# Patient Record
Sex: FEMALE | Race: WHITE | NOT HISPANIC OR LATINO | Employment: OTHER | URBAN - METROPOLITAN AREA
[De-identification: names, ages, dates, MRNs, and addresses within clinical notes are randomized per-mention and may not be internally consistent; named-entity substitution may affect disease eponyms.]

---

## 2017-01-16 ENCOUNTER — ALLSCRIPTS OFFICE VISIT (OUTPATIENT)
Dept: OTHER | Facility: OTHER | Age: 67
End: 2017-01-16

## 2017-01-16 ENCOUNTER — LAB REQUISITION (OUTPATIENT)
Dept: LAB | Facility: HOSPITAL | Age: 67
End: 2017-01-16
Payer: MEDICARE

## 2017-01-16 DIAGNOSIS — Z12.4 ENCOUNTER FOR SCREENING FOR MALIGNANT NEOPLASM OF CERVIX: ICD-10-CM

## 2017-01-16 DIAGNOSIS — N95.0 POSTMENOPAUSAL BLEEDING: ICD-10-CM

## 2017-01-16 PROCEDURE — G0145 SCR C/V CYTO,THINLAYER,RESCR: HCPCS | Performed by: OBSTETRICS & GYNECOLOGY

## 2017-01-16 PROCEDURE — 88305 TISSUE EXAM BY PATHOLOGIST: CPT | Performed by: OBSTETRICS & GYNECOLOGY

## 2017-01-23 ENCOUNTER — GENERIC CONVERSION - ENCOUNTER (OUTPATIENT)
Dept: OTHER | Facility: OTHER | Age: 67
End: 2017-01-23

## 2017-01-23 LAB
LAB AP GYN PRIMARY INTERPRETATION: NORMAL
Lab: NORMAL

## 2017-04-10 ENCOUNTER — GENERIC CONVERSION - ENCOUNTER (OUTPATIENT)
Dept: OTHER | Facility: OTHER | Age: 67
End: 2017-04-10

## 2017-04-10 ENCOUNTER — APPOINTMENT (OUTPATIENT)
Dept: LAB | Facility: HOSPITAL | Age: 67
End: 2017-04-10
Payer: MEDICARE

## 2017-04-10 ENCOUNTER — ALLSCRIPTS OFFICE VISIT (OUTPATIENT)
Dept: OTHER | Facility: OTHER | Age: 67
End: 2017-04-10

## 2017-04-10 ENCOUNTER — TRANSCRIBE ORDERS (OUTPATIENT)
Dept: LAB | Facility: HOSPITAL | Age: 67
End: 2017-04-10

## 2017-04-10 DIAGNOSIS — E78.5 HYPERLIPIDEMIA: ICD-10-CM

## 2017-04-10 DIAGNOSIS — I10 ESSENTIAL (PRIMARY) HYPERTENSION: ICD-10-CM

## 2017-04-10 DIAGNOSIS — E55.9 VITAMIN D DEFICIENCY: ICD-10-CM

## 2017-04-10 DIAGNOSIS — E66.9 OBESITY: ICD-10-CM

## 2017-04-10 DIAGNOSIS — R73.01 IMPAIRED FASTING GLUCOSE: ICD-10-CM

## 2017-04-10 LAB
25(OH)D3 SERPL-MCNC: 42.7 NG/ML (ref 30–100)
ALBUMIN SERPL BCP-MCNC: 4.3 G/DL (ref 3.5–5)
ALP SERPL-CCNC: 85 U/L (ref 46–116)
ALT SERPL W P-5'-P-CCNC: 36 U/L (ref 12–78)
AST SERPL W P-5'-P-CCNC: 25 U/L (ref 5–45)
BASOPHILS # BLD AUTO: 0.04 THOUSANDS/ΜL (ref 0–0.1)
BASOPHILS NFR BLD AUTO: 1 % (ref 0–1)
BILIRUB SERPL-MCNC: 0.54 MG/DL (ref 0.2–1)
BUN SERPL-MCNC: 23 MG/DL (ref 5–25)
CALCIUM SERPL-MCNC: 9.4 MG/DL (ref 8.3–10.1)
CHLORIDE SERPL-SCNC: 106 MMOL/L (ref 100–108)
CHOLEST SERPL-MCNC: 209 MG/DL (ref 50–200)
CREAT SERPL-MCNC: 0.75 MG/DL (ref 0.6–1.3)
CREAT UR-MCNC: 111 MG/DL
EOSINOPHIL # BLD AUTO: 0.23 THOUSAND/ΜL (ref 0–0.61)
EOSINOPHIL NFR BLD AUTO: 4 % (ref 0–6)
ERYTHROCYTE [DISTWIDTH] IN BLOOD BY AUTOMATED COUNT: 13.2 % (ref 11.6–15.1)
EST. AVERAGE GLUCOSE BLD GHB EST-MCNC: 137 MG/DL
GFR SERPL CREATININE-BSD FRML MDRD: >60 ML/MIN/1.73SQ M
GLUCOSE P FAST SERPL-MCNC: 99 MG/DL (ref 65–99)
HBA1C MFR BLD: 6.4 % (ref 4.2–6.3)
HCT VFR BLD AUTO: 40.7 % (ref 34.8–46.1)
HDLC SERPL-MCNC: 60 MG/DL (ref 40–60)
HGB BLD-MCNC: 13.7 G/DL (ref 11.5–15.4)
LDLC SERPL CALC-MCNC: 125 MG/DL (ref 0–100)
LYMPHOCYTES # BLD AUTO: 1.79 THOUSANDS/ΜL (ref 0.6–4.47)
LYMPHOCYTES NFR BLD AUTO: 32 % (ref 14–44)
MCH RBC QN AUTO: 31.6 PG (ref 26.8–34.3)
MCHC RBC AUTO-ENTMCNC: 33.7 G/DL (ref 31.4–37.4)
MCV RBC AUTO: 94 FL (ref 82–98)
MICROALBUMIN UR-MCNC: 24.3 MG/L (ref 0–20)
MICROALBUMIN/CREAT 24H UR: 22 MG/G CREATININE (ref 0–30)
MONOCYTES # BLD AUTO: 0.5 THOUSAND/ΜL (ref 0.17–1.22)
MONOCYTES NFR BLD AUTO: 9 % (ref 4–12)
NEUTROPHILS # BLD AUTO: 3.06 THOUSANDS/ΜL (ref 1.85–7.62)
NEUTS SEG NFR BLD AUTO: 54 % (ref 43–75)
NRBC BLD AUTO-RTO: 0 /100 WBCS
PLATELET # BLD AUTO: 257 THOUSANDS/UL (ref 149–390)
PMV BLD AUTO: 9.6 FL (ref 8.9–12.7)
POTASSIUM SERPL-SCNC: 4.7 MMOL/L (ref 3.5–5.3)
PROT SERPL-MCNC: 8.1 G/DL (ref 6.4–8.2)
RBC # BLD AUTO: 4.33 MILLION/UL (ref 3.81–5.12)
SODIUM SERPL-SCNC: 139 MMOL/L (ref 136–145)
TRIGL SERPL-MCNC: 122 MG/DL
TSH SERPL DL<=0.05 MIU/L-ACNC: 2.56 UIU/ML (ref 0.36–3.74)
WBC # BLD AUTO: 5.63 THOUSAND/UL (ref 4.31–10.16)

## 2017-04-10 PROCEDURE — 85025 COMPLETE CBC W/AUTO DIFF WBC: CPT

## 2017-04-10 PROCEDURE — 82570 ASSAY OF URINE CREATININE: CPT

## 2017-04-10 PROCEDURE — 82043 UR ALBUMIN QUANTITATIVE: CPT

## 2017-04-10 PROCEDURE — 80053 COMPREHEN METABOLIC PANEL: CPT

## 2017-04-10 PROCEDURE — 82306 VITAMIN D 25 HYDROXY: CPT

## 2017-04-10 PROCEDURE — 80061 LIPID PANEL: CPT

## 2017-04-10 PROCEDURE — 83036 HEMOGLOBIN GLYCOSYLATED A1C: CPT

## 2017-04-10 PROCEDURE — 36415 COLL VENOUS BLD VENIPUNCTURE: CPT

## 2017-04-10 PROCEDURE — 84443 ASSAY THYROID STIM HORMONE: CPT

## 2017-04-11 ENCOUNTER — GENERIC CONVERSION - ENCOUNTER (OUTPATIENT)
Dept: OTHER | Facility: OTHER | Age: 67
End: 2017-04-11

## 2017-04-11 ENCOUNTER — ALLSCRIPTS OFFICE VISIT (OUTPATIENT)
Dept: OTHER | Facility: OTHER | Age: 67
End: 2017-04-11

## 2017-05-04 ENCOUNTER — GENERIC CONVERSION - ENCOUNTER (OUTPATIENT)
Dept: OTHER | Facility: OTHER | Age: 67
End: 2017-05-04

## 2017-05-04 LAB
LEFT EYE DIABETIC RETINOPATHY: NORMAL
RIGHT EYE DIABETIC RETINOPATHY: NORMAL

## 2017-07-12 ENCOUNTER — ALLSCRIPTS OFFICE VISIT (OUTPATIENT)
Dept: OTHER | Facility: OTHER | Age: 67
End: 2017-07-12

## 2017-10-02 DIAGNOSIS — R73.01 IMPAIRED FASTING GLUCOSE: ICD-10-CM

## 2017-10-02 DIAGNOSIS — I10 ESSENTIAL (PRIMARY) HYPERTENSION: ICD-10-CM

## 2017-10-02 DIAGNOSIS — E66.9 OBESITY: ICD-10-CM

## 2017-10-02 DIAGNOSIS — E78.5 HYPERLIPIDEMIA: ICD-10-CM

## 2017-10-11 ENCOUNTER — GENERIC CONVERSION - ENCOUNTER (OUTPATIENT)
Dept: OTHER | Facility: OTHER | Age: 67
End: 2017-10-11

## 2017-10-11 ENCOUNTER — APPOINTMENT (OUTPATIENT)
Dept: LAB | Facility: HOSPITAL | Age: 67
End: 2017-10-11
Payer: MEDICARE

## 2017-10-11 DIAGNOSIS — I10 ESSENTIAL (PRIMARY) HYPERTENSION: ICD-10-CM

## 2017-10-11 DIAGNOSIS — E78.5 HYPERLIPIDEMIA: ICD-10-CM

## 2017-10-11 DIAGNOSIS — R73.01 IMPAIRED FASTING GLUCOSE: ICD-10-CM

## 2017-10-11 DIAGNOSIS — E66.9 OBESITY: ICD-10-CM

## 2017-10-11 LAB
ALBUMIN SERPL BCP-MCNC: 3.8 G/DL (ref 3.5–5)
ALP SERPL-CCNC: 88 U/L (ref 46–116)
ALT SERPL W P-5'-P-CCNC: 46 U/L (ref 12–78)
ANION GAP SERPL CALCULATED.3IONS-SCNC: 6 MMOL/L (ref 4–13)
AST SERPL W P-5'-P-CCNC: 41 U/L (ref 5–45)
BILIRUB SERPL-MCNC: 0.64 MG/DL (ref 0.2–1)
BUN SERPL-MCNC: 17 MG/DL (ref 5–25)
CALCIUM SERPL-MCNC: 9.4 MG/DL (ref 8.3–10.1)
CHLORIDE SERPL-SCNC: 106 MMOL/L (ref 100–108)
CHOLEST SERPL-MCNC: 205 MG/DL (ref 50–200)
CO2 SERPL-SCNC: 26 MMOL/L (ref 21–32)
CREAT SERPL-MCNC: 0.72 MG/DL (ref 0.6–1.3)
EST. AVERAGE GLUCOSE BLD GHB EST-MCNC: 143 MG/DL
GFR SERPL CREATININE-BSD FRML MDRD: 86 ML/MIN/1.73SQ M
GLUCOSE P FAST SERPL-MCNC: 114 MG/DL (ref 65–99)
HBA1C MFR BLD: 6.6 % (ref 4.2–6.3)
HDLC SERPL-MCNC: 58 MG/DL (ref 40–60)
LDLC SERPL CALC-MCNC: 120 MG/DL (ref 0–100)
POTASSIUM SERPL-SCNC: 4.1 MMOL/L (ref 3.5–5.3)
PROT SERPL-MCNC: 8.5 G/DL (ref 6.4–8.2)
SODIUM SERPL-SCNC: 138 MMOL/L (ref 136–145)
TRIGL SERPL-MCNC: 137 MG/DL

## 2017-10-11 PROCEDURE — 83036 HEMOGLOBIN GLYCOSYLATED A1C: CPT

## 2017-10-11 PROCEDURE — 36415 COLL VENOUS BLD VENIPUNCTURE: CPT

## 2017-10-11 PROCEDURE — 80053 COMPREHEN METABOLIC PANEL: CPT

## 2017-10-11 PROCEDURE — 80061 LIPID PANEL: CPT

## 2017-10-12 ENCOUNTER — ALLSCRIPTS OFFICE VISIT (OUTPATIENT)
Dept: OTHER | Facility: OTHER | Age: 67
End: 2017-10-12

## 2017-10-13 NOTE — PROGRESS NOTES
Assessment  1  Diabetes mellitus type 2, controlled (250 00) (E11 9)   2  Hyperlipidemia (272 4) (E78 5)   3  Benign essential hypertension (401 1) (I10)   4  Morbid obesity with body mass index of 40 0-49 9 (278 01) (E66 01)   5  Medicare annual wellness visit, subsequent (V70 0) (Z00 00)    Plan  Benign essential hypertension    · Losartan Potassium 100 MG Oral Tablet; TAKE 1 TABLET DAILY  Benign essential hypertension, Diabetes mellitus type 2, controlled, Hyperlipidemia,  Morbid obesity with body mass index of 40 0-49 9    · (1) CBC/PLT/DIFF; Status:Active; Requested for:02Apr2018;    · (1) COMPREHENSIVE METABOLIC PANEL; Status:Active; Requested for:02Apr2018;    · (1) HEMOGLOBIN A1C; Status:Active; Requested for:02Apr2018;    · (1) LIPID PANEL FASTING W DIRECT LDL REFLEX; Status:Active; Requested  for:02Apr2018;    · (1) MICROALBUMIN CREATININE RATIO, RANDOM URINE; Status:Active; Requested  for:02Apr2018;    · (1) TSH WITH FT4 REFLEX; Status:Active; Requested for:02Apr2018;   Diabetes mellitus type 2, controlled    · MetFORMIN HCl - 500 MG Oral Tablet; take 1 tablet every twelve hours   · *VB - Foot Exam; Status:Complete;   Done: 39SRM9136 02:33PM  Hyperlipidemia    · Simvastatin 20 MG Oral Tablet;  Take 1 tablet daily  Medicare annual wellness visit, subsequent    · *VB - Fall Risk Assessment  (Dx Z13 89 Screen for Neurologic Disorder);  Status:Complete;   Done: 46XWG8365 01:29PM   · *VB-Depression Screening; Status:Complete;   Done: 52CUL1118 01:30PM  Morbid obesity with body mass index of 40 0-49 9    · 1 Alfreda Montgomery MD, Jenaro Farooq (Internal Medicine) Co-Management  *  Status: Active  Requested  for: 21GXV7939  Care Summary provided  : Yes   · Keep a diary of when and what you eat ; Status:Complete;   Done: 44SOY4716 02:34PM   · Some eating tips that can help you lose weight ; Status:Complete;   Done: 47TSB9200  02:34PM   · We recommend that you bring your body mass index down to 26 ; Status:Complete;    Done: 98AMU1860 02:34PM  Vitamin D deficiency    · Vitamin D-3 5000 UNIT TABS; Take one capsule daily  Unlinked    · Calcium TABS   · Fish Oil OIL   · Osteo Bi-Flex Joint Shield Oral Tablet    Discussion/Summary    Reviewed lab in 10/63910 6 Low carb diet  Advised pt to take metformin  SE educated pt  ok205/137/58/120 low fat diet  other meds  weight! Refer to wt loss program  flu shot this season  pneumovax at age of 70  Got prevnar 13 2/2016zostavax in 3/2017 per pt  GYN Dr Anitra Santiago for mammogram and pap  Had mammogram 11/2016 negative  Pap 2017, negative per pt  colonoscopy 2012 Dr Evelyn Reno  Due for it  Will make an appt per pt  scan 11/2016 normal  in 6 months  Possible side effects of new medications were reviewed with the patient/guardian today  The treatment plan was reviewed with the patient/guardian  The patient/guardian understands and agrees with the treatment plan      Chief Complaint  Patient presents for a 6 month follow up      History of Present Illness  Pt is here by herself  HgA1C 6 5 Does not take metformin now  neuropathy  Denies hypoglycemia  Does not check BS at home  opthalmology 5/2017 Dr Mamadou Knapp  No retinopathy  orthopedics for knee pain/arthritis  Got cortisone shots which helped some  dermatology as needed for 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  Got steroid shot which helped a lot  obesity---BMI 47 86 today  by herself  Does all ADL's  Still drive  recent falls  score 9 in office  Pt Denies depression  A lot of stress per pt  The patient states her hyperlipidemia has been stable since the last visit  Comorbid Illnesses: diabetes mellitus-and-hypertension  Symptoms: The patient is currently asymptomatic  Medications: the patient is adherent with her medication regimen -She denies medication side effects  the patient's LDL goal is 130 mg/dL  -the patient's last LDL was 120 mg/dL -10/2017  The patient presents for follow-up of essential hypertension   The patient states she has been doing well with her blood pressure control since the last visit  She has no comorbid illnesses  Symptoms: The patient is currently asymptomatic  Home monitoring: The patient is not checking blood pressure at home  Medications: the patient is adherent with her medication regimen -She denies medication side effects  Review of Systems    Constitutional: No fever, no chills, feels well, no tiredness, no recent weight gain or weight loss  ENT: no complaints of earache, no loss of hearing, no nose bleeds, no nasal discharge, no sore throat, no hoarseness  Cardiovascular: No complaints of slow heart rate, no fast heart rate, no chest pain, no palpitations, no leg claudication, no lower extremity edema  Respiratory: No complaints of shortness of breath, no wheezing, no cough, no SOB on exertion, no orthopnea, no PND  Gastrointestinal: No complaints of abdominal pain, no constipation, no nausea or vomiting, no diarrhea, no bloody stools  Musculoskeletal: No complaints of arthralgias, no myalgias, no joint swelling or stiffness, no limb pain or swelling  Active Problems  1  Benign essential hypertension (401 1) (I10)   2  Cervical cancer screening (V76 2) (Z12 4)   3  Cervical radiculopathy (723 4) (M54 12)   4  Chronic lower back pain (724 2,338 29) (M54 5,G89 29)   5  Encounter for routine gynecological examination (V72 31) (Z01 419)   6  Encounter for screening mammogram for breast cancer (V76 12) (Z12 31)   7  Hyperlipidemia (272 4) (E78 5)   8  Left knee pain (719 46) (M25 562)   9  Lumbar stenosis with neurogenic claudication (724 03) (M48 062)   10  Need for shingles vaccine (V04 89) (Z23)   11  Need for vaccination with 13-polyvalent pneumococcal conjugate vaccine (V03 82) (Z23)   12  Osteoarthrosis (715 90) (M19 90)   13  Post-menopausal bleeding (627 1) (N95 0)   14  Primary osteoarthritis of both knees (715 16) (M17 0)   15  Psoriasis (696 1) (L40 9)   16  Right knee pain (719 46) (M25 561)   17   Right shoulder pain (719 41) (M25 511)   18  Screening for osteoporosis (V82 81) (Z13 820)   19  Spondylolisthesis, lumbar region (738 4) (M43 16)   20  Vaginal lump (625 8) (N94 9)   21  Vitamin D deficiency (268 9) (E55 9)    Past Medical History  1  History of Calcaneal spur (726 73) (M77 30)   2  History of Elevated ALT measurement (790 4) (R74 0)   3  History of alopecia (V13 89) (Z87 898)   4  History of Pes planus, unspecified laterality (734) (M21 40)   5  History of Plantar fasciitis (728 71) (M72 2)   6  History of Pure hypercholesterolemia (272 0) (E78 00)    Surgical History  1  History of Colonoscopy (Fiberoptic)   2  History of Incisional Breast Biopsy    Family History  Mother    1  Family history of Diabetes Mellitus (V18 0)  Brother    2  Family history of Colon Cancer (V16 0)    Social History   · Being A Social Drinker   · Never A Smoker    Current Meds   1  Calcium TABS; Therapy: (Recorded:89Dfw5026) to Recorded   2  Fish Oil OIL; Therapy: (Recorded:12Oct2017) to Recorded   3  Losartan Potassium 100 MG Oral Tablet; TAKE 1 TABLET DAILY; Therapy: 09Apr2012 to (Evaluate:06Apr2018)  Requested for: 90Blm5861; Last   Rx:11Apr2017 Ordered   4  MetFORMIN HCl - 500 MG Oral Tablet; take 1 tablet every twelve hours; Therapy: 72Rqn2993 to (Evaluate:08Oct2017); Last Rx:18Gnf8892 Ordered   5  Osteo Bi-Flex Joint Shield Oral Tablet; Therapy: (Recorded:12Oct2017) to Recorded   6  Simvastatin 20 MG Oral Tablet; Take 1 tablet daily; Therapy: 03DQI2771 to (Evaluate:06Apr2018)  Requested for: 49Xth0090; Last   Rx:14Bvz8251 Ordered   7  Vitamin D-3 5000 UNIT TABS; Take one capsule daily; Therapy: 77Oyr8794 to (Evaluate:10Tsz5857); Last Rx:61Dgz8475 Ordered    Allergies  1  Amoxicillin TABS   2  Bactrim TABS   3  Procardia CAPS   4  Augmentin TABS   5   Floxin TABS    Vitals  Vital Signs    Recorded: 68IMX7940 01:17PM   Temperature 98 6 F, Tympanic   Heart Rate 68, L Radial   Pulse Quality Normal, L Radial Respiration Quality Normal   Respiration 16   Systolic 213, LUE, Sitting   Diastolic 78, LUE, Sitting   Height 4 ft 10 in   Weight 229 lb    BMI Calculated 47 86   BSA Calculated 1 93   Pain Scale 3     Physical Exam    Constitutional   General appearance: No acute distress, well appearing and well nourished  Pulmonary   Respiratory effort: No increased work of breathing or signs of respiratory distress  Auscultation of lungs: Clear to auscultation  Cardiovascular   Auscultation of heart: Normal rate and rhythm, normal S1 and S2, without murmurs  Examination of extremities for edema and/or varicosities: Normal     Carotid pulses: Normal     Abdomen   Abdomen: Non-tender, no masses  Liver and spleen: No hepatomegaly or splenomegaly  Lymphatic   Palpation of lymph nodes in neck: No lymphadenopathy  Musculoskeletal   Gait and station: Normal         Socks and shoes removed, Right Foot Findings: normal foot, no swelling, no erythema  Normal tactile sensation with monofilament testing throughout the right foot  Socks and shoes removed, Left Foot Findings: normal foot, no swelling, no erythema  Normal tactile sensation with monofilament testing throughout the left foot  Pulses:   2+ in the dorsalis pedis on the right  Pulses:   2+ in the dorsalis pedis on the left        Results/Data  *VB-Depression Screening 27DMW3797 01:30PM Ludie Hands     Test Name Result Flag Reference   Depression Scale Result      Depression Screen - Negative For Symptoms     *VB - Fall Risk Assessment  (Dx Z13 89 Screen for Neurologic Disorder) 35VQK9948 01:29PM Ludie Hands     Test Name Result Flag Reference   Falls Risk      No falls in the past year     (1) COMPREHENSIVE METABOLIC PANEL 97NGO3097 79:57LX Amaury Gama Order Number: [id number]     Test Name Result Flag Reference   SODIUM 138 mmol/L  136-145   POTASSIUM 4 1 mmol/L  3 5-5 3   CHLORIDE 106 mmol/L  100-108   CARBON DIOXIDE 26 mmol/L  21-32 ANION GAP (CALC) 6 mmol/L  4-13   BLOOD UREA NITROGEN 17 mg/dL  5-25   CREATININE 0 72 mg/dL  0 60-1 30   Standardized to IDMS reference method   CALCIUM 9 4 mg/dL  8 3-10 1   BILI, TOTAL 0 64 mg/dL  0 20-1 00   ALK PHOSPHATAS 88 U/L     ALT (SGPT) 46 U/L  12-78   Specimen collection should occur prior to Sulfasalazine and/or Sulfapyridine administration due to the potential for falsely depressed results  AST(SGOT) 41 U/L  5-45   Specimen collection should occur prior to Sulfasalazine administration due to the potential for falsely depressed results  ALBUMIN 3 8 g/dL  3 5-5 0   TOTAL PROTEIN 8 5 g/dL H 6 4-8 2   eGFR 86 ml/min/1 73sq m     National Kidney Disease Education Program recommendations are as follows:  GFR calculation is accurate only with a steady state creatinine  Chronic Kidney disease less than 60 ml/min/1 73 sq  meters  Kidney failure less than 15 ml/min/1 73 sq  meters  GLUCOSE FASTING 114 mg/dL H 65-99   Specimen collection should occur prior to Sulfasalazine administration due to the potential for falsely depressed results  Specimen collection should occur prior to Sulfapyridine administration due to the potential for falsely elevated results  (1) HEMOGLOBIN A1C 11Oct2017 02:31PM Choco Cowden    Order Number: [id number]     Test Name Result Flag Reference   HEMOGLOBIN A1C 6 6 % H 4 2-6 3   EST  AVG  GLUCOSE 143 mg/dl       (1) LIPID PANEL FASTING W DIRECT LDL REFLEX 11Oct2017 02:31P Choco Cowden    Order Number: [id number]     Test Name Result Flag Reference   CHOLESTEROL 205 mg/dL H    LDL CHOLESTEROL CALCULATED 120 mg/dL H 0-100   - Patient Instructions:  This is a fasting blood test  Water, black tea or black coffee only after 9:00pm the night before test   Drink 2 glasses of water the morning of test       Triglyceride:        Normal <150 mg/dl   Borderline High 150-199 mg/dl   High 200-499 mg/dl   Very High >499 mg/dl      Cholesterol: Desirable <200 mg/dl    Borderline High 200-239 mg/dl    High >239 mg/dl      HDL Cholesterol:       High>59 mg/dL    Low <41 mg/dL      HDL Cholesterol:       High>59 mg/dL    Low <41 mg/dL      This screening LDL is a calculated result  It does not have the accuracy of the Direct Measured LDL in the monitoring of patients with hyperlipidemia and/or statin therapy  Direct Measure LDL (MJV393) must be ordered separately in these patients  TRIGLYCERIDES 137 mg/dL  <=150   Specimen collection should occur prior to N-Acetylcysteine or Metamizole administration due to the potential for falsely depressed results  HDL,DIRECT 58 mg/dL  40-60   Specimen collection should occur prior to Metamizole administration due to the potential for falsley depressed results  Health Management  Health Maintenance   *VB - Eye Exam; every 1 year; Last 72AXJ4658; Next Due: 98OIL9879; Active    Future Appointments    Date/Time Provider Specialty Site   10/31/2017 01:30 PM PREET Obrien  Orthopedic Surgery [address]     Signatures   Electronically signed by :  Shelby Jeong MD; Oct 12 2017  2:35PM EST                       (Author)

## 2017-10-31 ENCOUNTER — GENERIC CONVERSION - ENCOUNTER (OUTPATIENT)
Dept: OTHER | Facility: OTHER | Age: 67
End: 2017-10-31

## 2017-11-15 ENCOUNTER — TRANSCRIBE ORDERS (OUTPATIENT)
Dept: ADMINISTRATIVE | Facility: HOSPITAL | Age: 67
End: 2017-11-15

## 2017-11-15 DIAGNOSIS — Z12.31 VISIT FOR SCREENING MAMMOGRAM: Primary | ICD-10-CM

## 2017-11-16 ENCOUNTER — GENERIC CONVERSION - ENCOUNTER (OUTPATIENT)
Dept: OTHER | Facility: OTHER | Age: 67
End: 2017-11-16

## 2017-11-16 DIAGNOSIS — Z12.31 ENCOUNTER FOR SCREENING MAMMOGRAM FOR MALIGNANT NEOPLASM OF BREAST: ICD-10-CM

## 2017-11-30 ENCOUNTER — HOSPITAL ENCOUNTER (OUTPATIENT)
Dept: RADIOLOGY | Age: 67
Discharge: HOME | End: 2017-11-30
Payer: MEDICARE

## 2017-11-30 DIAGNOSIS — Z12.31 ENCOUNTER FOR SCREENING MAMMOGRAM FOR MALIGNANT NEOPLASM OF BREAST: ICD-10-CM

## 2017-11-30 PROCEDURE — G0202 SCR MAMMO BI INCL CAD: HCPCS

## 2018-01-09 NOTE — RESULT NOTES
Message   DW Pt on 2/25/2016 OV  Verified Results  (1) COMPREHENSIVE METABOLIC PANEL 73DNE2423 70:24GG [place], [facility] Kidney Disease Education Program recommendations are as follows:  GFR calculation is accurate only with a steady state creatinine  Chronic Kidney disease less than 60 ml/min/1 73 sq  meters  Kidney failure less than 15 ml/min/1 73 sq  meters  Test Name Result Flag Reference   GLUCOSE,RANDM 86 mg/dL     If the patient is fasting, the ADA then defines impaired fasting glucose as > 100 mg/dL and diabetes as > or equal to 123 mg/dL     SODIUM 141 mmol/L  136-145   POTASSIUM 4 1 mmol/L  3 5-5 3   CHLORIDE 105 mmol/L  100-108   CARBON DIOXIDE 29 mmol/L  21-32   ANION GAP (CALC) 7 mmol/L  4-13   BLOOD UREA NITROGEN 14 mg/dL  5-25   CREATININE 0 71 mg/dL  0 60-1 30   Standardized to IDMS reference method   CALCIUM 9 0 mg/dL  8 3-10 1   BILI, TOTAL 0 65 mg/dL  0 20-1 00   ALK PHOSPHATAS 84 U/L     ALT (SGPT) 32 U/L  12-78   AST(SGOT) 29 U/L  5-45   ALBUMIN 3 8 g/dL  3 5-5 0   TOTAL PROTEIN 7 9 g/dL  6 4-8 2   eGFR Non-African American      >60 0 ml/min/1 73sq m     (1) VITAMIN D 25-HYDROXY 15Oha7223 02:58PM Mariela Spruce     Test Name Result Flag Reference   VIT D 25-HYDROX 42 8 ng/mL  30 0-100 0     (1) LIPID PANEL FASTING W DIRECT LDL REFLEX 98NQJ1341 02:58PM Mariela Spruce   Triglyceride:         Normal              <150 mg/dl       Borderline High    150-199 mg/dl       High               200-499 mg/dl       Very High          >499 mg/dl  Cholesterol:         Desirable        <200 mg/dl      Borderline High  200-239 mg/dl      High             >239 mg/dl  HDL Cholesterol:        High    >59 mg/dL      Low     <41 mg/dL  LDL Cholesterol:        Optimal          <100 mg/dl         Near Optimal     100-129 mg/dl        Above Optimal          Borderline High   130-159 mg/dl          High              160-189 mg/dl          Very High        >189 mg/dl  LDL CALCULATED:    This screening LDL is a calculated result  It does not have the accuracy of the Direct Measured LDL in the monitoring of patients with hyperlipidemia and/or statin therapy  Direct Measure LDL (WYU212) must be ordered separately in these patients  Test Name Result Flag Reference   CHOLESTEROL 197 mg/dL     LDL CHOLESTEROL CALCULATED 113 mg/dL H 0-100   TRIGLYCERIDES 127 mg/dL  <=150   HDL,DIRECT 59 mg/dL  40-60     (1) CBC/PLT/DIFF 74ONA7878 02:58PM [place]     Test Name Result Flag Reference   WBC COUNT 5 76 Thousand/uL  4 31-10 16   RBC COUNT 4 30 Million/uL  3 81-5 12   HEMOGLOBIN 13 7 g/dL  11 5-15 4   HEMATOCRIT 40 5 %  34 8-46  1   MCV 94 fL  82-98   MCH 31 9 pg  26 8-34 3   MCHC 33 8 g/dL  31 4-37 4   RDW 13 1 %  11 6-15 1   MPV 9 8 fL  8 9-12 7   PLATELET COUNT 258 Thousands/uL  149-390   nRBC AUTOMATED 0 /100 WBCs     NEUTROPHILS RELATIVE PERCENT 47 %  43-75   LYMPHOCYTES RELATIVE PERCENT 35 %  14-44   MONOCYTES RELATIVE PERCENT 9 %  4-12   EOSINOPHILS RELATIVE PERCENT 8 % H 0-6   BASOPHILS RELATIVE PERCENT 1 %  0-1   NEUTROPHILS ABSOLUTE COUNT 2 80 Thousands/µL  1 85-7 62   LYMPHOCYTES ABSOLUTE COUNT 1 99 Thousands/µL  0 60-4 47   MONOCYTES ABSOLUTE COUNT 0 50 Thousand/µL  0 17-1 22   EOSINOPHILS ABSOLUTE COUNT 0 43 Thousand/µL  0 00-0 61   BASOPHILS ABSOLUTE COUNT 0 03 Thousands/µL  0 00-0 10

## 2018-01-09 NOTE — RESULT NOTES
Message   DW pt on 2/25/2016 OV  Verified Results  (1) HEMOGLOBIN A1C 74Ewf8112 02:58PM Christiana Ewing   5 7-6 4% impaired fasting glucose  >=6 5% diagnosis of diabetes    Falsely low levels are seen in conditions linked to short RBC life span-  hemolytic anemia, and splenomegaly  Falsely elevated levels are seen in situations where there is an increased production of RBC- receipt of erythropoietin or blood transfusions  Adopted from ADA-Clinical Practice Recommendations     Test Name Result Flag Reference   HEMOGLOBIN A1C 6 2 % H 4 0-5 6   EST  AVG   GLUCOSE 131 mg/dl

## 2018-01-10 NOTE — RESULT NOTES
Verified Results  (1) COMPREHENSIVE METABOLIC PANEL [id number] 24:98NX Rachel Haider Order Number: [id number]     Test Name Result Flag Reference   SODIUM 138 mmol/L  136-145   POTASSIUM 4 1 mmol/L  3 5-5 3   CHLORIDE 106 mmol/L  100-108   CARBON DIOXIDE 26 mmol/L  21-32   ANION GAP (CALC) 6 mmol/L  4-13   BLOOD UREA NITROGEN 17 mg/dL  5-25   CREATININE 0 72 mg/dL  0 60-1 30   Standardized to IDMS reference method   CALCIUM 9 4 mg/dL  8 3-10 1   BILI, TOTAL 0 64 mg/dL  0 20-1 00   ALK PHOSPHATAS 88 U/L     ALT (SGPT) 46 U/L  12-78   Specimen collection should occur prior to Sulfasalazine and/or Sulfapyridine administration due to the potential for falsely depressed results  AST(SGOT) 41 U/L  5-45   Specimen collection should occur prior to Sulfasalazine administration due to the potential for falsely depressed results  ALBUMIN 3 8 g/dL  3 5-5 0   TOTAL PROTEIN 8 5 g/dL H 6 4-8 2   eGFR 86 ml/min/1 73sq m     National Kidney Disease Education Program recommendations are as follows:  GFR calculation is accurate only with a steady state creatinine  Chronic Kidney disease less than 60 ml/min/1 73 sq  meters  Kidney failure less than 15 ml/min/1 73 sq  meters  GLUCOSE FASTING 114 mg/dL H 65-99   Specimen collection should occur prior to Sulfasalazine administration due to the potential for falsely depressed results  Specimen collection should occur prior to Sulfapyridine administration due to the potential for falsely elevated results  (1) HEMOGLOBIN A1C 11Oct2017 02:31PPREET Olvera Helleroy    Order Number: [id number]     Test Name Result Flag Reference   HEMOGLOBIN A1C 6 6 % H 4 2-6 3   EST  AVG  GLUCOSE 143 mg/dl       (1) LIPID PANEL FASTING W DIRECT LDL REFLEX 11Oct2017 02:31PM Wade Spralicia    Order Number: [id number]     Test Name Result Flag Reference   CHOLESTEROL 205 mg/dL H    LDL CHOLESTEROL CALCULATED 120 mg/dL H 0-100   - Patient Instructions:  This is a fasting blood test  Water, black tea or black coffee only after 9:00pm the night before test   Drink 2 glasses of water the morning of test       Triglyceride:        Normal <150 mg/dl   Borderline High 150-199 mg/dl   High 200-499 mg/dl   Very High >499 mg/dl      Cholesterol:       Desirable <200 mg/dl    Borderline High 200-239 mg/dl    High >239 mg/dl      HDL Cholesterol:       High>59 mg/dL    Low <41 mg/dL      HDL Cholesterol:       High>59 mg/dL    Low <41 mg/dL      This screening LDL is a calculated result  It does not have the accuracy of the Direct Measured LDL in the monitoring of patients with hyperlipidemia and/or statin therapy  Direct Measure LDL (RCS935) must be ordered separately in these patients  TRIGLYCERIDES 137 mg/dL  <=150   Specimen collection should occur prior to N-Acetylcysteine or Metamizole administration due to the potential for falsely depressed results  HDL,DIRECT 58 mg/dL  40-60   Specimen collection should occur prior to Metamizole administration due to the potential for falsley depressed results

## 2018-01-10 NOTE — PROGRESS NOTES
Assessment    1  Diabetes mellitus type 2, controlled (250 00) (E11 9)   2  Hyperlipidemia (272 4) (E78 5)   3  Benign essential hypertension (401 1) (I10)   4  Morbid obesity with body mass index of 40 0-49 9 (278 01) (E66 01)   5  Medicare annual wellness visit, subsequent (V70 0) (Z00 00)    Plan  Benign essential hypertension    · Losartan Potassium 100 MG Oral Tablet; TAKE 1 TABLET DAILY  Benign essential hypertension, Diabetes mellitus type 2, controlled, Hyperlipidemia,  Morbid obesity with body mass index of 40 0-49 9    · (1) CBC/PLT/DIFF; Status:Active; Requested for:02Apr2018;    · (1) COMPREHENSIVE METABOLIC PANEL; Status:Active; Requested for:02Apr2018;    · (1) HEMOGLOBIN A1C; Status:Active; Requested for:02Apr2018;    · (1) LIPID PANEL FASTING W DIRECT LDL REFLEX; Status:Active; Requested  for:02Apr2018;    · (1) MICROALBUMIN CREATININE RATIO, RANDOM URINE; Status:Active; Requested  for:02Apr2018;    · (1) TSH WITH FT4 REFLEX; Status:Active; Requested for:02Apr2018;   Diabetes mellitus type 2, controlled    · MetFORMIN HCl - 500 MG Oral Tablet; take 1 tablet every twelve hours   · *VB - Foot Exam; Status:Complete;   Done: 80YGY2332 02:33PM  Hyperlipidemia    · Simvastatin 20 MG Oral Tablet;  Take 1 tablet daily  Medicare annual wellness visit, subsequent    · *VB - Fall Risk Assessment  (Dx Z13 89 Screen for Neurologic Disorder);  Status:Complete;   Done: 20VAR0228 01:29PM   · *VB-Depression Screening; Status:Complete;   Done: 48DLL1093 01:30PM  Morbid obesity with body mass index of 40 0-49 9    · 1 Toro Saavedra MD, Brian Bernard (Internal Medicine) Co-Management  *  Status: Active  Requested  for: 42HOK5051  Care Summary provided  : Yes   · Keep a diary of when and what you eat ; Status:Complete;   Done: 14BJB5759   · Some eating tips that can help you lose weight ; Status:Complete;   Done: 12Oct2017   · We recommend that you bring your body mass index down to 26 ; Status:Complete;    Done: 12Oct2017  Vitamin D deficiency    · Vitamin D-3 5000 UNIT TABS; Take one capsule daily  Unlinked    · Calcium TABS   · Fish Oil OIL   · Osteo Bi-Flex Joint Shield Oral Tablet    Discussion/Summary    MMSE 30/30 today in office  Give "advance directive" and "My five wish" to pt  Impression: Subsequent Annual Wellness Visit  Cardiovascular screening and counseling: the risks and benefits of screening were discussed and screening is current  Diabetes screening and counseling: the risks and benefits of screening were discussed and screening is current  Colorectal cancer screening and counseling: the risks and benefits of screening were discussed and due for a colonoscopy (low risk)  Breast cancer screening and counseling: the risks and benefits of screening were discussed and screening is current  Cervical cancer screening and counseling: the risks and benefits of screening were discussed and screening is current  Osteoporosis screening and counseling: the risks and benefits of screening were discussed and screening is current  Glaucoma screening and counseling: the risks and benefits of screening were discussed and screening is current  Immunizations: the risks and benefits of influenza vaccination were discussed with the patient, influenza vaccination is recommended annually, the risks and benefits of pneumococcal vaccination were discussed with the patient and the patient declines the pneumococcal vaccination  Advance Directive Planning: not complete, paperwork and instructions were given to the patient  Patient Discussion: plan discussed with the patient  Chief Complaint  Patient is due for an annual wellness visit  History of Present Illness  Welcome to Medicare and Wellness Visits:   Medicare Screening and Risk Factors   Hospitalizations: no previous hospitalizations  Once per lifetime medicare screening tests: ECG has not been done and AAA screening US has not yet been done    Medicare Screening Tests Risk Questions   Abdominal aortic aneurysm risk assessment: none indicated  Osteoporosis risk assessment: , female gender and over 48years of age  Drug and Alcohol Use: The patient has never smoked cigarettes  The patient reports occasional alcohol use  She has never used illicit drugs  Diet and Physical Activity: Current diet includes 1 servings of fruit per day, 1 servings of vegetables per day, 1 servings of meat per day, 1 servings of dairy products per day and 1 cups of coffee per day  She is sedentary and exercises infrequently  The patient does not exercise  Mood Disorder and Cognitive Impairment Screening: PHQ-9 Depression Scale and clinically significant symptoms   Depression screening score was 9  She reports feeling down, depressed, or hopeless over the past two weeks  She reports feeling little interest or pleasure in doing things over the past two weeks  Functional Ability/Level of Safety: Hearing is normal bilaterally  She does not use a hearing aid  Home safety risk factors:  no grab bars in the bathroom  Advance Directives: Advance directives: no living will, no durable power of  for health care directives and no advance directives  Co-Managers and Medical Equipment/Suppliers: See Patient Care Team      Patient Care Team    Care Team Member Role Specialty Office Number   Dionicio How DO Specialist Obstetrics/Gynecology [phone]     Review of Systems    Constitutional: negative  ENT: negative  Cardiovascular: negative  Respiratory: negative  Gastrointestinal: negative  Musculoskeletal: negative  Active Problems    1  Benign essential hypertension (401 1) (I10)   2  Cervical cancer screening (V76 2) (Z12 4)   3  Cervical radiculopathy (723 4) (M54 12)   4  Chronic lower back pain (724 2,338 29) (M54 5,G89 29)   5  Encounter for routine gynecological examination (V72 31) (Z01 419)   6   Encounter for screening mammogram for breast cancer (V76 12) (Z12 31)   7  Hyperlipidemia (272 4) (E78 5)   8  Left knee pain (719 46) (M25 562)   9  Lumbar stenosis with neurogenic claudication (724 03) (M48 062)   10  Need for shingles vaccine (V04 89) (Z23)   11  Need for vaccination with 13-polyvalent pneumococcal conjugate vaccine (V03 82) (Z23)   12  Osteoarthrosis (715 90) (M19 90)   13  Post-menopausal bleeding (627 1) (N95 0)   14  Primary osteoarthritis of both knees (715 16) (M17 0)   15  Psoriasis (696 1) (L40 9)   16  Right knee pain (719 46) (M25 561)   17  Right shoulder pain (719 41) (M25 511)   18  Screening for osteoporosis (V82 81) (Z13 820)   19  Spondylolisthesis, lumbar region (738 4) (M43 16)   20  Vaginal lump (625 8) (N94 9)   21  Vitamin D deficiency (268 9) (E55 9)    Past Medical History    · History of Calcaneal spur (726 73) (M77 30)   · History of Elevated ALT measurement (790 4) (R74 0)   · History of alopecia (V13 89) (Z87 898)   · History of Pes planus, unspecified laterality (734) (M21 40)   · History of Plantar fasciitis (728 71) (M72 2)   · History of Pure hypercholesterolemia (272 0) (E78 00)    Surgical History    · History of Colonoscopy (Fiberoptic)   · History of Incisional Breast Biopsy    Family History  Mother    · Family history of Diabetes Mellitus (V18 0)  Brother    · Family history of Colon Cancer (V16 0)    Social History    · Being A Social Drinker   · Never A Smoker    Current Meds   1  Calcium TABS; Therapy: (Recorded:12Oct2017) to Recorded   2  Fish Oil OIL; Therapy: (Recorded:12Oct2017) to Recorded   3  Losartan Potassium 100 MG Oral Tablet; TAKE 1 TABLET DAILY; Therapy: 09Apr2012 to (Evaluate:06Apr2018)  Requested for: 11Apr2017; Last   Rx:70Wom6748 Ordered   4  MetFORMIN HCl - 500 MG Oral Tablet; take 1 tablet every twelve hours; Therapy: 20Pzk6423 to (Evaluate:08Oct2017); Last Rx:11Apr2017 Ordered   5  Osteo Bi-Flex Joint Shield Oral Tablet; Therapy: (Recorded:12Oct2017) to Recorded   6   Simvastatin 20 MG Oral Tablet; Take 1 tablet daily; Therapy: 78VYS3209 to (Evaluate:06Apr2018)  Requested for: 51Lwv9706; Last   Rx:31Hng5668 Ordered   7  Vitamin D-3 5000 UNIT TABS; Take one capsule daily; Therapy: 51Fzp1121 to (Evaluate:80Xnw3300); Last Rx:54Odg2880 Ordered    Allergies    1  Amoxicillin TABS   2  Bactrim TABS   3  Procardia CAPS   4  Augmentin TABS   5  Floxin TABS    Immunizations   1 2    Influenza  Nov 2012 05-Oct-2015    PCV  25-Feb-2016     PPSV  15-May-2014      Vitals  Signs    Temperature: 98 6 F, Tympanic  Heart Rate: 68, L Radial  Pulse Quality: Normal, L Radial  Respiration Quality: Normal  Respiration: 16  Systolic: 036, LUE, Sitting  Diastolic: 78, LUE, Sitting  Height: 4 ft 10 in  Weight: 229 lb   BMI Calculated: 47 86  BSA Calculated: 1 93  Pain Scale: 3    Results/Data  *VB - Foot Exam 40NDL5642 02:33PM [employer]     Test Name Result Flag Reference   FOOT Dheeraj Avila 63HZY5915       *VB-Depression Screening 40HIG9584 01:30PM [employer]     Test Name Result Flag Reference   Depression Scale Result      Depression Screen - Negative For Symptoms     *VB - Fall Risk Assessment  (Dx Z13 89 Screen for Neurologic Disorder) 75SBA4564 01:29PM [employer]     Test Name Result Flag Reference   Falls Risk      No falls in the past year       Health Management  Health Maintenance   *VB - Eye Exam; every 1 year; Last 81TLX6841; Next Due: 25ALH9336; Active    Future Appointments    Date/Time Provider Specialty Site   10/31/2017 01:30 PM PREET Rashid  Orthopedic Surgery [address]     Signatures   Electronically signed by :  Ish Jones MD; Oct 12 2017  3:07PM EST                       (Author)

## 2018-01-10 NOTE — RESULT NOTES
Message   Dexa is normal       Verified Results  * DXA BONE DENSITY SPINE HIP AND PELVIS 80WGK5483 03:02PM Laila Diamond   TW Order Number: [id number]    - Patient Instructions: To schedule this appointment, please contact Central Scheduling at [phone]  [employer] Order Number: [id number]    - Patient Instructions: To schedule this appointment, please contact Central Scheduling at [phone]  Test Name Result Flag Reference   DXA BONE DENSITY SPINE HIP AND PELVIS (Report)     CENTRAL DXA SCAN     CLINICAL HISTORY:  74year old post-menopausal  female risk factors include estrogen deficient     TECHNIQUE: Bone densitometry was performed using a Horizon A bone densitometer  Regions of interest appear properly placed  There are no obvious fractures or other confounding variables which could limit the study  COMPARISON: None  RESULTS:    LUMBAR SPINE: L1-L4:   BMD 1 297 gm/cm2   T-score 2 3   Z-score 4 2     LEFT TOTAL HIP:   BMD 1 192 gm/cm2   T-score 2 0   Z-score 3 4     LEFT FEMORAL NECK:   BMD 0 917 gm/cm2   T-score 0 6   Z-score 2 3     LEFT FOREARM :   BMD 0 644 gm/sq-cm,   T-score is -0 7   Z-score is 1 3          IMPRESSION:   1  Based on the [facility] classification, this study is normal and the patient is considered at low risk for fracture  2  A daily intake of calcium of at least 1200 mg and vitamin D, 800-1000 IU, as well as weight bearing and muscle strengthening exercise, fall prevention and avoidance of tobacco and excessive alcohol intake as basic preventive measures are recommended  3  Repeat DXA scan on the same equipment in 18-24 months as clinically indicated  The 10 year risk of hip fracture is 0 1%, with the 10 year risk of major osteoporotic fracture being 5 4%, as calculated by the [facility] fracture risk assessment tool (FRAX)   The current NOF guidelines recommend treating patients with FRAX 10 year risk score   of >3% for hip fracture and >20% for major osteoporotic fracture  WHO CLASSIFICATION:   Normal (a T-score of -1 0 or higher)   Low bone mineral density (a T-score of less than -1 0 but higher than -2 5)   Osteoporosis (a T-score of -2 5 or less)   Severe osteoporosis (a T-score of -2 5 or less with a fragility fracture)             Workstation performed: [id number]     Signed by:   Kenan Griggs MD   11/8/16

## 2018-01-12 VITALS
HEIGHT: 58 IN | HEART RATE: 68 BPM | TEMPERATURE: 98.6 F | BODY MASS INDEX: 48.07 KG/M2 | WEIGHT: 229 LBS | SYSTOLIC BLOOD PRESSURE: 138 MMHG | DIASTOLIC BLOOD PRESSURE: 78 MMHG | RESPIRATION RATE: 16 BRPM

## 2018-01-12 VITALS
WEIGHT: 222 LBS | BODY MASS INDEX: 46.6 KG/M2 | HEIGHT: 58 IN | HEART RATE: 103 BPM | SYSTOLIC BLOOD PRESSURE: 146 MMHG | DIASTOLIC BLOOD PRESSURE: 85 MMHG

## 2018-01-13 VITALS
BODY MASS INDEX: 45.97 KG/M2 | DIASTOLIC BLOOD PRESSURE: 92 MMHG | HEIGHT: 58 IN | SYSTOLIC BLOOD PRESSURE: 130 MMHG | TEMPERATURE: 98.2 F | HEART RATE: 68 BPM | WEIGHT: 219.01 LBS | RESPIRATION RATE: 16 BRPM

## 2018-01-13 VITALS
DIASTOLIC BLOOD PRESSURE: 84 MMHG | HEIGHT: 58 IN | BODY MASS INDEX: 46.6 KG/M2 | SYSTOLIC BLOOD PRESSURE: 130 MMHG | WEIGHT: 222 LBS

## 2018-01-13 NOTE — RESULT NOTES
Message   Lumbar xray showed Moderate arthritis  Right knee xray showed arthritis  Left knee xray showed moderate severe arthritis  Pt should follow orthopedics as discussed in OV  Verified Results  * XR KNEE 3 VIEW RIGHT 25Mar2016 02:17PM Alena Carlosmiles    Order Number: [id number]     Test Name Result Flag Reference   XR KNEE 3 VW RIGHT (Report)     RIGHT KNEE     INDICATION: Chronic bilateral knee pain for years, left greater than right  COMPARISON: 11/23/2005     VIEWS: 3; 3 images     FINDINGS:     There is no acute fracture or dislocation  There is no joint effusion  Tricompartmental osteoarthritis with moderately severe narrowing of the medial tibial femoral joint space and osteophytes in all 3 compartments  No lytic or blastic lesions are seen  Vascular calcifications  IMPRESSION:     Tricompartmental osteoarthritis, most pronounced in the medial compartment  Workstation performed: [id number]     Signed by:   Ilir Walker DO   3/27/16     * XR SPINE LUMBAR MINIMUM 4 VIEWS 59MNU4910 02:17PM Alena Carlosmiles    Order Number: [id number]     Test Name Result Flag Reference   XR SPINE LUMBAR MINIMUM 4 VIEWS (Report)     LUMBAR SPINE     INDICATION: Chronic low back pain     COMPARISON: None     VIEWS: AP, lateral, bilateral oblique and coned down projections; 5 images     FINDINGS:     There is a lumbarized S1 vertebral body  Alignment is unremarkable  There are moderate lower lumbar degenerative changes with right greater than left facet arthrosis, most severe at L5-L6  Mild disc space narrowing is seen throughout the lower lumbar spine  There are degenerative changes of the hips  Visualized soft tissues appear unremarkable  IMPRESSION:     Lumbarized S1 vertebral body  Moderate lower lumbar degenerative changes with right greater than left facet arthrosis most severe at L5 and L6         Workstation performed: [id number]     Signed by:   Niecy Vences MD Zachery   3/27/16     * XR KNEE 3 VIEW LEFT 25Mar2016 02:17PM Teressa Gordon    Order Number: [id number]     Test Name Result Flag Reference   XR KNEE 3 VW LEFT (Report)     LEFT KNEE     INDICATION: Chronic bilateral knee pain for years, left greater than right  COMPARISON: None     VIEWS: 3; 3 images     FINDINGS:     There is no acute fracture or dislocation  There is no joint effusion  Tricompartmental osteoarthritis with near bone-on-bone joint space narrowing medial tibiofemoral joint space and small osteophytes in all 3 compartments  No lytic or blastic lesions are seen  Soft tissues are unremarkable  IMPRESSION:     Tricompartmental osteoarthritis which is moderately severe in the medial compartment, mild elsewhere         Workstation performed: [id number]     Signed by:   Jermaine Nolan DO   3/27/16

## 2018-01-14 VITALS
DIASTOLIC BLOOD PRESSURE: 78 MMHG | HEIGHT: 58 IN | SYSTOLIC BLOOD PRESSURE: 128 MMHG | BODY MASS INDEX: 46.6 KG/M2 | WEIGHT: 222 LBS

## 2018-01-14 NOTE — RESULT NOTES
Verified Results  (1) CBC/PLT/DIFF 23Gii3188 01:06PM Laila iDamond    Order Number: [id number]     Test Name Result Flag Reference   WBC COUNT 5 63 Thousand/uL  4 31-10 16   RBC COUNT 4 33 Million/uL  3 81-5 12   HEMOGLOBIN 13 7 g/dL  11 5-15 4   HEMATOCRIT 40 7 %  34 8-46  1   MCV 94 fL  82-98   MCH 31 6 pg  26 8-34 3   MCHC 33 7 g/dL  31 4-37 4   RDW 13 2 %  11 6-15 1   MPV 9 6 fL  8 9-12 7   PLATELET COUNT 386 Thousands/uL  149-390   nRBC AUTOMATED 0 /100 WBCs     NEUTROPHILS RELATIVE PERCENT 54 %  43-75   LYMPHOCYTES RELATIVE PERCENT 32 %  14-44   MONOCYTES RELATIVE PERCENT 9 %  4-12   EOSINOPHILS RELATIVE PERCENT 4 %  0-6   BASOPHILS RELATIVE PERCENT 1 %  0-1   NEUTROPHILS ABSOLUTE COUNT 3 06 Thousands/? ??L  1 85-7 62   LYMPHOCYTES ABSOLUTE COUNT 1 79 Thousands/? ??L  0 60-4 47   MONOCYTES ABSOLUTE COUNT 0 50 Thousand/? ??L  0 17-1 22   EOSINOPHILS ABSOLUTE COUNT 0 23 Thousand/? ??L  0 00-0 61   BASOPHILS ABSOLUTE COUNT 0 04 Thousands/? ??L  0 00-0 10   - Patient Instructions: This bloodwork is non-fasting  Please drink two glasses of water morning of bloodwork  - Patient Instructions: This bloodwork is non-fasting  Please drink two glasses of water morning of bloodwork  (1) COMPREHENSIVE METABOLIC PANEL 79MFG4511 80:64EP Caitie Bains Order Number: [id number]     Test Name Result Flag Reference   SODIUM 139 mmol/L  136-145   POTASSIUM 4 7 mmol/L  3 5-5 3   CHLORIDE 106 mmol/L  100-108   CARBON DIOXIDE 15 mmol/L L 21-32   ANION GAP (CALC) 18 mmol/L H 4-13   BLOOD UREA NITROGEN 23 mg/dL  5-25   CREATININE 0 75 mg/dL  0 60-1 30   Standardized to IDMS reference method   CALCIUM 9 4 mg/dL  8 3-10 1   BILI, TOTAL 0 54 mg/dL  0 20-1 00   ALK PHOSPHATAS 85 U/L     ALT (SGPT) 36 U/L  12-78   AST(SGOT) 25 U/L  5-45   ALBUMIN 1 7 g/dL L 3 5-5 0   TOTAL PROTEIN 8 1 g/dL  6 4-8 2   eGFR Non-African American      >60 0 ml/min/1 73sq m   - Patient Instructions:  This is a fasting blood test  Water, black tea or black coffee only after 9:00pm the night before test Drink 2 glasses of water the morning of test   National Kidney Disease Education Program recommendations are as follows:  GFR calculation is accurate only with a steady state creatinine  Chronic Kidney disease less than 60 ml/min/1 73 sq  meters  Kidney failure less than 15 ml/min/1 73 sq  meters  GLUCOSE FASTING 99 mg/dL  65-99     (1) HEMOGLOBIN A1C 10Apr2017 01:06PM [employer]   TW Order Number: [id number]     Test Name Result Flag Reference   HEMOGLOBIN A1C 6 4 % H 4 2-6 3   EST  AVG  GLUCOSE 137 mg/dl       (1) LIPID PANEL FASTING W DIRECT LDL REFLEX 10Apr2017 01:06PM [employer] Balloon   TW Order Number: [id number]     Test Name Result Flag Reference   CHOLESTEROL 209 mg/dL H    LDL CHOLESTEROL CALCULATED 125 mg/dL H 0-100   - Patient Instructions: This is a fasting blood test  Water, black tea or black coffee only after 9:00pm the night before test   Drink 2 glasses of water the morning of test     - Patient Instructions: This is a fasting blood test  Water, black tea or black coffee only after 9:00pm the night before test Drink 2 glasses of water the morning of test   Triglyceride:         Normal              <150 mg/dl       Borderline High    150-199 mg/dl       High               200-499 mg/dl       Very High          >499 mg/dl  Cholesterol:         Desirable        <200 mg/dl      Borderline High  200-239 mg/dl      High             >239 mg/dl  HDL Cholesterol:        High    >59 mg/dL      Low     <41 mg/dL  LDL Cholesterol:        Optimal          <100 mg/dl        Near Optimal     100-129 mg/dl        Above Optimal          Borderline High   130-159 mg/dl          High              160-189 mg/dl          Very High        >189 mg/dl  LDL CALCULATED:    This screening LDL is a calculated result    It does not have the accuracy of the Direct Measured LDL in the monitoring of patients with hyperlipidemia and/or statin therapy  Direct Measure LDL (XSX770) must be ordered separately in these patients  TRIGLYCERIDES 122 mg/dL  <=150   Specimen collection should occur prior to N-Acetylcysteine or Metamizole administration due to the potential for falsely depressed results  HDL,DIRECT 60 mg/dL  40-60   Specimen collection should occur prior to Metamizole administration due to the potential for falsely depressed results  (1) MICROALBUMIN CREATININE RATIO, RANDOM URINE 10Apr2017 01:06PM [employer] Order Number: [id number]     Test Name Result Flag Reference   MICROALBUMIN/ CREAT R 22 mg/g creatinine  0-30   MICROALBUMIN,URINE 24 3 mg/L H 0 0-20 0   CREATININE URINE 111 0 mg/dL       (1) TSH WITH FT4 REFLEX 10Apr2017 01:06PM [employer] Order Number: [id number]     Test Name Result Flag Reference   TSH 2 560 uIU/mL  0 358-3 740   - Patient Instructions: This is a fasting blood test  Water, black tea or black coffee only after 9:00pm the night before test Drink 2 glasses of water the morning of test   Patients undergoing fluorescein dye angiography may retain small amounts of fluorescein in the body for 48-72 hours post procedure  Samples containing fluorescein can produce falsely depressed TSH values  If the patient had this procedure,a specimen should be resubmitted post fluorescein clearance            The recommended reference ranges for TSH during pregnancy are as follows:  First trimester 0 1 to 2 5 uIU/mL  Second trimester  0 2 to 3 0 uIU/mL  Third trimester 0 3 to 3 0 uIU/m     (1) VITAMIN D 25-HYDROXY 10Apr2017 01:06PM [employer] Order Number: [id number]     Test Name Result Flag Reference   VIT D 25-HYDROX 42 7 ng/mL  30 0-100 0   This assay is a certified procedure of the CDC Vitamin D Standardization Certification Program (VDSCP)     Deficiency <20ng/ml   Insufficiency 20-30ng/ml   Sufficient  ng/ml     *Patients undergoing fluorescein dye angiography may retain small amounts of fluorescein in the body for 48-72 hours post procedure  Samples containing fluorescein can produce falsely elevated Vitamin D values  If the patient had this procedure, a specimen should be resubmitted post fluorescein clearance

## 2018-01-14 NOTE — RESULT NOTES
Message   DW pt on 11/16/2016  Verified Results  (1) COMPREHENSIVE METABOLIC PANEL [id number] 02:89XD [facility] Order Number: [id number]     Test Name Result Flag Reference   GLUCOSE,RANDM 96 mg/dL     If the patient is fasting, the ADA then defines impaired fasting glucose as > 100 mg/dL and diabetes as > or equal to 123 mg/dL  SODIUM 137 mmol/L  136-145   POTASSIUM 4 1 mmol/L  3 5-5 3   CHLORIDE 105 mmol/L  100-108   CARBON DIOXIDE 26 mmol/L  21-32   ANION GAP (CALC) 6 mmol/L  4-13   BLOOD UREA NITROGEN 14 mg/dL  5-25   CREATININE 0 72 mg/dL  0 60-1 30   Standardized to IDMS reference method   CALCIUM 9 9 mg/dL  8 3-10 1   BILI, TOTAL 0 63 mg/dL  0 20-1 00   ALK PHOSPHATAS 97 U/L     ALT (SGPT) 41 U/L  12-78   AST(SGOT) 34 U/L  5-45   ALBUMIN 4 0 g/dL  3 5-5 0   TOTAL PROTEIN 8 1 g/dL  6 4-8 2   eGFR Non-African American      >60 0 ml/min/1 73sq [facility] [employer] Disease Education Program recommendations are as follows:  GFR calculation is accurate only with a steady state creatinine  Chronic Kidney disease less than 60 ml/min/1 73 sq  meters  Kidney failure less than 15 ml/min/1 73 sq  meters  (1) HEMOGLOBIN A1C [id number] 01:45PM [facility] Order Number: [id number]     Test Name Result Flag Reference   HEMOGLOBIN A1C 6 7 % H 4 2-6 3   EST  AVG   GLUCOSE 146 mg/dl       (1) LIPID PANEL FASTING W DIRECT LDL REFLEX [id number] 01:45PM [facility] Order Number: [id number]     Test Name Result Flag Reference   CHOLESTEROL 184 mg/dL     LDL CHOLESTEROL CALCULATED 105 mg/dL H 0-100   Triglyceride:         Normal              <150 mg/dl       Borderline High    150-199 mg/dl       High               200-499 mg/dl       Very High          >499 mg/dl  Cholesterol:         Desirable        <200 mg/dl      Borderline High  200-239 mg/dl      High             >239 mg/dl  HDL Cholesterol:        High    >59 mg/dL      Low     <41 mg/dL  LDL Cholesterol:        Optimal <100 mg/dl        Near Optimal     100-129 mg/dl        Above Optimal          Borderline High   130-159 mg/dl          High              160-189 mg/dl          Very High        >189 mg/dl  LDL CALCULATED:    This screening LDL is a calculated result  It does not have the accuracy of the Direct Measured LDL in the monitoring of patients with hyperlipidemia and/or statin therapy  Direct Measure LDL (JNU914) must be ordered separately in these patients  TRIGLYCERIDES 142 mg/dL  <=150   Specimen collection should occur prior to N-Acetylcysteine or Metamizole administration due to the potential for falsely depressed results  HDL,DIRECT 51 mg/dL  40-60   Specimen collection should occur prior to Metamizole administration due to the potential for falsely depressed results  (1) TSH WITH FT4 REFLEX [id number] 01:45PM Chris Alvarenga    Order Number: [id number]     Test Name Result Flag Reference   TSH 3 060 uIU/mL  0 358-3 740   Patients undergoing fluorescein dye angiography may retain small amounts of fluorescein in the body for 48-72 hours post procedure  Samples containing fluorescein can produce falsely depressed TSH values  If the patient had this procedure,a specimen should be resubmitted post fluorescein clearance            The recommended reference ranges for TSH during pregnancy are as follows:  First trimester 0 1 to 2 5 uIU/mL  Second trimester  0 2 to 3 0 uIU/mL  Third trimester 0 3 to 3 0 uIU/m

## 2018-01-15 NOTE — RESULT NOTES
Verified Results  (1) COMPREHENSIVE METABOLIC PANEL [id number] 35:09LH Yenny VOGEL Order Number: [id number]     Test Name Result Flag Reference   SODIUM 139 mmol/L  136-145   POTASSIUM 4 7 mmol/L  3 5-5 3   CHLORIDE 106 mmol/L  100-108   CARBON DIOXIDE   21-32   Sampling error, unsatisfactory for repeat  This is a corrected result  Previous result was 15 mmol/L on 4/10/2017 at 1517 EDT mmol/L   Sampling error, unsatisfactory for repeat  This is a corrected result  Previous result was 15 mmol/L on 4/10/2017 at 1517 EDT   ANION GAP ([address])   4-13   This is a corrected result  Previous result was 18 mmol/L on 4/10/2017 at 1517 EDT mmol/L   This is a corrected result  Previous result was 18 mmol/L on 4/10/2017 at 1517 EDT   BLOOD UREA NITROGEN 23 mg/dL  5-25   CREATININE 0 75 mg/dL  0 60-1 30   Standardized to IDMS reference method   CALCIUM 9 4 mg/dL  8 3-10 1   BILI, TOTAL 0 54 mg/dL  0 20-1 00   ALK PHOSPHATAS 85 U/L     ALT (SGPT) 36 U/L  12-78   AST(SGOT) 25 U/L  5-45   ALBUMIN 4 3 g/dL  3 5-5 0   Sampling error, corrected result faxed to 's office  This is a corrected result  Previous result was 1 7 g/dL on 4/10/2017 at 1517 EDT   TOTAL PROTEIN 8 1 g/dL  6 4-8 2   eGFR Non-African American      >60 0 ml/min/1 73sq m   - Patient Instructions: This is a fasting blood test  Water, black tea or black coffee only after 9:00pm the night before test Drink 2 glasses of water the morning of test   National Kidney Disease Education Program recommendations are as follows:  GFR calculation is accurate only with a steady state creatinine  Chronic Kidney disease less than 60 ml/min/1 73 sq  meters  Kidney failure less than 15 ml/min/1 73 sq  meters     GLUCOSE FASTING 99 mg/dL  65-99

## 2018-01-15 NOTE — RESULT NOTES
Verified Results  (1) TISSUE EXAM [id number] 04:51PM Dru Gitelman Order Number: [id number]     Test Name Result Flag Reference   LAB AP CASE REPORT (Report)     Surgical Pathology Report             Case: [id number]                   Authorizing Provider: Varun Pratt DO    Collected:      01/16/2017 1651        Pathologist:      Arpita Mcgowan MD     Received:      01/18/2017 0919        Specimen:  Endometrium   LAB AP FINAL DIAGNOSIS (Report)     Endometrium, biopsy:   - Minute, superficial endometrium (see comment)    Comment: The specimen is insufficient for further evaluation after   processing  Clinical correlation is suggested in determining if the   targeted area is adequately sampled  Electronically signed by Arpita Mcgowan MD on 1/20/2017 at 9:22 AM   LAB AP SURGICAL ADDITIONAL INFORMATION (Report)     These tests were developed and their performance characteristics   determined by [facility] or [facility]  They may not be cleared or approved by the [place]  Food and   Drug Administration  The FDA has determined that such clearance or   approval is not necessary  These tests are used for clinical purposes  They should not be regarded as investigational or for research  This   laboratory has been approved by CLIA 88, designated as a high-complexity   laboratory and is qualified to perform these tests  Interpretation performed at [facility], [address] [facility]   LAB [facility] (Report)     A  The specimen is received in formalin, labeled with the patient's name   and hospital number, and is designated endometrial biopsy  The specimen   consists of multiple minute white tan-pink, soft tissue fragments   measuring in aggregate 0 4 x 0 4 by less than 0 1 cm  Due to size and   consistency of the specimen, it is questionable whether the specimen may   survive histologic processing  Entirely submitted   One cassette  Note: The estimated total formalin fixation time based upon information   provided by the submitting clinician and the standard processing schedule   is approximately 72 hours    [facility]   LAB AP CLINICAL INFORMATION      TW Order Number: [id number]

## 2018-01-22 VITALS
HEIGHT: 58 IN | BODY MASS INDEX: 47.75 KG/M2 | WEIGHT: 227.5 LBS | HEART RATE: 84 BPM | DIASTOLIC BLOOD PRESSURE: 87 MMHG | SYSTOLIC BLOOD PRESSURE: 147 MMHG

## 2018-01-31 ENCOUNTER — TELEPHONE (OUTPATIENT)
Dept: OBGYN CLINIC | Facility: HOSPITAL | Age: 68
End: 2018-01-31

## 2018-01-31 ENCOUNTER — OFFICE VISIT (OUTPATIENT)
Dept: OBGYN CLINIC | Facility: HOSPITAL | Age: 68
End: 2018-01-31
Payer: COMMERCIAL

## 2018-01-31 VITALS
HEART RATE: 80 BPM | HEIGHT: 60 IN | WEIGHT: 223.5 LBS | SYSTOLIC BLOOD PRESSURE: 148 MMHG | BODY MASS INDEX: 43.88 KG/M2 | DIASTOLIC BLOOD PRESSURE: 97 MMHG

## 2018-01-31 DIAGNOSIS — M17.10 ARTHRITIS OF KNEE: Primary | ICD-10-CM

## 2018-01-31 PROCEDURE — 99213 OFFICE O/P EST LOW 20 MIN: CPT | Performed by: ORTHOPAEDIC SURGERY

## 2018-01-31 RX ORDER — SIMVASTATIN 20 MG
1 TABLET ORAL DAILY
COMMUNITY
Start: 2012-05-16 | End: 2018-06-27 | Stop reason: SDUPTHER

## 2018-01-31 RX ORDER — PSYLLIUM HUSK 0.4 G
CAPSULE ORAL
COMMUNITY

## 2018-01-31 RX ORDER — LOSARTAN POTASSIUM 100 MG/1
1 TABLET ORAL DAILY
COMMUNITY
Start: 2012-04-09 | End: 2018-06-27 | Stop reason: SDUPTHER

## 2018-01-31 NOTE — TELEPHONE ENCOUNTER
----- Message from Manuel López sent at 1/30/2018  4:21 PM EST -----  Regarding: Injections  Contact: [address] to check the status of this patients injections and spoke with Obi Ramirez  Who informed me that the patient declined consent to ship the injections because the co-pay was to high  Marcel Alvares also informed me that the pharmacy offered her co-pay assistance but the patient also declined because she did not want to fill out the paper work

## 2018-01-31 NOTE — PROGRESS NOTES
69 y o female presents for repeat evaluation bilateral knee pain secondary to osteoarthritis  Patient notes that it has been 3 months since her most recent corticosteroid injection both her knees  And notes that her pain is slowly returning to each knee     Patient notes that she has recently taken on a erythematous rash in both legs after recently beginning meloxicam prescribed by her primary care physician she stops taking the meloxicam 2 weeks ago and her rash is slowly improving she is following up with her primary care physician for said rash    Review of Systems  Review of systems negative unless otherwise specified in HPI    Past Medical History  Past Medical History:   Diagnosis Date    Diabetes insipidus ([address] )     Hyperlipidemia     Hypertension        Past Surgical History  Past Surgical History:   Procedure Laterality Date    CARPAL TUNNEL RELEASE         Current Medications  No current outpatient prescriptions on file prior to visit  No current facility-administered medications on file prior to visit  Recent Labs [facility])  @LABALLVALUEIP(HCT:3,HGB:3,PT,INR,WBC:3,ESR,CRP,GLUCOSE,HGBA1C)@      Physical exam  · General: Awake, Alert, Oriented  · Eyes: Pupils equal, round and reactive to light  · Heart: regular rate and rhythm  · Lungs: No audible wheezing  · Abdomen: soft  bilateral Knee exam  · Tender to palpation over medial lateral joint line of both knees  · Small erythematous rash noted distal to the knees bilateral lower extremities  · Extension 0° flexion X degrees  · Stable to varus valgus stress  · Negative Lachman's  · Negative posterior drawer  · Negative Jourdan's  · Sensation intact both legs    Procedure  Procedures    Imaging  No new imaging    Assessment:   69 y  o female with bilateral knee osteoarthritis    Plan  · Weightbearing:   As toleratedWeightbearing:  · Activity:  As tolerated  · Patient has new onset of diabetes mellitus therefore caution must be used when injecting with steroid we will pre off for a Euflexxa injection  · Follow-up: 2 weeks   ·

## 2018-01-31 NOTE — TELEPHONE ENCOUNTER
----- Message from Manuel Bojorquez sent at 1/30/2018  4:21 PM EST -----  Regarding: Injections  Contact: [address] to check the status of this patients injections and spoke with Glenda Hugo  Who informed me that the patient declined consent to ship the injections because the co-pay was to [facility] also informed me that the pharmacy offered her co-pay assistance but the patient also declined because she did not want to fill out the paper work

## 2018-01-31 NOTE — PROGRESS NOTES
69 y o female presents for repeat evaluation bilateral knee pain secondary to osteoarthritis  Patient notes that it has been 3 months since her most recent corticosteroid injection both her knees  And notes that her pain is slowly returning to each knee     Patient notes that she has recently taken on a erythematous rash in both legs after recently beginning meloxicam prescribed by her primary care physician she stops taking the meloxicam 2 weeks ago and her rash is slowly improving she is following up with her primary care physician for said rash    Review of Systems  Review of systems negative unless otherwise specified in HPI    Past Medical History  Past Medical History:   Diagnosis Date    Diabetes insipidus ([address] )     Hyperlipidemia     Hypertension        Past Surgical History  Past Surgical History:   Procedure Laterality Date    CARPAL TUNNEL RELEASE         Current Medications  No current outpatient prescriptions on file prior to visit  No current facility-administered medications on file prior to visit  Recent Labs [facility])  @LABALLVALUEIP(HCT:3,HGB:3,PT,INR,WBC:3,ESR,CRP,GLUCOSE,HGBA1C)@      Physical exam  · General: Awake, Alert, Oriented  · Eyes: Pupils equal, round and reactive to light  · Heart: regular rate and rhythm  · Lungs: No audible wheezing  · Abdomen: soft  bilateral Knee exam  · Tender to palpation over medial lateral joint line of both knees  · Small erythematous rash noted distal to the knees bilateral lower extremities  · Extension 0° flexion X degrees  · Stable to varus valgus stress  · Negative Lachman's  · Negative posterior drawer  · Negative Jourdan's  · Sensation intact both legs    Procedure  None    Imaging  No new imaging    Assessment:   69 y  o female with bilateral knee osteoarthritis    Plan  · Weightbearing:   As toleratedWeightbearing:  · Activity:  As tolerated  · Patient has new onset of diabetes mellitus therefore caution must be used when injecting with steroid we will pre off for a Euflexxa injection  · Follow-up: 2 weeks

## 2018-02-12 ENCOUNTER — APPOINTMENT (OUTPATIENT)
Dept: LAB | Facility: HOSPITAL | Age: 68
End: 2018-02-12
Payer: MEDICARE

## 2018-02-12 ENCOUNTER — TELEPHONE (OUTPATIENT)
Dept: OBGYN CLINIC | Facility: HOSPITAL | Age: 68
End: 2018-02-12

## 2018-02-12 ENCOUNTER — OFFICE VISIT (OUTPATIENT)
Dept: FAMILY MEDICINE CLINIC | Facility: CLINIC | Age: 68
End: 2018-02-12
Payer: MEDICARE

## 2018-02-12 VITALS
HEIGHT: 61 IN | DIASTOLIC BLOOD PRESSURE: 90 MMHG | SYSTOLIC BLOOD PRESSURE: 138 MMHG | TEMPERATURE: 98.2 F | WEIGHT: 218.2 LBS | RESPIRATION RATE: 16 BRPM | HEART RATE: 72 BPM | BODY MASS INDEX: 41.19 KG/M2

## 2018-02-12 DIAGNOSIS — R21 RASH: ICD-10-CM

## 2018-02-12 DIAGNOSIS — L73.9 FOLLICULITIS: ICD-10-CM

## 2018-02-12 DIAGNOSIS — R21 RASH: Primary | ICD-10-CM

## 2018-02-12 PROBLEM — E11.9 DIABETES MELLITUS TYPE 2, CONTROLLED (HCC): Status: ACTIVE | Noted: 2017-10-12

## 2018-02-12 PROBLEM — E66.01 MORBID OBESITY WITH BODY MASS INDEX OF 40.0-49.9 (HCC): Status: ACTIVE | Noted: 2017-10-12

## 2018-02-12 LAB
BASOPHILS # BLD AUTO: 0.02 THOUSANDS/ΜL (ref 0–0.1)
BASOPHILS NFR BLD AUTO: 0 % (ref 0–1)
EOSINOPHIL # BLD AUTO: 0.42 THOUSAND/ΜL (ref 0–0.61)
EOSINOPHIL NFR BLD AUTO: 6 % (ref 0–6)
ERYTHROCYTE [DISTWIDTH] IN BLOOD BY AUTOMATED COUNT: 13.3 % (ref 11.6–15.1)
HCT VFR BLD AUTO: 40.7 % (ref 34.8–46.1)
HGB BLD-MCNC: 13.7 G/DL (ref 11.5–15.4)
LYMPHOCYTES # BLD AUTO: 1.94 THOUSANDS/ΜL (ref 0.6–4.47)
LYMPHOCYTES NFR BLD AUTO: 30 % (ref 14–44)
MCH RBC QN AUTO: 31.6 PG (ref 26.8–34.3)
MCHC RBC AUTO-ENTMCNC: 33.7 G/DL (ref 31.4–37.4)
MCV RBC AUTO: 94 FL (ref 82–98)
MONOCYTES # BLD AUTO: 0.53 THOUSAND/ΜL (ref 0.17–1.22)
MONOCYTES NFR BLD AUTO: 8 % (ref 4–12)
NEUTROPHILS # BLD AUTO: 3.62 THOUSANDS/ΜL (ref 1.85–7.62)
NEUTS SEG NFR BLD AUTO: 56 % (ref 43–75)
NRBC BLD AUTO-RTO: 0 /100 WBCS
PLATELET # BLD AUTO: 293 THOUSANDS/UL (ref 149–390)
PMV BLD AUTO: 9.7 FL (ref 8.9–12.7)
RBC # BLD AUTO: 4.34 MILLION/UL (ref 3.81–5.12)
WBC # BLD AUTO: 6.54 THOUSAND/UL (ref 4.31–10.16)

## 2018-02-12 PROCEDURE — 36415 COLL VENOUS BLD VENIPUNCTURE: CPT

## 2018-02-12 PROCEDURE — 85025 COMPLETE CBC W/AUTO DIFF WBC: CPT

## 2018-02-12 PROCEDURE — 99214 OFFICE O/P EST MOD 30 MIN: CPT | Performed by: FAMILY MEDICINE

## 2018-02-12 RX ORDER — CEPHALEXIN 500 MG/1
500 CAPSULE ORAL EVERY 8 HOURS SCHEDULED
Qty: 30 CAPSULE | Refills: 0 | Status: SHIPPED | OUTPATIENT
Start: 2018-02-12 | End: 2018-02-22

## 2018-02-12 NOTE — TELEPHONE ENCOUNTER
----- Message from Manuel Al sent at 2/9/2018  3:53 PM EST -----  No prior authorization or specialty pharmacy is needed for this patient, office may buy and bill for Euflexxa injections  Please call the patient and schedule her an appointment to have her injections administered    ----- Message -----  From: Avis Pfeiffer MD  Sent: 1/31/2018   3:05 PM  To: Arlin Macias MA    Pre authorization for Euflexxa injection

## 2018-02-12 NOTE — PROGRESS NOTES
Bertin is here for ongoing knee issues  Assessment/Plan:    Will check labs  Give antibiotics  SE educated pt  Call office if symptoms no improving or worse  Diagnoses and all orders for this visit:    Rash  -     CBC and differential; Future    Folliculitis  -     CBC and differential; Future  -     cephalexin (KEFLEX) 500 mg capsule; Take 1 capsule (500 mg total) by mouth every 8 (eight) hours for 10 days          Subjective:      Patient ID: Kobe Davila is a 69 y o  female  HPI    Pt is here by herself  Had knee pain for a while  She tried meloxicam for 1 week, but she had bruise of elbow  So she stopped it  Then, 2 weeks ago she had rash on her lower legs  No pain  No itching  Denies new lotion, soap, cloth etc      Saw dermatology for psoriasis, got shot on elbow but did not help this time  Saw orthopedics for knee pain  Plan to have Euflexxa for knee injection  Denies fever, SOB, cP, n/v/abd pain  The following portions of the patient's history were reviewed and updated as appropriate: allergies, current medications, past family history, past medical history, past social history, past surgical history and problem list     Review of Systems   Constitutional: Negative for appetite change, chills and fever  HENT: Negative for congestion, ear pain, sinus pain and sore throat  Eyes: Negative for discharge and itching  Respiratory: Negative for apnea, cough, chest tightness, shortness of breath and wheezing  Cardiovascular: Negative for chest pain, palpitations and leg swelling  Gastrointestinal: Negative for abdominal pain, anal bleeding, constipation, diarrhea, nausea and vomiting  Endocrine: Negative for cold intolerance, heat intolerance and polyuria  Genitourinary: Negative for difficulty urinating and dysuria  Musculoskeletal: Negative for arthralgias, back pain and myalgias  Skin: Positive for rash  Neurological: Negative for dizziness and headaches  Psychiatric/Behavioral: Negative for agitation  Objective:    Vitals:    02/12/18 1508   BP: 138/90   Pulse: 72   Resp: 16   Temp: 98 2 °F (36 8 °C)        Physical Exam   Constitutional: She appears well-developed  No distress  HENT:   Head: Normocephalic  Right Ear: External ear normal    Left Ear: External ear normal    Nose: Nose normal    Mouth/Throat: Oropharynx is clear and moist    Eyes: Conjunctivae are normal  Pupils are equal, round, and reactive to light  Right eye exhibits no discharge  Left eye exhibits no discharge  Neck: Normal range of motion  No thyromegaly present  Cardiovascular: Normal rate, regular rhythm and normal heart sounds  Exam reveals no gallop and no friction rub  No murmur heard  Pulmonary/Chest: Effort normal and breath sounds normal  No respiratory distress  She has no wheezes  She has no rales  She exhibits no tenderness  Abdominal: Soft  Bowel sounds are normal  She exhibits no distension and no mass  There is no tenderness  There is no rebound and no guarding  Musculoskeletal: Normal range of motion  She exhibits no edema, tenderness or deformity  Lymphadenopathy:     She has no cervical adenopathy  Neurological: She is alert  Skin: Rash noted  Maculopapular rash on lower legs  Size 1-3mm, erythema with pustules  Psychiatric: She has a normal mood and affect

## 2018-02-13 ENCOUNTER — TELEPHONE (OUTPATIENT)
Dept: FAMILY MEDICINE CLINIC | Facility: CLINIC | Age: 68
End: 2018-02-13

## 2018-02-13 NOTE — TELEPHONE ENCOUNTER
----- Message from Cleve Jimenez MD sent at 2/12/2018  6:34 PM EST -----  WBC normal  Hg normal  Platelet normal

## 2018-03-15 ENCOUNTER — OFFICE VISIT (OUTPATIENT)
Dept: OBGYN CLINIC | Facility: HOSPITAL | Age: 68
End: 2018-03-15
Payer: MEDICARE

## 2018-03-15 VITALS
WEIGHT: 222 LBS | HEIGHT: 60 IN | SYSTOLIC BLOOD PRESSURE: 125 MMHG | BODY MASS INDEX: 43.59 KG/M2 | HEART RATE: 97 BPM | DIASTOLIC BLOOD PRESSURE: 79 MMHG

## 2018-03-15 DIAGNOSIS — M25.462 EFFUSION OF LEFT KNEE: ICD-10-CM

## 2018-03-15 DIAGNOSIS — M17.0 PRIMARY OSTEOARTHRITIS OF BOTH KNEES: Primary | ICD-10-CM

## 2018-03-15 PROCEDURE — 20610 DRAIN/INJ JOINT/BURSA W/O US: CPT | Performed by: ORTHOPAEDIC SURGERY

## 2018-03-15 RX ORDER — HYALURONATE SODIUM 10 MG/ML
20 SYRINGE (ML) INTRAARTICULAR
Status: COMPLETED | OUTPATIENT
Start: 2018-03-15 | End: 2018-03-15

## 2018-03-15 RX ADMIN — Medication 20 MG: at 15:26

## 2018-03-15 RX ADMIN — Medication 20 MG: at 15:28

## 2018-03-15 NOTE — PROGRESS NOTES
1  Primary osteoarthritis of both knees     2  Effusion of left knee       Patient is here for her first injection of bilateral into the left knee  Patient reports persistent bilateral knee pain  Patient was seen, examined and plan formulated by Dr Frias Gentle      Physical exam of the right knee shows no effusion no ecchymosis    Left knee effusion present    Large joint arthrocentesis  Date/Time: 3/15/2018 3:26 PM  Consent given by: patient  Supporting Documentation  Indications: pain   Procedure Details  Location: knee - R knee  Needle size: 22 G  Ultrasound guidance: no  Approach: posterior  Medications administered: 20 mg Sodium Hyaluronate 20 MG/2ML    Patient tolerance: patient tolerated the procedure well with no immediate complications      Large joint arthrocentesis  Date/Time: 3/15/2018 3:28 PM  Consent given by: patient  Supporting Documentation  Indications: pain   Procedure Details  Location: knee - L knee  Needle size: 18 G  Ultrasound guidance: no  Approach: posterior  Medications administered: 20 mg Sodium Hyaluronate 20 MG/2ML    Aspirate amount: 20 mL  Aspirate: yellow and clear  Patient tolerance: patient tolerated the procedure well with no immediate complications            Patient tolerated procedure follow up one week

## 2018-03-22 ENCOUNTER — OFFICE VISIT (OUTPATIENT)
Dept: OBGYN CLINIC | Facility: HOSPITAL | Age: 68
End: 2018-03-22
Payer: MEDICARE

## 2018-03-22 VITALS
BODY MASS INDEX: 43.72 KG/M2 | SYSTOLIC BLOOD PRESSURE: 161 MMHG | DIASTOLIC BLOOD PRESSURE: 90 MMHG | WEIGHT: 222.66 LBS | HEIGHT: 60 IN | HEART RATE: 86 BPM

## 2018-03-22 DIAGNOSIS — M17.0 PRIMARY OSTEOARTHRITIS OF BOTH KNEES: Primary | ICD-10-CM

## 2018-03-22 PROCEDURE — 20610 DRAIN/INJ JOINT/BURSA W/O US: CPT | Performed by: ORTHOPAEDIC SURGERY

## 2018-03-22 RX ORDER — HYALURONATE SODIUM 10 MG/ML
20 SYRINGE (ML) INTRAARTICULAR
Status: COMPLETED | OUTPATIENT
Start: 2018-03-22 | End: 2018-03-22

## 2018-03-22 RX ADMIN — Medication 20 MG: at 15:46

## 2018-03-22 NOTE — PROGRESS NOTES
Orthopedics          Maxine Dodd 69 y o  female MRN: [id number]      Chief Complaint:   bilateral knee pain    HPI:   69 y  o female complaining of bilateral knee pain and osteoarthritis  Patient presents today for her 2nd Euflexxa injection  Patient states she did fall on her left knee yesterday  She was able to go well and shovel snow thereafter she states the pain is minimal   She does complain of aching pain or bilateral knees left worse than right  She denies any fevers chills swelling of her lower extremities she denies any increasing numbness and tingling                Review Of Systems:   · Skin: Normal  · Neuro: See HPI  · Musculoskeletal: See HPI  · 14 point review of systems negative except as stated above     Past Medical History:   Past Medical History:   Diagnosis Date    Alopecia     Calcaneal spur     [address] insip[address]us [address] [address]     Hyperlipidemia     Hypertension     Osteoarthrosis 2/13/2013    Pes planus     unspecified laterality    Plantar fasciitis        Past Surgical History:   Past Surgical History:   Procedure Laterality Date    CARPAL TUNNEL RELEASE      COLONOSCOPY      May 2006    INCISIONAL BREAST BIOPSY         Family History:  Family history reviewed and non-contributory  Family History   Problem Relation Age of Onset    Diabetes Mother     Hypertension Mother     Emphysema Father     Cancer Brother     Colon cancer Brother        Social History:  Social History     Social History    Marital status: Single     Spouse name: N/A    Number of children: N/A    Years of education: N/A     Social History Main Topics    Smoking status: Never Smoker    Smokeless tobacco: Never Used    Alcohol use Yes      Comment: social    Drug use: No    Sexual activity: Not Asked     Other Topics Concern    None     Social History Narrative    None       Allergies:    Allergies   Allergen Reactions    Nifedipine Edema    Sulfamethoxazole-Trimethoprim Edema       Labs:    0  Lab Value Date/Time   HCT 40 7 02/12/2018 1621   HCT 40 7 04/10/2017 1306   HCT 40 5 02/22/2016 1458   HGB 13 7 02/12/2018 1621   HGB 13 7 04/10/2017 1306   HGB 13 7 02/22/2016 1458   WBC 6 54 02/12/2018 1621   WBC 5 63 04/10/2017 1306   WBC 5 76 02/22/2016 1458       Meds:    Current Outpatient Prescriptions:     Calcium Carb-Cholecalciferol (CALCIUM 1000 + D) 1000-800 MG-UNIT TABS, Take by mouth, Disp: , Rfl:     FISH OIL-CANOLA OIL-VIT D3 PO, by Does not apply route, Disp: , Rfl:     losartan (COZAAR) 100 MG tablet, Take 1 tablet by mouth daily, Disp: , Rfl:     metFORMIN (GLUCOPHAGE) 500 mg tablet, Take 1 tablet by mouth, Disp: , Rfl:     simvastatin (ZOCOR) 20 mg tablet, Take 1 tablet by mouth daily, Disp: , Rfl:       Physical Exam:     General Appearance:    Alert, cooperative, no distress, appears stated age   Head:    Normocephalic, without obvious abnormality, atraumatic   Eyes:    conjunctiva/corneas clear, both eyes        Nose:   Nares normal, septum midline, no drainage    Throat:   Lips normal; teeth and gums normal   Neck:    symmetrical, trachea midline, ;     thyroid:  no enlargement/   Back:     Symmetric, no curvature, ROM normal   Lungs:   No audible wheezing or labored breathing   Chest Wall:    No tenderness or deformity    Heart:    Regular rate and rhythm               Pulses:   2+ and symmetric all extremities   Skin:   Skin color, texture, turgor normal, no rashes or lesions   Neurologic:   normal strength, sensation and reflexes     throughout       Musculoskeletal: bilateral lower extremity  · On examination of the left knee there is a mild effusion, no erythema  Range of motion to full active extension and flexion to greater than 120°  Pain on palpation medial and lateral joint lines  There is crepitus with range of motion, no warmth to palpation, bony enlargement noted  No pain on palpation pes anserine bursa region or distal iliotibial band    Stable to varus and valgus stress without pain or gapping  Negative anterior and posterior drawer testing  Sensation intact distal pulses present  On examination of the right knee there is no effusion, no erythema  Range of motion to full active extension and flexion to greater than 120°  Pain on palpation medial and lateral joint lines  There is crepitus with range of motion, no warmth to palpation, bony enlargement noted  No pain on palpation pes anserine bursa region or distal iliotibial band  Stable to varus and valgus stress without pain or gapping  Negative anterior and posterior drawer testing  Sensation intact distal pulses present  Large joint arthrocentesis  Date/Time: 3/22/2018 3:46 PM  Consent given by: patient  Site marked: site marked  Supporting Documentation  Indications: pain   Procedure Details  Location: knee - R knee  Needle size: 22 G  Approach: anteromedial  Medications administered: 20 mg Sodium Hyaluronate 20 MG/2ML      Large joint arthrocentesis  Date/Time: 3/22/2018 3:46 PM  Consent given by: patient  Site marked: site marked  Supporting Documentation  Indications: pain   Procedure Details  Location: knee - L knee  Needle size: 22 G  Ultrasound guidance: no  Approach: anteromedial  Medications administered: 20 mg Sodium Hyaluronate 20 MG/2ML            _*_*_*_*_*_*_*_*_*_*_*_*_*_*_*_*_*_*_*_*_*_*_*_*_*_*_*_*_*_*_*_*_*_*_*_*_*_*_*_*_*    Assessment:  69 y  o female with bilateral knee pain and osteoarthritis    Plan:   · Weight bearing as tolerated  bilateral lower extremity  · Bilateral Euflexxa injections given as noted above   · Patient interviewed and examined by Dr Brooklyn Cooley and myself  · Patient advised should they develop any increasing pain, redness, drainage, numbness, tingling or swelling surrounding the injection sight, they are to contact our office or present to the emergency department    · Follow up 1 week for final bilateral intra-articular knee Euflexxa injections        Barry Haile KRISHNA

## 2018-03-29 ENCOUNTER — OFFICE VISIT (OUTPATIENT)
Dept: OBGYN CLINIC | Facility: HOSPITAL | Age: 68
End: 2018-03-29
Payer: MEDICARE

## 2018-03-29 VITALS
BODY MASS INDEX: 43.72 KG/M2 | HEART RATE: 93 BPM | HEIGHT: 60 IN | WEIGHT: 222.66 LBS | DIASTOLIC BLOOD PRESSURE: 93 MMHG | SYSTOLIC BLOOD PRESSURE: 162 MMHG

## 2018-03-29 DIAGNOSIS — M17.0 BILATERAL PRIMARY OSTEOARTHRITIS OF KNEE: Primary | ICD-10-CM

## 2018-03-29 PROCEDURE — 20610 DRAIN/INJ JOINT/BURSA W/O US: CPT | Performed by: ORTHOPAEDIC SURGERY

## 2018-03-29 RX ORDER — HYALURONATE SODIUM 10 MG/ML
20 SYRINGE (ML) INTRAARTICULAR
Status: COMPLETED | OUTPATIENT
Start: 2018-03-29 | End: 2018-03-29

## 2018-03-29 RX ADMIN — Medication 20 MG: at 16:01

## 2018-03-29 RX ADMIN — Medication 20 MG: at 16:02

## 2018-03-29 NOTE — PATIENT INSTRUCTIONS
Weightbearing as tolerated bilateral lower extremities    Follow up in office in 3 months for repeat evaluation and possible repeat injections

## 2018-03-29 NOTE — PROGRESS NOTES
69 y o female presents for ongoing evaluation of bilateral knee pain  Patient is here today for her 3rd Euflexxa injection  Patient states that she feels relatively the same from a pain perspective but does notice episodes throughout the day where she has significant pain relief recently  Patient denies any numbness tingling  Patient has also been taking Aleve over-the-counter with significant pain relief  She denies any episodes of instability clicking or locking  Pain is bilateral the level of the joint and is worse along the medial aspect of her knees  Review of Systems  Review of systems negative unless otherwise specified in HPI    Past Medical History  Past Medical History:   Diagnosis Date    Alopecia     Calcaneal spur     Diabetes insipidus ([facility] [address] [facility]     Hyperlipidemia     Hypertension     Osteoarthrosis 2/13/2013    Pes planus     unspecified laterality    Plantar fasciitis        Past Surgical History  Past Surgical History:   Procedure Laterality Date    CARPAL TUNNEL RELEASE      COLONOSCOPY      May 2006    INCISIONAL BREAST BIOPSY         Current Medications  Current Outpatient Prescriptions on File Prior to Visit   Medication Sig Dispense Refill    Calcium Carb-Cholecalciferol (CALCIUM 1000 + D) 1000-800 MG-UNIT TABS Take by mouth      FISH OIL-CANOLA OIL-VIT D3 PO by Does not apply route      losartan (COZAAR) 100 MG tablet Take 1 tablet by mouth daily      metFORMIN (GLUCOPHAGE) 500 mg tablet Take 1 tablet by mouth      simvastatin (ZOCOR) 20 mg tablet Take 1 tablet by mouth daily       No current facility-administered medications on file prior to visit          Recent Labs [facility])    0  Lab Value Date/Time   HCT 40 7 02/12/2018 1621   HGB 13 7 02/12/2018 1621   WBC 6 54 02/12/2018 1621   GLUCOSE 96 11/14/2016 1345   GLUCOSE 95 04/21/2015 1430   HGBA1C 6 6 (H) 10/11/2017 1431   HGBA1C 6 2 (H) 04/21/2015 1430         Physical exam  · General: Awake, Alert, Oriented  · Eyes: Pupils equal, round and reactive to light  · Heart: regular rate and rhythm  · Lungs: No audible wheezing  · Abdomen: soft  bilateral Knee exam  · Skin intact, no erythema, no ecchymosis, no palpable knee effusion  · Tenderness to palpation along medial joint line bilaterally, minimal tenderness palpation along lateral joint line  · mild palpable crepitation with knee flexion-extension  · Knee stable to varus and valgus stress  · Knee range of motion 0-120 degrees  · Distal extremity warm sensate    Procedure  Large joint arthrocentesis  Date/Time: 3/29/2018 4:01 PM  Consent given by: patient  Site marked: site marked  Supporting Documentation  Indications: pain   Procedure Details  Location: knee - R knee  Needle size: 22 G  Ultrasound guidance: no  Approach: anterolateral  Medications administered: 20 mg Sodium Hyaluronate 20 MG/2ML    Patient tolerance: patient tolerated the procedure well with no immediate complications  Dressing:  Sterile dressing applied  Large joint arthrocentesis  Date/Time: 3/29/2018 4:02 PM  Consent given by: patient  Site marked: site marked  Timeout: Immediately prior to procedure a time out was called to verify the correct patient, procedure, equipment, support staff and site/side marked as required   Supporting Documentation  Indications: pain   Procedure Details  Location: knee - L knee  Needle size: 22 G  Ultrasound guidance: no  Approach: anterolateral  Medications administered: 20 mg Sodium Hyaluronate 20 MG/2ML    Patient tolerance: patient tolerated the procedure well with no immediate complications  Dressing:  Sterile dressing applied          Imaging  None taken at today's visit     Assessment:   69 y  o female with ongoing bilateral knee pain secondary to bilateral knee osteoarthritis status post 3 of 3 Euflexxa injections    Plan  · Weightbearing:  As tolerated bilateral lower extremities  · Activity:  As tolerated  · Patient receive 3rd Euflexxa injection bilateral knees today  Patient states that she has been experiencing episodes of increasing pain relief  Patient will be seen back in office in 3 months time for repeat evaluation and possible repeat injections    · Follow-up: 3 months   ·

## 2018-04-02 DIAGNOSIS — E78.5 HYPERLIPIDEMIA: ICD-10-CM

## 2018-04-02 DIAGNOSIS — I10 ESSENTIAL (PRIMARY) HYPERTENSION: ICD-10-CM

## 2018-04-02 DIAGNOSIS — E11.9 TYPE 2 DIABETES MELLITUS WITHOUT COMPLICATIONS (HCC): ICD-10-CM

## 2018-04-02 DIAGNOSIS — E66.01 MORBID (SEVERE) OBESITY DUE TO EXCESS CALORIES (HCC): ICD-10-CM

## 2018-04-30 ENCOUNTER — OFFICE VISIT (OUTPATIENT)
Dept: URGENT CARE | Age: 68
End: 2018-04-30
Payer: MEDICARE

## 2018-04-30 VITALS
HEIGHT: 60 IN | RESPIRATION RATE: 18 BRPM | DIASTOLIC BLOOD PRESSURE: 67 MMHG | SYSTOLIC BLOOD PRESSURE: 159 MMHG | WEIGHT: 221.2 LBS | TEMPERATURE: 98.1 F | OXYGEN SATURATION: 98 % | HEART RATE: 94 BPM | BODY MASS INDEX: 43.43 KG/M2

## 2018-04-30 DIAGNOSIS — H92.01 OTALGIA OF RIGHT EAR: Primary | ICD-10-CM

## 2018-04-30 PROCEDURE — G0463 HOSPITAL OUTPT CLINIC VISIT: HCPCS | Performed by: FAMILY MEDICINE

## 2018-04-30 PROCEDURE — 99213 OFFICE O/P EST LOW 20 MIN: CPT | Performed by: FAMILY MEDICINE

## 2018-04-30 RX ORDER — FLUTICASONE PROPIONATE 50 MCG
1 SPRAY, SUSPENSION (ML) NASAL DAILY
Qty: 16 G | Refills: 0 | Status: SHIPPED | OUTPATIENT
Start: 2018-04-30 | End: 2021-06-08 | Stop reason: ALTCHOICE

## 2018-04-30 NOTE — PROGRESS NOTES
[employer] [facility]        NAME: Dionne Pineda is a 69 y o  female  : 1950    MRN: [id number]  DATE: 2018  TIME: 5:31 PM    Assessment and Plan   Otalgia of right ear [H92 01]  1  Otalgia of right ear  fluticasone (FLONASE) 50 mcg/act nasal spray         Patient Instructions       Take flonase and motrin for symptomatic relief  Follow up with your family doctor this week  If symptoms persist, contact an [facility] and Throat doctor  Call [facility] to schedule and appointment:    [phone]    If swelling behind ear, redness behind ear, tenderness behind ear, worsening ear pain, fever or chills develop go immediately to ER    Chief Complaint     Chief Complaint   Patient presents with    Earache     right ear x 5 days          History of Present Illness       Patient has 1 week history of intermittant R ear pain  She states that the pain is underneath R ear  Patient states that the pain is not constant, she occasionally gets a sharp 8/10 pain but then it goes down to 0/10  Patient denies any fevers or chills  Denies any ear discharge  Denies any dizziness  Denies frequent ear infections  Patient denies any tenderness behind R ear, or redness or swelling  Pt states she has a little cold sore in her mouth and noticed that she has a little swelling under R side of jaw  Pt states she was on the internet and read about mastoiditis and became concerned  Pt has no other symptoms  Review of Systems   Review of Systems   Constitutional: Negative for chills, diaphoresis, fatigue and fever  HENT: Positive for congestion, ear pain, postnasal drip and sinus pressure  Negative for ear discharge, facial swelling, rhinorrhea, sinus pain and voice change  Eyes: Negative  Respiratory: Negative for cough, chest tightness and shortness of breath  Cardiovascular: Negative for chest pain and palpitations     Gastrointestinal: Negative for abdominal pain, diarrhea, nausea and vomiting  Endocrine: Negative  Genitourinary: Negative for dysuria  Musculoskeletal: Negative for back pain, myalgias and neck pain  Skin: Negative for pallor and rash  Allergic/Immunologic: Negative  Neurological: Negative for dizziness, syncope, weakness, light-headedness and headaches  Hematological: Negative  Psychiatric/Behavioral: Negative            Current Medications       Current Outpatient Prescriptions:     Calcium Carb-Cholecalciferol (CALCIUM 1000 + D) 1000-800 MG-UNIT TABS, Take by mouth, Disp: , Rfl:     FISH OIL-CANOLA OIL-VIT D3 PO, by Does not apply route, Disp: , Rfl:     losartan (COZAAR) 100 MG tablet, Take 1 tablet by mouth daily, Disp: , Rfl:     metFORMIN (GLUCOPHAGE) 500 mg tablet, Take 1 tablet by mouth, Disp: , Rfl:     simvastatin (ZOCOR) 20 mg tablet, Take 1 tablet by mouth daily, Disp: , Rfl:     fluticasone (FLONASE) 50 mcg/act nasal spray, 1 spray into each nostril daily, Disp: 16 g, Rfl: 0    Current Allergies     Allergies as of 04/30/2018 - Reviewed 04/30/2018   Allergen Reaction Noted    Nifedipine Edema 02/12/2013    Sulfamethoxazole-trimethoprim Edema 02/12/2013            The following portions of the patient's history were reviewed and updated as appropriate: allergies, current medications, past family history, past medical history, past social history, past surgical history and problem list      Past Medical History:   Diagnosis Date    Alopecia     Calcaneal spur     Diabetes insipidus [facility] [address]     Hyperlipidemia     Hypertension     Osteoarthrosis 2/13/2013    Pes planus     unspecified laterality    Plantar fasciitis        Past Surgical History:   Procedure Laterality Date    CARPAL TUNNEL RELEASE      COLONOSCOPY      May 2006    INCISIONAL BREAST BIOPSY         Family History   Problem Relation Age of Onset    Diabetes Mother     Hypertension Mother     Emphysema Father     Cancer Brother     Colon cancer Brother Medications have been verified  Objective   /67   Pulse 94   Temp 98 1 °F (36 7 °C) (Temporal)   Resp 18   Ht 5' (1 524 m)   Wt 100 kg (221 lb 3 2 oz)   SpO2 98%   BMI 43 20 kg/m²        Physical Exam     Physical Exam   Constitutional: She appears well-developed and well-nourished  No distress  HENT:   Head: Normocephalic and atraumatic  Right Ear: Hearing, external ear and ear canal normal  No drainage or tenderness  No foreign bodies  No mastoid tenderness  Tympanic membrane is bulging  Tympanic membrane is not injected, not perforated and not erythematous  No decreased hearing is noted  Left Ear: Hearing, external ear and ear canal normal  Tympanic membrane is bulging  Tympanic membrane is not erythematous  Nose: Nose normal    Mouth/Throat: Oropharynx is clear and moist  No oropharyngeal exudate  Eyes: Conjunctivae are normal  Right eye exhibits no discharge  Left eye exhibits no discharge  No scleral icterus  Neck: Normal range of motion  Neck supple  Cardiovascular: Normal rate, regular rhythm, normal heart sounds and intact distal pulses  Pulmonary/Chest: Effort normal and breath sounds normal  No respiratory distress  She has no wheezes  She has no rales  Skin: Skin is warm  No rash noted  She is not diaphoretic  Nursing note and vitals reviewed  Patients pain is likely secondary to lymphnode under R side of jaw  No ear infection  No mastoid tenderness warmth or erythema  TMs bulging b/l  I will place pt on flonase and have her f/u with PCP  I instructed pt on detailed instructions of when to go to ER including redness, swelling of mastoid, fevers, chills, decreased hearing of ear, and any other concerning symptoms  She agrees and understands  I also provided patient with information for ENT

## 2018-04-30 NOTE — PATIENT INSTRUCTIONS
Take flonase and motrin for symptomatic relief  Follow up with your family doctor this week  If symptoms persist, contact an [facility] Thro[facility] doctor  Call [facility] to schedule and appointment:    [phone]    If swelling behind ear, redness behind ear, tenderness behind ear, worsening ear pain, fever or chills develop go immediately to ER  Earache   WHAT YOU NEED TO KNOW:   An earache can be caused by a problem within your ear or from another body area  Common causes include earwax buildup, objects in your ear, injury, infections, or jaw or dental problems  Less often, earaches may be caused by arthritis in your upper spine  DISCHARGE INSTRUCTIONS:   Return to the emergency department if:   · You have a severe earache  · You have ear pain with itching, hearing loss, dizziness, a feeling of fullness in your ear, or ringing in your ears  Contact your healthcare provider if:   · Your ear pain worsens or does not go away with treatment  · You have drainage from your ear  · You have a fever  · Your outer ear becomes red, swollen, and warm  · You have questions or concerns about your condition or care  Medicines: You may need any of the following:  · NSAIDs , such as ibuprofen, help decrease swelling, pain, and fever  This medicine is available with or without a doctor's order  NSAIDs can cause stomach bleeding or kidney problems in certain people  If you take blood thinner medicine, always ask if NSAIDs are safe for you  Always read the medicine label and follow directions  Do not give these medicines to children under 10months of age without direction from your child's healthcare provider  · Acetaminophen  decreases pain and fever  It is available without a doctor's order  Ask how much to take and how often to take it  Follow directions  Acetaminophen can cause liver damage if not taken correctly  · Do not give aspirin to children under 25years of age    Your child could develop Reye syndrome if he takes aspirin  Reye syndrome can cause life-threatening brain and liver damage  Check your child's medicine labels for aspirin, salicylates, or oil of wintergreen  · Take your medicine as directed  Call your healthcare provider if you think your medicine is not helping or if you have side effects  Tell him if you are allergic to any medicine  Keep a list of the medicines, vitamins, and herbs you take  Include the amounts, and when and why you take them  Bring the list or the pill bottles to follow-up visits  Carry your medicine list with you in case of an emergency  Follow up with your healthcare provider as directed:  Write down your questions so you remember to ask them during your visits  © 2017 [employer]  Information is for End User's use only and may not be sold, redistributed or otherwise used for commercial purposes  All illustrations and images included in CareNotes® are the copyrighted property of [employer]  or Ishaan Bell  The above information is an  only  It is not intended as medical advice for individual conditions or treatments  Talk to your doctor, nurse or pharmacist before following any medical regimen to see if it is safe and effective for you

## 2018-05-03 ENCOUNTER — TELEPHONE (OUTPATIENT)
Dept: FAMILY MEDICINE CLINIC | Facility: CLINIC | Age: 68
End: 2018-05-03

## 2018-05-03 NOTE — TELEPHONE ENCOUNTER
Tried to call pt but nobody answer the phone  Please tell pt she can hold metformin today, restart tomorrow after she can eat

## 2018-05-04 ENCOUNTER — ANESTHESIA (OUTPATIENT)
Dept: GASTROENTEROLOGY | Facility: HOSPITAL | Age: 68
End: 2018-05-04
Payer: MEDICARE

## 2018-05-04 ENCOUNTER — ANESTHESIA EVENT (OUTPATIENT)
Dept: GASTROENTEROLOGY | Facility: HOSPITAL | Age: 68
End: 2018-05-04
Payer: MEDICARE

## 2018-05-04 ENCOUNTER — HOSPITAL ENCOUNTER (OUTPATIENT)
Facility: HOSPITAL | Age: 68
Setting detail: OUTPATIENT SURGERY
Discharge: HOME | End: 2018-05-04
Attending: COLON & RECTAL SURGERY | Admitting: COLON & RECTAL SURGERY
Payer: MEDICARE

## 2018-05-04 VITALS
BODY MASS INDEX: 43.19 KG/M2 | WEIGHT: 220 LBS | HEIGHT: 60 IN | TEMPERATURE: 97.8 F | RESPIRATION RATE: 18 BRPM | HEART RATE: 83 BPM | DIASTOLIC BLOOD PRESSURE: 91 MMHG | SYSTOLIC BLOOD PRESSURE: 149 MMHG | OXYGEN SATURATION: 96 %

## 2018-05-04 RX ORDER — PROPOFOL 10 MG/ML
INJECTION, EMULSION INTRAVENOUS CONTINUOUS PRN
Status: DISCONTINUED | OUTPATIENT
Start: 2018-05-04 | End: 2018-05-04 | Stop reason: SURG

## 2018-05-04 RX ORDER — SODIUM CHLORIDE 9 MG/ML
100 INJECTION, SOLUTION INTRAVENOUS CONTINUOUS
Status: DISCONTINUED | OUTPATIENT
Start: 2018-05-04 | End: 2018-05-04 | Stop reason: HOSPADM

## 2018-05-04 RX ORDER — PROPOFOL 10 MG/ML
INJECTION, EMULSION INTRAVENOUS AS NEEDED
Status: DISCONTINUED | OUTPATIENT
Start: 2018-05-04 | End: 2018-05-04 | Stop reason: SURG

## 2018-05-04 RX ADMIN — PROPOFOL 50 MG: 10 INJECTION, EMULSION INTRAVENOUS at 09:59

## 2018-05-04 RX ADMIN — PROPOFOL 100 MG: 10 INJECTION, EMULSION INTRAVENOUS at 09:52

## 2018-05-04 RX ADMIN — PROPOFOL 120 MCG/KG/MIN: 10 INJECTION, EMULSION INTRAVENOUS at 09:53

## 2018-05-04 RX ADMIN — SODIUM CHLORIDE 100 ML/HR: 0.9 INJECTION, SOLUTION INTRAVENOUS at 09:01

## 2018-05-04 NOTE — ANESTHESIA POSTPROCEDURE EVALUATION
Post-Op Assessment Note      CV Status:  Stable    Mental Status:  Alert and awake    Hydration Status:  Euvolemic    PONV Controlled:  Controlled    Airway Patency:  Patent    Post Op Vitals Reviewed: Yes          Staff: CRNA           /76 (05/04/18 1020)    Temp      Pulse 78 (05/04/18 1020)   Resp 18 (05/04/18 1020)    SpO2 98 % (05/04/18 1020)

## 2018-05-04 NOTE — OP NOTE
**** GI/ENDOSCOPY REPORT ****     PATIENT NAME: Maryam N Second ------ VISIT ID:     INTRODUCTION: Colonoscopy - A 69 female patient presents for an outpatient   Colonoscopy at [address] [place] [address]  PREVIOUS COLONOSCOPY:     INDICATIONS: Screening for family history of colorectal cancer  CONSENT:  The benefits, risks, and alternatives to the procedure were   discussed and informed consent was obtained from the patient  PREPARATION: The patient was identified by myself both verbally and by   visual inspection of ID band  EKG, pulse, pulse oximetry and blood   pressure were monitored throughout the procedure  ASA Classification:   Class 1 - Patient has no organic, physiologic, biochemical, or psychiatric   disturbance  Airway Assessment Classification: Airway class 1 -   Visualization of the soft palate, fauces, uvula, and posterior pillars  MEDICATIONS: Anesthesia-check records     RECTAL EXAM: Normal sphincter tone  No external hemorrhoids  No internal   hemorrhoids  Fungal dermatitis seen perianal area  PROCEDURE:  The endoscope was passed without difficulty through the anus   under direct visualization and advanced to the cecum, confirmed by   photographs  The quality of the preparation was  The scope was withdrawn   and the mucosa was carefully examined  The views were good  The patient's   toleration of the procedure was good  Cecal Intubation Time: 4 minutes(s)   Cecal Withdrawal Time: 12 minutes(s)     FINDINGS:  No evidence of diverticula, erythematous mucosae, polyps, and   tumors in the colon  Appendiceal opening identified     COMPLICATIONS: There were no complications  IMPRESSIONS: No evidence of diverticula, erythematous mucosae, polyps, and   tumors in the colon  RECOMMENDATIONS: Continue current medications  Call if you are having any   problems: Dr Maddy King [phone]  Start high fiber diet  Colonoscopy   recommended in 5 years       PATHOLOGY SPECIMENS:     PROCEDURE CODES: Colonoscopy     ICD-9 Codes: V16 0 Family history of malignant neoplasm of   gastrointestinal tract     ICD-10 Codes: Z80 0 Family history of malignant neoplasm of digestive   organs     PERFORMED BY: PREET Shane  on 05/04/2018  Version 1, electronically signed by PREET Shane  on   05/04/2018 at 10:20

## 2018-05-04 NOTE — ADDENDUM NOTE
Addendum  created 05/04/18 1052 by Porfirio Root MD    Anesthesia Review and Sign - Ready for Procedure

## 2018-05-04 NOTE — H&P
History and Physical   Colon and Rectal Surgery   Sam Templeton 69 y o  female MRN: [id number]  Unit/Bed#: ENDO POOL Encounter: [id number]  05/04/18   9:46 AM      No chief complaint on file  History of Present Illness   HPI:  Sam Templeton is a 69 y o  female who presents with high family colon cancer risk        Historical Information   Past Medical History:   Diagnosis Date    Alopecia     Calcaneal spur     [facility]us [facility] [address]     Diabetes mellitus ([facility] [address]     Hyperlipidemia     Hypertension     Osteoarthrosis 2/13/2013    Pes planus     unspecified laterality    Plantar fasciitis      Past Surgical History:   Procedure Laterality Date    CARPAL TUNNEL RELEASE      COLONOSCOPY      May 2006    INCISIONAL BREAST BIOPSY         Meds/Allergies     Prescriptions Prior to Admission   Medication    Calcium Carb-Cholecalciferol (CALCIUM 1000 + D) 1000-800 MG-UNIT TABS    FISH OIL-CANOLA OIL-VIT D3 PO    fluticasone (FLONASE) 50 mcg/act nasal spray    losartan (COZAAR) 100 MG tablet    metFORMIN (GLUCOPHAGE) 500 mg tablet    simvastatin (ZOCOR) 20 mg tablet         Current Facility-Administered Medications:     sodium chloride 0 9 % infusion, 100 mL/hr, Intravenous, Continuous, R Elena Beckman MD, Last Rate: 100 mL/hr at 05/04/18 0901, 100 mL/hr at 05/04/18 0901    Allergies   Allergen Reactions    Nifedipine Edema    Sulfamethoxazole-Trimethoprim Edema         Social History   History   Alcohol Use    Yes     Comment: social     History   Drug Use No     History   Smoking Status    Never Smoker   Smokeless Tobacco    Never Used         Family History:   Family History   Problem Relation Age of Onset    Diabetes Mother     Hypertension Mother     Emphysema Father     Cancer Brother     Colon cancer Brother          Objective     Current Vitals:   Blood Pressure: (!) 183/99 (05/04/18 0846)  Pulse: 89 (05/04/18 0846)  Temperature: 97 8 °F (36 6 °C) (05/04/18 0846)  Temp Source: Temporal (05/04/18 0846)  Respirations: 16 (05/04/18 0846)  Height: 5' (152 4 cm) (05/04/18 0846)  Weight - Scale: 99 8 kg (220 lb) (05/04/18 0846)  SpO2: 97 % (05/04/18 0846)  No intake or output data in the 24 hours ending 05/04/18 0946    Physical Exam:  General: No acute distress  Eyes: Normal   ENT: Normal   Neck: No JVD  Pulm: Normal in A&P  CV: NSR no murmur  Abdomen: Soft and normal on palpation, no mass, no tenderness, no guarding  Rectal: Normal sphincter tone, no perianal skin lesions  Extremities: Normal  Lymphatics: Normal        Lab Results: I have personally reviewed pertinent lab results  Imaging: I have personally reviewed pertinent reports  Patient was consented by myself for procedure as explained earlier with all the risks and benefits described  All questions answered  ASSESSMENT:  Angelica Luke is a 69 y o  female who presents with  high family colon cancer risk  Nicolas Tim       PLAN:  colonoscopy

## 2018-06-25 ENCOUNTER — APPOINTMENT (OUTPATIENT)
Dept: LAB | Facility: HOSPITAL | Age: 68
End: 2018-06-25
Payer: MEDICARE

## 2018-06-25 DIAGNOSIS — E66.01 MORBID (SEVERE) OBESITY DUE TO EXCESS CALORIES (HCC): ICD-10-CM

## 2018-06-25 DIAGNOSIS — I10 ESSENTIAL (PRIMARY) HYPERTENSION: ICD-10-CM

## 2018-06-25 DIAGNOSIS — E78.5 HYPERLIPIDEMIA: ICD-10-CM

## 2018-06-25 DIAGNOSIS — E11.9 TYPE 2 DIABETES MELLITUS WITHOUT COMPLICATIONS (HCC): ICD-10-CM

## 2018-06-25 LAB
ALBUMIN SERPL BCP-MCNC: 4 G/DL (ref 3.5–5)
ALP SERPL-CCNC: 80 U/L (ref 46–116)
ALT SERPL W P-5'-P-CCNC: 27 U/L (ref 12–78)
ANION GAP SERPL CALCULATED.3IONS-SCNC: 9 MMOL/L (ref 4–13)
AST SERPL W P-5'-P-CCNC: 24 U/L (ref 5–45)
BASOPHILS # BLD AUTO: 0.04 THOUSANDS/ΜL (ref 0–0.1)
BASOPHILS NFR BLD AUTO: 1 % (ref 0–1)
BILIRUB SERPL-MCNC: 0.6 MG/DL (ref 0.2–1)
BUN SERPL-MCNC: 16 MG/DL (ref 5–25)
CALCIUM SERPL-MCNC: 9.4 MG/DL (ref 8.3–10.1)
CHLORIDE SERPL-SCNC: 102 MMOL/L (ref 100–108)
CHOLEST SERPL-MCNC: 193 MG/DL (ref 50–200)
CO2 SERPL-SCNC: 26 MMOL/L (ref 21–32)
CREAT SERPL-MCNC: 0.75 MG/DL (ref 0.6–1.3)
CREAT UR-MCNC: 185 MG/DL
EOSINOPHIL # BLD AUTO: 0.34 THOUSAND/ΜL (ref 0–0.61)
EOSINOPHIL NFR BLD AUTO: 5 % (ref 0–6)
ERYTHROCYTE [DISTWIDTH] IN BLOOD BY AUTOMATED COUNT: 12.6 % (ref 11.6–15.1)
EST. AVERAGE GLUCOSE BLD GHB EST-MCNC: 134 MG/DL
GFR SERPL CREATININE-BSD FRML MDRD: 82 ML/MIN/1.73SQ M
GLUCOSE P FAST SERPL-MCNC: 88 MG/DL (ref 65–99)
HBA1C MFR BLD: 6.3 % (ref 4.2–6.3)
HCT VFR BLD AUTO: 42.7 % (ref 34.8–46.1)
HDLC SERPL-MCNC: 56 MG/DL (ref 40–60)
HGB BLD-MCNC: 14.1 G/DL (ref 11.5–15.4)
IMM GRANULOCYTES # BLD AUTO: 0.02 THOUSAND/UL (ref 0–0.2)
IMM GRANULOCYTES NFR BLD AUTO: 0 % (ref 0–2)
LDLC SERPL CALC-MCNC: 107 MG/DL (ref 0–100)
LYMPHOCYTES # BLD AUTO: 1.87 THOUSANDS/ΜL (ref 0.6–4.47)
LYMPHOCYTES NFR BLD AUTO: 29 % (ref 14–44)
MCH RBC QN AUTO: 31.3 PG (ref 26.8–34.3)
MCHC RBC AUTO-ENTMCNC: 33 G/DL (ref 31.4–37.4)
MCV RBC AUTO: 95 FL (ref 82–98)
MICROALBUMIN UR-MCNC: 56.9 MG/L (ref 0–20)
MICROALBUMIN/CREAT 24H UR: 31 MG/G CREATININE (ref 0–30)
MONOCYTES # BLD AUTO: 0.55 THOUSAND/ΜL (ref 0.17–1.22)
MONOCYTES NFR BLD AUTO: 9 % (ref 4–12)
NEUTROPHILS # BLD AUTO: 3.65 THOUSANDS/ΜL (ref 1.85–7.62)
NEUTS SEG NFR BLD AUTO: 56 % (ref 43–75)
NRBC BLD AUTO-RTO: 0 /100 WBCS
PLATELET # BLD AUTO: 283 THOUSANDS/UL (ref 149–390)
PMV BLD AUTO: 9.1 FL (ref 8.9–12.7)
POTASSIUM SERPL-SCNC: 4.2 MMOL/L (ref 3.5–5.3)
PROT SERPL-MCNC: 8.2 G/DL (ref 6.4–8.2)
RBC # BLD AUTO: 4.5 MILLION/UL (ref 3.81–5.12)
SODIUM SERPL-SCNC: 137 MMOL/L (ref 136–145)
TRIGL SERPL-MCNC: 151 MG/DL
TSH SERPL DL<=0.05 MIU/L-ACNC: 3.55 UIU/ML (ref 0.36–3.74)
WBC # BLD AUTO: 6.47 THOUSAND/UL (ref 4.31–10.16)

## 2018-06-25 PROCEDURE — 85025 COMPLETE CBC W/AUTO DIFF WBC: CPT

## 2018-06-25 PROCEDURE — 82043 UR ALBUMIN QUANTITATIVE: CPT

## 2018-06-25 PROCEDURE — 80061 LIPID PANEL: CPT

## 2018-06-25 PROCEDURE — 80053 COMPREHEN METABOLIC PANEL: CPT

## 2018-06-25 PROCEDURE — 84443 ASSAY THYROID STIM HORMONE: CPT

## 2018-06-25 PROCEDURE — 36415 COLL VENOUS BLD VENIPUNCTURE: CPT

## 2018-06-25 PROCEDURE — 83036 HEMOGLOBIN GLYCOSYLATED A1C: CPT

## 2018-06-25 PROCEDURE — 82570 ASSAY OF URINE CREATININE: CPT

## 2018-06-27 ENCOUNTER — OFFICE VISIT (OUTPATIENT)
Dept: FAMILY MEDICINE CLINIC | Facility: CLINIC | Age: 68
End: 2018-06-27
Payer: MEDICARE

## 2018-06-27 VITALS
HEART RATE: 72 BPM | HEIGHT: 60 IN | DIASTOLIC BLOOD PRESSURE: 80 MMHG | SYSTOLIC BLOOD PRESSURE: 138 MMHG | TEMPERATURE: 98.5 F | BODY MASS INDEX: 42.13 KG/M2 | WEIGHT: 214.6 LBS | RESPIRATION RATE: 16 BRPM

## 2018-06-27 DIAGNOSIS — I10 BENIGN ESSENTIAL HYPERTENSION: ICD-10-CM

## 2018-06-27 DIAGNOSIS — E66.01 MORBID OBESITY WITH BODY MASS INDEX OF 40.0-49.9 (HCC): ICD-10-CM

## 2018-06-27 DIAGNOSIS — M20.41 HAMMER TOE OF RIGHT FOOT: ICD-10-CM

## 2018-06-27 DIAGNOSIS — E11.65 CONTROLLED TYPE 2 DIABETES MELLITUS WITH HYPERGLYCEMIA, WITHOUT LONG-TERM CURRENT USE OF INSULIN (HCC): Primary | ICD-10-CM

## 2018-06-27 DIAGNOSIS — E78.5 HYPERLIPIDEMIA, UNSPECIFIED HYPERLIPIDEMIA TYPE: ICD-10-CM

## 2018-06-27 PROCEDURE — 99214 OFFICE O/P EST MOD 30 MIN: CPT | Performed by: FAMILY MEDICINE

## 2018-06-27 RX ORDER — LOSARTAN POTASSIUM 100 MG/1
100 TABLET ORAL DAILY
Qty: 90 TABLET | Refills: 3 | Status: SHIPPED | OUTPATIENT
Start: 2018-06-27 | End: 2019-04-10 | Stop reason: SDUPTHER

## 2018-06-27 RX ORDER — SIMVASTATIN 20 MG
20 TABLET ORAL DAILY
Qty: 90 TABLET | Refills: 3 | Status: SHIPPED | OUTPATIENT
Start: 2018-06-27 | End: 2019-04-10 | Stop reason: SDUPTHER

## 2018-06-27 NOTE — PROGRESS NOTES
Patient is here for a follow up with no concerns  Assessment/Plan:  Reviewed lab in 6/2018  TSH normal  CBC ok  CMP ok  hgA1C 6 3 controlled  M/c 31 slightly elevated  Lipid 193/151/56/107 ok    DM---controlled  Low carb diet  Continue metformin 500mg bid  HTN---controlled  Continue losartan  Hyperlipidemia---controlled  Low fat diet  Continue simvastatin  Morbid obesity---lose weight  Sajan Rudolph toe---refer to podiatry  Got pneumovax at age of 70  Got prevnar 13 2/2016  Got zostavax in 3/2017 per pt  Aminata Dial for mammogram and pap  Had mammogram 11/2017 negative  Pap 2017, negative per pt  Had colonoscopy 5/2018 Dr Tess Hill  Repeat in 5 years  Dexa scan 11/2016 normal    RTO in 6 months  Diagnoses and all orders for this visit:    Controlled type 2 diabetes mellitus with hyperglycemia, without long-term current use of insulin (HCC)    Benign essential hypertension    Hyperlipidemia, unspecified hyperlipidemia type    Morbid obesity with body mass index of 40 0-49 9 (HCC)    Other orders  -     Multiple Vitamin (MULTI-VITAMIN DAILY PO); Take by mouth          Subjective:      Patient ID: Deena Hernandez is a 69 y o  female  HPI    Pt is here by herself  DM---6/2018 HgA1C 6 3 She is on metformin 500mg bid now  Tried to follow Low carb diet  Some neuropathy on toes  Denies hypoglycemia  Does not check BS at home  [facility] opthalmology 5/2018 Dr Kapadia Bound  No retinopathy  HTN---Does not check BP at home  Denies headache, vision change, SOB or CP  Today /80 today  She is on losartan 100mg QD  Hyperlipidemia---She is on simvastatin 20mg qhs  Denies SE      [facility] orthopedics for knee pain/arthritis  Got injections which helped some  She is on aleve as needed, not everyday  [facility] dermatology as needed for 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  Got steroid shot which helped a lot  Morbid obesity---BMI 41 91 today  Cannot exercise because of knee pain  Live by herself  Does all ADL's   Still drive    Denies recent falls  Denies depression  The following portions of the patient's history were reviewed and updated as appropriate: allergies, current medications, past family history, past medical history, past social history, past surgical history and problem list     Review of Systems   Constitutional: Negative for appetite change, chills and fever  HENT: Negative for congestion, ear pain, sinus pain and sore throat  Eyes: Negative for discharge and itching  Respiratory: Negative for apnea, cough, chest tightness, shortness of breath and wheezing  Cardiovascular: Negative for chest pain, palpitations and leg swelling  Gastrointestinal: Negative for abdominal pain, anal bleeding, constipation, diarrhea, nausea and vomiting  Endocrine: Negative for cold intolerance, heat intolerance and polyuria  Genitourinary: Negative for difficulty urinating and dysuria  Musculoskeletal: Negative for arthralgias, back pain and myalgias  Skin: Negative for rash  Neurological: Negative for dizziness and headaches  Psychiatric/Behavioral: Negative for agitation  Objective:      /80   Pulse 72   Temp 98 5 °F (36 9 °C) (Tympanic)   Resp 16   Ht 5' (1 524 m)   Wt 97 3 kg (214 lb 9 6 oz)   BMI 41 91 kg/m²          Physical Exam   Constitutional: She appears well-developed  No distress  HENT:   Head: Normocephalic  Right Ear: External ear normal    Left Ear: External ear normal    Nose: Nose normal    Mouth/Throat: Oropharynx is clear and moist    Eyes: Conjunctivae are normal  Pupils are equal, round, and reactive to light  Right eye exhibits no discharge  Left eye exhibits no discharge  Neck: Normal range of motion  No thyromegaly present  Cardiovascular: Normal rate, regular rhythm and normal heart sounds  Exam reveals no gallop and no friction rub  No murmur heard  Pulses:       Dorsalis pedis pulses are 2+ on the right side, and 2+ on the left side  Pulmonary/Chest: Effort normal and breath sounds normal  No respiratory distress  She has no wheezes  She has no rales  She exhibits no tenderness  Abdominal: Soft  Bowel sounds are normal    Musculoskeletal: Normal range of motion  Feet:    Feet:   Right Foot:   Skin Integrity: Negative for ulcer, skin breakdown, erythema, warmth, callus or dry skin  Left Foot:   Skin Integrity: Negative for ulcer, skin breakdown, erythema, warmth, callus or dry skin  Lymphadenopathy:     She has no cervical adenopathy  Neurological: She is alert  Psychiatric: She has a normal mood and affect  Patient's shoes and socks removed  Right Foot/Ankle   Right Foot Inspection  Skin Exam: skin normal and skin intact no dry skin, no warmth, no callus, no erythema, no maceration, no abnormal color, no pre-ulcer, no ulcer and no callus                          Toe Exam: right toe deformity  Sensory       Monofilament testing: intact  Vascular    The right DP pulse is 2+  Left Foot/Ankle  Left Foot Inspection  Skin Exam: skin normal and skin intactno dry skin, no warmth, no erythema, no maceration, normal color, no pre-ulcer, no ulcer and no callus                                         Sensory       Monofilament: intact  Vascular    The left DP pulse is 2+

## 2018-06-28 ENCOUNTER — OFFICE VISIT (OUTPATIENT)
Dept: OBGYN CLINIC | Facility: HOSPITAL | Age: 68
End: 2018-06-28
Payer: MEDICARE

## 2018-06-28 VITALS
HEART RATE: 99 BPM | SYSTOLIC BLOOD PRESSURE: 127 MMHG | DIASTOLIC BLOOD PRESSURE: 81 MMHG | BODY MASS INDEX: 42.11 KG/M2 | WEIGHT: 214.51 LBS | HEIGHT: 60 IN

## 2018-06-28 DIAGNOSIS — M25.561 CHRONIC PAIN OF RIGHT KNEE: Primary | ICD-10-CM

## 2018-06-28 DIAGNOSIS — M17.0 PRIMARY OSTEOARTHRITIS OF BOTH KNEES: ICD-10-CM

## 2018-06-28 DIAGNOSIS — G89.29 CHRONIC PAIN OF RIGHT KNEE: Primary | ICD-10-CM

## 2018-06-28 DIAGNOSIS — M25.562 CHRONIC PAIN OF LEFT KNEE: ICD-10-CM

## 2018-06-28 DIAGNOSIS — G89.29 CHRONIC PAIN OF LEFT KNEE: ICD-10-CM

## 2018-06-28 PROCEDURE — 99213 OFFICE O/P EST LOW 20 MIN: CPT | Performed by: ORTHOPAEDIC SURGERY

## 2018-06-28 PROCEDURE — 20610 DRAIN/INJ JOINT/BURSA W/O US: CPT | Performed by: ORTHOPAEDIC SURGERY

## 2018-06-28 RX ORDER — BUPIVACAINE HYDROCHLORIDE 2.5 MG/ML
2 INJECTION, SOLUTION INFILTRATION; PERINEURAL
Status: COMPLETED | OUTPATIENT
Start: 2018-06-28 | End: 2018-06-28

## 2018-06-28 RX ORDER — LIDOCAINE HYDROCHLORIDE 10 MG/ML
2 INJECTION, SOLUTION INFILTRATION; PERINEURAL
Status: COMPLETED | OUTPATIENT
Start: 2018-06-28 | End: 2018-06-28

## 2018-06-28 RX ORDER — BETAMETHASONE SODIUM PHOSPHATE AND BETAMETHASONE ACETATE 3; 3 MG/ML; MG/ML
12 INJECTION, SUSPENSION INTRA-ARTICULAR; INTRALESIONAL; INTRAMUSCULAR; SOFT TISSUE
Status: COMPLETED | OUTPATIENT
Start: 2018-06-28 | End: 2018-06-28

## 2018-06-28 RX ADMIN — BETAMETHASONE SODIUM PHOSPHATE AND BETAMETHASONE ACETATE 12 MG: 3; 3 INJECTION, SUSPENSION INTRA-ARTICULAR; INTRALESIONAL; INTRAMUSCULAR; SOFT TISSUE at 15:46

## 2018-06-28 RX ADMIN — LIDOCAINE HYDROCHLORIDE 2 ML: 10 INJECTION, SOLUTION INFILTRATION; PERINEURAL at 15:45

## 2018-06-28 RX ADMIN — BUPIVACAINE HYDROCHLORIDE 2 ML: 2.5 INJECTION, SOLUTION INFILTRATION; PERINEURAL at 15:45

## 2018-06-28 RX ADMIN — BETAMETHASONE SODIUM PHOSPHATE AND BETAMETHASONE ACETATE 12 MG: 3; 3 INJECTION, SUSPENSION INTRA-ARTICULAR; INTRALESIONAL; INTRAMUSCULAR; SOFT TISSUE at 15:45

## 2018-06-28 RX ADMIN — LIDOCAINE HYDROCHLORIDE 2 ML: 10 INJECTION, SOLUTION INFILTRATION; PERINEURAL at 15:46

## 2018-06-28 RX ADMIN — BUPIVACAINE HYDROCHLORIDE 2 ML: 2.5 INJECTION, SOLUTION INFILTRATION; PERINEURAL at 15:46

## 2018-06-28 NOTE — PROGRESS NOTES
69 y o female presents for evaluation  Three months ago she received a 3 shot series of viscosupplementation for arthritic knees bilaterally, and describes excellent relief of her symptoms  She describes less pain, she describes greater function, she describes less inflammation  Total knee over the past week or so she has had return to weight-bearing pain in both knees  Review of Systems  Review of systems negative unless otherwise specified in HPI    Past Medical History  Past Medical History:   Diagnosis Date    Alopecia     Calcaneal spur     [facility] [address] [facility]     [facility] mellitus [facility] [address] [facility]     Hyperlipidemia     Hypertension     Osteoarthrosis 2/13/2013    Pes planus     unspecified laterality    Plantar fasciitis        Past Surgical History  Past Surgical History:   Procedure Laterality Date    CARPAL TUNNEL RELEASE      COLONOSCOPY      May 2006    INCISIONAL BREAST BIOPSY      NV COLONOSCOPY FLX DX W/COLLJ SPEC WHEN PFRMD N/A 5/4/2018    Procedure: COLONOSCOPY;  Surgeon: Nichol Hendrix MD;  Location: [facility]; Service: Colorectal       Current Medications  Current Outpatient Prescriptions on File Prior to Visit   Medication Sig Dispense Refill    Calcium Carb-Cholecalciferol (CALCIUM 1000 + D) 1000-800 MG-UNIT TABS Take by mouth      FISH OIL-CANOLA OIL-VIT D3 PO by Does not apply route      fluticasone (FLONASE) 50 mcg/act nasal spray 1 spray into each nostril daily 16 g 0    losartan (COZAAR) 100 MG tablet Take 1 tablet (100 mg total) by mouth daily for 90 days 90 tablet 3    metFORMIN (GLUCOPHAGE) 500 mg tablet Take 1 tablet (500 mg total) by mouth 2 (two) times a day with meals for 90 days 180 tablet 3    Multiple Vitamin (MULTI-VITAMIN DAILY PO) Take by mouth      simvastatin (ZOCOR) 20 mg tablet Take 1 tablet (20 mg total) by mouth daily for 90 days 90 tablet 3     No current facility-administered medications on file prior to visit          Recent Labs (HCT,HGB,PT,INR,ESR,CRP,GLU,HgA1C)    0  Lab Value Date/Time   HCT 42 7 06/25/2018 1535   HGB 14 1 06/25/2018 1535   WBC 6 47 06/25/2018 1535   GLUCOSE 96 11/14/2016 1345   GLUCOSE 95 04/21/2015 1430   HGBA1C 6 3 06/25/2018 1535   HGBA1C 6 2 (H) 04/21/2015 1430         Physical exam  · General: Awake, Alert, Oriented  · Eyes: Pupils equal, round and reactive to light  · Heart: regular rate and rhythm  · Lungs: No audible wheezing  · Abdomen: soft  Gait patterns without antalgia  Breathing is nonlabored  Both hips move well  New the thighs atrophy  Both knees are in varus  There are no effusions bilaterally  There is bony enlargement moderate tenderness medially  There is crepitation flexion extension bilaterally  Calf compartments are soft and supple bilaterally  Toes are warm, sensate, mobile bilaterally    Imaging  No new x-rays accompany her today    Procedure  Injection of corticosteroid or provided for bilateral knee joints today    There documented below    Large joint arthrocentesis  Date/Time: 6/28/2018 3:45 PM  Consent given by: patient  Supporting Documentation  Indications: pain   Procedure Details  Location: knee - R knee  Needle size: 22 G  Ultrasound guidance: no  Medications administered: 2 mL bupivacaine 0 25 %; 2 mL lidocaine 1 %; 12 mg betamethasone acetate-betamethasone sodium phosphate 6 (3-3) mg/mL    Patient tolerance: patient tolerated the procedure well with no immediate complications  Dressing:  Sterile dressing applied  Large joint arthrocentesis  Date/Time: 6/28/2018 3:46 PM  Consent given by: patient  Supporting Documentation  Indications: pain   Procedure Details  Location: knee - L knee  Needle size: 22 G  Medications administered: 2 mL bupivacaine 0 25 %; 2 mL lidocaine 1 %; 12 mg betamethasone acetate-betamethasone sodium phosphate 6 (3-3) mg/mL    Patient tolerance: patient tolerated the procedure well with no immediate complications  Dressing:  Sterile dressing applied          Assessment/Plan:   69 y  o female with osteoarthritis of both knees who and excellent response to Euflexxa, but now has return of pain and dysfunction  Injection of corticosteroid is indicated as a bridge treatment  They are advised, except, administers outlined above    I would welcome the opportunity see this patient back in the office in 3 months time, I would consider a viable candidate for repeat injections of Euflexxa both knees

## 2018-09-12 ENCOUNTER — TELEPHONE (OUTPATIENT)
Dept: OBGYN CLINIC | Facility: HOSPITAL | Age: 68
End: 2018-09-12

## 2018-09-12 DIAGNOSIS — M17.10 ARTHRITIS OF KNEE: Primary | ICD-10-CM

## 2018-09-12 NOTE — TELEPHONE ENCOUNTER
Chart reviewed    Patient's medication ordered for both knees    Awaiting precertification    They will contact the patient

## 2018-10-22 RX ORDER — MOMETASONE FUROATE 1 MG/G
OINTMENT TOPICAL
Refills: 1 | COMMUNITY
Start: 2018-09-26 | End: 2020-07-30 | Stop reason: SDUPTHER

## 2018-10-24 ENCOUNTER — OFFICE VISIT (OUTPATIENT)
Dept: OBGYN CLINIC | Facility: HOSPITAL | Age: 68
End: 2018-10-24
Payer: MEDICARE

## 2018-10-24 VITALS
SYSTOLIC BLOOD PRESSURE: 129 MMHG | DIASTOLIC BLOOD PRESSURE: 77 MMHG | BODY MASS INDEX: 41.82 KG/M2 | WEIGHT: 213 LBS | HEIGHT: 60 IN | HEART RATE: 87 BPM

## 2018-10-24 DIAGNOSIS — G89.29 CHRONIC PAIN OF RIGHT KNEE: ICD-10-CM

## 2018-10-24 DIAGNOSIS — G89.29 CHRONIC PAIN OF LEFT KNEE: ICD-10-CM

## 2018-10-24 DIAGNOSIS — M17.11 PRIMARY OSTEOARTHRITIS OF RIGHT KNEE: ICD-10-CM

## 2018-10-24 DIAGNOSIS — M25.562 CHRONIC PAIN OF LEFT KNEE: ICD-10-CM

## 2018-10-24 DIAGNOSIS — M25.561 CHRONIC PAIN OF RIGHT KNEE: ICD-10-CM

## 2018-10-24 DIAGNOSIS — M17.12 PRIMARY OSTEOARTHRITIS OF LEFT KNEE: Primary | ICD-10-CM

## 2018-10-24 PROCEDURE — 20610 DRAIN/INJ JOINT/BURSA W/O US: CPT | Performed by: ORTHOPAEDIC SURGERY

## 2018-10-24 RX ORDER — HYALURONATE SODIUM 10 MG/ML
20 SYRINGE (ML) INTRAARTICULAR
Status: COMPLETED | OUTPATIENT
Start: 2018-10-24 | End: 2018-10-24

## 2018-10-24 RX ADMIN — Medication 20 MG: at 13:40

## 2018-10-24 RX ADMIN — Medication 20 MG: at 13:39

## 2018-10-24 NOTE — PROGRESS NOTES
Assessment:  1  Primary osteoarthritis of left knee  Large joint arthrocentesis   2  Chronic pain of left knee  Large joint arthrocentesis   3  Primary osteoarthritis of right knee  Large joint arthrocentesis   4  Chronic pain of right knee  Large joint arthrocentesis       Plan:  Bilateral knees known osteoarthritis left worse than right both on diagnostics as well as symptomatically  She is here for Euflexxa #1 to both knees  Both knees were injected with Euflexxa #1 successfully  Ice and post injection protocol advised  Weight-bearing and activities as tolerated  Future candidate for total knee arthroplasty  Patient was advised about maintain a healthy lifestyle for proper weight control  To do next visit:  Return in about 1 week (around 10/31/2018) for re-check and Euflexxa #2 B/L knees   Scribe Attestation    I,:   Sarah Go am acting as a scribe while in the presence of the attending physician :        I,:   Sarthak Whiteside MD personally performed the services described in this documentation    as scribed in my presence :              Subjective:   Michelle Davison is a 77 y o  female who presents for Euflexxa #1 to both of her knees  She found better relief with the series instead of the one-and-done injection  She wishes to continue with the visco for her knees for her known osteoarthritis, left worse than right, both symptomatically and radiographically         Review of systems negative unless otherwise specified in HPI    Past Medical History:   Diagnosis Date    Alopecia     Calcaneal spur     Diabetes insipidus ([address]     Diabetes mellitus ([address]     Hyperlipidemia     Hypertension     Osteoarthrosis 2/13/2013    Pes planus     unspecified laterality    Plantar fasciitis        Past Surgical History:   Procedure Laterality Date    CARPAL TUNNEL RELEASE      COLONOSCOPY      May 2006    INCISIONAL BREAST BIOPSY      AL COLONOSCOPY FLX DX W/COLLJ SPEC WHEN PFRMD N/A 5/4/2018 Procedure: COLONOSCOPY;  Surgeon: Rony Cristobal MD;  Location: [facility]; Service: Colorectal       Family History   Problem Relation Age of Onset    Diabetes Mother     Hypertension Mother     Emphysema Father     Cancer Brother     Colon cancer Brother        Social History     Occupational History    Not on file  Social History Main Topics    Smoking status: Never Smoker    Smokeless tobacco: Never Used    Alcohol use Yes      Comment: social    Drug use: No    Sexual activity: Not on file         Current Outpatient Prescriptions:     Calcium Carb-Cholecalciferol (CALCIUM 1000 + D) 1000-800 MG-UNIT TABS, Take by mouth, Disp: , Rfl:     FISH OIL-CANOLA OIL-VIT D3 PO, by Does not apply route, Disp: , Rfl:     fluticasone (FLONASE) 50 mcg/act nasal spray, 1 spray into each nostril daily, Disp: 16 g, Rfl: 0    losartan (COZAAR) 100 MG tablet, Take 1 tablet (100 mg total) by mouth daily for 90 days, Disp: 90 tablet, Rfl: 3    metFORMIN (GLUCOPHAGE) 500 mg tablet, Take 1 tablet (500 mg total) by mouth 2 (two) times a day with meals for 90 days, Disp: 180 tablet, Rfl: 3    mometasone (ELOCON) 0 1 % ointment, APPLY TO RASH LESIONS TWO TIMES DAILY FOR 2 WEEKS THEN THREE TO FOUR DAYS PER WEEK AS NEEDED, Disp: , Rfl: 1    Multiple Vitamin (MULTI-VITAMIN DAILY PO), Take by mouth, Disp: , Rfl:     simvastatin (ZOCOR) 20 mg tablet, Take 1 tablet (20 mg total) by mouth daily for 90 days, Disp: 90 tablet, Rfl: 3    Allergies   Allergen Reactions    Nifedipine Edema    Sulfamethoxazole-Trimethoprim Edema            Vitals:    10/24/18 1323   BP: 129/77   Pulse: 87       Objective:          Physical Exam                    Right Knee Exam     Tenderness   The patient is experiencing tenderness in the medial joint line  Range of Motion   The patient has normal right knee ROM  Right knee flexion: with crepitus       Tests   Jourdan:  Medial - negative Lateral - negative  Varus: negative  Valgus: negative    Other   Erythema: absent  Sensation: normal  Swelling: none  Other tests: no effusion present      Left Knee Exam     Tenderness   The patient is experiencing tenderness in the medial joint line  Range of Motion   The patient has normal left knee ROM  Left knee flexion: with crepitus       Tests   Jourdan:  Medial - negative Lateral - negative    Other   Erythema: absent  Sensation: normal  Swelling: mild  Effusion: effusion (trace) present    Comments:    Mild laxity but no gross instability              Diagnostics, reviewed and taken today if performed as documented:    None performed          Procedures, if performed today:  Large joint arthrocentesis  Date/Time: 10/24/2018 1:39 PM  Consent given by: patient  Site marked: site marked  Supporting Documentation  Indications: pain   Procedure Details  Location: knee - L knee  Preparation: Patient was prepped and draped in the usual sterile fashion  Needle size: 22 G  Ultrasound guidance: no  Medications administered: 20 mg Sodium Hyaluronate 20 MG/2ML    Patient tolerance: patient tolerated the procedure well with no immediate complications  Dressing:  Sterile dressing applied  Large joint arthrocentesis  Date/Time: 10/24/2018 1:40 PM  Consent given by: patient  Site marked: site marked  Supporting Documentation  Indications: pain   Procedure Details  Location: knee - R knee  Preparation: Patient was prepped and draped in the usual sterile fashion  Needle size: 22 G  Ultrasound guidance: no  Medications administered: 20 mg Sodium Hyaluronate 20 MG/2ML    Patient tolerance: patient tolerated the procedure well with no immediate complications  Dressing:  Sterile dressing applied            Portions of the record may have been created with voice recognition software   Occasional wrong word or "sound a like" substitutions may have occurred due to the inherent limitations of voice recognition software   Read the chart carefully and recognize, using context, where substitutions have occurred

## 2018-10-31 ENCOUNTER — OFFICE VISIT (OUTPATIENT)
Dept: OBGYN CLINIC | Facility: HOSPITAL | Age: 68
End: 2018-10-31
Payer: MEDICARE

## 2018-10-31 VITALS
BODY MASS INDEX: 41.82 KG/M2 | HEART RATE: 93 BPM | DIASTOLIC BLOOD PRESSURE: 85 MMHG | HEIGHT: 60 IN | SYSTOLIC BLOOD PRESSURE: 142 MMHG | WEIGHT: 213 LBS

## 2018-10-31 DIAGNOSIS — G89.29 CHRONIC PAIN OF LEFT KNEE: ICD-10-CM

## 2018-10-31 DIAGNOSIS — M17.12 PRIMARY OSTEOARTHRITIS OF LEFT KNEE: Primary | ICD-10-CM

## 2018-10-31 DIAGNOSIS — M25.562 CHRONIC PAIN OF LEFT KNEE: ICD-10-CM

## 2018-10-31 DIAGNOSIS — M25.561 CHRONIC PAIN OF RIGHT KNEE: ICD-10-CM

## 2018-10-31 DIAGNOSIS — G89.29 CHRONIC PAIN OF RIGHT KNEE: ICD-10-CM

## 2018-10-31 DIAGNOSIS — M17.11 PRIMARY OSTEOARTHRITIS OF RIGHT KNEE: ICD-10-CM

## 2018-10-31 PROCEDURE — 20610 DRAIN/INJ JOINT/BURSA W/O US: CPT | Performed by: ORTHOPAEDIC SURGERY

## 2018-10-31 RX ORDER — HYALURONATE SODIUM 10 MG/ML
20 SYRINGE (ML) INTRAARTICULAR
Status: COMPLETED | OUTPATIENT
Start: 2018-10-31 | End: 2018-10-31

## 2018-10-31 RX ADMIN — Medication 20 MG: at 14:24

## 2018-10-31 RX ADMIN — Medication 20 MG: at 14:23

## 2018-10-31 NOTE — PROGRESS NOTES
Assessment:  1  Primary osteoarthritis of left knee  Large joint arthrocentesis   2  Chronic pain of left knee  Large joint arthrocentesis   3  Primary osteoarthritis of right knee  Large joint arthrocentesis   4  Chronic pain of right knee  Large joint arthrocentesis       Plan:  Her knees known osteoarthritis, both knees injected with Euflexxa #2 Visco injection today  Ice and post injection protocol advised weight-bearing and activities as tolerated  She was advised to return next week to complete the Visco series to her knees  To do next visit:  Return in about 1 week (around 11/7/2018) for re-check and Euflexxa #3 B/L Knees  The above stated was discussed in layman's terms and the patient expressed understanding  All questions were answered to the patient's satisfaction  Scribe Attestation    I,:   Thalia Colón am acting as a scribe while in the presence of the attending physician :        I,:   Katja Sebastian MD personally performed the services described in this documentation    as scribed in my presence :              Subjective:   Prem Mclaughlin is a 77 y o  female who presents for Euflexxa#2 to both knees for ongoing Visco supplement treatment of her known osteoarthritis  He has she had her 1st injection last week with no ill side effects  Review of systems negative unless otherwise specified in HPI    Past Medical History:   Diagnosis Date    Alopecia     Calcaneal spur     Diabetes insipidus ([address]     Diabetes mellitus ([address]     Hyperlipidemia     Hypertension     Osteoarthrosis 2/13/2013    Pes planus     unspecified laterality    Plantar fasciitis        Past Surgical History:   Procedure Laterality Date    CARPAL TUNNEL RELEASE      COLONOSCOPY      May 2006    INCISIONAL BREAST BIOPSY      HI COLONOSCOPY FLX DX W/COLLJ SPEC WHEN PFRMD N/A 5/4/2018    Procedure: COLONOSCOPY;  Surgeon: Belinda Vasquez MD;  Location: [facility];   Service: Colorectal       Family History   Problem Relation Age of Onset    Diabetes Mother     Hypertension Mother     Emphysema Father     Cancer Brother     Colon cancer Brother        Social History     Occupational History    Not on file  Social History Main Topics    Smoking status: Never Smoker    Smokeless tobacco: Never Used    Alcohol use Yes      Comment: social    Drug use: No    Sexual activity: Not on file         Current Outpatient Prescriptions:     Calcium Carb-Cholecalciferol (CALCIUM 1000 + D) 1000-800 MG-UNIT TABS, Take by mouth, Disp: , Rfl:     FISH OIL-CANOLA OIL-VIT D3 PO, by Does not apply route, Disp: , Rfl:     fluticasone (FLONASE) 50 mcg/act nasal spray, 1 spray into each nostril daily, Disp: 16 g, Rfl: 0    losartan (COZAAR) 100 MG tablet, Take 1 tablet (100 mg total) by mouth daily for 90 days, Disp: 90 tablet, Rfl: 3    metFORMIN (GLUCOPHAGE) 500 mg tablet, Take 1 tablet (500 mg total) by mouth 2 (two) times a day with meals for 90 days, Disp: 180 tablet, Rfl: 3    mometasone (ELOCON) 0 1 % ointment, APPLY TO RASH LESIONS TWO TIMES DAILY FOR 2 WEEKS THEN THREE TO FOUR DAYS PER WEEK AS NEEDED, Disp: , Rfl: 1    Multiple Vitamin (MULTI-VITAMIN DAILY PO), Take by mouth, Disp: , Rfl:     simvastatin (ZOCOR) 20 mg tablet, Take 1 tablet (20 mg total) by mouth daily for 90 days, Disp: 90 tablet, Rfl: 3    Allergies   Allergen Reactions    Nifedipine Edema    Sulfamethoxazole-Trimethoprim Edema            Vitals:    10/31/18 1352   BP: 142/85   Pulse: 93       Objective:          Physical Exam                    Right Knee Exam     Tenderness   The patient is experiencing tenderness in the medial joint line  Range of Motion   The patient has normal right knee ROM  Right knee flexion: with crepitus       Tests   Jourdan:  Medial - negative Lateral - negative  Varus: negative  Valgus: negative    Other   Erythema: absent  Sensation: normal  Swelling: none  Other tests: no effusion present      Left Knee Exam     Tenderness   The patient is experiencing tenderness in the medial joint line  Range of Motion   The patient has normal left knee ROM  Left knee flexion: with crepitus  Tests   Jourdan:  Medial - negative Lateral - negative    Other   Erythema: absent  Sensation: normal  Swelling: mild  Effusion: no effusion (trace) present    Comments:    Mild laxity but no gross instability              Diagnostics, reviewed and taken today if performed as documented:    None performed            Procedures, if performed today:    Large joint arthrocentesis  Date/Time: 10/31/2018 2:23 PM  Consent given by: patient  Site marked: site marked  Supporting Documentation  Indications: pain   Procedure Details  Location: knee - L knee  Preparation: Patient was prepped and draped in the usual sterile fashion  Needle size: 22 G  Ultrasound guidance: no  Medications administered: 20 mg Sodium Hyaluronate 20 MG/2ML    Patient tolerance: patient tolerated the procedure well with no immediate complications  Dressing:  Sterile dressing applied  Large joint arthrocentesis  Date/Time: 10/31/2018 2:24 PM  Consent given by: patient  Site marked: site marked  Supporting Documentation  Indications: pain   Procedure Details  Location: knee - R knee  Preparation: Patient was prepped and draped in the usual sterile fashion  Needle size: 22 G  Ultrasound guidance: no  Medications administered: 20 mg Sodium Hyaluronate 20 MG/2ML    Patient tolerance: patient tolerated the procedure well with no immediate complications  Dressing:  Sterile dressing applied            Portions of the record may have been created with voice recognition software   Occasional wrong word or "sound a like" substitutions may have occurred due to the inherent limitations of voice recognition software   Read the chart carefully and recognize, using context, where substitutions have occurred

## 2018-11-07 ENCOUNTER — OFFICE VISIT (OUTPATIENT)
Dept: OBGYN CLINIC | Facility: HOSPITAL | Age: 68
End: 2018-11-07
Payer: MEDICARE

## 2018-11-07 VITALS
HEART RATE: 96 BPM | DIASTOLIC BLOOD PRESSURE: 94 MMHG | HEIGHT: 60 IN | BODY MASS INDEX: 41.82 KG/M2 | WEIGHT: 213 LBS | SYSTOLIC BLOOD PRESSURE: 145 MMHG

## 2018-11-07 DIAGNOSIS — M17.0 PRIMARY OSTEOARTHRITIS OF BOTH KNEES: Primary | ICD-10-CM

## 2018-11-07 PROCEDURE — 99024 POSTOP FOLLOW-UP VISIT: CPT | Performed by: ORTHOPAEDIC SURGERY

## 2018-11-07 PROCEDURE — 20610 DRAIN/INJ JOINT/BURSA W/O US: CPT | Performed by: ORTHOPAEDIC SURGERY

## 2018-11-07 RX ORDER — HYALURONATE SODIUM 10 MG/ML
20 SYRINGE (ML) INTRAARTICULAR
Status: COMPLETED | OUTPATIENT
Start: 2018-11-07 | End: 2018-11-07

## 2018-11-07 RX ADMIN — Medication 20 MG: at 15:19

## 2018-11-07 NOTE — PROGRESS NOTES
1  Primary osteoarthritis of both knees       Patient is here for her 3rd injection of Euflexxa into the bilateral knee  Patient reports no post injection complications  All organ systems normal  Physical exam of the knee shows no effusion no ecchymosis        Large joint arthrocentesis  Date/Time: 11/7/2018 3:19 PM  Consent given by: patient  Supporting Documentation  Indications: pain   Procedure Details  Location: knee - R knee  Needle size: 22 G  Medications administered: 20 mg Sodium Hyaluronate 20 MG/2ML    Patient tolerance: patient tolerated the procedure well with no immediate complications      Large joint arthrocentesis  Date/Time: 11/7/2018 3:19 PM  Consent given by: patient  Supporting Documentation  Indications: pain   Procedure Details  Location: knee - L knee  Needle size: 22 G  Medications administered: 20 mg Sodium Hyaluronate 20 MG/2ML    Patient tolerance: patient tolerated the procedure well with no immediate complications            Patient tolerated procedure follow up 3 months

## 2019-02-06 ENCOUNTER — HOSPITAL ENCOUNTER (OUTPATIENT)
Dept: RADIOLOGY | Facility: HOSPITAL | Age: 69
Discharge: HOME | End: 2019-02-06
Attending: ORTHOPAEDIC SURGERY
Payer: MEDICARE

## 2019-02-06 ENCOUNTER — OFFICE VISIT (OUTPATIENT)
Dept: OBGYN CLINIC | Facility: HOSPITAL | Age: 69
End: 2019-02-06
Payer: MEDICARE

## 2019-02-06 VITALS
SYSTOLIC BLOOD PRESSURE: 150 MMHG | WEIGHT: 218 LBS | HEART RATE: 86 BPM | DIASTOLIC BLOOD PRESSURE: 98 MMHG | HEIGHT: 60 IN | BODY MASS INDEX: 42.8 KG/M2

## 2019-02-06 DIAGNOSIS — M17.12 PRIMARY OSTEOARTHRITIS OF LEFT KNEE: Primary | ICD-10-CM

## 2019-02-06 DIAGNOSIS — M17.11 PRIMARY OSTEOARTHRITIS OF RIGHT KNEE: ICD-10-CM

## 2019-02-06 DIAGNOSIS — M25.562 LEFT KNEE PAIN, UNSPECIFIED CHRONICITY: ICD-10-CM

## 2019-02-06 PROCEDURE — 20610 DRAIN/INJ JOINT/BURSA W/O US: CPT | Performed by: ORTHOPAEDIC SURGERY

## 2019-02-06 PROCEDURE — 99213 OFFICE O/P EST LOW 20 MIN: CPT | Performed by: ORTHOPAEDIC SURGERY

## 2019-02-06 PROCEDURE — 73560 X-RAY EXAM OF KNEE 1 OR 2: CPT

## 2019-02-06 RX ORDER — LIDOCAINE HYDROCHLORIDE 10 MG/ML
2 INJECTION, SOLUTION INFILTRATION; PERINEURAL
Status: COMPLETED | OUTPATIENT
Start: 2019-02-06 | End: 2019-02-06

## 2019-02-06 RX ORDER — BETAMETHASONE SODIUM PHOSPHATE AND BETAMETHASONE ACETATE 3; 3 MG/ML; MG/ML
12 INJECTION, SUSPENSION INTRA-ARTICULAR; INTRALESIONAL; INTRAMUSCULAR; SOFT TISSUE
Status: COMPLETED | OUTPATIENT
Start: 2019-02-06 | End: 2019-02-06

## 2019-02-06 RX ORDER — BUPIVACAINE HYDROCHLORIDE 5 MG/ML
2 INJECTION, SOLUTION EPIDURAL; INTRACAUDAL
Status: COMPLETED | OUTPATIENT
Start: 2019-02-06 | End: 2019-02-06

## 2019-02-06 RX ADMIN — BETAMETHASONE SODIUM PHOSPHATE AND BETAMETHASONE ACETATE 12 MG: 3; 3 INJECTION, SUSPENSION INTRA-ARTICULAR; INTRALESIONAL; INTRAMUSCULAR; SOFT TISSUE at 15:37

## 2019-02-06 RX ADMIN — BUPIVACAINE HYDROCHLORIDE 2 ML: 5 INJECTION, SOLUTION EPIDURAL; INTRACAUDAL at 15:37

## 2019-02-06 RX ADMIN — LIDOCAINE HYDROCHLORIDE 2 ML: 10 INJECTION, SOLUTION INFILTRATION; PERINEURAL at 15:37

## 2019-02-06 NOTE — PROGRESS NOTES
Assessment:  1  Primary osteoarthritis of left knee     2  Left knee pain, unspecified chronicity  XR knee 1 or 2 vw left   3  Primary osteoarthritis of right knee         Plan:  Patient received a cortisone injection to her left knee today in the office  Surgical intervention for left total knee arthroplasty was discussed in detail  Weight loss was discussed and encouraged    To do next visit:  Return in about 6 weeks (around 3/20/2019)  The above stated was discussed in layman's terms and the patient expressed understanding  All questions were answered to the patient's satisfaction  Scribe Attestation    I,:   Polly Samaniego am acting as a scribe while in the presence of the attending physician :        I,:   Saurav Valdes MD personally performed the services described in this documentation    as scribed in my presence :              Subjective:   David Mascorro is a 77 y o  female who presents to the office for follow up of bilateral osteoarthritis  She has been treated with cortisone and visco injections with beneficial relief  Today she reports increased pain, worse on the left, that is decreasing her quality of life  She has increased pain with weightbearing activities  She is unable to perform daily tasks due to her pain  She takes aleve and advil to control her pain which has become less relieving  She is interested in discussing surgical intervention versus continued injection therapy today in the office  Patient has a history of diabetes         Review of systems negative unless otherwise specified in HPI    Past Medical History:   Diagnosis Date    Alopecia     Calcaneal spur     Diabetes insipidus ([address]     Diabetes mellitus [address] [address]     Hyperlipidemia     Hypertension     Osteoarthrosis 2/13/2013    Pes planus     unspecified laterality    Plantar fasciitis        Past Surgical History:   Procedure Laterality Date    CARPAL TUNNEL RELEASE      COLONOSCOPY      May 2006    INCISIONAL BREAST BIOPSY      [place] COLONOSCOPY FLX DX W/COLLJ SPEC WHEN PFRMD N/A 5/4/2018    Procedure: COLONOSCOPY;  Surgeon: Hannah Mcadams MD;  [facility]: [facility]; Service: Colorectal       Family History   Problem Relation Age of Onset    Diabetes Mother     Hypertension Mother     Emphysema Father     Cancer Brother     Colon cancer Brother        Social History     Occupational History    Not on file  Social History Main Topics    Smoking status: Never Smoker    Smokeless tobacco: Never Used    Alcohol use Yes      Comment: social    Drug use: No    Sexual activity: Not on file         Current Outpatient Prescriptions:     Calcium Carb-Cholecalciferol (CALCIUM 1000 + D) 1000-800 MG-UNIT TABS, Take by mouth, Disp: , Rfl:     FISH OIL-CANOLA OIL-VIT D3 PO, by Does not apply route, Disp: , Rfl:     fluticasone (FLONASE) 50 mcg/act nasal spray, 1 spray into each nostril daily, Disp: 16 g, Rfl: 0    losartan (COZAAR) 100 MG tablet, Take 1 tablet (100 mg total) by mouth daily for 90 days, Disp: 90 tablet, Rfl: 3    metFORMIN (GLUCOPHAGE) 500 mg tablet, Take 1 tablet (500 mg total) by mouth 2 (two) times a day with meals for 90 days, Disp: 180 tablet, Rfl: 3    mometasone (ELOCON) 0 1 % ointment, APPLY TO RASH LESIONS TWO TIMES DAILY FOR 2 WEEKS THEN THREE TO FOUR DAYS PER WEEK AS NEEDED, Disp: , Rfl: 1    Multiple Vitamin (MULTI-VITAMIN DAILY PO), Take by mouth, Disp: , Rfl:     simvastatin (ZOCOR) 20 mg tablet, Take 1 tablet (20 mg total) by mouth daily for 90 days, Disp: 90 tablet, Rfl: 3    Allergies   Allergen Reactions    Nifedipine Edema    Sulfamethoxazole-Trimethoprim Edema            Vitals:    02/06/19 1448   BP: 150/98   Pulse: 86       Objective:                    Right Knee Exam     Tenderness   The patient is experiencing tenderness in the medial joint line  Range of Motion   The patient has normal right knee ROM      Muscle Strength     The patient has normal right knee strength  Tests   Lachman:  Anterior - negative      Varus: negative  Valgus: negative    Other   Sensation: normal  Pulse: present  Swelling: none      Left Knee Exam     Tenderness   The patient is experiencing tenderness in the medial joint line  Range of Motion   The patient has normal left knee ROM  Muscle Strength     The patient has normal left knee strength  Tests   Lachman:  Anterior - negative      Varus: negative  Valgus: negative    Other   Sensation: normal  Pulse: present  Swelling: none    Comments:  Medial bony enlargement            Diagnostics, reviewed and taken today if performed as documented:    X-rays of left knee 02/06/2019 were reviewed and shows moderate to severe degenerative changes  The attending physician has personally reviewed the pertinent films in PACS and interpretation is above        Procedures, if performed today:    Large joint arthrocentesis  Date/Time: 2/6/2019 3:37 PM  Consent given by: patient  Timeout: Immediately prior to procedure a time out was called to verify the correct patient, procedure, equipment, support staff and site/side marked as required   Supporting Documentation  Indications: pain   Procedure Details  Location: knee - L knee  Preparation: Patient was prepped and draped in the usual sterile fashion  Needle size: 22 G  Ultrasound guidance: no  Approach: anterolateral  Medications administered: 2 mL lidocaine 1 %; 2 mL bupivacaine (PF) 0 5 %; 12 mg betamethasone acetate-betamethasone sodium phosphate 6 (3-3) mg/mL    Patient tolerance: patient tolerated the procedure well with no immediate complications  Dressing:  Sterile dressing applied

## 2019-02-07 ENCOUNTER — TRANSCRIBE ORDERS (OUTPATIENT)
Dept: ADMINISTRATIVE | Facility: HOSPITAL | Age: 69
End: 2019-02-07

## 2019-02-07 DIAGNOSIS — Z12.39 SCREENING BREAST EXAMINATION: Primary | ICD-10-CM

## 2019-02-07 DIAGNOSIS — Z12.31 ENCOUNTER FOR SCREENING MAMMOGRAM FOR MALIGNANT NEOPLASM OF BREAST: Primary | ICD-10-CM

## 2019-03-01 ENCOUNTER — HOSPITAL ENCOUNTER (OUTPATIENT)
Dept: RADIOLOGY | Age: 69
Discharge: HOME | End: 2019-03-01
Payer: MEDICARE

## 2019-03-01 VITALS — BODY MASS INDEX: 40.12 KG/M2 | WEIGHT: 218 LBS | HEIGHT: 62 IN

## 2019-03-01 DIAGNOSIS — Z12.31 ENCOUNTER FOR SCREENING MAMMOGRAM FOR MALIGNANT NEOPLASM OF BREAST: ICD-10-CM

## 2019-03-01 PROCEDURE — 77063 BREAST TOMOSYNTHESIS BI: CPT

## 2019-03-01 PROCEDURE — 77067 SCR MAMMO BI INCL CAD: CPT

## 2019-03-20 ENCOUNTER — OFFICE VISIT (OUTPATIENT)
Dept: OBGYN CLINIC | Facility: HOSPITAL | Age: 69
End: 2019-03-20
Payer: MEDICARE

## 2019-03-20 ENCOUNTER — HOSPITAL ENCOUNTER (OUTPATIENT)
Dept: RADIOLOGY | Facility: HOSPITAL | Age: 69
Discharge: HOME | End: 2019-03-20
Attending: ORTHOPAEDIC SURGERY
Payer: MEDICARE

## 2019-03-20 VITALS
HEART RATE: 92 BPM | WEIGHT: 218 LBS | BODY MASS INDEX: 40.12 KG/M2 | HEIGHT: 62 IN | SYSTOLIC BLOOD PRESSURE: 170 MMHG | DIASTOLIC BLOOD PRESSURE: 105 MMHG

## 2019-03-20 DIAGNOSIS — M25.561 RIGHT KNEE PAIN, UNSPECIFIED CHRONICITY: ICD-10-CM

## 2019-03-20 DIAGNOSIS — M16.11 ARTHRITIS OF RIGHT HIP: ICD-10-CM

## 2019-03-20 DIAGNOSIS — M17.0 PRIMARY OSTEOARTHRITIS OF BOTH KNEES: ICD-10-CM

## 2019-03-20 DIAGNOSIS — M25.551 PAIN IN RIGHT HIP: Primary | ICD-10-CM

## 2019-03-20 DIAGNOSIS — M25.551 PAIN IN RIGHT HIP: ICD-10-CM

## 2019-03-20 PROCEDURE — 73502 X-RAY EXAM HIP UNI 2-3 VIEWS: CPT

## 2019-03-20 PROCEDURE — 99214 OFFICE O/P EST MOD 30 MIN: CPT | Performed by: ORTHOPAEDIC SURGERY

## 2019-03-20 PROCEDURE — 73560 X-RAY EXAM OF KNEE 1 OR 2: CPT

## 2019-03-20 NOTE — PROGRESS NOTES
Subjective;    78-year-old adult female well known to the practice  She has an established history of osteoarthritic changes of her knees left greater than right  She has had a multitude of treatments to conclude but not limited to wellness, weight reduction, physical exercise both in and outside of her home, the use of neoprene sleeve or knee wraps, medication by mouth, and injectable medication  She has become refractory to medication by mouth, she has plateaued in regards to weight loss and general conditioning  She now has amplified pain from her left knee medially as well as her right hip and groin  As she is holding the leg externally rotated and is resistant internal rotation of the right hip x-rays of the right hip and right knee were done at today's office visit  Past Medical History:   Diagnosis Date    Alopecia     Calcaneal spur     [facility]ip[facility]us [facility] [address]     Diabetes mellitus [facility] [address] [facility]     Hyperlipidemia     Hypertension     Osteoarthrosis 2/13/2013    Pes planus     unspecified laterality    Plantar fasciitis        Past Surgical History:   Procedure Laterality Date    CARPAL TUNNEL RELEASE      COLONOSCOPY      May 2006    INCISIONAL BREAST BIOPSY      [place] COLONOSCOPY FLX DX W/COLLJ SPEC WHEN PFRMD N/A 5/4/2018    Procedure: COLONOSCOPY;  Surgeon: Yousif Hernandez MD;  Location: [facility];   Service: Colorectal       Family History   Problem Relation Age of Onset    Diabetes Mother     Hypertension Mother     Emphysema Father     Cancer Brother     Colon cancer Brother 40       Social History     Tobacco Use    Smoking status: Never Smoker    Smokeless tobacco: Never Used   Substance Use Topics    Alcohol use: Yes     Comment: social    Drug use: No     Exam;    Adult female who appears her stated age  She is comfortable in the exam chair  Blood pressure was retaken due to error a high reading by the machine  Retaken with a blood pressure cuff and stethoscope it was 156/82 in the left arm seated position  HEENT; NC/AT  Neck; no bruits  Chest; CTA  CVS; faint heart sounds, S1-S2  Abdomen; plient soft nontender  Musculoskeletal;    She holds the right leg externally rotated from the hip down attempted internal rotation of the flexed right hip is met with a hard stop severe pain and lifting of the buttock from the chair  She has a tight adductor tendon right thigh medially  She has modest discomfort to palpation of the medial compartment of her right knee yet no effusion  Her left knee offers varus angulation left knee fully extends without a extension lag or contracture  She has flexion of the left knee greater than 90°  X-rays; left knee series shows bone on bone opposition of the medial compartment patellofemoral arthritic changes and genu varum    X-rays right hip series with AP pelvis shows significant degenerative changes of the right femoral head bone on bone opposition to the acetabular dome  X-rays right knee series degenerative changes with medial compartment narrowing    Impression;    Significant osteoarthritis of the right hip, painful, in debilitating  Osteoarthritis of both knees, left greater than right    Plan;    Her entire experience was reviewed by the attending surgeon  She is offered right total hip replacement arthroplasty as an answer to her hip thigh and groin pain  Also total hip is offered prior to any intervention of her knees to allow for appropriate therapy without painful range of motion of the hip  In the interim as per her desire request Euflexxa product will be preauthorized for both knees  She is of course asked to continue practicing wellness and conditioning as her arthritic joints allow, and maintain her weight if not of decrease her weight    Again her experience was provided by and plan formulated by the attending surgeon it was my privilege to assist him in its delivery

## 2019-03-20 NOTE — PATIENT INSTRUCTIONS
You have arthritic changes of your right hip and both knees  You were offered surgery to replace her right hip to improve range of motion and alleviate her pain  Right hip surgery is the preferred surgery prior to embarking on invasive management of your knees  You will be assisted with obtaining Euflexxa brand product you're Visco supplement of record  It will be our privilege to begin administering this within 2-4 weeks from today's date  We look forward when you return to discuss your decision regarding elective right total hip replacement arthroplasty

## 2019-03-22 ENCOUNTER — TELEPHONE (OUTPATIENT)
Dept: OBGYN CLINIC | Facility: OTHER | Age: 69
End: 2019-03-22

## 2019-04-08 ENCOUNTER — APPOINTMENT (OUTPATIENT)
Dept: LAB | Facility: HOSPITAL | Age: 69
End: 2019-04-08
Payer: MEDICARE

## 2019-04-08 DIAGNOSIS — E66.01 MORBID OBESITY WITH BODY MASS INDEX OF 40.0-49.9 (HCC): ICD-10-CM

## 2019-04-08 DIAGNOSIS — I10 BENIGN ESSENTIAL HYPERTENSION: ICD-10-CM

## 2019-04-08 DIAGNOSIS — E11.65 CONTROLLED TYPE 2 DIABETES MELLITUS WITH HYPERGLYCEMIA, WITHOUT LONG-TERM CURRENT USE OF INSULIN (HCC): ICD-10-CM

## 2019-04-08 DIAGNOSIS — E78.5 HYPERLIPIDEMIA, UNSPECIFIED HYPERLIPIDEMIA TYPE: ICD-10-CM

## 2019-04-08 LAB
ALBUMIN SERPL BCP-MCNC: 4.1 G/DL (ref 3.5–5)
ALP SERPL-CCNC: 89 U/L (ref 46–116)
ALT SERPL W P-5'-P-CCNC: 21 U/L (ref 12–78)
ANION GAP SERPL CALCULATED.3IONS-SCNC: 5 MMOL/L (ref 4–13)
AST SERPL W P-5'-P-CCNC: 20 U/L (ref 5–45)
BILIRUB SERPL-MCNC: 0.57 MG/DL (ref 0.2–1)
BUN SERPL-MCNC: 14 MG/DL (ref 5–25)
CALCIUM SERPL-MCNC: 9.2 MG/DL (ref 8.3–10.1)
CHLORIDE SERPL-SCNC: 106 MMOL/L (ref 100–108)
CHOLEST SERPL-MCNC: 185 MG/DL (ref 50–200)
CO2 SERPL-SCNC: 26 MMOL/L (ref 21–32)
CREAT SERPL-MCNC: 0.66 MG/DL (ref 0.6–1.3)
EST. AVERAGE GLUCOSE BLD GHB EST-MCNC: 131 MG/DL
GFR SERPL CREATININE-BSD FRML MDRD: 90 ML/MIN/1.73SQ M
GLUCOSE P FAST SERPL-MCNC: 84 MG/DL (ref 65–99)
HBA1C MFR BLD: 6.2 % (ref 4.2–6.3)
HDLC SERPL-MCNC: 54 MG/DL (ref 40–60)
LDLC SERPL CALC-MCNC: 100 MG/DL (ref 0–100)
NONHDLC SERPL-MCNC: 131 MG/DL
POTASSIUM SERPL-SCNC: 4 MMOL/L (ref 3.5–5.3)
PROT SERPL-MCNC: 7.9 G/DL (ref 6.4–8.2)
SODIUM SERPL-SCNC: 137 MMOL/L (ref 136–145)
TRIGL SERPL-MCNC: 154 MG/DL
TSH SERPL DL<=0.05 MIU/L-ACNC: 2.81 UIU/ML (ref 0.36–3.74)

## 2019-04-08 PROCEDURE — 80053 COMPREHEN METABOLIC PANEL: CPT

## 2019-04-08 PROCEDURE — 36415 COLL VENOUS BLD VENIPUNCTURE: CPT

## 2019-04-08 PROCEDURE — 80061 LIPID PANEL: CPT

## 2019-04-08 PROCEDURE — 83036 HEMOGLOBIN GLYCOSYLATED A1C: CPT

## 2019-04-08 PROCEDURE — 84443 ASSAY THYROID STIM HORMONE: CPT

## 2019-04-10 ENCOUNTER — OFFICE VISIT (OUTPATIENT)
Dept: FAMILY MEDICINE CLINIC | Facility: CLINIC | Age: 69
End: 2019-04-10
Payer: MEDICARE

## 2019-04-10 VITALS
DIASTOLIC BLOOD PRESSURE: 82 MMHG | SYSTOLIC BLOOD PRESSURE: 138 MMHG | HEIGHT: 62 IN | OXYGEN SATURATION: 98 % | BODY MASS INDEX: 38.46 KG/M2 | HEART RATE: 94 BPM | WEIGHT: 209 LBS | TEMPERATURE: 97.9 F | RESPIRATION RATE: 20 BRPM

## 2019-04-10 DIAGNOSIS — Z11.59 NEED FOR HEPATITIS C SCREENING TEST: ICD-10-CM

## 2019-04-10 DIAGNOSIS — E78.5 HYPERLIPIDEMIA, UNSPECIFIED HYPERLIPIDEMIA TYPE: ICD-10-CM

## 2019-04-10 DIAGNOSIS — Z00.00 MEDICARE ANNUAL WELLNESS VISIT, SUBSEQUENT: ICD-10-CM

## 2019-04-10 DIAGNOSIS — E66.9 OBESITY (BMI 30-39.9): ICD-10-CM

## 2019-04-10 DIAGNOSIS — Z13.820 SCREENING FOR OSTEOPOROSIS: ICD-10-CM

## 2019-04-10 DIAGNOSIS — E11.65 CONTROLLED TYPE 2 DIABETES MELLITUS WITH HYPERGLYCEMIA, WITHOUT LONG-TERM CURRENT USE OF INSULIN (HCC): Primary | ICD-10-CM

## 2019-04-10 DIAGNOSIS — I10 BENIGN ESSENTIAL HYPERTENSION: ICD-10-CM

## 2019-04-10 PROCEDURE — G0439 PPPS, SUBSEQ VISIT: HCPCS | Performed by: FAMILY MEDICINE

## 2019-04-10 PROCEDURE — 99214 OFFICE O/P EST MOD 30 MIN: CPT | Performed by: FAMILY MEDICINE

## 2019-04-10 RX ORDER — NICOTINE POLACRILEX 2 MG
GUM BUCCAL DAILY
COMMUNITY

## 2019-04-10 RX ORDER — MELATONIN
1000 DAILY
COMMUNITY

## 2019-04-10 RX ORDER — LOSARTAN POTASSIUM 100 MG/1
100 TABLET ORAL DAILY
Qty: 90 TABLET | Refills: 3 | Status: SHIPPED | OUTPATIENT
Start: 2019-04-10 | End: 2019-10-07 | Stop reason: HOSPADM

## 2019-04-10 RX ORDER — SIMVASTATIN 20 MG
20 TABLET ORAL DAILY
Qty: 90 TABLET | Refills: 3 | Status: SHIPPED | OUTPATIENT
Start: 2019-04-10 | End: 2020-02-17

## 2019-05-07 ENCOUNTER — TELEPHONE (OUTPATIENT)
Dept: OBGYN CLINIC | Facility: HOSPITAL | Age: 69
End: 2019-05-07

## 2019-05-08 ENCOUNTER — APPOINTMENT (OUTPATIENT)
Dept: RADIOLOGY | Age: 69
End: 2019-05-08
Payer: MEDICARE

## 2019-05-08 ENCOUNTER — OFFICE VISIT (OUTPATIENT)
Dept: FAMILY MEDICINE CLINIC | Facility: CLINIC | Age: 69
End: 2019-05-08
Payer: MEDICARE

## 2019-05-08 ENCOUNTER — TRANSCRIBE ORDERS (OUTPATIENT)
Dept: ADMINISTRATIVE | Age: 69
End: 2019-05-08

## 2019-05-08 VITALS
RESPIRATION RATE: 18 BRPM | SYSTOLIC BLOOD PRESSURE: 140 MMHG | HEIGHT: 62 IN | BODY MASS INDEX: 38.76 KG/M2 | WEIGHT: 210.6 LBS | TEMPERATURE: 98 F | DIASTOLIC BLOOD PRESSURE: 84 MMHG | HEART RATE: 82 BPM

## 2019-05-08 DIAGNOSIS — M79.605 LEFT LEG PAIN: ICD-10-CM

## 2019-05-08 DIAGNOSIS — M54.32 SCIATICA OF LEFT SIDE: Primary | ICD-10-CM

## 2019-05-08 DIAGNOSIS — M54.32 SCIATICA OF LEFT SIDE: ICD-10-CM

## 2019-05-08 PROCEDURE — 72110 X-RAY EXAM L-2 SPINE 4/>VWS: CPT

## 2019-05-08 PROCEDURE — 99214 OFFICE O/P EST MOD 30 MIN: CPT | Performed by: FAMILY MEDICINE

## 2019-05-08 RX ORDER — PREDNISONE 10 MG/1
TABLET ORAL
Qty: 28 TABLET | Refills: 0 | Status: SHIPPED | OUTPATIENT
Start: 2019-05-08 | End: 2019-07-22

## 2019-05-18 ENCOUNTER — TELEPHONE (OUTPATIENT)
Dept: OTHER | Facility: OTHER | Age: 69
End: 2019-05-18

## 2019-05-20 DIAGNOSIS — M54.30 SCIATICA, UNSPECIFIED LATERALITY: Primary | ICD-10-CM

## 2019-05-20 RX ORDER — TRAMADOL HYDROCHLORIDE 50 MG/1
50 TABLET ORAL EVERY 8 HOURS PRN
Qty: 15 TABLET | Refills: 0 | Status: SHIPPED | OUTPATIENT
Start: 2019-05-20 | End: 2019-05-25

## 2019-05-23 ENCOUNTER — TELEPHONE (OUTPATIENT)
Dept: FAMILY MEDICINE CLINIC | Facility: CLINIC | Age: 69
End: 2019-05-23

## 2019-05-28 ENCOUNTER — TELEPHONE (OUTPATIENT)
Dept: FAMILY MEDICINE CLINIC | Facility: CLINIC | Age: 69
End: 2019-05-28

## 2019-05-28 DIAGNOSIS — M48.062 LUMBAR STENOSIS WITH NEUROGENIC CLAUDICATION: Primary | ICD-10-CM

## 2019-05-28 RX ORDER — GABAPENTIN 300 MG/1
300 CAPSULE ORAL
Qty: 30 CAPSULE | Refills: 0 | Status: SHIPPED | OUTPATIENT
Start: 2019-05-28 | End: 2019-06-11 | Stop reason: SDUPTHER

## 2019-05-29 ENCOUNTER — TELEPHONE (OUTPATIENT)
Dept: FAMILY MEDICINE CLINIC | Facility: CLINIC | Age: 69
End: 2019-05-29

## 2019-06-01 ENCOUNTER — TELEPHONE (OUTPATIENT)
Dept: FAMILY MEDICINE CLINIC | Facility: CLINIC | Age: 69
End: 2019-06-01

## 2019-06-01 DIAGNOSIS — G89.29 CHRONIC LOW BACK PAIN WITH SCIATICA, SCIATICA LATERALITY UNSPECIFIED, UNSPECIFIED BACK PAIN LATERALITY: Primary | ICD-10-CM

## 2019-06-01 DIAGNOSIS — M54.40 CHRONIC LOW BACK PAIN WITH SCIATICA, SCIATICA LATERALITY UNSPECIFIED, UNSPECIFIED BACK PAIN LATERALITY: Primary | ICD-10-CM

## 2019-06-01 RX ORDER — IBUPROFEN 600 MG/1
600 TABLET ORAL EVERY 8 HOURS PRN
Qty: 30 TABLET | Refills: 0 | Status: SHIPPED | OUTPATIENT
Start: 2019-06-01 | End: 2019-06-13 | Stop reason: SDUPTHER

## 2019-06-03 ENCOUNTER — TELEPHONE (OUTPATIENT)
Dept: FAMILY MEDICINE CLINIC | Facility: CLINIC | Age: 69
End: 2019-06-03

## 2019-06-04 ENCOUNTER — TELEPHONE (OUTPATIENT)
Dept: OBGYN CLINIC | Facility: HOSPITAL | Age: 69
End: 2019-06-04

## 2019-06-10 ENCOUNTER — CLINICAL SUPPORT (OUTPATIENT)
Dept: PAIN MEDICINE | Facility: CLINIC | Age: 69
End: 2019-06-10
Payer: MEDICARE

## 2019-06-10 VITALS
HEART RATE: 84 BPM | SYSTOLIC BLOOD PRESSURE: 140 MMHG | WEIGHT: 209 LBS | TEMPERATURE: 98.7 F | DIASTOLIC BLOOD PRESSURE: 86 MMHG | HEIGHT: 62 IN | BODY MASS INDEX: 38.46 KG/M2

## 2019-06-10 DIAGNOSIS — M46.1 SACROILIITIS (HCC): ICD-10-CM

## 2019-06-10 DIAGNOSIS — M51.36 DDD (DEGENERATIVE DISC DISEASE), LUMBAR: ICD-10-CM

## 2019-06-10 DIAGNOSIS — M54.16 LUMBAR RADICULOPATHY: Primary | ICD-10-CM

## 2019-06-10 DIAGNOSIS — M48.062 LUMBAR STENOSIS WITH NEUROGENIC CLAUDICATION: ICD-10-CM

## 2019-06-10 DIAGNOSIS — M54.32 SCIATICA OF LEFT SIDE: ICD-10-CM

## 2019-06-10 DIAGNOSIS — M47.816 LUMBAR SPONDYLOSIS: ICD-10-CM

## 2019-06-10 PROBLEM — M51.369 DDD (DEGENERATIVE DISC DISEASE), LUMBAR: Status: ACTIVE | Noted: 2019-06-10

## 2019-06-10 PROCEDURE — 99204 OFFICE O/P NEW MOD 45 MIN: CPT | Performed by: ANESTHESIOLOGY

## 2019-06-11 DIAGNOSIS — M48.062 LUMBAR STENOSIS WITH NEUROGENIC CLAUDICATION: ICD-10-CM

## 2019-06-11 RX ORDER — GABAPENTIN 300 MG/1
300 CAPSULE ORAL 3 TIMES DAILY
Qty: 90 CAPSULE | Refills: 0 | Status: SHIPPED | OUTPATIENT
Start: 2019-06-11 | End: 2019-07-11 | Stop reason: SDUPTHER

## 2019-06-13 DIAGNOSIS — M54.40 CHRONIC LOW BACK PAIN WITH SCIATICA, SCIATICA LATERALITY UNSPECIFIED, UNSPECIFIED BACK PAIN LATERALITY: ICD-10-CM

## 2019-06-13 DIAGNOSIS — G89.29 CHRONIC LOW BACK PAIN WITH SCIATICA, SCIATICA LATERALITY UNSPECIFIED, UNSPECIFIED BACK PAIN LATERALITY: ICD-10-CM

## 2019-06-13 RX ORDER — IBUPROFEN 600 MG/1
600 TABLET ORAL EVERY 8 HOURS PRN
Qty: 30 TABLET | Refills: 3 | Status: SHIPPED | OUTPATIENT
Start: 2019-06-13 | End: 2019-09-16 | Stop reason: ALTCHOICE

## 2019-06-14 ENCOUNTER — TELEPHONE (OUTPATIENT)
Dept: PAIN MEDICINE | Facility: CLINIC | Age: 69
End: 2019-06-14

## 2019-06-14 ENCOUNTER — HOSPITAL ENCOUNTER (OUTPATIENT)
Dept: NON INVASIVE DIAGNOSTICS | Facility: CLINIC | Age: 69
Discharge: HOME | End: 2019-06-14
Payer: MEDICARE

## 2019-06-14 ENCOUNTER — TELEPHONE (OUTPATIENT)
Dept: PAIN MEDICINE | Facility: MEDICAL CENTER | Age: 69
End: 2019-06-14

## 2019-06-14 DIAGNOSIS — M79.605 LEFT LEG PAIN: ICD-10-CM

## 2019-06-14 DIAGNOSIS — M54.16 LUMBAR RADICULOPATHY: Primary | ICD-10-CM

## 2019-06-14 PROCEDURE — 93971 EXTREMITY STUDY: CPT

## 2019-06-14 RX ORDER — METHYLPREDNISOLONE 4 MG/1
TABLET ORAL
Qty: 1 EACH | Refills: 0 | Status: SHIPPED | OUTPATIENT
Start: 2019-06-14 | End: 2019-08-05

## 2019-06-15 PROCEDURE — 93971 EXTREMITY STUDY: CPT | Performed by: SURGERY

## 2019-06-18 ENCOUNTER — HOSPITAL ENCOUNTER (OUTPATIENT)
Dept: MRI IMAGING | Facility: HOSPITAL | Age: 69
Discharge: HOME | End: 2019-06-18
Attending: ANESTHESIOLOGY
Payer: MEDICARE

## 2019-06-18 DIAGNOSIS — M54.16 LUMBAR RADICULOPATHY: ICD-10-CM

## 2019-06-18 PROCEDURE — 72148 MRI LUMBAR SPINE W/O DYE: CPT

## 2019-06-19 ENCOUNTER — TELEPHONE (OUTPATIENT)
Dept: PAIN MEDICINE | Facility: CLINIC | Age: 69
End: 2019-06-19

## 2019-06-20 ENCOUNTER — EVALUATION (OUTPATIENT)
Dept: PHYSICAL THERAPY | Facility: CLINIC | Age: 69
End: 2019-06-20
Payer: MEDICARE

## 2019-06-20 ENCOUNTER — TELEPHONE (OUTPATIENT)
Dept: PAIN MEDICINE | Facility: CLINIC | Age: 69
End: 2019-06-20

## 2019-06-20 DIAGNOSIS — M54.16 LUMBAR RADICULOPATHY: ICD-10-CM

## 2019-06-20 PROCEDURE — 97162 PT EVAL MOD COMPLEX 30 MIN: CPT | Performed by: PHYSICAL THERAPIST

## 2019-06-20 PROCEDURE — 97112 NEUROMUSCULAR REEDUCATION: CPT | Performed by: PHYSICAL THERAPIST

## 2019-06-21 ENCOUNTER — TELEPHONE (OUTPATIENT)
Dept: PAIN MEDICINE | Facility: CLINIC | Age: 69
End: 2019-06-21

## 2019-06-25 ENCOUNTER — APPOINTMENT (OUTPATIENT)
Dept: PHYSICAL THERAPY | Facility: CLINIC | Age: 69
End: 2019-06-25
Payer: MEDICARE

## 2019-06-26 ENCOUNTER — OFFICE VISIT (OUTPATIENT)
Dept: PHYSICAL THERAPY | Facility: CLINIC | Age: 69
End: 2019-06-26
Payer: MEDICARE

## 2019-06-26 DIAGNOSIS — M54.16 LUMBAR RADICULOPATHY: Primary | ICD-10-CM

## 2019-06-26 PROCEDURE — 97112 NEUROMUSCULAR REEDUCATION: CPT | Performed by: PHYSICAL THERAPIST

## 2019-06-26 PROCEDURE — 97110 THERAPEUTIC EXERCISES: CPT | Performed by: PHYSICAL THERAPIST

## 2019-06-27 ENCOUNTER — TELEPHONE (OUTPATIENT)
Dept: FAMILY MEDICINE CLINIC | Facility: CLINIC | Age: 69
End: 2019-06-27

## 2019-06-27 NOTE — TELEPHONE ENCOUNTER
Patient wants 90 day refills on Losartan 100 mg tablets and simvastatin 20 mg tablets called in to [facility]  Cleopatra Dickson can be reached at [phone] any questions

## 2019-06-28 ENCOUNTER — OFFICE VISIT (OUTPATIENT)
Dept: PHYSICAL THERAPY | Facility: CLINIC | Age: 69
End: 2019-06-28
Payer: MEDICARE

## 2019-06-28 DIAGNOSIS — M54.16 LUMBAR RADICULOPATHY: Primary | ICD-10-CM

## 2019-06-28 PROCEDURE — 97110 THERAPEUTIC EXERCISES: CPT

## 2019-06-28 PROCEDURE — 97112 NEUROMUSCULAR REEDUCATION: CPT

## 2019-07-01 ENCOUNTER — APPOINTMENT (OUTPATIENT)
Dept: PHYSICAL THERAPY | Facility: CLINIC | Age: 69
End: 2019-07-01
Payer: MEDICARE

## 2019-07-02 ENCOUNTER — TELEPHONE (OUTPATIENT)
Dept: OBGYN CLINIC | Facility: HOSPITAL | Age: 69
End: 2019-07-02

## 2019-07-02 NOTE — TELEPHONE ENCOUNTER
Patient is calling stating that she is having back pain and is able to get an epidural but she also has euflexxa injections coming up  She is wondering if she should get the injections with us and then get the epidural or what should she do   She is looking for advice on what to do first

## 2019-07-02 NOTE — TELEPHONE ENCOUNTER
Patient is requesting a call back  she said that someone called to get her setup for an injection for spa  Patient would like to have the injection done by pain management first? However she wants to know if Dr Persaud Memory think she should get the injection on her knee first? Please call her back with an answer

## 2019-07-02 NOTE — TELEPHONE ENCOUNTER
The sequence of events (injections) are not critical   It depends on her symptoms  If the back pain and thigh discomfort are more pressing/problematic to her, then she should do those injections with Dr Piedad Holly first   The knee injections are a series and will occur over the course of 3 weeks  The back injections can occur the same week that the visco injections are performed without side effect

## 2019-07-03 ENCOUNTER — APPOINTMENT (OUTPATIENT)
Dept: PHYSICAL THERAPY | Facility: CLINIC | Age: 69
End: 2019-07-03
Payer: MEDICARE

## 2019-07-05 ENCOUNTER — OFFICE VISIT (OUTPATIENT)
Dept: PHYSICAL THERAPY | Facility: CLINIC | Age: 69
End: 2019-07-05
Payer: MEDICARE

## 2019-07-05 DIAGNOSIS — M54.16 LUMBAR RADICULOPATHY: Primary | ICD-10-CM

## 2019-07-05 PROCEDURE — 97112 NEUROMUSCULAR REEDUCATION: CPT | Performed by: PHYSICAL THERAPIST

## 2019-07-05 PROCEDURE — 97110 THERAPEUTIC EXERCISES: CPT | Performed by: PHYSICAL THERAPIST

## 2019-07-05 NOTE — PROGRESS NOTES
Daily Note     Today's date: 2019  Patient name: Obdulio Paulino  : 1950  MRN: [id number]  Referring provider: Melina Pulido DO  Dx:   Encounter Diagnosis     ICD-10-CM    1  Lumbar radiculopathy M54 [id number]                 Subjective: Pt states her R knee hurts today, otherwise is feeling well  Arrived at appointment 10 min late    Objective: See treatment diary below  Precautions: DM II, OA    Daily Treatment Diary     Manuals                                                              Exercise Diary              UBE w/ lumbar roll   5 min 5'         Seated AB  5"x10 5"x10 5" 10x         Seated AB w/ march  x10 1x15 15x         Seated AB w/ add  5"x10 5"x15 5" 15x         Seated AB w/ abd  5"x10 grn 5"x15  green 5"x20  green         Seated SK to C             Seated piriformis str if able             Repeated Lumbar ext modified standing at raised table 3x10 4x10 4x10 4x10         Gait train with SPC   100 ft 100ft x2 Cues to proper use of SPC                                                Postural ed  15' 10'                                                                                         Modalities                                         Assessment: Tolerated session fair  Fatigues easily and reports some pain increase with exercises but able to tolerate  Some difficulty maintaining trunk stability and posture with seated exercises  Trialed having pt use SPC with L hand to aid with R knee and hip pain, however pt also demonstrated trendelenburg gait on L hip which deteriorated gait quality, recommended pt continue using on R hand for time being  Would benefit from continued PT  Plan: Continue treatment as per PT plan of care

## 2019-07-08 ENCOUNTER — APPOINTMENT (OUTPATIENT)
Dept: PHYSICAL THERAPY | Facility: CLINIC | Age: 69
End: 2019-07-08
Payer: MEDICARE

## 2019-07-09 ENCOUNTER — HOSPITAL ENCOUNTER (OUTPATIENT)
Dept: RADIOLOGY | Facility: CLINIC | Age: 69
Discharge: HOME | End: 2019-07-09
Payer: MEDICARE

## 2019-07-09 VITALS
TEMPERATURE: 97.8 F | SYSTOLIC BLOOD PRESSURE: 162 MMHG | DIASTOLIC BLOOD PRESSURE: 92 MMHG | OXYGEN SATURATION: 98 % | RESPIRATION RATE: 20 BRPM | HEART RATE: 77 BPM

## 2019-07-09 DIAGNOSIS — M54.16 LUMBAR RADICULOPATHY: ICD-10-CM

## 2019-07-09 PROCEDURE — 62323 NJX INTERLAMINAR LMBR/SAC: CPT | Performed by: ANESTHESIOLOGY

## 2019-07-09 RX ORDER — LIDOCAINE HYDROCHLORIDE 10 MG/ML
5 INJECTION, SOLUTION EPIDURAL; INFILTRATION; INTRACAUDAL; PERINEURAL ONCE
Status: COMPLETED | OUTPATIENT
Start: 2019-07-09 | End: 2019-07-09

## 2019-07-09 RX ORDER — METHYLPREDNISOLONE ACETATE 80 MG/ML
80 INJECTION, SUSPENSION INTRA-ARTICULAR; INTRALESIONAL; INTRAMUSCULAR; PARENTERAL; SOFT TISSUE ONCE
Status: COMPLETED | OUTPATIENT
Start: 2019-07-09 | End: 2019-07-09

## 2019-07-09 RX ADMIN — LIDOCAINE HYDROCHLORIDE 3 ML: 10 INJECTION, SOLUTION EPIDURAL; INFILTRATION; INTRACAUDAL; PERINEURAL at 16:06

## 2019-07-09 RX ADMIN — METHYLPREDNISOLONE ACETATE 80 MG: 80 INJECTION, SUSPENSION INTRA-ARTICULAR; INTRALESIONAL; INTRAMUSCULAR; SOFT TISSUE at 16:09

## 2019-07-09 RX ADMIN — IOHEXOL 1 ML: 300 INJECTION, SOLUTION INTRAVENOUS at 16:07

## 2019-07-09 NOTE — H&P
History of Present Illness: The patient is a 77 y o  female who presents with complaints of low back and leg pain  Patient Active Problem List   Diagnosis    Benign essential hypertension    Cervical radiculopathy    Chronic lower back pain    Diabetes mellitus type 2, controlled ([facility] [address]    Hyperlipidemia    Left knee pain    Lumbar stenosis with neurogenic claudication    Obesity (BMI 30-39  9)    Osteoarthrosis    Post-menopausal bleeding    Primary osteoarthritis of both knees    Psoriasis    Right knee pain    Right shoulder pain    Spondylolisthesis, lumbar region    Vaginal lump    Vitamin D deficiency    Effusion of left knee    Pain in right hip    Arthritis of right hip    Lumbar radiculopathy    DDD (degenerative disc disease), lumbar    Lumbar spondylosis    Sacroiliitis (HCC)       Past Medical History:   Diagnosis Date    Alopecia     Calcaneal spur     Diabetes insipidus ([facility] [address]     Diabetes mellitus ([facility] [address]     Hyperlipidemia     Hypertension     Osteoarthrosis 2/13/2013    Pes planus     unspecified laterality    Plantar fasciitis        Past Surgical History:   Procedure Laterality Date    CARPAL TUNNEL RELEASE      COLONOSCOPY      May 2006    INCISIONAL BREAST BIOPSY      [place] COLONOSCOPY FLX DX W/COLLJ SPEC WHEN PFRMD N/A 5/4/2018    Procedure: COLONOSCOPY;  Surgeon: Niles Estes MD;  Location: [facility];   Service: Colorectal         Current Outpatient Medications:     Biotin 1 MG CAPS, Take by mouth, Disp: , Rfl:     Calcium Carb-Cholecalciferol (CALCIUM 1000 + D) 1000-800 MG-UNIT TABS, Take by mouth, Disp: , Rfl:     cholecalciferol (VITAMIN D3) 1,000 units tablet, Take 1,000 Units by mouth daily, Disp: , Rfl:     FISH OIL-CANOLA OIL-VIT D3 PO, by Does not apply route, Disp: , Rfl:     fluticasone (FLONASE) 50 mcg/act nasal spray, 1 spray into each nostril daily (Patient not taking: Reported on 4/10/2019), Disp: 16 g, Rfl: 0    gabapentin (NEURONTIN) 300 mg capsule, Take 1 capsule (300 mg total) by mouth 3 (three) times a day for 30 days, Disp: 90 capsule, Rfl: 0    ibuprofen (MOTRIN) 600 mg tablet, Take 1 tablet (600 mg total) by mouth every 8 (eight) hours as needed for moderate pain, Disp: 30 tablet, Rfl: 3    losartan (COZAAR) 100 MG tablet, Take 1 tablet (100 mg total) by mouth daily for 90 days, Disp: 90 tablet, Rfl: 3    metFORMIN (GLUCOPHAGE) 500 mg tablet, Take 1 tablet (500 mg total) by mouth 2 (two) times a day with meals for 90 days, Disp: 180 tablet, Rfl: 3    methylPREDNISolone 4 MG tablet therapy pack, Use as directed on package, Disp: 1 each, Rfl: 0    mometasone (ELOCON) 0 1 % ointment, APPLY TO RASH LESIONS TWO TIMES DAILY FOR 2 WEEKS THEN THREE TO FOUR DAYS PER WEEK AS NEEDED, Disp: , Rfl: 1    Multiple Vitamin (MULTI-VITAMIN DAILY PO), Take by mouth, Disp: , Rfl:     predniSONE 10 mg tablet, Take 4 tabs for 4 days, then 3 tabs for 2 days, then 2 tabs for 2 days and then 1 tab for 2 day (Patient not taking: Reported on 6/10/2019), Disp: 28 tablet, Rfl: 0    simvastatin (ZOCOR) 20 mg tablet, Take 1 tablet (20 mg total) by mouth daily for 90 days, Disp: 90 tablet, Rfl: 3    Allergies   Allergen Reactions    Meloxicam Hives    Nifedipine Edema    Sulfamethoxazole-Trimethoprim Edema       Physical Exam:   Vitals:    07/09/19 1552   BP: 154/87   Pulse: 80   Resp: 18   Temp: 97 8 °F (36 6 °C)   SpO2: 94%     General: Awake, Alert, Oriented x 3, Mood and affect appropriate  Respiratory: Respirations even and unlabored  Cardiovascular: Peripheral pulses intact; no edema  Musculoskeletal Exam:  Bilateral lumbar paraspinals tender to palpation  ASA Score: 3         Assessment:   1   Lumbar radiculopathy        Plan: ROSEMARY

## 2019-07-09 NOTE — DISCHARGE INSTRUCTIONS
Epidural Steroid Injection   WHAT YOU NEED TO KNOW:   An epidural steroid injection (MARYLIN) is a procedure to inject steroid medicine into the epidural space  The epidural space is between your spinal cord and vertebrae  Steroids reduce inflammation and fluid buildup in your spine that may be causing pain  You may be given pain medicine along with the steroids  ACTIVITY  · Do not drive or operate machinery today  · No strenuous activity today - bending, lifting, etc   · You may resume normal activites starting tomorrow - start slowly and as tolerated  · You may shower today, but no tub baths or hot tubs  · You may have numbness for several hours from the local anesthetic  Please use caution and common sense, especially with weight-bearing activities  CARE OF THE INJECTION SITE  · If you have soreness or pain, apply ice to the area today (20 minutes on/20 minutes off)  · Starting tomorrow, you may use warm, moist heat or ice if needed  · You may have an increase or change in your discomfort for 36-48 hours after your treatment  · Apply ice and continue with any pain medication you have been prescribed  · Notify [facility] if you have any of the following: redness, drainage, swelling, headache, stiff neck or fever above 100°F     SPECIAL INSTRUCTIONS  · Our office will contact you in approximately 7 days for a progress report  MEDICATIONS  · Continue to take all routine medications  · Our office may have instructed you to hold some medications  If you have a problem specifically related to your procedure, please call our office at [phone]  Problems not related to your procedure should be directed to your primary care physician

## 2019-07-10 ENCOUNTER — OFFICE VISIT (OUTPATIENT)
Dept: OBGYN CLINIC | Facility: HOSPITAL | Age: 69
End: 2019-07-10
Payer: MEDICARE

## 2019-07-10 VITALS
SYSTOLIC BLOOD PRESSURE: 176 MMHG | HEIGHT: 62 IN | WEIGHT: 212.8 LBS | HEART RATE: 77 BPM | BODY MASS INDEX: 39.16 KG/M2 | DIASTOLIC BLOOD PRESSURE: 88 MMHG

## 2019-07-10 DIAGNOSIS — M25.562 CHRONIC PAIN OF LEFT KNEE: ICD-10-CM

## 2019-07-10 DIAGNOSIS — M25.561 CHRONIC PAIN OF RIGHT KNEE: ICD-10-CM

## 2019-07-10 DIAGNOSIS — G89.29 CHRONIC PAIN OF RIGHT KNEE: ICD-10-CM

## 2019-07-10 DIAGNOSIS — G89.29 CHRONIC PAIN OF LEFT KNEE: ICD-10-CM

## 2019-07-10 DIAGNOSIS — M17.11 PRIMARY OSTEOARTHRITIS OF RIGHT KNEE: ICD-10-CM

## 2019-07-10 DIAGNOSIS — Z47.1 AFTERCARE FOLLOWING RIGHT HIP JOINT REPLACEMENT SURGERY: ICD-10-CM

## 2019-07-10 DIAGNOSIS — M25.551 RIGHT HIP PAIN: ICD-10-CM

## 2019-07-10 DIAGNOSIS — M16.11 PRIMARY OSTEOARTHRITIS OF RIGHT HIP: ICD-10-CM

## 2019-07-10 DIAGNOSIS — M17.12 PRIMARY OSTEOARTHRITIS OF LEFT KNEE: Primary | ICD-10-CM

## 2019-07-10 DIAGNOSIS — Z96.641 AFTERCARE FOLLOWING RIGHT HIP JOINT REPLACEMENT SURGERY: ICD-10-CM

## 2019-07-10 PROCEDURE — 20610 DRAIN/INJ JOINT/BURSA W/O US: CPT | Performed by: ORTHOPAEDIC SURGERY

## 2019-07-10 RX ORDER — CEFAZOLIN SODIUM 2 G/50ML
2000 SOLUTION INTRAVENOUS ONCE
Status: CANCELLED | OUTPATIENT
Start: 2019-07-10 | End: 2019-07-10

## 2019-07-10 RX ORDER — FOLIC ACID 1 MG/1
1 TABLET ORAL DAILY
Qty: 30 TABLET | Refills: 0 | Status: SHIPPED | OUTPATIENT
Start: 2019-07-10 | End: 2019-10-30 | Stop reason: CLARIF

## 2019-07-10 RX ORDER — CHLORHEXIDINE GLUCONATE 0.12 MG/ML
15 RINSE ORAL ONCE
Status: CANCELLED | OUTPATIENT
Start: 2019-07-10 | End: 2019-07-10

## 2019-07-10 RX ORDER — ACETAMINOPHEN 325 MG/1
975 TABLET ORAL ONCE
Status: CANCELLED | OUTPATIENT
Start: 2019-07-10 | End: 2019-07-10

## 2019-07-10 RX ORDER — HYALURONATE SODIUM 10 MG/ML
20 SYRINGE (ML) INTRAARTICULAR
Status: COMPLETED | OUTPATIENT
Start: 2019-07-10 | End: 2019-07-10

## 2019-07-10 RX ORDER — ASCORBIC ACID 500 MG
500 TABLET ORAL 2 TIMES DAILY
Qty: 60 TABLET | Refills: 0 | Status: SHIPPED | OUTPATIENT
Start: 2019-07-10 | End: 2021-06-08 | Stop reason: ALTCHOICE

## 2019-07-10 RX ORDER — FERROUS SULFATE TAB EC 324 MG (65 MG FE EQUIVALENT) 324 (65 FE) MG
TABLET DELAYED RESPONSE ORAL
Qty: 15 TABLET | Refills: 0 | Status: SHIPPED | OUTPATIENT
Start: 2019-07-10 | End: 2021-06-08 | Stop reason: ALTCHOICE

## 2019-07-10 RX ORDER — SODIUM CHLORIDE, SODIUM LACTATE, POTASSIUM CHLORIDE, CALCIUM CHLORIDE 600; 310; 30; 20 MG/100ML; MG/100ML; MG/100ML; MG/100ML
125 INJECTION, SOLUTION INTRAVENOUS CONTINUOUS
Status: CANCELLED | OUTPATIENT
Start: 2019-07-10

## 2019-07-10 RX ORDER — GABAPENTIN 300 MG/1
300 CAPSULE ORAL ONCE
Status: CANCELLED | OUTPATIENT
Start: 2019-07-10 | End: 2019-07-10

## 2019-07-10 RX ADMIN — Medication 20 MG: at 15:55

## 2019-07-10 NOTE — PROGRESS NOTES
Assessment:  1  Primary osteoarthritis of left knee  Large joint arthrocentesis: L knee   2  Chronic pain of left knee  Large joint arthrocentesis: L knee   3  Primary osteoarthritis of right knee  Large joint arthrocentesis: R knee   4  Chronic pain of right knee  Large joint arthrocentesis: R knee   5  Primary osteoarthritis of right hip  ferrous sulfate 324 (65 Fe) mg    folic acid (FOLVITE) 1 mg tablet    ascorbic acid (VITAMIN C) 500 mg tablet   6  Right hip pain  ferrous sulfate 324 (65 Fe) mg    folic acid (FOLVITE) 1 mg tablet    ascorbic acid (VITAMIN C) 500 mg tablet   7  Aftercare following right hip joint replacement surgery  enoxaparin (LOVENOX) 40 mg/0 4 mL       Plan:  Previously taken hip diagnostics reviewed and physical exam performed  Diagnosis, treatment options and associated risks were discussed with the patient including no treatment, nonsurgical treatment and potential for surgical intervention  The patient was given the opportunity to ask questions regarding each  Patient was educated on maintaining a healthy lifestyle with proper weight management and physician recommended home exercise program/routine for regular fitness  Quality of life decision to pursue a right total hip arthroplasty  Risks and benefits of a right total hip arthroplasty discussed, consents obtained  Both knees were also injected with the 1st of 3 Euflexxa visco injections, and she will follow-up each of the next 2 weeks to complete the visco series  Ice and post-injection protocol advised  Weight bearing and activities as tolerated  Pre-operative vitamins and post-operative lovenox sent to her pharmacy  Given the patients elevated BP, recommend she consults with her PCP for further management  To do next visit:  Return in about 1 week (around 7/17/2019) for re-check and Euflexxa #2 both knees  The above stated was discussed in layman's terms and the patient expressed understanding    All questions were answered to the patient's satisfaction  Scribe Attestation    I,:   Josette Hernandez am acting as a scribe while in the presence of the attending physician :        I,:   Kd Poe MD personally performed the services described in this documentation    as scribed in my presence :              Subjective:   Satnam Hawkins is a 77 y o  female who presents for repeat evaluation of her bilateral knees and right hip, all known osteoarthritis  She is here today for initiation of the Euflexxa visco series for her knees  She continues to complain of medial knee pain with weight bearing and stiffness when getting up after prolonged sedentary positions  She does use a cane for ambulatory assistance  Her right hip, groin pain,  has been more bothersome as of late  She has groin pain with rotation motions  She has difficulty putting on/off her socks/shoes, getting in/out of her car, or turning in bed  She recently had a lumbar MARYLIN which has helped with some of her lumbar symptoms, leaving her right hip symptoms  Patient's most recent (April) hemoglobin A1c was 6 2  BMI is currently 38 9  Review of systems negative unless otherwise specified in HPI    Past Medical History:   Diagnosis Date    Alopecia     Calcaneal spur     Diabetes insipidus ([address]     Diabetes mellitus ([address]     Hyperlipidemia     Hypertension     Osteoarthrosis 2/13/2013    Pes planus     unspecified laterality    Plantar fasciitis        Past Surgical History:   Procedure Laterality Date    CARPAL TUNNEL RELEASE      COLONOSCOPY      May 2006    INCISIONAL BREAST BIOPSY      [place] COLONOSCOPY FLX DX W/COLLJ SPEC WHEN PFRMD N/A 5/4/2018    Procedure: COLONOSCOPY;  Surgeon: Claudeen Garbe, MD;  Location: [facility];   Service: Colorectal       Family History   Problem Relation Age of Onset    Diabetes Mother     Hypertension Mother     Emphysema Father     Cancer Brother     Colon cancer Brother 40       Social History Occupational History    Not on file   Tobacco Use    Smoking status: Never Smoker    Smokeless tobacco: Never Used   Substance and Sexual Activity    Alcohol use: Yes     Comment: social    Drug use: No    Sexual activity: Not on file         Current Outpatient Medications:     Biotin 1 MG CAPS, Take by mouth, Disp: , Rfl:     Calcium Carb-Cholecalciferol (CALCIUM 1000 + D) 1000-800 MG-UNIT TABS, Take by mouth, Disp: , Rfl:     cholecalciferol (VITAMIN D3) 1,000 units tablet, Take 1,000 Units by mouth daily, Disp: , Rfl:     FISH OIL-CANOLA OIL-VIT D3 PO, by Does not apply route, Disp: , Rfl:     fluticasone (FLONASE) 50 mcg/act nasal spray, 1 spray into each nostril daily (Patient not taking: Reported on 4/10/2019), Disp: 16 g, Rfl: 0    gabapentin (NEURONTIN) 300 mg capsule, Take 1 capsule (300 mg total) by mouth 3 (three) times a day for 30 days, Disp: 90 capsule, Rfl: 0    ibuprofen (MOTRIN) 600 mg tablet, Take 1 tablet (600 mg total) by mouth every 8 (eight) hours as needed for moderate pain, Disp: 30 tablet, Rfl: 3    losartan (COZAAR) 100 MG tablet, Take 1 tablet (100 mg total) by mouth daily for 90 days, Disp: 90 tablet, Rfl: 3    metFORMIN (GLUCOPHAGE) 500 mg tablet, Take 1 tablet (500 mg total) by mouth 2 (two) times a day with meals for 90 days, Disp: 180 tablet, Rfl: 3    methylPREDNISolone 4 MG tablet therapy pack, Use as directed on package, Disp: 1 each, Rfl: 0    mometasone (ELOCON) 0 1 % ointment, APPLY TO RASH LESIONS TWO TIMES DAILY FOR 2 WEEKS THEN THREE TO FOUR DAYS PER WEEK AS NEEDED, Disp: , Rfl: 1    Multiple Vitamin (MULTI-VITAMIN DAILY PO), Take by mouth, Disp: , Rfl:     predniSONE 10 mg tablet, Take 4 tabs for 4 days, then 3 tabs for 2 days, then 2 tabs for 2 days and then 1 tab for 2 day (Patient not taking: Reported on 6/10/2019), Disp: 28 tablet, Rfl: 0    simvastatin (ZOCOR) 20 mg tablet, Take 1 tablet (20 mg total) by mouth daily for 90 days, Disp: 90 tablet, Rfl: 3    Allergies   Allergen Reactions    Meloxicam Hives    Nifedipine Edema    Sulfamethoxazole-Trimethoprim Edema            Vitals:    07/10/19 1604   BP: (!) 176/88   Pulse:        Objective:                    Right Knee Exam     Muscle Strength   The patient has normal right knee strength  Tenderness   The patient is experiencing tenderness in the medial joint line  Range of Motion   The patient has normal right knee ROM  Right knee flexion: with crepitus and stiffness  Other   Erythema: absent  Sensation: normal  Swelling: mild  Effusion: no effusion present      Left Knee Exam     Muscle Strength   The patient has normal left knee strength  Tenderness   The patient is experiencing tenderness in the medial joint line  Range of Motion   The patient has normal left knee ROM  Left knee flexion: with crepitus and stiffness  Other   Erythema: absent  Sensation: normal  Swelling: mild  Effusion: no effusion present    Comments:    Mild varus alignment at both lower extremities  Right Hip Exam     Tenderness   The patient is experiencing tenderness in the anterior      Range of Motion   Flexion: 90   External rotation: 10   Internal rotation: 0 (all with groin pain and guarding)     Muscle Strength   Abduction: 4/5   Adduction: 4/5   Flexion: 4/5     Other   Erythema: absent            Diagnostics, reviewed and taken today if performed as documented:    None performed but reviewed:  Advanced right hip OA          Procedures, if performed today:    Large joint arthrocentesis: R knee  Date/Time: 7/10/2019 3:55 PM  Consent given by: patient  Site marked: site marked  Timeout: Immediately prior to procedure a time out was called to verify the correct patient, procedure, equipment, support staff and site/side marked as required   Supporting Documentation  Indications: pain and diagnostic evaluation   Procedure Details  Location: knee - R knee  Preparation: Patient was prepped and draped in the usual sterile fashion  Needle size: 22 G  Ultrasound guidance: no  Approach: anterolateral  Medications administered: 20 mg Sodium Hyaluronate 20 MG/2ML    Patient tolerance: patient tolerated the procedure well with no immediate complications  Dressing:  Sterile dressing applied    Large joint arthrocentesis: L knee  Date/Time: 7/10/2019 3:55 PM  Consent given by: patient  Site marked: site marked  Timeout: Immediately prior to procedure a time out was called to verify the correct patient, procedure, equipment, support staff and site/side marked as required   Supporting Documentation  Indications: pain and diagnostic evaluation   Procedure Details  Location: knee - L knee  Preparation: Patient was prepped and draped in the usual sterile fashion  Needle size: 22 G  Ultrasound guidance: no  Approach: anterolateral  Medications administered: 20 mg Sodium Hyaluronate 20 MG/2ML    Patient tolerance: patient tolerated the procedure well with no immediate complications  Dressing:  Sterile dressing applied              Portions of the record may have been created with voice recognition software   Occasional wrong word or "sound a like" substitutions may have occurred due to the inherent limitations of voice recognition software   Read the chart carefully and recognize, using context, where substitutions have occurred

## 2019-07-11 ENCOUNTER — OFFICE VISIT (OUTPATIENT)
Dept: PHYSICAL THERAPY | Facility: CLINIC | Age: 69
End: 2019-07-11
Payer: MEDICARE

## 2019-07-11 DIAGNOSIS — M54.16 LUMBAR RADICULOPATHY: Primary | ICD-10-CM

## 2019-07-11 DIAGNOSIS — M48.062 LUMBAR STENOSIS WITH NEUROGENIC CLAUDICATION: ICD-10-CM

## 2019-07-11 PROCEDURE — 97112 NEUROMUSCULAR REEDUCATION: CPT | Performed by: PHYSICAL THERAPIST

## 2019-07-11 PROCEDURE — 97110 THERAPEUTIC EXERCISES: CPT | Performed by: PHYSICAL THERAPIST

## 2019-07-11 RX ORDER — GABAPENTIN 300 MG/1
300 CAPSULE ORAL 3 TIMES DAILY
Qty: 90 CAPSULE | Refills: 5 | Status: SHIPPED | OUTPATIENT
Start: 2019-07-11 | End: 2019-12-11

## 2019-07-11 NOTE — TELEPHONE ENCOUNTER
Patient wants a refill on Gabapentin 300 mg capsule take 1 capsule by mouth 3 times a day total 90 capsules  Cara Humphries can be reached [phone] any questions

## 2019-07-11 NOTE — PROGRESS NOTES
Daily Note     Today's date: 2019  Patient name: Ramón Au  : 1950  MRN: [id number]  Referring provider: Renetta Stephens DO  Dx:   Encounter Diagnosis     ICD-10-CM    1  Lumbar radiculopathy M54 16                   Subjective: Pt reports she had injections in her knees yesterday and an epidural in her back two days ago  She also reports she will have her right hip replaced in September  Objective: See treatment diary below      Assessment: Tolerated treatment well  Patient with decreased rest breaks needed between exercises today  Tolerated addition of 2 exercises without complaint  Much more upright stance noted when walking  Plan: Continue per plan of care          Precautions: DM II, OA    Daily Treatment Diary     Manuals                                                             Exercise Diary              UBE w/ lumbar roll   5 min 5' 7'        Seated AB  5"x10 5"x10 5" 10x 5"x15        Seated AB w/ march  x10 1x15 15x 15x        Seated AB w/ add  5"x10 5"x15 5" 15x 5"x20        Seated AB w/ abd  5"x10 grn 5"x15  green 5"x20  green 5"x20 grn        Seated SK to C     5"x5        Seated piriformis str if able             Repeated Lumbar ext modified standing at raised table 3x10 4x10 4x10 4x10 4x10        Gait train with SPC   100 ft 100ft x2 Cues to proper use of SPC         Step Up     4" 10x                                  Postural ed  15' 10'   5'                                                                                      Modalities

## 2019-07-12 ENCOUNTER — APPOINTMENT (OUTPATIENT)
Dept: PHYSICAL THERAPY | Facility: CLINIC | Age: 69
End: 2019-07-12
Payer: MEDICARE

## 2019-07-15 ENCOUNTER — OFFICE VISIT (OUTPATIENT)
Dept: PHYSICAL THERAPY | Facility: CLINIC | Age: 69
End: 2019-07-15
Payer: MEDICARE

## 2019-07-15 DIAGNOSIS — M54.16 LUMBAR RADICULOPATHY: Primary | ICD-10-CM

## 2019-07-15 PROCEDURE — 97112 NEUROMUSCULAR REEDUCATION: CPT | Performed by: PHYSICAL THERAPIST

## 2019-07-15 PROCEDURE — 97110 THERAPEUTIC EXERCISES: CPT | Performed by: PHYSICAL THERAPIST

## 2019-07-15 NOTE — PROGRESS NOTES
Daily Note     Today's date: 7/15/2019  Patient name: Argelia Pulliam  : 1950  MRN: [id number]  Referring provider: Mike Decker DO  Dx:   Encounter Diagnosis     ICD-10-CM    1  Lumbar radiculopathy M54 16                   Subjective: Pt reports she continues to improve but was having more leg pain over the weekend  She acknowledged she has not been doing her lumbar extensions nearly as often as instructed  Objective: See treatment diary below      Assessment: Tolerated treatment well  Patient would benefit from continued PT      Plan: Continue per plan of care        Precautions: DM II, OA    Daily Treatment Diary     Manuals 6/20 6/26 6/28 7/5 7/11 7/15                                                           Exercise Diary              SCI fit bike w/ lumbar roll      6'       UBE w/ lumbar roll   5 min 5' 7'        Seated AB  5"x10 5"x10 5" 10x 5"x15 5"x15       Seated AB w/ march  x10 1x15 15x 15x 15x       Seated AB w/ add  5"x10 5"x15 5" 15x 5"x20 5"x20       Seated AB w/ abd  5"x10 grn 5"x15  green 5"x20  green 5"x20 grn 5"x20 blue       Seated SK to C     5"x5 5"x5       Seated piriformis str if able             Repeated Lumbar ext modified standing at raised table 3x10 4x10 4x10 4x10 4x10 4x10       Gait train with SPC   100 ft 100ft x2 Cues to proper use of SPC         Step Up     4" 10x 4" 15x                                 Postural ed  15' 10'   5' 5'                                                                                     Modalities

## 2019-07-16 ENCOUNTER — TELEPHONE (OUTPATIENT)
Dept: PAIN MEDICINE | Facility: CLINIC | Age: 69
End: 2019-07-16

## 2019-07-17 ENCOUNTER — OFFICE VISIT (OUTPATIENT)
Dept: OBGYN CLINIC | Facility: HOSPITAL | Age: 69
End: 2019-07-17
Payer: MEDICARE

## 2019-07-17 VITALS
HEART RATE: 78 BPM | BODY MASS INDEX: 39.15 KG/M2 | HEIGHT: 62 IN | DIASTOLIC BLOOD PRESSURE: 90 MMHG | SYSTOLIC BLOOD PRESSURE: 158 MMHG | WEIGHT: 212.74 LBS

## 2019-07-17 DIAGNOSIS — G89.29 CHRONIC PAIN OF LEFT KNEE: ICD-10-CM

## 2019-07-17 DIAGNOSIS — M25.561 CHRONIC PAIN OF RIGHT KNEE: ICD-10-CM

## 2019-07-17 DIAGNOSIS — M17.12 PRIMARY OSTEOARTHRITIS OF LEFT KNEE: Primary | ICD-10-CM

## 2019-07-17 DIAGNOSIS — G89.29 CHRONIC PAIN OF RIGHT KNEE: ICD-10-CM

## 2019-07-17 DIAGNOSIS — M25.562 CHRONIC PAIN OF LEFT KNEE: ICD-10-CM

## 2019-07-17 DIAGNOSIS — M17.11 PRIMARY OSTEOARTHRITIS OF RIGHT KNEE: ICD-10-CM

## 2019-07-17 PROCEDURE — 20610 DRAIN/INJ JOINT/BURSA W/O US: CPT | Performed by: ORTHOPAEDIC SURGERY

## 2019-07-17 RX ORDER — HYALURONATE SODIUM 10 MG/ML
20 SYRINGE (ML) INTRAARTICULAR
Status: COMPLETED | OUTPATIENT
Start: 2019-07-17 | End: 2019-07-17

## 2019-07-17 RX ORDER — LIDOCAINE HYDROCHLORIDE 10 MG/ML
3 INJECTION, SOLUTION INFILTRATION; PERINEURAL
Status: COMPLETED | OUTPATIENT
Start: 2019-07-17 | End: 2019-07-17

## 2019-07-17 RX ADMIN — LIDOCAINE HYDROCHLORIDE 3 ML: 10 INJECTION, SOLUTION INFILTRATION; PERINEURAL at 14:38

## 2019-07-17 RX ADMIN — Medication 20 MG: at 14:38

## 2019-07-17 NOTE — PROGRESS NOTES
Assessment:  1  Primary osteoarthritis of left knee  Large joint arthrocentesis: L knee   2  Chronic pain of left knee  Large joint arthrocentesis: L knee   3  Primary osteoarthritis of right knee  Large joint arthrocentesis: R knee   4  Chronic pain of right knee  Large joint arthrocentesis: R knee       Plan:  Bilateral knees known osteoarthritis returns today for Euflexxa #2 to both knees for ongoing treatment of her known osteoarthritis  Both knees were injected the 2nd of 3 Euflexxa Visco injections  Ice and post injection protocol advised  Weightbearing activities as tolerated  Follow up next week for completion of here Euflexxa series  (RP)    To do next visit:  Return in about 1 week (around 7/24/2019) for re-check and Euflexxa #3 B/L knees  The above stated was discussed in layman's terms and the patient expressed understanding  All questions were answered to the patient's satisfaction  Scribe Attestation    I,:   Chante Owen am acting as a scribe while in the presence of the attending physician :        I,:   Jessica Trotter MD personally performed the services described in this documentation    as scribed in my presence :              Subjective:   Alek Lyons is a 77 y o  female who presents for the 2nd of 3 Euflexxa Visco injections to both knees for ongoing treatment of her known osteoarthritis  Last week her knees were injected with the 1st injections without any ill or side effects  Continues to use pain with weight-bearing activities  She uses a single-point cane for ambulatory assistance she returns today for continuation of her gel series        Review of systems negative unless otherwise specified in HPI    Past Medical History:   Diagnosis Date    Alopecia     Calcaneal spur     [address] [facility] [address] [address]     [facility] mellitus [facility] [facility]     Hyperlipidemia     Hypertension     Osteoarthrosis 2/13/2013    Pes planus     unspecified laterality    Plantar fasciitis        Past Surgical History:   Procedure Laterality Date    CARPAL TUNNEL RELEASE      COLONOSCOPY      May 2006    INCISIONAL BREAST BIOPSY      [place] COLONOSCOPY FLX DX W/COLLJ SPEC WHEN PFRMD N/A 5/4/2018    Procedure: COLONOSCOPY;  Surgeon: Batsheva Jo MD;  Location: [facility];   Service: Colorectal       Family History   Problem Relation Age of Onset    Diabetes Mother     Hypertension Mother     Emphysema Father     Cancer Brother     Colon cancer Brother 40       Social History     Occupational History    Not on file   Tobacco Use    Smoking status: Never Smoker    Smokeless tobacco: Never Used   Substance and Sexual Activity    Alcohol use: Yes     Comment: social    Drug use: No    Sexual activity: Not on file         Current Outpatient Medications:     ascorbic acid (VITAMIN C) 500 mg tablet, Take 1 tablet (500 mg total) by mouth 2 (two) times a day, Disp: 60 tablet, Rfl: 0    Biotin 1 MG CAPS, Take by mouth, Disp: , Rfl:     Calcium Carb-Cholecalciferol (CALCIUM 1000 + D) 1000-800 MG-UNIT TABS, Take by mouth, Disp: , Rfl:     cholecalciferol (VITAMIN D3) 1,000 units tablet, Take 1,000 Units by mouth daily, Disp: , Rfl:     enoxaparin (LOVENOX) 40 mg/0 4 mL, Inject 0 4 mL (40 mg total) under the skin daily for 28 days To start postoperatively, Disp: 28 Syringe, Rfl: 0    ferrous sulfate 324 (65 Fe) mg, Take one tablet daily, every other day, Disp: 15 tablet, Rfl: 0    FISH OIL-CANOLA OIL-VIT D3 PO, by Does not apply route, Disp: , Rfl:     fluticasone (FLONASE) 50 mcg/act nasal spray, 1 spray into each nostril daily (Patient not taking: Reported on 4/10/2019), Disp: 16 g, Rfl: 0    folic acid (FOLVITE) 1 mg tablet, Take 1 tablet (1 mg total) by mouth daily, Disp: 30 tablet, Rfl: 0    gabapentin (NEURONTIN) 300 mg capsule, Take 1 capsule (300 mg total) by mouth 3 (three) times a day for 30 days, Disp: 90 capsule, Rfl: 5    ibuprofen (MOTRIN) 600 mg tablet, Take 1 tablet (600 mg total) by mouth every 8 (eight) hours as needed for moderate pain, Disp: 30 tablet, Rfl: 3    losartan (COZAAR) 100 MG tablet, Take 1 tablet (100 mg total) by mouth daily for 90 days, Disp: 90 tablet, Rfl: 3    metFORMIN (GLUCOPHAGE) 500 mg tablet, Take 1 tablet (500 mg total) by mouth 2 (two) times a day with meals for 90 days, Disp: 180 tablet, Rfl: 3    methylPREDNISolone 4 MG tablet therapy pack, Use as directed on package, Disp: 1 each, Rfl: 0    mometasone (ELOCON) 0 1 % ointment, APPLY TO RASH LESIONS TWO TIMES DAILY FOR 2 WEEKS THEN THREE TO FOUR DAYS PER WEEK AS NEEDED, Disp: , Rfl: 1    Multiple Vitamin (MULTI-VITAMIN DAILY PO), Take by mouth, Disp: , Rfl:     predniSONE 10 mg tablet, Take 4 tabs for 4 days, then 3 tabs for 2 days, then 2 tabs for 2 days and then 1 tab for 2 day (Patient not taking: Reported on 6/10/2019), Disp: 28 tablet, Rfl: 0    simvastatin (ZOCOR) 20 mg tablet, Take 1 tablet (20 mg total) by mouth daily for 90 days, Disp: 90 tablet, Rfl: 3    Allergies   Allergen Reactions    Meloxicam Hives    Nifedipine Edema    Sulfamethoxazole-Trimethoprim Edema          There were no vitals filed for this visit  Objective:                    Right Knee Exam     Muscle Strength   The patient has normal right knee strength  Tenderness   The patient is experiencing tenderness in the medial joint line  Range of Motion   The patient has normal right knee ROM  Right knee flexion: with crepitus and stiffness  Other   Erythema: absent  Sensation: normal  Swelling: mild  Effusion: no effusion present      Left Knee Exam     Muscle Strength   The patient has normal left knee strength  Tenderness   The patient is experiencing tenderness in the medial joint line  Range of Motion   The patient has normal left knee ROM  Left knee flexion: with crepitus and stiffness       Other   Erythema: absent  Sensation: normal  Swelling: mild  Effusion: no effusion present    Comments:    Mild varus alignment at both lower extremities  No indications not to proceed with today's injections            Diagnostics, reviewed and taken today if performed as documented:    None performed          Procedures, if performed today:    Large joint arthrocentesis: R knee  Date/Time: 7/17/2019 2:38 PM  Consent given by: patient  Site marked: site marked  Timeout: Immediately prior to procedure a time out was called to verify the correct patient, procedure, equipment, support staff and site/side marked as required   Supporting Documentation  Indications: pain and diagnostic evaluation   Procedure Details  Location: knee - R knee  Preparation: Patient was prepped and draped in the usual sterile fashion  Needle size: 22 G  Ultrasound guidance: no  Approach: anteromedial  Medications administered: 20 mg Sodium Hyaluronate 20 MG/2ML; 3 mL lidocaine 1 %    Patient tolerance: patient tolerated the procedure well with no immediate complications  Dressing:  Sterile dressing applied    Large joint arthrocentesis: L knee  Date/Time: 7/17/2019 2:38 PM  Consent given by: patient  Site marked: site marked  Timeout: Immediately prior to procedure a time out was called to verify the correct patient, procedure, equipment, support staff and site/side marked as required   Supporting Documentation  Indications: pain and diagnostic evaluation   Procedure Details  Location: knee - L knee  Preparation: Patient was prepped and draped in the usual sterile fashion  Needle size: 22 G  Ultrasound guidance: no  Approach: anteromedial  Medications administered: 3 mL lidocaine 1 %; 20 mg Sodium Hyaluronate 20 MG/2ML    Patient tolerance: patient tolerated the procedure well with no immediate complications  Dressing:  Sterile dressing applied              Portions of the record may have been created with voice recognition software    Occasional wrong word or "sound a like" substitutions may have occurred due to the inherent limitations of voice recognition software  Read the chart carefully and recognize, using context, where substitutions have occurred

## 2019-07-18 ENCOUNTER — OFFICE VISIT (OUTPATIENT)
Dept: PHYSICAL THERAPY | Facility: CLINIC | Age: 69
End: 2019-07-18
Payer: MEDICARE

## 2019-07-18 ENCOUNTER — PREP FOR PROCEDURE (OUTPATIENT)
Dept: OBGYN CLINIC | Facility: HOSPITAL | Age: 69
End: 2019-07-18

## 2019-07-18 DIAGNOSIS — M16.11 PRIMARY OSTEOARTHRITIS OF ONE HIP, RIGHT: Primary | ICD-10-CM

## 2019-07-18 DIAGNOSIS — M54.16 LUMBAR RADICULOPATHY: Primary | ICD-10-CM

## 2019-07-18 PROCEDURE — 97112 NEUROMUSCULAR REEDUCATION: CPT | Performed by: PHYSICAL THERAPIST

## 2019-07-18 PROCEDURE — 97110 THERAPEUTIC EXERCISES: CPT | Performed by: PHYSICAL THERAPIST

## 2019-07-18 NOTE — PROGRESS NOTES
Daily Note     Today's date: 2019  Patient name: Sony Rodas  : 1950  MRN: [id number]  Referring provider: Nimesh Perez DO  Dx:   Encounter Diagnosis     ICD-10-CM    1  Lumbar radiculopathy M54 16                   Subjective: Pt reports she really liked the SCI fit bike from last visit and she's getting around better  Objective: See treatment diary below      Assessment: Tolerated treatment well  Patient would benefit from continued PT      Plan: Continue per plan of care        Precautions: DM II, OA    Daily Treatment Diary     Manuals 6/20 6/26 6/28 7/5 7/11 7/15 7/18                                                          Exercise Diary              SCI fit bike w/ lumbar roll      6' 8'      UBE w/ lumbar roll   5 min 5' 7'        Seated AB  5"x10 5"x10 5" 10x 5"x15 5"x15 5"x20      Seated AB / march  x10 1x15 15x 15x 15x 20x      Seated AB w/ add  5"x10 5"x15 5" 15x 5"x20 5"x20 5"x20      Seated AB w/ abd  5"x10 grn 5"x15  green 5"x20  green 5"x20 grn 5"x20 blue 5"x20 blue      Seated SK to C     5"x5 5"x5 5"x5      Seated piriformis str if able             Repeated Lumbar ext modified standing at raised table 3x10 4x10 4x10 4x10 4x10 4x10 4x10      Gait train with SPC   100 ft 100ft x2 Cues to proper use of SPC         Step Up     4" 10x 4" 15x 6" 10x                                Postural ed  15' 10'   5' 5'                                                                                     Modalities

## 2019-07-22 ENCOUNTER — OFFICE VISIT (OUTPATIENT)
Dept: PAIN MEDICINE | Facility: CLINIC | Age: 69
End: 2019-07-22
Payer: MEDICARE

## 2019-07-22 VITALS
HEIGHT: 62 IN | BODY MASS INDEX: 38.64 KG/M2 | SYSTOLIC BLOOD PRESSURE: 148 MMHG | WEIGHT: 210 LBS | HEART RATE: 88 BPM | DIASTOLIC BLOOD PRESSURE: 88 MMHG

## 2019-07-22 DIAGNOSIS — M54.16 LUMBAR RADICULOPATHY: Primary | ICD-10-CM

## 2019-07-22 DIAGNOSIS — M43.16 SPONDYLOLISTHESIS, LUMBAR REGION: ICD-10-CM

## 2019-07-22 DIAGNOSIS — M25.551 RIGHT HIP PAIN: ICD-10-CM

## 2019-07-22 PROCEDURE — 99214 OFFICE O/P EST MOD 30 MIN: CPT | Performed by: NURSE PRACTITIONER

## 2019-07-22 NOTE — PROGRESS NOTES
Assessment:  1  Lumbar radiculopathy    2  Spondylolisthesis, lumbar region    3  Right hip pain        Plan:  1  Patient continues with a 50% relief of her low back and radiating leg pain status post LESI with Dr Althea Goodpasture on July 9, 2019  I discussed with the patient that since there has been moderate to significant improvement in the pain symptoms, we will hold off on any repeat injections at this point in time  However, I reviewed with the patient that if their symptoms should return or worsen,  they should call our office to schedule to discuss repeating the injection  2  Patient is scheduled for a right total hip replacement with Dr Baldev Payton on September 23, 2019  She will continue to follow with orthopedics as scheduled  She is currently undergoing Euflexxa injections for her bilateral knee pain  3  Patient may continue gabapentin 300 mg t i d  As prescribed by her PCP  She is not interested in increasing her dose at this time as her leg pain is intermittent and much more mild than previous office visit  4  The patient may continue Tylenol and ibuprofen p r n   5  The patient will follow-up in 2 months for medication prescription refill and reevaluation  The patient was advised to contact the office should their symptoms worsen in the interim  The patient was agreeable and verbalized an understanding  [place] Prescription Drug Monitoring Program report was reviewed and was appropriate     Other than as stated above, the patient denies any interval changes in medications, medical condition, mental condition, symptoms, or allergies since the last office visit  I attest that I have spent at least 25 minutes face to face with the patient and that at least 50% of the time was spent educating and/or discussing the patient's symptoms and treatment plan options  M*Modal software was used to dictate this note  It may contain errors with dictating incorrect words or incorrect spelling   Please contact the provider directly with any questions  History of Present Illness: The patient is a 77 y o  female last seen on 6/10/19 who presents for a follow up office visit in regards to chronic lumbosacral back pain that radiates into the anterior, lateral and posterior aspects of her lower extremities with associated numbness and paresthesias secondary to lumbar degenerative disc disease, lumbar spondylosis, lumbar stenosis and lumbar anterolisthesis  The patient denies bowel or bladder incontinence or saddle anesthesia  Patient is status post L5-S1 LESI with Dr Piedad Metzger on July 9, 2019 and reports 50% ongoing relief of her lower extremity symptoms and back pain from this procedure at this time  She does feel that her symptoms are much more mild and intermittent at this time  She does continue with some right hip and groin pain toward the thigh, however she does have known severe osteoarthritis of the hip  She states she never had intra-articular hip injections, but is scheduled for right total hip replacement with Dr Wilfrid Oliver on September 23, 2019  She currently follows with orthopedics as well for bilateral knee pain and is currently receiving Euflexxa injections  MRI of the lumbar spine reveals multilevel lumbar spondylosis, bilateral foraminal stenosis with probable bilateral L2 nerve root encroachment at L1-2, mild central and bilateral foraminal stenosis at L2-3, moderate central and bilateral foraminal stenosis with possible bilateral L4 nerve root encroachment at L3-4, and grade 1 anterolisthesis with mild bilateral foraminal stenosis at L4-5  X-ray of the bilateral hips reveals severe narrowing of the right hip joint and moderate narrowing of the left hip joint  Vascular studies having completed on the lower extremities which were unremarkable as well  The patient currently rates her pain a 5/10 on the numeric pain rating scale    She states her pain is occasional in nature and follows no particular pattern throughout the day  She characterizes the pain as sharp  Current pain medications includes:  Gabapentin 300 mg t i d  And ibuprofen 600 mg p r n  As prescribed by her PCP   The patient reports that this regimen is providing moderate pain relief  The patient is reporting no side effects from this pain medication regimen  I have personally reviewed and/or updated the patient's past medical history, past surgical history, family history, social history, current medications, allergies, and vital signs today  Review of Systems:    Review of Systems   Respiratory: Negative for shortness of breath  Cardiovascular: Negative for chest pain  Gastrointestinal: Negative for constipation, diarrhea, nausea and vomiting  Musculoskeletal: Negative for arthralgias, gait problem, joint swelling and myalgias  Skin: Negative for rash  Neurological: Negative for dizziness, seizures and weakness  All other systems reviewed and are negative  Past Medical History:   Diagnosis Date    Alopecia     Calcaneal spur     [facility]     [facility] mellitus [facility]     Hyperlipidemia     Hypertension     Osteoarthrosis 2/13/2013    Pes planus     unspecified laterality    Plantar fasciitis        Past Surgical History:   Procedure Laterality Date    CARPAL TUNNEL RELEASE      COLONOSCOPY      May 2006    INCISIONAL BREAST BIOPSY      [place] COLONOSCOPY FLX DX W/COLLJ SPEC WHEN PFRMD N/A 5/4/2018    Procedure: COLONOSCOPY;  Surgeon: Webb Cogan, MD;  Location: [facility];   Service: Colorectal       Family History   Problem Relation Age of Onset    Diabetes Mother     Hypertension Mother     Emphysema Father     Cancer Brother     Colon cancer Brother 40       Social History     Occupational History    Not on file   Tobacco Use    Smoking status: Never Smoker    Smokeless tobacco: Never Used   Substance and Sexual Activity    Alcohol use: Yes     Comment: social  Drug use: No    Sexual activity: Not on file         Current Outpatient Medications:     ascorbic acid (VITAMIN C) 500 mg tablet, Take 1 tablet (500 mg total) by mouth 2 (two) times a day, Disp: 60 tablet, Rfl: 0    Biotin 1 MG CAPS, Take by mouth, Disp: , Rfl:     Calcium Carb-Cholecalciferol (CALCIUM 1000 + D) 1000-800 MG-UNIT TABS, Take by mouth, Disp: , Rfl:     cholecalciferol (VITAMIN D3) 1,000 units tablet, Take 1,000 Units by mouth daily, Disp: , Rfl:     enoxaparin (LOVENOX) 40 mg/0 4 mL, Inject 0 4 mL (40 mg total) under the skin daily for 28 days To start postoperatively, Disp: 28 Syringe, Rfl: 0    ferrous sulfate 324 (65 Fe) mg, Take one tablet daily, every other day, Disp: 15 tablet, Rfl: 0    FISH OIL-CANOLA OIL-VIT D3 PO, by Does not apply route, Disp: , Rfl:     fluticasone (FLONASE) 50 mcg/act nasal spray, 1 spray into each nostril daily, Disp: 16 g, Rfl: 0    folic acid (FOLVITE) 1 mg tablet, Take 1 tablet (1 mg total) by mouth daily, Disp: 30 tablet, Rfl: 0    gabapentin (NEURONTIN) 300 mg capsule, Take 1 capsule (300 mg total) by mouth 3 (three) times a day for 30 days, Disp: 90 capsule, Rfl: 5    ibuprofen (MOTRIN) 600 mg tablet, Take 1 tablet (600 mg total) by mouth every 8 (eight) hours as needed for moderate pain, Disp: 30 tablet, Rfl: 3    methylPREDNISolone 4 MG tablet therapy pack, Use as directed on package, Disp: 1 each, Rfl: 0    mometasone (ELOCON) 0 1 % ointment, APPLY TO RASH LESIONS TWO TIMES DAILY FOR 2 WEEKS THEN THREE TO FOUR DAYS PER WEEK AS NEEDED, Disp: , Rfl: 1    Multiple Vitamin (MULTI-VITAMIN DAILY PO), Take by mouth, Disp: , Rfl:     losartan (COZAAR) 100 MG tablet, Take 1 tablet (100 mg total) by mouth daily for 90 days, Disp: 90 tablet, Rfl: 3    metFORMIN (GLUCOPHAGE) 500 mg tablet, Take 1 tablet (500 mg total) by mouth 2 (two) times a day with meals for 90 days, Disp: 180 tablet, Rfl: 3    simvastatin (ZOCOR) 20 mg tablet, Take 1 tablet (20 mg total) by mouth daily for 90 days, Disp: 90 tablet, Rfl: 3    Allergies   Allergen Reactions    Meloxicam Hives    Nifedipine Edema    Sulfamethoxazole-Trimethoprim Edema       Physical Exam:    /88   Pulse 88   Ht 5' 2 01" (1 575 m)   Wt 95 3 kg (210 lb)   BMI 38 40 kg/m²     Constitutional:overweight  Eyes:anicteric  HEENT:grossly intact  Neck:supple, symmetric, trachea midline and no masses   Pulmonary:even and unlabored  Cardiovascular:No edema or pitting edema present  Skin:Normal without rashes or lesions and well hydrated  Psychiatric:Mood and affect appropriate  Neurologic:Cranial Nerves II-XII grossly intact  Musculoskeletal:antalgic gait but steady with the use of a cane      Imaging  No orders to display       MRI LUMBAR SPINE WITHOUT CONTRAST     INDICATION: 20-year-old female, back pain, leg pain     COMPARISON:  5/8/2019 x-rays     TECHNIQUE:  Sagittal T1, sagittal T2, sagittal inversion recovery, axial T1 and axial T2, coronal T2     IMAGE QUALITY:  Diagnostic     FINDINGS:     VERTEBRAL BODIES:    Mild to moderate levoscoliosis  Grade 1 degenerative retrolisthesis T12-L1  Grade 1 degenerative anterolisthesis L3-4, L4-5  No compression fracture  Normal marrow signal is identified within the visualized bony structures  No discrete marrow   lesion      SACRUM:  Normal signal within the sacrum   No evidence of insufficiency or stress fracture      DISTAL CORD AND CONUS:  Normal size and signal within the distal cord and conus        PARASPINAL SOFT TISSUES:  Paraspinal soft tissues are unremarkable      LOWER THORACIC DISC SPACES: Grade 1 degenerative retrolisthesis T12-L1, mild central canal stenosis, no overt neural element impingement     LUMBAR DISC SPACES:     L1-L2:  Moderate degenerative spondylosis, moderate central disc herniation, moderate central stenosis, mild bilateral foraminal stenosis, probable bilateral L2 nerve root encroachment      L2-L3:  Moderate degenerative spondylosis and bulging annulus, mild central canal and bilateral foraminal stenosis     L3-L4:  Moderate degenerative spondylosis, grade 1 anterolisthesis, moderate central canal stenosis, mild bilateral foraminal stenosis, possible bilateral L4 nerve root encroachment     L4-L5:  Moderate degenerative spondylosis, grade 1 anterolisthesis, mild bilateral foraminal stenosis, no overt neural element impingement     L5-S1:  Normal disc, mild degenerative facet arthrosis, no stenosis     IMPRESSION:  Multilevel degenerative spondylosis, levoscoliosis, consistent with x-rays     Grade 1 retrolisthesis, mild central canal stenosis T12-L1     Moderate central canal stenosis, moderate central disc herniation, mild bilateral foraminal stenosis L1-2     Mild central canal and bilateral foraminal stenosis L2-3     Grade 1 anterolisthesis, moderate central canal stenosis, mild bilateral foraminal stenosis L3-4     Grade 1 anterolisthesis, mild bilateral foraminal stenosis L4-5     No orders of the defined types were placed in this encounter

## 2019-07-23 ENCOUNTER — OFFICE VISIT (OUTPATIENT)
Dept: PHYSICAL THERAPY | Facility: CLINIC | Age: 69
End: 2019-07-23
Payer: MEDICARE

## 2019-07-23 ENCOUNTER — TELEPHONE (OUTPATIENT)
Dept: PAIN MEDICINE | Facility: MEDICAL CENTER | Age: 69
End: 2019-07-23

## 2019-07-23 DIAGNOSIS — M54.16 LUMBAR RADICULOPATHY: Primary | ICD-10-CM

## 2019-07-23 PROCEDURE — 97110 THERAPEUTIC EXERCISES: CPT | Performed by: PHYSICAL THERAPIST

## 2019-07-23 PROCEDURE — 97112 NEUROMUSCULAR REEDUCATION: CPT | Performed by: PHYSICAL THERAPIST

## 2019-07-23 NOTE — PROGRESS NOTES
Daily Note     Today's date: 2019  Patient name: Raulito Rico  : 1950  MRN: [id number]  Referring provider: Young Esparza DO  Dx:   Encounter Diagnosis     ICD-10-CM    1  Lumbar radiculopathy M54 16                   Subjective: Pt reports having increased low back pain over the last few days but gets relief with repeated lumbar extensions  Objective: See treatment diary below      Assessment: Tolerated treatment well  Patient would benefit from continued PT  Much improved standing tolerance today  Plan: Continue per plan of care        Precautions: DM II, OA    Daily Treatment Diary     Manuals 6/20 6/26 6/28 7/5 7/11 7/15 7/18 7/23                                                         Exercise Diary              SCI fit bike w/ lumbar roll      6' 8' 8'     UBE w/ lumbar roll   5 min 5' 7'        Seated AB  5"x10 5"x10 5" 10x 5"x15 5"x15 5"x20 5"x20     Seated AB w/ march  x10 1x15 15x 15x 15x 20x 20x     Seated AB w/ add  5"x10 5"x15 5" 15x 5"x20 5"x20 5"x20 5"x20     Seated AB w/ abd  5"x10 grn 5"x15  green 5"x20  green 5"x20 grn 5"x20 blue 5"x20 blue 5"x20 blue     Seated SK to C     5"x5 5"x5 5"x5 5"x5     Seated piriformis str if able             Repeated Lumbar ext modified standing at raised table 3x10 4x10 4x10 4x10 4x10 4x10 4x10 4x10     Gait train with SPC   100 ft 100ft x2 Cues to proper use of SPC         Step Up     4" 10x 4" 15x 6" 10x 6" 15x     Std SLR x3 w/ AB        10x ea                  Postural ed  15' 10'   5' 5'                                                                                     Modalities

## 2019-07-23 NOTE — TELEPHONE ENCOUNTER
Pt is calling stating she mentioned yesterday with [facility] about lower right sided  back pain  Pt is asking if the pain persists (low back pain) pt is wondering if it would help to increase the Gabapentin        Pt can be reached at [phone]

## 2019-07-24 ENCOUNTER — OFFICE VISIT (OUTPATIENT)
Dept: OBGYN CLINIC | Facility: HOSPITAL | Age: 69
End: 2019-07-24
Payer: MEDICARE

## 2019-07-24 VITALS
DIASTOLIC BLOOD PRESSURE: 87 MMHG | HEART RATE: 92 BPM | WEIGHT: 210.1 LBS | SYSTOLIC BLOOD PRESSURE: 131 MMHG | BODY MASS INDEX: 38.66 KG/M2 | HEIGHT: 62 IN

## 2019-07-24 DIAGNOSIS — M17.0 PRIMARY OSTEOARTHRITIS OF BOTH KNEES: Primary | ICD-10-CM

## 2019-07-24 PROCEDURE — 20610 DRAIN/INJ JOINT/BURSA W/O US: CPT | Performed by: ORTHOPAEDIC SURGERY

## 2019-07-24 RX ORDER — HYALURONATE SODIUM 10 MG/ML
20 SYRINGE (ML) INTRAARTICULAR
Status: COMPLETED | OUTPATIENT
Start: 2019-07-24 | End: 2019-07-24

## 2019-07-24 RX ADMIN — Medication 20 MG: at 15:43

## 2019-07-24 RX ADMIN — Medication 20 MG: at 15:46

## 2019-07-24 NOTE — PROGRESS NOTES
Subjective; patient with established osteoarthritis of her knees  She is undergoing 3 shot Visco supplement using Euflexxa brand product  On arrival to the office today she has improvement of her knee function since last week  Past Medical History:   Diagnosis Date    Alopecia     Calcaneal spur     Diabetes insipidus ([address]     Diabetes mellitus ([address]     Hyperlipidemia     Hypertension     Osteoarthrosis 2/13/2013    Pes planus     unspecified laterality    Plantar fasciitis        Past Surgical History:   Procedure Laterality Date    CARPAL TUNNEL RELEASE      COLONOSCOPY      May 2006    INCISIONAL BREAST BIOPSY      [place] COLONOSCOPY FLX DX W/COLLJ SPEC WHEN PFRMD N/A 5/4/2018    Procedure: COLONOSCOPY;  Surgeon: Ann Olivares MD;  Location: [facility]; Service: Colorectal       Family History   Problem Relation Age of Onset    Diabetes Mother     Hypertension Mother     Emphysema Father     Cancer Brother     Colon cancer Brother 40       Social History     Tobacco Use    Smoking status: Never Smoker    Smokeless tobacco: Never Used   Substance Use Topics    Alcohol use: Yes     Comment: social    Drug use: No     Exam;    Patient has no overlying discoloration bruising or effusion that would preclude safe administration of medication      Large joint arthrocentesis: R knee  Date/Time: 7/24/2019 3:43 PM  Procedure Details  Location: knee - R knee  Needle size: 22 G  Medications administered: 20 mg Sodium Hyaluronate 20 MG/2ML    Patient tolerance: patient tolerated the procedure well with no immediate complications  Dressing:  Sterile dressing applied    Large joint arthrocentesis: L knee  Date/Time: 7/24/2019 3:46 PM  Procedure Details  Location: knee - L knee  Needle size: 22 G  Medications administered: 20 mg Sodium Hyaluronate 20 MG/2ML    Patient tolerance: patient tolerated the procedure well with no immediate complications  Dressing:  Sterile dressing applied Impression;    Osteoarthritis of the knees  Chronic knee pain    Plan;    She underwent injections of Euflexxa to both knees  She tolerated procedure well  Her experience was supervised by and plan formulated by the attending it was my privilege to assist him in its delivery

## 2019-07-25 ENCOUNTER — OFFICE VISIT (OUTPATIENT)
Dept: PHYSICAL THERAPY | Facility: CLINIC | Age: 69
End: 2019-07-25
Payer: MEDICARE

## 2019-07-25 DIAGNOSIS — M54.16 LUMBAR RADICULOPATHY: Primary | ICD-10-CM

## 2019-07-25 PROCEDURE — 97112 NEUROMUSCULAR REEDUCATION: CPT | Performed by: PHYSICAL THERAPIST

## 2019-07-25 PROCEDURE — 97110 THERAPEUTIC EXERCISES: CPT | Performed by: PHYSICAL THERAPIST

## 2019-07-25 NOTE — PROGRESS NOTES
Daily Note     Today's date: 2019  Patient name: Jose Roberto De La Paz  : 1950  MRN: [id number]  Referring provider: Keaton Tomlinson DO  Dx:   Encounter Diagnosis     ICD-10-CM    1  Lumbar radiculopathy M54 16                   Subjective: Pt notes she's improving quite a bit  She's walking much better and has been trying to not use her cane at times  Objective: See treatment diary below      Assessment: Tolerated treatment well  Patient would benefit from continued PT      Plan: Continue per plan of care        Precautions: DM II, OA    Daily Treatment Diary     Manuals 6/20 6/26 6/28 7/5 7/11 7/15 7/18 7/23 7/25                                                        Exercise Diary              SCI fit bike w/ lumbar roll      6' 8' 8' 10'    UBE w/ lumbar roll   5 min 5' 7'        Seated AB  5"x10 5"x10 5" 10x 5"x15 5"x15 5"x20 5"x20 5"x20    Seated AB / march  x10 1x15 15x 15x 15x 20x 20x 20x    Seated AB w/ add  5"x10 5"x15 5" 15x 5"x20 5"x20 5"x20 5"x20 5"x20    Seated AB w/ abd  5"x10 grn 5"x15  green 5"x20  green 5"x20 grn 5"x20 blue 5"x20 blue 5"x20 blue 5"x20    Seated SK to C     5"x5 5"x5 5"x5 5"x5     Seated piriformis str if able             Repeated Lumbar ext modified standing at raised table 3x10 4x10 4x10 4x10 4x10 4x10 4x10 4x10 4x10    Gait train with SPC   100 ft 100ft x2 Cues to proper use of SPC         Step Up     4" 10x 4" 15x 6" 10x 6" 15x 6" 15x    Std SLR x3 w/ AB        10x ea 15x ea                 Postural ed  15' 10'   5' 5'                                                                                     Modalities

## 2019-07-30 ENCOUNTER — OFFICE VISIT (OUTPATIENT)
Dept: PHYSICAL THERAPY | Facility: CLINIC | Age: 69
End: 2019-07-30
Payer: MEDICARE

## 2019-07-30 DIAGNOSIS — M54.16 LUMBAR RADICULOPATHY: Primary | ICD-10-CM

## 2019-07-30 PROCEDURE — 97112 NEUROMUSCULAR REEDUCATION: CPT | Performed by: PHYSICAL THERAPIST

## 2019-07-30 PROCEDURE — 97110 THERAPEUTIC EXERCISES: CPT | Performed by: PHYSICAL THERAPIST

## 2019-07-30 NOTE — PROGRESS NOTES
Daily Note     Today's date: 2019  Patient name: Bijan Montes  : 1950  MRN: [id number]  Referring provider: Phyllis Henderson DO  Dx:   Encounter Diagnosis     ICD-10-CM    1  Lumbar radiculopathy M54 16                   Subjective: Pt reports her hip was hurting her a lot this morning  She arrived 20 min late for appointment  Objective: See treatment diary below      Assessment: Tolerated treatment well  Patient demonstrated fatigue post treatment      Plan: Continue per plan of care        Precautions: DM II, OA    Daily Treatment Diary     Manuals 6/20 6/26 6/28 7/5 7/11 7/15 7/18 7/23 7/25 7/30                                                       Exercise Diary              SCI fit bike w/ lumbar roll      6' 8' 8' 10' 10'   UBE w/ lumbar roll   5 min 5' 7'        Seated AB  5"x10 5"x10 5" 10x 5"x15 5"x15 5"x20 5"x20 5"x20    Seated AB w/ march  x10 1x15 15x 15x 15x 20x 20x 20x    Seated AB w/ add  5"x10 5"x15 5" 15x 5"x20 5"x20 5"x20 5"x20 5"x20 5"x20   Seated AB w/ abd  5"x10 grn 5"x15  green 5"x20  green 5"x20 grn 5"x20 blue 5"x20 blue 5"x20 blue 5"x20 5"x20 blue   Seated SK to C     5"x5 5"x5 5"x5 5"x5     Seated piriformis str if able             Repeated Lumbar ext modified standing at raised table 3x10 4x10 4x10 4x10 4x10 4x10 4x10 4x10 4x10 2x10   Gait train with SPC   100 ft 100ft x2 Cues to proper use of SPC         Step Up     4" 10x 4" 15x 6" 10x 6" 15x 6" 15x 6" 15x   Std SLR x3 w/ AB        10x ea 15x ea 15x ea                Postural ed  15' 10'   5' 5'                                                                                     Modalities

## 2019-08-01 ENCOUNTER — OFFICE VISIT (OUTPATIENT)
Dept: PHYSICAL THERAPY | Facility: CLINIC | Age: 69
End: 2019-08-01
Payer: MEDICARE

## 2019-08-01 DIAGNOSIS — M54.16 LUMBAR RADICULOPATHY: Primary | ICD-10-CM

## 2019-08-01 PROCEDURE — 97112 NEUROMUSCULAR REEDUCATION: CPT | Performed by: PHYSICAL THERAPIST

## 2019-08-01 PROCEDURE — 97110 THERAPEUTIC EXERCISES: CPT | Performed by: PHYSICAL THERAPIST

## 2019-08-01 NOTE — PROGRESS NOTES
Daily Note     Today's date: 2019  Patient name: Joycie Phoenix  : 1950  MRN: [id number]  Referring provider: Diana Joseph DO  Dx:   Encounter Diagnosis     ICD-10-CM    1  Lumbar radiculopathy M54 16                   Subjective: Pt reports she's been having more right anterolateral hip pain lately but does not feel therapy is causing it  Objective: See treatment diary below      Assessment: Tolerated treatment well  Patient would benefit from continued PT      Plan: Continue per plan of care        Precautions: DM II, OA    Daily Treatment Diary     Manuals    R long axis hip distraction KT 5'                                                   Exercise Diary              SCI fit bike w/ lumbar roll 10'        10' 10'                Seated AB 5"x20        5"x20    Seated AB w/ march 20x        20x    Seated AB w/ add 5"x20        5"x20 5"x20   Seated AB w/ abd 5"x20 blue        5"x20 5"x20 blue                             Repeated Lumbar ext modified standing at raised table 3x10        4x10 2x10                Step Up 6" 15x L only        6" 15x 6" 15x   Std SLR x3 w/ AB 20x ea        15x ea 15x ea                Postural ed                                                                                            Modalities

## 2019-08-02 ENCOUNTER — TELEPHONE (OUTPATIENT)
Dept: OBGYN CLINIC | Facility: HOSPITAL | Age: 69
End: 2019-08-02

## 2019-08-02 NOTE — TELEPHONE ENCOUNTER
Pt called asking questions regarding her vitamins  Left me a VM and I returned the VM  I spoke with pt's preferred [facility] pharmacy and they report they received the vitamin C, folic acid and iron RXs, however placed them all back on hold  Per staff at pharmacy, iron and vitamin C is not covered by pt's insurance and are OTC- per her she will assist this patient find the correct dose of vitamin C and iron  Per her, folic acid is covered at $4  I instructed her to fill the folic acid  Spoke with patient  She was advised of everything stated above  Per her, she plans to  her vitamins  Pt instructed to start her vitamins on 8/25/19  Pt plans to attend her ML[facility] class on Monday, I encouraged pt to bring her caregiver with her  Pt denies having further questions or concerns at this time  Pt encouraged to call me with any questions, concerns or issues

## 2019-08-05 ENCOUNTER — APPOINTMENT (OUTPATIENT)
Dept: LAB | Facility: HOSPITAL | Age: 69
End: 2019-08-05
Attending: ORTHOPAEDIC SURGERY
Payer: MEDICARE

## 2019-08-05 DIAGNOSIS — E11.65 CONTROLLED TYPE 2 DIABETES MELLITUS WITH HYPERGLYCEMIA, WITHOUT LONG-TERM CURRENT USE OF INSULIN (HCC): ICD-10-CM

## 2019-08-05 DIAGNOSIS — Z11.59 NEED FOR HEPATITIS C SCREENING TEST: ICD-10-CM

## 2019-08-05 DIAGNOSIS — M16.11 PRIMARY OSTEOARTHRITIS OF RIGHT HIP: ICD-10-CM

## 2019-08-05 DIAGNOSIS — M16.11 PRIMARY OSTEOARTHRITIS OF ONE HIP, RIGHT: ICD-10-CM

## 2019-08-05 LAB
ABO GROUP BLD: NORMAL
ALBUMIN SERPL BCP-MCNC: 4 G/DL (ref 3.5–5)
ALP SERPL-CCNC: 75 U/L (ref 46–116)
ALT SERPL W P-5'-P-CCNC: 14 U/L (ref 12–78)
ANION GAP SERPL CALCULATED.3IONS-SCNC: 6 MMOL/L (ref 4–13)
APTT PPP: 27 SECONDS (ref 23–37)
AST SERPL W P-5'-P-CCNC: 15 U/L (ref 5–45)
ATRIAL RATE: 79 BPM
BASOPHILS # BLD AUTO: 0.03 THOUSANDS/ΜL (ref 0–0.1)
BASOPHILS NFR BLD AUTO: 1 % (ref 0–1)
BILIRUB SERPL-MCNC: 0.59 MG/DL (ref 0.2–1)
BLD GP AB SCN SERPL QL: NEGATIVE
BUN SERPL-MCNC: 17 MG/DL (ref 5–25)
CALCIUM SERPL-MCNC: 9.4 MG/DL (ref 8.3–10.1)
CHLORIDE SERPL-SCNC: 106 MMOL/L (ref 100–108)
CO2 SERPL-SCNC: 27 MMOL/L (ref 21–32)
CREAT SERPL-MCNC: 0.5 MG/DL (ref 0.6–1.3)
CRP SERPL QL: <3 MG/L
EOSINOPHIL # BLD AUTO: 0.2 THOUSAND/ΜL (ref 0–0.61)
EOSINOPHIL NFR BLD AUTO: 4 % (ref 0–6)
ERYTHROCYTE [DISTWIDTH] IN BLOOD BY AUTOMATED COUNT: 13.1 % (ref 11.6–15.1)
EST. AVERAGE GLUCOSE BLD GHB EST-MCNC: 134 MG/DL
FERRITIN SERPL-MCNC: 190 NG/ML (ref 8–388)
GFR SERPL CREATININE-BSD FRML MDRD: 98 ML/MIN/1.73SQ M
GLUCOSE P FAST SERPL-MCNC: 77 MG/DL (ref 65–99)
HBA1C MFR BLD: 6.3 % (ref 4.2–6.3)
HCT VFR BLD AUTO: 39.9 % (ref 34.8–46.1)
HGB BLD-MCNC: 12.9 G/DL (ref 11.5–15.4)
IMM GRANULOCYTES # BLD AUTO: 0.02 THOUSAND/UL (ref 0–0.2)
IMM GRANULOCYTES NFR BLD AUTO: 0 % (ref 0–2)
INR PPP: 1.14 (ref 0.84–1.19)
IRON SATN MFR SERPL: 29 %
IRON SERPL-MCNC: 82 UG/DL (ref 50–170)
LYMPHOCYTES # BLD AUTO: 1.3 THOUSANDS/ΜL (ref 0.6–4.47)
LYMPHOCYTES NFR BLD AUTO: 25 % (ref 14–44)
MCH RBC QN AUTO: 31.4 PG (ref 26.8–34.3)
MCHC RBC AUTO-ENTMCNC: 32.3 G/DL (ref 31.4–37.4)
MCV RBC AUTO: 97 FL (ref 82–98)
MONOCYTES # BLD AUTO: 0.5 THOUSAND/ΜL (ref 0.17–1.22)
MONOCYTES NFR BLD AUTO: 10 % (ref 4–12)
NEUTROPHILS # BLD AUTO: 3.17 THOUSANDS/ΜL (ref 1.85–7.62)
NEUTS SEG NFR BLD AUTO: 60 % (ref 43–75)
NRBC BLD AUTO-RTO: 0 /100 WBCS
P AXIS: 39 DEGREES
PLATELET # BLD AUTO: 234 THOUSANDS/UL (ref 149–390)
PMV BLD AUTO: 9.2 FL (ref 8.9–12.7)
POTASSIUM SERPL-SCNC: 4.2 MMOL/L (ref 3.5–5.3)
PR INTERVAL: 188 MS
PROT SERPL-MCNC: 8 G/DL (ref 6.4–8.2)
PROTHROMBIN TIME: 14.2 SECONDS (ref 11.6–14.5)
QRS AXIS: -38 DEGREES
QRSD INTERVAL: 100 MS
QT INTERVAL: 402 MS
QTC INTERVAL: 460 MS
RBC # BLD AUTO: 4.11 MILLION/UL (ref 3.81–5.12)
RH BLD: POSITIVE
SODIUM SERPL-SCNC: 139 MMOL/L (ref 136–145)
SPECIMEN EXPIRATION DATE: NORMAL
T WAVE AXIS: 21 DEGREES
TIBC SERPL-MCNC: 283 UG/DL (ref 250–450)
VENTRICULAR RATE: 79 BPM
WBC # BLD AUTO: 5.22 THOUSAND/UL (ref 4.31–10.16)

## 2019-08-05 PROCEDURE — 86803 HEPATITIS C AB TEST: CPT

## 2019-08-05 PROCEDURE — 83550 IRON BINDING TEST: CPT

## 2019-08-05 PROCEDURE — 80053 COMPREHEN METABOLIC PANEL: CPT

## 2019-08-05 PROCEDURE — 83540 ASSAY OF IRON: CPT

## 2019-08-05 PROCEDURE — 83036 HEMOGLOBIN GLYCOSYLATED A1C: CPT

## 2019-08-05 PROCEDURE — 36415 COLL VENOUS BLD VENIPUNCTURE: CPT

## 2019-08-05 PROCEDURE — 93005 ELECTROCARDIOGRAM TRACING: CPT

## 2019-08-05 PROCEDURE — 86901 BLOOD TYPING SEROLOGIC RH(D): CPT

## 2019-08-05 PROCEDURE — 86900 BLOOD TYPING SEROLOGIC ABO: CPT

## 2019-08-05 PROCEDURE — 85610 PROTHROMBIN TIME: CPT

## 2019-08-05 PROCEDURE — 86140 C-REACTIVE PROTEIN: CPT

## 2019-08-05 PROCEDURE — 85025 COMPLETE CBC W/AUTO DIFF WBC: CPT

## 2019-08-05 PROCEDURE — 82728 ASSAY OF FERRITIN: CPT

## 2019-08-05 PROCEDURE — 93010 ELECTROCARDIOGRAM REPORT: CPT | Performed by: INTERNAL MEDICINE

## 2019-08-05 PROCEDURE — 86850 RBC ANTIBODY SCREEN: CPT

## 2019-08-05 PROCEDURE — 85730 THROMBOPLASTIN TIME PARTIAL: CPT

## 2019-08-06 ENCOUNTER — OFFICE VISIT (OUTPATIENT)
Dept: PHYSICAL THERAPY | Facility: CLINIC | Age: 69
End: 2019-08-06
Payer: MEDICARE

## 2019-08-06 DIAGNOSIS — M54.16 LUMBAR RADICULOPATHY: Primary | ICD-10-CM

## 2019-08-06 LAB — HCV AB SER QL: NORMAL

## 2019-08-06 PROCEDURE — 97112 NEUROMUSCULAR REEDUCATION: CPT | Performed by: PHYSICAL THERAPIST

## 2019-08-06 PROCEDURE — 97110 THERAPEUTIC EXERCISES: CPT | Performed by: PHYSICAL THERAPIST

## 2019-08-06 NOTE — PROGRESS NOTES
Daily Note     Today's date: 2019  Patient name: Ramón Au  : 1950  MRN: [id number]  Referring provider: Renetta Stephens DO  Dx:   Encounter Diagnosis     ICD-10-CM    1  Lumbar radiculopathy M54 16                   Subjective: Pt reports she felt better after last session and thinks the hip distraction helped  Objective: See treatment diary below      Assessment: Tolerated treatment well  Patient would benefit from continued PT      Plan: Continue per plan of care        Precautions: DM II, OA    Daily Treatment Diary     Manuals    R long axis hip distraction KT 5' KT 5'                                                  Exercise Diary              SCI fit bike w/ lumbar roll 10' 10'       10' 10'                Seated AB 5"x20 5"x20       5"x20    Seated AB w/ march 20x 20x       20x    Seated AB w/ add 5"x20 5"x20       5"x20 5"x20   Seated AB w/ abd 5"x20 blue 5"x20 blue       5"x20 5"x20 blue   Prone glute sq  5"x20           Prone hip ext  20x           Repeated Lumbar ext modified standing at raised table 3x10 2x10       4x10 2x10                Step Up 6" 15x L only 6" 20x       6" 15x 6" 15x   Std SLR x3 w/ AB 20x ea 20x ea       15x ea 15x ea                Postural ed                                                                                            Modalities

## 2019-08-06 NOTE — PRE-PROCEDURE INSTRUCTIONS
Pre-Surgery Instructions:   Medication Instructions    ascorbic acid (VITAMIN C) 500 mg tablet Patient was instructed by Physician and understands   Biotin 1 MG CAPS Instructed patient per Anesthesia Guidelines   Calcium Carb-Cholecalciferol (CALCIUM 1000 + D) 1000-800 MG-UNIT TABS Instructed patient per Anesthesia Guidelines   cholecalciferol (VITAMIN D3) 1,000 units tablet Instructed patient per Anesthesia Guidelines   enoxaparin (LOVENOX) 40 mg/0 4 mL Patient was instructed by Physician and understands   ferrous sulfate 324 (65 Fe) mg Patient was instructed by Physician and understands   fluticasone (FLONASE) 50 mcg/act nasal spray Instructed patient per Anesthesia Guidelines   folic acid (FOLVITE) 1 mg tablet Instructed patient per Anesthesia Guidelines   gabapentin (NEURONTIN) 300 mg capsule Instructed patient per Anesthesia Guidelines   ibuprofen (MOTRIN) 600 mg tablet Instructed patient per Anesthesia Guidelines   losartan (COZAAR) 100 MG tablet Instructed patient per Anesthesia Guidelines   mometasone (ELOCON) 0 1 % ointment Instructed patient per Anesthesia Guidelines   Multiple Vitamin (MULTI-VITAMIN DAILY PO) Instructed patient per Anesthesia Guidelines   simvastatin (ZOCOR) 20 mg tablet Instructed patient per Anesthesia Guidelines      [DISCONTINUED] metFORMIN (GLUCOPHAGE) 500 mg tablet

## 2019-08-06 NOTE — PRE-PROCEDURE INSTRUCTIONS
Pre-Surgery Instructions:   Medication Instructions    ascorbic acid (VITAMIN C) 500 mg tablet Patient was instructed by Physician and understands   Biotin 1 MG CAPS Instructed patient per Anesthesia Guidelines   Calcium Carb-Cholecalciferol (CALCIUM 1000 + D) 1000-800 MG-UNIT TABS Instructed patient per Anesthesia Guidelines   cholecalciferol (VITAMIN D3) 1,000 units tablet Instructed patient per Anesthesia Guidelines   enoxaparin (LOVENOX) 40 mg/0 4 mL Patient was instructed by Physician and understands   ferrous sulfate 324 (65 Fe) mg Patient was instructed by Physician and understands   fluticasone (FLONASE) 50 mcg/act nasal spray Instructed patient per Anesthesia Guidelines   folic acid (FOLVITE) 1 mg tablet Instructed patient per Anesthesia Guidelines   gabapentin (NEURONTIN) 300 mg capsule Instructed patient per Anesthesia Guidelines   ibuprofen (MOTRIN) 600 mg tablet Instructed patient per Anesthesia Guidelines   losartan (COZAAR) 100 MG tablet Instructed patient per Anesthesia Guidelines   mometasone (ELOCON) 0 1 % ointment Instructed patient per Anesthesia Guidelines   Multiple Vitamin (MULTI-VITAMIN DAILY PO) Instructed patient per Anesthesia Guidelines   simvastatin (ZOCOR) 20 mg tablet Instructed patient per Anesthesia Guidelines   [DISCONTINUED] metFORMIN (GLUCOPHAGE) 500 mg tablet     Continue to take this medication on your normal schedule  If this is an oral medication and you take it in the morning, then you may take this medicine with a sip of water  Low Molecular Weight Heparin Med Class     Stop taking this medication at least 12-24 hours prior to surgery/procedure with prescribing Physician and Surgeon consultation    Insulin Med Class     Pre-Surgery/Procedure Instructions for Adult Patients who Take Medicine for Diabetes or to Control their Blood Sugar     Day Before Surgery/Procedure  Use the directions based on the type of medicine you take for your diabetes  1  If you are having a procedure that does not require a bowel prep:  ? Pre-Mixed Insulin (Intermediate Acting: Humalog 75/25, Humulin 70/30  Novolog 70/30, Regular Insulin)  § Take ½ your regular dose the evening before your procedure  ? Rapid/Fast Acting Insulin/Long Acting Insulin (Humalog U200, NovoLog, Apidra, Lantus, Levemir, Ugo Simeon, [place])  § Take your FULL regular dose the day before procedure  ? Oral Diabetic Medicines including Glipizide/Glimepiride/Glucotrol (sulfonylurea)  § Take your regular dose with dinner the evening before your procedure  2  If you are having a procedure (e g  Colonoscopy) that requires a bowel prep and you are allowed to have at least a clear liquid diet:  ? Pre-Mixed Insulin (Intermediate Acting: Humalog 75/25, Humulin 70/30, Novolog 70/30, Regular Insulin)  § Take ½ your regular dose the evening before your procedure  ? Rapid/Fast Acting Insulin (Humalog U200, NovoLog, Apidra, Fiasp)  § Take ½ your regular dose the evening before your procedure  ? Long Acting Insulin (Lantus, Levemir, Ugo Simeon)  § Take your FULL regular dose the day before procedure  ? Oral Glipizide/Glimepiride/Glucotrol (sulfonylurea)  § Take ½ your regular dose the evening before your procedure  ? Oral Diabetic Medicines that are NOT Glipizide/Glimepiride/Glucotrol  § Take your regular dose with dinner in the evening before your procedure      Day of Surgery/Procedure  · Long Acting Insulin (Lantus, Levemir, Ugo Simeon)  ? If you usually take your Long-Acting Insulin in the morning, take the full dose as scheduled  · With the exception of the morning Long-Acting Insulin noted above, DO NOT take ANY diabetic medicine on the day of your procedure unless you were instructed by the doctor who manages your diabetic medicines  · Continue to check your blood sugars  · If you have an insulin pump then consult with your Endocrinologist for instructions    · If you cannot see your Endocrinologist, on the day of the procedure set your insulin pump to your basal rate only  Please bring your insulin pump supplies to the hospital      This Educational material has been approved by the Patient Education Advisory Committee  Date prepared: 1/17/2018          Expiration date: 1/17/2019        Approval Number:                     NSAID Med Class     Stop taking this medication at least 3 days prior to surgery/procedure

## 2019-08-08 ENCOUNTER — OFFICE VISIT (OUTPATIENT)
Dept: PHYSICAL THERAPY | Facility: CLINIC | Age: 69
End: 2019-08-08
Payer: MEDICARE

## 2019-08-08 DIAGNOSIS — M54.16 LUMBAR RADICULOPATHY: Primary | ICD-10-CM

## 2019-08-08 PROCEDURE — 97112 NEUROMUSCULAR REEDUCATION: CPT | Performed by: PHYSICAL THERAPIST

## 2019-08-08 PROCEDURE — 97110 THERAPEUTIC EXERCISES: CPT | Performed by: PHYSICAL THERAPIST

## 2019-08-08 NOTE — PROGRESS NOTES
Daily Note     Today's date: 2019  Patient name: Mo Rondon  : 1950  MRN: [id number]  Referring provider: Sixto Forte DO  Dx:   Encounter Diagnosis     ICD-10-CM    1  Lumbar radiculopathy M54 16                   Subjective: Pt reports she's feeling pretty well  Objective: See treatment diary below      Assessment: Tolerated treatment well  Patient demonstrated fatigue post treatment      Plan: Continue per plan of care        Precautions: DM II, OA    Daily Treatment Diary     Manuals    R long axis hip distraction KT 5' KT 5' KT 5'                                                 Exercise Diary              SCI fit bike w/ lumbar roll 10' 10' 10'      10' 10'   AB + sit to stand   10x          Seated AB 5"x20 5"x20       5"x20    Seated AB / march 20x 20x       20x    Seated AB w/ add 5"x20 5"x20 5"x20      5"x20 5"x20   Seated AB w/ abd 5"x20 blue 5"x20 blue       5"x20 5"x20 blue   Prone glute sq  5"x20 5"x20          Prone hip ext  20x 20x          Repeated Lumbar ext modified standing at raised table 3x10 2x10 2x10      4x10 2x10   Prone quad str   30"x2 manual          Step Up 6" 15x L only 6" 20x 2x flight      6" 15x 6" 15x   Std SLR x3 w/ AB 20x ea 20x ea 20x ea      15x ea 15x ea                Postural ed                                                                                            Modalities

## 2019-08-13 ENCOUNTER — APPOINTMENT (OUTPATIENT)
Dept: PHYSICAL THERAPY | Facility: CLINIC | Age: 69
End: 2019-08-13
Payer: MEDICARE

## 2019-08-15 ENCOUNTER — OFFICE VISIT (OUTPATIENT)
Dept: PHYSICAL THERAPY | Facility: CLINIC | Age: 69
End: 2019-08-15
Payer: MEDICARE

## 2019-08-15 DIAGNOSIS — M54.16 LUMBAR RADICULOPATHY: Primary | ICD-10-CM

## 2019-08-15 PROCEDURE — 97112 NEUROMUSCULAR REEDUCATION: CPT | Performed by: PHYSICAL THERAPIST

## 2019-08-15 PROCEDURE — 97530 THERAPEUTIC ACTIVITIES: CPT | Performed by: PHYSICAL THERAPIST

## 2019-08-15 PROCEDURE — 97110 THERAPEUTIC EXERCISES: CPT | Performed by: PHYSICAL THERAPIST

## 2019-08-15 NOTE — PROGRESS NOTES
Daily Note     Today's date: 8/15/2019  Patient name: Veronica Brar  : 1950  MRN: [id number]  Referring provider: Juan Guevara DO  Dx:   Encounter Diagnosis     ICD-10-CM    1  Lumbar radiculopathy M54 16                   Subjective: Pt reports she tried her stairs at home and was able to do them without too much difficulty  Objective: See treatment diary below      Assessment: Tolerated treatment well  Patient would benefit from continued PT      Plan: Continue per plan of care        Precautions: DM II, OA    Daily Treatment Diary     Manuals 8/1 8/6 8/8 8/15         R long axis hip distraction KT 5' KT 5' KT 5' KT 5'                                                Exercise Diary              SCI fit bike w/ lumbar roll 10' 10' 10'          AB + sit to stand   10x 15x         Seated AB 5"x20 5"x20  5"x20         Seated AB w/ march 20x 20x  20x         Seated AB w/ add 5"x20 5"x20 5"x20 5"x20         Seated AB w/ abd 5"x20 blue 5"x20 blue  5"x20 blue         Prone glute sq  5"x20 5"x20 5"x20         Prone hip ext  20x 20x 20x         Repeated Lumbar ext modified standing at raised table 3x10 2x10 2x10 2x10         Prone quad str   30"x2 manual 30"x3         Step Up 6" 15x L only 6" 20x 2x flight 2x flight         Std SLR x3 w/ AB 20x ea 20x ea 20x ea 20x ea                      Postural ed                                                                                            Modalities

## 2019-08-20 ENCOUNTER — APPOINTMENT (OUTPATIENT)
Dept: PHYSICAL THERAPY | Facility: CLINIC | Age: 69
End: 2019-08-20
Payer: MEDICARE

## 2019-08-20 ENCOUNTER — OFFICE VISIT (OUTPATIENT)
Dept: PHYSICAL THERAPY | Facility: CLINIC | Age: 69
End: 2019-08-20
Payer: MEDICARE

## 2019-08-20 DIAGNOSIS — Z12.31 ENCOUNTER FOR SCREENING MAMMOGRAM FOR BREAST CANCER: Primary | ICD-10-CM

## 2019-08-20 DIAGNOSIS — M54.16 LUMBAR RADICULOPATHY: Primary | ICD-10-CM

## 2019-08-20 PROCEDURE — 97110 THERAPEUTIC EXERCISES: CPT | Performed by: PHYSICAL THERAPIST

## 2019-08-20 PROCEDURE — 97112 NEUROMUSCULAR REEDUCATION: CPT | Performed by: PHYSICAL THERAPIST

## 2019-08-20 NOTE — PROGRESS NOTES
Daily Note     Today's date: 2019  Patient name: Angelica Luke  : 1950  MRN: [id number]  Referring provider: Franca Jolley DO  Dx:   Encounter Diagnosis     ICD-10-CM    1  Lumbar radiculopathy M54 16                   Subjective: Pt reports she's feeling pretty well and comfortable performing her HEP  Objective: See treatment diary below      Assessment: Tolerated treatment well  Patient exhibited good technique with therapeutic exercises  Increased strength and much improved functional mobility noted since beginning PT  Goals  STG - 3 weeks  1  Independent with HEP - MET  2  Maintain neutral posture in sitting without cueing - MET  3  Eliminate radicular pain with repeated motions  - MET    LTG - 6 weeks  1  Return to sleeping in a bed to improve QOL  - not yet met due to hip pain  2  Tolerate sitting/standing/walking 10-15 minutes without onset of pain to allow resuming PLOF  - MET  3  Normalize BLE flexibility to promote good body mechanics during daily tasks  - excellent progress noted  4  Tolerate all functional transfers and household ambulation without low back pain  - MET    Discussed upcoming total hip replacement surgery, precautions, and expectations for rehab period  All questions answered to patient's satisfaction  Plan: Discharge episode of care due to upcoming total hip replacement  Pt to continue with HEP       Precautions: DM II, OA    Daily Treatment Diary     Manuals 8/1 8/6 8/8 8/15         R long axis hip distraction KT 5' KT 5' KT 5' KT 5'                                                Exercise Diary              SCI fit bike w/ lumbar roll 10' 10' 10'          AB + sit to stand   10x 15x         Seated AB 5"x20 5"x20  5"x20         Seated AB w/ march 20x 20x  20x         Seated AB w/ add 5"x20 5"x20 5"x20 5"x20         Seated AB w/ abd 5"x20 blue 5"x20 blue  5"x20 blue         Prone glute sq  5"x20 5"x20 5"x20         Prone hip ext  20x 20x 20x         Repeated Lumbar ext modified standing at raised table 3x10 2x10 2x10 2x10         Prone quad str   30"x2 manual 30"x3         Step Up 6" 15x L only 6" 20x 2x flight 2x flight         Std SLR x3 w/ AB 20x ea 20x ea 20x ea 20x ea                      Postural ed                                                                                            Modalities

## 2019-08-22 ENCOUNTER — APPOINTMENT (OUTPATIENT)
Dept: PHYSICAL THERAPY | Facility: CLINIC | Age: 69
End: 2019-08-22
Payer: MEDICARE

## 2019-08-23 ENCOUNTER — PREPPED CHART (OUTPATIENT)
Dept: URBAN - METROPOLITAN AREA CLINIC 6 | Facility: CLINIC | Age: 69
End: 2019-08-23

## 2019-08-23 LAB
LEFT EYE DIABETIC RETINOPATHY: NORMAL
RIGHT EYE DIABETIC RETINOPATHY: NORMAL

## 2019-08-27 ENCOUNTER — APPOINTMENT (OUTPATIENT)
Dept: PHYSICAL THERAPY | Facility: CLINIC | Age: 69
End: 2019-08-27
Payer: MEDICARE

## 2019-08-29 ENCOUNTER — APPOINTMENT (OUTPATIENT)
Dept: PHYSICAL THERAPY | Facility: CLINIC | Age: 69
End: 2019-08-29
Payer: MEDICARE

## 2019-09-05 ENCOUNTER — TELEPHONE (OUTPATIENT)
Dept: OBGYN CLINIC | Facility: HOSPITAL | Age: 69
End: 2019-09-05

## 2019-09-09 NOTE — TELEPHONE ENCOUNTER
Preoperative Elective Admission Assessment-Spoke with pt  Living Situation: Pt lives alone in a 1/2 double/multi-level home  Home Layout: Multi-level with step-in tub                       Steps: None to enter but #15 "steep and narrow steps" to the 2nd level  First Floor Setup: Yes  Post-op Caregiver: Pt's brother, Nilam Galvez, is pt's only support system  Per pt, Nilam Galvez has multiple health issues and is unable to "lift a lot"  I advised pt Nilam Galvez should not be lifting/pushing pt, pt is to be able to self maneuver if decided safe to DC to home  Nilam Galvez to help with house upkeep, dressing, meal prep etc  Pt agreeable Nilam Galvez can be caregiver if no lifting being done  Post-op Transport:Pt's brotherNilam,    Outpatient Physical Therapy Site: [facility]  DME: Pt has a cane but does not have a RW or BSC  Patient's Current Level of Function: Pt currently ambulates using a cane  Medication Management: Pt self manages her meds                     Preferred Pharmacy: [facility] in [facility]                     Blood Management Vitamins: Pt confirms she is taking her daily iron, MV, folic acid and vitamin C                     Post-op anticoagulant: Pt has filled and picked up her post-op lovenox, educated this is for after surgery use only  DC Plan:   Plan A) Home with outpatient PT  Plan B) Home with [place] [id number]  *Plan A and B are only if pt self sufficient and independent with ADLs, brother can help but limitedly  Plan C) SNF                    Barriers to DC identified preoperatively:  Limited post-op caregiver    BMI: 38 42 at [facility] 7/24  [employer]: pt enrolled                     RAPT: Score 6, already in caresense                     ACE/ARB Form: NA GFR 98                     HOOS/KOOS: Score 43 335, already in caresense      Patient Education:  Pt educated on post-op pain, early mobilization (POD0), indication for/use of incentive spirometer (10x/hour while awake) and indication for/use of foot/leg pumps (18 hours/day)  educated that our goal, if at all possible, is to appropriately discharge patient based off their post-op function while striving to maintain maximal independence  If possible, the goal is to discharge patient to home and for them to attend outpatient physical therapy  I educated patient on the many benefits of outpt PT(Including maintaining independence, additional resources at outpt site, better outcomes etc  )  Also educated on how home PT vs  outpt PT is determined (while inpt)  Pt denies having questions at this time  Pt encouraged to call me with any questions, concerns or issues

## 2019-09-16 ENCOUNTER — OFFICE VISIT (OUTPATIENT)
Dept: FAMILY MEDICINE CLINIC | Facility: CLINIC | Age: 69
End: 2019-09-16
Payer: MEDICARE

## 2019-09-16 VITALS
WEIGHT: 209 LBS | BODY MASS INDEX: 38.46 KG/M2 | HEIGHT: 62 IN | SYSTOLIC BLOOD PRESSURE: 138 MMHG | RESPIRATION RATE: 16 BRPM | HEART RATE: 80 BPM | TEMPERATURE: 98 F | DIASTOLIC BLOOD PRESSURE: 88 MMHG

## 2019-09-16 DIAGNOSIS — M16.11 PRIMARY OSTEOARTHRITIS OF RIGHT HIP: ICD-10-CM

## 2019-09-16 DIAGNOSIS — E78.5 HYPERLIPIDEMIA, UNSPECIFIED HYPERLIPIDEMIA TYPE: ICD-10-CM

## 2019-09-16 DIAGNOSIS — I10 BENIGN ESSENTIAL HYPERTENSION: ICD-10-CM

## 2019-09-16 DIAGNOSIS — Z01.818 PREOP EXAMINATION: Primary | ICD-10-CM

## 2019-09-16 DIAGNOSIS — E11.65 CONTROLLED TYPE 2 DIABETES MELLITUS WITH HYPERGLYCEMIA, WITHOUT LONG-TERM CURRENT USE OF INSULIN (HCC): ICD-10-CM

## 2019-09-16 PROCEDURE — 99214 OFFICE O/P EST MOD 30 MIN: CPT | Performed by: FAMILY MEDICINE

## 2019-09-16 NOTE — PROGRESS NOTES
Chief Complaint   Patient presents with    Pre-op Exam     right hip surgery- surgery 9/23/19     Health Maintenance   Topic Date Due    HEPATITIS B VACCINES (1 of 3 - Risk 3-dose series) 09/20/1967    DTaP,Tdap,and Td Vaccines (1 - Tdap) 09/20/1969    Falls: Plan of Care  09/20/2013    URINE MICROALBUMIN  06/25/2019    INFLUENZA VACCINE  07/01/2019    DXA SCAN  11/07/2019    HEMOGLOBIN A1C  02/05/2020    Fall Risk  04/10/2020    Urinary Incontinence Screening  04/10/2020    Medicare Annual Wellness Visit (AWV)  04/10/2020    BMI: Followup Plan  04/10/2020    Diabetic Foot Exam  04/10/2020    BMI: Adult  07/24/2020    Depression Screening PHQ  08/05/2020    DM Eye Exam  08/23/2020    CRC Screening: Colonoscopy  05/04/2023    Hepatitis C Screening  Completed    Pneumococcal Vaccine: 65+ Years  Completed    Pneumococcal Vaccine: Pediatrics (0 to 5 Years) and At-Risk Patients (6 to 59 Years)  Aged Out     Assessment/Plan:    Diabetes mellitus type 2, controlled ([facility] [address] )  Lab Results   Component Value Date    HGBA1C 6 3 08/05/2019       Controlled  Continue metformin 500mg bid  Benign essential hypertension  Controlled  DASH diet  Continue losartan 100mg QD  Hyperlipidemia  Controlled  Low fat diet  Continue simvastatin 20mg qhs  Reviewed lab in 8/2019  hgA1C 6 3 good  Cbc ok  CMP ok  INR 1 14 normal  EKG showed sinus rhythm, no acute ST-T change  Pt will have low risk for hip surgery  Will send clearance to Dr Lisa Dorman          Diagnoses and all orders for this visit:    Preop examination    Primary osteoarthritis of right hip  -     Ambulatory referral to Family Practice    Controlled type 2 diabetes mellitus with hyperglycemia, without long-term current use of insulin (HCC)    Benign essential hypertension    Hyperlipidemia, unspecified hyperlipidemia type          Subjective:      Patient ID: Argelia Pulliam is a 77 y o  female  HPI    Pt is here by herself     Plan to do right hip replacement on 9/23/2019 by Dr Robbie Staley  Surgical Risk Assessment:   Prior Anesthesia: Patient had prior anesthesia, no prior adverse reaction to edidural anesthesia, no prior adverse reaction to spinal anesthesia and no prior adverse reaction to general anesthesia  Pertinent Past Medical History: diabetes, No CAD, no chronic liver disease, no coagulation delay, no pulmonary embolism, no DVT, does not use insulin, no thyroid disease, no seizure disorder, no CVA, no asthma and no COPD  Exercise Capacity: able to walk four blocks without symptoms and able to walk two flights of stairs without symptoms  Lifestyle Factors: denies alcohol use, denies tobacco use and denies illegal drug use  Symptoms: no frequent nosebleeds, no chest pain, no cough, no dyspnea, no edema, no palpitations and no wheezing  Living Situation: live alone  Brother may help her  DM---8/2019 HgA1C 6 3 She is on metformin 500mg bid now    Tried to follow Low carb diet  Some neuropathy on toes  Denies hypoglycemia  Does not check BS at home  [facility] opthalmology 5/2018 Dr Iraj Carvalho  No retinopathy  No podiatry      HTN---Does not check BP at home  Denies headache, vision change, SOB or CP  She is on losartan 100mg QD  BP today ok       Hyperlipidemia---She is on simvastatin 20mg qhs  Denies SE       Live by herself  Does all ADL's  Still drive  Denies recent falls  Denies depression        The following portions of the patient's history were reviewed and updated as appropriate: allergies, current medications, past family history, past medical history, past social history, past surgical history and problem list     Review of Systems   Constitutional: Negative for appetite change, chills and fever  HENT: Negative for congestion, ear pain, sinus pain and sore throat  Eyes: Negative for discharge and itching  Respiratory: Negative for apnea, cough, chest tightness, shortness of breath and wheezing      Cardiovascular: Negative for chest pain, palpitations and leg swelling  Gastrointestinal: Negative for abdominal pain, anal bleeding, constipation, diarrhea, nausea and vomiting  Endocrine: Negative for cold intolerance, heat intolerance and polyuria  Genitourinary: Negative for difficulty urinating and dysuria  Musculoskeletal: Positive for arthralgias  Negative for back pain and myalgias  Skin: Negative for rash  Neurological: Negative for dizziness and headaches  Psychiatric/Behavioral: Negative for agitation  Objective:      /88   Pulse 80   Temp 98 °F (36 7 °C) (Tympanic)   Resp 16   Ht 5' 2" (1 575 m)   Wt 94 8 kg (209 lb)   BMI 38 23 kg/m²          Physical Exam   Constitutional: She appears well-developed  No distress  HENT:   Head: Normocephalic  Right Ear: External ear normal    Left Ear: External ear normal    Nose: Nose normal    Mouth/Throat: Oropharynx is clear and moist    Eyes: Pupils are equal, round, and reactive to light  Conjunctivae are normal  Right eye exhibits no discharge  Left eye exhibits no discharge  Neck: Normal range of motion  No thyromegaly present  Cardiovascular: Normal rate, regular rhythm and normal heart sounds  Exam reveals no gallop and no friction rub  No murmur heard  Pulmonary/Chest: Effort normal and breath sounds normal  No respiratory distress  She has no wheezes  She has no rales  She exhibits no tenderness  Abdominal: Soft  Bowel sounds are normal    Musculoskeletal: Normal range of motion  She exhibits no edema  Lymphadenopathy:     She has no cervical adenopathy  Neurological: She is alert  Psychiatric: She has a normal mood and affect

## 2019-09-16 NOTE — ASSESSMENT & PLAN NOTE
Lab Results   Component Value Date    HGBA1C 6 3 08/05/2019       Controlled  Continue metformin 500mg bid

## 2019-09-19 ENCOUNTER — OFFICE VISIT (OUTPATIENT)
Dept: PAIN MEDICINE | Facility: CLINIC | Age: 69
End: 2019-09-19
Payer: MEDICARE

## 2019-09-19 VITALS
WEIGHT: 204 LBS | HEIGHT: 62 IN | HEART RATE: 84 BPM | BODY MASS INDEX: 37.54 KG/M2 | SYSTOLIC BLOOD PRESSURE: 131 MMHG | DIASTOLIC BLOOD PRESSURE: 84 MMHG

## 2019-09-19 DIAGNOSIS — M48.062 SPINAL STENOSIS OF LUMBAR REGION WITH NEUROGENIC CLAUDICATION: ICD-10-CM

## 2019-09-19 DIAGNOSIS — G89.4 CHRONIC PAIN SYNDROME: Primary | ICD-10-CM

## 2019-09-19 DIAGNOSIS — M54.16 LUMBAR RADICULOPATHY: ICD-10-CM

## 2019-09-19 DIAGNOSIS — M54.40 CHRONIC BILATERAL LOW BACK PAIN WITH SCIATICA, SCIATICA LATERALITY UNSPECIFIED: ICD-10-CM

## 2019-09-19 DIAGNOSIS — G89.29 CHRONIC BILATERAL LOW BACK PAIN WITH SCIATICA, SCIATICA LATERALITY UNSPECIFIED: ICD-10-CM

## 2019-09-19 DIAGNOSIS — M16.11 PRIMARY OSTEOARTHRITIS OF RIGHT HIP: ICD-10-CM

## 2019-09-19 DIAGNOSIS — M47.816 LUMBAR SPONDYLOSIS: ICD-10-CM

## 2019-09-19 DIAGNOSIS — M25.551 RIGHT HIP PAIN: ICD-10-CM

## 2019-09-19 PROCEDURE — 1124F ACP DISCUSS-NO DSCNMKR DOCD: CPT | Performed by: NURSE PRACTITIONER

## 2019-09-19 PROCEDURE — 99213 OFFICE O/P EST LOW 20 MIN: CPT | Performed by: NURSE PRACTITIONER

## 2019-09-19 NOTE — PROGRESS NOTES
Assessment:  1  Chronic pain syndrome    2  Chronic bilateral low back pain with sciatica, sciatica laterality unspecified    3  Lumbar spondylosis    4  Spinal stenosis of lumbar region with neurogenic claudication    5  Lumbar radiculopathy    6  Right hip pain    7  Primary osteoarthritis of right hip        Plan:  The patient is a 77 y o  female last seen on 7/22/19 who presents for a follow up office visit  Patient has a history of chronic pain syndrome secondary to low back pain, lumbar spondylosis, lumbar stenosis, lumbar radiculopathy, and right hip osteoarthritis  Patient presents today with ongoing low back and right hip pain  She has made significant prove min after undergoing the L5-S1 lumbar interlaminar epidural steroid injection on July 9, 2019  She will be having a right hip total arthroplasty on September 23, 2019  She will continue on gabapentin as prescribed  She does not need refills today  We will discuss weaning off in the future once healed from her hip surgery    In regards to the numbness and tingling in her toes  MRI of the lumbar spine shows mild bilateral foraminal stenosis L4-L5 and no stenosis at L5-S1  She is diabetic, so I would consider ordering an EMG of bilateral lower extremities in the future if continues with numbness and tingling in her feet to rule out radiculopathy versus peripheral neuropathy  MRI of the lumbar spine also shows moderate central canal stenosis L3-L4  If ongoing right thigh pain after her right total hip arthroplasty, may consider a right L3 and L4 transforaminal epidural steroid injection to see if the thigh pain is neuropathic in nature      The patient will follow-up in 12 weeks for medication prescription refill and reevaluation  The patient was advised to contact the office should their symptoms worsen in the interim  The patient was agreeable and verbalized an understanding  History of Present Illness: The patient is a 77 y o  female last seen on 7/22/19 who presents for a follow up office visit  Patient has a history of chronic pain syndrome secondary to low back pain, lumbar spondylosis, lumbar stenosis, lumbar radiculopathy, and right hip osteoarthritis  She presents today with ongoing right hip pain  The pain also radiates into the anteriolateral aspect of her right thigh  The pain is intermittent and described as burning, dull aching, and cramping  She also has been experiencing some tingling in the toes in both of her feet starting at the 2nd toe  The degree of numbness and tingling varies but can increase with sitting  She is currently rating her pain a 3/10 on the numeric rating scale  She will be having a right total hip arthroplasty with Dr Moy Philippe on September 23, 2019  She continues to take gabapentin 300 milligrams 3 times a day  She did not decrease the dose, because she was told by a nurse at orthopedics that they may use Gabapentin to treat her postoperative hip pain  She is also taking Tylenol as needed  She had a L5-S1 lumbar interlaminar epidural steroid injection on July 9, 2019 which had provided excellent pain relief  She no longer is experiencing the intense back pain she had in the past           I have personally reviewed and/or updated the patient's past medical history, past surgical history, family history, social history, current medications, allergies, and vital signs today  Review of Systems:    Review of Systems   Respiratory: Negative for shortness of breath  Cardiovascular: Negative for chest pain  Gastrointestinal: Negative for constipation, diarrhea, nausea and vomiting  Musculoskeletal: Positive for gait problem  Negative for arthralgias, joint swelling and myalgias  Skin: Negative for rash  Neurological: Negative for dizziness, seizures and weakness  All other systems reviewed and are negative          Past Medical History:   Diagnosis Date    Alopecia     Calcaneal spur     [facility] insipidus [facility] [address] [facility]     Diabetes mellitus [facility] [address] [facility]     Hyperlipidemia     Hypertension     Osteoarthrosis 2/13/2013    Pes planus     unspecified laterality    Plantar fasciitis        Past Surgical History:   Procedure Laterality Date    CARPAL TUNNEL RELEASE      COLONOSCOPY      May 2006    INCISIONAL BREAST BIOPSY      SD COLONOSCOPY FLX DX W/COLLJ SPEC WHEN PFRMD N/A 5/4/2018    Procedure: COLONOSCOPY;  Surgeon: Batsheva Jo MD;  Location: [facility];   Service: Colorectal    WISDOM TOOTH EXTRACTION         Family History   Problem Relation Age of Onset    Diabetes Mother     Hypertension Mother     Emphysema Father     Cancer Brother     Colon cancer Brother 40       Social History     Occupational History    Not on file   Tobacco Use    Smoking status: Never Smoker    Smokeless tobacco: Never Used   Substance and Sexual Activity    Alcohol use: Yes     Frequency: 2-4 times a month     Drinks per session: 1 or 2     Binge frequency: Never    Drug use: No    Sexual activity: Not on file         Current Outpatient Medications:     ascorbic acid (VITAMIN C) 500 mg tablet, Take 1 tablet (500 mg total) by mouth 2 (two) times a day, Disp: 60 tablet, Rfl: 0    Biotin 1 MG CAPS, Take by mouth daily , Disp: , Rfl:     cholecalciferol (VITAMIN D3) 1,000 units tablet, Take 1,000 Units by mouth daily, Disp: , Rfl:     ferrous sulfate 324 (65 Fe) mg, Take one tablet daily, every other day, Disp: 15 tablet, Rfl: 0    fluticasone (FLONASE) 50 mcg/act nasal spray, 1 spray into each nostril daily, Disp: 16 g, Rfl: 0    folic acid (FOLVITE) 1 mg tablet, Take 1 tablet (1 mg total) by mouth daily, Disp: 30 tablet, Rfl: 0    metFORMIN (GLUCOPHAGE) 500 mg tablet, TAKE 1 TABLET BY MOUTH TWO TIMES DAILY WITH MEALS, Disp: 180 tablet, Rfl: 3    mometasone (ELOCON) 0 1 % ointment, APPLY TO RASH LESIONS TWO TIMES DAILY FOR 2 WEEKS THEN THREE TO FOUR DAYS PER WEEK AS NEEDED, Disp: , Rfl: 1    Multiple Vitamin (MULTI-VITAMIN DAILY PO), Take by mouth daily , Disp: , Rfl:     Calcium Carb-Cholecalciferol (CALCIUM 1000 + D) 1000-800 MG-UNIT TABS, Take by mouth, Disp: , Rfl:     enoxaparin (LOVENOX) 40 mg/0 4 mL, Inject 0 4 mL (40 mg total) under the skin daily for 28 days To start postoperatively, Disp: 28 Syringe, Rfl: 0    gabapentin (NEURONTIN) 300 mg capsule, Take 1 capsule (300 mg total) by mouth 3 (three) times a day for 30 days, Disp: 90 capsule, Rfl: 5    losartan (COZAAR) 100 MG tablet, Take 1 tablet (100 mg total) by mouth daily for 90 days, Disp: 90 tablet, Rfl: 3    simvastatin (ZOCOR) 20 mg tablet, Take 1 tablet (20 mg total) by mouth daily for 90 days, Disp: 90 tablet, Rfl: 3    Allergies   Allergen Reactions    Meloxicam Hives    Nifedipine Edema    Sulfamethoxazole-Trimethoprim Edema       Physical Exam:    /84   Pulse 84   Ht 5' 2" (1 575 m)   Wt 92 5 kg (204 lb)   BMI 37 31 kg/m²     Constitutional:normal, well developed, well nourished, alert, in no distress and non-toxic and no overt pain behavior  Eyes:anicteric  HEENT:grossly intact  Neck:supple, symmetric, trachea midline and no masses   Pulmonary:even and unlabored  Cardiovascular:No edema or pitting edema present  Skin:Normal without rashes or lesions and well hydrated  Psychiatric:Mood and affect appropriate  Neurologic:Cranial Nerves II-XII grossly intact  Musculoskeletal:ambulates with cane      Imaging  MRI LUMBAR SPINE WITHOUT CONTRAST 6/18/19     INDICATION: 22-year-old female, back pain, leg pain     COMPARISON:  5/8/2019 x-rays     TECHNIQUE:  Sagittal T1, sagittal T2, sagittal inversion recovery, axial T1 and axial T2, coronal T2     IMAGE QUALITY:  Diagnostic     FINDINGS:     VERTEBRAL BODIES:    Mild to moderate levoscoliosis  Grade 1 degenerative retrolisthesis T12-L1  Grade 1 degenerative anterolisthesis L3-4, L4-5  No compression fracture      Normal marrow signal is identified within the visualized bony structures  No discrete marrow   lesion      SACRUM:  Normal signal within the sacrum  No evidence of insufficiency or stress fracture      DISTAL CORD AND CONUS:  Normal size and signal within the distal cord and conus        PARASPINAL SOFT TISSUES:  Paraspinal soft tissues are unremarkable      LOWER THORACIC DISC SPACES: Grade 1 degenerative retrolisthesis T12-L1, mild central canal stenosis, no overt neural element impingement     LUMBAR DISC SPACES:     L1-L2:  Moderate degenerative spondylosis, moderate central disc herniation, moderate central stenosis, mild bilateral foraminal stenosis, probable bilateral L2 nerve root encroachment      L2-L3:  Moderate degenerative spondylosis and bulging annulus, mild central canal and bilateral foraminal stenosis     L3-L4:  Moderate degenerative spondylosis, grade 1 anterolisthesis, moderate central canal stenosis, mild bilateral foraminal stenosis, possible bilateral L4 nerve root encroachment     L4-L5:  Moderate degenerative spondylosis, grade 1 anterolisthesis, mild bilateral foraminal stenosis, no overt neural element impingement     L5-S1:  Normal disc, mild degenerative facet arthrosis, no stenosis     IMPRESSION:  Multilevel degenerative spondylosis, levoscoliosis, consistent with x-rays     Grade 1 retrolisthesis, mild central canal stenosis T12-L1     Moderate central canal stenosis, moderate central disc herniation, mild bilateral foraminal stenosis L1-2     Mild central canal and bilateral foraminal stenosis L2-3     Grade 1 anterolisthesis, moderate central canal stenosis, mild bilateral foraminal stenosis L3-4     Grade 1 anterolisthesis, mild bilateral foraminal stenosis L4-5       No orders of the defined types were placed in this encounter

## 2019-09-22 ENCOUNTER — ANESTHESIA EVENT (OUTPATIENT)
Dept: PERIOP | Facility: HOSPITAL | Age: 69
DRG: 470 | End: 2019-09-22
Payer: MEDICARE

## 2019-09-22 NOTE — ANESTHESIA PREPROCEDURE EVALUATION
68year old ASA 2 F with R Hip osteoarthritis who presents for R total hip arthroplasty  Review of Systems/Medical History  Patient summary reviewed  Chart reviewed  No history of anesthetic complications     Cardiovascular  Exercise tolerance (METS): >4,  Hyperlipidemia, Hypertension (losartan last taken 11:30 pm ) , No past MI , No angina , No CHF ,    Pulmonary  Negative pulmonary ROS Not a smoker ,        GI/Hepatic       Negative  ROS        Endo/Other  Diabetes (Diet controlled ) well controlled type 2 ,      GYN       Hematology      Comment: No anticoagulation use  Musculoskeletal  Back pain , lumbar pain, Sciatica, Osteoarthritis,   Comment: Sciatica s/p LESI in 7/19  Arthritis     Neurology  Negative neurology ROS      Psychology       6/18/19 MRI   IMPRESSION:  Multilevel degenerative spondylosis, levoscoliosis, consistent with x-rays     Grade 1 retrolisthesis, mild central canal stenosis T12-L1     Moderate central canal stenosis, moderate central disc herniation, mild bilateral foraminal stenosis L1-2     Mild central canal and bilateral foraminal stenosis L2-3     Grade 1 anterolisthesis, moderate central canal stenosis, mild bilateral foraminal stenosis L3-4     Grade 1 anterolisthesis, mild bilateral foraminal stenosis L4-5          Physical Exam    Airway    Mallampati score: III  TM Distance: >3 FB  Neck ROM: full     Dental       Cardiovascular      Pulmonary      Other Findings        Anesthesia Plan  ASA Score- 2     Anesthesia Type- spinal and IV sedation with anesthesia with ASA Monitors  Additional Monitors:   Airway Plan:         Plan Factors-    Induction- intravenous  Postoperative Plan-     Informed Consent- Anesthetic plan and risks discussed with patient  I personally reviewed this patient with the CRNA  Discussed and agreed on the Anesthesia Plan with the CRNA  Diana Mccray

## 2019-09-23 ENCOUNTER — ANESTHESIA (OUTPATIENT)
Dept: PERIOP | Facility: HOSPITAL | Age: 69
DRG: 470 | End: 2019-09-23
Payer: MEDICARE

## 2019-09-23 ENCOUNTER — HOSPITAL ENCOUNTER (INPATIENT)
Facility: HOSPITAL | Age: 69
LOS: 4 days | Discharge: NURSING, REHAB OR LONG-TERM CARE | DRG: 470 | End: 2019-09-27
Attending: ORTHOPAEDIC SURGERY | Admitting: ORTHOPAEDIC SURGERY
Payer: MEDICARE

## 2019-09-23 DIAGNOSIS — Z96.641 STATUS POST TOTAL HIP REPLACEMENT, RIGHT: Primary | ICD-10-CM

## 2019-09-23 DIAGNOSIS — E11.21 CONTROLLED TYPE 2 DIABETES MELLITUS WITH DIABETIC NEPHROPATHY, WITHOUT LONG-TERM CURRENT USE OF INSULIN (HCC): ICD-10-CM

## 2019-09-23 DIAGNOSIS — I10 BENIGN ESSENTIAL HYPERTENSION: ICD-10-CM

## 2019-09-23 DIAGNOSIS — I47.1 SVT (SUPRAVENTRICULAR TACHYCARDIA) (HCC): ICD-10-CM

## 2019-09-23 LAB
ABO GROUP BLD: NORMAL
BLD GP AB SCN SERPL QL: NEGATIVE
GLUCOSE SERPL-MCNC: 106 MG/DL (ref 65–140)
GLUCOSE SERPL-MCNC: 125 MG/DL (ref 65–140)
GLUCOSE SERPL-MCNC: 146 MG/DL (ref 65–140)
GLUCOSE SERPL-MCNC: 187 MG/DL (ref 65–140)
PLATELET # BLD AUTO: 222 THOUSANDS/UL (ref 149–390)
PMV BLD AUTO: 9 FL (ref 8.9–12.7)
RH BLD: POSITIVE
SPECIMEN EXPIRATION DATE: NORMAL

## 2019-09-23 PROCEDURE — 0SR902A REPLACEMENT OF RIGHT HIP JOINT WITH METAL ON POLYETHYLENE SYNTHETIC SUBSTITUTE, UNCEMENTED, OPEN APPROACH: ICD-10-PCS | Performed by: ORTHOPAEDIC SURGERY

## 2019-09-23 PROCEDURE — C1776 JOINT DEVICE (IMPLANTABLE): HCPCS | Performed by: ORTHOPAEDIC SURGERY

## 2019-09-23 PROCEDURE — 97163 PT EVAL HIGH COMPLEX 45 MIN: CPT

## 2019-09-23 PROCEDURE — G8979 MOBILITY GOAL STATUS: HCPCS

## 2019-09-23 PROCEDURE — 99024 POSTOP FOLLOW-UP VISIT: CPT | Performed by: ORTHOPAEDIC SURGERY

## 2019-09-23 PROCEDURE — 86900 BLOOD TYPING SEROLOGIC ABO: CPT | Performed by: ORTHOPAEDIC SURGERY

## 2019-09-23 PROCEDURE — 82948 REAGENT STRIP/BLOOD GLUCOSE: CPT

## 2019-09-23 PROCEDURE — 86901 BLOOD TYPING SEROLOGIC RH(D): CPT | Performed by: ORTHOPAEDIC SURGERY

## 2019-09-23 PROCEDURE — 86850 RBC ANTIBODY SCREEN: CPT | Performed by: ORTHOPAEDIC SURGERY

## 2019-09-23 PROCEDURE — 27130 TOTAL HIP ARTHROPLASTY: CPT | Performed by: ORTHOPAEDIC SURGERY

## 2019-09-23 PROCEDURE — G8978 MOBILITY CURRENT STATUS: HCPCS

## 2019-09-23 PROCEDURE — C1713 ANCHOR/SCREW BN/BN,TIS/BN: HCPCS | Performed by: ORTHOPAEDIC SURGERY

## 2019-09-23 PROCEDURE — 85049 AUTOMATED PLATELET COUNT: CPT | Performed by: ORTHOPAEDIC SURGERY

## 2019-09-23 DEVICE — PINNACLE POROCOAT ACETABULAR SHELL SECTOR II 48MM OD
Type: IMPLANTABLE DEVICE | Site: HIP | Status: FUNCTIONAL
Brand: PINNACLE POROCOAT

## 2019-09-23 DEVICE — PINNACLE HIP SOLUTIONS ALTRX POLYETHYLENE ACETABULAR LINER +4 10 DEGREES 32MM ID 48MM OD
Type: IMPLANTABLE DEVICE | Site: HIP | Status: FUNCTIONAL
Brand: PINNACLE ALTRX

## 2019-09-23 DEVICE — PINNACLE CANCELLOUS BONE SCREW 6.5MM X 25MM
Type: IMPLANTABLE DEVICE | Site: HIP | Status: FUNCTIONAL
Brand: PINNACLE

## 2019-09-23 DEVICE — ARTICUL/EZE FEMORAL HEAD DIAMETER 32MM +5 12/14 TAPER
Type: IMPLANTABLE DEVICE | Site: HIP | Status: FUNCTIONAL
Brand: ARTICUL/EZE

## 2019-09-23 DEVICE — CORAIL HIP SYSTEM CEMENTLESS FEMORAL STEM 12/14 AMT 135 DEGREES KHO SIZE 10 HA COATED HIGH OFFSET NO COLLAR
Type: IMPLANTABLE DEVICE | Site: HIP | Status: FUNCTIONAL
Brand: CORAIL

## 2019-09-23 RX ORDER — ACETAMINOPHEN 325 MG/1
975 TABLET ORAL ONCE
Status: COMPLETED | OUTPATIENT
Start: 2019-09-23 | End: 2019-09-23

## 2019-09-23 RX ORDER — ACETAMINOPHEN 325 MG/1
975 TABLET ORAL EVERY 8 HOURS
Status: DISCONTINUED | OUTPATIENT
Start: 2019-09-23 | End: 2019-09-27 | Stop reason: HOSPADM

## 2019-09-23 RX ORDER — PROPOFOL 10 MG/ML
INJECTION, EMULSION INTRAVENOUS AS NEEDED
Status: DISCONTINUED | OUTPATIENT
Start: 2019-09-23 | End: 2019-09-23 | Stop reason: SURG

## 2019-09-23 RX ORDER — CHLORHEXIDINE GLUCONATE 0.12 MG/ML
15 RINSE ORAL ONCE
Status: COMPLETED | OUTPATIENT
Start: 2019-09-23 | End: 2019-09-23

## 2019-09-23 RX ORDER — GABAPENTIN 300 MG/1
300 CAPSULE ORAL ONCE
Status: COMPLETED | OUTPATIENT
Start: 2019-09-23 | End: 2019-09-23

## 2019-09-23 RX ORDER — DOCUSATE SODIUM 100 MG/1
100 CAPSULE, LIQUID FILLED ORAL 2 TIMES DAILY
Status: DISCONTINUED | OUTPATIENT
Start: 2019-09-23 | End: 2019-09-27 | Stop reason: HOSPADM

## 2019-09-23 RX ORDER — OXYCODONE HYDROCHLORIDE 5 MG/1
TABLET ORAL
Qty: 30 TABLET | Refills: 0 | Status: SHIPPED | OUTPATIENT
Start: 2019-09-23 | End: 2019-10-07 | Stop reason: HOSPADM

## 2019-09-23 RX ORDER — SODIUM CHLORIDE, SODIUM LACTATE, POTASSIUM CHLORIDE, CALCIUM CHLORIDE 600; 310; 30; 20 MG/100ML; MG/100ML; MG/100ML; MG/100ML
125 INJECTION, SOLUTION INTRAVENOUS CONTINUOUS
Status: DISCONTINUED | OUTPATIENT
Start: 2019-09-23 | End: 2019-09-27 | Stop reason: HOSPADM

## 2019-09-23 RX ORDER — SENNOSIDES 8.6 MG
1 TABLET ORAL DAILY
Status: DISCONTINUED | OUTPATIENT
Start: 2019-09-23 | End: 2019-09-27 | Stop reason: HOSPADM

## 2019-09-23 RX ORDER — HYDROMORPHONE HCL/PF 1 MG/ML
0.5 SYRINGE (ML) INJECTION
Status: DISCONTINUED | OUTPATIENT
Start: 2019-09-23 | End: 2019-09-23 | Stop reason: HOSPADM

## 2019-09-23 RX ORDER — MAGNESIUM HYDROXIDE 1200 MG/15ML
LIQUID ORAL AS NEEDED
Status: DISCONTINUED | OUTPATIENT
Start: 2019-09-23 | End: 2019-09-23 | Stop reason: HOSPADM

## 2019-09-23 RX ORDER — KETAMINE HCL IN NACL, ISO-OSM 100MG/10ML
SYRINGE (ML) INJECTION AS NEEDED
Status: DISCONTINUED | OUTPATIENT
Start: 2019-09-23 | End: 2019-09-23 | Stop reason: SURG

## 2019-09-23 RX ORDER — OXYCODONE HYDROCHLORIDE 5 MG/1
10 TABLET ORAL EVERY 4 HOURS PRN
Status: DISCONTINUED | OUTPATIENT
Start: 2019-09-23 | End: 2019-09-27 | Stop reason: HOSPADM

## 2019-09-23 RX ORDER — ASCORBIC ACID 500 MG
500 TABLET ORAL 2 TIMES DAILY
Status: DISCONTINUED | OUTPATIENT
Start: 2019-09-23 | End: 2019-09-27 | Stop reason: HOSPADM

## 2019-09-23 RX ORDER — ONDANSETRON 2 MG/ML
4 INJECTION INTRAMUSCULAR; INTRAVENOUS ONCE AS NEEDED
Status: DISCONTINUED | OUTPATIENT
Start: 2019-09-23 | End: 2019-09-23 | Stop reason: HOSPADM

## 2019-09-23 RX ORDER — SUCCINYLCHOLINE/SOD CL,ISO/PF 100 MG/5ML
SYRINGE (ML) INTRAVENOUS AS NEEDED
Status: DISCONTINUED | OUTPATIENT
Start: 2019-09-23 | End: 2019-09-23 | Stop reason: SURG

## 2019-09-23 RX ORDER — ONDANSETRON 2 MG/ML
4 INJECTION INTRAMUSCULAR; INTRAVENOUS EVERY 6 HOURS PRN
Status: DISCONTINUED | OUTPATIENT
Start: 2019-09-23 | End: 2019-09-27 | Stop reason: HOSPADM

## 2019-09-23 RX ORDER — ROCURONIUM BROMIDE 10 MG/ML
INJECTION, SOLUTION INTRAVENOUS AS NEEDED
Status: DISCONTINUED | OUTPATIENT
Start: 2019-09-23 | End: 2019-09-23 | Stop reason: SURG

## 2019-09-23 RX ORDER — HYDRALAZINE HYDROCHLORIDE 25 MG/1
25 TABLET, FILM COATED ORAL EVERY 8 HOURS PRN
Status: DISCONTINUED | OUTPATIENT
Start: 2019-09-23 | End: 2019-09-27 | Stop reason: HOSPADM

## 2019-09-23 RX ORDER — FOLIC ACID 1 MG/1
1 TABLET ORAL DAILY
Status: DISCONTINUED | OUTPATIENT
Start: 2019-09-23 | End: 2019-09-27 | Stop reason: HOSPADM

## 2019-09-23 RX ORDER — ACETAMINOPHEN 500 MG
1000 TABLET ORAL EVERY 6 HOURS PRN
COMMUNITY

## 2019-09-23 RX ORDER — SODIUM CHLORIDE, SODIUM LACTATE, POTASSIUM CHLORIDE, CALCIUM CHLORIDE 600; 310; 30; 20 MG/100ML; MG/100ML; MG/100ML; MG/100ML
50 INJECTION, SOLUTION INTRAVENOUS CONTINUOUS
Status: DISCONTINUED | OUTPATIENT
Start: 2019-09-23 | End: 2019-09-27 | Stop reason: HOSPADM

## 2019-09-23 RX ORDER — METHOCARBAMOL 500 MG/1
500 TABLET, FILM COATED ORAL EVERY 6 HOURS SCHEDULED
Status: DISCONTINUED | OUTPATIENT
Start: 2019-09-23 | End: 2019-09-27 | Stop reason: HOSPADM

## 2019-09-23 RX ORDER — PRAVASTATIN SODIUM 40 MG
40 TABLET ORAL
Status: DISCONTINUED | OUTPATIENT
Start: 2019-09-23 | End: 2019-09-27 | Stop reason: HOSPADM

## 2019-09-23 RX ORDER — GABAPENTIN 300 MG/1
300 CAPSULE ORAL 3 TIMES DAILY
Status: DISCONTINUED | OUTPATIENT
Start: 2019-09-23 | End: 2019-09-27 | Stop reason: HOSPADM

## 2019-09-23 RX ORDER — MOMETASONE FUROATE 1 MG/G
OINTMENT TOPICAL DAILY
Status: DISCONTINUED | OUTPATIENT
Start: 2019-09-23 | End: 2019-09-23

## 2019-09-23 RX ORDER — HYDROMORPHONE HCL/PF 1 MG/ML
0.5 SYRINGE (ML) INJECTION EVERY 2 HOUR PRN
Status: ACTIVE | OUTPATIENT
Start: 2019-09-23 | End: 2019-09-25

## 2019-09-23 RX ORDER — PROPOFOL 10 MG/ML
INJECTION, EMULSION INTRAVENOUS AS NEEDED
Status: DISCONTINUED | OUTPATIENT
Start: 2019-09-23 | End: 2019-09-23

## 2019-09-23 RX ORDER — GLYCOPYRROLATE 0.2 MG/ML
INJECTION INTRAMUSCULAR; INTRAVENOUS AS NEEDED
Status: DISCONTINUED | OUTPATIENT
Start: 2019-09-23 | End: 2019-09-23 | Stop reason: SURG

## 2019-09-23 RX ORDER — ONDANSETRON 2 MG/ML
INJECTION INTRAMUSCULAR; INTRAVENOUS AS NEEDED
Status: DISCONTINUED | OUTPATIENT
Start: 2019-09-23 | End: 2019-09-23 | Stop reason: SURG

## 2019-09-23 RX ORDER — CEFAZOLIN SODIUM 1 G/50ML
1000 SOLUTION INTRAVENOUS EVERY 8 HOURS
Status: COMPLETED | OUTPATIENT
Start: 2019-09-23 | End: 2019-09-24

## 2019-09-23 RX ORDER — TRANEXAMIC ACID 100 MG/ML
INJECTION, SOLUTION INTRAVENOUS AS NEEDED
Status: DISCONTINUED | OUTPATIENT
Start: 2019-09-23 | End: 2019-09-23 | Stop reason: SURG

## 2019-09-23 RX ORDER — HYDROMORPHONE HCL/PF 1 MG/ML
SYRINGE (ML) INJECTION AS NEEDED
Status: DISCONTINUED | OUTPATIENT
Start: 2019-09-23 | End: 2019-09-23 | Stop reason: SURG

## 2019-09-23 RX ORDER — FERROUS SULFATE 325(65) MG
325 TABLET ORAL
Status: DISCONTINUED | OUTPATIENT
Start: 2019-09-23 | End: 2019-09-27 | Stop reason: HOSPADM

## 2019-09-23 RX ORDER — NEOSTIGMINE METHYLSULFATE 1 MG/ML
INJECTION INTRAVENOUS AS NEEDED
Status: DISCONTINUED | OUTPATIENT
Start: 2019-09-23 | End: 2019-09-23 | Stop reason: SURG

## 2019-09-23 RX ORDER — LIDOCAINE HYDROCHLORIDE 10 MG/ML
INJECTION, SOLUTION INFILTRATION; PERINEURAL AS NEEDED
Status: DISCONTINUED | OUTPATIENT
Start: 2019-09-23 | End: 2019-09-23 | Stop reason: SURG

## 2019-09-23 RX ORDER — OXYCODONE HYDROCHLORIDE 5 MG/1
5 TABLET ORAL EVERY 4 HOURS PRN
Status: DISCONTINUED | OUTPATIENT
Start: 2019-09-23 | End: 2019-09-27 | Stop reason: HOSPADM

## 2019-09-23 RX ORDER — FENTANYL CITRATE/PF 50 MCG/ML
50 SYRINGE (ML) INJECTION
Status: DISCONTINUED | OUTPATIENT
Start: 2019-09-23 | End: 2019-09-23 | Stop reason: HOSPADM

## 2019-09-23 RX ORDER — FENTANYL CITRATE 50 UG/ML
INJECTION, SOLUTION INTRAMUSCULAR; INTRAVENOUS AS NEEDED
Status: DISCONTINUED | OUTPATIENT
Start: 2019-09-23 | End: 2019-09-23 | Stop reason: SURG

## 2019-09-23 RX ORDER — MIDAZOLAM HYDROCHLORIDE 1 MG/ML
INJECTION INTRAMUSCULAR; INTRAVENOUS AS NEEDED
Status: DISCONTINUED | OUTPATIENT
Start: 2019-09-23 | End: 2019-09-23 | Stop reason: SURG

## 2019-09-23 RX ORDER — CEFAZOLIN SODIUM 2 G/50ML
2000 SOLUTION INTRAVENOUS ONCE
Status: COMPLETED | OUTPATIENT
Start: 2019-09-23 | End: 2019-09-23

## 2019-09-23 RX ADMIN — TRANEXAMIC ACID 1 G: 1 INJECTION, SOLUTION INTRAVENOUS at 08:04

## 2019-09-23 RX ADMIN — SODIUM CHLORIDE, SODIUM LACTATE, POTASSIUM CHLORIDE, AND CALCIUM CHLORIDE: .6; .31; .03; .02 INJECTION, SOLUTION INTRAVENOUS at 07:05

## 2019-09-23 RX ADMIN — MIDAZOLAM 1 MG: 1 INJECTION INTRAMUSCULAR; INTRAVENOUS at 07:30

## 2019-09-23 RX ADMIN — MIDAZOLAM 1 MG: 1 INJECTION INTRAMUSCULAR; INTRAVENOUS at 07:40

## 2019-09-23 RX ADMIN — CEFAZOLIN SODIUM 1000 MG: 1 SOLUTION INTRAVENOUS at 17:01

## 2019-09-23 RX ADMIN — FOLIC ACID 1 MG: 1 TABLET ORAL at 14:04

## 2019-09-23 RX ADMIN — PRAVASTATIN SODIUM 40 MG: 40 TABLET ORAL at 17:00

## 2019-09-23 RX ADMIN — OXYCODONE HYDROCHLORIDE 5 MG: 5 TABLET ORAL at 10:48

## 2019-09-23 RX ADMIN — Medication 10 MG: at 08:36

## 2019-09-23 RX ADMIN — OXYCODONE HYDROCHLORIDE AND ACETAMINOPHEN 500 MG: 500 TABLET ORAL at 17:00

## 2019-09-23 RX ADMIN — PROPOFOL 150 MG: 10 INJECTION, EMULSION INTRAVENOUS at 07:50

## 2019-09-23 RX ADMIN — SODIUM CHLORIDE, SODIUM LACTATE, POTASSIUM CHLORIDE, AND CALCIUM CHLORIDE 50 ML/HR: .6; .31; .03; .02 INJECTION, SOLUTION INTRAVENOUS at 12:52

## 2019-09-23 RX ADMIN — Medication 20 MG: at 08:05

## 2019-09-23 RX ADMIN — ENOXAPARIN SODIUM 40 MG: 40 INJECTION SUBCUTANEOUS at 21:15

## 2019-09-23 RX ADMIN — CHLORHEXIDINE GLUCONATE 0.12% ORAL RINSE 15 ML: 1.2 LIQUID ORAL at 06:41

## 2019-09-23 RX ADMIN — METHOCARBAMOL TABLETS 500 MG: 500 TABLET, COATED ORAL at 14:03

## 2019-09-23 RX ADMIN — DOCUSATE SODIUM 100 MG: 100 CAPSULE, LIQUID FILLED ORAL at 14:04

## 2019-09-23 RX ADMIN — ROCURONIUM BROMIDE 20 MG: 10 INJECTION INTRAVENOUS at 08:00

## 2019-09-23 RX ADMIN — OXYCODONE HYDROCHLORIDE 5 MG: 5 TABLET ORAL at 19:35

## 2019-09-23 RX ADMIN — DOCUSATE SODIUM 100 MG: 100 CAPSULE, LIQUID FILLED ORAL at 17:00

## 2019-09-23 RX ADMIN — LIDOCAINE HYDROCHLORIDE 50 MG: 10 INJECTION, SOLUTION INFILTRATION; PERINEURAL at 07:50

## 2019-09-23 RX ADMIN — PHENYLEPHRINE HYDROCHLORIDE 30 MCG/MIN: 10 INJECTION INTRAVENOUS at 08:24

## 2019-09-23 RX ADMIN — FENTANYL CITRATE 100 MCG: 50 INJECTION, SOLUTION INTRAMUSCULAR; INTRAVENOUS at 07:50

## 2019-09-23 RX ADMIN — INSULIN LISPRO 1 UNITS: 100 INJECTION, SOLUTION INTRAVENOUS; SUBCUTANEOUS at 17:38

## 2019-09-23 RX ADMIN — ACETAMINOPHEN 975 MG: 325 TABLET ORAL at 06:40

## 2019-09-23 RX ADMIN — HYDROMORPHONE HYDROCHLORIDE 0.5 MG: 1 INJECTION, SOLUTION INTRAMUSCULAR; INTRAVENOUS; SUBCUTANEOUS at 09:12

## 2019-09-23 RX ADMIN — FENTANYL CITRATE 50 MCG: 50 INJECTION INTRAMUSCULAR; INTRAVENOUS at 09:22

## 2019-09-23 RX ADMIN — ONDANSETRON 4 MG: 2 INJECTION INTRAMUSCULAR; INTRAVENOUS at 08:37

## 2019-09-23 RX ADMIN — GABAPENTIN 300 MG: 300 CAPSULE ORAL at 14:12

## 2019-09-23 RX ADMIN — PHENYLEPHRINE HYDROCHLORIDE 100 MCG: 10 INJECTION INTRAVENOUS at 08:24

## 2019-09-23 RX ADMIN — GABAPENTIN 300 MG: 300 CAPSULE ORAL at 21:12

## 2019-09-23 RX ADMIN — FERROUS SULFATE TAB 325 MG (65 MG ELEMENTAL FE) 325 MG: 325 (65 FE) TAB at 14:11

## 2019-09-23 RX ADMIN — ACETAMINOPHEN 975 MG: 325 TABLET ORAL at 14:02

## 2019-09-23 RX ADMIN — Medication 100 MG: at 07:50

## 2019-09-23 RX ADMIN — IRON SUCROSE 300 MG: 20 INJECTION, SOLUTION INTRAVENOUS at 14:04

## 2019-09-23 RX ADMIN — GABAPENTIN 300 MG: 300 CAPSULE ORAL at 06:41

## 2019-09-23 RX ADMIN — SODIUM CHLORIDE, SODIUM LACTATE, POTASSIUM CHLORIDE, AND CALCIUM CHLORIDE 1000 ML: .6; .31; .03; .02 INJECTION, SOLUTION INTRAVENOUS at 11:50

## 2019-09-23 RX ADMIN — METHOCARBAMOL TABLETS 500 MG: 500 TABLET, COATED ORAL at 17:00

## 2019-09-23 RX ADMIN — GLYCOPYRROLATE 0.4 MG: 0.2 INJECTION, SOLUTION INTRAMUSCULAR; INTRAVENOUS at 08:51

## 2019-09-23 RX ADMIN — FENTANYL CITRATE 50 MCG: 50 INJECTION INTRAMUSCULAR; INTRAVENOUS at 09:29

## 2019-09-23 RX ADMIN — ACETAMINOPHEN 975 MG: 325 TABLET ORAL at 21:12

## 2019-09-23 RX ADMIN — HYDROMORPHONE HYDROCHLORIDE 0.5 MG: 1 INJECTION, SOLUTION INTRAMUSCULAR; INTRAVENOUS; SUBCUTANEOUS at 08:19

## 2019-09-23 RX ADMIN — PROPOFOL 100 MG: 10 INJECTION, EMULSION INTRAVENOUS at 07:51

## 2019-09-23 RX ADMIN — CEFAZOLIN SODIUM 2000 MG: 2 SOLUTION INTRAVENOUS at 07:50

## 2019-09-23 RX ADMIN — SENNOSIDES 8.6 MG: 8.6 TABLET, FILM COATED ORAL at 14:03

## 2019-09-23 RX ADMIN — NEOSTIGMINE METHYLSULFATE 3 MG: 1 INJECTION, SOLUTION INTRAVENOUS at 08:51

## 2019-09-23 RX ADMIN — OXYCODONE HYDROCHLORIDE AND ACETAMINOPHEN 500 MG: 500 TABLET ORAL at 14:03

## 2019-09-23 NOTE — OP NOTE
OPERATIVE REPORT  PATIENT NAME: Angelica Luke    :  1950  MRN: [id number]  Pt Location: [facility]    SURGERY DATE: 2019    Surgeon(s) and Role:     * Rhina Manzo MD - Primary     * Dolores Pedersen PA-C - Ananya Adams MD - Assisting    Preop Diagnosis:  Primary osteoarthritis of right hip [M16 11]    Post-Op Diagnosis Codes:     * Primary osteoarthritis of right hip [M16 11]    Procedure(s) (LRB):  ARTHROPLASTY HIP TOTAL (Right)    Specimen(s):  * No specimens in log *    Estimated Blood Loss:   Minimal    Drains:  Urethral Catheter Latex 16 Fr  (Active)   Number of days: 0       Anesthesia Type:   Spinal    Operative Indications:  Primary osteoarthritis of right hip [M16 11]      Operative Findings:  depuy   Cup-48mm metal   Liner-10 degreed lipped poly   Head/neck-32m + 5mm metal   [id number]    Complications:   None    Procedure and Technique: Following induction of adequate level of general anesthesia, a Blue catheters and sterilely introduced into this patient's bladder  Antibiotics were administered  She was then placed in the left leg is, right-side-up position  An axillary roll was placed underneath the left axilla  The right hip and lateral thigh were then prepped draped sterilely  A posterior-lateral approach was created order gain access to the hip  Full-thickness flaps raised get the tensor fascia  This was split, expose the deep layer the hip  With the hip in internal rotation, the piriformis tendon was identified, transected, and retracted in a posterior fashion  The remainder of the short external rotators were sectioned as well  A T-type capsulotomy was used to open up the hip  The femoral head was then delivered posteriorly  Utilizing the femoral neck osteotomy guide, the proper femoral cut was then made  A posterior capsulectomy, and anterior capsulotomy was then created in order to circumferentially expose the acetabulum    Reaming started at 55 extended to 50 which point time hemisphere of bleeding cancellous bone countered  The 48 trial was inserted and noted to fit well, therefore the 48 mm insert was then hammered into place  A single screw was then placed within the posterior superior quadrant for additional fixation  The 10 degree lip liner was snapped into position  The proximal femur was then prepared for insertion of Press-Fit component  The box osteotome was used of the proximal femur  Patient's canal sequentially broached  With a 10  High offset, a 30 2+5 femoral head and neck, hip was located, taken through range of motion, found to be quite stable  The trial components removed if the hip was carefully dislocated  The insert components were assembled and introduced the patient's right hip in standard fashion  The hip was once again located, taken through range of motion, found to be quite stable  Satisfied with the extent of surgery, the wounds then flushed with saline and closed  A Betadine soak was initiated  The piriformis tendon was reapproximated to the greater trochanter number Vicryl suture  The tensor fascia was then closed number Vicryl suture  The subcu tissue closed in layers with a mixture 1  For deep layer, 2 O Vicryl for the subcutaneous tissues, and skin staples the skin  Sterile dressings were applied  Abduction pillow placed    She was then awakened from general anesthesia, and taken recovery room in stable condition with plans to include physical therapy weight-bearing to tolerance, she will require DVT prophylaxis with Lovenox   I was present for the entire procedure    Patient Disposition:  PACU     SIGNATURE: Kobi Dia MD  DATE: September 23, 2019  TIME: 8:53 AM

## 2019-09-23 NOTE — H&P (VIEW-ONLY)
Office Visit     7/10/2019  [address] [facility] Moshe Parks MD   Orthopedic Surgery   Primary osteoarthritis of left knee +6 more   Dx   Left Knee - Follow-up , Right Knee - Follow-up ; Referred by Janice Bingham PA-C   Reason for Visit    Progress Notes     Procedure Orders   1  Large joint arthrocentesis: L knee [[id number]] ordered by Shefali Parks MD at 07/10/19 1555    2  Large joint arthrocentesis: R knee [[id number]] ordered by Shefali Parks MD at 07/10/19 1555    Expand All Collapse All    Assessment:  1  Primary osteoarthritis of left knee  Large joint arthrocentesis: L knee   2  Chronic pain of left knee  Large joint arthrocentesis: L knee   3  Primary osteoarthritis of right knee  Large joint arthrocentesis: R knee   4  Chronic pain of right knee  Large joint arthrocentesis: R knee   5  Primary osteoarthritis of right hip  ferrous sulfate 324 (65 Fe) mg     folic acid (FOLVITE) 1 mg tablet     ascorbic acid (VITAMIN C) 500 mg tablet   6  Right hip pain  ferrous sulfate 324 (65 Fe) mg     folic acid (FOLVITE) 1 mg tablet     ascorbic acid (VITAMIN C) 500 mg tablet   7  Aftercare following right hip joint replacement surgery  enoxaparin (LOVENOX) 40 mg/0 4 mL         Plan:  Previously taken hip diagnostics reviewed and physical exam performed  Diagnosis, treatment options and associated risks were discussed with the patient including no treatment, nonsurgical treatment and potential for surgical intervention  The patient was given the opportunity to ask questions regarding each  Patient was educated on maintaining a healthy lifestyle with proper weight management and physician recommended home exercise program/routine for regular fitness  Quality of life decision to pursue a right total hip arthroplasty  Risks and benefits of a right total hip arthroplasty discussed, consents obtained    Both knees were also injected with the 1st of 3 Euflexxa visco injections, and she will follow-up each of the next 2 weeks to complete the visco series  Ice and post-injection protocol advised  Weight bearing and activities as tolerated  Pre-operative vitamins and post-operative lovenox sent to her pharmacy  Given the patients elevated BP, recommend she consults with her PCP for further management          To do next visit:  Return in about 1 week (around 7/17/2019) for re-check and Euflexxa #2 both knees      The above stated was discussed in layman's terms and the patient expressed understanding  All questions were answered to the patient's satisfaction               Scribe Attestation    I,:   Sher Mccann am acting as a scribe while in the presence of the attending physician :        I,:   Renuka Andrew MD personally performed the services described in this documentation    as scribed in my presence  :                  Subjective:   Tonio Stern is a 77 y o  female who presents for repeat evaluation of her bilateral knees and right hip, all known osteoarthritis  She is here today for initiation of the Euflexxa visco series for her knees  She continues to complain of medial knee pain with weight bearing and stiffness when getting up after prolonged sedentary positions  She does use a cane for ambulatory assistance  Her right hip, groin pain,  has been more bothersome as of late  She has groin pain with rotation motions  She has difficulty putting on/off her socks/shoes, getting in/out of her car, or turning in bed  She recently had a lumbar MARYLIN which has helped with some of her lumbar symptoms, leaving her right hip symptoms       Patient's most recent (April) hemoglobin A1c was 6 2  BMI is currently 38  9         Review of systems negative unless otherwise specified in HPI     Medical History        Past Medical History:   Diagnosis Date    Alopecia      Calcaneal spur      [facility] [address]      [facility] mellitus [facility] [address]      Hyperlipidemia      Hypertension      Osteoarthrosis 2/13/2013    Pes planus       unspecified laterality    Plantar fasciitis              Surgical History         Past Surgical History:   Procedure Laterality Date    CARPAL TUNNEL RELEASE        COLONOSCOPY         May 2006    INCISIONAL BREAST BIOPSY        [place] COLONOSCOPY FLX DX W/COLLJ SPEC WHEN PFRMD N/A 5/4/2018     Procedure: COLONOSCOPY;  Surgeon: Dusty Mtz MD;  Location:  GI LAB; Service: Colorectal                  Family History   Problem Relation Age of Onset    Diabetes Mother      Hypertension Mother      Emphysema Father      Cancer Brother      Colon cancer Brother 40         Social History            Occupational History    Not on file   Tobacco Use    Smoking status: Never Smoker    Smokeless tobacco: Never Used   Substance and Sexual Activity    Alcohol use:  Yes       Comment: social    Drug use: No    Sexual activity: Not on file            Current Outpatient Medications:     Biotin 1 MG CAPS, Take by mouth, Disp: , Rfl:     Calcium Carb-Cholecalciferol (CALCIUM 1000 + D) 1000-800 MG-UNIT TABS, Take by mouth, Disp: , Rfl:     cholecalciferol (VITAMIN D3) 1,000 units tablet, Take 1,000 Units by mouth daily, Disp: , Rfl:     FISH OIL-CANOLA OIL-VIT D3 PO, by Does not apply route, Disp: , Rfl:     fluticasone (FLONASE) 50 mcg/act nasal spray, 1 spray into each nostril daily (Patient not taking: Reported on 4/10/2019), Disp: 16 g, Rfl: 0    gabapentin (NEURONTIN) 300 mg capsule, Take 1 capsule (300 mg total) by mouth 3 (three) times a day for 30 days, Disp: 90 capsule, Rfl: 0    ibuprofen (MOTRIN) 600 mg tablet, Take 1 tablet (600 mg total) by mouth every 8 (eight) hours as needed for moderate pain, Disp: 30 tablet, Rfl: 3    losartan (COZAAR) 100 MG tablet, Take 1 tablet (100 mg total) by mouth daily for 90 days, Disp: 90 tablet, Rfl: 3    metFORMIN (GLUCOPHAGE) 500 mg tablet, Take 1 tablet (500 mg total) by mouth 2 (two) times a day with meals for 90 days, Disp: 180 tablet, Rfl: 3    methylPREDNISolone 4 MG tablet therapy pack, Use as directed on package, Disp: 1 each, Rfl: 0    mometasone (ELOCON) 0 1 % ointment, APPLY TO RASH LESIONS TWO TIMES DAILY FOR 2 WEEKS THEN THREE TO FOUR DAYS PER WEEK AS NEEDED, Disp: , Rfl: 1    Multiple Vitamin (MULTI-VITAMIN DAILY PO), Take by mouth, Disp: , Rfl:     predniSONE 10 mg tablet, Take 4 tabs for 4 days, then 3 tabs for 2 days, then 2 tabs for 2 days and then 1 tab for 2 day (Patient not taking: Reported on 6/10/2019), Disp: 28 tablet, Rfl: 0    simvastatin (ZOCOR) 20 mg tablet, Take 1 tablet (20 mg total) by mouth daily for 90 days, Disp: 90 tablet, Rfl: 3          Allergies   Allergen Reactions    Meloxicam Hives    Nifedipine Edema    Sulfamethoxazole-Trimethoprim Edema                   Vitals:     07/10/19 1604   BP: (!) 176/88   Pulse:           Objective:                     Right Knee Exam      Muscle Strength   The patient has normal right knee strength      Tenderness   The patient is experiencing tenderness in the medial joint line      Range of Motion   The patient has normal right knee ROM  Right knee flexion: with crepitus and stiffness       Other   Erythema: absent  Sensation: normal  Swelling: mild  Effusion: no effusion present        Left Knee Exam      Muscle Strength   The patient has normal left knee strength      Tenderness   The patient is experiencing tenderness in the medial joint line      Range of Motion   The patient has normal left knee ROM    Left knee flexion: with crepitus and stiffness       Other   Erythema: absent  Sensation: normal  Swelling: mild  Effusion: no effusion present     Comments:    Mild varus alignment at both lower extremities          Right Hip Exam      Tenderness   The patient is experiencing tenderness in the anterior      Range of Motion   Flexion: 90   External rotation: 10   Internal rotation: 0 (all with groin pain and guarding)      Muscle Strength Abduction: 4/5   Adduction: 4/5   Flexion: 4/5      Other   Erythema: absent                 Diagnostics, reviewed and taken today if performed as documented:     None performed but reviewed:  Advanced right hip OA             Procedures, if performed today:     Large joint arthrocentesis: R knee  Date/Time: 7/10/2019 3:55 PM  Consent given by: patient  Site marked: site marked  Timeout: Immediately prior to procedure a time out was called to verify the correct patient, procedure, equipment, support staff and site/side marked as required   Supporting Documentation  Indications: pain and diagnostic evaluation   Procedure Details  Location: knee - R knee  Preparation: Patient was prepped and draped in the usual sterile fashion  Needle size: 22 G  Ultrasound guidance: no  Approach: anterolateral  Medications administered: 20 mg Sodium Hyaluronate 20 MG/2ML     Patient tolerance: patient tolerated the procedure well with no immediate complications  Dressing:  Sterile dressing applied    Large joint arthrocentesis: L knee  Date/Time: 7/10/2019 3:55 PM  Consent given by: patient  Site marked: site marked  Timeout: Immediately prior to procedure a time out was called to verify the correct patient, procedure, equipment, support staff and site/side marked as required   Supporting Documentation  Indications: pain and diagnostic evaluation   Procedure Details  Location: knee - L knee  Preparation: Patient was prepped and draped in the usual sterile fashion  Needle size: 22 G  Ultrasound guidance: no  Approach: anterolateral  Medications administered: 20 mg Sodium Hyaluronate 20 MG/2ML     Patient tolerance: patient tolerated the procedure well with no immediate complications  Dressing:  Sterile dressing applied                   Portions of the record may have been created with voice recognition software   Occasional wrong word or "sound a like" substitutions may have occurred due to the inherent limitations of voice recognition software   Read the chart carefully and recognize, using context, where substitutions have occurred             Instructions         Return in about 1 week (around 7/17/2019) for re-check and Euflexxa #2 both knees  Additional Documentation     Vitals:    /88      Pulse 77    Ht 5' 2" (1 575 m)    Wt 96 5 kg (212 lb 12 8 oz)    BMI 38 92 kg/m²    BSA 1 96 m²       More Vitals    SmartForms:     [facility] PRE-CHARTING      [facility] PCMH/PCSP WRAP UP REQUIREMENTS ADVANCED      [facility] SCRIBE ATTESTATION       Encounter Info:    Billing Info,    History,    Allergies,    Detailed Report       Orders Placed        Labs       Comprehensive metabolic panel       APTT       C-reactive protein       CBC and differential       Iron Panel       Protime-INR       Type and screen      Hemoglobin A1C W/EAG Estimation      Nicolas Tim  (6 more)     Other Orders       Large joint arthrocentesis: R knee       Large joint arthrocentesis: L knee      Ambulatory referral to Family Practice Pending Review      Ambulatory referral to Physical Therapy Authorized      Case request operating room: ARTHROPLASTY HIP TOTAL Once      Nicolas Tim  (3 more)     All Encounter Results    Medication Changes         Ascorbic Acid 500 mg Oral 2 times daily       Enoxaparin Sodium 40 mg Subcutaneous Daily, To start postoperatively       Ferrous Sulfate 324 (65 Fe) mg Take one tablet daily, every other day       Folic Acid 1 mg Oral Daily      Medication List    Medications Administered      Sodium Hyaluronate (Viscosup) 20 mg    Sodium Hyaluronate (Viscosup) 20 mg   Visit Diagnoses         Primary osteoarthritis of left knee      Chronic pain of left knee      Primary osteoarthritis of right knee      Chronic pain of right knee      Primary osteoarthritis of right hip      Right hip pain      Aftercare following right hip joint replacement surgery      Problem List

## 2019-09-23 NOTE — INTERVAL H&P NOTE
H&P reviewed  After examining the patient I find no changes in the patients condition since the H&P had been written      Vitals:    09/23/19 0616   BP: 124/87   Pulse: 97   Resp: 20   Temp: 98 7 °F (37 1 °C)   SpO2: 97%       Preop for right total hip arthroplasty

## 2019-09-23 NOTE — CONSULTS
Consultation - Francis Lang 68 y o  female MRN: [id number]    Unit/Bed#: OR POOL Encounter: [id number]        History of Present Illness     HPI: Francis Lang is a 68 y o  female, with HTN, hyperlipidemia, DM type 2 and chronic low back pain, who presents for an elective Rt ELSA by Dr Ashly Varela  She had failed conservative treatment  Pt had minimal EBL intraop  Pt currently reports feeling fatigued and has Rt hip and back pain  Pt was hypotensive in PACU and did receive IV fluid boluses  ROS:  Constitutional: fatigue  HENT: Negative  Respiratory: Negative  Cardiovascular: Negative  Gastrointestinal: Negative  Musculoskeletal: back and Rt hip pain  Neurological: Negative  Psychiatric/Behavioral: Negative  Historical Information   Past Medical History:   Diagnosis Date    Alopecia     Calcaneal spur     [facility]us [facility] [address]     [facility] mellitus [facility] [address]     Hyperlipidemia     Hypertension     Osteoarthrosis 2/13/2013    Pes planus     unspecified laterality    Plantar fasciitis      Past Surgical History:   Procedure Laterality Date    CARPAL TUNNEL RELEASE      COLONOSCOPY      May 2006    INCISIONAL BREAST BIOPSY      [place] COLONOSCOPY FLX DX W/COLLJ SPEC WHEN PFRMD N/A 5/4/2018    Procedure: COLONOSCOPY;  Surgeon: Denzel Steele MD;  Location: [facility];   Service: Colorectal    WISDOM TOOTH EXTRACTION       Social History   Social History     Substance and Sexual Activity   Alcohol Use Yes    Frequency: 2-4 times a month    Drinks per session: 1 or 2    Binge frequency: Never     Social History     Substance and Sexual Activity   Drug Use No     Social History     Tobacco Use   Smoking Status Never Smoker   Smokeless Tobacco Never Used     Family History   Problem Relation Age of Onset    Diabetes Mother     Hypertension Mother     Emphysema Father     Cancer Brother     Colon cancer Brother 40       Meds/Allergies   current meds:  Current Facility-Administered Medications   Medication Dose Route Frequency    ascorbic acid (VITAMIN C) tablet 500 mg  500 mg Oral BID    fentaNYL (SUBLIMAZE) injection 50 mcg  50 mcg Intravenous Q3 min PRN    ferrous sulfate tablet 325 mg  325 mg Oral Daily With Breakfast    folic acid (FOLVITE) tablet 1 mg  1 mg Oral Daily    gabapentin (NEURONTIN) capsule 300 mg  300 mg Oral TID    HYDROmorphone (DILAUDID) injection 0 5 mg  0 5 mg Intravenous Q5 Min PRN    lactated ringers infusion  125 mL/hr Intravenous Continuous    lactated ringers infusion  50 mL/hr Intravenous Continuous    mometasone (ELOCON) 0 1 % ointment   Topical Daily    ondansetron (ZOFRAN) injection 4 mg  4 mg Intravenous Once PRN    pravastatin (PRAVACHOL) tablet 40 mg  40 mg Oral Daily With Dinner       PTA meds:   Medications Prior to Admission   Medication    acetaminophen (TYLENOL) 500 mg tablet    ascorbic acid (VITAMIN C) 500 mg tablet    enoxaparin (LOVENOX) 40 mg/0 4 mL    ferrous sulfate 324 (65 Fe) mg    folic acid (FOLVITE) 1 mg tablet    gabapentin (NEURONTIN) 300 mg capsule    losartan (COZAAR) 100 MG tablet    simvastatin (ZOCOR) 20 mg tablet    Biotin 1 MG CAPS    Calcium Carb-Cholecalciferol (CALCIUM 1000 + D) 1000-800 MG-UNIT TABS    cholecalciferol (VITAMIN D3) 1,000 units tablet    fluticasone (FLONASE) 50 mcg/act nasal spray    metFORMIN (GLUCOPHAGE) 500 mg tablet    mometasone (ELOCON) 0 1 % ointment    Multiple Vitamin (MULTI-VITAMIN DAILY PO)     Allergies   Allergen Reactions    Meloxicam Hives    Nifedipine Edema    Sulfamethoxazole-Trimethoprim Edema       Objective   Vitals: Blood pressure (!) 97/49, pulse 72, temperature 98 6 °F (37 °C), resp  rate 20, height 5' 2" (1 575 m), weight 94 8 kg (209 lb), SpO2 100 %  Physical Exam      HENT:  Normocephalic  EOM are normal  PERRLA  Neck supple  Cardiovascular: Normal rate and regular rhythm      Pulmonary: Breath sounds normal  No respiratory distress  Pt has no wheezes nor rales  Abdominal: Soft  Bowel sounds are normal  Pt exhibits no distension  There is no tenderness  There is no rebound and no guarding  Neurological: Pt is alert and oriented to person, place, and time  Psychiatric: Pt has a normal mood and affect  Invasive Devices     Peripheral Intravenous Line            Peripheral IV 09/23/19 Left Hand less than 1 day          Drain            Urethral Catheter Latex 16 Fr  less than 1 day                   Lab Results:           Invalid input(s): LABGLOM            Results from last 7 days   Lab Units 09/23/19  0920 09/23/19  0648   POC GLUCOSE mg/dl 146* 125       Labs reviewed    Imaging: reviewed  EKG, Pathology, and Other Studies: I have personally reviewed pertinent reports  VTE Prophylaxis: Enoxaparin (Lovenox)    Code Status: No Order   Advance Directive and Living Will:      Power of :    POLST:      Reviewed with patient their advanced directive wishes while being in hospital during this encounter  Patient demonstrates understanding of the directives they wish and these are in line with what is noted in the current hospital record  Level 1: Full Code    Assessment/Plan     Rt hip OA s/p Rt ELSA: Continue post op pain control measures as prescribed  Follow bowel regimen to help decrease narcotic induced constipation  Follow post operative hemoglobin with serial CBC and treat accordingly  Monitor WBC and fever curve post op while encouraging use of incentive spirometer  DVT prophylaxis in place and reviewed  HTN: Pt takes losartan 100mg daily  Will hold ARB to decrease the risk of [place] in the post-op period  Add Hydralazine 25mg every 8 hours as needed for SBP > 160  Monitor blood pressure per protocol and adjust medications as needed - BP has actually been low  DM type 2: Pt takes metformin 500mg 2x daily with meals  Continue SSI and accuchecks  Adjust medications as needed      Chronic back pain: Continue gabapentin  Counseling / Coordination of Care  Total time spent: At least 60 minutes, with more than 50% spent counseling/coordinating care  Counseling includes discussion with patient re: progress  and discussion with patient of his/her current medical state/information  Coordination of patient's care was performed in conjunction with primary service  Time invested included review of patient's labs, vitals, and management of their comorbidities with continued monitoring  In addition, this patient was discussed with medical team including physician and advanced extenders  The care of the patient was extensively discussed and appropriate treatment plan was formulated unique for this patient  ** Please Note: Dragon 360 Dictation voice to text software may have been used in the creation of this document   **

## 2019-09-23 NOTE — PROGRESS NOTES
Pt BP 70/54, dizzy, and pale  LR 1L bolus started per protocol  Dr Marsha Crowell aware and coming bedside to see pt

## 2019-09-23 NOTE — PLAN OF CARE
Problem: PHYSICAL THERAPY ADULT  Goal: Performs mobility at highest level of function for planned discharge setting  See evaluation for individualized goals  Description  Treatment/Interventions: Functional transfer training, LE strengthening/ROM, Elevations, Endurance training, Therapeutic exercise, Patient/family training, Equipment eval/education, Bed mobility, Gait training  Equipment Recommended: Jenna Patterson       See flowsheet documentation for full assessment, interventions and recommendations  Note:   Prognosis: Good  Problem List: Decreased strength, Decreased range of motion, Decreased endurance, Impaired balance, Decreased mobility, Pain, Orthopedic restrictions  Assessment: Pt seen for high complexity physical therapy evaluation  Pt is a 69 y/o female w/ history/comorbidities of HTN, HLD, DM II, LBP w/ sciatica, known OA, who is now admitted w/ known R hip OA for elective R ELSA, performed today 9/23,  Due to acute surgery, pain, fall risk, new ELSA precautions, note unstable clinical picture  PT consulted to assess mobility, d/c needs  Pt presents w/ decreased functional mob, standing balance, endurance, R LE ROM/strength, barriers at home  will benefit from skilled PT to correct for the above problems  Pending progress, lean towards home w/ home PT   will also need RW  Recommendation: (home w/ home PT, RW pending progress)     PT - OK to Discharge: No    See flowsheet documentation for full assessment

## 2019-09-23 NOTE — CONSULTS
Office Visit     7/10/2019  [address] [facility] Moshe Benavides MD   Orthopedic Surgery   Primary osteoarthritis of left knee +6 more   Dx   Left Knee - Follow-up , Right Knee - Follow-up ; Referred by Hill Hazel PA-C   Reason for Visit    Progress Notes     Procedure Orders   1  Large joint arthrocentesis: L knee [[id number]] ordered by Anish Benavides MD at 07/10/19 1555    2  Large joint arthrocentesis: R knee [[id number]] ordered by Anish Benavides MD at 07/10/19 1555    Expand All Collapse All    Assessment:  1  Primary osteoarthritis of left knee  Large joint arthrocentesis: L knee   2  Chronic pain of left knee  Large joint arthrocentesis: L knee   3  Primary osteoarthritis of right knee  Large joint arthrocentesis: R knee   4  Chronic pain of right knee  Large joint arthrocentesis: R knee   5  Primary osteoarthritis of right hip  ferrous sulfate 324 (65 Fe) mg     folic acid (FOLVITE) 1 mg tablet     ascorbic acid (VITAMIN C) 500 mg tablet   6  Right hip pain  ferrous sulfate 324 (65 Fe) mg     folic acid (FOLVITE) 1 mg tablet     ascorbic acid (VITAMIN C) 500 mg tablet   7  Aftercare following right hip joint replacement surgery  enoxaparin (LOVENOX) 40 mg/0 4 mL         Plan:  Previously taken hip diagnostics reviewed and physical exam performed  Diagnosis, treatment options and associated risks were discussed with the patient including no treatment, nonsurgical treatment and potential for surgical intervention  The patient was given the opportunity to ask questions regarding each  Patient was educated on maintaining a healthy lifestyle with proper weight management and physician recommended home exercise program/routine for regular fitness  Quality of life decision to pursue a right total hip arthroplasty  Risks and benefits of a right total hip arthroplasty discussed, consents obtained    Both knees were also injected with the 1st of 3 Euflexxa visco injections, and she will follow-up each of the next 2 weeks to complete the visco series  Ice and post-injection protocol advised  Weight bearing and activities as tolerated  Pre-operative vitamins and post-operative lovenox sent to her pharmacy  Given the patients elevated BP, recommend she consults with her PCP for further management          To do next visit:  Return in about 1 week (around 7/17/2019) for re-check and Euflexxa #2 both knees      The above stated was discussed in layman's terms and the patient expressed understanding  All questions were answered to the patient's satisfaction               Scribe Attestation    I,:   Estelita Lundberg am acting as a scribe while in the presence of the attending physician :        I,:   Danish Roblero MD personally performed the services described in this documentation    as scribed in my presence  :                  Subjective:   Argelia Pulliam is a 77 y o  female who presents for repeat evaluation of her bilateral knees and right hip, all known osteoarthritis  She is here today for initiation of the Euflexxa visco series for her knees  She continues to complain of medial knee pain with weight bearing and stiffness when getting up after prolonged sedentary positions  She does use a cane for ambulatory assistance  Her right hip, groin pain,  has been more bothersome as of late  She has groin pain with rotation motions  She has difficulty putting on/off her socks/shoes, getting in/out of her car, or turning in bed  She recently had a lumbar MARYLIN which has helped with some of her lumbar symptoms, leaving her right hip symptoms       Patient's most recent (April) hemoglobin A1c was 6 2  BMI is currently 38  9         Review of systems negative unless otherwise specified in HPI     Medical History        Past Medical History:   Diagnosis Date    Alopecia      Calcaneal spur      [facility]ip[facility]us [facility] [address] [facility]      [facility] mellitus [facility] [address] [facility]      Hyperlipidemia      Hypertension      Osteoarthrosis 2/13/2013    Pes planus       unspecified laterality    Plantar fasciitis              Surgical History         Past Surgical History:   Procedure Laterality Date    CARPAL TUNNEL RELEASE        COLONOSCOPY         May 2006    INCISIONAL BREAST BIOPSY        OK COLONOSCOPY FLX DX W/COLLJ SPEC WHEN PFRMD N/A 5/4/2018     Procedure: COLONOSCOPY;  Surgeon: Breanna Hernández MD;  Location: [facility]; Service: Colorectal                  Family History   Problem Relation Age of Onset    Diabetes Mother      Hypertension Mother      Emphysema Father      Cancer Brother      Colon cancer Brother 40         Social History            Occupational History    Not on file   Tobacco Use    Smoking status: Never Smoker    Smokeless tobacco: Never Used   Substance and Sexual Activity    Alcohol use:  Yes       Comment: social    Drug use: No    Sexual activity: Not on file            Current Outpatient Medications:     Biotin 1 MG CAPS, Take by mouth, Disp: , Rfl:     Calcium Carb-Cholecalciferol (CALCIUM 1000 + D) 1000-800 MG-UNIT TABS, Take by mouth, Disp: , Rfl:     cholecalciferol (VITAMIN D3) 1,000 units tablet, Take 1,000 Units by mouth daily, Disp: , Rfl:     FISH OIL-CANOLA OIL-VIT D3 PO, by Does not apply route, Disp: , Rfl:     fluticasone (FLONASE) 50 mcg/act nasal spray, 1 spray into each nostril daily (Patient not taking: Reported on 4/10/2019), Disp: 16 g, Rfl: 0    gabapentin (NEURONTIN) 300 mg capsule, Take 1 capsule (300 mg total) by mouth 3 (three) times a day for 30 days, Disp: 90 capsule, Rfl: 0    ibuprofen (MOTRIN) 600 mg tablet, Take 1 tablet (600 mg total) by mouth every 8 (eight) hours as needed for moderate pain, Disp: 30 tablet, Rfl: 3    losartan (COZAAR) 100 MG tablet, Take 1 tablet (100 mg total) by mouth daily for 90 days, Disp: 90 tablet, Rfl: 3    metFORMIN (GLUCOPHAGE) 500 mg tablet, Take 1 tablet (500 mg total) by mouth 2 (two) times a day with meals for 90 days, Disp: 180 tablet, Rfl: 3    methylPREDNISolone 4 MG tablet therapy pack, Use as directed on package, Disp: 1 each, Rfl: 0    mometasone (ELOCON) 0 1 % ointment, APPLY TO RASH LESIONS TWO TIMES DAILY FOR 2 WEEKS THEN THREE TO FOUR DAYS PER WEEK AS NEEDED, Disp: , Rfl: 1    Multiple Vitamin (MULTI-VITAMIN DAILY PO), Take by mouth, Disp: , Rfl:     predniSONE 10 mg tablet, Take 4 tabs for 4 days, then 3 tabs for 2 days, then 2 tabs for 2 days and then 1 tab for 2 day (Patient not taking: Reported on 6/10/2019), Disp: 28 tablet, Rfl: 0    simvastatin (ZOCOR) 20 mg tablet, Take 1 tablet (20 mg total) by mouth daily for 90 days, Disp: 90 tablet, Rfl: 3          Allergies   Allergen Reactions    Meloxicam Hives    Nifedipine Edema    Sulfamethoxazole-Trimethoprim Edema                   Vitals:     07/10/19 1604   BP: (!) 176/88   Pulse:           Objective:                     Right Knee Exam      Muscle Strength   The patient has normal right knee strength      Tenderness   The patient is experiencing tenderness in the medial joint line      Range of Motion   The patient has normal right knee ROM  Right knee flexion: with crepitus and stiffness       Other   Erythema: absent  Sensation: normal  Swelling: mild  Effusion: no effusion present        Left Knee Exam      Muscle Strength   The patient has normal left knee strength      Tenderness   The patient is experiencing tenderness in the medial joint line      Range of Motion   The patient has normal left knee ROM    Left knee flexion: with crepitus and stiffness       Other   Erythema: absent  Sensation: normal  Swelling: mild  Effusion: no effusion present     Comments:    Mild varus alignment at both lower extremities          Right Hip Exam      Tenderness   The patient is experiencing tenderness in the anterior      Range of Motion   Flexion: 90   External rotation: 10   Internal rotation: 0 (all with groin pain and guarding)      Muscle Strength Abduction: 4/5   Adduction: 4/5   Flexion: 4/5      Other   Erythema: absent                 Diagnostics, reviewed and taken today if performed as documented:     None performed but reviewed:  Advanced right hip OA             Procedures, if performed today:     Large joint arthrocentesis: R knee  Date/Time: 7/10/2019 3:55 PM  Consent given by: patient  Site marked: site marked  Timeout: Immediately prior to procedure a time out was called to verify the correct patient, procedure, equipment, support staff and site/side marked as required   Supporting Documentation  Indications: pain and diagnostic evaluation   Procedure Details  Location: knee - R knee  Preparation: Patient was prepped and draped in the usual sterile fashion  Needle size: 22 G  Ultrasound guidance: no  Approach: anterolateral  Medications administered: 20 mg Sodium Hyaluronate 20 MG/2ML     Patient tolerance: patient tolerated the procedure well with no immediate complications  Dressing:  Sterile dressing applied    Large joint arthrocentesis: L knee  Date/Time: 7/10/2019 3:55 PM  Consent given by: patient  Site marked: site marked  Timeout: Immediately prior to procedure a time out was called to verify the correct patient, procedure, equipment, support staff and site/side marked as required   Supporting Documentation  Indications: pain and diagnostic evaluation   Procedure Details  Location: knee - L knee  Preparation: Patient was prepped and draped in the usual sterile fashion  Needle size: 22 G  Ultrasound guidance: no  Approach: anterolateral  Medications administered: 20 mg Sodium Hyaluronate 20 MG/2ML     Patient tolerance: patient tolerated the procedure well with no immediate complications  Dressing:  Sterile dressing applied                   Portions of the record may have been created with voice recognition software   Occasional wrong word or "sound a like" substitutions may have occurred due to the inherent limitations of voice recognition software   Read the chart carefully and recognize, using context, where substitutions have occurred             Instructions         Return in about 1 week (around 7/17/2019) for re-check and Euflexxa #2 both knees  Additional Documentation     Vitals:    /88      Pulse 77    Ht 5' 2" (1 575 m)    Wt 96 5 kg (212 lb 12 8 oz)    BMI 38 92 kg/m²    BSA 1 96 m²       More Vitals    SmartForms:     [facility] PRE-CHARTING      [facility] PCMH/PCSP WRAP UP REQUIREMENTS ADVANCED      [facility] SCRIBE ATTESTATION       Encounter Info:    Billing Info,    History,    Allergies,    Detailed Report       Orders Placed        Labs       Comprehensive metabolic panel       APTT       C-reactive protein       CBC and differential       Iron Panel       Protime-INR       Type and screen      Hemoglobin A1C W/EAG Estimation      Yves Ewing  (6 more)     Other Orders       Large joint arthrocentesis: R knee       Large joint arthrocentesis: L knee      Ambulatory referral to Family Practice Pending Review      Ambulatory referral to Physical Therapy Authorized      Case request operating room: ARTHROPLASTY HIP TOTAL Once      Yves Ewing  (3 more)     All Encounter Results    Medication Changes         Ascorbic Acid 500 mg Oral 2 times daily       Enoxaparin Sodium 40 mg Subcutaneous Daily, To start postoperatively       Ferrous Sulfate 324 (65 Fe) mg Take one tablet daily, every other day       Folic Acid 1 mg Oral Daily      Medication List    Medications Administered      Sodium Hyaluronate (Viscosup) 20 mg    Sodium Hyaluronate (Viscosup) 20 mg   Visit Diagnoses         Primary osteoarthritis of left knee      Chronic pain of left knee      Primary osteoarthritis of right knee      Chronic pain of right knee      Primary osteoarthritis of right hip      Right hip pain      Aftercare following right hip joint replacement surgery      Problem List

## 2019-09-23 NOTE — PLAN OF CARE
Problem: Potential for Falls  Goal: Patient will remain free of falls  Description  INTERVENTIONS:  - Assess patient frequently for physical needs  -  Identify cognitive and physical deficits and behaviors that affect risk of falls    -  [place] fall precautions as indicated by assessment   - Educate patient/family on patient safety including physical limitations  - Instruct patient to call for assistance with activity based on assessment  - Modify environment to reduce risk of injury  - Consider OT/PT consult to assist with strengthening/mobility  Outcome: Progressing     Problem: PAIN - ADULT  Goal: Verbalizes/displays adequate comfort level or baseline comfort level  Description  Interventions:  - Encourage patient to monitor pain and request assistance  - Assess pain using appropriate pain scale  - Administer analgesics based on type and severity of pain and evaluate response  - Implement non-pharmacological measures as appropriate and evaluate response  - Consider cultural and social influences on pain and pain management  - Notify physician/advanced practitioner if interventions unsuccessful or patient reports new pain  Outcome: Progressing     Problem: INFECTION - ADULT  Goal: Absence or prevention of progression during hospitalization  Description  INTERVENTIONS:  - Assess and monitor for signs and symptoms of infection  - Monitor lab/diagnostic results  - Monitor all insertion sites, i e  indwelling lines, tubes, and drains  - Monitor endotracheal if appropriate and nasal secretions for changes in amount and color  - [place] appropriate cooling/warming therapies per order  - Administer medications as ordered  - Instruct and encourage patient and family to use good hand hygiene technique  - Identify and instruct in appropriate isolation precautions for identified infection/condition  Outcome: Progressing     Problem: SAFETY ADULT  Goal: Maintain or return to baseline ADL function  Description  INTERVENTIONS:  -  Assess patient's ability to carry out ADLs; assess patient's baseline for ADL function and identify physical deficits which impact ability to perform ADLs (bathing, care of mouth/teeth, toileting, grooming, dressing, etc )  - Assess/evaluate cause of self-care deficits   - Assess range of motion  - Assess patient's mobility; develop plan if impaired  - Assess patient's need for assistive devices and provide as appropriate  - Encourage maximum independence but intervene and supervise when necessary  - Involve family in performance of ADLs  - Assess for home care needs following discharge   - Consider OT consult to assist with ADL evaluation and planning for discharge  - Provide patient education as appropriate  Outcome: Progressing  Goal: Maintain or return mobility status to optimal level  Description  INTERVENTIONS:  - Assess patient's baseline mobility status (ambulation, transfers, stairs, etc )    - Identify cognitive and physical deficits and behaviors that affect mobility  - Identify mobility aids required to assist with transfers and/or ambulation (gait belt, sit-to-stand, lift, walker, cane, etc )  - [place] fall precautions as indicated by assessment  - Record patient progress and toleration of activity level on Mobility SBAR; progress patient to next Phase/Stage  - Instruct patient to call for assistance with activity based on assessment  - Consider rehabilitation consult to assist with strengthening/weightbearing, etc   Outcome: Progressing     Problem: DISCHARGE PLANNING  Goal: Discharge to home or other facility with appropriate resources  Description  INTERVENTIONS:  - Identify barriers to discharge w/patient and caregiver  - Arrange for needed discharge resources and transportation as appropriate  - Identify discharge learning needs (meds, wound care, etc )  - Arrange for interpretive services to assist at discharge as needed  - Refer to Case Management Department for coordinating discharge planning if the patient needs post-hospital services based on physician/advanced practitioner order or complex needs related to functional status, cognitive ability, or social support system  Outcome: Progressing

## 2019-09-23 NOTE — ANESTHESIA POSTPROCEDURE EVALUATION
Post-Op Assessment Note    CV Status:  Stable  Pain Score: 5    Pain management: adequate     Mental Status:  Alert and awake   Hydration Status:  Euvolemic   PONV Controlled:  Controlled   Airway Patency:  Patent   Post Op Vitals Reviewed: Yes      Staff: CRNA, Anesthesiologist           BP   148/79   Temp   98 6   Pulse  72   Resp   19   SpO2   99

## 2019-09-24 PROBLEM — M16.11 PRIMARY OSTEOARTHRITIS OF RIGHT HIP: Status: RESOLVED | Noted: 2019-07-10 | Resolved: 2019-09-24

## 2019-09-24 PROBLEM — Z96.641 STATUS POST TOTAL HIP REPLACEMENT, RIGHT: Status: ACTIVE | Noted: 2019-09-24

## 2019-09-24 LAB
ANION GAP SERPL CALCULATED.3IONS-SCNC: 5 MMOL/L (ref 4–13)
BUN SERPL-MCNC: 20 MG/DL (ref 5–25)
CALCIUM SERPL-MCNC: 8.3 MG/DL (ref 8.3–10.1)
CHLORIDE SERPL-SCNC: 103 MMOL/L (ref 100–108)
CO2 SERPL-SCNC: 28 MMOL/L (ref 21–32)
CREAT SERPL-MCNC: 0.8 MG/DL (ref 0.6–1.3)
ERYTHROCYTE [DISTWIDTH] IN BLOOD BY AUTOMATED COUNT: 16.5 % (ref 11.6–15.1)
GFR SERPL CREATININE-BSD FRML MDRD: 74 ML/MIN/1.73SQ M
GLUCOSE SERPL-MCNC: 118 MG/DL (ref 65–140)
GLUCOSE SERPL-MCNC: 121 MG/DL (ref 65–140)
GLUCOSE SERPL-MCNC: 131 MG/DL (ref 65–140)
GLUCOSE SERPL-MCNC: 134 MG/DL (ref 65–140)
GLUCOSE SERPL-MCNC: 138 MG/DL (ref 65–140)
HCT VFR BLD AUTO: 33.2 % (ref 34.8–46.1)
HGB BLD-MCNC: 10.4 G/DL (ref 11.5–15.4)
MCH RBC QN AUTO: 28.1 PG (ref 26.8–34.3)
MCHC RBC AUTO-ENTMCNC: 31.3 G/DL (ref 31.4–37.4)
MCV RBC AUTO: 90 FL (ref 82–98)
PLATELET # BLD AUTO: 202 THOUSANDS/UL (ref 149–390)
PMV BLD AUTO: 10.1 FL (ref 8.9–12.7)
POTASSIUM SERPL-SCNC: 3 MMOL/L (ref 3.5–5.3)
RBC # BLD AUTO: 3.7 MILLION/UL (ref 3.81–5.12)
SODIUM SERPL-SCNC: 136 MMOL/L (ref 136–145)
WBC # BLD AUTO: 10.3 THOUSAND/UL (ref 4.31–10.16)

## 2019-09-24 PROCEDURE — G8987 SELF CARE CURRENT STATUS: HCPCS

## 2019-09-24 PROCEDURE — 82948 REAGENT STRIP/BLOOD GLUCOSE: CPT

## 2019-09-24 PROCEDURE — NS001 PR NO SIGNATURE OR ATTESTATION: Performed by: ORTHOPAEDIC SURGERY

## 2019-09-24 PROCEDURE — 97530 THERAPEUTIC ACTIVITIES: CPT

## 2019-09-24 PROCEDURE — 97116 GAIT TRAINING THERAPY: CPT

## 2019-09-24 PROCEDURE — G8988 SELF CARE GOAL STATUS: HCPCS

## 2019-09-24 PROCEDURE — 97167 OT EVAL HIGH COMPLEX 60 MIN: CPT

## 2019-09-24 PROCEDURE — 80048 BASIC METABOLIC PNL TOTAL CA: CPT | Performed by: ORTHOPAEDIC SURGERY

## 2019-09-24 PROCEDURE — 85027 COMPLETE CBC AUTOMATED: CPT | Performed by: ORTHOPAEDIC SURGERY

## 2019-09-24 RX ORDER — POTASSIUM CHLORIDE 20 MEQ/1
20 TABLET, EXTENDED RELEASE ORAL ONCE
Status: COMPLETED | OUTPATIENT
Start: 2019-09-24 | End: 2019-09-24

## 2019-09-24 RX ADMIN — GABAPENTIN 300 MG: 300 CAPSULE ORAL at 21:18

## 2019-09-24 RX ADMIN — OXYCODONE HYDROCHLORIDE 10 MG: 5 TABLET ORAL at 17:04

## 2019-09-24 RX ADMIN — OXYCODONE HYDROCHLORIDE 5 MG: 5 TABLET ORAL at 22:45

## 2019-09-24 RX ADMIN — GABAPENTIN 300 MG: 300 CAPSULE ORAL at 08:29

## 2019-09-24 RX ADMIN — ACETAMINOPHEN 975 MG: 325 TABLET ORAL at 05:03

## 2019-09-24 RX ADMIN — METFORMIN HYDROCHLORIDE 500 MG: 500 TABLET ORAL at 08:29

## 2019-09-24 RX ADMIN — DOCUSATE SODIUM 100 MG: 100 CAPSULE, LIQUID FILLED ORAL at 08:29

## 2019-09-24 RX ADMIN — SENNOSIDES 8.6 MG: 8.6 TABLET, FILM COATED ORAL at 08:29

## 2019-09-24 RX ADMIN — DOCUSATE SODIUM 100 MG: 100 CAPSULE, LIQUID FILLED ORAL at 17:05

## 2019-09-24 RX ADMIN — METHOCARBAMOL TABLETS 500 MG: 500 TABLET, COATED ORAL at 17:03

## 2019-09-24 RX ADMIN — OXYCODONE HYDROCHLORIDE 5 MG: 5 TABLET ORAL at 00:07

## 2019-09-24 RX ADMIN — ACETAMINOPHEN 975 MG: 325 TABLET ORAL at 12:26

## 2019-09-24 RX ADMIN — OXYCODONE HYDROCHLORIDE AND ACETAMINOPHEN 500 MG: 500 TABLET ORAL at 17:04

## 2019-09-24 RX ADMIN — OXYCODONE HYDROCHLORIDE AND ACETAMINOPHEN 500 MG: 500 TABLET ORAL at 08:29

## 2019-09-24 RX ADMIN — FOLIC ACID 1 MG: 1 TABLET ORAL at 08:29

## 2019-09-24 RX ADMIN — METFORMIN HYDROCHLORIDE 500 MG: 500 TABLET ORAL at 17:04

## 2019-09-24 RX ADMIN — ACETAMINOPHEN 975 MG: 325 TABLET ORAL at 21:18

## 2019-09-24 RX ADMIN — IRON SUCROSE 300 MG: 20 INJECTION, SOLUTION INTRAVENOUS at 09:29

## 2019-09-24 RX ADMIN — GABAPENTIN 300 MG: 300 CAPSULE ORAL at 17:04

## 2019-09-24 RX ADMIN — METHOCARBAMOL TABLETS 500 MG: 500 TABLET, COATED ORAL at 11:17

## 2019-09-24 RX ADMIN — ENOXAPARIN SODIUM 40 MG: 40 INJECTION SUBCUTANEOUS at 08:29

## 2019-09-24 RX ADMIN — METHOCARBAMOL TABLETS 500 MG: 500 TABLET, COATED ORAL at 00:07

## 2019-09-24 RX ADMIN — OXYCODONE HYDROCHLORIDE 5 MG: 5 TABLET ORAL at 09:29

## 2019-09-24 RX ADMIN — OXYCODONE HYDROCHLORIDE 10 MG: 5 TABLET ORAL at 05:04

## 2019-09-24 RX ADMIN — CEFAZOLIN SODIUM 1000 MG: 1 SOLUTION INTRAVENOUS at 00:07

## 2019-09-24 RX ADMIN — PRAVASTATIN SODIUM 40 MG: 40 TABLET ORAL at 17:03

## 2019-09-24 RX ADMIN — FERROUS SULFATE TAB 325 MG (65 MG ELEMENTAL FE) 325 MG: 325 (65 FE) TAB at 08:29

## 2019-09-24 RX ADMIN — METHOCARBAMOL TABLETS 500 MG: 500 TABLET, COATED ORAL at 05:03

## 2019-09-24 RX ADMIN — POTASSIUM CHLORIDE 20 MEQ: 1500 TABLET, EXTENDED RELEASE ORAL at 08:29

## 2019-09-24 NOTE — QUICK NOTE
Patient was reviewed and seen by me  Doing well, but requiring additional assistive equipment for a safe discharge  Per physical therapy evaluation, she requires assistance with repositioning and safe transfer out of bed  Patient currently must maintain hip precautions and is a high risk of fall in her home sleeping environment and would benefit from a hospital bed for home

## 2019-09-24 NOTE — PROGRESS NOTES
Internal Medicine Progress Note  Patient: Obdulio Paulino  Age/sex: 68 y o  female  Medical Record #: [id number]      ASSESSMENT/PLAN:  Obdulio Paulino is seen and examined and mangement for following issues:    Rt hip OA s/p Rt ELSA:  pain control adequate  Patient remains afebrile  Eating breakfast without difficulty no nausea  Monitor WBC and fever curve post op while encouraging use of incentive spirometer  DVT prophylaxis in place and reviewed  Venofer per Orthopedic protocol  Labs reviewed     HTN: Pt takes losartan 100mg daily  Will hold ARB to decrease the risk of [place] in the post-op period  Add Hydralazine 25mg every 8 hours as needed for SBP > 160  Monitor blood pressure per protocol and adjust medications as needed - BP has actually been low      DM type 2: Pt takes metformin 500mg 2x daily with meals -  resumed today  Continue SSI and accuchecks  Adjust medications as needed      Chronic back pain: Continue gabapentin  Hypokalemia:  Replete with oral potassium chloride 20 mEq p o  X1      Subjective: Patient seen and examined  Patients overnight issues or events were reviewed with nursing or staff during rounds or morning huddle session  New or overnight issues include the following: DM type 2:  Stable; Metformin to restart this AM  Blood sugars stable  HTN:  Stable; Continue off ARB for now  BP has improved from the OR  Low back pain:  Adequate control; Continue pain control measures      ROS:   GI: denies abdominal pain, change bowel habits or reflux symptoms  Neuro: Denies any headache, new vision changes, new neuropathies,new weaknesses   Respiratory: No Cough, SOB, denies wheeze  Cardiovascular: No CP, palpitations , denies perception of rapid heartbeat  : denies any new urinary burning or frequency    Review of Scheduled Meds:    Current Facility-Administered Medications:  acetaminophen 975 mg Oral Q8H Isabel Garsia MD    ascorbic acid 500 mg Oral BID Isabel Garsia MD    docusate sodium 100 mg Oral BID Laron Smith MD    enoxaparin 40 mg Subcutaneous Daily Carlos Reveles MD    ferrous sulfate 325 mg Oral Daily With Cholo Esparza MD    folic acid 1 mg Oral Daily Laron Smith MD    gabapentin 300 mg Oral TID Laron Smith MD    hydrALAZINE 25 mg Oral Q8H PRN Kaitlynn Arroyo PA-C    HYDROmorphone 0 5 mg Intravenous Q2H PRN Laron Smith MD    insulin lispro 1-5 Units Subcutaneous TID AC Kaitlynn Arroyo PA-C    insulin lispro 1-5 Units Subcutaneous HS Kaitlynn Arroyo PA-C    lactated ringers 1,000 mL Intravenous Once PRN Laron Smith MD    lactated ringers 500 mL Intravenous Once PRN Laron Smith MD    lactated ringers 125 mL/hr Intravenous Continuous Sameer Javier MD Last Rate: 75 mL/hr (09/23/19 0705)   lactated ringers 50 mL/hr Intravenous Continuous Deborah Núñez CRNA Last Rate: 50 mL/hr (09/23/19 1252)   metFORMIN 500 mg Oral BID With Meals Adriane Oviedo DO    methocarbamol 500 mg Oral Q6H Fortunato Casas MD    ondansetron 4 mg Intravenous Q6H PRN Laron Smith MD    oxyCODONE 10 mg Oral Q4H PRN Laron Smith MD    oxyCODONE 5 mg Oral Q4H PRN Laron Smith MD    pravastatin 40 mg Oral Daily With Kayy Rodriguez MD    senna 1 tablet Oral Daily Laron Smith MD        Labs:     Results from last 7 days   Lab Units 09/24/19  0609 09/23/19  1401   WBC Thousand/uL 10 30*  --    HEMOGLOBIN g/dL 10 4*  --    HEMATOCRIT % 33 2*  --    PLATELETS Thousands/uL 202 222     Results from last 7 days   Lab Units 09/24/19  0609   SODIUM mmol/L 136   POTASSIUM mmol/L 3 0*   CHLORIDE mmol/L 103   CO2 mmol/L 28   BUN mg/dL 20   CREATININE mg/dL 0 80   CALCIUM mg/dL 8 3                  Results from last 7 days   Lab Units 09/24/19  0623 09/23/19  2119 09/23/19  1619   POC GLUCOSE mg/dl 121 106 187*       Invasive Devices     Peripheral Intravenous Line            Peripheral IV 09/23/19 Left Hand 1 day                 *Labs reviewed  *Radiology studies reviewed  *Medications reviewed and reconciled as needed  *Please refer to order section for additional ordered labs studies    Physical Examination:  Vitals:   Vitals:    09/23/19 2320 09/24/19 0316 09/24/19 0600 09/24/19 0608   BP: 121/67 121/65  119/65   BP Location:    Left arm   Pulse: 89 85  92   Resp: 18 19 18   Temp: 99 °F (37 2 °C) 98 6 °F (37 °C)  98 4 °F (36 9 °C)   TempSrc:    Oral   SpO2: 94% 92%  93%   Weight:   93 7 kg (206 lb 9 1 oz)    Height:           GEN: NAD  RESP: CTAB, no R/R/W, good expiratory effort, breath sounds equal  CV: +S1 S2, regular rate, no rubs, PMI normal  ABD: soft, NT, ND, normal BS   : catheter removed;   EXT: DP pulses intact b/l; good cap refill;   Skin: no rashes , no lesions  Neuro: AAOx3 no focality on exam;    Total time spent: At least 35 minutes, with more than 50% spent counseling/coordinating care  Counseling includes discussion with patient re: progress  and discussion with patient of his/her current medical state/information  Coordination of patient's care was performed in conjunction with primary service  Time invested included review of patient's labs, vitals, and management of their comorbidities with continued monitoring  In addition, this patient was discussed with medical team including physician and advanced extenders  The care of the patient was extensively discussed and appropriate treatment plan was formulated unique for this patient  ** Please Note: Dragon 360 Dictation voice to text software may have been used in the creation of this document   **

## 2019-09-24 NOTE — PROGRESS NOTES
Damon Martins 68 y o  female MRN: [id number]  Unit/Bed#: -01      Subjective:  68 y  o female post operative day 1 right total hip arthroplasty  Pt doing well  Pain controlled      Labs:  0   Lab Value Date/Time    HCT 33 2 (L) 09/24/2019 0609    HCT 39 9 08/05/2019 1423    HCT 42 7 06/25/2018 1535    HGB 10 4 (L) 09/24/2019 0609    HGB 12 9 08/05/2019 1423    HGB 14 1 06/25/2018 1535    INR 1 14 08/05/2019 1423    WBC 10 30 (H) 09/24/2019 0609    WBC 5 22 08/05/2019 1423    WBC 6 47 06/25/2018 1535    CRP <3 0 08/05/2019 1423       Meds:    Current Facility-Administered Medications:     acetaminophen (TYLENOL) tablet 975 mg, 975 mg, Oral, Q8H, MD Easton, 975 mg at 09/24/19 0503    ascorbic acid (VITAMIN C) tablet 500 mg, 500 mg, Oral, BID, MD Easton, 500 mg at 09/23/19 1700    docusate sodium (COLACE) capsule 100 mg, 100 mg, Oral, BID, MD Easton, 100 mg at 09/23/19 1700    enoxaparin (LOVENOX) subcutaneous injection 40 mg, 40 mg, Subcutaneous, Daily, Yousuf Beckett MD, 40 mg at 09/23/19 2115    ferrous sulfate tablet 325 mg, 325 mg, Oral, Daily With Breakfast, MD Easton, 325 mg at 62/45/52 1425    folic acid (FOLVITE) tablet 1 mg, 1 mg, Oral, Daily, MD Easton, 1 mg at 09/23/19 1404    gabapentin (NEURONTIN) capsule 300 mg, 300 mg, Oral, TID, MD Easton, 300 mg at 09/23/19 2112    hydrALAZINE (APRESOLINE) tablet 25 mg, 25 mg, Oral, Q8H PRN, Kaitlynn Arroyo PA-C    HYDROmorphone (DILAUDID) injection 0 5 mg, 0 5 mg, Intravenous, Q2H PRN, MD Easton    insulin lispro (HumaLOG) 100 units/mL subcutaneous injection 1-5 Units, 1-5 Units, Subcutaneous, TID AC, 1 Units at 09/23/19 0958 **AND** Fingerstick Glucose (POCT), , , TID AC, Kaitlynn Arroyo PA-C    insulin lispro (HumaLOG) 100 units/mL subcutaneous injection 1-5 Units, 1-5 Units, Subcutaneous, HS, Kaitlynn Arroyo PA-C    lactated ringers bolus 1,000 mL, 1,000 mL, Intravenous, Once PRN **AND** [DISCONTINUED] lactated ringers bolus 1,000 mL, 1,000 mL, Intravenous, Once PRN, Contreras Ornelas MD, Stopped at 09/23/19 1252    lactated ringers bolus 500 mL, 500 mL, Intravenous, Once PRN **AND** [DISCONTINUED] lactated ringers bolus 500 mL, 500 mL, Intravenous, Once PRN, Contreras Ornelas MD    lactated ringers infusion, 125 mL/hr, Intravenous, Continuous, Laura Pinto MD, Last Rate: 75 mL/hr at 09/23/19 0705    lactated ringers infusion, 50 mL/hr, Intravenous, Continuous, Mel Arthur CRNA, Last Rate: 50 mL/hr at 09/23/19 1252, 50 mL/hr at 09/23/19 1252    metFORMIN (GLUCOPHAGE) tablet 500 mg, 500 mg, Oral, BID With Meals, Jorge Gamboa DO    methocarbamol (ROBAXIN) tablet 500 mg, 500 mg, Oral, Q6H [address], Contreras Ornelas MD, 500 mg at 09/24/19 0503    ondansetron (ZOFRAN) injection 4 mg, 4 mg, Intravenous, Q6H PRN, Contreras Ornelas MD    oxyCODONE (ROXICODONE) IR tablet 10 mg, 10 mg, Oral, Q4H PRN, Contreras Ornelas MD, 10 mg at 09/24/19 0504    oxyCODONE (ROXICODONE) IR tablet 5 mg, 5 mg, Oral, Q4H PRN, Contreras Ornelas MD, 5 mg at 09/24/19 0007    pravastatin (PRAVACHOL) tablet 40 mg, 40 mg, Oral, Daily With Patria Wren MD, 40 mg at 09/23/19 1700    senna (SENOKOT) tablet 8 6 mg, 1 tablet, Oral, Daily, Contreras Ornelas MD, 8 6 mg at 09/23/19 1403    Blood Culture:   No results found for: BLOODCX    Wound Culture:   No results found for: WOUNDCULT    Ins and Outs:  I/O last 24 hours: In: 2648 [P O :240; I V :600; IV Piggyback:1000]  Out: 1225 [Urine:1225]          Physical Exam:  Vitals:    09/24/19 0608   BP: 119/65   Pulse: 92   Resp: 18   Temp: 98 4 °F (36 9 °C)   SpO2: 93%     right lower extremity:  · Dressings C/D/I  · Sensation intact L2-S1  · Motor intact L2-S1  · 2+ dorsalis pedis     _*_*_*_*_*_*_*_*_*_*_*_*_*_*_*_*_*_*_*_*_*_*_*_*_*_*_*_*_*_*_*_*_*_*_*_*_*_*_*_*_*    Assessment: 68 y  o female post operative day 1 right total hip arthroplasty   Doing well    Plan:  · Weight Bearing as tolerated  · Up and out of bed  · Posterior total hip precautions  · Abduction pillow while in bed  · DVT prophylaxis  · Analgesics  · PT/OT  · Will continue to assess for acute blood loss anemia    Luiza Arciniega

## 2019-09-24 NOTE — PLAN OF CARE
Problem: PHYSICAL THERAPY ADULT  Goal: Performs mobility at highest level of function for planned discharge setting  See evaluation for individualized goals  Description  Treatment/Interventions: Functional transfer training, LE strengthening/ROM, Elevations, Endurance training, Therapeutic exercise, Patient/family training, Equipment eval/education, Bed mobility, Gait training  Equipment Recommended: Guille Arevalo       See flowsheet documentation for full assessment, interventions and recommendations  Outcome: Progressing  Note:   Prognosis: Good  Problem List: Decreased strength, Decreased range of motion, Decreased endurance, Impaired balance, Decreased mobility, Pain, Orthopedic restrictions  Assessment: Pt continues to perform all mobility with min A x 1 this session with standby assist for line management & safety  All mobility performed slowly due to pain & anxiety with instructions & encouragement required throughout to maximize outcomes  Pt anxious with performing tasks & perseverates on her progress during session  RW adjusted during session for comfort  Distances limited by increased pain during stance phase of gait, which limits her step length  Next session, plan to increase ambulatin distances & improve transfer technique to decrease required assist at this time  Continue POC at this time  If pt's brother able to stay with pt & assist with mobility at home, pt may be able to progress to home with home PT  If increased assist is unavailable, pt will most likely require rehab to maximize independence for safe return home  Pt will report on conversation with brother in upcoming session  Barriers to Discharge: Inaccessible home environment, Decreased caregiver support     Recommendation: (home PT vs rehab pending progress & support)     PT - OK to Discharge: No    See flowsheet documentation for full assessment

## 2019-09-24 NOTE — OCCUPATIONAL THERAPY NOTE
[address] Evaluation     Patient Name: Sirisha Walters Date: 9/24/2019  Problem List  Principal Problem:    Status post total hip replacement, right    Past Medical History  Past Medical History:   Diagnosis Date    Alopecia     Calcaneal spur     [facility] [address] [facility]     Diabetes mellitus [facility] [address] [facility]     Hyperlipidemia     Hypertension     Osteoarthrosis 2/13/2013    Pes planus     unspecified laterality    Plantar fasciitis      Past Surgical History  Past Surgical History:   Procedure Laterality Date    CARPAL TUNNEL RELEASE      COLONOSCOPY      May 2006    INCISIONAL BREAST BIOPSY      UT COLONOSCOPY FLX DX W/COLLJ SPEC WHEN PFRMD N/A 5/4/2018    Procedure: COLONOSCOPY;  Surgeon: Shannon Elliott MD;  [facility]: [facility]; Service: Colorectal    UT TOTAL HIP ARTHROPLASTY Right 9/23/2019    Procedure: ARTHROPLASTY HIP TOTAL;  Surgeon: Anish Benavides MD;  [facility];  Service: Orthopedics    WISDOM TOOTH EXTRACTION        09/24/19 1115   Note Type   Note type Eval only   Restrictions/Precautions   Weight Bearing Precautions Per Order Yes   RUE Weight Bearing Per Order WBAT   LUE Weight Bearing Per Order WBAT   RLE Weight Bearing Per Order WBAT   LLE Weight Bearing Per Order WBAT   Other Precautions WBS;THR;Fall Risk;Pain   Pain Assessment   Pain Assessment 0-10   Pain Score 8   Pain Type Acute pain   Pain Location Hip   Pain Orientation Right   Hospital Pain Intervention(s) Repositioned; Ambulation/increased activity; Emotional support   Home Living   Type of [address] Multi-level; Able to live on main level with bedroom/bathroom   Prior Function   Level of [place] Independent with ADLs and functional mobility   Lives With Alone   Receives Help From Family   ADL Assistance Independent   IADLs Independent   Falls in the last 6 months 0   Vocational Retired   [address] and mobility however admits she was struggling PTA - I iadls Reciprocal Relationships limited - reports brother is supportive and able to assist    Service to Others retired   Intrinsic Gratification  mostly sedentary   Subjective   Subjective "I'm nervous about having to do this at home"   ADL   Eating Assistance 7  Independent   Grooming Assistance 5  Supervision/Setup   [facility] [address] 4  Minimal Assistance   [facility] [address] 3  Moderate Assistance   [address] 4  Minimal Assistance    [facility] 3  Moderate [address]  3  Moderate Assistance   Bed Mobility   Additional Comments oob in chair    Transfers   Sit to Stand 4  Minimal assistance   Stand to Sit 4  Minimal assistance   Stand pivot 4  Minimal assistance   Functional Mobility   Functional Mobility 4  Minimal assistance   Additional Comments moves very slowly - requires cues t/o for sequencing, following thr prec - anxious and requires positive reinforcement   Additional items Rolling walker   Balance   Static Sitting Fair +   Dynamic Sitting Fair   Static Standing Fair -   Dynamic Standing Poor +   Ambulatory Poor +   Activity Tolerance   Activity Tolerance Patient limited by fatigue;Patient limited by pain   RUE Assessment   RUE Assessment WFL   LUE Assessment   LUE Assessment WFL   Cognition   Arousal/Participation Alert; Cooperative   Attention Attends with cues to redirect   Orientation Level Oriented X4   Memory Decreased recall of precautions   Following Commands Follows one step commands with increased time or repetition   Assessment   Limitation Decreased ADL status; Decreased endurance;Decreased self-care trans;Decreased high-level ADLs   Prognosis Good   Assessment Pt is a 68 y o  female who was admitted to [facility] on 9/23/2019 with Status post total hip replacement, right -posterior approach  Pt's problem list also includes PMH of DM, HTN and alopecia, calcaneal spur, hld, OA, pes planus, plantar fascitis   At baseline pt was completing adls and mobility independently however admits she was struggling at home PTA - I iadls  Pt lives alone in 2 story home with first floor setup - sleeps in recliner  Currently pt requires mod assist for overall ADLS and min to mod assist for functional mobility/transfers  Pt currently presents with impairments in the following categories -steps to enter environment, limited home support, difficulty performing ADLS and difficulty performing IADLS  activity tolerance, endurance and standing balance/tolerance  These impairments, as well as pt's fatigue, pain, hip precautions, orthopedic restricitions , WBS , decreased caregiver support and risk for falls  limit pt's ability to safely engage in all baseline areas of occupation, includingbathing, dressing, toileting, functional mobility/transfers, community mobility, laundry , driving, house maintenance, meal prep, cleaning, social participation  and leisure activities  From OT standpoint, recommendations depend upon progress and ability to secure additional support  upon D/C  OT will continue to follow to address the below stated goals  Goals   Patient Goals have less pain    LTG Time Frame 7-10   Long Term Goal #1 refer to established goals below   Plan   Treatment Interventions ADL retraining;Functional transfer training; Endurance training;Cognitive reorientation;Patient/family training;Equipment evaluation/education; Compensatory technique education; Activityengagement   Goal Expiration Date 10/04/19   OT Frequency 3-5x/wk   Recommendation   OT Discharge Recommendation Other (Comment)  (pending progress and additional support )   Barthel Index   Feeding 5   Bathing 0   Grooming Score 0   Dressing Score 5   Bladder Score 10   Bowels Score 10   Toilet Use Score 5   Transfers (Bed/Chair) Score 5   Mobility (Level Surface) Score 0   Stairs Score 0   Barthel Index Score 40       OCCUPATIONAL THERAPY GOALS:    *Mod I adls after setup with use of AE    *Mod I toileting and clothing management   *Mod I functional mobility and transfers to/from all surfaces with good dynamic balance and safety for participation in dynamic adls and iadl tasks   *Demonstrate good carryover with safe use of RW, THR prec and use of LHAE  during functional tasks   *Assess DME needs   *Increase activity tolerance to 35-40 minutes for participation in adls and enjoyable activities  *Assist with safe d/c recommendations     Renuka Arauz, OT

## 2019-09-24 NOTE — PLAN OF CARE
Problem: PHYSICAL THERAPY ADULT  Goal: Performs mobility at highest level of function for planned discharge setting  See evaluation for individualized goals  Description  Treatment/Interventions: Functional transfer training, LE strengthening/ROM, Elevations, Endurance training, Therapeutic exercise, Patient/family training, Equipment eval/education, Bed mobility, Gait training  Equipment Recommended: Guille Arevalo       See flowsheet documentation for full assessment, interventions and recommendations  9/24/2019 1727 by Kevan Herrera PTA  Outcome: Progressing  Note:   Prognosis: Good  Problem List: Decreased strength, Decreased range of motion, Decreased endurance, Impaired balance, Decreased mobility, Pain, Orthopedic restrictions  Assessment: Pt continues to be limited by pain with mobilty this session  Able to perform transfers with mod A to reach EOB, then min A for OOB mobilty  Pt able to maintain static standing balance without issue, but unable to tolerate increased weight bearing on RLE during stance phase to advance LLE  Seated rests given to recover  pt performed pivot to commode without incident, and demonstrated improved mobilty due to urgency, but required instructions for safety  Educated pt in pain management, hip precautions, and goals for next session  Pt & brother verbalized understanding at this time  Continue to plan for discharge home with family support and home PT pending progress  If pt unable make significant progress next session, will require rehab at discharge  Barriers to Discharge: Inaccessible home environment     Recommendation: (home PT vs rehab pending progress)     PT - OK to Discharge: No    See flowsheet documentation for full assessment       9/24/2019 1320 by Kevan Herrera PTA  Outcome: Progressing  Note:   Prognosis: Good  Problem List: Decreased strength, Decreased range of motion, Decreased endurance, Impaired balance, Decreased mobility, Pain, Orthopedic restrictions  Assessment: Pt continues to perform all mobility with min A x 1 this session with standby assist for line management & safety  All mobility performed slowly due to pain & anxiety with instructions & encouragement required throughout to maximize outcomes  Pt anxious with performing tasks & perseverates on her progress during session  RW adjusted during session for comfort  Distances limited by increased pain during stance phase of gait, which limits her step length  Next session, plan to increase ambulatin distances & improve transfer technique to decrease required assist at this time  Continue POC at this time  If pt's brother able to stay with pt & assist with mobility at home, pt may be able to progress to home with home PT  If increased assist is unavailable, pt will most likely require rehab to maximize independence for safe return home  Pt will report on conversation with brother in upcoming session  Barriers to Discharge: Inaccessible home environment, Decreased caregiver support     Recommendation: (home PT vs rehab pending progress & support)     PT - OK to Discharge: No    See flowsheet documentation for full assessment

## 2019-09-24 NOTE — PHYSICAL THERAPY NOTE
Physical Therapy Progress Note     09/24/19 1118   Pain Assessment   Pain Assessment 0-10   Pain Score 7   Pain Location Hip   Pain Orientation Right   Hospital Pain Intervention(s) Repositioned; Ambulation/increased activity; Rest   Restrictions/Precautions   RLE Weight Bearing Per Order WBAT   Other Precautions WBS;THR;Fall Risk;Pain;Multiple lines   Subjective   Subjective Pt encountered seated in recliner, anxious, but agreeable to treatment  Encouragement & instructions required for all tasks  Pt reports she can recieve help from brother & will call him before PM session to ensure that he can stay with her at discharge  Transfers   Sit to Stand 4  Minimal assistance   Additional items Assist x 1; Armrests; Increased time required;Verbal cues  (+ standby)   Stand to Sit 5  Supervision   Additional items Assist x 1; Armrests; Increased time required;Verbal cues   Stand pivot 4  Minimal assistance   Additional items Assist x 1  (+ standby )   Ambulation/Elevation   Gait pattern Decreased R stance;Decreased foot clearance;Shuffling;Excessively slow; Step to;Short stride; Foward flexed; Antalgic;Poor UE support   Gait Assistance 4  Minimal assist   Additional items Assist x 1   Assistive Device Rolling walker   Distance 10', 8' recliner to commode   Balance   Static Sitting Fair +   Static Standing Fair -   Ambulatory Poor +   Endurance Deficit   Endurance Deficit Yes   Endurance Deficit Description pain, weakness, anxiety   Activity Tolerance   Activity Tolerance Patient tolerated treatment well;Patient limited by fatigue;Patient limited by pain   Nurse Alanna Gay RN   Assessment   Prognosis Good   Problem List Decreased strength;Decreased range of motion;Decreased endurance; Impaired balance;Decreased mobility;Pain;Orthopedic restrictions   Assessment Pt continues to perform all mobility with min A x 1 this session with standby assist for line management & safety    All mobility performed slowly due to pain & anxiety with instructions & encouragement required throughout to maximize outcomes  Pt anxious with performing tasks & perseverates on her progress during session  RW adjusted during session for comfort  Distances limited by increased pain during stance phase of gait, which limits her step length  Next session, plan to increase ambulatin distances & improve transfer technique to decrease required assist at this time  Continue POC at this time  If pt's brother able to stay with pt & assist with mobility at home, pt may be able to progress to home with home PT  If increased assist is unavailable, pt will most likely require rehab to maximize independence for safe return home  Pt will report on conversation with brother in upcoming session  Barriers to Discharge Inaccessible home environment;Decreased caregiver support   Goals   Patient Goals to do better & have less pain   STG Expiration Date 10/03/19   PT Treatment Day 1   Plan   Treatment/Interventions Functional transfer training;LE strengthening/ROM; Elevations; Therapeutic exercise; Endurance training;Patient/family training;Equipment eval/education; Bed mobility;Gait training   PT Frequency 7x/wk; Twice a day   Recommendation   Recommendation   (home PT vs rehab pending progress & support)   Equipment Recommended Walker  (pediatric RW)   PT - OK to Discharge No     Luis Rodriguez, PTA

## 2019-09-24 NOTE — PLAN OF CARE
Problem: OCCUPATIONAL THERAPY ADULT  Goal: Performs self-care activities at highest level of function for planned discharge setting  See evaluation for individualized goals  Description  Treatment Interventions: ADL retraining, Functional transfer training, Endurance training, Cognitive reorientation, Patient/family training, Equipment evaluation/education, Compensatory technique education, Activityengagement          See flowsheet documentation for full assessment, interventions and recommendations  Note:   Limitation: Decreased ADL status, Decreased endurance, Decreased self-care trans, Decreased high-level ADLs  Prognosis: Good  Assessment: Pt is a 68 y o  female who was admitted to [facility] on 9/23/2019 with Status post total hip replacement, right -posterior approach  Pt's problem list also includes PMH of DM, HTN and alopecia, calcaneal spur, hld, OA, pes planus, plantar fascitis  At baseline pt was completing adls and mobility independently however admits she was struggling at home PTA - I iadls  Pt lives alone in 2 story home with first floor setup - sleeps in recliner  Currently pt requires mod assist for overall ADLS and min to mod assist for functional mobility/transfers  Pt currently presents with impairments in the following categories -steps to enter environment, limited home support, difficulty performing ADLS and difficulty performing IADLS  activity tolerance, endurance and standing balance/tolerance   These impairments, as well as pt's fatigue, pain, hip precautions, orthopedic restricitions , WBS , decreased caregiver support and risk for falls  limit pt's ability to safely engage in all baseline areas of occupation, includingbathing, dressing, toileting, functional mobility/transfers, community mobility, laundry , driving, house maintenance, meal prep, cleaning, social participation  and leisure activities  From OT standpoint, recommendations depend upon progress and ability to secure additional support  upon D/C  OT will continue to follow to address the below stated goals        OT Discharge Recommendation: Other (Comment)(pending progress and additional support )

## 2019-09-24 NOTE — PROGRESS NOTES
Pastoral Care Progress Note    2019  Patient: Chanel Obregon : 1950  Admission Date & Time: 2019 0553  MRN: [id number] [facility]: [id number]                     Chaplaincy Interventions Utilized:   Empowerment: Provided chaplaincy education    Exploration: Facilitated story telling    Collaboration:     Relationship Building: Cultivated a relationship of care and support and Listened empathically    Ritual:   Chaplaincy Outcomes Achieved:  Expressed gratitude, Expressed peace, Identified meaningful connections and Improved communication  Spiritual Coping Strategies Utilized:   No spiritual coping       [id number] Affiliation [address] Orthodoxy   Current Adventism Involvement Patient active with [employer]   Spiritual Beliefs/Perceptions   Concept of God Accepting   Relationship with God Close   Support Systems Family members   Stress Factors   Patient Stress Factors Health changes   Coping Responses   Patient Coping Accepting;Open/discussion   Plan of Care   Comments Cultivated a relationship of care and support   Assessment Completed by: Unit visit

## 2019-09-24 NOTE — SOCIAL WORK
Initial interview:     CM met with the patient to review the CM role and discuss possible dc needs  Pt lives alone in a 1/2 double, 2 story home in [place], [place]  [address]  2nd bedroom and full bathroom  Pt reports that she sleeps in a recliner chair on the 1st floor and has a 1/2 bath on that level  Pt reported a history of sliding out of the recliner and requiring assist to get up  Pt uses a SPC for mobility, is retired and her brother drives for her  No other DME  No hx of VNA  or IP rehab  Pt denied drug, etoh or mental illness history  No POA or LW  Prescriptions are filled at [facility] in [place]  Main contact:   Brother Hilaria Diaz [phone], [phone]    DC Plan - home via brother, DME needs - RW, BSC and a hospital bed d/t high risk of fall / hx of sliding out of recliner  Pt reported that she has already received her Lovenox from her pharmacy  DME scripts for RW and [facility] sent to [facility] via [facility]  CM requested hospital bed script from  [facility]  CM will follow  CM reviewed d/c planning process including the following: identifying help at home, patient preference for d/c planning needs, Discharge Lounge, [facility] Meds to Bed program, availability of treatment team to discuss questions or concerns patient and/or family may have regarding understanding medications and recognizing signs and symptoms once discharged  CM also encouraged patient to follow up with all recommended appointments after discharge  Patient advised of importance for patient and family to participate in managing patients medical well being

## 2019-09-24 NOTE — PHYSICAL THERAPY NOTE
Physical Therapy Progress Note     09/24/19 1705   Pain Assessment   Pain Assessment 0-10   Pain Score 7   Pain Location Hip   Pain Orientation Right   Hospital Pain Intervention(s) Repositioned; Ambulation/increased activity; Rest;Medication (See MAR)   Response to Interventions worse with weight bearing RLE   Restrictions/Precautions   RLE Weight Bearing Per Order WBAT   Other Precautions WBS;THR;Pain; Fall Risk   Subjective   Subjective Pt encountered supine in bed, about to transfer to [facility] with nursing present  Pt reports sleeping through lunch today, and feels more rested now  Pt reports elevated pain with weight bearing which increases her anxiety about ambulation  RN notifed at conclusion of session  Bed Mobility   Supine to Sit 3  Moderate assistance   Additional items Assist x 1; Increased time required;LE management   Transfers   Sit to Stand 5  Supervision   Additional items Assist x 1; Armrests; Increased time required   Stand to Sit 5  Supervision   Additional items Assist x 1; Armrests; Increased time required   Stand pivot 4  Minimal assistance   Additional items Assist x 1; Armrests; Increased time required   Ambulation/Elevation   Gait pattern Excessively slow; Step to;Short stride; Shuffling;Decreased R stance; Forward Flexion; Antalgic; Improper Weight shift   Gait Assistance 4  Minimal assist   Additional items Assist x 1   Assistive Device Rolling walker   Distance 3' x 2, 5' x 2   Balance   Static Sitting Fair +   Static Standing Fair -   Ambulatory Poor +   Endurance Deficit   Endurance Deficit Yes   Endurance Deficit Description pain   Activity Tolerance   Activity Tolerance Patient limited by pain   Nurse Beata Rasheed RN   Assessment   Prognosis Good   Problem List Decreased strength;Decreased range of motion;Decreased endurance; Impaired balance;Decreased mobility;Pain;Orthopedic restrictions   Assessment Pt continues to be limited by pain with mobilty this session    Able to perform transfers with mod A to reach EOB, then min A for OOB mobilty  Pt able to maintain static standing balance without issue, but unable to tolerate increased weight bearing on RLE during stance phase to advance LLE  Seated rests given to recover  pt performed pivot to commode without incident, and demonstrated improved mobilty due to urgency, but required instructions for safety  Educated pt in pain management, hip precautions, and goals for next session  Pt & brother verbalized understanding at this time  Continue to plan for discharge home with family support and home PT pending progress  If pt unable make significant progress next session, will require rehab at discharge  Barriers to Discharge Inaccessible home environment   Goals   Patient Goals for pain to go down & do better tomorrow   STG Expiration Date 10/03/19   PT Treatment Day 2   Plan   Treatment/Interventions Functional transfer training;LE strengthening/ROM; Elevations; Therapeutic exercise; Endurance training;Patient/family training;Equipment eval/education; Bed mobility;Gait training   Progress Progressing toward goals   PT Frequency 7x/wk; Twice a day   Recommendation   Recommendation   (home PT vs rehab pending progress)   Equipment Recommended Walker  (pediatric RW)   PT - OK to Discharge No     Genie Lopez, PTA

## 2019-09-24 NOTE — UTILIZATION REVIEW
Initial Clinical Review    Elective Inpatient surgical procedure    Age/Sex: 68 y o  female     Surgery Date: 9/23    Procedure: S/P ARTHROPLASTY HIP TOTAL (Right)    Anesthesia: Spinal    Admission Orders: Date/Time/Statement: Inpatient Admission Orders (From admission, onward)     Ordered        09/23/19 0912  Inpatient Admission  Once                   Orders Placed This Encounter   Procedures    Inpatient Admission     Standing Status:   Standing     Number of Occurrences:   1     Order Specific Question:   Admitting Physician     Answer:   Dolores Smith [[id number]]     Order Specific Question:   Level of Care     Answer:   Med Surg [[id number]]     Order Specific Question:   Estimated length of stay     Answer:   More than 2 Midnights     Order Specific Question:   Certification     Answer:   I certify that inpatient services are medically necessary for this patient for a duration of greater than two midnights  See H&P and MD Progress Notes for additional information about the patient's course of treatment       Vital Signs: /65 (BP Location: Left arm)   Pulse 92   Temp 98 4 °F (36 9 °C) (Oral)   Resp 18   Ht 5' 2" (1 575 m)   Wt 93 7 kg (206 lb 9 1 oz)   SpO2 93%   BMI 37 78 kg/m²      Diet: Consistent Carb   Mobility: Activity as tolerated  DVT Prophylaxis: Sequential compression device    Medications/Pain Control:   Current Facility-Administered Medications:  acetaminophen 975 mg Oral Q8H    ascorbic acid 500 mg Oral BID    docusate sodium 100 mg Oral BID    enoxaparin 40 mg Subcutaneous Daily    ferrous sulfate 325 mg Oral Daily With Breakfast    folic acid 1 mg Oral Daily    gabapentin 300 mg Oral TID    hydrALAZINE 25 mg Oral Q8H PRN    HYDROmorphone 0 5 mg Intravenous Q2H PRN    insulin lispro 1-5 Units Subcutaneous TID AC    insulin lispro 1-5 Units Subcutaneous HS    iron sucrose 300 mg Intravenous Once    lactated ringers 1,000 mL Intravenous Once PRN    lactated ringers 500 mL Intravenous Once PRN lactated ringers 125 mL/hr Intravenous Continuous    lactated ringers 50 mL/hr Intravenous Continuous    metFORMIN 500 mg Oral BID With Meals    methocarbamol 500 mg Oral Q6H [address]    ondansetron 4 mg Intravenous Q6H PRN    oxyCODONE 10 mg Oral Q4H PRN 9/24 x1   oxyCODONE 5 mg Oral Q4H PRN 9/23 x2, 9/24 x2   pravastatin 40 mg Oral Daily With Dinner    senna 1 tablet Oral Daily      Network Utilization Review Department  Phone: [phone]; Fax [phone]  [email or web address]  ATTENTION: Please call with any questions or concerns to [phone]  and carefully listen to the prompts so that you are directed to the right person  Send all requests for admission clinical reviews, approved or denied determinations and any other requests to fax [phone]   All voicemails are confidential

## 2019-09-25 DIAGNOSIS — I47.1 SUPRAVENTRICULAR TACHYCARDIA (HCC): Primary | ICD-10-CM

## 2019-09-25 PROBLEM — I47.10 SUPRAVENTRICULAR TACHYCARDIA: Status: ACTIVE | Noted: 2019-09-25

## 2019-09-25 LAB
ANION GAP SERPL CALCULATED.3IONS-SCNC: 6 MMOL/L (ref 4–13)
BUN SERPL-MCNC: 7 MG/DL (ref 5–25)
CALCIUM SERPL-MCNC: 8.7 MG/DL (ref 8.3–10.1)
CHLORIDE SERPL-SCNC: 102 MMOL/L (ref 100–108)
CO2 SERPL-SCNC: 27 MMOL/L (ref 21–32)
CREAT SERPL-MCNC: 0.52 MG/DL (ref 0.6–1.3)
ERYTHROCYTE [DISTWIDTH] IN BLOOD BY AUTOMATED COUNT: 13.1 % (ref 11.6–15.1)
GFR SERPL CREATININE-BSD FRML MDRD: 96 ML/MIN/1.73SQ M
GLUCOSE SERPL-MCNC: 102 MG/DL (ref 65–140)
GLUCOSE SERPL-MCNC: 102 MG/DL (ref 65–140)
GLUCOSE SERPL-MCNC: 112 MG/DL (ref 65–140)
GLUCOSE SERPL-MCNC: 123 MG/DL (ref 65–140)
GLUCOSE SERPL-MCNC: 142 MG/DL (ref 65–140)
HCT VFR BLD AUTO: 33.4 % (ref 34.8–46.1)
HGB BLD-MCNC: 10.5 G/DL (ref 11.5–15.4)
MCH RBC QN AUTO: 31 PG (ref 26.8–34.3)
MCHC RBC AUTO-ENTMCNC: 31.4 G/DL (ref 31.4–37.4)
MCV RBC AUTO: 99 FL (ref 82–98)
PLATELET # BLD AUTO: 203 THOUSANDS/UL (ref 149–390)
PMV BLD AUTO: 9.7 FL (ref 8.9–12.7)
POTASSIUM SERPL-SCNC: 3.5 MMOL/L (ref 3.5–5.3)
RBC # BLD AUTO: 3.39 MILLION/UL (ref 3.81–5.12)
SODIUM SERPL-SCNC: 135 MMOL/L (ref 136–145)
TROPONIN I SERPL-MCNC: <0.02 NG/ML
TSH SERPL DL<=0.05 MIU/L-ACNC: 1.23 UIU/ML (ref 0.36–3.74)
WBC # BLD AUTO: 12 THOUSAND/UL (ref 4.31–10.16)

## 2019-09-25 PROCEDURE — 97530 THERAPEUTIC ACTIVITIES: CPT

## 2019-09-25 PROCEDURE — 80048 BASIC METABOLIC PNL TOTAL CA: CPT | Performed by: ORTHOPAEDIC SURGERY

## 2019-09-25 PROCEDURE — 84443 ASSAY THYROID STIM HORMONE: CPT | Performed by: INTERNAL MEDICINE

## 2019-09-25 PROCEDURE — 99222 1ST HOSP IP/OBS MODERATE 55: CPT | Performed by: INTERNAL MEDICINE

## 2019-09-25 PROCEDURE — 97535 SELF CARE MNGMENT TRAINING: CPT

## 2019-09-25 PROCEDURE — 84484 ASSAY OF TROPONIN QUANT: CPT | Performed by: INTERNAL MEDICINE

## 2019-09-25 PROCEDURE — 84484 ASSAY OF TROPONIN QUANT: CPT | Performed by: NURSE PRACTITIONER

## 2019-09-25 PROCEDURE — 85027 COMPLETE CBC AUTOMATED: CPT | Performed by: ORTHOPAEDIC SURGERY

## 2019-09-25 PROCEDURE — 97116 GAIT TRAINING THERAPY: CPT

## 2019-09-25 PROCEDURE — 93005 ELECTROCARDIOGRAM TRACING: CPT

## 2019-09-25 PROCEDURE — 82948 REAGENT STRIP/BLOOD GLUCOSE: CPT

## 2019-09-25 PROCEDURE — 99024 POSTOP FOLLOW-UP VISIT: CPT | Performed by: PHYSICIAN ASSISTANT

## 2019-09-25 RX ORDER — LOSARTAN POTASSIUM 50 MG/1
50 TABLET ORAL DAILY
Status: DISCONTINUED | OUTPATIENT
Start: 2019-09-26 | End: 2019-09-27 | Stop reason: HOSPADM

## 2019-09-25 RX ORDER — POTASSIUM CHLORIDE 20 MEQ/1
40 TABLET, EXTENDED RELEASE ORAL ONCE
Status: COMPLETED | OUTPATIENT
Start: 2019-09-25 | End: 2019-09-25

## 2019-09-25 RX ADMIN — ENOXAPARIN SODIUM 40 MG: 40 INJECTION SUBCUTANEOUS at 08:00

## 2019-09-25 RX ADMIN — DOCUSATE SODIUM 100 MG: 100 CAPSULE, LIQUID FILLED ORAL at 17:00

## 2019-09-25 RX ADMIN — OXYCODONE HYDROCHLORIDE 5 MG: 5 TABLET ORAL at 03:54

## 2019-09-25 RX ADMIN — GABAPENTIN 300 MG: 300 CAPSULE ORAL at 15:10

## 2019-09-25 RX ADMIN — METFORMIN HYDROCHLORIDE 500 MG: 500 TABLET ORAL at 07:19

## 2019-09-25 RX ADMIN — OXYCODONE HYDROCHLORIDE AND ACETAMINOPHEN 500 MG: 500 TABLET ORAL at 08:00

## 2019-09-25 RX ADMIN — OXYCODONE HYDROCHLORIDE 10 MG: 5 TABLET ORAL at 08:00

## 2019-09-25 RX ADMIN — ACETAMINOPHEN 975 MG: 325 TABLET ORAL at 05:04

## 2019-09-25 RX ADMIN — OXYCODONE HYDROCHLORIDE AND ACETAMINOPHEN 500 MG: 500 TABLET ORAL at 17:00

## 2019-09-25 RX ADMIN — METFORMIN HYDROCHLORIDE 500 MG: 500 TABLET ORAL at 16:48

## 2019-09-25 RX ADMIN — POTASSIUM CHLORIDE 40 MEQ: 1500 TABLET, EXTENDED RELEASE ORAL at 15:11

## 2019-09-25 RX ADMIN — GABAPENTIN 300 MG: 300 CAPSULE ORAL at 22:12

## 2019-09-25 RX ADMIN — METHOCARBAMOL TABLETS 500 MG: 500 TABLET, COATED ORAL at 05:04

## 2019-09-25 RX ADMIN — METHOCARBAMOL TABLETS 500 MG: 500 TABLET, COATED ORAL at 11:42

## 2019-09-25 RX ADMIN — DOCUSATE SODIUM 100 MG: 100 CAPSULE, LIQUID FILLED ORAL at 08:00

## 2019-09-25 RX ADMIN — METOPROLOL TARTRATE 12.5 MG: 25 TABLET ORAL at 13:42

## 2019-09-25 RX ADMIN — SENNOSIDES 8.6 MG: 8.6 TABLET, FILM COATED ORAL at 08:00

## 2019-09-25 RX ADMIN — PRAVASTATIN SODIUM 40 MG: 40 TABLET ORAL at 16:48

## 2019-09-25 RX ADMIN — FOLIC ACID 1 MG: 1 TABLET ORAL at 08:00

## 2019-09-25 RX ADMIN — METHOCARBAMOL TABLETS 500 MG: 500 TABLET, COATED ORAL at 17:23

## 2019-09-25 RX ADMIN — ACETAMINOPHEN 975 MG: 325 TABLET ORAL at 11:45

## 2019-09-25 RX ADMIN — ACETAMINOPHEN 975 MG: 325 TABLET ORAL at 19:51

## 2019-09-25 RX ADMIN — METHOCARBAMOL TABLETS 500 MG: 500 TABLET, COATED ORAL at 00:06

## 2019-09-25 RX ADMIN — METOPROLOL TARTRATE 12.5 MG: 25 TABLET ORAL at 22:11

## 2019-09-25 RX ADMIN — FERROUS SULFATE TAB 325 MG (65 MG ELEMENTAL FE) 325 MG: 325 (65 FE) TAB at 07:19

## 2019-09-25 RX ADMIN — OXYCODONE HYDROCHLORIDE 10 MG: 5 TABLET ORAL at 19:50

## 2019-09-25 RX ADMIN — GABAPENTIN 300 MG: 300 CAPSULE ORAL at 08:00

## 2019-09-25 NOTE — PHYSICAL THERAPY NOTE
Physical Therapy Progress Note     09/25/19 7375   Pain Assessment   Pain Assessment 0-10   Pain Score 5   Pain Location Hip   Pain Orientation Right   Hospital Pain Intervention(s) Repositioned; Ambulation/increased activity; Elevated; Rest   Response to Interventions tolerated   Restrictions/Precautions   RLE Weight Bearing Per Order WBAT   Other Precautions WBS;THR;Pain; Fall Risk   Subjective   Subjective Pt encountered seated in recliner with family present  Agreeable to treatment once family left  Reports increased fatigue, chills, bloating & increased flatulence this session  Pt declined further ambulation due to these symptoms, but agreeable to remain in chair with encouragement  Transfers   Sit to Stand 5  Supervision   Additional items Assist x 1; Armrests; Increased time required   Stand to Sit 5  Supervision   Additional items Assist x 1; Armrests; Increased time required   Ambulation/Elevation   Gait pattern Excessively slow; Step to;Short stride; Shuffling;Decreased R stance;Decreased foot clearance; Antalgic; Improper Weight shift   Gait Assistance 5  Supervision   Additional items Assist x 1   Assistive Device Rolling walker   Distance 40'   Balance   Static Sitting Fair +   Static Standing Fair   Ambulatory Fair -   Endurance Deficit   Endurance Deficit Yes   Endurance Deficit Description fatigue   Activity Tolerance   Activity Tolerance Patient tolerated treatment well;Patient limited by fatigue   Nurse Made Aware [id number] Fred Oneal RN   Assessment   Prognosis Good   Problem List Decreased strength;Decreased range of motion;Decreased endurance; Impaired balance;Decreased mobility;Pain;Orthopedic restrictions   Assessment Pt limited this session by increased fatigue compared to AM session  Required increased encouragment to perform tasks & maximize participation  Pt able to perform all mobility without assist & good awareness of precautions throughout    Upon return to recliner, pt instructed in plan for tomorrow to increase ambulation, perform stair training again, and have brother present for education with all tasks  Pt verbalized understanding & agreement  Continue with current discharge plan  Anticipate discharge home wtih progress in 1-2 sessions as long as increased assist can be provided  If assist is unavailable, will require rehab  Barriers to Discharge Inaccessible home environment;Decreased caregiver support   Goals   Patient Goals to feel better & walk more tomorrow   STG Expiration Date 10/03/19   PT Treatment Day 4   Plan   Treatment/Interventions Functional transfer training;LE strengthening/ROM; Therapeutic exercise;Elevations; Endurance training;Patient/family training;Equipment eval/education; Bed mobility;Gait training   Progress Progressing toward goals   PT Frequency 7x/wk; Twice a day   Recommendation   Recommendation   (home PT vs rehab pending progress, family assist)   Equipment Recommended Augusta Hernandez   PT - OK to Discharge No     Nonnie Sos, PTA

## 2019-09-25 NOTE — CONSULTS
Consultation - Cardiology Team One  Trinna Riedel 68 y o  female MRN: [id number]  Unit/Bed#: -01 Encounter: [id number]    Inpatient consult to Cardiology  Consult performed by: WILLIAN Bruce  Consult ordered by: WILLIAN Banegas        Physician Requesting Consult: Faith Chinchilla MD     Reason for Consult / Principal Problem: tachycardia    Assessment/ Plan:    SVT: pt with 30-45 min of asymptomatic SVT; Hrs 150bpm    She went back into SR on her own; repeat ekg showing sinus rhythm with rate 92  No prior hx  Placed on low dose metoprolol tartrate 12 5mg BID by medicine team; continue this dose on discharge  K 3 5; give 40meq x1 today  Repeat labs in AM and echo if she will be staying overnight  If not she can have an echo as OP  Follow up with Dr Rhoda Bailey as OP 10/30/19  HTN: on losartan 100mg as OP; BP stable 117/72; adding metoprolol for SVT; will decrease losartan dose to 50mg daily  History of Present Illness      HPI: Trinna Riedel is a 68y o  year old female who has a history of HTN, dyslididemia, DM Type 2 and chronic back pain  She presented on 9/23 for elective right total hip arthroplasty  Surgery uncomplicated per OP report  She has minimal EBL intraoperatively  She was hypotensive in PACU which responded to IVF and BP remained stable  No events since surgery; she has been participating in therapy, This afternoon while after completing PT and eating her lunch she became tachycardic; this was only noted on HR monitor by nursing staff  EKG at the time showed a narrow complex regular tachycardia; consistent with SVT  She was not experiencing any sytmptoms including chest pain, palpitations, dizziness, lightheadedness, SOB  This rhythm persisted for about 30-40min and then her HR decreased  Medicine team started her on metoprolol tartrate 12 5mg BID and a cardiology consult was requested for further evaluation     At time of my exam pt reports feeling well  She has not experienced any cardiac symptoms today  Denies nay prior hx of arrhythmias including atrial fibrillation/flutter, MI/CAD, CHF  No prior cardiac testing  Does not get exertional SOB or chest pain  She did have a pre-op ekg 8/2019 that showed sinus rhythm with HR 79  EKG reviewed personally:  9/25/19  12:15  Narrow complex tachycardia; SVT    9/25/2019  Sinus rhythm  Rate 92    Telemetry reviewed personally:   No telemetry    Review of Systems   Constitution: Negative for decreased appetite and fever  Cardiovascular: Negative for chest pain, dyspnea on exertion, leg swelling, orthopnea, palpitations and syncope  Respiratory: Negative for cough, shortness of breath and wheezing  Gastrointestinal: Negative for abdominal pain, nausea and vomiting  Genitourinary: Negative for dysuria  Neurological: Negative for dizziness and light-headedness  Psychiatric/Behavioral: Negative for altered mental status  All other systems reviewed and are negative  Historical Information   Past Medical History:   Diagnosis Date    Alopecia     Calcaneal spur     [facility] insipidus [facility] [address]     Diabetes mellitus ([facility] [address] [facility]     Hyperlipidemia     Hypertension     Osteoarthrosis 2/13/2013    Pes planus     unspecified laterality    Plantar fasciitis      Past Surgical History:   Procedure Laterality Date    CARPAL TUNNEL RELEASE      COLONOSCOPY      May 2006    INCISIONAL BREAST BIOPSY      NM COLONOSCOPY FLX DX W/COLLJ SPEC WHEN PFRMD N/A 5/4/2018    Procedure: COLONOSCOPY;  Surgeon: Kely Alegre MD;  Location: [facility];   Service: Colorectal    NM TOTAL HIP ARTHROPLASTY Right 9/23/2019    Procedure: ARTHROPLASTY HIP TOTAL;  Surgeon: Taurus Clinton MD;  Location: [facility];  Service: Orthopedics    WISDOM TOOTH EXTRACTION       Social History     Substance and Sexual Activity   Alcohol Use Yes    Frequency: 2-4 times a month    Drinks per session: 1 or 2    Binge frequency: Never     Social History     Substance and Sexual Activity   Drug Use No     Social History     Tobacco Use   Smoking Status Never Smoker   Smokeless Tobacco Never Used     Family History:   Family History   Problem Relation Age of Onset    Diabetes Mother     Hypertension Mother     Emphysema Father     Cancer Brother     Colon cancer Brother 40     Meds/Allergies   current meds:   Current Facility-Administered Medications   Medication Dose Route Frequency    acetaminophen (TYLENOL) tablet 975 mg  975 mg Oral Q8H    ascorbic acid (VITAMIN C) tablet 500 mg  500 mg Oral BID    docusate sodium (COLACE) capsule 100 mg  100 mg Oral BID    enoxaparin (LOVENOX) subcutaneous injection 40 mg  40 mg Subcutaneous Daily    ferrous sulfate tablet 325 mg  325 mg Oral Daily With Breakfast    folic acid (FOLVITE) tablet 1 mg  1 mg Oral Daily    gabapentin (NEURONTIN) capsule 300 mg  300 mg Oral TID    hydrALAZINE (APRESOLINE) tablet 25 mg  25 mg Oral Q8H PRN    insulin lispro (HumaLOG) 100 units/mL subcutaneous injection 1-5 Units  1-5 Units Subcutaneous TID AC    insulin lispro (HumaLOG) 100 units/mL subcutaneous injection 1-5 Units  1-5 Units Subcutaneous HS    lactated ringers infusion  125 mL/hr Intravenous Continuous    lactated ringers infusion  50 mL/hr Intravenous Continuous    metFORMIN (GLUCOPHAGE) tablet 500 mg  500 mg Oral BID With Meals    methocarbamol (ROBAXIN) tablet 500 mg  500 mg Oral Q6H LANRE    metoprolol tartrate (LOPRESSOR) partial tablet 12 5 mg  12 5 mg Oral Q12H LANRE    ondansetron (ZOFRAN) injection 4 mg  4 mg Intravenous Q6H PRN    oxyCODONE (ROXICODONE) IR tablet 10 mg  10 mg Oral Q4H PRN    oxyCODONE (ROXICODONE) IR tablet 5 mg  5 mg Oral Q4H PRN    pravastatin (PRAVACHOL) tablet 40 mg  40 mg Oral Daily With Dinner    senna (SENOKOT) tablet 8 6 mg  1 tablet Oral Daily       lactated ringers 125 mL/hr Last Rate: 75 mL/hr (09/23/19 0705)   lactated ringers 50 mL/hr Last Rate: Stopped (09/24/19 0932)     Allergies   Allergen Reactions    Meloxicam Hives    Nifedipine Edema    Sulfamethoxazole-Trimethoprim Edema     Objective   Vitals: Blood pressure 120/81, pulse (!) 152, temperature 98 4 °F (36 9 °C), resp  rate 18, height 5' 2" (1 575 m), weight 94 9 kg (209 lb 3 5 oz), SpO2 92 %  ,     Body mass index is 38 27 kg/m²  ,     Systolic (40NHH), MVM:410 , Min:119 , PPX:123     Diastolic (49VCU), CSN:99, Min:65, Max:81      Intake/Output Summary (Last 24 hours) at 9/25/2019 1347  Last data filed at 9/25/2019 0855  Gross per 24 hour   Intake 300 ml   Output 2350 ml   Net -2050 ml     Weight (last 2 days)     Date/Time   Weight    09/25/19 0610   94 9 (209 22)    09/24/19 0600   93 7 (206 57)    09/23/19 0913   99 2 (218 7)    09/23/19 0650   94 8 (209)            Invasive Devices     Peripheral Intravenous Line            Peripheral IV 09/23/19 Left Hand 2 days              Physical Exam   Constitutional: She is oriented to person, place, and time  No distress  HENT:   Head: Normocephalic and atraumatic  Neck: No JVD present  Cardiovascular: Normal rate, regular rhythm, S1 normal and S2 normal    No murmur heard  No LE edema   Pulmonary/Chest: Effort normal and breath sounds normal  She has no wheezes  She has no rales  Abdominal: Soft  Musculoskeletal: She exhibits no edema  Neurological: She is alert and oriented to person, place, and time  Skin: Skin is warm and dry  She is not diaphoretic  Psychiatric: She has a normal mood and affect  Her behavior is normal    Nursing note and vitals reviewed      LABORATORY RESULTS:      CBC with diff: Results from last 7 days   Lab Units 09/25/19  0432 09/24/19  0609 09/23/19  1401   WBC Thousand/uL 12 00* 10 30*  --    HEMOGLOBIN g/dL 10 5* 10 4*  --    HEMATOCRIT % 33 4* 33 2*  --    MCV fL 99* 90  --    PLATELETS Thousands/uL 203 202 222   MCH pg 31 0 28 1  --    MCHC g/dL 31 4 31 3*  --    RDW % 13 1 16 5*  --    MPV fL 9 7 10 1 9  0     CMP:  Results from last 7 days   Lab Units 09/25/19  0432 09/24/19  0609   POTASSIUM mmol/L 3 5 3 0*   CHLORIDE mmol/L 102 103   CO2 mmol/L 27 28   BUN mg/dL 7 20   CREATININE mg/dL 0 52* 0 80   CALCIUM mg/dL 8 7 8 3   EGFR ml/min/1 73sq m 96 74     BMP:  Results from last 7 days   Lab Units 09/25/19  0432 09/24/19  0609   POTASSIUM mmol/L 3 5 3 0*   CHLORIDE mmol/L 102 103   CO2 mmol/L 27 28   BUN mg/dL 7 20   CREATININE mg/dL 0 52* 0 80   CALCIUM mg/dL 8 7 8 3         Lipid Profile:   Lab Results   Component Value Date    CHOL 202 04/21/2015    CHOL 207 (H) 08/13/2013     Lab Results   Component Value Date    HDL 54 04/08/2019    HDL 56 06/25/2018    HDL 58 10/11/2017     Lab Results   Component Value Date    LDLCALC 100 04/08/2019    LDLCALC 107 (H) 06/25/2018    LDLCALC 120 (H) 10/11/2017     Lab Results   Component Value Date    TRIG 154 (H) 04/08/2019    TRIG 151 (H) 06/25/2018    TRIG 137 10/11/2017     Imaging: I have personally reviewed pertinent reports  No results found  Assessment  Principal Problem:    Status post total hip replacement, right    Thank you for allowing us to participate in this patient's care  This pt will follow up with Dr Marlys Vazquez once discharged  Counseling / Coordination of Care  Total floor / unit time spent today 45 minutes  Greater than 50% of total time was spent with the patient and / or family counseling and / or coordination of care  A description of the counseling / coordination of care: Review of history, current assessment, development of a plan  Code Status: Level 1 - Full Code    ** Please Note: Dragon 360 Dictation voice to text software may have been used in the creation of this document   **

## 2019-09-25 NOTE — QUICK NOTE
Called by Dr Teodora Levy to come down and see the patient after she developed a sudden onset of tachycardia to 150 = she stayed in this for 1/2 hr to 45 minutes per nsg and was asymptomatic  BP stable  Sats stable  No SOB  She was sitting in chair at the time it occurred  HR then abruptly dropped back to baseline   Chest is clear  EKG done  Is not afib/flutter but looks like a re-entrant tachycardia  Reviewed all with Dr Teodora Levy and he will be in to see her later  May end up placing in BB  Considering abrupt onset/resolution and no sx, likely she has this as OP as well

## 2019-09-25 NOTE — PLAN OF CARE
Problem: OCCUPATIONAL THERAPY ADULT  Goal: Performs self-care activities at highest level of function for planned discharge setting  See evaluation for individualized goals  Description  Treatment Interventions: ADL retraining, Functional transfer training, Endurance training, Cognitive reorientation, Patient/family training, Equipment evaluation/education, Compensatory technique education, Activityengagement          See flowsheet documentation for full assessment, interventions and recommendations  Outcome: Progressing  Note:   Limitation: Decreased ADL status, Decreased endurance, Decreased self-care trans, Decreased high-level ADLs  Prognosis: Good  Assessment: Pt was seen this date for OT tx session focusing on self care tasks, sit to stand progressions, tranfers, LHAE education, THP reinforcment and reeducation, LB dressing with LHAE education and practice, home set up and problem solving discussion, energy conservation educaiton, fall prevention educaiton and overall activity toelrance  Pt presnts seated OOB in chair, compeltes previously mentioned tasks at documented assist levels, please see above in flowsheet for details  Pt continues to be unsure about level of support available upon d/c reporting her brother lives only 2 blocks away however she would be uncomfortable asking to to assist her with self care tasks, toileting tasks or physicallying having to "handle" her as he is her little brother and having him assist is not to her comfort level  Educated pt on improtance of use of [address] to be able to independently complete self cares and practiced with same, pt continues to require Min A at this time for overall stability in stance as well as increased cues on funciotnal use of LHAE while maintaining THP   Pt also educated on importance of having support at home upon d/c to maximize safety and decrease risk of falls, reports understanding however continues to report she is unsure if her brother will be staying with her upon d/c  Pt resting in chair at end of session with all needs in reach, Will continue to follow pt to assess funcitonal level in completion of full ADL task with good carryover of understanidng of THP with LHAE and overall safety with same as pt reports she would need to be completely independent with bathing, dressing, and toileting as she will not request assistance from her brother with same  Continue to follow with current POC        OT Discharge Recommendation: Other (Comment)(Pending clarification of support available upon D/C)

## 2019-09-25 NOTE — PLAN OF CARE
Problem: PHYSICAL THERAPY ADULT  Goal: Performs mobility at highest level of function for planned discharge setting  See evaluation for individualized goals  Description  Treatment/Interventions: Functional transfer training, LE strengthening/ROM, Elevations, Endurance training, Therapeutic exercise, Patient/family training, Equipment eval/education, Bed mobility, Gait training  Equipment Recommended: Sixto Ledezma       See flowsheet documentation for full assessment, interventions and recommendations  9/25/2019 1717 by Nicko Santiago PTA  Outcome: Progressing  Note:   Prognosis: Good  Problem List: Decreased strength, Decreased range of motion, Decreased endurance, Impaired balance, Decreased mobility, Pain, Orthopedic restrictions  Assessment: Pt limited this session by increased fatigue compared to AM session  Required increased encouragment to perform tasks & maximize participation  Pt able to perform all mobility without assist & good awareness of precautions throughout  Upon return to recliner, pt instructed in plan for tomorrow to increase ambulation, perform stair training again, and have brother present for education with all tasks  Pt verbalized understanding & agreement  Continue with current discharge plan  Anticipate discharge home wtih progress in 1-2 sessions as long as increased assist can be provided  If assist is unavailable, will require rehab  Barriers to Discharge: Inaccessible home environment, Decreased caregiver support     Recommendation: (home PT vs rehab pending progress, family assist)     PT - OK to Discharge: No    See flowsheet documentation for full assessment  9/25/2019 1708 by Nicko Santiago PTA  Outcome: Progressing  Note:   Prognosis: Good  Problem List: Decreased strength, Decreased range of motion, Decreased endurance, Impaired balance, Decreased mobility, Pain, Orthopedic restrictions  Assessment: Pt demonstrated improved functional mobility this session  Ambulated repeated household distances, but limited by fatigue & LE weakness  Continues to ambulate with slow, shuffling gait, but no LOB noted  Seated rest given between trials to recover  Pt ambulated on steps as noted above with instructions for sequencing  Required increased assist while descending due to instability & posterior weight shifting while stepping down  Continue to plan for discharge home with family support if pt able to continue progress  If increased assist is unavailable, pt will require rehab before return home to maximize indpendence & safety with all tasks  Barriers to Discharge: Decreased caregiver support, Inaccessible home environment     Recommendation: (home PT vs rehab pending progress)     PT - OK to Discharge: No    See flowsheet documentation for full assessment

## 2019-09-25 NOTE — PLAN OF CARE
Problem: PHYSICAL THERAPY ADULT  Goal: Performs mobility at highest level of function for planned discharge setting  See evaluation for individualized goals  Description  Treatment/Interventions: Functional transfer training, LE strengthening/ROM, Elevations, Endurance training, Therapeutic exercise, Patient/family training, Equipment eval/education, Bed mobility, Gait training  Equipment Recommended: Debo Mcclain       See flowsheet documentation for full assessment, interventions and recommendations  Outcome: Progressing  Note:   Prognosis: Good  Problem List: Decreased strength, Decreased range of motion, Decreased endurance, Impaired balance, Decreased mobility, Pain, Orthopedic restrictions  Assessment: Pt demonstrated improved functional mobility this session  Ambulated repeated household distances, but limited by fatigue & LE weakness  Continues to ambulate with slow, shuffling gait, but no LOB noted  Seated rest given between trials to recover  Pt ambulated on steps as noted above with instructions for sequencing  Required increased assist while descending due to instability & posterior weight shifting while stepping down  Continue to plan for discharge home with family support if pt able to continue progress  If increased assist is unavailable, pt will require rehab before return home to maximize indpendence & safety with all tasks  Barriers to Discharge: Decreased caregiver support, Inaccessible home environment     Recommendation: (home PT vs rehab pending progress)     PT - OK to Discharge: No    See flowsheet documentation for full assessment

## 2019-09-25 NOTE — PROGRESS NOTES
Sudden increase in pt's Hr, reading 153 on masimo  Checked rate manually, which was accurate to masimo reading  Did EKG and called Dr Yaquelin Gibson  NP will be down to assess pt

## 2019-09-25 NOTE — PROGRESS NOTES
Internal Medicine Progress Note  Patient: Mayda Goodwin  Age/sex: 68 y o  female  Medical Record #: [id number]      ASSESSMENT/PLAN:  Mayda Goodwin is seen and examined and mangement for following issues:    Rt hip OA s/p Rt ELSA:  a did well overnight; pain control adequate  Patient remains afebrile  Monitor WBC and fever curve post op while encouraging use of incentive spirometer  DVT prophylaxis in place and reviewed  Venofer per Orthopedic protocol  Labs reviewed     HTN:  blood pressure well controlled continue to hold ARB to decrease the risk of KENTON in the post-op period  Add Hydralazine 25mg every 8 hours as needed for SBP > 160  Monitor blood pressure per protocol and adjust medications as needed - BP has been low      DM type 2:  fasting a m  Blood sugar 112  Pt takes metformin 500mg 2x daily with meals -  resumed  Continue SSI and accuchecks  Adjust medications as needed  BS stable    Post operative blood loss anemia:  IV venofer given per orthopedics; repeat cbc stable     Chronic back pain: Continue gabapentin  Hypokalemia:  Resolved; Repleted yesterday     Patient stable for DC from medical standpoint  Subjective: Patient seen and examined  Patients overnight issues or events were reviewed with nursing or staff during rounds or morning huddle session  New or overnight issues include the following: DM type 2:  Stable; Metformin restarted  Blood sugars stable  HTN:  Stable; Continue off ARB for now  BP stable  Low back pain:  Adequate control; Continue pain control measures    Hypokalemia resolved    ROS:   GI: denies abdominal pain, change bowel habits or reflux symptoms  Neuro: Denies any headache, new vision changes, new neuropathies,new weaknesses   Respiratory: No Cough, SOB, denies wheeze  Cardiovascular: No CP, palpitations , denies perception of rapid heartbeat  : denies any new urinary burning or frequency    Review of Scheduled Meds:    Current Facility-Administered Medications:  acetaminophen 975 mg Oral Nuria Paula MD    ascorbic acid 500 mg Oral BID Neel Camara MD    docusate sodium 100 mg Oral BID Neel Camara MD    enoxaparin 40 mg Subcutaneous Daily Stephen Ayala MD    ferrous sulfate 325 mg Oral Daily With Gregor Fisher MD    folic acid 1 mg Oral Daily Neel Camara MD    gabapentin 300 mg Oral TID Neel Camara MD    hydrALAZINE 25 mg Oral Q8H PRN Kaitlynn Arroyo PA-C    HYDROmorphone 0 5 mg Intravenous Q2H PRN Neel Camara MD    insulin lispro 1-5 Units Subcutaneous TID AC Kaitlynn Arroyo PA-C    insulin lispro 1-5 Units Subcutaneous HS Kaitlynn Arroyo PA-C    lactated ringers 125 mL/hr Intravenous Continuous Toni Real MD Last Rate: 75 mL/hr (09/23/19 0705)   lactated ringers 50 mL/hr Intravenous Continuous Guerrero Stephens CRNA Last Rate: Stopped (09/24/19 0932)   metFORMIN 500 mg Oral BID With Meals Radha Rowell DO    methocarbamol 500 mg Oral Q6H Ulisses Frankel MD    ondansetron 4 mg Intravenous Q6H PRN Neel Camara MD    oxyCODONE 10 mg Oral Q4H PRN Neel Camara MD    oxyCODONE 5 mg Oral Q4H PRN Neel Camara MD    pravastatin 40 mg Oral Daily With Jefferson Moss MD    senna 1 tablet Oral Daily Neel Camara MD        Labs:     Results from last 7 days   Lab Units 09/24/19  0609 09/23/19  1401   WBC Thousand/uL 10 30*  --    HEMOGLOBIN g/dL 10 4*  --    HEMATOCRIT % 33 2*  --    PLATELETS Thousands/uL 202 222     Results from last 7 days   Lab Units 09/25/19  0432 09/24/19  0609   SODIUM mmol/L 135* 136   POTASSIUM mmol/L 3 5 3 0*   CHLORIDE mmol/L 102 103   CO2 mmol/L 27 28   BUN mg/dL 7 20   CREATININE mg/dL 0 52* 0 80   CALCIUM mg/dL 8 7 8 3                  Results from last 7 days   Lab Units 09/25/19  0630 09/24/19  2100 09/24/19  1704   POC GLUCOSE mg/dl 112 138 118       Invasive Devices     Peripheral Intravenous Line            Peripheral IV 09/23/19 Left Hand 2 days                 *Labs reviewed  *Radiology studies reviewed  *Medications reviewed and reconciled as needed  *Please refer to order section for additional ordered labs studies    Physical Examination:  Vitals:   Vitals:    09/24/19 1502 09/24/19 2307 09/25/19 0610 09/25/19 0651   BP: 122/67 119/66  119/65   BP Location:       Pulse: 92 95  91   Resp: 18 19 18   Temp: 100 2 °F (37 9 °C) 99 °F (37 2 °C)  98 4 °F (36 9 °C)   TempSrc:       SpO2: 95% 95%  96%   Weight:   94 9 kg (209 lb 3 5 oz)    Height:           GEN: NAD  RESP: CTAB, no R/R/W, good expiratory effort, breath sounds equal  CV: +S1 S2, regular rate, no rubs, PMI normal  ABD: soft, NT, ND, normal BS   : voiding   EXT: DP pulses intact b/l; good cap refill; dressing intact  Skin: no rashes , no lesions  Neuro: AAOx3 no focality on exam;    Total time spent: At least 35 minutes, with more than 50% spent counseling/coordinating care  Counseling includes discussion with patient re: progress  and discussion with patient of his/her current medical state/information  Coordination of patient's care was performed in conjunction with primary service  Time invested included review of patient's labs, vitals, and management of their comorbidities with continued monitoring  In addition, this patient was discussed with medical team including physician and advanced extenders  The care of the patient was extensively discussed and appropriate treatment plan was formulated unique for this patient  ** Please Note: Dragon 360 Dictation voice to text software may have been used in the creation of this document   **

## 2019-09-25 NOTE — PHYSICAL THERAPY NOTE
Physical Therapy Progress Note     09/25/19 1130   Pain Assessment   Pain Assessment 0-10   Pain Score 6   Pain Location Hip   Pain Orientation Right   Hospital Pain Intervention(s) Repositioned; Ambulation/increased activity; Rest   Response to Interventions tolerated   Restrictions/Precautions   RLE Weight Bearing Per Order WBAT   Other Precautions WBS;THR;Pain; Fall Risk   Subjective   Subjective Pt encountered seated in recliner, pleasant and agreeable to treatment  Reports controlled pain & improved mobility at beginning of session  Required repeated instrucions to maximize carryover of instructions  Transfers   Sit to Stand 5  Supervision   Additional items Assist x 1; Armrests; Increased time required   Stand to Sit 5  Supervision   Additional items Assist x 1; Armrests; Increased time required   Ambulation/Elevation   Gait pattern Excessively slow; Step to;Short stride; Shuffling;Decreased R stance;Decreased foot clearance; Antalgic; Improper Weight shift   Gait Assistance 4  Minimal assist   Additional items Assist x 1   Assistive Device Rolling walker   Distance 80' x 2   Balance   Static Sitting Fair +   Static Standing Fair   Ambulatory Fair -   Endurance Deficit   Endurance Deficit Yes   Endurance Deficit Description fatigue, LE weakness   Activity Tolerance   Activity Tolerance Patient tolerated treatment well;Patient limited by fatigue   Nurse Made Aware [id number] Fred Oneal RN   Assessment   Prognosis Good   Problem List Decreased strength;Decreased range of motion;Decreased endurance; Impaired balance;Decreased mobility;Pain;Orthopedic restrictions   Assessment Pt demonstrated improved functional mobility this session  Ambulated repeated household distances, but limited by fatigue & LE weakness  Continues to ambulate with slow, shuffling gait, but no LOB noted  Seated rest given between trials to recover  Pt ambulated on steps as noted above with instructions for sequencing    Required increased assist while descending due to instability & posterior weight shifting while stepping down  Continue to plan for discharge home with family support if pt able to continue progress  If increased assist is unavailable, pt will require rehab before return home to maximize indpendence & safety with all tasks  Barriers to Discharge Decreased caregiver support; Inaccessible home environment   Goals   Patient Goals to keep doing better & go home   STG Expiration Date 10/03/19   PT Treatment Day 3   Plan   Treatment/Interventions Functional transfer training;LE strengthening/ROM; Elevations; Therapeutic exercise; Endurance training;Patient/family training;Equipment eval/education; Bed mobility;Gait training   Progress Progressing toward goals   PT Frequency 7x/wk; Twice a day   Recommendation   Recommendation   (home PT vs rehab pending progress)   Equipment Recommended Jennifer Redder   PT - OK to Discharge No     Luis Rodriguez, PTA

## 2019-09-25 NOTE — PROGRESS NOTES
Progress Note - Orthopedics   Satnam Hawkins 68 y o  female MRN: [id number]  Unit/Bed#: -01 Encounter: [id number]    Assessment:  Postop day 2, right total hip replacement    Plan:  Continue PT  DVT prophylaxis  Discharge planning    Weight bearing:   Weight-bearing as tolerated right lower extremity    VTE Pharmacologic Prophylaxis: Enoxaparin (Lovenox)  VTE Mechanical Prophylaxis: sequential compression device    Subjective:   Adult female now postop day 2 from right total hip replacement    Vitals: Blood pressure 119/66, pulse 95, temperature 99 °F (37 2 °C), resp  rate 19, height 5' 2" (1 575 m), weight 94 9 kg (209 lb 3 5 oz), SpO2 95 %  ,Body mass index is 38 27 kg/m²  Intake/Output Summary (Last 24 hours) at 9/25/2019 9971  Last data filed at 9/25/2019 0413  Gross per 24 hour   Intake 240 ml   Output 1975 ml   Net -1735 ml       Invasive Devices     Peripheral Intravenous Line            Peripheral IV 09/23/19 Left Hand 1 day                Physical Exam:   Adult female patient awake alert note acute distress  Ortho Exam: Hip  Dressing intact, no drainage, no calf pain      Lab, Imaging and other studies: pending

## 2019-09-26 LAB
ANION GAP SERPL CALCULATED.3IONS-SCNC: 5 MMOL/L (ref 4–13)
BUN SERPL-MCNC: 9 MG/DL (ref 5–25)
CALCIUM SERPL-MCNC: 8.7 MG/DL (ref 8.3–10.1)
CHLORIDE SERPL-SCNC: 100 MMOL/L (ref 100–108)
CO2 SERPL-SCNC: 27 MMOL/L (ref 21–32)
CREAT SERPL-MCNC: 0.56 MG/DL (ref 0.6–1.3)
ERYTHROCYTE [DISTWIDTH] IN BLOOD BY AUTOMATED COUNT: 13.2 % (ref 11.6–15.1)
GFR SERPL CREATININE-BSD FRML MDRD: 94 ML/MIN/1.73SQ M
GLUCOSE SERPL-MCNC: 106 MG/DL (ref 65–140)
GLUCOSE SERPL-MCNC: 110 MG/DL (ref 65–140)
GLUCOSE SERPL-MCNC: 114 MG/DL (ref 65–140)
GLUCOSE SERPL-MCNC: 129 MG/DL (ref 65–140)
GLUCOSE SERPL-MCNC: 99 MG/DL (ref 65–140)
HCT VFR BLD AUTO: 31 % (ref 34.8–46.1)
HGB BLD-MCNC: 9.9 G/DL (ref 11.5–15.4)
MCH RBC QN AUTO: 31.5 PG (ref 26.8–34.3)
MCHC RBC AUTO-ENTMCNC: 31.9 G/DL (ref 31.4–37.4)
MCV RBC AUTO: 99 FL (ref 82–98)
PLATELET # BLD AUTO: 212 THOUSANDS/UL (ref 149–390)
PMV BLD AUTO: 9.5 FL (ref 8.9–12.7)
POTASSIUM SERPL-SCNC: 3.8 MMOL/L (ref 3.5–5.3)
RBC # BLD AUTO: 3.14 MILLION/UL (ref 3.81–5.12)
SODIUM SERPL-SCNC: 132 MMOL/L (ref 136–145)
WBC # BLD AUTO: 11.25 THOUSAND/UL (ref 4.31–10.16)

## 2019-09-26 PROCEDURE — 97530 THERAPEUTIC ACTIVITIES: CPT

## 2019-09-26 PROCEDURE — 99024 POSTOP FOLLOW-UP VISIT: CPT | Performed by: PHYSICIAN ASSISTANT

## 2019-09-26 PROCEDURE — 85027 COMPLETE CBC AUTOMATED: CPT | Performed by: ORTHOPAEDIC SURGERY

## 2019-09-26 PROCEDURE — 97110 THERAPEUTIC EXERCISES: CPT

## 2019-09-26 PROCEDURE — 97116 GAIT TRAINING THERAPY: CPT

## 2019-09-26 PROCEDURE — 80048 BASIC METABOLIC PNL TOTAL CA: CPT | Performed by: ORTHOPAEDIC SURGERY

## 2019-09-26 PROCEDURE — 82948 REAGENT STRIP/BLOOD GLUCOSE: CPT

## 2019-09-26 PROCEDURE — 97535 SELF CARE MNGMENT TRAINING: CPT

## 2019-09-26 RX ADMIN — METFORMIN HYDROCHLORIDE 500 MG: 500 TABLET ORAL at 07:18

## 2019-09-26 RX ADMIN — SENNOSIDES 8.6 MG: 8.6 TABLET, FILM COATED ORAL at 08:18

## 2019-09-26 RX ADMIN — ENOXAPARIN SODIUM 40 MG: 40 INJECTION SUBCUTANEOUS at 08:16

## 2019-09-26 RX ADMIN — OXYCODONE HYDROCHLORIDE 10 MG: 5 TABLET ORAL at 02:01

## 2019-09-26 RX ADMIN — GABAPENTIN 300 MG: 300 CAPSULE ORAL at 15:20

## 2019-09-26 RX ADMIN — ACETAMINOPHEN 975 MG: 325 TABLET ORAL at 05:57

## 2019-09-26 RX ADMIN — PRAVASTATIN SODIUM 40 MG: 40 TABLET ORAL at 16:48

## 2019-09-26 RX ADMIN — GABAPENTIN 300 MG: 300 CAPSULE ORAL at 08:18

## 2019-09-26 RX ADMIN — OXYCODONE HYDROCHLORIDE 5 MG: 5 TABLET ORAL at 08:18

## 2019-09-26 RX ADMIN — METFORMIN HYDROCHLORIDE 500 MG: 500 TABLET ORAL at 16:48

## 2019-09-26 RX ADMIN — DOCUSATE SODIUM 100 MG: 100 CAPSULE, LIQUID FILLED ORAL at 17:02

## 2019-09-26 RX ADMIN — LOSARTAN POTASSIUM 50 MG: 50 TABLET, FILM COATED ORAL at 08:18

## 2019-09-26 RX ADMIN — METHOCARBAMOL TABLETS 500 MG: 500 TABLET, COATED ORAL at 23:55

## 2019-09-26 RX ADMIN — GABAPENTIN 300 MG: 300 CAPSULE ORAL at 21:48

## 2019-09-26 RX ADMIN — OXYCODONE HYDROCHLORIDE AND ACETAMINOPHEN 500 MG: 500 TABLET ORAL at 08:18

## 2019-09-26 RX ADMIN — ACETAMINOPHEN 975 MG: 325 TABLET ORAL at 21:48

## 2019-09-26 RX ADMIN — METHOCARBAMOL TABLETS 500 MG: 500 TABLET, COATED ORAL at 12:09

## 2019-09-26 RX ADMIN — FERROUS SULFATE TAB 325 MG (65 MG ELEMENTAL FE) 325 MG: 325 (65 FE) TAB at 07:18

## 2019-09-26 RX ADMIN — METHOCARBAMOL TABLETS 500 MG: 500 TABLET, COATED ORAL at 17:02

## 2019-09-26 RX ADMIN — DOCUSATE SODIUM 100 MG: 100 CAPSULE, LIQUID FILLED ORAL at 08:18

## 2019-09-26 RX ADMIN — METHOCARBAMOL TABLETS 500 MG: 500 TABLET, COATED ORAL at 05:57

## 2019-09-26 RX ADMIN — METOPROLOL TARTRATE 12.5 MG: 25 TABLET ORAL at 08:18

## 2019-09-26 RX ADMIN — ACETAMINOPHEN 975 MG: 325 TABLET ORAL at 12:08

## 2019-09-26 RX ADMIN — OXYCODONE HYDROCHLORIDE 5 MG: 5 TABLET ORAL at 21:48

## 2019-09-26 RX ADMIN — METHOCARBAMOL TABLETS 500 MG: 500 TABLET, COATED ORAL at 00:34

## 2019-09-26 RX ADMIN — OXYCODONE HYDROCHLORIDE 5 MG: 5 TABLET ORAL at 14:10

## 2019-09-26 RX ADMIN — FOLIC ACID 1 MG: 1 TABLET ORAL at 08:18

## 2019-09-26 RX ADMIN — OXYCODONE HYDROCHLORIDE AND ACETAMINOPHEN 500 MG: 500 TABLET ORAL at 17:02

## 2019-09-26 NOTE — SOCIAL WORK
A post acute care recommendation was made by the care team for inpt rehab  Discussed [place] [facility] with pt  List of resources was given to pt via print out of SNF's in a 7 mile radius from pt's address  Pt does not think that she can tolerate 3 hours per day of therapy so she declined acute rehab  Pt chose [facility], [facility] and [facility]  Cm sent referrals  [facility] notified [facility] to and [facility] to hold off on delivery of DME  ALEJO explained to pt that none of her DME should be delivered until after she's completed inpt rehab  Pt understood and is calling her brother to inform him

## 2019-09-26 NOTE — PHYSICAL THERAPY NOTE
PHYSICAL THERAPY NOTE          Patient Name: Veronica Brar  BFSZJ'T Date: 9/26/2019 09/26/19 1313   Pain Assessment   Pain Assessment 0-10   Pain Score 3   Pain Type Acute pain;Surgical pain   Pain Location Hip   Pain Orientation Right   Hospital Pain Intervention(s) Repositioned; Ambulation/increased activity   Response to Interventions tolerated   Restrictions/Precautions   Weight Bearing Precautions Per Order Yes   RLE Weight Bearing Per Order WBAT   Other Precautions WBS;THR;Fall Risk;Pain   General   Chart Reviewed Yes   Family/Caregiver Present No   Cognition   Overall Cognitive Status WFL   Arousal/Participation Alert; Cooperative   Attention Attends with cues to redirect   Orientation Level Oriented X4   Following Commands Follows multistep commands with increased time or repetition   Subjective   Subjective "I picked a couple rehab places, I am always tired in the afternoon"   Bed Mobility   Additional Comments Not assessed, pt received sitting in bedside chair, agreeable to therapy session, pt left seated in bedside chair at termination of session, all needs within reach   Transfers   Sit to Stand 5  Supervision   Additional items Increased time required;Verbal cues   Stand to Sit 5  Supervision   Additional items Increased time required;Verbal cues   Additional Comments transfer for repositioning in bedside chair as pt reports she was sliding forward   Ambulation/Elevation   Gait pattern Not tested  (not a goal of this session)   Balance   Static Sitting Good   Dynamic Sitting Fair +   Static Standing Fair  (RW)   Endurance Deficit   Endurance Deficit Yes   Endurance Deficit Description weakness, fatigue   Activity Tolerance   Activity Tolerance Patient limited by fatigue;Patient limited by pain   Nurse Made Aware RN cleared patient for PT session   Exercises   Glute Sets Sitting;10 reps  (2 sets)   Knee AROM Long Arc Quad Sitting;10 reps;AROM; Right  (2 sets)   Ankle Pumps Sitting;25 reps;AROM; Bilateral  (2 sets)   Assessment   Prognosis Good   Problem List Decreased strength;Decreased range of motion;Decreased endurance; Impaired balance;Decreased mobility; Decreased coordination; Impaired judgement;Decreased safety awareness;Orthopedic restrictions;Pain   Assessment Pt participated in therapy session focusing on therapeutic exercises  Pt continues to report increased fatigue in afternoon compared to AM sessions  Pt agreeable to participate in seated TE and instructed pt to perform exercises 2-3x per day while in the hospital  Provided extensive pt education regarding goals of therapy session, difference between PT in the acute care setting vs subacute setting, goals for therapy session tomorrow if pt does not get discharged today  Skilled physical therapy is indicated to address listed functional deficits focusing on strength, endurance and functional mobility  Barriers to Discharge Inaccessible home environment;Decreased caregiver support   Goals   Patient Goals go to rehab   STG Expiration Date 10/03/19   Short Term Goal #1 1-10 days: bed mob and transfers w/ indep, standing balance to good/normal w/ device, ambulate 200-300 ft w/ RW and mod I WBAT on R, increase B LE strength by 1/2 -1 grade, indep recall all ELSA precautions, indep HEP, ambulate 1 flight of stairs w/ rail and mod I   PT Treatment Day 6   Plan   Treatment/Interventions Functional transfer training;LE strengthening/ROM; Elevations; Therapeutic exercise; Endurance training;Patient/family training;Equipment eval/education; Bed mobility;Gait training;Spoke to nursing   Progress Progressing toward goals   PT Frequency 7x/wk; Twice a day   Recommendation   Recommendation Post acute IP rehab   Equipment Recommended Walker  (pt RW already present in room)   PT - OK to Discharge Yes   Additional Comments to rehab when medically stable       Virginie Jones PT, DPT

## 2019-09-26 NOTE — PROGRESS NOTES
Internal Medicine Progress Note  Patient: Obdulio Paulino  Age/sex: 68 y o  female  Medical Record #: [id number]      ASSESSMENT/PLAN:  Obdulio Paulino is seen and examined and mangement for following issues:    Rt hip OA s/p Rt ELSA:  a did well overnight; pain control adequate  Patient remains afebrile  Monitor WBC and fever curve post op while encouraging use of incentive spirometer  DVT prophylaxis in place and reviewed  Venofer per Orthopedic protocol  Concerned about help at home; lives alone will defer to case management     HTN:  blood pressure well controlled continue to hold ARB to decrease the risk of [place] in the post-op period  Add Hydralazine 25mg every 8 hours as needed for SBP > 160  Monitor blood pressure per protocol and adjust medications as needed - BP better      DM type 2:    Pt takes metformin 500mg 2x daily with meals -   Continue SSI and accuchecks  Adjust medications as needed  BS stable    Post operative blood loss anemia:  IV venofer given per orthopedics; hgb 9 9 >2 gram blood loss     Chronic back pain: Continue gabapentin  Hyponatremia:  Asymptomatic; appetite improving; does not appear dry    SVT:  One episode yesterday, started metoprolol, cardiology input appreciated, they recommend outpatient echocardiogram to be completed by PCP; cont low dose BB; no further episodes              Subjective: Patient seen and examined  Patients overnight issues or events were reviewed with nursing or staff during rounds or morning huddle session  New or overnight issues include the following: DM type 2:  Stable; Metformin restarted  Blood sugars stable  HTN:  Stable; Continue off ARB for now  BP stable  Low back pain:  Adequate control; Continue pain control measures        ROS:   GI: denies abdominal pain, change bowel habits or reflux symptoms  Neuro: Denies any headache, new vision changes, new neuropathies,new weaknesses   Respiratory: No Cough, SOB, denies wheeze  Cardiovascular: No CP, palpitations , denies perception of rapid heartbeat  : denies any new urinary burning or frequency    Review of Scheduled Meds:    Current Facility-Administered Medications:  acetaminophen 975 mg Oral Q8H Giovana Harper MD    ascorbic acid 500 mg Oral BID Giovana Harper MD    docusate sodium 100 mg Oral BID Giovana Harper MD    enoxaparin 40 mg Subcutaneous Daily Nidia MD [place]    ferrous sulfate 325 mg Oral Daily With Sundeep Tobias MD    folic acid 1 mg Oral Daily Giovana Harper MD    gabapentin 300 mg Oral TID Giovana Harper MD    hydrALAZINE 25 mg Oral Q8H PRN Kaitlynn Arroyo PA-C    insulin lispro 1-5 Units Subcutaneous TID AC Kaitlynn Arroyo PA-C    insulin lispro 1-5 Units Subcutaneous HS Kaitlynn Arroyo PA-C    lactated ringers 125 mL/hr Intravenous Continuous Fareed Rosas MD Last Rate: 75 mL/hr (09/23/19 0705)   lactated ringers 50 mL/hr Intravenous Continuous Cory NoREZA liz Last Rate: Stopped (09/24/19 0932)   losartan 50 mg Oral Daily WILLIAN Portillo    metFORMIN 500 mg Oral BID With Meals Mat Moran DO    methocarbamol 500 mg Oral Q6H Joe Lopez MD    metoprolol tartrate 12 5 mg Oral Q12H [facility] WILLIAN Enamorado    ondansetron 4 mg Intravenous Q6H PRN Giovana Harper MD    oxyCODONE 10 mg Oral Q4H PRN Giovana Harper MD    oxyCODONE 5 mg Oral Q4H PRN Giovana Harper MD    pravastatin 40 mg Oral Daily With Moni Christina MD    senna 1 tablet Oral Daily Giovana Harper MD        Labs:     Results from last 7 days   Lab Units 09/26/19  0517 09/25/19  0432 09/24/19  0609   WBC Thousand/uL 11 25* 12 00* 10 30*   HEMOGLOBIN g/dL 9 9* 10 5* 10 4*   HEMATOCRIT % 31 0* 33 4* 33 2*   PLATELETS Thousands/uL 212 203 202     Results from last 7 days   Lab Units 09/26/19  0517 09/25/19  0432 09/24/19  0609   SODIUM mmol/L 132* 135* 136   POTASSIUM mmol/L 3 8 3 5 3 0*   CHLORIDE mmol/L 100 102 103   CO2 mmol/L 27 27 28   BUN mg/dL 9 7 20   CREATININE mg/dL 0 56* 0 52* 0 80   CALCIUM mg/dL 8 7 8 7 8 3                  Results from last 7 days   Lab Units 09/26/19  0518 09/25/19  2125 09/25/19  1600   POC GLUCOSE mg/dl 110 102 123       Invasive Devices     Peripheral Intravenous Line            Peripheral IV 09/23/19 Left Hand 3 days                 *Labs reviewed  *Radiology studies reviewed  *Medications reviewed and reconciled as needed  *Please refer to order section for additional ordered labs studies    Physical Examination:  Vitals:   Vitals:    09/25/19 2209 09/25/19 2327 09/26/19 0600 09/26/19 0713   BP: 138/72 110/73  126/56   BP Location: Left arm      Pulse:  80  94   Resp:  18  18   Temp:  98 8 °F (37 1 °C)  99 °F (37 2 °C)   TempSrc:       SpO2:  98%  96%   Weight:   97 7 kg (215 lb 6 2 oz)    Height:           GEN: NAD  RESP: CTAB, no R/R/W, good expiratory effort, breath sounds equal  CV: +S1 S2, regular rate, no rubs, PMI normal  ABD: soft, NT, ND, normal BS   : voiding   EXT: DP pulses intact b/l; good cap refill; dressing intact  Skin: no rashes , no lesions  Neuro: AAOx3 no focality on exam;    Total time spent: At least 35 minutes, with more than 50% spent counseling/coordinating care  Counseling includes discussion with patient re: progress  and discussion with patient of his/her current medical state/information  Coordination of patient's care was performed in conjunction with primary service  Time invested included review of patient's labs, vitals, and management of their comorbidities with continued monitoring  In addition, this patient was discussed with medical team including physician and advanced extenders  The care of the patient was extensively discussed and appropriate treatment plan was formulated unique for this patient  ** Please Note: Dragon 360 Dictation voice to text software may have been used in the creation of this document   **

## 2019-09-26 NOTE — PLAN OF CARE
Problem: PHYSICAL THERAPY ADULT  Goal: Performs mobility at highest level of function for planned discharge setting  See evaluation for individualized goals  Description  Treatment/Interventions: Functional transfer training, LE strengthening/ROM, Elevations, Endurance training, Therapeutic exercise, Patient/family training, Equipment eval/education, Bed mobility, Gait training  Equipment Recommended: Anton Mu       See flowsheet documentation for full assessment, interventions and recommendations  Outcome: Progressing  Note:   Prognosis: Good  Problem List: Decreased strength, Decreased range of motion, Decreased endurance, Impaired balance, Decreased mobility, Decreased coordination, Impaired judgement, Decreased safety awareness, Orthopedic restrictions, Pain  Assessment: Pt particiapted in therapy session and continues to require frequent verbal and tactile cues for safety  Pt easily distractible and requires redirection to focus on therapeutic tasks  Provided extensive education and discussion with pt regarding disposition plan  Pt reports she has 5 uneven steps with a right handrail and then an additional uneven step to enter home environment  She expresses concerns regarding sleeping arrangements as her hospital bed will not be delivered until tomorrow  Pt also expresses concern regarding personal hygiene at home as she is unable to rely on her brother for that and she lives alone  Discussed home health aides with pt and reported she would need to discuss with case management regarding setting up home health aides  Pt performed transfers and ambulation with supervision assist this session however has not made significant progress in ambulation distance and is still unable to participate in stair training  Recommend pt transition to post acute inpatient rehab following discharge from the hospital to increase functional independence   Skilled physical therapy is indicated to address listed functional deficits focusing on strength, endurance and functional mobility  Barriers to Discharge: Inaccessible home environment, Decreased caregiver support     Recommendation: Post acute IP rehab     PT - OK to Discharge: Yes    See flowsheet documentation for full assessment

## 2019-09-26 NOTE — PLAN OF CARE
Problem: PHYSICAL THERAPY ADULT  Goal: Performs mobility at highest level of function for planned discharge setting  See evaluation for individualized goals  Description  Treatment/Interventions: Functional transfer training, LE strengthening/ROM, Elevations, Endurance training, Therapeutic exercise, Patient/family training, Equipment eval/education, Bed mobility, Gait training  Equipment Recommended: Manuel Cobian       See flowsheet documentation for full assessment, interventions and recommendations  9/26/2019 1343 by Destiny Holguin, PT  Outcome: Progressing  Note:   Prognosis: Good  Problem List: Decreased strength, Decreased range of motion, Decreased endurance, Impaired balance, Decreased mobility, Decreased coordination, Impaired judgement, Decreased safety awareness, Orthopedic restrictions, Pain  Assessment: Pt participated in therapy session focusing on therapeutic exercises  Pt continues to report increased fatigue in afternoon compared to AM sessions  Pt agreeable to participate in seated TE and instructed pt to perform exercises 2-3x per day while in the hospital  Provided extensive pt education regarding goals of therapy session, difference between PT in the acute care setting vs subacute setting, goals for therapy session tomorrow if pt does not get discharged today  Skilled physical therapy is indicated to address listed functional deficits focusing on strength, endurance and functional mobility  Barriers to Discharge: Inaccessible home environment, Decreased caregiver support     Recommendation: Post acute IP rehab     PT - OK to Discharge: Yes    See flowsheet documentation for full assessment       9/26/2019 1035 by Destiny Holguin PT  Outcome: Progressing  Note:   Prognosis: Good  Problem List: Decreased strength, Decreased range of motion, Decreased endurance, Impaired balance, Decreased mobility, Decreased coordination, Impaired judgement, Decreased safety awareness, Orthopedic restrictions, Pain  Assessment: Pt particiapted in therapy session and continues to require frequent verbal and tactile cues for safety  Pt easily distractible and requires redirection to focus on therapeutic tasks  Provided extensive education and discussion with pt regarding disposition plan  Pt reports she has 5 uneven steps with a right handrail and then an additional uneven step to enter home environment  She expresses concerns regarding sleeping arrangements as her hospital bed will not be delivered until tomorrow  Pt also expresses concern regarding personal hygiene at home as she is unable to rely on her brother for that and she lives alone  Discussed home health aides with pt and reported she would need to discuss with case management regarding setting up home health aides  Pt performed transfers and ambulation with supervision assist this session however has not made significant progress in ambulation distance and is still unable to participate in stair training  Recommend pt transition to post acute inpatient rehab following discharge from the hospital to increase functional independence  Skilled physical therapy is indicated to address listed functional deficits focusing on strength, endurance and functional mobility  Barriers to Discharge: Inaccessible home environment, Decreased caregiver support     Recommendation: Post acute IP rehab     PT - OK to Discharge: Yes    See flowsheet documentation for full assessment

## 2019-09-26 NOTE — PLAN OF CARE
Problem: Potential for Falls  Goal: Patient will remain free of falls  Description  INTERVENTIONS:  - Assess patient frequently for physical needs  -  Identify cognitive and physical deficits and behaviors that affect risk of falls    -  [place] fall precautions as indicated by assessment   - Educate patient/family on patient safety including physical limitations  - Instruct patient to call for assistance with activity based on assessment  - Modify environment to reduce risk of injury  - Consider OT/PT consult to assist with strengthening/mobility  Outcome: Progressing     Problem: PAIN - ADULT  Goal: Verbalizes/displays adequate comfort level or baseline comfort level  Description  Interventions:  - Encourage patient to monitor pain and request assistance  - Assess pain using appropriate pain scale  - Administer analgesics based on type and severity of pain and evaluate response  - Implement non-pharmacological measures as appropriate and evaluate response  - Consider cultural and social influences on pain and pain management  - Notify physician/advanced practitioner if interventions unsuccessful or patient reports new pain  Outcome: Progressing     Problem: INFECTION - ADULT  Goal: Absence or prevention of progression during hospitalization  Description  INTERVENTIONS:  - Assess and monitor for signs and symptoms of infection  - Monitor lab/diagnostic results  - Monitor all insertion sites, i e  indwelling lines, tubes, and drains  - Monitor endotracheal if appropriate and nasal secretions for changes in amount and color  - [place] appropriate cooling/warming therapies per order  - Administer medications as ordered  - Instruct and encourage patient and family to use good hand hygiene technique  - Identify and instruct in appropriate isolation precautions for identified infection/condition  Outcome: Progressing     Problem: SAFETY ADULT  Goal: Maintain or return to baseline ADL function  Description  INTERVENTIONS:  -  Assess patient's ability to carry out ADLs; assess patient's baseline for ADL function and identify physical deficits which impact ability to perform ADLs (bathing, care of mouth/teeth, toileting, grooming, dressing, etc )  - Assess/evaluate cause of self-care deficits   - Assess range of motion  - Assess patient's mobility; develop plan if impaired  - Assess patient's need for assistive devices and provide as appropriate  - Encourage maximum independence but intervene and supervise when necessary  - Involve family in performance of ADLs  - Assess for home care needs following discharge   - Consider OT consult to assist with ADL evaluation and planning for discharge  - Provide patient education as appropriate  Outcome: Progressing  Goal: Maintain or return mobility status to optimal level  Description  INTERVENTIONS:  - Assess patient's baseline mobility status (ambulation, transfers, stairs, etc )    - Identify cognitive and physical deficits and behaviors that affect mobility  - Identify mobility aids required to assist with transfers and/or ambulation (gait belt, sit-to-stand, lift, walker, cane, etc )  - [place] fall precautions as indicated by assessment  - Record patient progress and toleration of activity level on Mobility SBAR; progress patient to next Phase/Stage  - Instruct patient to call for assistance with activity based on assessment  - Consider rehabilitation consult to assist with strengthening/weightbearing, etc   Outcome: Progressing     Problem: DISCHARGE PLANNING  Goal: Discharge to home or other facility with appropriate resources  Description  INTERVENTIONS:  - Identify barriers to discharge w/patient and caregiver  - Arrange for needed discharge resources and transportation as appropriate  - Identify discharge learning needs (meds, wound care, etc )  - Arrange for interpretive services to assist at discharge as needed  - Refer to Case Management Department for coordinating discharge planning if the patient needs post-hospital services based on physician/advanced practitioner order or complex needs related to functional status, cognitive ability, or social support system  Outcome: Progressing     Problem: Prexisting or High Potential for Compromised Skin Integrity  Goal: Skin integrity is maintained or improved  Description  INTERVENTIONS:  - Identify patients at risk for skin breakdown  - Assess and monitor skin integrity  - Assess and monitor nutrition and hydration status  - Monitor labs   - Assess for incontinence   - Turn and reposition patient  - Assist with mobility/ambulation  - Relieve pressure over bony prominences  - Avoid friction and shearing  - Provide appropriate hygiene as needed including keeping skin clean and dry  - Evaluate need for skin moisturizer/barrier cream  - Collaborate with interdisciplinary team   - Patient/family teaching  - Consider wound care consult   Outcome: Progressing

## 2019-09-26 NOTE — PROGRESS NOTES
Rossnoemi Martins 68 y o  female MRN: [id number]  Unit/Bed#: -01      Subjective:  68 y  o female post operative day 1 right posterior total hip arthroplasty  Pt doing well  Pain controlled  Tmax overnight was 101 1, which resolved  No acute complaints      Labs:  0   Lab Value Date/Time    HCT 33 4 (L) 09/25/2019 0432    HCT 33 2 (L) 09/24/2019 0609    HCT 39 9 08/05/2019 1423    HGB 10 5 (L) 09/25/2019 0432    HGB 10 4 (L) 09/24/2019 0609    HGB 12 9 08/05/2019 1423    INR 1 14 08/05/2019 1423    WBC 12 00 (H) 09/25/2019 0432    WBC 10 30 (H) 09/24/2019 0609    WBC 5 22 08/05/2019 1423    CRP <3 0 08/05/2019 1423       Meds:    Current Facility-Administered Medications:     acetaminophen (TYLENOL) tablet 975 mg, 975 mg, Oral, Q8H, Jeyson Neely MD, 975 mg at 09/26/19 0557    ascorbic acid (VITAMIN C) tablet 500 mg, 500 mg, Oral, BID, Jeyson Neely MD, 500 mg at 09/25/19 1700    docusate sodium (COLACE) capsule 100 mg, 100 mg, Oral, BID, Jeyson Neely MD, 100 mg at 09/25/19 1700    enoxaparin (LOVENOX) subcutaneous injection 40 mg, 40 mg, Subcutaneous, Daily, Maru Tillman MD, 40 mg at 09/25/19 0800    ferrous sulfate tablet 325 mg, 325 mg, Oral, Daily With Breakfast, Jeyson Neely MD, 325 mg at 09/90/73 7817    folic acid (FOLVITE) tablet 1 mg, 1 mg, Oral, Daily, Jeyson Neely MD, 1 mg at 09/25/19 0800    gabapentin (NEURONTIN) capsule 300 mg, 300 mg, Oral, TID, Jeyson Neely MD, 300 mg at 09/25/19 2212    hydrALAZINE (APRESOLINE) tablet 25 mg, 25 mg, Oral, Q8H PRN, Kaitlynn Arroyo PA-C    insulin lispro (HumaLOG) 100 units/mL subcutaneous injection 1-5 Units, 1-5 Units, Subcutaneous, TID AC, 1 Units at 09/23/19 1738 **AND** Fingerstick Glucose (POCT), , , TID AC, Kaitlynn Arroyo PA-C    insulin lispro (HumaLOG) 100 units/mL subcutaneous injection 1-5 Units, 1-5 Units, Subcutaneous, HS, Kaitlynn Arroyo PA-C    lactated ringers infusion, 125 mL/hr, Intravenous, Continuous, Kianna Pereira MD Ingrid, Last Rate: 75 mL/hr at 09/23/19 0705    lactated ringers infusion, 50 mL/hr, Intravenous, Continuous, Paola Rivera, CRNA, Stopped at 09/24/19 0932    losartan (COZAAR) tablet 50 mg, 50 mg, Oral, Daily, Julisa Dine, CRNP    metFORMIN (GLUCOPHAGE) tablet 500 mg, 500 mg, Oral, BID With Meals, Reed Munoz, DO, 500 mg at 09/25/19 1648    methocarbamol (ROBAXIN) tablet 500 mg, 500 mg, Oral, Q6H [facility], Naomi Chand MD, 500 mg at 09/26/19 0557    metoprolol tartrate (LOPRESSOR) partial tablet 12 5 mg, 12 5 mg, Oral, Q12H LANRE, WILLIAN Enamorado, 12 5 mg at 09/25/19 2211    ondansetron (ZOFRAN) injection 4 mg, 4 mg, Intravenous, Q6H PRN, Naomi Chand MD    oxyCODONE (ROXICODONE) IR tablet 10 mg, 10 mg, Oral, Q4H PRN, Naomi Chand MD, 10 mg at 09/26/19 0201    oxyCODONE (ROXICODONE) IR tablet 5 mg, 5 mg, Oral, Q4H PRN, Naomi Chand MD, 5 mg at 09/25/19 0354    pravastatin (PRAVACHOL) tablet 40 mg, 40 mg, Oral, Daily With Pastor Otoniel MD, 40 mg at 09/25/19 1648    senna (SENOKOT) tablet 8 6 mg, 1 tablet, Oral, Daily, Naomi Chand MD, 8 6 mg at 09/25/19 0800    Blood Culture:   No results found for: BLOODCX    Wound Culture:   No results found for: WOUNDCULT    Ins and Outs:  I/O last 24 hours: In: 860 [P O :860]  Out: 2175 [Urine:2175]          Physical Exam:  Vitals:    09/25/19 2327   BP: 110/73   Pulse: 80   Resp: 18   Temp: 98 8 °F (37 1 °C)   SpO2: 98%     right lower extremity:  · Dressings C/D/I  · Sensation intact L2-S1  · Motor intact L2-S1  · 2+ dorsalis pedis     _*_*_*_*_*_*_*_*_*_*_*_*_*_*_*_*_*_*_*_*_*_*_*_*_*_*_*_*_*_*_*_*_*_*_*_*_*_*_*_*_*    Assessment: 68 y  o female post operative day 1 right posterior total hip arthroplasty   Doing well    Plan:  · Weight Bearing as tolerated  · Up and out of bed  · Posterior total hip precautions  · Abduction pillow while in bed  · DVT prophylaxis  · Analgesics  · PT/OT  · Will continue to assess for acute blood loss anemia   · Pending AM lab draw    Howard Shannon PA-C

## 2019-09-26 NOTE — OCCUPATIONAL THERAPY NOTE
Occupational Therapy Treatment Note:       09/26/19 1135   Restrictions/Precautions   Weight Bearing Precautions Per Order Yes   RLE Weight Bearing Per Order WBAT   Other Precautions WBS;THR;Fall Risk;Pain   Pain Assessment   Pain Assessment 0-10   Pain Score 4   Pain Type Acute pain;Surgical pain   Pain Location Hip   Pain Orientation Right   ADL   Where Assessed Chair   Eating Assistance 7  Independent   Grooming Assistance 5  Supervision/Setup   Grooming Deficit Setup;Standing with assistive device; Wash/dry hands   UB Bathing Assistance 4  Minimal Assistance   UB Bathing Deficit Setup;Verbal cueing; Increased time to complete   LB Bathing Assistance 3  Moderate Assistance   LB Bathing Deficit Setup;Steadying;Verbal cueing;Supervision/safety; Increased time to complete; Buttocks;Right lower leg including foot; Left lower leg including foot   UB Dressing Assistance 4  Minimal Assistance   UB Dressing Deficit Setup; Increased time to complete   UB Dressing Comments   (asssit with washing back)   LB Dressing Assistance 3  Moderate Assistance   LB Dressing Deficit Setup; Requires assistive device for steadying;Verbal cueing;Supervision/safety; Increased time to complete; Don/doff R sock; Thread RLE into pants; Don/doff R shoe   Toileting Assistance  4  Minimal Assistance   Toileting Deficit Setup;Steadying;Verbal cueing;Supervison/safety; Increased time to complete;Clothing management up   Transfers   Sit to Stand 5  Supervision   Additional items Assist x 1; Increased time required;Verbal cues   Stand to Sit 5  Supervision   Additional items Assist x 1; Increased time required   Functional Mobility   Functional Mobility 4  Minimal assistance   Additional Comments   (intermittent vc's for technique)   Additional items Rolling walker   Toilet Transfers   Toilet Transfer From Rolling walker   Toilet Transfer Type To and from   Toilet Transfer to Raised toilet seat with rails   Toilet Transfer Technique Ambulating   Toilet Transfers Minimal assistance   Cognition   Overall Cognitive Status WFL   Arousal/Participation Alert; Cooperative   Attention Attends with cues to redirect   Orientation Level Oriented X4   Memory Within functional limits   Following Commands Follows multistep commands with increased time or repetition   Comments able to recall 3/3 THP   Activity Tolerance   Activity Tolerance Patient tolerated treatment well   Medical Staff Made Aware Patient cleared for OT by RN   Assessment   Assessment Patient participated in skilled OT with focus on adl/self care task performance, toileting, functional transfers and mobility skills, activity endurance, use of LHAE for LB dressing, THP carryover into functional task performance skills  Patient presents transferring into bathroom with PT  Patient required min assist and vc's to plan and sequence steps with the task of toileting inc ambulating to sink to perform hand washing post toileting  Patient required increased amount of time secondary to decreased endurance and requiring vc's to direct attention  Patient required intermittent CGA from bathroom to recliner  Patient required assist levels as documented with performance of adl tasks including follow through with use of LHAE for LB dressing and consistency with TH precautions during functional tasks  Patient would benefit from [address] with focus on increasing functional strength and endurance, increasing consistency with carryover of specific techniques for bathing and dressing, increase safety and independence with functional mobiilty and transfer skills for carryover into her own enviroment  Plan   Treatment Interventions ADL retraining;Functional transfer training; Endurance training   Goal Expiration Date 10/04/19   OT Treatment Day 2   OT Frequency 3-5x/wk   Recommendation   OT Discharge Recommendation Short Term Rehab   OT - OK to Discharge   (when medically cleared)   Dacia Fairbanks

## 2019-09-26 NOTE — PLAN OF CARE
Problem: OCCUPATIONAL THERAPY ADULT  Goal: Performs self-care activities at highest level of function for planned discharge setting  See evaluation for individualized goals  Description  Treatment Interventions: ADL retraining, Functional transfer training, Endurance training, Cognitive reorientation, Patient/family training, Equipment evaluation/education, Compensatory technique education, Activityengagement          See flowsheet documentation for full assessment, interventions and recommendations  Outcome: Progressing  Note:   Limitation: Decreased ADL status, Decreased endurance, Decreased self-care trans, Decreased high-level ADLs  Prognosis: Good  Assessment: Patient participated in skilled OT with focus on adl/self care task performance, toileting, functional transfers and mobility skills, activity endurance, use of LHAE for LB dressing, THP carryover into functional task performance skills  Patient presents transferring into bathroom with PT  Patient required min assist and vc's to plan and sequence steps with the task of toileting inc ambulating to sink to perform hand washing post toileting  Patient required increased amount of time secondary to decreased endurance and requiring vc's to direct attention  Patient required intermittent CGA from bathroom to recliner  Patient required assist levels as documented with performance of adl tasks including follow through with use of LHAE for LB dressing and consistency with TH precautions during functional tasks  Patient would benefit from [address] with focus on increasing functional strength and endurance, increasing consistency with carryover of specific techniques for bathing and dressing, increase safety and independence with functional mobiilty and transfer skills for carryover into her own enviroment        OT Discharge Recommendation: Short Term Rehab  OT - OK to Discharge: (when medically cleared)  Rishi Cormier

## 2019-09-26 NOTE — PHYSICAL THERAPY NOTE
PHYSICAL THERAPY NOTE          Patient Name: Trinna Riedel EVQIH'N Date: 9/26/2019 09/26/19 1018   Pain Assessment   Pain Assessment 0-10   Pain Score 4   Pain Type Acute pain;Surgical pain   Pain Location Hip   Pain Orientation Right   Hospital Pain Intervention(s) Repositioned; Ambulation/increased activity   Response to Interventions tolerated   Restrictions/Precautions   Weight Bearing Precautions Per Order Yes   RLE Weight Bearing Per Order WBAT   Other Precautions WBS;THR;Fall Risk;Pain   General   Chart Reviewed Yes   Family/Caregiver Present No   Cognition   Overall Cognitive Status WFL   Arousal/Participation Alert; Cooperative   Attention Attends with cues to redirect   Orientation Level Oriented X4   Following Commands Follows multistep commands with increased time or repetition   Subjective   Subjective "If they aren't able to deliver my hospital bed until tomorrow, where will I sleep tonight?"   Bed Mobility   Additional Comments Not assessed, pt received sitting in bedside chair, agreeable to therapy session   Transfers   Sit to Stand 5  Supervision   Additional items Increased time required;Verbal cues   Stand to Sit 5  Supervision   Additional items Increased time required;Verbal cues   Toilet transfer 5  Supervision   Additional items Increased time required;Verbal cues; Commode   Ambulation/Elevation   Gait pattern Improper Weight shift; Forward Flexion;Decreased foot clearance;Decreased R stance; Short stride; Step to;Excessively slow   Gait Assistance 5  Supervision   Additional items Verbal cues   Assistive Device Rolling walker   Distance 75' x 2   Stair Management Assistance Not tested  (pt unable to trial at this time)   Balance   Static Sitting Fair +   Dynamic Sitting Fair   Static Standing Fair  (RW)   Dynamic Standing Fair -  (RW)   Ambulatory Fair -  (RW)   Endurance Deficit   Endurance Deficit Yes Endurance Deficit Description weakness, fatigue, pain   Activity Tolerance   Activity Tolerance Patient limited by fatigue;Patient limited by pain   Nurse Made Aware RN cleared patient for PT session   Assessment   Prognosis Good   Problem List Decreased strength;Decreased range of motion;Decreased endurance; Impaired balance;Decreased mobility; Decreased coordination; Impaired judgement;Decreased safety awareness;Orthopedic restrictions;Pain   Assessment Pt particiapted in therapy session and continues to require frequent verbal and tactile cues for safety  Pt easily distractible and requires redirection to focus on therapeutic tasks  Provided extensive education and discussion with pt regarding disposition plan  Pt reports she has 5 uneven steps with a right handrail and then an additional uneven step to enter home environment  She expresses concerns regarding sleeping arrangements as her hospital bed will not be delivered until tomorrow  Pt also expresses concern regarding personal hygiene at home as she is unable to rely on her brother for that and she lives alone  Discussed home health aides with pt and reported she would need to discuss with case management regarding setting up home health aides  Pt performed transfers and ambulation with supervision assist this session however has not made significant progress in ambulation distance and is still unable to participate in stair training  Recommend pt transition to post acute inpatient rehab following discharge from the hospital to increase functional independence  Skilled physical therapy is indicated to address listed functional deficits focusing on strength, endurance and functional mobility     Barriers to Discharge Inaccessible home environment;Decreased caregiver support   Goals   Patient Goals get better   STG Expiration Date 10/03/19   Short Term Goal #1 1-10 days: bed mob and transfers w/ indep, standing balance to good/normal w/ device, ambulate 200-300 ft w/ RW and mod I WBAT on R, increase B LE strength by 1/2 -1 grade, indep recall all ELSA precautions, indep HEP, ambulate 1 flight of stairs w/ rail and mod I   PT Treatment Day 5   Plan   Treatment/Interventions Functional transfer training;LE strengthening/ROM; Elevations; Therapeutic exercise; Endurance training;Patient/family training;Equipment eval/education; Bed mobility;Gait training;Spoke to nursing;Spoke to case management;Spoke to advanced practitioner;OT   Progress Progressing toward goals   PT Frequency 7x/wk; Twice a day   Recommendation   Recommendation Post acute IP rehab   Equipment Recommended Walker  (pt RW already present in room)   PT - OK to Discharge Yes   Additional Comments to rehab when medically stable       Aylin Ackerman PT, DPT

## 2019-09-27 ENCOUNTER — HOSPITAL ENCOUNTER (INPATIENT)
Facility: HOSPITAL | Age: 69
LOS: 10 days | Discharge: HOME HEALTH | DRG: 560 | End: 2019-10-07
Attending: FAMILY MEDICINE | Admitting: FAMILY MEDICINE
Payer: MEDICARE

## 2019-09-27 ENCOUNTER — TRANSITIONAL CARE MANAGEMENT (OUTPATIENT)
Dept: FAMILY MEDICINE CLINIC | Facility: CLINIC | Age: 69
End: 2019-09-27

## 2019-09-27 VITALS
HEIGHT: 62 IN | TEMPERATURE: 98.2 F | HEART RATE: 82 BPM | DIASTOLIC BLOOD PRESSURE: 62 MMHG | BODY MASS INDEX: 39.64 KG/M2 | WEIGHT: 215.39 LBS | SYSTOLIC BLOOD PRESSURE: 99 MMHG | OXYGEN SATURATION: 98 % | RESPIRATION RATE: 17 BRPM

## 2019-09-27 DIAGNOSIS — M25.562 CHRONIC PAIN OF LEFT KNEE: ICD-10-CM

## 2019-09-27 DIAGNOSIS — G89.29 CHRONIC PAIN OF LEFT KNEE: ICD-10-CM

## 2019-09-27 DIAGNOSIS — Z96.641 AFTERCARE FOLLOWING RIGHT HIP JOINT REPLACEMENT SURGERY: ICD-10-CM

## 2019-09-27 DIAGNOSIS — Z47.1 AFTERCARE FOLLOWING RIGHT HIP JOINT REPLACEMENT SURGERY: ICD-10-CM

## 2019-09-27 DIAGNOSIS — Z96.641 STATUS POST TOTAL HIP REPLACEMENT, RIGHT: ICD-10-CM

## 2019-09-27 DIAGNOSIS — I47.1 SVT (SUPRAVENTRICULAR TACHYCARDIA) (HCC): ICD-10-CM

## 2019-09-27 DIAGNOSIS — I10 BENIGN ESSENTIAL HYPERTENSION: Primary | ICD-10-CM

## 2019-09-27 LAB
ANION GAP SERPL CALCULATED.3IONS-SCNC: 8 MMOL/L (ref 4–13)
ATRIAL RATE: 151 BPM
ATRIAL RATE: 153 BPM
ATRIAL RATE: 92 BPM
BASOPHILS # BLD AUTO: 0.06 THOUSANDS/ΜL (ref 0–0.1)
BASOPHILS NFR BLD AUTO: 1 % (ref 0–1)
BUN SERPL-MCNC: 11 MG/DL (ref 5–25)
CALCIUM SERPL-MCNC: 9.5 MG/DL (ref 8.3–10.1)
CHLORIDE SERPL-SCNC: 102 MMOL/L (ref 100–108)
CO2 SERPL-SCNC: 27 MMOL/L (ref 21–32)
CREAT SERPL-MCNC: 0.64 MG/DL (ref 0.6–1.3)
EOSINOPHIL # BLD AUTO: 0.51 THOUSAND/ΜL (ref 0–0.61)
EOSINOPHIL NFR BLD AUTO: 5 % (ref 0–6)
ERYTHROCYTE [DISTWIDTH] IN BLOOD BY AUTOMATED COUNT: 13.1 % (ref 11.6–15.1)
GFR SERPL CREATININE-BSD FRML MDRD: 90 ML/MIN/1.73SQ M
GLUCOSE SERPL-MCNC: 119 MG/DL (ref 65–140)
GLUCOSE SERPL-MCNC: 119 MG/DL (ref 65–140)
GLUCOSE SERPL-MCNC: 127 MG/DL (ref 65–140)
GLUCOSE SERPL-MCNC: 134 MG/DL (ref 65–140)
GLUCOSE SERPL-MCNC: 95 MG/DL (ref 65–140)
HCT VFR BLD AUTO: 35.9 % (ref 34.8–46.1)
HGB BLD-MCNC: 11.5 G/DL (ref 11.5–15.4)
IMM GRANULOCYTES # BLD AUTO: 0.06 THOUSAND/UL (ref 0–0.2)
IMM GRANULOCYTES NFR BLD AUTO: 1 % (ref 0–2)
LYMPHOCYTES # BLD AUTO: 1.32 THOUSANDS/ΜL (ref 0.6–4.47)
LYMPHOCYTES NFR BLD AUTO: 14 % (ref 14–44)
MCH RBC QN AUTO: 31 PG (ref 26.8–34.3)
MCHC RBC AUTO-ENTMCNC: 32 G/DL (ref 31.4–37.4)
MCV RBC AUTO: 97 FL (ref 82–98)
MONOCYTES # BLD AUTO: 0.95 THOUSAND/ΜL (ref 0.17–1.22)
MONOCYTES NFR BLD AUTO: 10 % (ref 4–12)
NEUTROPHILS # BLD AUTO: 6.78 THOUSANDS/ΜL (ref 1.85–7.62)
NEUTS SEG NFR BLD AUTO: 69 % (ref 43–75)
NRBC BLD AUTO-RTO: 0 /100 WBCS
P AXIS: 31 DEGREES
PLATELET # BLD AUTO: 308 THOUSANDS/UL (ref 149–390)
PMV BLD AUTO: 8.6 FL (ref 8.9–12.7)
POTASSIUM SERPL-SCNC: 3.7 MMOL/L (ref 3.5–5.3)
PR INTERVAL: 112 MS
PR INTERVAL: 114 MS
PR INTERVAL: 176 MS
QRS AXIS: -18 DEGREES
QRS AXIS: -19 DEGREES
QRS AXIS: -29 DEGREES
QRSD INTERVAL: 96 MS
QRSD INTERVAL: 98 MS
QRSD INTERVAL: 98 MS
QT INTERVAL: 262 MS
QT INTERVAL: 264 MS
QT INTERVAL: 362 MS
QTC INTERVAL: 415 MS
QTC INTERVAL: 421 MS
QTC INTERVAL: 447 MS
RBC # BLD AUTO: 3.71 MILLION/UL (ref 3.81–5.12)
SODIUM SERPL-SCNC: 137 MMOL/L (ref 136–145)
T WAVE AXIS: 38 DEGREES
T WAVE AXIS: 49 DEGREES
T WAVE AXIS: 9 DEGREES
VENTRICULAR RATE: 151 BPM
VENTRICULAR RATE: 153 BPM
VENTRICULAR RATE: 92 BPM
WBC # BLD AUTO: 9.68 THOUSAND/UL (ref 4.31–10.16)

## 2019-09-27 PROCEDURE — 97530 THERAPEUTIC ACTIVITIES: CPT

## 2019-09-27 PROCEDURE — 93010 ELECTROCARDIOGRAM REPORT: CPT | Performed by: INTERNAL MEDICINE

## 2019-09-27 PROCEDURE — NC001 PR NO CHARGE: Performed by: ORTHOPAEDIC SURGERY

## 2019-09-27 PROCEDURE — 82948 REAGENT STRIP/BLOOD GLUCOSE: CPT

## 2019-09-27 PROCEDURE — NS001 PR NO SIGNATURE OR ATTESTATION: Performed by: ORTHOPAEDIC SURGERY

## 2019-09-27 PROCEDURE — 97116 GAIT TRAINING THERAPY: CPT

## 2019-09-27 PROCEDURE — 97535 SELF CARE MNGMENT TRAINING: CPT

## 2019-09-27 PROCEDURE — 85025 COMPLETE CBC W/AUTO DIFF WBC: CPT | Performed by: ORTHOPAEDIC SURGERY

## 2019-09-27 PROCEDURE — 80048 BASIC METABOLIC PNL TOTAL CA: CPT | Performed by: ORTHOPAEDIC SURGERY

## 2019-09-27 PROCEDURE — 99306 1ST NF CARE HIGH MDM 50: CPT | Performed by: FAMILY MEDICINE

## 2019-09-27 RX ORDER — POLYETHYLENE GLYCOL 3350 17 G/17G
17 POWDER, FOR SOLUTION ORAL DAILY PRN
Status: DISCONTINUED | OUTPATIENT
Start: 2019-09-27 | End: 2019-10-07 | Stop reason: HOSPADM

## 2019-09-27 RX ORDER — FOLIC ACID 1 MG/1
1 TABLET ORAL DAILY
Status: DISCONTINUED | OUTPATIENT
Start: 2019-09-28 | End: 2019-10-07 | Stop reason: HOSPADM

## 2019-09-27 RX ORDER — METHOCARBAMOL 500 MG/1
500 TABLET, FILM COATED ORAL 2 TIMES DAILY
Status: DISCONTINUED | OUTPATIENT
Start: 2019-09-27 | End: 2019-10-07 | Stop reason: HOSPADM

## 2019-09-27 RX ORDER — FERROUS SULFATE 325(65) MG
325 TABLET ORAL
Status: DISCONTINUED | OUTPATIENT
Start: 2019-09-28 | End: 2019-10-07 | Stop reason: HOSPADM

## 2019-09-27 RX ORDER — DOCUSATE SODIUM 100 MG/1
100 CAPSULE, LIQUID FILLED ORAL 2 TIMES DAILY
Status: DISCONTINUED | OUTPATIENT
Start: 2019-09-27 | End: 2019-10-07 | Stop reason: HOSPADM

## 2019-09-27 RX ORDER — PRAVASTATIN SODIUM 40 MG
40 TABLET ORAL
Status: DISCONTINUED | OUTPATIENT
Start: 2019-09-27 | End: 2019-10-07 | Stop reason: HOSPADM

## 2019-09-27 RX ORDER — SENNOSIDES 8.6 MG
1 TABLET ORAL DAILY
Status: DISCONTINUED | OUTPATIENT
Start: 2019-09-28 | End: 2019-10-07 | Stop reason: HOSPADM

## 2019-09-27 RX ORDER — OXYCODONE HYDROCHLORIDE 5 MG/1
5 TABLET ORAL EVERY 4 HOURS PRN
Status: DISCONTINUED | OUTPATIENT
Start: 2019-09-27 | End: 2019-10-07 | Stop reason: HOSPADM

## 2019-09-27 RX ORDER — GABAPENTIN 300 MG/1
300 CAPSULE ORAL 3 TIMES DAILY
Status: DISCONTINUED | OUTPATIENT
Start: 2019-09-27 | End: 2019-10-07 | Stop reason: HOSPADM

## 2019-09-27 RX ORDER — METHOCARBAMOL 500 MG/1
500 TABLET, FILM COATED ORAL EVERY 6 HOURS SCHEDULED
Status: DISCONTINUED | OUTPATIENT
Start: 2019-09-27 | End: 2019-09-27

## 2019-09-27 RX ORDER — ASCORBIC ACID 500 MG
500 TABLET ORAL 2 TIMES DAILY
Status: DISCONTINUED | OUTPATIENT
Start: 2019-09-27 | End: 2019-10-07 | Stop reason: HOSPADM

## 2019-09-27 RX ORDER — LOSARTAN POTASSIUM 50 MG/1
50 TABLET ORAL DAILY
Status: DISCONTINUED | OUTPATIENT
Start: 2019-09-28 | End: 2019-10-07 | Stop reason: HOSPADM

## 2019-09-27 RX ORDER — ACETAMINOPHEN 325 MG/1
975 TABLET ORAL EVERY 8 HOURS
Status: DISCONTINUED | OUTPATIENT
Start: 2019-09-27 | End: 2019-10-07 | Stop reason: HOSPADM

## 2019-09-27 RX ORDER — POLYETHYLENE GLYCOL 3350 17 G/17G
17 POWDER, FOR SOLUTION ORAL DAILY PRN
Status: DISCONTINUED | OUTPATIENT
Start: 2019-09-27 | End: 2019-09-27 | Stop reason: HOSPADM

## 2019-09-27 RX ADMIN — METFORMIN HYDROCHLORIDE 500 MG: 500 TABLET ORAL at 07:03

## 2019-09-27 RX ADMIN — ACETAMINOPHEN 975 MG: 325 TABLET ORAL at 12:01

## 2019-09-27 RX ADMIN — METOPROLOL TARTRATE 12.5 MG: 25 TABLET ORAL at 21:58

## 2019-09-27 RX ADMIN — FOLIC ACID 1 MG: 1 TABLET ORAL at 08:06

## 2019-09-27 RX ADMIN — DOCUSATE SODIUM 100 MG: 100 CAPSULE, LIQUID FILLED ORAL at 18:08

## 2019-09-27 RX ADMIN — METHOCARBAMOL TABLETS 500 MG: 500 TABLET, COATED ORAL at 05:14

## 2019-09-27 RX ADMIN — OXYCODONE HYDROCHLORIDE 5 MG: 5 TABLET ORAL at 12:01

## 2019-09-27 RX ADMIN — PRAVASTATIN SODIUM 40 MG: 40 TABLET ORAL at 18:09

## 2019-09-27 RX ADMIN — METHOCARBAMOL TABLETS 500 MG: 500 TABLET, COATED ORAL at 12:01

## 2019-09-27 RX ADMIN — OXYCODONE HYDROCHLORIDE 5 MG: 5 TABLET ORAL at 05:14

## 2019-09-27 RX ADMIN — OXYCODONE HYDROCHLORIDE 5 MG: 5 TABLET ORAL at 22:04

## 2019-09-27 RX ADMIN — FERROUS SULFATE TAB 325 MG (65 MG ELEMENTAL FE) 325 MG: 325 (65 FE) TAB at 07:03

## 2019-09-27 RX ADMIN — GABAPENTIN 300 MG: 300 CAPSULE ORAL at 21:59

## 2019-09-27 RX ADMIN — OXYCODONE HYDROCHLORIDE AND ACETAMINOPHEN 500 MG: 500 TABLET ORAL at 08:06

## 2019-09-27 RX ADMIN — OXYCODONE HYDROCHLORIDE AND ACETAMINOPHEN 500 MG: 500 TABLET ORAL at 18:09

## 2019-09-27 RX ADMIN — OXYCODONE HYDROCHLORIDE 5 MG: 5 TABLET ORAL at 18:08

## 2019-09-27 RX ADMIN — METFORMIN HYDROCHLORIDE 500 MG: 500 TABLET ORAL at 18:09

## 2019-09-27 RX ADMIN — GABAPENTIN 300 MG: 300 CAPSULE ORAL at 08:06

## 2019-09-27 RX ADMIN — ACETAMINOPHEN 975 MG: 325 TABLET ORAL at 21:58

## 2019-09-27 RX ADMIN — METHOCARBAMOL 500 MG: 500 TABLET, FILM COATED ORAL at 18:08

## 2019-09-27 RX ADMIN — DOCUSATE SODIUM 100 MG: 100 CAPSULE, LIQUID FILLED ORAL at 08:06

## 2019-09-27 RX ADMIN — ENOXAPARIN SODIUM 40 MG: 40 INJECTION SUBCUTANEOUS at 08:06

## 2019-09-27 RX ADMIN — SENNOSIDES 8.6 MG: 8.6 TABLET, FILM COATED ORAL at 08:06

## 2019-09-27 RX ADMIN — ACETAMINOPHEN 975 MG: 325 TABLET ORAL at 05:14

## 2019-09-27 NOTE — PROGRESS NOTES
Internal Medicine Progress Note  Patient: Argelia Pulliam  Age/sex: 68 y o  female  Medical Record #: [id number]      ASSESSMENT/PLAN:  Argelia Pulliam is seen and examined and mangement for following issues:    Rt hip OA s/p Rt ELSA:  Did well overnight; pain control adequate  Patient remains afebrile  Monitor WBC and fever curve post op while encouraging use of incentive spirometer  DVT prophylaxis in place and reviewed  Venofer per Orthopedic protocol  DC to SNF planned for today      HTN:  blood pressure well controlled and pt currently does not need ARB  Will monitor BP and add back when needed      DM type 2:    Pt takes metformin 500mg 2x daily with meals -   Continue SSI and accuchecks  Adjust medications as needed  BS stable    Post operative blood loss anemia:  IV venofer given per orthopedics; hgb 9 9 >2 gram blood loss     Chronic back pain: Continue gabapentin  Hyponatremia:  Asymptomatic; appetite improving; does not appear dry    SVT:  No further events since starting metoprolol, cardiology input appreciated, they recommend outpatient echocardiogram to be completed by PCP; cont low dose BB; no further episodes        Subjective: Patient seen and examined  Patients overnight issues or events were reviewed with nursing or staff during rounds or morning huddle session  New or overnight issues include the following: DM type 2:  Stable; Metformin restarted  Blood sugars stable  HTN:  Stable; Continue off ARB for now  BP stable  Low back pain:  Adequate control; Continue pain control measures        ROS:   GI: denies abdominal pain, change bowel habits or reflux symptoms  Neuro: Denies any headache, new vision changes, new neuropathies,new weaknesses   Respiratory: No Cough, SOB, denies wheeze  Cardiovascular: No CP, palpitations , denies perception of rapid heartbeat  : denies any new urinary burning or frequency    Review of Scheduled Meds:    Current Facility-Administered Medications:  acetaminophen 975 mg Oral Du Medina MD    ascorbic acid 500 mg Oral BID Eloina Davison MD    docusate sodium 100 mg Oral BID Eloina Davison MD    enoxaparin 40 mg Subcutaneous Daily Sofia Oreilly MD    ferrous sulfate 325 mg Oral Daily With Gerald Montero MD    folic acid 1 mg Oral Daily Eloina Davison MD    gabapentin 300 mg Oral TID Eloina Davison MD    hydrALAZINE 25 mg Oral Q8H PRN Kaitlnyn Arroyo PA-C    insulin lispro 1-5 Units Subcutaneous TID AC Kaitlynn Arroyo PA-C    insulin lispro 1-5 Units Subcutaneous HS Kaitlynn Arroyo PA-C    lactated ringers 125 mL/hr Intravenous Continuous Taurus Clinton MD Last Rate: 75 mL/hr (09/23/19 0705)   lactated ringers 50 mL/hr Intravenous Continuous Breann Hanley CRNA Last Rate: Stopped (09/24/19 0932)   losartan 50 mg Oral Daily WILLIAN Ugatre    metFORMIN 500 mg Oral BID With Meals Bruno Vaca DO    methocarbamol 500 mg Oral Q6H Jung Flores MD    metoprolol tartrate 12 5 mg Oral Q12H [facility] WILLIAN Enamorado    ondansetron 4 mg Intravenous Q6H PRN Eloina Davison MD    oxyCODONE 10 mg Oral Q4H PRN Eloina Davison MD    oxyCODONE 5 mg Oral Q4H PRN Eloina Davison MD    pravastatin 40 mg Oral Daily With Sandeep Henderson MD    senna 1 tablet Oral Daily Eloina Davison MD        Labs:     Results from last 7 days   Lab Units 09/26/19  0517 09/25/19  0432 09/24/19  0609   WBC Thousand/uL 11 25* 12 00* 10 30*   HEMOGLOBIN g/dL 9 9* 10 5* 10 4*   HEMATOCRIT % 31 0* 33 4* 33 2*   PLATELETS Thousands/uL 212 203 202     Results from last 7 days   Lab Units 09/26/19  0517 09/25/19  0432 09/24/19  0609   SODIUM mmol/L 132* 135* 136   POTASSIUM mmol/L 3 8 3 5 3 0*   CHLORIDE mmol/L 100 102 103   CO2 mmol/L 27 27 28   BUN mg/dL 9 7 20   CREATININE mg/dL 0 56* 0 52* 0 80   CALCIUM mg/dL 8 7 8 7 8 3                  Results from last 7 days   Lab Units 09/26/19  1923 09/26/19  1628 09/26/19  1054   POC GLUCOSE mg/dl 106 114 129       Invasive Devices     Peripheral Intravenous Line Peripheral IV 09/23/19 Left Hand 3 days                 *Labs reviewed  *Radiology studies reviewed  *Medications reviewed and reconciled as needed  *Please refer to order section for additional ordered labs studies    Physical Examination:  Vitals:   Vitals:    09/26/19 0713 09/26/19 1513 09/26/19 2142 09/26/19 2322   BP: 126/56 98/58 107/66 105/63   Pulse: 94 78 93 90   Resp: 18 18 19   Temp: 99 °F (37 2 °C) 98 2 °F (36 8 °C)  98 2 °F (36 8 °C)   TempSrc:       SpO2: 96% 95% 97% 97%   Weight:       Height:           GEN: NAD  RESP: CTAB, no R/R/W, good expiratory effort, breath sounds equal  CV: +S1 S2, regular rate, no rubs, PMI normal  ABD: soft, NT, ND, normal BS   : voiding   EXT: DP pulses intact b/l; good cap refill; dressing intact  Skin: no rashes , no lesions  Neuro: AAOx3 no focality on exam;    Total time spent: At least 35 minutes, with more than 50% spent counseling/coordinating care  Counseling includes discussion with patient re: progress  and discussion with patient of his/her current medical state/information  Coordination of patient's care was performed in conjunction with primary service  Time invested included review of patient's labs, vitals, and management of their comorbidities with continued monitoring  In addition, this patient was discussed with medical team including physician and advanced extenders  The care of the patient was extensively discussed and appropriate treatment plan was formulated unique for this patient  ** Please Note: Dragon 360 Dictation voice to text software may have been used in the creation of this document   **

## 2019-09-27 NOTE — DISCHARGE INSTRUCTIONS
You need an outpatient echocardiogram  Please call [phone] to schedule  Discharge Instructions - Pavel Martins 68 y o  female MRN: [id number]  Unit/Bed#: PACU 04    Weight Bearing Status:                                           Weight Bearing as tolerated to the right lower extremity  DVT prophylaxis:  Complete course of Lovenox as directed    Pain:  Continue analgesics as directed    Showering Instructions:   Do not shower until 5 days after surgery    Dressing Instructions:   Keep dressing clean, dry and intact until follow up appointment  Driving Instructions:  No driving until cleared by Orthopaedic Surgery  PT/OT:  Continue PT/OT on outpatient basis as directed    Appt Instructions: If you do not have your appointment, please call the clinic at [phone]  Otherwise followup as scheduled below:      Please obtain cardiac echo in 1 week and follow up with cardiology  Follow up with Dr Hina Fairbanks as OP 10/30/19     Please continue taking metoprolol for control of your heart rate

## 2019-09-27 NOTE — PLAN OF CARE
Problem: OCCUPATIONAL THERAPY ADULT  Goal: Performs self-care activities at highest level of function for planned discharge setting  See evaluation for individualized goals  Description  Treatment Interventions: ADL retraining, Functional transfer training, Endurance training, Cognitive reorientation, Patient/family training, Equipment evaluation/education, Compensatory technique education, Activityengagement          See flowsheet documentation for full assessment, interventions and recommendations  Outcome: Progressing  Note:   Limitation: Decreased ADL status, Decreased endurance, Decreased self-care trans, Decreased high-level ADLs  Prognosis: Good  Assessment: Pt was seen this date for OT tx session focusing on self-care tasks, sit to stnad progressions, tranfers, funciotnal mobility, LHAE dcation and reinforcement of funcitional use, fall prevention education and overall activity tolerance  Pt presnts seated OOB in chair, compeltes previously mentioned tasks at documented assist levels, see above  Pt continues to require cues for use of LHAE, and emotional suport throughout txsession as well as increased rest breaks and time for task completion  Pt reports she will not b able to have her brother providing support at home also reporting " My brother thinks it will be better if I go to rehab before going home" Due to inadequate levl of support, continue to recommend STR upon D/C However should pt find a solution to having support at home, D/C To home with family support is recommended as pt is funcitonal at S level for safety  Pt resting in chair at end of sessio nwith all needs i nreach  Would benefit form Continued OT tx to improve overall funciotnal abilites  COntinue to follow with current POC        OT Discharge Recommendation: Short Term Rehab  OT - OK to Discharge: (when medically cleared)

## 2019-09-27 NOTE — SOCIAL WORK
CM notified pt that [address] [place] [address]  are willing to accept pt today  Pt chose [facility] and stated that her brother will transport her  Pt stated that she will tell her brother to return the [facility] and RW from [facility] that he took home  Await admit time from [facility] ([phone])  9:45am CM spoke with Cl Mercado at [facility] and she stated that [facility] is willing to admit anytime today  Cl Mercado provided nursing report phone and fax #'s which CM entered into Facility Contacts  CM left detailed message for pt's brother, Sarah Deleon at [phone] that pt would like him to transport pt to [facility] in [facility] today  CM asked brother to please return the [facility] and RW to the hospital so that [facility] can pick it up  CM explained that pt will have  at [facility] and they will arrange necessary equipment prior to her return home from there  CM asked brother to please return this call with a  time  10:15am Brother stated that he will  pt between 12pm-1pm and return the un-used BSC and RW  CM notified Jeremías Esteban with [employer] who stated he cannot  the equipment today and to set it aside for him  CM asked pt's nurse, Sarah Deleon to do so and he agreed  CM notified physician and [facility] of  time

## 2019-09-27 NOTE — PLAN OF CARE
Problem: PHYSICAL THERAPY ADULT  Goal: Performs mobility at highest level of function for planned discharge setting  See evaluation for individualized goals  Description  Treatment/Interventions: Functional transfer training, LE strengthening/ROM, Elevations, Endurance training, Therapeutic exercise, Patient/family training, Equipment eval/education, Bed mobility, Gait training  Equipment Recommended: Anabela Whalen       See flowsheet documentation for full assessment, interventions and recommendations  Outcome: Progressing  Note:   Prognosis: Good  Problem List: Decreased strength, Decreased range of motion, Decreased endurance, Impaired balance, Decreased mobility, Decreased coordination, Impaired judgement, Decreased safety awareness, Orthopedic restrictions, Pain  Assessment: Pt continues to make progress with functional mobilty this session  ambualted repeated household distances with seated rest in between to recover  ambulated on steps as noted above with improved sequencing & reduced assist compared to previous trial   Pt educatd on foot placement on steps to prevent crossing her legs while descending  pt with good carryover in subsequent trial   Upon return to room, discussed pt progress & POC at this time  Continue with current discharge plan at this time as pt likely will not have assist upon return home & will require increased practice with mobility tasks & maximize independence for safe return home  Barriers to Discharge: Decreased caregiver support     Recommendation: Post acute IP rehab     PT - OK to Discharge: Yes    See flowsheet documentation for full assessment

## 2019-09-27 NOTE — PHYSICAL THERAPY NOTE
Physical Therapy Progress Note     09/27/19 0905   Pain Assessment   Pain Assessment 0-10   Pain Score 3   Pain Location Hip   Pain Orientation Right   Hospital Pain Intervention(s) Repositioned; Ambulation/increased activity; Rest   Response to Interventions tolerated   Restrictions/Precautions   RLE Weight Bearing Per Order WBAT   Other Precautions WBS;THR;Pain; Fall Risk   Subjective   Subjective Pt encountered supine in bed, pleasant and agreeable to treatment  No reported increased pain with mobility  Bed Mobility   Supine to Sit 3  Moderate assistance   Additional items Assist x 1; Increased time required;LE management   Transfers   Sit to Stand 5  Supervision   Additional items Assist x 1   Stand to Sit 5  Supervision   Additional items Assist x 1   Ambulation/Elevation   Gait pattern Excessively slow; Step to;Short stride;Decreased R stance;Decreased foot clearance; Antalgic; Improper Weight shift   Gait Assistance 5  Supervision   Additional items Assist x 1   Assistive Device Rolling walker   Distance 80' x 2   Stair Management Assistance 4  Minimal assist   Additional items Assist x 1   Stair Management Technique One rail R;Step to pattern; Foreward; With cane   Number of Stairs 4   Balance   Static Sitting Good   Static Standing Fair   Ambulatory Fair -   Endurance Deficit   Endurance Deficit Yes   Endurance Deficit Description pain, fatigue   Activity Tolerance   Activity Tolerance Patient tolerated treatment well   Nurse Made Aware CAPO Lloyd   Assessment   Prognosis Good   Problem List Decreased strength;Decreased range of motion;Decreased endurance; Impaired balance;Decreased mobility; Decreased coordination; Impaired judgement;Decreased safety awareness;Orthopedic restrictions;Pain   Assessment Pt continues to make progress with functional mobilty this session  ambualted repeated household distances with seated rest in between to recover    ambulated on steps as noted above with improved sequencing & reduced assist compared to previous trial   Pt educatd on foot placement on steps to prevent crossing her legs while descending  pt with good carryover in subsequent trial   Upon return to room, discussed pt progress & POC at this time  Continue with current discharge plan at this time as pt likely will not have assist upon return home & will require increased practice with mobility tasks & maximize independence for safe return home  Barriers to Discharge Decreased caregiver support   Goals   Patient Goals to go to rehab so she can go home safely   STG Expiration Date 10/03/19   PT Treatment Day 7   Plan   Treatment/Interventions Functional transfer training;LE strengthening/ROM; Elevations; Therapeutic exercise; Endurance training;Patient/family training;Equipment eval/education; Bed mobility;Gait training   Progress Progressing toward goals   PT Frequency 7x/wk; Twice a day   Recommendation   Recommendation Post acute IP rehab   Equipment Recommended Danisha Kelley, PTA

## 2019-09-27 NOTE — PROGRESS NOTES
Boubacar Martins 68 y o  female MRN: [id number]  Unit/Bed#: -01      Subjective:  68 y  o female post operative day 4 right total hip arthroplasty  Pt doing well  Pain controlled      Labs:  0   Lab Value Date/Time    HCT 31 0 (L) 09/26/2019 0517    HCT 33 4 (L) 09/25/2019 0432    HCT 33 2 (L) 09/24/2019 0609    HGB 9 9 (L) 09/26/2019 0517    HGB 10 5 (L) 09/25/2019 0432    HGB 10 4 (L) 09/24/2019 0609    INR 1 14 08/05/2019 1423    WBC 11 25 (H) 09/26/2019 0517    WBC 12 00 (H) 09/25/2019 0432    WBC 10 30 (H) 09/24/2019 0609    CRP <3 0 08/05/2019 1423       Meds:    Current Facility-Administered Medications:     acetaminophen (TYLENOL) tablet 975 mg, 975 mg, Oral, Q8H, Jennie Singh MD, 975 mg at 09/27/19 8533    ascorbic acid (VITAMIN C) tablet 500 mg, 500 mg, Oral, BID, Jennie Singh MD, 500 mg at 09/26/19 1702    docusate sodium (COLACE) capsule 100 mg, 100 mg, Oral, BID, Jennie Singh MD, 100 mg at 09/26/19 1702    enoxaparin (LOVENOX) subcutaneous injection 40 mg, 40 mg, Subcutaneous, Daily, Shayy Olvera MD, 40 mg at 09/26/19 1584    ferrous sulfate tablet 325 mg, 325 mg, Oral, Daily With Breakfast, Jennie Singh MD, 325 mg at 50/93/04 6817    folic acid (FOLVITE) tablet 1 mg, 1 mg, Oral, Daily, Jennie Singh MD, 1 mg at 09/26/19 0818    gabapentin (NEURONTIN) capsule 300 mg, 300 mg, Oral, TID, Jennie Singh MD, 300 mg at 09/26/19 2148    hydrALAZINE (APRESOLINE) tablet 25 mg, 25 mg, Oral, Q8H PRN, Kaitlynn Arroyo PA-C    insulin lispro (HumaLOG) 100 units/mL subcutaneous injection 1-5 Units, 1-5 Units, Subcutaneous, TID AC, 1 Units at 09/23/19 1738 **AND** Fingerstick Glucose (POCT), , , TID AC, Kaitlynn Arroyo PA-C    insulin lispro (HumaLOG) 100 units/mL subcutaneous injection 1-5 Units, 1-5 Units, Subcutaneous, HS, Kaitlynn Arroyo PA-C    lactated ringers infusion, 125 mL/hr, Intravenous, Continuous, Anish Benavides MD, Last Rate: 75 mL/hr at 09/23/19 0705    lactated ringers infusion, 50 mL/hr, Intravenous, Continuous, Aretha Vasquez, CRNA, Stopped at 09/24/19 0932    losartan (COZAAR) tablet 50 mg, 50 mg, Oral, Daily, WILLIAN Romo, 50 mg at 09/26/19 0818    metFORMIN (GLUCOPHAGE) tablet 500 mg, 500 mg, Oral, BID With Meals, [employer] Sa[employer], DO, 500 mg at 09/26/19 1648    methocarbamol (ROBAXIN) tablet 500 mg, 500 mg, Oral, Q6H [address], Nisha Coburn MD, 500 mg at 09/27/19 0514    metoprolol tartrate (LOPRESSOR) partial tablet 12 5 mg, 12 5 mg, Oral, Q12H LANRE, WILLIAN Enamorado, 12 5 mg at 09/26/19 0818    ondansetron (ZOFRAN) injection 4 mg, 4 mg, Intravenous, Q6H PRN, Nisha Coburn MD    oxyCODONE (ROXICODONE) IR tablet 10 mg, 10 mg, Oral, Q4H PRN, Nisha Coburn MD, 10 mg at 09/26/19 0201    oxyCODONE (ROXICODONE) IR tablet 5 mg, 5 mg, Oral, Q4H PRN, Nisha Coburn MD, 5 mg at 09/27/19 0514    pravastatin (PRAVACHOL) tablet 40 mg, 40 mg, Oral, Daily With Liz Shaffer MD, 40 mg at 09/26/19 1648    senna (SENOKOT) tablet 8 6 mg, 1 tablet, Oral, Daily, Nisha Coburn MD, 8 6 mg at 09/26/19 0818    Blood Culture:   No results found for: BLOODCX    Wound Culture:   No results found for: WOUNDCULT    Ins and Outs:  I/O last 24 hours: In: 1860 [P O :1860]  Out: 1000 [Urine:1000]          Physical Exam:  Vitals:    09/26/19 2322   BP: 105/63   Pulse: 90   Resp: 19   Temp: 98 2 °F (36 8 °C)   SpO2: 97%     right lower extremity:  · Dressings C/D/I  · Sensation intact L2-S1  · Motor intact L2-S1  · 2+ dorsalis pedis     _*_*_*_*_*_*_*_*_*_*_*_*_*_*_*_*_*_*_*_*_*_*_*_*_*_*_*_*_*_*_*_*_*_*_*_*_*_*_*_*_*    Assessment: 68 y  o female post operative day 4 right total hip arthroplasty   Doing well    Plan:  · Weight Bearing as tolerated  · Up and out of bed  · Posterior total hip precautions  · Abduction pillow while in bed  · DVT prophylaxis  · Analgesics  · PT/OT  · Patient noted to have acute blood loss anemia due to a drop in Hbg of > 2 0g from preop levels, will monitor vital signs and resuscitate with IV fluids as needed    Joshua Greenwood

## 2019-09-27 NOTE — ASSESSMENT & PLAN NOTE
Patient had an episode of supraventricular tachycardia with heart rate in the 150s while she was in the acute and patient's hospital   He converted spontaneously however she was placed on metoprolol 12 5 mg daily which we will continue for now    Patient is to have outpatient follow-up with Cardiology after discharge from rehab

## 2019-09-27 NOTE — ASSESSMENT & PLAN NOTE
Patient had right hip replacement surgery done which was uneventful with no complications reported  She is here for course of rehab requiring physical therapy and occupational therapy which has been consulted  Continue Oxycodone for pain control along with Tylenol as well  Also on Robaxin for muscle relaxation which have decreased to every 12 hours    Also on Lovenox 40 mg daily for 28 days post surgery

## 2019-09-27 NOTE — OCCUPATIONAL THERAPY NOTE
Occupational Therapy Treatment Note:       09/27/19 1035   Restrictions/Precautions   Weight Bearing Precautions Per Order Yes   RLE Weight Bearing Per Order WBAT   Other Precautions WBS;THR;Fall Risk;Pain   Pain Assessment   Pain Score 3   Pain Type Acute pain   Pain Location Hip   Pain Orientation Right   ADL   Where Assessed Chair   Grooming Assistance 5  Supervision/Setup   Grooming Deficit Setup; Wash/dry hands; Wash/dry face; Teeth care;Brushing hair   Grooming Comments completes wash hands, face and oral car in stance at sink, RW level, compeltes brushing hair in seated   UB Bathing Assistance 4  Minimal Assistance   UB Bathing Deficit Chest;Right arm;Left arm; Abdomen   UB Bathing Comments assist to wash back, in seated, increased time   LB Bathing Assistance 3  Moderate Assistance   LB Bathing Deficit Perineal area; Buttocks;Right upper leg;Left upper leg;Right lower leg including foot; Left lower leg including foot   LB Bathing Comments Assit for distal LE and buttocks, educated pt on use of long handled sponge for same, reports understanding   [address] 4  Minimal Assistance   UB Dressing Deficit Thread RUE; Thread LUE;Pull around back   UB Dressing Comments assist to don bra, able to don pull ovre shirt mod I in seated   LB Dressing Assistance 4  Minimal Assistance   LB Dressing Deficit Don/doff R sock; Don/doff L sock; Thread RLE into underwear; Thread LLE into underwear;Pull up over hips;Don/doff R shoe;Don/doff L shoe;Use of adaptive equipment   LB Dressing Comments [address] [facility], continues to require verbal insruciton for use of same, increased time required and occasional min A    Toileting Assistance  4  Minimal Assistance   Toileting Deficit Perineal hygiene   Toileting Comments Requests assist for thoroughness following toileting   Functional Standing Tolerance   Time ~7 mins   Activity standing at sink during self care tasks    Comments RW level, close SBA throughout   Bed Mobility   Additional Comments OOB upon presntation and at end of session   Transfers   Sit to Stand 5  Supervision   Additional items Increased time required   Stand to Sit 5  Supervision   Additional items Increased time required   Stand pivot 5  Supervision   Additional items Increased time required   Toilet transfer 5  Supervision   Additional items Increased time required   Additional Comments RW level, ocassional VC for hand placement during transfers   Functional Mobility   Functional Mobility 5  Supervision   Additional Comments to and from bathroom   Additional items Rolling walker   Toilet Transfers   Toilet Transfer From Rolling walker   Toilet Transfer Type To and from   Toilet Transfer to Raised toilet seat with rails   Toilet Transfer Technique Ambulating   Toilet Transfers Supervision   Toilet Transfers Comments increased time   Cognition   Overall Cognitive Status [facility]   Arousal/Participation Alert; Cooperative   Attention Attends with cues to redirect   Orientation Level Oriented X4   Memory Within functional limits   Following Commands Follows multistep commands with increased time or repetition   Comments tangential, cues to redirect, pt with anxious like behaviors throguhout reporting she eels anxious about d/c requires increased emotional support, and encouragment throguhout tx session this date due to same  Activity Tolerance   Activity Tolerance Patient tolerated treatment well   Medical Staff Made Aware NSG aware   Assessment   Assessment Pt was seen this date for OT tx session focusing on self-care tasks, sit to stnad progressions, tranfers, funciotnal mobility, LHAE dcation and reinforcement of funcitional use, fall prevention education and overall activity tolerance  Pt presnts seated OOB in chair, compeltes previously mentioned tasks at documented assist levels, see above   Pt continues to require cues for use of LHAE, and emotional suport throughout txsession as well as increased rest breaks and time for task completion  Pt reports she will not b able to have her brother providing support at home also reporting " My brother thinks it will be better if I go to rehab before going home" Due to inadequate levl of support, continue to recommend STR upon D/C However should pt find a solution to having support at home, D/C To home with family support is recommended as pt is funcitonal at S level for safety  Pt resting in chair at end of sessio nwith all needs i nreach  Would benefit form Continued OT tx to improve overall funciotnal abilites  COntinue to follow with current POC      Plan   Treatment Interventions ADL retraining   Goal Expiration Date 10/04/19   OT Treatment Day 3   OT Frequency 3-5x/wk   Recommendation   OT Discharge Recommendation Short Term [address] [facility] [facility] [place]

## 2019-09-27 NOTE — NURSING NOTE
Patient discharged in no acute distress or pain via wheelchair accompanied by PCA and brother to be transported by brother via car to [facility]  AVS and summary of care faxed to [facility] prior to patient's arrival  Patient had prescription for Oxycodone in their possession at time of discharge

## 2019-09-27 NOTE — ASSESSMENT & PLAN NOTE
Lab Results   Component Value Date    HGBA1C 6 3 08/05/2019    Patient's diabetes is well controlled  Continue metformin oral only    No need to do Accu-Cheks

## 2019-09-27 NOTE — DISCHARGE SUMMARY
ORTHOPEDICS DISCHARGE SUMMARY   Lew Martins 68 y o  female MRN: [id number]  Unit/Bed#: -01      Attending Physician: Jacqueline Melgar    Admitting diagnosis: Primary osteoarthritis of right hip [M16 11]    Discharge diagnosis: Primary osteoarthritis of right hip [M16 11]    Date of admission: 9/23/2019    Date of discharge: 09/27/19    Procedure: Right total hip arthroplasty    HPI  This is a 68y o  year old female that presented to the office with signs and symptoms of right hip osteoarthritis  They tried and failed conservative treatment measures and wished to proceed with surgical intervention  The risks, benefits, and complications of the procedure were discussed with the patient and informed consent was obtained  Hospital Course: The patient was admitted to the hospital on 9/23/2019 and underwent an uncomplicated right total hip arthroplasty  They were transferred to the floor after a brief stay in the post-anesthesia care unit  Their pain was well managed with IV and oral pain medications  They began therapy on post operative day #1  Lovenox was also started for DVT prophylaxis 12 hours post op  Patient experienced asymptomatic SVTs for which cardiology was consulted and the patient was started on metoprolol  Patient was cleared for discharge by cardiology with an outpatient echo 1 week after discharge  On discharge date pt was cleared by PT and the medicine team and determined to be safe for discharge  Daily discussion was had with the patient, nursing staff, orthopaedic team, and family members if present  All questions were answered to the patients satisifaction      0   Lab Value Date/Time    HGB 11 5 09/27/2019 1002    HGB 9 9 (L) 09/26/2019 0517    HGB 10 5 (L) 09/25/2019 0432    HGB 10 4 (L) 09/24/2019 0609    HGB 12 9 08/05/2019 1423    HGB 14 1 06/25/2018 1535    HGB 13 7 02/12/2018 1621    HGB 13 7 04/10/2017 1306    HGB 13 7 02/22/2016 1458       Greater than 2 gram drop which qualifies for diagnosis of acute blood loss anemia  Vital signs remained stable and pt was resuscitated with IVF as needed   Body mass index is 39 4 kg/m²  morbidly obese  Recommend behavior modifications, nutrition and physical activity  Discharge Instructions: The patient was discharged weight bearing as tolerated to the right lower extremity  Lovenox will be continued for 28 days  Continue PT/OT  Take pain medications as instructed  Please obtain cardiac echo in 1 week and follow up with cardiology  Please continue taking metoprolol for control of your heart rate    Discharge Medications: For the complete list of discharge medications, please refer to the patient's medication reconciliation

## 2019-09-27 NOTE — H&P
H&P- Alberto Alicia 1950, 68 y o  female MRN: [id number]    Unit/Bed#: -02 Encounter: [id number]    Primary Care Provider: Angel Flores MD   Date and time admitted to hospital: 9/27/2019  2:31 PM        * Aftercare following right hip joint replacement surgery  Assessment & Plan  Patient had right hip replacement surgery done which was uneventful with no complications reported  She is here for course of rehab requiring physical therapy and occupational therapy which has been consulted  Continue Oxycodone for pain control along with Tylenol as well  Also on Robaxin for muscle relaxation which have decreased to every 12 hours  Also on Lovenox 40 mg daily for 28 days post surgery    Supraventricular tachycardia [facility])  Assessment & Plan  Patient had an episode of supraventricular tachycardia with heart rate in the 150s while she was in the acute and patient's hospital   He converted spontaneously however she was placed on metoprolol 12 5 mg daily which we will continue for now  Patient is to have outpatient follow-up with Cardiology after discharge from rehab    Obesity (BMI 30-39  9)  Assessment & Plan  Consult dietitian    Left knee pain  Assessment & Plan  Continue physical therapy and occupational therapy    Diabetes mellitus type 2, controlled [facility])  Assessment & Plan    Lab Results   Component Value Date    HGBA1C 6 3 08/05/2019    Patient's diabetes is well controlled  Continue metformin oral only  No need to do Accu-Cheks    Benign essential hypertension  Assessment & Plan  Patient's blood pressure is well controlled at this time    Continue low-dose metoprolol and losartan      VTE Prophylaxis: Enoxaparin (Lovenox)  / reason for no mechanical VTE prophylaxis On Lovenox   Code Status:  Full code as per the patient  POLST: There is no POLST form on file for this patient (pre-hospital)  Discussion with family:  None    Anticipated Length of Stay:  Patient will be admitted on an SNF Short Term Inpatient basis with an anticipated length of stay of  more than 2 midnights  Justification for Hospital Stay:  Aftercare for right hip replacement surgery    Total Time for Visit, including Counseling / Coordination of Care: 1 hour  Greater than 50% of this total time spent on direct patient counseling and coordination of care  Chief Complaint:   Right hip pain    History of Present Illness:    Sacha Mojica is a 68 y o  female who presents with right hip pain  Patient states that she recently had right hip replacement done and had no complications other than an episode of SVT postop  She states that she is here for course of rehab   Right hip pain at present is 5/10    Review of Systems:    Review of Systems   Constitutional: Negative for appetite change, chills, fatigue and fever  HENT: Negative for hearing loss, sore throat and trouble swallowing  Eyes: Negative for photophobia, discharge and visual disturbance  Respiratory: Negative for chest tightness and shortness of breath  Cardiovascular: Negative for chest pain and palpitations  Gastrointestinal: Positive for constipation  Negative for abdominal pain, blood in stool and vomiting  Endocrine: Negative for polydipsia and polyuria  Genitourinary: Negative for difficulty urinating, dysuria, flank pain and hematuria  Musculoskeletal: Positive for arthralgias and gait problem  Negative for back pain  Skin: Negative for rash  Allergic/Immunologic: Negative for environmental allergies and food allergies  Neurological: Negative for dizziness, seizures, syncope and headaches  Hematological: Does not bruise/bleed easily  Psychiatric/Behavioral: Negative for behavioral problems  All other systems reviewed and are negative        Past Medical and Surgical History:     Past Medical History:   Diagnosis Date    Alopecia     Calcaneal spur     Diabetes insipidus [facility] [address] [facility]     Diabetes mellitus ([facility] [address] [facility]     Hyperlipidemia     Hypertension     Osteoarthrosis 2/13/2013    Pes planus     unspecified laterality    Plantar fasciitis        Past Surgical History:   Procedure Laterality Date    CARPAL TUNNEL RELEASE      COLONOSCOPY      May 2006    INCISIONAL BREAST BIOPSY      DC COLONOSCOPY FLX DX W/COLLJ SPEC WHEN PFRMD N/A 5/4/2018    Procedure: COLONOSCOPY;  Surgeon: Dania Nguyễn MD;  [facility]; Service: Colorectal    DC TOTAL HIP ARTHROPLASTY Right 9/23/2019    Procedure: ARTHROPLASTY HIP TOTAL;  Surgeon: Sergio Mathur MD;  [facility];  Service: Orthopedics    WISDOM TOOTH EXTRACTION         Meds/Allergies:    Prior to Admission medications    Medication Sig Start Date End Date Taking?  Authorizing Provider   acetaminophen (TYLENOL) 500 mg tablet Take 1,000 mg by mouth every 6 (six) hours as needed for mild pain   Yes Historical Provider, MD   ascorbic acid (VITAMIN C) 500 mg tablet Take 1 tablet (500 mg total) by mouth 2 (two) times a day 7/10/19  Yes Sergio Mathur MD   cholecalciferol (VITAMIN D3) 1,000 units tablet Take 1,000 Units by mouth daily   Yes Historical Provider, MD   ferrous sulfate 324 (65 Fe) mg Take one tablet daily, every other day 7/10/19  Yes Sergio Mathur MD   fluticasone [facility]) 50 mcg/act nasal spray 1 spray into each nostril daily 4/30/18  Yes Alli Russo PA-C   folic acid (FOLVITE) 1 mg tablet Take 1 tablet (1 mg total) by mouth daily 7/10/19  Yes Sergio Mathur MD   metFORMIN (GLUCOPHAGE) 500 mg tablet TAKE 1 TABLET BY MOUTH TWO TIMES DAILY WITH MEALS 8/5/19  Yes Stefano Jacob MD   metoprolol tartrate (LOPRESSOR) 25 mg tablet Take 0 5 tablets (12 5 mg total) by mouth every 12 (twelve) hours 9/27/19  Yes Naomi Malin MD   Multiple Vitamin (MULTI-VITAMIN DAILY PO) Take by mouth daily    Yes Historical Provider, MD   oxyCODONE (ROXICODONE) 5 mg immediate release tablet 1 pill po Q4-6 Hrs prn 9/23/19  Yes Cydney Jeter PA-C   Biotin 1 MG CAPS Take by mouth daily Historical Provider, MD   Calcium Carb-Cholecalciferol (CALCIUM 1000 + D) 1000-800 MG-UNIT TABS Take by mouth    Historical Provider, MD   enoxaparin (LOVENOX) 40 mg/0 4 mL Inject 0 4 mL (40 mg total) under the skin daily for 28 days To start postoperatively 7/10/19 8/7/19  Juanis Flynn MD   gabapentin (NEURONTIN) 300 mg capsule Take 1 capsule (300 mg total) by mouth 3 (three) times a day for 30 days 7/11/19 9/16/19  Bishop Chandler MD   losartan (COZAAR) 100 MG tablet Take 1 tablet (100 mg total) by mouth daily for 90 days 4/10/19 9/16/19  Bishop Chandler MD   mometasone (ELOCON) 0 1 % ointment APPLY TO RASH LESIONS TWO TIMES DAILY FOR 2 WEEKS THEN THREE TO FOUR DAYS PER WEEK AS NEEDED 9/26/18   Historical Provider, MD   simvastatin (ZOCOR) 20 mg tablet Take 1 tablet (20 mg total) by mouth daily for 90 days 4/10/19 9/16/19  Bishop Chandler MD     I have reviewed home medications with patient personally  Allergies:    Allergies   Allergen Reactions    Meloxicam Hives    Nifedipine Edema    Sulfamethoxazole-Trimethoprim Edema       Social History:     Marital Status: Single   Social History     Substance and Sexual Activity   Alcohol Use Yes    Alcohol/week: 0 0 standard drinks    Frequency: 2-4 times a month    Drinks per session: 1 or 2    Binge frequency: Never    Comment: 0     Social History     Tobacco Use   Smoking Status Never Smoker   Smokeless Tobacco Never Used     Social History     Substance and Sexual Activity   Drug Use No       Family History:    Family History   Problem Relation Age of Onset    Diabetes Mother     Hypertension Mother     Emphysema Father     Cancer Brother     Colon cancer Brother 37       Physical Exam:     Vitals:   Blood Pressure: 140/72 (09/27/19 1416)  Pulse: 99 (09/27/19 1416)  Temperature: 97 5 °F (36 4 °C) (09/27/19 1405)  Temp Source: Temporal (09/27/19 1416)  Respirations: 19 (09/27/19 1405)  Height: 5' 2" (157 5 cm) (09/27/19 1405)  Weight - Scale: 96 6 kg (212 lb 15 4 oz) (09/27/19 1405)  SpO2: 96 % (09/27/19 1405)    Physical Exam   Constitutional: She is oriented to person, place, and time  She appears well-developed and well-nourished  HENT:   Head: Normocephalic and atraumatic  Mouth/Throat: Oropharynx is clear and moist    Eyes: Conjunctivae and EOM are normal    Neck: Normal range of motion  Neck supple  Cardiovascular: Normal rate, regular rhythm and normal heart sounds  Pulmonary/Chest: Effort normal and breath sounds normal    Abdominal: Soft  Bowel sounds are normal  She exhibits no mass  There is no tenderness  There is no rebound and no guarding  Genitourinary:   Genitourinary Comments: deferred   Musculoskeletal: Normal range of motion  Neurological: She is alert and oriented to person, place, and time  She has normal reflexes  Cranial nerves 2-12 are normal   Normal neurological exam   Skin: Skin is warm and dry  No rash noted  Psychiatric: She has a normal mood and affect  Nursing note and vitals reviewed  Additional Data:     Lab Results: I have personally reviewed pertinent reports  Results from last 7 days   Lab Units 09/27/19  1002   WBC Thousand/uL 9 68   HEMOGLOBIN g/dL 11 5   HEMATOCRIT % 35 9   PLATELETS Thousands/uL 308   NEUTROS PCT % 69   LYMPHS PCT % 14   MONOS PCT % 10   EOS PCT % 5     Results from last 7 days   Lab Units 09/27/19  1002   SODIUM mmol/L 137   POTASSIUM mmol/L 3 7   CHLORIDE mmol/L 102   CO2 mmol/L 27   BUN mg/dL 11   CREATININE mg/dL 0 64   ANION GAP mmol/L 8   CALCIUM mg/dL 9 5   GLUCOSE RANDOM mg/dL 119         Results from last 7 days   Lab Units 09/27/19  1031 09/27/19  0549 09/26/19  1923 09/26/19  1628 09/26/19  1054 09/26/19  0518 09/25/19  2125 09/25/19  1600 09/25/19  1038 09/25/19  0630 09/24/19  2100 09/24/19  1704   POC GLUCOSE mg/dl 127 95 106 114 129 110 102 123 142* 112 138 118               Imaging: I have personally reviewed pertinent reports        No orders to display       EKG, Pathology, and Other Studies Reviewed on Admission:   · EKG:  Reviewed    Allscripts / Epic Records Reviewed: Yes     ** Please Note: This note has been constructed using a voice recognition system   **

## 2019-09-27 NOTE — ASSESSMENT & PLAN NOTE
Patient's blood pressure is well controlled at this time    Continue low-dose metoprolol and losartan

## 2019-09-28 LAB
GLUCOSE SERPL-MCNC: 102 MG/DL (ref 65–140)
GLUCOSE SERPL-MCNC: 104 MG/DL (ref 65–140)
GLUCOSE SERPL-MCNC: 122 MG/DL (ref 65–140)
GLUCOSE SERPL-MCNC: 90 MG/DL (ref 65–140)

## 2019-09-28 PROCEDURE — 97116 GAIT TRAINING THERAPY: CPT

## 2019-09-28 PROCEDURE — 82948 REAGENT STRIP/BLOOD GLUCOSE: CPT

## 2019-09-28 PROCEDURE — 97162 PT EVAL MOD COMPLEX 30 MIN: CPT

## 2019-09-28 RX ADMIN — ACETAMINOPHEN 975 MG: 325 TABLET ORAL at 21:58

## 2019-09-28 RX ADMIN — METOPROLOL TARTRATE 12.5 MG: 25 TABLET ORAL at 21:59

## 2019-09-28 RX ADMIN — METFORMIN HYDROCHLORIDE 500 MG: 500 TABLET ORAL at 07:53

## 2019-09-28 RX ADMIN — DOCUSATE SODIUM 100 MG: 100 CAPSULE, LIQUID FILLED ORAL at 10:32

## 2019-09-28 RX ADMIN — METOPROLOL TARTRATE 12.5 MG: 25 TABLET ORAL at 10:32

## 2019-09-28 RX ADMIN — ENOXAPARIN SODIUM 40 MG: 40 INJECTION SUBCUTANEOUS at 10:35

## 2019-09-28 RX ADMIN — OXYCODONE HYDROCHLORIDE 5 MG: 5 TABLET ORAL at 07:53

## 2019-09-28 RX ADMIN — GABAPENTIN 300 MG: 300 CAPSULE ORAL at 22:00

## 2019-09-28 RX ADMIN — OXYCODONE HYDROCHLORIDE 5 MG: 5 TABLET ORAL at 15:05

## 2019-09-28 RX ADMIN — FOLIC ACID 1 MG: 1 TABLET ORAL at 10:34

## 2019-09-28 RX ADMIN — OXYCODONE HYDROCHLORIDE AND ACETAMINOPHEN 500 MG: 500 TABLET ORAL at 17:49

## 2019-09-28 RX ADMIN — PRAVASTATIN SODIUM 40 MG: 40 TABLET ORAL at 17:49

## 2019-09-28 RX ADMIN — METHOCARBAMOL 500 MG: 500 TABLET, FILM COATED ORAL at 17:50

## 2019-09-28 RX ADMIN — DOCUSATE SODIUM 100 MG: 100 CAPSULE, LIQUID FILLED ORAL at 17:50

## 2019-09-28 RX ADMIN — ACETAMINOPHEN 975 MG: 325 TABLET ORAL at 14:13

## 2019-09-28 RX ADMIN — LOSARTAN POTASSIUM 50 MG: 50 TABLET ORAL at 10:34

## 2019-09-28 RX ADMIN — METHOCARBAMOL 500 MG: 500 TABLET, FILM COATED ORAL at 10:34

## 2019-09-28 RX ADMIN — OXYCODONE HYDROCHLORIDE AND ACETAMINOPHEN 500 MG: 500 TABLET ORAL at 10:33

## 2019-09-28 RX ADMIN — GABAPENTIN 300 MG: 300 CAPSULE ORAL at 17:50

## 2019-09-28 RX ADMIN — METFORMIN HYDROCHLORIDE 500 MG: 500 TABLET ORAL at 17:49

## 2019-09-28 RX ADMIN — OXYCODONE HYDROCHLORIDE 5 MG: 5 TABLET ORAL at 02:10

## 2019-09-28 RX ADMIN — ACETAMINOPHEN 975 MG: 325 TABLET ORAL at 05:36

## 2019-09-28 RX ADMIN — SENNOSIDES 8.6 MG: 8.6 TABLET, FILM COATED ORAL at 10:32

## 2019-09-28 RX ADMIN — GABAPENTIN 300 MG: 300 CAPSULE ORAL at 10:34

## 2019-09-28 RX ADMIN — FERROUS SULFATE TAB 325 MG (65 MG ELEMENTAL FE) 325 MG: 325 (65 FE) TAB at 07:53

## 2019-09-28 NOTE — PHYSICAL THERAPY NOTE
PT Eval  [id number]  Physical Therapy Evaluation     Patient's Name: Yunior Beaulieu    Admitting Diagnosis  Status post total hip replacement, right [[id number]]    Problem List  Patient Active Problem List   Diagnosis    Benign essential hypertension    Cervical radiculopathy    Chronic lower back pain    Diabetes mellitus type 2, controlled ([address] )    Hyperlipidemia    Left knee pain    Lumbar stenosis with neurogenic claudication    Obesity (BMI 30-39  9)    Osteoarthrosis    Post-menopausal bleeding    Primary osteoarthritis of both knees    Psoriasis    Right knee pain    Right shoulder pain    Spondylolisthesis, lumbar region    Vaginal lump    Vitamin D deficiency    Effusion of left knee    Right hip pain    Arthritis of right hip    Lumbar radiculopathy    DDD (degenerative disc disease), lumbar    Lumbar spondylosis    Sacroiliitis (HCC)    Status post total hip replacement, right    Supraventricular tachycardia ([address] )    Aftercare following right hip joint replacement surgery       Past Medical History  Past Medical History:   Diagnosis Date    Alopecia     Calcaneal spur     Diabetes insipidus ([address]     Diabetes mellitus ([address]     Hyperlipidemia     Hypertension     Osteoarthrosis 2/13/2013    Pes planus     unspecified laterality    Plantar fasciitis        Past Surgical History  Past Surgical History:   Procedure Laterality Date    CARPAL TUNNEL RELEASE      COLONOSCOPY      May 2006    INCISIONAL BREAST BIOPSY      ND COLONOSCOPY FLX DX W/COLLJ SPEC WHEN PFRMD N/A 5/4/2018    Procedure: COLONOSCOPY;  Surgeon: Lia Rankin MD;  [facility];   Service: Colorectal    ND TOTAL HIP ARTHROPLASTY Right 9/23/2019    Procedure: ARTHROPLASTY HIP TOTAL;  Surgeon: Reyna Mccarty MD;  [facility];  Service: Orthopedics    WISDOM TOOTH EXTRACTION          09/28/19 1305   Note Type   Note type Eval only   Pain Assessment   Pain Assessment 0-10   Pain Score 5   Curry-Baker FACES Pain Rating 2   Pain Type Acute pain   Pain Location Hip   Pain Orientation Right   Pain Descriptors Aching   Pain Frequency Constant/continuous   Pain Onset Ongoing   Clinical Progression Gradually improving   Patient's Stated Pain Goal No pain   Hospital Pain Intervention(s) Medication (See MAR)   Diversional Activities Television   Response to Interventions tolerated well   Home Living   Type of [address] Multi-level;Stairs to enter with rails  (sleeping in recliner chair; enters from rear, 6+6)   Additional Comments resides alone with support from her brother  Pt's goal is to be able to live (I) at and exceeding PLOF     Prior Function   Level of [place] Independent with ADLs and functional mobility   Lives With Alone   Receives Help From Family   ADL Assistance Independent   IADLs Independent   Falls in the last 6 months 0   Vocational Retired   Restrictions/Precautions   [place] Bearing Precautions Per Order Yes   RUE [place] Bearing Per Order WBAT   LUE Weight Bearing Per Order WBAT   RLE Weight Bearing Per Order WBAT   LLE Weight Bearing Per Order WBAT   Other Precautions Fall Risk;Pain;THR   General   Family/Caregiver Present No   Cognition   Overall Cognitive Status WFL   Arousal/Participation Responsive   Attention Attends with cues to redirect   Orientation Level Oriented X4   Memory Within functional limits   Following Commands Follows multistep commands with increased time or repetition   Comments tangential, verbose, requires redirection   RUE Assessment   RUE Assessment WFL   LUE Assessment   LUE Assessment WFL   RLE Assessment   RLE Assessment X   Strength RLE   RLE Overall Strength 2+/5   LLE Assessment   LLE Assessment WFL   Coordination   Movements are Fluid and Coordinated 1   Light Touch   RLE Light Touch Grossly intact   LLE Light Touch Grossly intact   Bed Mobility   Additional Comments OOB in recliner chair at time of Evaluation   Transfers   Sit to Stand 5  Supervision   Additional items Increased time required   Stand to Sit 5  Supervision   Additional items Increased time required   Stand pivot 5  Supervision   Additional items Increased time required   Additional Comments RW for transfers   Ambulation/Elevation   Gait pattern Excessively slow   Gait Assistance 5  Supervision   Additional items Assist x 1   Assistive Device Rolling walker   Stair Management Assistance 4  Minimal assist   Additional items Assist x 1   Stair Management Technique One rail L   Number of Stairs 4   Balance   Static Sitting Good   Dynamic Sitting Fair +   Static Standing Fair   Dynamic Standing Fair -   Ambulatory Fair -   Endurance Deficit   Endurance Deficit Yes   Endurance Deficit Description pain and fatigue limited   Activity Tolerance   Activity Tolerance Patient limited by pain   Medical Staff Made Aware RN   Nurse Made Aware [facility]   Assessment   Prognosis Good   Problem List Decreased strength;Decreased range of motion;Decreased endurance; Impaired balance;Decreased mobility; Decreased coordination; Impaired judgement;Decreased safety awareness;Orthopedic restrictions;Pain   Assessment Lew Luis (Ladona Plowman) Spine is a 68year old female with recent R THR  Pt ambulates up to 150ft with RW with decreased R stance time, antalgic gait and increase LLE WB  Pt edu on hip precautions and demonstrated 2/3 knowledge  Pt completed FTSTS in 23s to assess functional strength which is impaired per examination  Pt will benefit from ongoing skilled PT services to address balance, mobility, strength, endurance, and safety  Barriers to Discharge Decreased caregiver support; Inaccessible home environment   Goals   Patient Goals "go home (I)"   STG Expiration Date 10/18/19   Short Term Goal #1 2-3 weeks   LTG Expiration Date 10/18/19   Long Term Goal #1 1 ) pt will amb 300ft or greater mod (I) with RW vs SPC for return home   2) Pt will ascend/descend 1FF stairs to access second floor bedroom using required UE support  3 ) pt will be (I) for bed mobility to improve home access  4) Pt will be (I) with hip precautions for safety  5) pt will be (I) with HEP for continued strengthening   PT Treatment Day 1   Plan   Treatment/Interventions Therapeutic exercise; Endurance training;Elevations;LE strengthening/ROM; Cognitive reorientation; Bed mobility;Gait training; Compensatory technique education;Patient/family training;Equipment eval/education;Continued evaluation; Functional transfer training;ADL retraining   PT Frequency   (6x/wk)   Recommendation   Recommendation Short-term skilled PT   Equipment Recommended Walker   PT - OK to Discharge Yes   Modified Hamlin Scale   Modified Renaldo Scale 4   Barthel Index   Feeding 5   Bathing 0   Grooming Score 0   Dressing Score 5   Bladder Score 10   Bowels Score 10   Toilet Use Score 5   Transfers (Bed/Chair) Score 5   Mobility (Level Surface) Score 0   Stairs Score 0   Barthel Index Score 40     Viviane Frias, PT

## 2019-09-28 NOTE — PLAN OF CARE
Problem: PHYSICAL THERAPY ADULT  Goal: Performs mobility at highest level of function for planned discharge setting  See evaluation for individualized goals  Description  Treatment/Interventions: Therapeutic exercise, Endurance training, Elevations, LE strengthening/ROM, Cognitive reorientation, Bed mobility, Gait training, Compensatory technique education, Patient/family training, Equipment eval/education, Continued evaluation, Functional transfer training, ADL retraining  Equipment Recommended: Jennifer Ramos       See flowsheet documentation for full assessment, interventions and recommendations  9/28/2019 1410 by Corie Camops, PT  Outcome: Progressing  Note:   Prognosis: Good  Problem List: Decreased strength, Decreased range of motion, Decreased endurance, Impaired balance, Decreased mobility, Decreased coordination, Impaired judgement, Decreased safety awareness, Orthopedic restrictions, Pain  Assessment: Lew Moran" Adilson Levy is a 68year old female with recent R THR  Pt ambulates up to 150ft with RW with decreased R stance time, antalgic gait and increase LLE WB  Pt edu on hip precautions and demonstrated 2/3 knowledge  Pt completed FTSTS in 23s to assess functional strength which is impaired per examination  Pt will benefit from ongoing skilled PT services to address balance, mobility, strength, endurance, and safety  Barriers to Discharge: Decreased caregiver support, Inaccessible home environment     Recommendation: Short-term skilled PT     PT - OK to Discharge: Yes    See flowsheet documentation for full assessment  9/28/2019 1410 by Corie Campos, PT  Outcome: Progressing  Note:   Prognosis: Good  Problem List: Decreased strength, Decreased range of motion, Decreased endurance, Impaired balance, Decreased mobility, Decreased coordination, Impaired judgement, Decreased safety awareness, Orthopedic restrictions, Pain  Assessment: Lew Levy (Jackye Rather) is a 68year old female with recent R THR   Pt ambulates up to 150ft with RW with decreased R stance time, antalgic gait and increase LLE WB  Pt edu on hip precautions and demonstrated 2/3 knowledge  Pt completed FTSTS in 23s to assess functional strength which is impaired per examination  Pt will benefit from ongoing skilled PT services to address balance, mobility, strength, endurance, and safety  Barriers to Discharge: Decreased caregiver support, Inaccessible home environment     Recommendation: Short-term skilled PT     PT - OK to Discharge: Yes    See flowsheet documentation for full assessment

## 2019-09-29 LAB
GLUCOSE SERPL-MCNC: 104 MG/DL (ref 65–140)
GLUCOSE SERPL-MCNC: 105 MG/DL (ref 65–140)
GLUCOSE SERPL-MCNC: 116 MG/DL (ref 65–140)
GLUCOSE SERPL-MCNC: 88 MG/DL (ref 65–140)

## 2019-09-29 PROCEDURE — 97535 SELF CARE MNGMENT TRAINING: CPT

## 2019-09-29 PROCEDURE — 82948 REAGENT STRIP/BLOOD GLUCOSE: CPT

## 2019-09-29 PROCEDURE — 97166 OT EVAL MOD COMPLEX 45 MIN: CPT

## 2019-09-29 RX ADMIN — GABAPENTIN 300 MG: 300 CAPSULE ORAL at 08:57

## 2019-09-29 RX ADMIN — ACETAMINOPHEN 975 MG: 325 TABLET ORAL at 21:53

## 2019-09-29 RX ADMIN — GABAPENTIN 300 MG: 300 CAPSULE ORAL at 21:54

## 2019-09-29 RX ADMIN — FERROUS SULFATE TAB 325 MG (65 MG ELEMENTAL FE) 325 MG: 325 (65 FE) TAB at 08:58

## 2019-09-29 RX ADMIN — METOPROLOL TARTRATE 12.5 MG: 25 TABLET ORAL at 21:54

## 2019-09-29 RX ADMIN — ENOXAPARIN SODIUM 40 MG: 40 INJECTION SUBCUTANEOUS at 08:57

## 2019-09-29 RX ADMIN — DOCUSATE SODIUM 100 MG: 100 CAPSULE, LIQUID FILLED ORAL at 08:58

## 2019-09-29 RX ADMIN — METFORMIN HYDROCHLORIDE 500 MG: 500 TABLET ORAL at 17:55

## 2019-09-29 RX ADMIN — FOLIC ACID 1 MG: 1 TABLET ORAL at 08:58

## 2019-09-29 RX ADMIN — ACETAMINOPHEN 975 MG: 325 TABLET ORAL at 06:11

## 2019-09-29 RX ADMIN — METHOCARBAMOL 500 MG: 500 TABLET, FILM COATED ORAL at 08:58

## 2019-09-29 RX ADMIN — TUBERCULIN PURIFIED PROTEIN DERIVATIVE 5 UNITS: 5 INJECTION INTRADERMAL at 06:36

## 2019-09-29 RX ADMIN — OXYCODONE HYDROCHLORIDE 5 MG: 5 TABLET ORAL at 01:35

## 2019-09-29 RX ADMIN — ACETAMINOPHEN 975 MG: 325 TABLET ORAL at 14:07

## 2019-09-29 RX ADMIN — OXYCODONE HYDROCHLORIDE AND ACETAMINOPHEN 500 MG: 500 TABLET ORAL at 08:57

## 2019-09-29 RX ADMIN — GABAPENTIN 300 MG: 300 CAPSULE ORAL at 16:48

## 2019-09-29 RX ADMIN — OXYCODONE HYDROCHLORIDE AND ACETAMINOPHEN 500 MG: 500 TABLET ORAL at 17:55

## 2019-09-29 RX ADMIN — PRAVASTATIN SODIUM 40 MG: 40 TABLET ORAL at 17:55

## 2019-09-29 RX ADMIN — METFORMIN HYDROCHLORIDE 500 MG: 500 TABLET ORAL at 08:57

## 2019-09-29 RX ADMIN — SENNOSIDES 8.6 MG: 8.6 TABLET, FILM COATED ORAL at 08:57

## 2019-09-29 RX ADMIN — OXYCODONE HYDROCHLORIDE 5 MG: 5 TABLET ORAL at 08:58

## 2019-09-29 RX ADMIN — METOPROLOL TARTRATE 12.5 MG: 25 TABLET ORAL at 08:58

## 2019-09-29 RX ADMIN — METHOCARBAMOL 500 MG: 500 TABLET, FILM COATED ORAL at 17:55

## 2019-09-29 RX ADMIN — LOSARTAN POTASSIUM 50 MG: 50 TABLET ORAL at 08:58

## 2019-09-29 NOTE — PROGRESS NOTES
Medication Regimen Review (MRR)    To promote positive health outcomes and reduce adverse consequences the patient's medication therapy has been reviewed by a pharmacist for the following potential problems:   1   documented indication and therapeutic benefits  2   appropriate dose, frequency, route, and duration of therapy  3   medication interactions, side effects, and allergies  4   medication or transcription errors  Medications are also reviewed for appropriate monitoring, duplicate therapy, and dose reduction  Based on the review please see the following recommendations  Patient information:    The patient is 68 y o  Female admitted for ambulatory dysfunction secondary to right hip replacement surgery  Wt Readings from Last 1 Encounters:   09/29/19 95 2 kg (209 lb 14 1 oz)       Lab Results   Component Value Date    GLUCOSE 95 04/21/2015    CALCIUM 9 5 09/27/2019     04/21/2015    K 3 7 09/27/2019    CO2 27 09/27/2019     09/27/2019    BUN 11 09/27/2019    CREATININE 0 64 09/27/2019       Recommendations:  1  No recommendations at this time

## 2019-09-29 NOTE — PLAN OF CARE
Problem: OCCUPATIONAL THERAPY ADULT  Goal: Performs self-care activities at highest level of function for planned discharge setting  See evaluation for individualized goals  Description  Treatment Interventions: ADL retraining, Functional transfer training, UE strengthening/ROM, Endurance training          See flowsheet documentation for full assessment, interventions and recommendations  Note:   Limitation: Decreased ADL status, Decreased Safe judgement during ADL, Decreased endurance, Decreased high-level ADLs  Prognosis: Good  Assessment: Pt is a 68 y o  female seen for OT evaluation s/p admit to [facility] on [facility]/27/2019 w/ Aftercare following right hip joint replacement surgery, posterior approach currently with total hip precautions, WBAT  Comorbidities affecting pt's functional performance at time of assessment include: DM, obesity and decreased bilateral knee joint integrity    Personal factors affecting pt at time of IE include:steps to enter environment, limited home support, difficulty performing ADLS, difficulty performing IADLS , health management  and environment  Upon evaluation: Pt requires assist for LB ADLs and bed mobility, supervision for transfers and bathroom mobility with a rolling walker  The following deficits impact occupational performance: weakness, decreased ROM, decreased strength, decreased balance, decreased tolerance, impaired problem solving, increased pain and orthopedic restrictions  Pt to benefit from continued skilled OT services while in the hospital to address deficits as defined above and maximize level of functional independence w ADL's and functional mobility  Occupational performance areas to address include: grooming, bathing/shower, toilet hygiene, dressing, health maintenance, functional mobility, community mobility, clothing management and household maintenance  From OT standpoint, recommendation at time of d/c would be home with family support    Recommend a commode and shower chair (TBD what type)        OT Discharge Recommendation: Home with family support

## 2019-09-29 NOTE — OCCUPATIONAL THERAPY NOTE
Occupational Therapy Evaluation & Treatment Note   9:40-10:35 (txt 10:11-10:35)     Patient Name: Domo Cabrera  PRTKA'X Date: 9/29/2019  Problem List  Principal Problem:    Aftercare following right hip joint replacement surgery  Active Problems:    Benign essential hypertension    Diabetes mellitus type 2, controlled ([facility] [address] )    Left knee pain    Obesity (BMI 30-39  9)    Supraventricular tachycardia ([facility] [address] )    Past Medical History  Past Medical History:   Diagnosis Date    Alopecia     Calcaneal spur     Diabetes insipidus ([facility] [address] )     Diabetes mellitus ([facility] [address] )     Hyperlipidemia     Hypertension     Osteoarthrosis 2/13/2013    Pes planus     unspecified laterality    Plantar fasciitis      Past Surgical History  Past Surgical History:   Procedure Laterality Date    CARPAL TUNNEL RELEASE      COLONOSCOPY      May 2006    INCISIONAL BREAST BIOPSY      IA COLONOSCOPY FLX DX W/COLLJ SPEC WHEN PFRMD N/A 5/4/2018    Procedure: COLONOSCOPY;  Surgeon: Jeremy Adams MD;  Location: [facility];   Service: Colorectal    IA TOTAL HIP ARTHROPLASTY Right 9/23/2019    Procedure: ARTHROPLASTY HIP TOTAL;  Surgeon: Phu Cheung MD;  [facility]: [facility];  Service: Orthopedics    WISDOM TOOTH EXTRACTION             09/29/19 0946   Note Type   Note type Eval/Treat   Restrictions/Precautions   Weight Bearing Precautions Per Order Yes   RUE Weight Bearing Per Order WBAT   LUE Weight Bearing Per Order WBAT   RLE Weight Bearing Per Order WBAT   LLE Weight Bearing Per Order WBAT   Other Precautions Fall Risk;Pain;THR   Pain Assessment   Pain Assessment 0-10   Pain Score 5   Pain Type Surgical pain   Pain Location Hip   Pain Orientation Right   Pain Descriptors Aching;Discomfort   Hospital Pain Intervention(s) Medication (See MAR)   Home Living   Type of [address] [place] [address] Multi-level;Stairs to enter with rails;1/2 bath on main level  (railling on R side going up)   Bathroom Shower/Tub Tub/shower unit   Cal Nev Ari Toilet Standard  (1st floor toilet lower than standard )   Bathroom Equipment   (none )   Bathroom Accessibility Accessible via walker   [address] [facility]   Additional Comments Pt reports she has been staying on the 1st floor and sleeping in a recliner  Prior Function   Level of Spray Independent with ADLs and functional mobility   Lives With Alone   Receives Help From Family   ADL Assistance Independent   IADLs Independent   Falls in the last 6 months 0   Vocational Retired   Psychosocial   Psychosocial (WDL) WDL   Subjective   Subjective "each day has it's pluses and minuses"   ADL   Where Assessed   (in the bathroom )   Eating Assistance 7  Independent   Grooming Assistance 5  Supervision/Setup   Grooming Deficit Wash/dry hands  (standing at the sink)   UB Bathing Assistance 5  Supervision/Setup   LB Bathing Assistance 2  Maximal Assistance   UB Dressing Assistance 5  Supervision/Setup   LB Dressing Assistance 2  Maximal [address]  3  Moderate Assistance   Toileting Deficit Clothing management up;Clothing management down;Perineal hygiene   Bed Mobility   Supine to Sit 4  Minimal assistance   Additional items Assist x 1;LE management;Verbal cues; Increased time required   Sit to Supine 3  Moderate assistance   Additional items Assist x 1;LE management;Verbal cues; Increased time required   Transfers   Sit to Stand 5  Supervision   Additional items Increased time required;Verbal cues   Stand to Sit 5  Supervision   Additional items Increased time required;Verbal cues   Toilet transfer 5  Supervision   Additional items Assist x 1; Armrests   Functional Mobility   Functional Mobility 5  Supervision   Additional Comments household distances    Additional items Rolling walker   Balance   Static Sitting Good   Dynamic Sitting Good   Static Standing Fair +   Dynamic Standing Fair   Ambulatory Fair   Activity Tolerance   Activity Tolerance Patient limited by fatigue   RUE Assessment   RUE Assessment WFL   LUE Assessment   LUE Assessment WFL   Hand Function   Gross Motor Coordination Functional   Fine Motor Coordination Functional   Sensation   Light Touch No apparent deficits   Proprioception   Proprioception No apparent deficits   Cognition   Overall Cognitive Status WFL   Arousal/Participation Responsive; Cooperative   Attention Attends with cues to redirect   Orientation Level Oriented X4   Memory Within functional limits   Following Commands Follows one step commands without difficulty   Comments Pt very verbose and tangential, requires frequent redirection to task  Assessment   Limitation Decreased ADL status; Decreased Safe judgement during ADL;Decreased endurance;Decreased high-level ADLs   Prognosis Good   Assessment   Pt is a 68 y o  female seen for OT evaluation s/p admit to [facility] on 9/27/2019 w/ Aftercare following right hip joint replacement surgery, posterior approach currently with total hip precautions, WBAT  Comorbidities affecting pt's functional performance at time of assessment include: DM, obesity and decreased bilateral knee joint integrity    Personal factors affecting pt at time of IE include:steps to enter environment, limited home support, difficulty performing ADLS, difficulty performing IADLS , health management  and environment  Upon evaluation: Pt requires assist for LB ADLs and bed mobility, supervision for transfers and bathroom mobility with a rolling walker  The following deficits impact occupational performance: weakness, decreased ROM, decreased strength, decreased balance, decreased tolerance, impaired problem solving, increased pain and orthopedic restrictions  Pt to benefit from continued skilled OT services while in the hospital to address deficits as defined above and maximize level of functional independence w ADL's and functional mobility   Occupational performance areas to address include: grooming, bathing/shower, toilet hygiene, dressing, health maintenance, functional mobility, community mobility, clothing management and household maintenance  From OT standpoint, recommendation at time of d/c would be home with family support  Recommend a commode and shower chair (TBD what type)  Goals   Patient Goals to be independent at home    Plan   Treatment Interventions ADL retraining;Functional transfer training;UE strengthening/ROM; Endurance training   Goal Expiration Date 10/07/19   OT Treatment Day 1   OT Frequency   (5-7x/week)   Additional Treatment Session   Start Time 1011   End Time 0920   Treatment Assessment Pt seen for OT txt following initial eval   Pt educated re: hip precautions and safety with body mechanics  Pt completed grooming at the sink with good standing tolerance  Pt would benefit from continued OT services to further address deficits and maximize function  Recommendation   OT Discharge Recommendation Home with family support   Barthel Index   Feeding 10   Bathing 0   Grooming Score 5   Dressing Score 5   Bladder Score 10   Bowels Score 10   Toilet Use Score 5   Transfers (Bed/Chair) Score 10   Mobility (Level Surface) Score 0   Stairs Score 0   Barthel Index Score 55        Vitals- 138/73, HR 78, O2 98%  Standardized Assessment- Barthel Index, 55  Home Evaluation (virtual)- Pt's brother to provide pictures if indicated   Family/Caregiver Training- Pt reports she has limited family support, her brother assists as able but has his own health issues  Pt is interested in services to the home       Additional Comments-  Pt remains in room with all needs at end of session          Goals STG achieved within 1 week Performance at Initial Evaluation (9/29) Current Performance (last date completed)   Grooming while standing at sink Eva/I Supervision  (9/29) supervision    ADL transfers  Eva/I Supervision  (9/29) supervision    Bathroom mobility  Eva/I Supervision syed De Santiago (9/29) supervision syed De Santiago   UB ADL  Eva/I Set-up (9/29) set-up   LB ADL, AE PRN Marni/I maxA (9/29) maxA socks, underwear    Toileting/clothing management and hygiene Marni/I modA (9/29) modA to manage underwear and gown, hygiene following a BM   Increase standing tolerance for inc'd safety with standing purposeful tasks >10 minutes ~4 minutes (9/29) 4 minutes   Participate in therex 1-3x/week for inc'd overall stamina/activity tolerance for purposeful tasks To complete N/A    Tub/shower transfer  Marni/I with DME as needed N/A    Kitchen mobility for inc'd independence and safety negotiating kitchen environment Marni/I N/A    Light meal prep Marni/I N/A    Bed mobility with HOB flat  Marni/I modA (9/29) modA, LE management    Household management (cleaning) Marni/I N/A

## 2019-09-30 ENCOUNTER — TELEPHONE (OUTPATIENT)
Dept: OBGYN CLINIC | Facility: HOSPITAL | Age: 69
End: 2019-09-30

## 2019-09-30 LAB
GLUCOSE SERPL-MCNC: 103 MG/DL (ref 65–140)
GLUCOSE SERPL-MCNC: 128 MG/DL (ref 65–140)
GLUCOSE SERPL-MCNC: 89 MG/DL (ref 65–140)
GLUCOSE SERPL-MCNC: 96 MG/DL (ref 65–140)

## 2019-09-30 PROCEDURE — 82948 REAGENT STRIP/BLOOD GLUCOSE: CPT

## 2019-09-30 PROCEDURE — 97535 SELF CARE MNGMENT TRAINING: CPT

## 2019-09-30 PROCEDURE — 97530 THERAPEUTIC ACTIVITIES: CPT

## 2019-09-30 PROCEDURE — 97116 GAIT TRAINING THERAPY: CPT

## 2019-09-30 PROCEDURE — 97110 THERAPEUTIC EXERCISES: CPT

## 2019-09-30 RX ADMIN — METHOCARBAMOL 500 MG: 500 TABLET, FILM COATED ORAL at 17:24

## 2019-09-30 RX ADMIN — PRAVASTATIN SODIUM 40 MG: 40 TABLET ORAL at 17:24

## 2019-09-30 RX ADMIN — FERROUS SULFATE TAB 325 MG (65 MG ELEMENTAL FE) 325 MG: 325 (65 FE) TAB at 09:30

## 2019-09-30 RX ADMIN — METOPROLOL TARTRATE 12.5 MG: 25 TABLET ORAL at 09:30

## 2019-09-30 RX ADMIN — LOSARTAN POTASSIUM 50 MG: 50 TABLET ORAL at 09:30

## 2019-09-30 RX ADMIN — FOLIC ACID 1 MG: 1 TABLET ORAL at 09:29

## 2019-09-30 RX ADMIN — GABAPENTIN 300 MG: 300 CAPSULE ORAL at 09:30

## 2019-09-30 RX ADMIN — GABAPENTIN 300 MG: 300 CAPSULE ORAL at 17:23

## 2019-09-30 RX ADMIN — METFORMIN HYDROCHLORIDE 500 MG: 500 TABLET ORAL at 17:23

## 2019-09-30 RX ADMIN — OXYCODONE HYDROCHLORIDE AND ACETAMINOPHEN 500 MG: 500 TABLET ORAL at 17:24

## 2019-09-30 RX ADMIN — OXYCODONE HYDROCHLORIDE AND ACETAMINOPHEN 500 MG: 500 TABLET ORAL at 09:29

## 2019-09-30 RX ADMIN — ACETAMINOPHEN 975 MG: 325 TABLET ORAL at 06:37

## 2019-09-30 RX ADMIN — GABAPENTIN 300 MG: 300 CAPSULE ORAL at 21:42

## 2019-09-30 RX ADMIN — ACETAMINOPHEN 975 MG: 325 TABLET ORAL at 21:41

## 2019-09-30 RX ADMIN — METHOCARBAMOL 500 MG: 500 TABLET, FILM COATED ORAL at 09:29

## 2019-09-30 RX ADMIN — METFORMIN HYDROCHLORIDE 500 MG: 500 TABLET ORAL at 09:30

## 2019-09-30 RX ADMIN — OXYCODONE HYDROCHLORIDE 5 MG: 5 TABLET ORAL at 22:37

## 2019-09-30 RX ADMIN — ENOXAPARIN SODIUM 40 MG: 40 INJECTION SUBCUTANEOUS at 09:29

## 2019-09-30 RX ADMIN — OXYCODONE HYDROCHLORIDE 5 MG: 5 TABLET ORAL at 02:54

## 2019-09-30 RX ADMIN — METOPROLOL TARTRATE 12.5 MG: 25 TABLET ORAL at 21:41

## 2019-09-30 RX ADMIN — ACETAMINOPHEN 975 MG: 325 TABLET ORAL at 14:29

## 2019-09-30 NOTE — PHYSICAL THERAPY NOTE
Letter of Medical Necessity   Pt is  69 yo female who was admitted to [facility] for reahb following a post R hip replacement  Pt' s past medical hx consists of lumbar and cervical  radiculopathy,DDD of lumbar spine,pain and OA of B knees,and OA of R hip with resulting THR  Pt lives alone with FF to get to 2nd floor  Pt has been sleeping on a recliner on the first floor and would benefit from hospital bed on the first floor   A hospital bed would allow pt to position in ways not feasible with a ordinary bed: to alleviate pain and and assist in edema control ( to limit risk of DVT s/p THR) In  addition, a hospital bed that has rails and elevating functions would allow the pt to perform bed mobility with ease to decrease the workload  and risk for hip dislocation as pt has weak upper body strength and cannot transfer sup to sit by herself  Laila Andino

## 2019-09-30 NOTE — PLAN OF CARE
Problem: Potential for Falls  Goal: Patient will remain free of falls  Description  INTERVENTIONS:  - Assess patient frequently for physical needs  -  Identify cognitive and physical deficits and behaviors that affect risk of falls  -  [place] fall precautions as indicated by assessment   - Educate patient/family on patient safety including physical limitations  - Instruct patient to call for assistance with activity based on assessment  - Modify environment to reduce risk of injury  - Consider OT/PT consult to assist with strengthening/mobility  Outcome: Progressing     Problem: Nutrition/Hydration-ADULT  Goal: Nutrient/Hydration intake appropriate for improving, restoring or maintaining nutritional needs  Description  Monitor and assess patient's nutrition/hydration status for malnutrition  Collaborate with interdisciplinary team and initiate plan and interventions as ordered  Monitor patient's weight and dietary intake as ordered or per policy  Utilize nutrition screening tool and intervene as necessary  Determine patient's food preferences and provide high-protein, high-caloric foods as appropriate       INTERVENTIONS:  - Monitor oral intake, urinary output, labs, and treatment plans  - Assess nutrition and hydration status and recommend course of action  - Evaluate amount of meals eaten  - Assist patient with eating if necessary   - Allow adequate time for meals  - Recommend/ encourage appropriate diets, oral nutritional supplements, and vitamin/mineral supplements  - Order, calculate, and assess calorie counts as needed  - Recommend, monitor, and adjust tube feedings and TPN/PPN based on assessed needs  - Assess need for intravenous fluids  - Provide specific nutrition/hydration education as appropriate  - Include patient/family/caregiver in decisions related to nutrition  Outcome: Progressing

## 2019-09-30 NOTE — PHYSICAL THERAPY NOTE
Pt ws admitted for rehab on 9/27/19  Her POC for physical therapy will be increased to BID during her rehab stay      Jeremiah Waters, PT

## 2019-09-30 NOTE — PHYSICAL THERAPY NOTE
37' session     09/30/19 1008   Pain Assessment   Pain Assessment 0-10   Pain Score 6   Pain Type Surgical pain   Pain Location Hip   Pain Orientation Right   Pain Radiating Towards knee   Pain Descriptors Sore  (stiff)   Pain Frequency Intermittent   Pain Onset Ongoing   Clinical Progression Not changed   Patient's Stated Pain Goal No pain   Hospital Pain Intervention(s) Cold applied;Medication (See MAR); Ambulation/increased activity   Response to Interventions no change   Precautions   Total Hip Replacement ADduction; Flexion; Internal rotation   Restrictions/Precautions   RUE Weight Bearing Per Order WBAT   Other Precautions Pain;WBS;THR   General   Chart Reviewed Yes   Family/Caregiver Present No   Cognition   Overall Cognitive Status WFL   Arousal/Participation Alert; Cooperative   Attention Attends with cues to redirect  (very talkative)   Following Commands Follows one step commands without difficulty   Subjective   Subjective last night had a lot of pain - knee isnt too bad today but hip is still sore   Transfers   Sit to Stand 5  Supervision   Additional items   (cues for flex precaution)   Stand to Sit 5  Supervision   Stand pivot 5  Supervision   Ambulation/Elevation   Gait pattern   (R LE appears longer than L )   Gait Assistance 5  Supervision   Assistive Device Rolling walker   Distance 170' x 2   Stair Management Assistance   (close S to CG)   Stair Management Technique One rail R;Step to pattern; With cane   Number of Stairs 4   Curbs 1 + 1  for 4" stsp with RW and S    Balance   Static Standing Fair +   Ambulatory Fair   Endurance Deficit   Endurance Deficit Yes   Activity Tolerance   Activity Tolerance Patient limited by fatigue;Patient limited by pain   Assessment   Prognosis Good   Problem List Decreased strength;Decreased range of motion;Decreased endurance;Decreased mobility;Pain;Orthopedic restrictions   Assessment Pt is very talkative - needing redirection to tasks   Pt needing review of precautions- states 3/3 but reminders not to break flex precaution with STS and while seatd - tends top lean too far  Forward to get things on her table - also  brike IR precautions when leaning to  L in the chair  LLE appeatrs longer than R with amb - donned sneakers for session  Pt reports Rails to be on the R  For 5 steps  In the back  And  one threshold step to enter , 6 + 6 steps are in the front which she does not use  Pt reports that she has applied for a hospital bed to stay on first level of home   CP for R knee and hip after session   Barriers to Discharge Inaccessible home environment;Decreased caregiver support   Goals   Patient Goals to get stronger so I can manuever better   STG Expiration Date 10/18/19   LTG Expiration Date 10/18/19   PT Treatment Day 2   Plan   Treatment/Interventions   (cont as per poC)   Progress Progressing toward goals   PT Frequency   (6x/wk BID)

## 2019-09-30 NOTE — PLAN OF CARE
Problem: PHYSICAL THERAPY ADULT  Goal: Performs mobility at highest level of function for planned discharge setting  See evaluation for individualized goals  Description  Treatment/Interventions: (cont as per poC)  Equipment Recommended: Richi Sandoval       See flowsheet documentation for full assessment, interventions and recommendations  9/30/2019 1434 by Onel Berry PTA  Outcome: Progressing  Note:   Prognosis: Good  Problem List: Decreased strength, Decreased range of motion, Decreased endurance, Decreased mobility, Orthopedic restrictions, Pain  Assessment: Pt has much difficulty with sup to sit with bed flat and no rail  Pt reports no upper body strength and  Needs mod A for upper body to upright  Able to perform with S when [facility] is elevated and use of rail and leglifter  Oral Baldy HEP  With performance of sup ex - but will need further review  chest pain to R hip and knee after session  Rec hospital bed for pt to use of first floor  Pt has hx of back and neck pain, OA of knees and hips and has not been able to get to 2nd  floor to her bed ( which she reports is too low and she cannot get out of it)  Barriers to Discharge: Inaccessible home environment, Decreased caregiver support     Recommendation: Short-term skilled PT     PT - OK to Discharge: Yes    See flowsheet documentation for full assessment  9/30/2019 1019 by Onel Berry PTA  Outcome: Progressing  Note:   Prognosis: Good  Problem List: Decreased strength, Decreased range of motion, Decreased endurance, Decreased mobility, Pain, Orthopedic restrictions  Assessment: Pt is very talkative - needing redirection to tasks  Pt needing review of precautions- states 3/3 but reminders not to break flex precaution with STS and while seatd - tends top lean too far  Forward to get things on her table - also  brike IR precautions when leaning to  L in the chair  LLE appeatrs longer than R with amb - donned sneakers for session   Pt reports Rails to be on the R  For 5 steps  In the back  And  one threshold step to enter , 6 + 6 steps are in the front which she does not use  Pt reports that she has applied for a hospital bed to stay on first level of home  CP for R knee and hip after session  Barriers to Discharge: Inaccessible home environment, Decreased caregiver support     Recommendation: Short-term skilled PT     PT - OK to Discharge: Yes    See flowsheet documentation for full assessment  9/30/2019 1019 by Mykel Ruiz PTA  Outcome: Progressing  Note:   Prognosis: Good  Problem List: Decreased strength, Decreased range of motion, Decreased endurance, Decreased mobility, Pain, Orthopedic restrictions  Assessment: Pt is very talkative - needing redirection to tasks  Pt needing review of precautions- states 3/3 but reminders not to break flex precaution with STS and while seatd - tends top lean too far  Forward to get things on her table - also  brike IR precautions when leaning to  L in the chair  LLE appeatrs longer than R with amb - donned sneakers for session  Pt reports Rails to be on the R  For 5 steps  In the back  And  one threshold step to enter , 6 + 6 steps are in the front which she does not use  Pt reports that she has applied for a hospital bed to stay on first level of home  CP for R knee and hip after session  Barriers to Discharge: Inaccessible home environment, Decreased caregiver support     Recommendation: Short-term skilled PT     PT - OK to Discharge: Yes    See flowsheet documentation for full assessment

## 2019-09-30 NOTE — PLAN OF CARE
Problem: OCCUPATIONAL THERAPY ADULT  Goal: Performs self-care activities at highest level of function for planned discharge setting  See evaluation for individualized goals  Description  Treatment Interventions: ADL retraining, Functional transfer training, UE strengthening/ROM, Endurance training          See flowsheet documentation for full assessment, interventions and recommendations  Outcome: Progressing  Note:   Limitation: Decreased ADL status, Decreased Safe judgement during ADL, Decreased endurance, Decreased high-level ADLs  Prognosis: Good  Assessment: Pt pleasant and cooperative during  ADL session  Pt finishing breakfast upon initiation of session; Pt requires cues for redirect during tasks as pt is very talkative (detailed, explaining in depth, etc)  Session focused on eating, grooming, bathing, and dressing  Pt completed toileting with nursing prior to session (as per nursing report S)  Pt able to adhere to hip precautions t/o session  Pt receptive of AE provided to improve performance with ADLs, to continue use  Pt limited by THR precautions, ROM, weakness, pain, dynamic standing balance, and activity tolerance  Pt educated on safe functional transfer techniques,  the appropriate use of AE/DME to improve functional performance, and activity modification techniques for energy conservation  Pt would benefit from continued OT sessions to focus on above deficits, ADL/IADL performance, and EC techs to maximize independence  Pt seated in recliner with call bell in reach to conclude session       OT Discharge Recommendation: Home with family support

## 2019-09-30 NOTE — TELEPHONE ENCOUNTER
VM left for  at [facility] unit  I explained my role with this patient and offered to be of any assistance with ortho questions, concerns or issues  I also requested DC updates and disposition

## 2019-09-30 NOTE — PLAN OF CARE
Problem: PHYSICAL THERAPY ADULT  Goal: Performs mobility at highest level of function for planned discharge setting  See evaluation for individualized goals  Description  Treatment/Interventions: (cont as per poC)  Equipment Recommended: Aguilar Garcia       See flowsheet documentation for full assessment, interventions and recommendations  9/30/2019 1019 by Jen Urrutia PTA  Outcome: Progressing  Note:   Prognosis: Good  Problem List: Decreased strength, Decreased range of motion, Decreased endurance, Decreased mobility, Pain, Orthopedic restrictions  Assessment: Pt is very talkative - needing redirection to tasks  Pt needing review of precautions- states 3/3 but reminders not to break flex precaution with STS and while seatd - tends top lean too far  Forward to get things on her table - also  brike IR precautions when leaning to  L in the chair  LLE appeatrs longer than R with amb - donned sneakers for session  Pt reports Rails to be on the R  For 5 steps  In the back  And  one threshold step to enter , 6 + 6 steps are in the front which she does not use  Pt reports that she has applied for a hospital bed to stay on first level of home  CP for R knee and hip after session  Barriers to Discharge: Inaccessible home environment, Decreased caregiver support     Recommendation: Short-term skilled PT     PT - OK to Discharge: Yes    See flowsheet documentation for full assessment  9/30/2019 1019 by Jen Urrutia PTA  Outcome: Progressing  Note:   Prognosis: Good  Problem List: Decreased strength, Decreased range of motion, Decreased endurance, Decreased mobility, Pain, Orthopedic restrictions  Assessment: Pt is very talkative - needing redirection to tasks  Pt needing review of precautions- states 3/3 but reminders not to break flex precaution with STS and while seatd - tends top lean too far  Forward to get things on her table - also  brike IR precautions when leaning to  L in the chair   LLE appeatrs longer than R with amb - donned sneakers for session  Pt reports Rails to be on the R  For 5 steps  In the back  And  one threshold step to enter , 6 + 6 steps are in the front which she does not use  Pt reports that she has applied for a hospital bed to stay on first level of home  CP for R knee and hip after session  Barriers to Discharge: Inaccessible home environment, Decreased caregiver support     Recommendation: Short-term skilled PT     PT - OK to Discharge: Yes    See flowsheet documentation for full assessment

## 2019-09-30 NOTE — OCCUPATIONAL THERAPY NOTE
Occupational Therapy Treatment Note  [id number] (71 min)       Satnamlivier Alarconmike     9/30/2019    Patient Active Problem List   Diagnosis    Benign essential hypertension    Cervical radiculopathy    Chronic lower back pain    Diabetes mellitus type 2, controlled ([address] )    Hyperlipidemia    Left knee pain    Lumbar stenosis with neurogenic claudication    Obesity (BMI 30-39  9)    Osteoarthrosis    Post-menopausal bleeding    Primary osteoarthritis of both knees    Psoriasis    Right knee pain    Right shoulder pain    Spondylolisthesis, lumbar region    Vaginal lump    Vitamin D deficiency    Effusion of left knee    Right hip pain    Arthritis of right hip    Lumbar radiculopathy    DDD (degenerative disc disease), lumbar    Lumbar spondylosis    Sacroiliitis (HCC)    Status post total hip replacement, right    Supraventricular tachycardia ([address] )    Aftercare following right hip joint replacement surgery       Past Medical History:   Diagnosis Date    Alopecia     Calcaneal spur     Diabetes insipidus [facility]     Diabetes mellitus ([address] )     Hyperlipidemia     Hypertension     Osteoarthrosis 2/13/2013    Pes planus     unspecified laterality    Plantar fasciitis        TIME: 6516-0595  VITALS: Non taken; stable   PAIN: 5/10;  "maybe moving helps"   COGNITION: WFL - tangential   PRECAUTIONS: WBAT; THR; Fall risk; Pain     EXPIRATION DATE: 10/07/19  TREATMENT DAY: 2  DISCHARGE RECOMMENDATION: Home with family support   ADDITIONAL COMMENTS: Able to adhere/recall precautions     ASSESSMENT:    Pt pleasant and cooperative during  ADL session  Pt finishing breakfast upon initiation of session; Pt requires cues for redirect during tasks as pt is very talkative (detailed, explaining in depth, etc)  Session focused on eating, grooming, bathing, and dressing  Pt completed toileting with nursing prior to session (as per nursing report S)  Pt able to adhere to hip precautions t/o session   Pt receptive of AE provided to improve performance with ADLs, to continue use  Pt limited by THR precautions, ROM, weakness, pain, dynamic standing balance, and activity tolerance  Pt educated on safe functional transfer techniques,  the appropriate use of AE/DME to improve functional performance, and activity modification techniques for energy conservation  Pt would benefit from continued OT sessions to focus on above deficits, ADL/IADL performance, and EC techs to maximize independence  Pt seated in recliner with call bell in reach to conclude session      Goals STG achieved within 1 week Performance at Initial Evaluation (9/29) Current Performance (last date completed)   Grooming while standing at sink Marni/I Supervision  (9/30) supervision    ADL transfers  Marni/I Supervision  (9/30) d/S   Bathroom mobility  Marni/I Supervision w  [facility] (9/30) supervision w     UB ADL  Marni/I Set-up (9/30) Setup/Supervision    LB ADL, AE PRN Marni/I maxA (9/29) maxA socks, underwear    (9/30) Min A - to wash buttocks; AE trial - Supervision with demonstration and minimal cueing    Toileting/clothing management and hygiene Marni/I modA (9/29) modA to manage underwear and gown, hygiene following a BM   Increase standing tolerance for inc'd safety with standing purposeful tasks >10 minutes ~4 minutes (9/29) 4 minutes   (9/30) 2-3 minutes with hygiene and clothing mgmt    Participate in therex 1-3x/week for inc'd overall stamina/activity tolerance for purposeful tasks To complete N/A     Tub/shower transfer  Marni/I with DME as needed N/A     Kitchen mobility for inc'd independence and safety negotiating kitchen environment Marni/I N/A     Light meal prep Marni/I N/A     Bed mobility with HOB flat  Marni/I modA (9/29) modA, LE management    Household management (cleaning) Marni/I N/A (9/30) utilizing reacher from seated positioning picking up items from floor -- adhering to precautions          Media Tello MS, OTR/L

## 2019-09-30 NOTE — PHYSICAL THERAPY NOTE
52' session     09/30/19 1421   Pain Assessment   Pain Assessment 0-10   Pain Score 5   Pain Type Surgical pain   Pain Location Hip   Pain Orientation Right   Pain Descriptors Sore   Pain Frequency Intermittent   Pain Onset Ongoing   Clinical Progression Gradually improving   Patient's Stated Pain Goal No pain   Hospital Pain Intervention(s) Medication (See MAR); Cold applied;Repositioned; Ambulation/increased activity   Response to Interventions no change   Restrictions/Precautions   RUE Weight Bearing Per Order WBAT   Other Precautions THR;Pain   General   Chart Reviewed Yes   Family/Caregiver Present No   Cognition   Overall Cognitive Status WFL   Arousal/Participation Alert; Cooperative   Attention   (redirected 2* being talkative)   Following Commands Follows all commands and directions without difficulty   Subjective   Subjective so how do you think I am doing ? I have no upper body strength   Bed Mobility   Supine to Sit   (with  mod A  when bed flat then S with [facility] elevated/rail)   Additional items   (use of leglifter, cues   R side of bed)   Sit to Supine 5  Supervision  (to CG - use of leglifter and cues for technque)   Additional items   (HOB flat and no rail)   Transfers   Sit to Stand 5  Supervision   Stand to Sit 5  Supervision   Stand pivot 5  Supervision   Ambulation/Elevation   Gait pattern   (slightly antalgic - step thru gt -ddecrease foot clearance)   Gait Assistance 5  Supervision   Assistive Device Rolling walker   Distance 150' x 1   Balance   Static Standing Fair +   Dynamic Standing Fair   Ambulatory Fair   Endurance Deficit   Endurance Deficit Yes   Activity Tolerance   Activity Tolerance Patient limited by fatigue;Patient limited by pain   Exercises   Quad Sets 20 reps   Heelslides Bilateral;AROM;20 reps   Glute Sets 20 reps   Hip Flexion   (SLR- AA R , A- L x 20)   Hip Abduction   (A for R and L LE x 20 reps)   Knee AROM Short Arc Quad Bilateral;AROM;20 reps   Ankle Pumps Bilateral;AROM;20 reps   Assessment   Prognosis Good   Problem List Decreased strength;Decreased range of motion;Decreased endurance;Decreased mobility;Orthopedic restrictions;Pain   Assessment Pt has much difficulty with sup to sit with bed flat and no rail  Pt reports no upper body strength and  Needs mod A for upper body to upright  Able to perform with S when [facility] is elevated and use of rail and leglifter  Brianne Oyster HEP  With performance of sup ex - but will need further review  chest pain to R hip and knee after session  Rec hospital bed for pt to use of first floor   Pt has hx of back and neck pain, OA of knees and hips and has not been able to get to 2nd  floor to her bed ( which she reports is too low and she cannot get out of it)   Barriers to Discharge Inaccessible home environment;Decreased caregiver support   Goals   Patient Goals get stronger   STG Expiration Date 10/18/19   LTG Expiration Date 10/18/19   PT Treatment Day 2   Plan   Treatment/Interventions   (cont as per POC)   Progress Progressing toward goals   PT Frequency   (6x/wk BID)   Recommendation   Equipment Recommended   (hospital bed, commode, RW)

## 2019-10-01 LAB
GLUCOSE SERPL-MCNC: 128 MG/DL (ref 65–140)
GLUCOSE SERPL-MCNC: 138 MG/DL (ref 65–140)
GLUCOSE SERPL-MCNC: 95 MG/DL (ref 65–140)
GLUCOSE SERPL-MCNC: 98 MG/DL (ref 65–140)

## 2019-10-01 PROCEDURE — 97116 GAIT TRAINING THERAPY: CPT

## 2019-10-01 PROCEDURE — 97535 SELF CARE MNGMENT TRAINING: CPT

## 2019-10-01 PROCEDURE — 82948 REAGENT STRIP/BLOOD GLUCOSE: CPT

## 2019-10-01 PROCEDURE — 97530 THERAPEUTIC ACTIVITIES: CPT

## 2019-10-01 PROCEDURE — 97110 THERAPEUTIC EXERCISES: CPT

## 2019-10-01 RX ADMIN — METOPROLOL TARTRATE 12.5 MG: 25 TABLET ORAL at 21:19

## 2019-10-01 RX ADMIN — OXYCODONE HYDROCHLORIDE 5 MG: 5 TABLET ORAL at 07:46

## 2019-10-01 RX ADMIN — METFORMIN HYDROCHLORIDE 500 MG: 500 TABLET ORAL at 07:46

## 2019-10-01 RX ADMIN — DOCUSATE SODIUM 100 MG: 100 CAPSULE, LIQUID FILLED ORAL at 08:40

## 2019-10-01 RX ADMIN — OXYCODONE HYDROCHLORIDE AND ACETAMINOPHEN 500 MG: 500 TABLET ORAL at 17:47

## 2019-10-01 RX ADMIN — GABAPENTIN 300 MG: 300 CAPSULE ORAL at 21:18

## 2019-10-01 RX ADMIN — DOCUSATE SODIUM 100 MG: 100 CAPSULE, LIQUID FILLED ORAL at 17:47

## 2019-10-01 RX ADMIN — ENOXAPARIN SODIUM 40 MG: 40 INJECTION SUBCUTANEOUS at 08:42

## 2019-10-01 RX ADMIN — ACETAMINOPHEN 975 MG: 325 TABLET ORAL at 21:18

## 2019-10-01 RX ADMIN — OXYCODONE HYDROCHLORIDE AND ACETAMINOPHEN 500 MG: 500 TABLET ORAL at 08:40

## 2019-10-01 RX ADMIN — FOLIC ACID 1 MG: 1 TABLET ORAL at 08:41

## 2019-10-01 RX ADMIN — GABAPENTIN 300 MG: 300 CAPSULE ORAL at 08:40

## 2019-10-01 RX ADMIN — OXYCODONE HYDROCHLORIDE 5 MG: 5 TABLET ORAL at 22:07

## 2019-10-01 RX ADMIN — ACETAMINOPHEN 975 MG: 325 TABLET ORAL at 05:28

## 2019-10-01 RX ADMIN — METHOCARBAMOL 500 MG: 500 TABLET, FILM COATED ORAL at 08:40

## 2019-10-01 RX ADMIN — METFORMIN HYDROCHLORIDE 500 MG: 500 TABLET ORAL at 16:53

## 2019-10-01 RX ADMIN — METHOCARBAMOL 500 MG: 500 TABLET, FILM COATED ORAL at 17:46

## 2019-10-01 RX ADMIN — ACETAMINOPHEN 975 MG: 325 TABLET ORAL at 13:26

## 2019-10-01 RX ADMIN — FERROUS SULFATE TAB 325 MG (65 MG ELEMENTAL FE) 325 MG: 325 (65 FE) TAB at 07:46

## 2019-10-01 RX ADMIN — GABAPENTIN 300 MG: 300 CAPSULE ORAL at 16:53

## 2019-10-01 RX ADMIN — METOPROLOL TARTRATE 12.5 MG: 25 TABLET ORAL at 08:41

## 2019-10-01 RX ADMIN — LOSARTAN POTASSIUM 50 MG: 50 TABLET ORAL at 08:41

## 2019-10-01 RX ADMIN — PRAVASTATIN SODIUM 40 MG: 40 TABLET ORAL at 16:53

## 2019-10-01 RX ADMIN — SENNOSIDES 8.6 MG: 8.6 TABLET, FILM COATED ORAL at 08:43

## 2019-10-01 NOTE — PLAN OF CARE
Problem: OCCUPATIONAL THERAPY ADULT  Goal: Performs self-care activities at highest level of function for planned discharge setting  See evaluation for individualized goals  Description  Treatment Interventions: ADL retraining, Functional transfer training, UE strengthening/ROM, Endurance training          See flowsheet documentation for full assessment, interventions and recommendations  Outcome: Progressing  Note:   Limitation: Decreased ADL status, Decreased Safe judgement during ADL, Decreased endurance, Decreased high-level ADLs  Prognosis: Good  Assessment:  Patient seen this date for OT with focus on goals as set by OTR  Patient agreeable to skilled OT session with focus on ADL's (bathing, dressing, toileting), Standing tolerance/balance, Activity tolerance and Education on safety, fall prevention and energy conservation techniques  Patient educated on safe functional transfers techniques, the appropriate use of AE/DME to improve functional performance, and activity modification techniques for energy conservation  Patient able to recall and adhere to THP's  No LOB with treatment interventions for ADL's  Plans for d/c are home  Patient is making gains toward OT goals with continued OT recommended at this time to max tolerance, safety and function for appropriate d/c planning  At end of session patient remains in room with all needs within reach          OT Discharge Recommendation: Home with family support

## 2019-10-01 NOTE — PHYSICAL THERAPY NOTE
10/01/19 1342   Pain Assessment   Pain Assessment 0-10   Pain Score 5   Pain Type Surgical pain   Pain Location Hip   Pain Orientation Right   Hospital Pain Intervention(s) Medication (See MAR); Repositioned; Ambulation/increased activity; Emotional support; Environmental changes   Precautions   Total Hip Replacement ADduction; Internal rotation; Flexion   Restrictions/Precautions   Weight Bearing Precautions Per Order Yes   RLE Weight Bearing Per Order WBAT   Other Precautions THR;Pain   General   Chart Reviewed Yes   Family/Caregiver Present No   Cognition   Overall Cognitive Status WFL   Following Commands Follows all commands and directions without difficulty   Subjective   Subjective Pt agreeable to PT   Bed Mobility   Sit to Supine   (CS/CG)   Additional items   (bed flat, no rail)   Transfers   Sit to Stand 5  Supervision   Additional items   (cues for THR precautions)   Stand to Sit 5  Supervision   Additional items Verbal cues   Stand pivot 5  Supervision   Ambulation/Elevation   Gait pattern Decreased foot clearance   Gait Assistance 5  Supervision   Assistive Device Rolling walker   Distance 10'   Balance   Ambulatory Fair   Activity Tolerance   Activity Tolerance Patient limited by fatigue;Patient tolerated treatment well   Exercises   Quad Sets Supine;Bilateral;AROM   Heelslides Supine;Bilateral;AROM;20 reps   Glute Sets Supine;20 reps;Bilateral   Hip Flexion   (SLR AAROM to RLE, AROM to LLE x 20)   Hip Abduction Supine;20 reps;AROM; Bilateral   Knee AROM Short Arc Quad Bilateral;20 reps;AROM   Ankle Pumps Supine;20 reps;AROM; Left   Assessment   Prognosis Good   Problem List Decreased strength;Decreased range of motion;Decreased endurance;Decreased mobility;Orthopedic restrictions;Pain   Assessment Pt seen at bed side PT for minute  Pt agreeable to PT treatment  Pt did transfer into bed w/ CG     Pt did well with exercise program     Goals   Patient Goals to get better   STG Expiration Date 10/18/19 LTG Expiration Date 10/18/19   PT Treatment Day 4   Plan   Treatment/Interventions   (Continue per plan of care)   Progress Progressing toward goals   PT Frequency Twice a day  (6x/week)   Jamir Middleton, PTA

## 2019-10-01 NOTE — OCCUPATIONAL THERAPY NOTE
Occupational Therapy Treatment Note          Satnamlivier Dorianmike            Patient Active Problem List   Diagnosis    Benign essential hypertension    Cervical radiculopathy    Chronic lower back pain    Diabetes mellitus type 2, controlled ([address] )    Hyperlipidemia    Left knee pain    Lumbar stenosis with neurogenic claudication    Obesity (BMI 30-39  9)    Osteoarthrosis    Post-menopausal bleeding    Primary osteoarthritis of both knees    Psoriasis    Right knee pain    Right shoulder pain    Spondylolisthesis, lumbar region    Vaginal lump    Vitamin D deficiency    Effusion of left knee    Right hip pain    Arthritis of right hip    Lumbar radiculopathy    DDD (degenerative disc disease), lumbar    Lumbar spondylosis    Sacroiliitis (HCC)    Status post total hip replacement, right    Supraventricular tachycardia ([address] )    Aftercare following right hip joint replacement surgery         Medical History        Past Medical History:   Diagnosis Date    Alopecia      Calcaneal spur      Diabetes insipidus ([address] )      Diabetes mellitus ([address] )      Hyperlipidemia      Hypertension      Osteoarthrosis 2/13/2013    Pes planus       unspecified laterality    Plantar fasciitis              TIME: (4167-6089) 70 minutes  VITALS: stable throughout treatment session  PAIN: no reports of pain throughout treatment session   COGNITION: WFL  PRECAUTIONS: WBAT; THR; Fall risk; Pain      EXPIRATION DATE: 10/07/19  TREATMENT DAY: 3  DISCHARGE RECOMMENDATION: Home with family support   ADDITIONAL COMMENTS:   · Able to adhere/recall precautions   · Elastic laces provided along with DME education for LE dressing   · DME:  Patient will need a bed side commode for home and possible hospital bed        ASSESSMENT: Patient seen this date for OT with focus on goals as set by OTR    Patient agreeable to skilled OT session with focus on ADL's (bathing, dressing, toileting), Standing tolerance/balance, Activity tolerance and Education on safety, fall prevention and energy conservation techniques  Patient educated on safe functional transfers techniques, the appropriate use of AE/DME to improve functional performance, and activity modification techniques for energy conservation  Patient able to recall and adhere to THP's  No LOB with treatment interventions for ADL's  Plans for d/c are home  Patient is making gains toward OT goals with continued OT recommended at this time to max tolerance, safety and function for appropriate d/c planning  At end of session patient remains in room with all needs within reach  Goals STG achieved within 1 week Performance at Initial Evaluation (9/29) Current Performance (last date completed)   Grooming while standing at sink Marni/I  (10/1) Supervision  10/1  MI with good tolerance and no LOB for oral care and grooming including applying hair products and rollers  (9/30) supervision    ADL transfers  Marni/I  (10/1) Supervision  10/1  MI within room with good safety and no LOB using RW  Dynamic balance Fair+, unsupported Fair+ for ADL  (9/30) D/S   Bathroom mobility  Marni/I Supervision w  [facility] (10/1)  MI + RW  (9/30) supervision w RW    UB ADL  Marni/I Set-up (9/30) Setup/Supervision    LB ADL, AE PRN Marni/I maxA (10/1)  Ed on AE use for socks and shoe management  Elastic laces provided to 2 sneakers therefore practiced 2x      Doff socks MI post 1 visual aide with dressing stick  Don sneakers Supervision + foot funnel  (9/29) maxA socks, underwear    (9/30) Min A - to wash buttocks; AE trial - Supervision with demonstration and minimal cueing    Toileting/clothing management and hygiene Marni/I modA 10/1  Min CM post shower (moisture) and Supervision hygiene   (9/29) modA to manage underwear and gown, hygiene following a BM   Increase standing tolerance for inc'd safety with standing purposeful tasks >10 minutes ~4 minutes 10/1  15 minutes sinkside for grooming/oral care  (9/29) 4 minutes   (9/30) 2-3 minutes with hygiene and clothing mgmt    Participate in therex 1-3x/week for inc'd overall stamina/activity tolerance for purposeful tasks To complete N/A  10/1  With functional activities   Tub/shower transfer  Marni/I with DME as needed N/A     Kitchen mobility for inc'd independence and safety negotiating kitchen environment Marni/I N/A     Light meal prep Marni/I N/A     Bed mobility with HOB flat  Marni/I modA (9/29) Ana Cancer, [address]    Household management (cleaning) Marni/I N/A (9/30) utilizing reacher from seated positioning picking up items from floor -- adhering to precautions          Charley Rincon  10/1/2019

## 2019-10-01 NOTE — PLAN OF CARE
Problem: PHYSICAL THERAPY ADULT  Goal: Performs mobility at highest level of function for planned discharge setting  See evaluation for individualized goals  Description  Treatment/Interventions: (cont as per poC)  Equipment Recommended: Lori Crisostomo       See flowsheet documentation for full assessment, interventions and recommendations  Outcome: Progressing  Note:   Prognosis: Good  Problem List: Decreased strength, Decreased range of motion, Decreased endurance, Decreased mobility, Orthopedic restrictions, Pain  Assessment: Pt seen for PT treatment  Pts gait is steady  No LOB  Pt requires occ cue for THR flexion precaution  Pt tolerated treatemtn fairly well  Pt talkative  Barriers to Discharge: Inaccessible home environment, Decreased caregiver support     Recommendation: Short-term skilled PT     PT - OK to Discharge: Yes    See flowsheet documentation for full assessment

## 2019-10-01 NOTE — NURSING NOTE
On initial rounds patient in no apparent distress  Skin warm and dry to touch  Very pleasant and cooperative  Stated pain at level 5 to right hip was medicated with relief  Tolerating diet  Tolerating therapy  All safety maintained  Will continue to monitor

## 2019-10-01 NOTE — PLAN OF CARE
Problem: PHYSICAL THERAPY ADULT  Goal: Performs mobility at highest level of function for planned discharge setting  See evaluation for individualized goals  Description  Treatment/Interventions: (cont as per poC)  Equipment Recommended: Ashish Lopez       See flowsheet documentation for full assessment, interventions and recommendations  10/1/2019 1445 by Kayleen Montalvo PTA  Outcome: Progressing  Note:   Prognosis: Good  Problem List: Decreased strength, Decreased range of motion, Decreased endurance, Decreased mobility, Orthopedic restrictions, Pain  Assessment: Pt seen at bed side PT for minute  Pt agreeable to PT treatment  Pt did transfer into bed w/ CG  Pt did well with exercise program    Barriers to Discharge: Inaccessible home environment, Decreased caregiver support     Recommendation: Short-term skilled PT     PT - OK to Discharge: Yes    See flowsheet documentation for full assessment  10/1/2019 1016 by Kayleen Montalvo PTA  Outcome: Progressing  Note:   Prognosis: Good  Problem List: Decreased strength, Decreased range of motion, Decreased endurance, Decreased mobility, Orthopedic restrictions, Pain  Assessment: Pt seen for PT treatment  Pts gait is steady  No LOB  Pt requires occ cue for THR flexion precaution  Pt tolerated treatemtn fairly well  Pt talkative  Barriers to Discharge: Inaccessible home environment, Decreased caregiver support     Recommendation: Short-term skilled PT     PT - OK to Discharge: Yes    See flowsheet documentation for full assessment

## 2019-10-01 NOTE — SOCIAL WORK
LOS: 4, Bundle: No, 30 day readmission: No  Primary Insurance: Medicare  Secondary: [address]    Patient admitted s/p right hip replacement done by Dr Mar TOMLINSON met with patient and reviewed admission packet and consents  Consents signed, patient does not have advanced directives  She is agreeable to a  with her brother  Prior to admission, patient lived alone in a 1/2 double 2  with 2nd story bedroom/bathroom  There is a recliner chair on 1st and 1/2 bath on 1st  She was independent with ADLs, she has a brother that lives nearby that assists with transportation  He can also assist with picking up food for patient at discharge  Pt uses a SPC to ambulate and does not own other DME  PCP is Dr Marielena Diaz and [facility] is [facility] [place]  Pt was prescribed Lovenox which she has already received from her pharmacy  BUSHRA on acute care sent orders for hospital bed, BSC and RW, BUSHRA will follow-up with ordering  Pt will have follow-up ortho appointment on Thursday at 2:00 PM  BUSHRA notified DON  BUSHRA will order [employer] for transport

## 2019-10-01 NOTE — PHYSICAL THERAPY NOTE
62 minute treatment       10/01/19 0858   Pain Assessment   Pain Assessment 0-10   Pain Score 5   Pain Type Surgical pain   Pain Location Hip   Pain Orientation Right   Pain Descriptors Aching   Pain Frequency Intermittent   Patient's Stated Pain Goal No pain   Hospital Pain Intervention(s) Medication (See MAR); Repositioned; Ambulation/increased activity; Distraction; Emotional support; Environmental changes   Precautions   Total Hip Replacement ADduction; Internal rotation; Flexion  (Pt able to recite 3/3)   Restrictions/Precautions   Weight Bearing Precautions Per Order Yes   Other Precautions THR;Pain   General   Chart Reviewed Yes   Cognition   Overall Cognitive Status WFL   Following Commands Follows all commands and directions without difficulty   Subjective   Subjective Pt agreeable to PT   Transfers   Sit to Stand 5  Supervision   Additional items   (cues for THR precautions)   Stand to Sit 5  Supervision   Stand pivot 5  Supervision   Toilet transfer 5  Supervision   Additional items Armrests   Ambulation/Elevation   Gait pattern Decreased foot clearance   Gait Assistance 5  Supervision   Assistive Device Rolling walker   Distance 165' x 2    Stair Management Technique One rail R;Step to pattern  (2 hands on 1 rail)   Number of Stairs 4   Curbs 1+1- 4" step w/ RW and S   Balance   Ambulatory Fair   Endurance Deficit   Endurance Deficit Yes   Activity Tolerance   Activity Tolerance Patient limited by fatigue;Patient tolerated treatment well   Exercises   Hamstring Sets   (ham curls w/ tband x 20)   Hip Abduction   (Hip abd w/ tband x 20)   Hip Adduction   (add ball squeeze x 20)   Knee AROM Long Arc Quad Sitting  (AROM to RLE, 2# to LLE x 20)   Assessment   Prognosis Good   Problem List Decreased strength;Decreased range of motion;Decreased endurance;Decreased mobility;Orthopedic restrictions;Pain   Assessment Pt seen for PT treatment  Pts gait is steady  No LOB    Pt requires occ cue for THR flexion precaution  Pt tolerated treatemtn fairly well  Pt talkative     Goals   Patient Goals to take more steps   STG Expiration Date 10/18/19   LTG Expiration Date 10/18/19   PT Treatment Day 3   Plan   Treatment/Interventions   (Continue per plan of care)   Progress Progressing toward goals   PT Frequency Twice a day  (6x/week)   John Ritter, PTA

## 2019-10-02 LAB
GLUCOSE SERPL-MCNC: 100 MG/DL (ref 65–140)
GLUCOSE SERPL-MCNC: 90 MG/DL (ref 65–140)
GLUCOSE SERPL-MCNC: 94 MG/DL (ref 65–140)

## 2019-10-02 PROCEDURE — 97110 THERAPEUTIC EXERCISES: CPT

## 2019-10-02 PROCEDURE — 97530 THERAPEUTIC ACTIVITIES: CPT

## 2019-10-02 PROCEDURE — 97535 SELF CARE MNGMENT TRAINING: CPT

## 2019-10-02 PROCEDURE — 82948 REAGENT STRIP/BLOOD GLUCOSE: CPT

## 2019-10-02 PROCEDURE — 97116 GAIT TRAINING THERAPY: CPT

## 2019-10-02 RX ADMIN — ACETAMINOPHEN 975 MG: 325 TABLET ORAL at 05:32

## 2019-10-02 RX ADMIN — GABAPENTIN 300 MG: 300 CAPSULE ORAL at 08:09

## 2019-10-02 RX ADMIN — ENOXAPARIN SODIUM 40 MG: 40 INJECTION SUBCUTANEOUS at 09:02

## 2019-10-02 RX ADMIN — SENNOSIDES 8.6 MG: 8.6 TABLET, FILM COATED ORAL at 09:03

## 2019-10-02 RX ADMIN — GABAPENTIN 300 MG: 300 CAPSULE ORAL at 20:48

## 2019-10-02 RX ADMIN — OXYCODONE HYDROCHLORIDE 5 MG: 5 TABLET ORAL at 08:09

## 2019-10-02 RX ADMIN — FOLIC ACID 1 MG: 1 TABLET ORAL at 09:02

## 2019-10-02 RX ADMIN — PRAVASTATIN SODIUM 40 MG: 40 TABLET ORAL at 17:52

## 2019-10-02 RX ADMIN — METHOCARBAMOL 500 MG: 500 TABLET, FILM COATED ORAL at 17:52

## 2019-10-02 RX ADMIN — OXYCODONE HYDROCHLORIDE 5 MG: 5 TABLET ORAL at 15:56

## 2019-10-02 RX ADMIN — ACETAMINOPHEN 975 MG: 325 TABLET ORAL at 13:35

## 2019-10-02 RX ADMIN — DOCUSATE SODIUM 100 MG: 100 CAPSULE, LIQUID FILLED ORAL at 17:52

## 2019-10-02 RX ADMIN — METFORMIN HYDROCHLORIDE 500 MG: 500 TABLET ORAL at 08:09

## 2019-10-02 RX ADMIN — METHOCARBAMOL 500 MG: 500 TABLET, FILM COATED ORAL at 09:02

## 2019-10-02 RX ADMIN — LOSARTAN POTASSIUM 50 MG: 50 TABLET ORAL at 09:02

## 2019-10-02 RX ADMIN — OXYCODONE HYDROCHLORIDE AND ACETAMINOPHEN 500 MG: 500 TABLET ORAL at 17:52

## 2019-10-02 RX ADMIN — OXYCODONE HYDROCHLORIDE 5 MG: 5 TABLET ORAL at 22:19

## 2019-10-02 RX ADMIN — OXYCODONE HYDROCHLORIDE AND ACETAMINOPHEN 500 MG: 500 TABLET ORAL at 09:02

## 2019-10-02 RX ADMIN — GABAPENTIN 300 MG: 300 CAPSULE ORAL at 15:55

## 2019-10-02 RX ADMIN — METOPROLOL TARTRATE 12.5 MG: 25 TABLET ORAL at 09:02

## 2019-10-02 RX ADMIN — DOCUSATE SODIUM 100 MG: 100 CAPSULE, LIQUID FILLED ORAL at 09:02

## 2019-10-02 RX ADMIN — ACETAMINOPHEN 975 MG: 325 TABLET ORAL at 21:41

## 2019-10-02 RX ADMIN — METFORMIN HYDROCHLORIDE 500 MG: 500 TABLET ORAL at 17:51

## 2019-10-02 RX ADMIN — METOPROLOL TARTRATE 12.5 MG: 25 TABLET ORAL at 20:48

## 2019-10-02 RX ADMIN — FERROUS SULFATE TAB 325 MG (65 MG ELEMENTAL FE) 325 MG: 325 (65 FE) TAB at 08:09

## 2019-10-02 NOTE — SOCIAL WORK
[facility] held with patient and patient's brother  CATALINO, NICK, and BUSHRA present  Baseline care plan reviewed, pt is agreeable to plan  Patient provided with a copy of her medication list  CATALINO reviewed medication list, BUSHRA confirmed with patient she does have Lovenox already at home as she picked up from the pharmacy  Patient continues to make good progress in therapy  Brother will be able to assist with laundry, driving pt to appointments and meals  Patient is aware she has a follow-up with her orthopedic surgeon tomorrow  Patient will take Sherry for appointment schedule at 2:00 PM  SW will continue to follow at this time, no further questions/concerns

## 2019-10-02 NOTE — PHYSICAL THERAPY NOTE
48' session   10/02/19 0848   Pain Assessment   Pain Assessment 0-10   Pain Score 5   Pain Type Surgical pain   Pain Location Hip   Pain Orientation Right   Pain Radiating Towards knee   Pain Descriptors Aching   Pain Frequency Intermittent   Pain Onset Ongoing   Clinical Progression Not changed   Patient's Stated Pain Goal No pain   Hospital Pain Intervention(s) Medication (See MAR); Cold applied; Ambulation/increased activity   Response to Interventions no change   Precautions   Total Hip Replacement ADduction; Internal rotation; Flexion   Restrictions/Precautions   RLE Weight Bearing Per Order WBAT   Other Precautions THR;Pain   General   Chart Reviewed Yes   Family/Caregiver Present No   Cognition   Overall Cognitive Status WFL   Arousal/Participation Alert; Cooperative   Attention Within functional limits   Following Commands Follows all commands and directions without difficulty   Subjective   Subjective it gets stiff   Bed Mobility   Supine to Sit 4  Minimal assistance   Additional items Assist x 1; Increased time required; Bedrails;HOB elevated   Sit to Supine 5  Supervision   Additional items Leg ; Increased time required;Verbal cues  (bed flat no rail)   Transfers   Sit to Stand 5  Supervision   Additional items   (reminders for flex precaution)   Stand to Sit 6  Modified independent   Stand pivot 6  Modified independent   Toilet transfer 5  Supervision   Additional items   (reminders forprecautions- managed door with cues)   Additional Comments stood at sink to wash hands with F+ balance   Ambulation/Elevation   Gait pattern Improper Weight shift;Decreased foot clearance   Gait Assistance 6  Modified independent   Assistive Device Rolling walker   Distance 66'921'   Balance   Static Standing Fair +   Dynamic Standing Fair +   Ambulatory Fair +   Endurance Deficit   Endurance Deficit Yes   Activity Tolerance   Activity Tolerance Patient limited by fatigue;Patient limited by pain   Exercises   Quad Sets 10 reps   Heelslides Bilateral;AROM;20 reps   Glute Sets 10 reps   Hip Flexion   (R, A-AA , L A x 20 reps ( SLR))   Hip Abduction Bilateral;AROM;20 reps   Knee AROM Short Arc Quad Bilateral;AROM;20 reps   Ankle Pumps Bilateral;AROM;20 reps   Bridging 10 reps  (modified with PPT- slight lift for back protection( bolster))   Assessment   Prognosis Good   Problem List Decreased strength;Decreased endurance;Decreased mobility;Orthopedic restrictions;Pain   Assessment Reviewed and performed all sup ex as per HEP with pt reading, then performing ex without need for cues  Pt cont to require occ reminders for flex precaution with mobility  Needs more practice with getting into BR and precautions prior to I sign  CP to R hip and knee after session   Barriers to Discharge Inaccessible home environment;Decreased caregiver support   Goals   Patient Goals keep doing what we are doing   STG Expiration Date 10/18/19   LTG Expiration Date 10/18/19   PT Treatment Day 5   Plan   Treatment/Interventions   (cont as per pOC)   Progress Progressing toward goals   PT Frequency   (6x/wk BID)   Recommendation   Equipment Recommended   (hospital bed, RW , commode)

## 2019-10-02 NOTE — PHYSICAL THERAPY NOTE
55' session     10/02/19 1213   Pain Assessment   Pain Assessment 0-10   Pain Score 5   Pain Type Surgical pain   Pain Location Hip   Pain Orientation Right   Pain Descriptors Aching   Pain Frequency Intermittent   Pain Onset Ongoing   Patient's Stated Pain Goal No pain   Hospital Pain Intervention(s) Medication (See MAR); Cold applied; Ambulation/increased activity   Response to Interventions no change   Restrictions/Precautions   RLE Weight Bearing Per Order WBAT   General   Chart Reviewed Yes   Family/Caregiver Present No   Cognition   Overall Cognitive Status WFL   Arousal/Participation Alert; Cooperative   Following Commands Follows all commands and directions without difficulty   Subjective   Subjective i have to watch myself with the turning   Transfers   Sit to Stand 6  Modified independent   Stand to Sit 6  Modified independent   Stand pivot 6  Modified independent   Toilet transfer 6  Modified independent   Ambulation/Elevation   Gait pattern Improper Weight shift;Decreased foot clearance   Gait Assistance 6  Modified independent   Assistive Device Rolling walker   Distance 270'013'   Stair Management Assistance 5  Supervision   Stair Management Technique One rail L;With cane; Step to pattern  (trialed froward then backwards on ff)   Number of Stairs 12   Curbs 1 + 1 , 4" curb step wiht RW and S    Balance   Ambulatory Fair +   Endurance Deficit   Endurance Deficit No   Activity Tolerance   Activity Tolerance Patient tolerated treatment well   Exercises   Hip Extension Standing;Right;AROM;10 reps   Knee Flexion Stretch Standing;Right;AROM;10 reps   Knee AROM Long Arc Quad Bilateral;AROM;20 reps   Ankle Pumps Standing;Bilateral;AROM;20 reps   Marching Standing;Bilateral;AROM;20 reps   Assessment   Prognosis Good   Problem List Decreased strength;Decreased range of motion;Decreased mobility;Orthopedic restrictions;Pain   Assessment Reviewed and performed seated and standing ex    Able to complete FF of steps today with L rail ( as per pt)  And cane-trialed forward then backwards descending steps 2* report of L knee pain   Pt unsure of which she prefers  CP to R hip and knee after session   Pt requesting to try a cane - will try next visit, will also educate in car transfer ( has MD appt Thurs) and will consider I sign   Barriers to Discharge Inaccessible home environment;Decreased caregiver support   Goals   LTG Expiration Date 10/18/19   PT Treatment Day 5   Plan   Treatment/Interventions   (cont as per pOC)   Progress Progressing toward goals   PT Frequency   (6x/wk BID)

## 2019-10-02 NOTE — PLAN OF CARE
Problem: PHYSICAL THERAPY ADULT  Goal: Performs mobility at highest level of function for planned discharge setting  See evaluation for individualized goals  Description  Treatment/Interventions: (cont as per poC)  Equipment Recommended: William Paget       See flowsheet documentation for full assessment, interventions and recommendations  Outcome: Progressing  Note:   Prognosis: Good  Problem List: Decreased strength, Decreased endurance, Decreased mobility, Orthopedic restrictions, Pain  Assessment: Reviewed and performed all sup ex as per HEP with pt reading, then performing ex without need for cues  Pt cont to require occ reminders for flex precaution with mobility  Needs more practice with getting into BR and precautions prior to I sign  CP to R hip and knee after session  Barriers to Discharge: Inaccessible home environment, Decreased caregiver support     Recommendation: Short-term skilled PT     PT - OK to Discharge: Yes    See flowsheet documentation for full assessment

## 2019-10-02 NOTE — PLAN OF CARE
Problem: PHYSICAL THERAPY ADULT  Goal: Performs mobility at highest level of function for planned discharge setting  See evaluation for individualized goals  Description  Treatment/Interventions: (cont as per poC)  Equipment Recommended: Ruff[employer] Pilling       See flowsheet documentation for full assessment, interventions and recommendations  10/2/2019 1227 by Arnaldo Gil PTA  Outcome: Progressing  Note:   Prognosis: Good  Problem List: Decreased strength, Decreased range of motion, Decreased mobility, Orthopedic restrictions, Pain  Assessment: Reviewed and performed seated and standing ex   Able to complete FF of steps today with L rail ( as per pt)  And cane-trialed forward then backwards descending steps 2* report of L knee pain   Pt unsure of which she prefers  CP to R hip and knee after session  Pt requesting to try a cane - will try next visit, will also educate in car transfer ( has MD hectort Thurs) and will consider I sign  Barriers to Discharge: Inaccessible home environment, Decreased caregiver support     Recommendation: Short-term skilled PT     PT - OK to Discharge: Yes    See flowsheet documentation for full assessment  10/2/2019 0857 by Arnaldo Gil PTA  Outcome: Progressing  Note:   Prognosis: Good  Problem List: Decreased strength, Decreased endurance, Decreased mobility, Orthopedic restrictions, Pain  Assessment: Reviewed and performed all sup ex as per HEP with pt reading, then performing ex without need for cues  Pt cont to require occ reminders for flex precaution with mobility  Needs more practice with getting into BR and precautions prior to I sign  CP to R hip and knee after session  Barriers to Discharge: Inaccessible home environment, Decreased caregiver support     Recommendation: Short-term skilled PT     PT - OK to Discharge: Yes    See flowsheet documentation for full assessment

## 2019-10-02 NOTE — PLAN OF CARE
Problem: OCCUPATIONAL THERAPY ADULT  Goal: Performs self-care activities at highest level of function for planned discharge setting  See evaluation for individualized goals  Description  Treatment Interventions: ADL retraining, Functional transfer training, UE strengthening/ROM, Endurance training          See flowsheet documentation for full assessment, interventions and recommendations  Outcome: Progressing  Note:   Limitation: Decreased ADL status, Decreased Safe judgement during ADL, Decreased endurance, Decreased high-level ADLs  Prognosis: Good  Assessment: Patient seen this date for OT with focus on goals as set by OTR  See note for specific details  Patient agreeable to skilled OT session with focus on ADL's (bathing, dressing, toileting), Transfers, Standing tolerance/balance, Home management, Activity tolerance and Education on safety, fall prevention and energy conservation techniques  Barriers to treatment include pain, PRN  Patient educated on safe functional transfers techniques, the appropriate use of AE/DME to improve functional performance, and activity modification techniques for energy conservation  Plans for d/c are home with family support  Patient is making gains toward OT goals with continued OT recommended at this time to max tolerance, safety and function for appropriate d/c planning  At end of session patient remains in room with all needs within reach        OT Discharge Recommendation: Home with family support

## 2019-10-03 ENCOUNTER — HOSPITAL ENCOUNTER (OUTPATIENT)
Dept: RADIOLOGY | Facility: HOSPITAL | Age: 69
Discharge: HOME | End: 2019-10-03
Attending: ORTHOPAEDIC SURGERY
Payer: MEDICARE

## 2019-10-03 ENCOUNTER — OFFICE VISIT (OUTPATIENT)
Dept: OBGYN CLINIC | Facility: HOSPITAL | Age: 69
End: 2019-10-03

## 2019-10-03 VITALS
WEIGHT: 209.88 LBS | BODY MASS INDEX: 38.62 KG/M2 | SYSTOLIC BLOOD PRESSURE: 126 MMHG | HEIGHT: 62 IN | HEART RATE: 98 BPM | DIASTOLIC BLOOD PRESSURE: 80 MMHG

## 2019-10-03 DIAGNOSIS — Z96.641 AFTERCARE FOLLOWING RIGHT HIP JOINT REPLACEMENT SURGERY: ICD-10-CM

## 2019-10-03 DIAGNOSIS — Z96.641 AFTERCARE FOLLOWING RIGHT HIP JOINT REPLACEMENT SURGERY: Primary | ICD-10-CM

## 2019-10-03 DIAGNOSIS — Z47.1 AFTERCARE FOLLOWING RIGHT HIP JOINT REPLACEMENT SURGERY: ICD-10-CM

## 2019-10-03 DIAGNOSIS — Z47.1 AFTERCARE FOLLOWING RIGHT HIP JOINT REPLACEMENT SURGERY: Primary | ICD-10-CM

## 2019-10-03 PROCEDURE — 99308 SBSQ NF CARE LOW MDM 20: CPT | Performed by: INTERNAL MEDICINE

## 2019-10-03 PROCEDURE — 97535 SELF CARE MNGMENT TRAINING: CPT | Performed by: STUDENT IN AN ORGANIZED HEALTH CARE EDUCATION/TRAINING PROGRAM

## 2019-10-03 PROCEDURE — 73502 X-RAY EXAM HIP UNI 2-3 VIEWS: CPT

## 2019-10-03 PROCEDURE — 97530 THERAPEUTIC ACTIVITIES: CPT

## 2019-10-03 PROCEDURE — 97110 THERAPEUTIC EXERCISES: CPT

## 2019-10-03 PROCEDURE — 99024 POSTOP FOLLOW-UP VISIT: CPT | Performed by: ORTHOPAEDIC SURGERY

## 2019-10-03 PROCEDURE — 97530 THERAPEUTIC ACTIVITIES: CPT | Performed by: STUDENT IN AN ORGANIZED HEALTH CARE EDUCATION/TRAINING PROGRAM

## 2019-10-03 PROCEDURE — 97116 GAIT TRAINING THERAPY: CPT

## 2019-10-03 RX ORDER — CEPHALEXIN 500 MG/1
500 CAPSULE ORAL EVERY 6 HOURS SCHEDULED
Qty: 12 CAPSULE | Refills: 0 | Status: SHIPPED | OUTPATIENT
Start: 2019-10-03 | End: 2019-10-04

## 2019-10-03 RX ADMIN — METFORMIN HYDROCHLORIDE 500 MG: 500 TABLET ORAL at 18:10

## 2019-10-03 RX ADMIN — METFORMIN HYDROCHLORIDE 500 MG: 500 TABLET ORAL at 07:53

## 2019-10-03 RX ADMIN — ACETAMINOPHEN 975 MG: 325 TABLET ORAL at 05:13

## 2019-10-03 RX ADMIN — METOPROLOL TARTRATE 12.5 MG: 25 TABLET ORAL at 21:04

## 2019-10-03 RX ADMIN — ENOXAPARIN SODIUM 40 MG: 40 INJECTION SUBCUTANEOUS at 08:00

## 2019-10-03 RX ADMIN — OXYCODONE HYDROCHLORIDE 5 MG: 5 TABLET ORAL at 07:54

## 2019-10-03 RX ADMIN — SENNOSIDES 8.6 MG: 8.6 TABLET, FILM COATED ORAL at 08:00

## 2019-10-03 RX ADMIN — LOSARTAN POTASSIUM 50 MG: 50 TABLET ORAL at 08:01

## 2019-10-03 RX ADMIN — PRAVASTATIN SODIUM 40 MG: 40 TABLET ORAL at 18:10

## 2019-10-03 RX ADMIN — OXYCODONE HYDROCHLORIDE AND ACETAMINOPHEN 500 MG: 500 TABLET ORAL at 08:01

## 2019-10-03 RX ADMIN — METHOCARBAMOL 500 MG: 500 TABLET, FILM COATED ORAL at 18:10

## 2019-10-03 RX ADMIN — GABAPENTIN 300 MG: 300 CAPSULE ORAL at 08:00

## 2019-10-03 RX ADMIN — METOPROLOL TARTRATE 12.5 MG: 25 TABLET ORAL at 08:00

## 2019-10-03 RX ADMIN — DOCUSATE SODIUM 100 MG: 100 CAPSULE, LIQUID FILLED ORAL at 18:10

## 2019-10-03 RX ADMIN — GABAPENTIN 300 MG: 300 CAPSULE ORAL at 21:03

## 2019-10-03 RX ADMIN — FOLIC ACID 1 MG: 1 TABLET ORAL at 08:01

## 2019-10-03 RX ADMIN — GABAPENTIN 300 MG: 300 CAPSULE ORAL at 16:37

## 2019-10-03 RX ADMIN — OXYCODONE HYDROCHLORIDE 5 MG: 5 TABLET ORAL at 12:55

## 2019-10-03 RX ADMIN — DOCUSATE SODIUM 100 MG: 100 CAPSULE, LIQUID FILLED ORAL at 08:00

## 2019-10-03 RX ADMIN — METHOCARBAMOL 500 MG: 500 TABLET, FILM COATED ORAL at 08:00

## 2019-10-03 RX ADMIN — FERROUS SULFATE TAB 325 MG (65 MG ELEMENTAL FE) 325 MG: 325 (65 FE) TAB at 08:00

## 2019-10-03 RX ADMIN — OXYCODONE HYDROCHLORIDE AND ACETAMINOPHEN 500 MG: 500 TABLET ORAL at 18:10

## 2019-10-03 RX ADMIN — ACETAMINOPHEN 975 MG: 325 TABLET ORAL at 21:03

## 2019-10-03 NOTE — PHYSICAL THERAPY NOTE
58' session     10/03/19 0912   Pain Assessment   Pain Assessment 0-10   Pain Score 5   Pain Type Surgical pain   Pain Location Hip   Pain Orientation Right   Pain Radiating Towards knee   Pain Descriptors Aching   Pain Frequency Intermittent   Pain Onset Ongoing   Clinical Progression Not changed   Patient's Stated Pain Goal No pain   Hospital Pain Intervention(s) Medication (See MAR); Cold applied; Ambulation/increased activity   Response to Interventions no increase pain with session   Precautions   Total Hip Replacement ADduction; Flexion; Internal rotation   Restrictions/Precautions   RLE Weight Bearing Per Order WBAT   Other Precautions THR;Pain   General   Chart Reviewed Yes   Family/Caregiver Present No   Cognition   Overall Cognitive Status WFL   Arousal/Participation Alert; Cooperative   Attention Within functional limits   Following Commands Follows all commands and directions without difficulty   Subjective   Subjective i have an appt this afternoon   Transfers   Sit to Stand 6  Modified independent   Stand to Sit 6  Modified independent   Stand pivot 6  Modified independent   Car transfer   (simulated with S  and education)   Additional Comments ssued and I sign for BR and hubbard   Ambulation/Elevation   Gait pattern Decreased foot clearance;Decreased R stance   Gait Assistance 6  Modified independent   Assistive Device Rolling walker;SPC   Distance 435',170'  (cane for 48' with S)   Stair Management Assistance 5  Supervision   Stair Management Technique One rail L;Step to pattern; With cane   Number of Stairs 12   Balance   Static Standing Fair +   Dynamic Standing Fair +   Ambulatory Fair +   Endurance Deficit   Endurance Deficit No   Activity Tolerance   Activity Tolerance Patient tolerated treatment well   Exercises   Hip Extension Standing;Right;AROM  (30 reps )   Knee PROM Flexion Standing;Right;AROM  (30 reps )   Ankle Pumps Standing;Bilateral;AROM  (30 reps)   Marching Standing;Bilateral;AROM  (30 reps)   Assessment   Prognosis Good   Assessment Issued I sign to pt for use to BR and in the hubbard during then day hours from the chair  Pt aware that she may call for A prn pt with increase distance with amb today - karla steps well going down forward  Some R knee discomfort but feels safer than backwards technique  Started with amb with cane today with S  Performed simulated car transfer with education on seat back, reclined and use of plastic bag on seat   Pt performed this with S    Barriers to Discharge Inaccessible home environment;Decreased caregiver support   Goals   Patient Goals keep getting stronger   STG Expiration Date 10/18/19   LTG Expiration Date 10/18/19   PT Treatment Day 6   Plan   Treatment/Interventions   (cont as per pOC)   Progress Improving as expected   PT Frequency   (6x/wk BID)

## 2019-10-03 NOTE — PROGRESS NOTES
69 y o female is 1 5 weeks out from right total hip arthroplasty  Patient missed her 1st visit, so rescheduled for today  She reports she has been doing well  She was discharged to rehab facility, states that she should be able to return to her home sometime next week  She has been compliant with her hip precautions as well as her DVT prophylaxis  She reports she is now moving independently with a walker  Pain well controlled  No numbness or tingling  Review of Systems  Review of systems negative unless otherwise specified in HPI    Past Medical History  Past Medical History:   Diagnosis Date    Alopecia     Calcaneal spur     [facility] [address] [facility]     [facility] mellitus [facility] [address] [facility]     Hyperlipidemia     Hypertension     Osteoarthrosis 2/13/2013    Pes planus     unspecified laterality    Plantar fasciitis        Past Surgical History  Past Surgical History:   Procedure Laterality Date    CARPAL TUNNEL RELEASE      COLONOSCOPY      May 2006    INCISIONAL BREAST BIOPSY      NM COLONOSCOPY FLX DX W/COLLJ SPEC WHEN PFRMD N/A 5/4/2018    Procedure: COLONOSCOPY;  Surgeon: Alejandro Hwang MD;  Location: [facility];   Service: Colorectal    NM TOTAL HIP ARTHROPLASTY Right 9/23/2019    Procedure: ARTHROPLASTY HIP TOTAL;  Surgeon: Van Fernandez MD;  Location: [facility];  Service: Orthopedics    WISDOM TOOTH EXTRACTION         Current Medications  Current Facility-Administered Medications on File Prior to Visit   Medication Dose Route Frequency Provider Last Rate Last Dose    acetaminophen (TYLENOL) tablet 975 mg  975 mg Oral Q8H Shelly Carranza MD   975 mg at 10/03/19 0513    ascorbic acid (VITAMIN C) tablet 500 mg  500 mg Oral BID Shelly Carranza MD   500 mg at 10/03/19 0801    docusate sodium (COLACE) capsule 100 mg  100 mg Oral BID Shelly Carranza MD   100 mg at 10/03/19 0800    enoxaparin (LOVENOX) subcutaneous injection 40 mg  40 mg Subcutaneous Q24H [address] Shelly Carranza MD   40 mg at 10/03/19 0800    ferrous sulfate tablet 325 mg  325 mg Oral Daily With Breakfast Dionne Michele MD   325 mg at 87/89/81 9912    folic acid (FOLVITE) tablet 1 mg  1 mg Oral Daily Dionne Michele MD   1 mg at 10/03/19 0801    gabapentin (NEURONTIN) capsule 300 mg  300 mg Oral TID Dionne Michele MD   300 mg at 10/03/19 0800    losartan (COZAAR) tablet 50 mg  50 mg Oral Daily Dionne Michele MD   50 mg at 10/03/19 0801    metFORMIN (GLUCOPHAGE) tablet 500 mg  500 mg Oral BID With Meals Dionne Michele MD   500 mg at 10/03/19 0753    methocarbamol (ROBAXIN) tablet 500 mg  500 mg Oral BID Dionne Michele MD   500 mg at 10/03/19 0800    metoprolol tartrate (LOPRESSOR) partial tablet 12 5 mg  12 5 mg Oral Q12H [facility] Dionne Michele MD   12 5 mg at 10/03/19 0800    oxyCODONE (ROXICODONE) IR tablet 5 mg  5 mg Oral Q4H PRN Dionne Michele MD   5 mg at 10/03/19 1255    polyethylene glycol (MIRALAX) packet 17 g  17 g Oral Daily PRN Dionne Michele MD        pravastatin (PRAVACHOL) tablet 40 mg  40 mg Oral Daily With [facility] Shelly England MD   40 mg at 10/02/19 1752    senna (SENOKOT) tablet 8 6 mg  1 tablet Oral Daily Dionne Michele MD   8 6 mg at 10/03/19 0800     Current Outpatient Medications on File Prior to Visit   Medication Sig Dispense Refill    acetaminophen (TYLENOL) 500 mg tablet Take 1,000 mg by mouth every 6 (six) hours as needed for mild pain      ascorbic acid (VITAMIN C) 500 mg tablet Take 1 tablet (500 mg total) by mouth 2 (two) times a day 60 tablet 0    Biotin 1 MG CAPS Take by mouth daily       Calcium Carb-Cholecalciferol (CALCIUM 1000 + D) 1000-800 MG-UNIT TABS Take by mouth      cholecalciferol (VITAMIN D3) 1,000 units tablet Take 1,000 Units by mouth daily      enoxaparin (LOVENOX) 40 mg/0 4 mL Inject 0 4 mL (40 mg total) under the skin daily for 28 days To start postoperatively 28 Syringe 0    ferrous sulfate 324 (65 Fe) mg Take one tablet daily, every other day 15 tablet 0    fluticasone (FLONASE) 50 mcg/act nasal spray 1 spray into each nostril daily 16 g 0    folic acid (FOLVITE) 1 mg tablet Take 1 tablet (1 mg total) by mouth daily 30 tablet 0    gabapentin (NEURONTIN) 300 mg capsule Take 1 capsule (300 mg total) by mouth 3 (three) times a day for 30 days 90 capsule 5    losartan (COZAAR) 100 MG tablet Take 1 tablet (100 mg total) by mouth daily for 90 days 90 tablet 3    metFORMIN (GLUCOPHAGE) 500 mg tablet TAKE 1 TABLET BY MOUTH TWO TIMES DAILY WITH MEALS 180 tablet 3    metoprolol tartrate (LOPRESSOR) 25 mg tablet Take 0 5 tablets (12 5 mg total) by mouth every 12 (twelve) hours  0    mometasone (ELOCON) 0 1 % ointment APPLY TO RASH LESIONS TWO TIMES DAILY FOR 2 WEEKS THEN THREE TO FOUR DAYS PER WEEK AS NEEDED  1    Multiple Vitamin (MULTI-VITAMIN DAILY PO) Take by mouth daily       oxyCODONE (ROXICODONE) 5 mg immediate release tablet 1 pill po Q4-6 Hrs prn 30 tablet 0    simvastatin (ZOCOR) 20 mg tablet Take 1 tablet (20 mg total) by mouth daily for 90 days 90 tablet 3       Recent Labs (HCT,HGB,PT,INR,ESR,CRP,GLU,HgA1C)  0   Lab Value Date/Time    HCT 35 9 09/27/2019 1002    HGB 11 5 09/27/2019 1002    WBC 9 68 09/27/2019 1002    INR 1 14 08/05/2019 1423    CRP <3 0 08/05/2019 1423    GLUCOSE 95 04/21/2015 1430    HGBA1C 6 3 08/05/2019 1423    HGBA1C 6 2 (H) 04/21/2015 1430         Physical exam  · General: Awake, Alert, Oriented  · Eyes: Pupils equal, round and reactive to light  · Heart: regular rate and rhythm  · Lungs: No audible wheezing  · Abdomen: soft  MSK right hip  -well-healing surgical incision with staples intact   No surrounding erythema, dehiscence, or wound drainage  -no pain with gentle range of motion of the hip  -motor intact knee flexion/extension, ankle dorsiflexion/plantar flexion, EHL/FHL  -sensation intact L3-S1  -2 second capillary refill    Imaging  Imaging reviewed with the patient  X-ray of the right hip shows well placed right total hip arthroplasty    Procedure  None indicated    Assessment/Plan: 68 y o female presenting 1 5 weeks status post right total hip arthroplasty  Patient instructed to return to the office in 1 week for repeat examination and removal of staples at that time

## 2019-10-03 NOTE — ASSESSMENT & PLAN NOTE
Patient had right hip replacement surgery done which was uneventful with no complications reported  She is here for course of rehab requiring physical therapy and occupational therapy which has been consulted  Continue Oxycodone for pain control along with Tylenol as well  Also on Robaxin for muscle relaxation which have decreased to every 12 hours    Also on Lovenox 40 mg daily for 28 days post surgery  Patient went yesterday for recheck of herself hip everything is fine according to patient clear from orthopedic surgeon continue rehab program

## 2019-10-03 NOTE — SOCIAL WORK
Patient to take [employer] to follow-up orthopedic appointment with Dr Brenna Kirby at [address] at 2:00 PM  SW will call for [employer] around 1:00 PM  Patient practiced car transfer with PTA  CNA to accompany patient  Patient signed [employer] waiver, placed in scan bin  Pt qualifier tool faxed to [employer]

## 2019-10-03 NOTE — PROGRESS NOTES
Progress Note - Hyacinth Scruggs 1950, 68 y o  female MRN: [id number]    Unit/Bed#: -02 Encounter: [id number]    Primary Care Provider: Rafael Kulkarni MD   Date and time admitted to hospital: 9/27/2019  2:31 PM        Supraventricular tachycardia [facility])  Assessment & Plan  Patient had an episode of supraventricular tachycardia with heart rate in the 150s while she was in the acute and patient's hospital   He converted spontaneously however she was placed on metoprolol 12 5 mg daily which we will continue for now  Patient is to have outpatient follow-up with Cardiology after discharge from rehab  No further arrhythmia continue metoprolol at present time    Obesity (BMI 30-39  9)  Assessment & Plan  Consult dietitian  Discussed with patient weight reduction program she is discharged to follow date an exercise program    Left knee pain  Assessment & Plan  Continue physical therapy and occupational therapy    Diabetes mellitus type 2, controlled [facility])  Assessment & Plan    Lab Results   Component Value Date    HGBA1C 6 3 08/05/2019    Patient's diabetes is well controlled  Continue metformin oral only  No need to do Accu-Cheks    Benign essential hypertension  Assessment & Plan  Patient's blood pressure is well controlled at this time  Continue low-dose metoprolol and losartan  Blood pressure well controlled    * Aftercare following right hip joint replacement surgery  Assessment & Plan  Patient had right hip replacement surgery done which was uneventful with no complications reported  She is here for course of rehab requiring physical therapy and occupational therapy which has been consulted  Continue Oxycodone for pain control along with Tylenol as well  Also on Robaxin for muscle relaxation which have decreased to every 12 hours    Also on Lovenox 40 mg daily for 28 days post surgery  Patient went yesterday for recheck of herself hip everything is fine according to patient clear from orthopedic surgeon continue rehab program                        VTE Pharmacologic Prophylaxis:   Pharmacologic: Enoxaparin (Lovenox)  Mechanical VTE Prophylaxis in Place: Yes    Patient Centered Rounds: I have performed bedside rounds with nursing staff today  Discussions with Specialists or Other Care Team Provider:  None    Education and Discussions with Family / Patient:  Patient    Time Spent for Care: 30 minutes  More than 50% of total time spent on counseling and coordination of care as described above  Current Length of Stay: 6 day(s)    Current Patient Status: SNF Short Term Inpatient   Certification Statement: The patient will continue to require additional inpatient hospital stay due to Rehab    Discharge Plan:  clear from rehab    Code Status: Level 1 - Full Code      Subjective:   No complain doing fairly well yesterday went to her orthopedic surgeon everything healing well he will follow-up outpatient did have problem with knee patient previously receive injection is doing much better did recommend outpatient alert continue physical therapy and some exercise program no chest pain or short of breath no GI  symptoms    Objective:     Vitals:   Temp (24hrs), Av 6 °F (35 9 °C), Min:96 6 °F (35 9 °C), Max:96 6 °F (35 9 °C)    Temp:  [96 6 °F (35 9 °C)] 96 6 °F (35 9 °C)  HR:  [77-98] 98  Resp:  [21] 21  BP: (125-127)/(65-86) 126/80  SpO2:  [96 %] 96 %  Body mass index is 38 39 kg/m²  Input and Output Summary (last 24 hours):     No intake or output data in the 24 hours ending 10/03/19 4193    Physical Exam:     Physical Exam   Constitutional: She is oriented to person, place, and time  She appears well-developed and well-nourished  Obese   HENT:   Head: Normocephalic and atraumatic  Right Ear: External ear normal    Left Ear: External ear normal    Mouth/Throat: Oropharynx is clear and moist    Eyes: Pupils are equal, round, and reactive to light   Conjunctivae and EOM are normal    Neck: Normal range of motion  Neck supple  Cardiovascular: Normal rate, regular rhythm, normal heart sounds and intact distal pulses  Pulmonary/Chest: Effort normal and breath sounds normal    Abdominal: Soft  Bowel sounds are normal  She exhibits no mass  There is no tenderness  There is no rebound and no guarding  Obese   Genitourinary:   Genitourinary Comments: deferred   Musculoskeletal: Normal range of motion  She exhibits deformity  Right hip surgery   Neurological: She is alert and oriented to person, place, and time  She has normal reflexes  Cranial nerves 2-12 are normal   Normal neurological exam   Skin: Skin is warm and dry  No rash noted  Psychiatric: She has a normal mood and affect  Nursing note and vitals reviewed  Additional Data:     Labs:    Results from last 7 days   Lab Units 09/27/19  1002   WBC Thousand/uL 9 68   HEMOGLOBIN g/dL 11 5   HEMATOCRIT % 35 9   PLATELETS Thousands/uL 308   NEUTROS PCT % 69   LYMPHS PCT % 14   MONOS PCT % 10   EOS PCT % 5     Results from last 7 days   Lab Units 09/27/19  1002   SODIUM mmol/L 137   POTASSIUM mmol/L 3 7   CHLORIDE mmol/L 102   CO2 mmol/L 27   BUN mg/dL 11   CREATININE mg/dL 0 64   ANION GAP mmol/L 8   CALCIUM mg/dL 9 5   GLUCOSE RANDOM mg/dL 119         Results from last 7 days   Lab Units 10/02/19  1706 10/02/19  1202 10/02/19  0611 10/01/19  2049 10/01/19  1636 10/01/19  1138 10/01/19  0530 09/30/19  2038 09/30/19  1654 09/30/19  1151 09/30/19  0601 09/29/19  2037   POC GLUCOSE mg/dl 100 90 94 128 138 95 98 96 128 103 89 116                   * I Have Reviewed All Lab Data Listed Above  * Additional Pertinent Lab Tests Reviewed: All Labs For Current Hospital Admission Reviewed    Imaging:    Imaging Reports Reviewed Today Include:  None today  Imaging Personally Reviewed by Myself Includes:   Old reviewed    Recent Cultures (last 7 days):           Last 24 Hours Medication List:     Current Facility-Administered Medications:  acetaminophen 975 mg Oral Q8H Daphnie Rowe MD   ascorbic acid 500 mg Oral BID Daphnie Rowe MD   docusate sodium 100 mg Oral BID Daphnie Rowe MD   enoxaparin 40 mg Subcutaneous Q24H [address] Daphnie Rowe MD   ferrous sulfate 325 mg Oral Daily With Breakfast Daphnie Rowe MD   folic acid 1 mg Oral Daily Daphnie Rowe MD   gabapentin 300 mg Oral TID Daphnie Rowe MD   losartan 50 mg Oral Daily Daphnie Rowe MD   metFORMIN 500 mg Oral BID With Meals Daphnie Rowe MD   methocarbamol 500 mg Oral BID Daphnie Rowe MD   metoprolol tartrate 12 5 mg Oral Q12H [address] Daphnie Rowe MD   oxyCODONE 5 mg Oral Q4H PRN Daphnie Rowe MD   polyethylene glycol 17 g Oral Daily PRN Daphnie Rowe MD   pravastatin 40 mg Oral Daily With Vargas Chahal MD   senna 1 tablet Oral Daily Daphnie Rowe MD        Today, Patient Was Seen By: Enio May MD    ** Please Note: Dictation voice to text software may have been used in the creation of this document   **

## 2019-10-03 NOTE — ASSESSMENT & PLAN NOTE
Patient had an episode of supraventricular tachycardia with heart rate in the 150s while she was in the acute and patient's hospital   He converted spontaneously however she was placed on metoprolol 12 5 mg daily which we will continue for now    Patient is to have outpatient follow-up with Cardiology after discharge from rehab  No further arrhythmia continue metoprolol at present time

## 2019-10-03 NOTE — PLAN OF CARE
Problem: OCCUPATIONAL THERAPY ADULT  Goal: Performs self-care activities at highest level of function for planned discharge setting  See evaluation for individualized goals  Description  Treatment Interventions: ADL retraining, Functional transfer training, UE strengthening/ROM, Endurance training          See flowsheet documentation for full assessment, interventions and recommendations  Outcome: Progressing  Note:   Limitation: Decreased ADL status, Decreased Safe judgement during ADL, Decreased endurance, Decreased high-level ADLs  Prognosis: Good  Assessment: Pt participates in OT session with focus on LB dressing, activity tolerance, transfers, and standing tolerance to increase I for d/c  Pt MI LB dressing to don/doff pants and socks with LHAE while seated/standing  Pt MI don/doff sneakers with LHAE  Pt engaged in static/dynamic standing with RW for 11 min duration  Pt MI functional mobility with RW into hallway  Pt will continue to benefit from activity tolerance, adls, and transfers       OT Discharge Recommendation: Home with family support

## 2019-10-03 NOTE — OCCUPATIONAL THERAPY NOTE
TIME: 10:10-11:20 (70 min)  VITALS: 110/68  PAIN:  3/10  COGNITION:  WFL  PRECAUTIONS:  THR, pain     EXPIRATION DATE: 10/7/19  TREATMENT DAY:2  DISCHARGE RECOMMENDATION: home with family support       10/03/19 1010   Restrictions/Precautions   Weight Bearing Precautions Per Order Yes   RLE Weight Bearing Per Order WBAT   Other Precautions Pain;THR   Pain Assessment   Pain Assessment 0-10   Pain Score 3   Functional Standing Tolerance   Time 11 min   Activity static/dynamic standing   Comments RW   Cognition   Overall Cognitive Status WFL   Arousal/Participation Alert   Attention Within functional limits   Orientation Level Oriented X4   Memory Within functional limits   Following Commands Follows all commands and directions without difficulty   Activity Tolerance   Activity Tolerance Patient tolerated treatment well   Assessment   Assessment Pt participates in OT session with focus on LB dressing, activity tolerance, transfers, and standing tolerance to increase I for d/c  Pt MI LB dressing to don/doff pants and socks with LHAE while seated/standing  Pt MI don/doff sneakers with LHAE  Pt engaged in static/dynamic standing with RW for 11 min duration  Pt MI functional mobility with RW into hallway  Pt will continue to benefit from activity tolerance, adls, and transfers  Plan   Treatment Interventions ADL retraining;Functional transfer training; Endurance training; Activityengagement   Goal Expiration Date 10/07/19   OT Treatment Day 2   OT Frequency   (5-7x/week)   Recommendation   OT Discharge Recommendation Home with family support     Goals STG achieved within 1 week Performance at Initial Evaluation (9/29) Current Performance (last date completed)   Grooming while standing at sink Marni/I  (10/1) (10/2) [facility] 10/2  MI with good safety and tolerance  10/1  MI with good tolerance and no LOB for oral care and grooming including applying hair products and rollers  (9/30) supervision    ADL transfers  Marni/I  (10/1) (10/2) (10/3) Supervision  10/2, 10/3  MI with RW  10/1  MI within room with good safety and no LOB using RW  Dynamic balance Fair+, unsupported Fair+ for ADL  (9/30) D/S   Bathroom mobility  Marni/I  (10/1) (10/2) Supervision w  Michael Judgejelani 10/2  MI + RW  (10/1)  MI + RW  (9/30) supervision w RW    UB ADL  Marni/I   (10/2) Set-up 10/2  MI  (9/30) Setup/Supervision    LB ADL, AE PRN Marni/I  (10/3) maxA 10/3: MI LB dressing don/doff pants and socks with LHAE  (10/1)  Ed on AE use for socks and shoe management  Elastic laces provided to 2 sneakers therefore practiced 2x     Doff socks MI post 1 visual aide with dressing stick  Don sneakers Supervision + foot funnel  (9/29) maxA socks, underwear    (9/30) Min A - to wash buttocks; AE trial - Supervision with demonstration and minimal cueing    Toileting/clothing management and hygiene Marni/I  (10/2) Adilson Cifuentes 10/2  MI CM and hygiene   Ed to take caution with management of flushing toilet and adhering to THP's  10/1  Min CM post shower (moisture) and Supervision hygiene   (9/29) modA to manage underwear and gown, hygiene following a BM   Increase standing tolerance for inc'd safety with standing purposeful tasks >10 minutes  (10/1) (10/2)  (10/3) ~4 minutes 10/3: 11 min dynamic/static stand with RW  10/2  8-10 minutes tolerated with functional activities  10/1  15 minutes sinkside for grooming/oral care  (9/29) 4 minutes   (9/30) 2-3 minutes with hygiene and clothing mgmt    Participate in therex 1-3x/week for inc'd overall stamina/activity tolerance for purposeful tasks To complete N/A  10/1, 10/2  With functional activities   Tub/shower transfer  Marni/I with DME as needed N/A     Kitchen mobility for inc'd independence and safety negotiating kitchen environment Marni/I  (10/2) N/A 10/2  MI mobility-- ed for safe walker placement recommendations initially as patient never used a walker previously  Good follow through with education    No issues noted       Light meal prep Eva/I N/A 10/2  MI + RW with microwave task  Patient reports light meals and take out/left overs prime  Ed for safe transport with continued trail needs on DME options to allow SW to be made aware if arrangements are needed  Patient does however indicate that she orders from [employer] and since items can be purchased there she may get on her own      Bed mobility with HOB flat  Eva/I modA 10/2  Supine to sit min assist and sit to supine Supervision  Patient still plans to have a hospital bed at home   (9/29) [address] management (cleaning) Eva/I N/A 10/2  Cleaning of beverage post kitchen task with MI demonstrating good safety  Would benefit from continued ed on management of functional tasks with adherence to THP lucas with twisting    (9/30) utilizing reacher from seated positioning picking up items from floor -- adhering to precautions

## 2019-10-03 NOTE — ASSESSMENT & PLAN NOTE
Consult dietitian  Discussed with patient weight reduction program she is discharged to follow date an exercise program

## 2019-10-03 NOTE — ASSESSMENT & PLAN NOTE
Patient's blood pressure is well controlled at this time    Continue low-dose metoprolol and losartan  Blood pressure well controlled

## 2019-10-03 NOTE — PLAN OF CARE
Problem: PHYSICAL THERAPY ADULT  Goal: Performs mobility at highest level of function for planned discharge setting  See evaluation for individualized goals  Description  Treatment/Interventions: (cont as per poC)  Equipment Recommended: Catrina Dire       See flowsheet documentation for full assessment, interventions and recommendations  Outcome: Progressing  Note:   Prognosis: Good  Problem List: Decreased strength, Decreased range of motion, Decreased mobility, Orthopedic restrictions, Pain  Assessment: Issued I sign to pt for use to BR and in the [place] during then day hours from the chair  Pt aware that she may call for A prn pt with increase distance with amb today - karla steps well going down forward  Some R knee discomfort but feels safer than backwards technique  Started with amb with cane today with S  Performed simulated car transfer with education on seat back, reclined and use of plastic bag on seat  Pt performed this with S   Barriers to Discharge: Inaccessible home environment, Decreased caregiver support     Recommendation: Short-term skilled PT     PT - OK to Discharge: Yes    See flowsheet documentation for full assessment

## 2019-10-04 PROCEDURE — 97110 THERAPEUTIC EXERCISES: CPT

## 2019-10-04 PROCEDURE — 97535 SELF CARE MNGMENT TRAINING: CPT

## 2019-10-04 PROCEDURE — 97530 THERAPEUTIC ACTIVITIES: CPT

## 2019-10-04 PROCEDURE — 97116 GAIT TRAINING THERAPY: CPT

## 2019-10-04 RX ADMIN — PRAVASTATIN SODIUM 40 MG: 40 TABLET ORAL at 17:17

## 2019-10-04 RX ADMIN — OXYCODONE HYDROCHLORIDE AND ACETAMINOPHEN 500 MG: 500 TABLET ORAL at 10:50

## 2019-10-04 RX ADMIN — GABAPENTIN 300 MG: 300 CAPSULE ORAL at 10:50

## 2019-10-04 RX ADMIN — FERROUS SULFATE TAB 325 MG (65 MG ELEMENTAL FE) 325 MG: 325 (65 FE) TAB at 10:50

## 2019-10-04 RX ADMIN — OXYCODONE HYDROCHLORIDE 5 MG: 5 TABLET ORAL at 00:09

## 2019-10-04 RX ADMIN — METOPROLOL TARTRATE 12.5 MG: 25 TABLET ORAL at 21:43

## 2019-10-04 RX ADMIN — OXYCODONE HYDROCHLORIDE 5 MG: 5 TABLET ORAL at 23:42

## 2019-10-04 RX ADMIN — OXYCODONE HYDROCHLORIDE AND ACETAMINOPHEN 500 MG: 500 TABLET ORAL at 17:17

## 2019-10-04 RX ADMIN — FOLIC ACID 1 MG: 1 TABLET ORAL at 10:51

## 2019-10-04 RX ADMIN — DOCUSATE SODIUM 100 MG: 100 CAPSULE, LIQUID FILLED ORAL at 17:17

## 2019-10-04 RX ADMIN — ACETAMINOPHEN 975 MG: 325 TABLET ORAL at 05:12

## 2019-10-04 RX ADMIN — METFORMIN HYDROCHLORIDE 500 MG: 500 TABLET ORAL at 10:50

## 2019-10-04 RX ADMIN — METOPROLOL TARTRATE 12.5 MG: 25 TABLET ORAL at 10:51

## 2019-10-04 RX ADMIN — GABAPENTIN 300 MG: 300 CAPSULE ORAL at 17:17

## 2019-10-04 RX ADMIN — OXYCODONE HYDROCHLORIDE 5 MG: 5 TABLET ORAL at 15:27

## 2019-10-04 RX ADMIN — SENNOSIDES 8.6 MG: 8.6 TABLET, FILM COATED ORAL at 10:51

## 2019-10-04 RX ADMIN — METHOCARBAMOL 500 MG: 500 TABLET, FILM COATED ORAL at 17:17

## 2019-10-04 RX ADMIN — DOCUSATE SODIUM 100 MG: 100 CAPSULE, LIQUID FILLED ORAL at 10:51

## 2019-10-04 RX ADMIN — ENOXAPARIN SODIUM 40 MG: 40 INJECTION SUBCUTANEOUS at 10:51

## 2019-10-04 RX ADMIN — METHOCARBAMOL 500 MG: 500 TABLET, FILM COATED ORAL at 10:50

## 2019-10-04 RX ADMIN — ACETAMINOPHEN 975 MG: 325 TABLET ORAL at 21:42

## 2019-10-04 RX ADMIN — OXYCODONE HYDROCHLORIDE 5 MG: 5 TABLET ORAL at 07:43

## 2019-10-04 RX ADMIN — ACETAMINOPHEN 975 MG: 325 TABLET ORAL at 15:27

## 2019-10-04 RX ADMIN — LOSARTAN POTASSIUM 50 MG: 50 TABLET ORAL at 10:51

## 2019-10-04 RX ADMIN — METFORMIN HYDROCHLORIDE 500 MG: 500 TABLET ORAL at 17:17

## 2019-10-04 RX ADMIN — GABAPENTIN 300 MG: 300 CAPSULE ORAL at 21:43

## 2019-10-04 NOTE — PLAN OF CARE
Problem: PHYSICAL THERAPY ADULT  Goal: Performs mobility at highest level of function for planned discharge setting  See evaluation for individualized goals  Description  Treatment/Interventions: (cont as per poC)  Equipment Recommended: Celso Gonzalez       See flowsheet documentation for full assessment, interventions and recommendations  Outcome: Progressing  Note:   Prognosis: Good  Problem List: Decreased strength, Decreased endurance, Orthopedic restrictions, Pain  Assessment: Pt is doing well - following precautions for R hip- CP to R hip and knee after session  Pt did down steps 1/2 way forward then backwards - reports backwards to be better for R knee but still feels slow and uncomfortable with backwards   Barriers to Discharge: Inaccessible home environment, Decreased caregiver support     Recommendation: Short-term skilled PT     PT - OK to Discharge: Yes    See flowsheet documentation for full assessment

## 2019-10-04 NOTE — NURSING NOTE
Late note Went to Dr Markie Rodriguez office today see note  Had patient self inject Lovenox with good technique for home lovenox therapy on discharge

## 2019-10-04 NOTE — OCCUPATIONAL THERAPY NOTE
OccupationalTherapy Treatment Note     Patient Name: Med Mathews  (Preferred Name: Amanda Banda)  ZHOQA'V Date: 10/4/2019  Problem List  Principal Problem:    Aftercare following right hip joint replacement surgery  Active Problems:    Benign essential hypertension    Diabetes mellitus type 2, controlled ([address] )    Left knee pain    Obesity (BMI 30-39  9)    Supraventricular tachycardia (HCC)       TIME: 6820-2323 (60 mins)  VITALS: None  PAIN: None reported  Had ice packs from PT on her hip on arrival and pt requesting ice packs put back on R hip at end of session  COGNITION: WFL  Cues to redirect  Pleasant and cooperative throughout  PRECAUTIONS:      EXPIRATION DATE:  10/7/19  TREATMENT DAY: 3  FREQUENCY: 5-7x/week  DISCHARGE RECOMMENDATION: Home with family support  ADDITIONAL COMMENTS: RN cleared pt for OT tx  Remains seated in bedside chair w/ all needs at end of session  ASSESSMENT: Patient participated in Skilled OT session this date with interventions consisting of ADL re training with the use of correct body mechnaics, Energy Conservation techniques, safety awareness and fall prevention techniques, maintaining Total Hip precautions, maintaining weight bearing restrictions, therapeutic exercise to: increase functional use of BUEs, increase BUE muscle strength ,  therapeutic activities to: increase activity tolerance, increase standing tolerance time with unilateral UE support to complete sink level ADLs, increase cardiovascular endurance , increase dynamic sit/ stand balance during functional activity , increase postural control and increase trunk control   Patient agreeable to OT treatment session, upon arrival patient was found seated OOB to Chair  In comparison to previous session, patient with improvements in activity tolerance, strengthening, improved independence with ADLs, functional transfers and safety/judgment w/ total hip precautions   Patient requiring frequent rest periods and ocassional safety reminders  Pt participated in various therapeutic exercises and functional activities with emphasis on BUE strengthening, and BUE use during functional tasks in static/dynamic standing balance for 12 minutes  Pt also completed active-assisted core strengthening and whole body use exercises to improve independence with LB dressing tasks (w/ LHAE) and BUE use  strength and manipulation  Patient continues to be functioning below baseline level, occupational performance remains limited secondary to factors listed above and increased risk for falls and injury  From OT standpoint, recommendation at time of d/c would be Home with family support  Patient to benefit from continued Occupational Therapy treatment while in the hospital to address deficits as defined above and maximize level of functional independence with ADLs and functional mobility  GOALS:    Goals STG achieved within 1 week Performance at Initial Evaluation (9/29) Current Performance (last date completed)   Grooming while standing at sink Marni/I  (10/1) (10/2) Supervision  10/4  MET w/ good safety + tolerance   ADL transfers  Marni/I  (10/1) (10/2) (10/3) Supervision  10/2, 10/3, 10/4  MI with RW   Bathroom mobility  Marni/I  (10/1) (10/2) Supervision w  Tammy Doom 10/2, 10/4  MI + RW  (10/1)  MI + RW   UB ADL  Marni/I   (10/2) Set-up 10/2  MI  (9/30) Setup/Supervision    LB ADL, AE PRN Marni/I  (10/3) maxA 10/4: MI don/doff shoes w/ LHAE  10/3: MI LB dressing don/doff pants and socks with LHAE  (10/1)  Ed on AE use for socks and shoe management    Elastic laces provided to 2 sneakers therefore practiced 2x     Doff socks MI post 1 visual aide with dressing stick  Don sneakers Supervision + foot funnel   Toileting/clothing management and hygiene Marni/I  (10/2) [facility] 10/2, 10/4  MI CM and hygiene   Ed to take caution with management of flushing toilet and adhering to THP's  10/1  Min CM post shower (moisture) and Supervision hygiene    Increase standing tolerance for inc'd safety with standing purposeful tasks >10 minutes  (10/1) (10/2)  (10/3) ~4 minutes 10/4: 12 mins w/ dynamic unsupported stand w/ RW, intermittent use of hallway handrails  10/3: 11 min dynamic/static stand with RW  10/2  8-10 minutes tolerated with functional activities  10/1  15 minutes sinkside for grooming/oral care  (9/29) 4 minutes   (9/30) 2-3 minutes with hygiene and clothing mgmt    Participate in therex 1-3x/week for inc'd overall stamina/activity tolerance for purposeful tasks To complete N/A  10/1, 10/2, 10/4  With functional activities   Tub/shower transfer  Marni/I with DME as needed N/A 10/4   Plans to spongebathe at home for first few weeks as bathroom is on 2nd floor  Kitchen mobility for inc'd independence and safety negotiating kitchen environment Marni/I  (10/2) N/A 10/2  MI mobility-- ed for safe walker placement recommendations initially as patient never used a walker previously  [address] follow through with education   No issues noted       Light meal prep Marni/I N/A 10/2  MI + RW with microwave task  Juan Freeman reports light meals and take out/left overs prime   Ed for safe transport with continued trail needs on DME options to allow SW to be made aware if arrangements are needed  Juan Freeman does however indicate that she orders from [employer] and since items can be purchased there she may get on her own      Bed mobility with HOB flat  Marni/I modA 10/2  Supine to sit min assist and sit to supine Supervision  Patient still plans to have a hospital bed at home   (9/29) [address] [employer] [employer] (cleaning) Marni/I N/A 10/2  Cleaning of beverage post kitchen task with MI demonstrating good safety   Would benefit from continued ed on management of functional tasks with adherence to THP lucas with twisting    (9/30) utilizing reacher from seated positioning picking up items from floor -- adhering to precautions                Eri Fam, MS, OTR/L

## 2019-10-04 NOTE — SOCIAL WORK
BUSHRA met with patient to discuss discharge plan  Patient to discharge home on Monday  BUSHRA discussed [address], copy signed and placed in scan bin  Patient will need RW, walker tray, BSC and hospital bed  BUSHRA faxed order to [facility]  Patient is requesting home VNA vs outpatient VNA  Patient prefers to use [facility]  BUSHRA sent referral and notified PTA of patient's preference

## 2019-10-04 NOTE — PHYSICAL THERAPY NOTE
52' session     10/04/19 0959   Pain Assessment   Pain Assessment 0-10   Pain Score 4   Pain Type Surgical pain   Pain Location Hip   Pain Orientation Right   Pain Descriptors Aching   Pain Frequency Intermittent   Pain Onset Ongoing   Clinical Progression Gradually improving   Patient's Stated Pain Goal No pain   Hospital Pain Intervention(s) Medication (See MAR); Cold applied;Repositioned; Ambulation/increased activity   Response to Interventions no increase pain with session   Precautions   Total Hip Replacement ADduction; Internal rotation; Flexion   Restrictions/Precautions   RLE Weight Bearing Per Order WBAT   Other Precautions THR;Pain   General   Chart Reviewed Yes   Family/Caregiver Present No   Cognition   Overall Cognitive Status WFL   Arousal/Participation Alert; Cooperative   Attention Within functional limits   Orientation Level Oriented X4   Memory Within functional limits   Following Commands Follows all commands and directions without difficulty   Subjective   Subjective MD appt went well- I was really tired when I got back   Transfers   Sit to Stand 6  Modified independent   Stand to Sit 6  Modified independent   Stand pivot 6  Modified independent   Ambulation/Elevation   Gait pattern Decreased foot clearance;Decreased R stance   Gait Assistance 6  Modified independent   Assistive Device Rolling walker   Distance 435',170'   Stair Management Assistance 6  Modified independent   Stair Management Technique One rail L;With cane; Step to pattern  (1/2 way down forward then backwards)   Number of Stairs 12   Curbs 1 + 1 , 4" curb steps with RW and mod I   Balance   Static Standing Good   Dynamic Standing Fair +   Endurance Deficit   Endurance Deficit No   Activity Tolerance   Activity Tolerance Patient tolerated treatment well   Exercises   Hip Abduction Standing;Bilateral;AROM  (in bars x 30 - 2# on L)   Hip Extension Standing;Bilateral;AROM; Right  (in bars x 30)   Knee PROM Flexion Standing;Right;AROM  (in bars x 30)   Knee AROM Long Arc Quad   (2# l active R x 30)   Ankle Pumps   (standing heel raises in bars x 30)   Marching Standing;Bilateral;AROM  (30 reps in bars -2 # on L)   Assessment   Prognosis Good   Problem List Decreased strength;Decreased endurance;Orthopedic restrictions;Pain   Assessment Pt is doing well - following precautions for R hip- CP to R hip and knee after session  Pt did down steps 1/2 way forward then backwards - reports backwards to be better for R knee but still feels slow and uncomfortable with backwards      Barriers to Discharge Inaccessible home environment;Decreased caregiver support   Goals   Patient Goals stairs again and to get R leg stronger   STG Expiration Date 10/18/19   LTG Expiration Date 10/18/19   PT Treatment Day 7   Plan   Treatment/Interventions   (cont as per POC)   Progress Improving as expected   PT Frequency   (6x/wk BID)

## 2019-10-04 NOTE — PLAN OF CARE
Problem: PHYSICAL THERAPY ADULT  Goal: Performs mobility at highest level of function for planned discharge setting  See evaluation for individualized goals  Description  Treatment/Interventions: (cont as per poC)  Equipment Recommended: Darby Abdi       See flowsheet documentation for full assessment, interventions and recommendations  10/4/2019 1447 by Marleni Pierce PTA  Outcome: Progressing  Note:   Prognosis: Good  Problem List: Decreased strength, Decreased endurance, Orthopedic restrictions, Pain  Assessment: Pt amb with cane this pm with increase stiffness and need to hold furniture for first 10-15'- then able to increase stride with decrease support and close S   CP to R hip and knee after session  Did not need to boom leglifter to get into bed this pm- performed ASLR  Rec OP PT on dc to home  Barriers to Discharge: Inaccessible home environment, Decreased caregiver support     Recommendation: Short-term skilled PT     PT - OK to Discharge: Yes    See flowsheet documentation for full assessment  10/4/2019 1010 by Marleni Pierce PTA  Outcome: Progressing  Note:   Prognosis: Good  Problem List: Decreased strength, Decreased endurance, Orthopedic restrictions, Pain  Assessment: Pt is doing well - following precautions for R hip- CP to R hip and knee after session  Pt did down steps 1/2 way forward then backwards - reports backwards to be better for R knee but still feels slow and uncomfortable with backwards   Barriers to Discharge: Inaccessible home environment, Decreased caregiver support     Recommendation: Short-term skilled PT     PT - OK to Discharge: Yes    See flowsheet documentation for full assessment

## 2019-10-04 NOTE — PHYSICAL THERAPY NOTE
40' session     10/04/19 4775   Pain Assessment   Pain Assessment 0-10   Pain Score 4   Pain Type Surgical pain   Pain Location Hip   Pain Orientation Right   Pain Descriptors Aching  (stiff)   Pain Frequency Intermittent   Pain Onset Ongoing   Clinical Progression Not changed   Patient's Stated Pain Goal No pain   Hospital Pain Intervention(s) Medication (See MAR); Repositioned;Cold applied; Ambulation/increased activity   Response to Interventions less stiffness after activity   Restrictions/Precautions   RLE Weight Bearing Per Order WBAT   Other Precautions THR;Pain   General   Chart Reviewed Yes   Family/Caregiver Present No   Cognition   Overall Cognitive Status WFL   Arousal/Participation Alert; Cooperative   Attention Within functional limits   Following Commands Follows all commands and directions without difficulty   Subjective   Subjective it gets stiff from sitting   Bed Mobility   Sit to Supine 6  Modified independent   Additional items   (no leglifter used today)   Transfers   Sit to Stand 6  Modified independent   Stand to Sit 6  Modified independent   Stand pivot 6  Modified independent   Ambulation/Elevation   Gait pattern Decreased foot clearance;Decreased R stance; Short stride; Improper Weight shift   Gait Assistance   (CG to start then close S )   Assistive Device [facility]   Distance 120'   Balance   Dynamic Standing Fair +   Ambulatory Fair +   Endurance Deficit   Endurance Deficit No   Activity Tolerance   Activity Tolerance Patient tolerated treatment well   Exercises   Quad Sets 20 reps   Heelslides Bilateral;AROM  (30 reps)   Glute Sets 20 reps   Hip Flexion Bilateral;AROM  (SLR x 30)   Hip Abduction Bilateral;AROM  (30 reps)   Knee AROM Short Arc Quad Bilateral;AROM  (30 reps)   Ankle Pumps Bilateral;AROM;20 reps   Assessment   Prognosis Good   Assessment Pt amb with cane this pm with increase stiffness and need to hold furniture for first 10-15'- then able to increase stride with decrease support and close S   CP to R hip and knee after session  Did not need to boom leglifter to get into bed this pm- performed ASLR   Rec OP PT on dc to home   Goals   Patient Goals get stronger   STG Expiration Date 10/18/19   LTG Expiration Date 10/18/19   PT Treatment Day 7   Plan   Treatment/Interventions   (cont asper pOC)   Progress Improving as expected   PT Frequency   (6x/wk BID)

## 2019-10-05 PROCEDURE — 97530 THERAPEUTIC ACTIVITIES: CPT

## 2019-10-05 PROCEDURE — 97110 THERAPEUTIC EXERCISES: CPT

## 2019-10-05 PROCEDURE — 97535 SELF CARE MNGMENT TRAINING: CPT

## 2019-10-05 PROCEDURE — 97116 GAIT TRAINING THERAPY: CPT

## 2019-10-05 RX ADMIN — METOPROLOL TARTRATE 12.5 MG: 25 TABLET ORAL at 21:55

## 2019-10-05 RX ADMIN — DOCUSATE SODIUM 100 MG: 100 CAPSULE, LIQUID FILLED ORAL at 17:38

## 2019-10-05 RX ADMIN — METFORMIN HYDROCHLORIDE 500 MG: 500 TABLET ORAL at 16:40

## 2019-10-05 RX ADMIN — ENOXAPARIN SODIUM 40 MG: 40 INJECTION SUBCUTANEOUS at 09:54

## 2019-10-05 RX ADMIN — METHOCARBAMOL 500 MG: 500 TABLET, FILM COATED ORAL at 09:54

## 2019-10-05 RX ADMIN — PRAVASTATIN SODIUM 40 MG: 40 TABLET ORAL at 16:41

## 2019-10-05 RX ADMIN — METHOCARBAMOL 500 MG: 500 TABLET, FILM COATED ORAL at 17:37

## 2019-10-05 RX ADMIN — ACETAMINOPHEN 975 MG: 325 TABLET ORAL at 21:55

## 2019-10-05 RX ADMIN — OXYCODONE HYDROCHLORIDE 5 MG: 5 TABLET ORAL at 16:34

## 2019-10-05 RX ADMIN — GABAPENTIN 300 MG: 300 CAPSULE ORAL at 16:41

## 2019-10-05 RX ADMIN — SENNOSIDES 8.6 MG: 8.6 TABLET, FILM COATED ORAL at 09:54

## 2019-10-05 RX ADMIN — GABAPENTIN 300 MG: 300 CAPSULE ORAL at 21:56

## 2019-10-05 RX ADMIN — FERROUS SULFATE TAB 325 MG (65 MG ELEMENTAL FE) 325 MG: 325 (65 FE) TAB at 09:54

## 2019-10-05 RX ADMIN — GABAPENTIN 300 MG: 300 CAPSULE ORAL at 09:54

## 2019-10-05 RX ADMIN — METFORMIN HYDROCHLORIDE 500 MG: 500 TABLET ORAL at 09:54

## 2019-10-05 RX ADMIN — OXYCODONE HYDROCHLORIDE AND ACETAMINOPHEN 500 MG: 500 TABLET ORAL at 09:55

## 2019-10-05 RX ADMIN — FOLIC ACID 1 MG: 1 TABLET ORAL at 09:55

## 2019-10-05 RX ADMIN — OXYCODONE HYDROCHLORIDE AND ACETAMINOPHEN 500 MG: 500 TABLET ORAL at 17:38

## 2019-10-05 RX ADMIN — LOSARTAN POTASSIUM 50 MG: 50 TABLET ORAL at 09:55

## 2019-10-05 RX ADMIN — METOPROLOL TARTRATE 12.5 MG: 25 TABLET ORAL at 09:55

## 2019-10-05 RX ADMIN — OXYCODONE HYDROCHLORIDE 5 MG: 5 TABLET ORAL at 09:54

## 2019-10-05 RX ADMIN — OXYCODONE HYDROCHLORIDE 5 MG: 5 TABLET ORAL at 23:12

## 2019-10-05 RX ADMIN — DOCUSATE SODIUM 100 MG: 100 CAPSULE, LIQUID FILLED ORAL at 09:54

## 2019-10-05 RX ADMIN — ACETAMINOPHEN 975 MG: 325 TABLET ORAL at 06:24

## 2019-10-05 NOTE — PLAN OF CARE
Problem: PHYSICAL THERAPY ADULT  Goal: Performs mobility at highest level of function for planned discharge setting  See evaluation for individualized goals  Description  Treatment/Interventions: (cont as per poC)  Equipment Recommended: Deny Mohan       See flowsheet documentation for full assessment, interventions and recommendations  Outcome: Adequate for Discharge  Note:   Prognosis: Good  Problem List: Decreased strength, Pain, Orthopedic restrictions  Assessment: Pt cont to do well with all mobility   This therapist stayed at bottom of steps while pt performed FF with confidence  Pt reports a good understanding of all ex as per HEP  Gt improving with use of cane but at this time, still rec use of RW for home  Pt plans on home PT at first but encouraged to progress to OP PT  Barriers to Discharge: Inaccessible home environment, Decreased caregiver support     Recommendation: Short-term skilled PT     PT - OK to Discharge: Yes    See flowsheet documentation for full assessment

## 2019-10-05 NOTE — PHYSICAL THERAPY NOTE
59' session ( split am and pm)     10/05/19 1324   Pain Assessment   Pain Assessment 0-10   Pain Score 5   Pain Type Surgical pain   Pain Location Hip   Pain Orientation Right   Pain Descriptors Aching   Pain Frequency Intermittent   Pain Onset Ongoing   Patient's Stated Pain Goal No pain   Hospital Pain Intervention(s) Medication (See MAR); Cold applied; Ambulation/increased activity   Response to Interventions no increase pain ewwith activity   Precautions   Total Hip Replacement ADduction; Internal rotation; Flexion   Restrictions/Precautions   RLE Weight Bearing Per Order WBAT   General   Chart Reviewed Yes   Family/Caregiver Present No   Cognition   Overall Cognitive Status WFL   Arousal/Participation Alert; Cooperative   Orientation Level Oriented X4   Following Commands Follows all commands and directions without difficulty   Subjective   Subjective no c/o - cont to be very talkative   Bed Mobility   Supine to Sit 6  Modified independent   Sit to Supine 6  Modified independent   Additional Comments HOB elevated and use of rail for sup to sit, bed flat no rail and no leglifter for sit to sup   Transfers   Sit to Stand 6  Modified independent   Stand to Sit 6  Modified independent   Stand pivot 6  Modified independent   Additional items   (wiht RW and cane)   Ambulation/Elevation   Gait pattern Decreased foot clearance;Decreased R stance   Gait Assistance   (mod I with  RW , S with cane)   Assistive Device Rolling walker;SPC   Distance   (RW for 170' x 2, cane for 120', 22' x 2)   Stair Management Assistance 6  Modified independent   Stair Management Technique One rail L;With cane; Step to pattern  (6 down forward then 6 backwards ( pt request to practice))   Number of Stairs 12   Curbs 1 + 1 , 4" steps  with RW and mod I   Balance   Dynamic Standing Fair +   Ambulatory Fair +   Endurance Deficit   Endurance Deficit No   Activity Tolerance   Activity Tolerance Patient tolerated treatment well   Exercises Heelslides Bilateral;AROM;20 reps   Hip Flexion Bilateral;AROM;20 reps   Hip Abduction Bilateral;AROM;20 reps   Hip Extension Standing;Bilateral;AROM;20 reps   Knee AROM Short Arc Quad Bilateral;AROM;20 reps   Knee PROM Flexion Right;AROM;20 reps;Standing   Ankle Pumps Bilateral;AROM;20 reps;Standing   Assessment   Prognosis Good   Problem List Decreased strength;Pain;Orthopedic restrictions   Assessment Pt cont to do well with all mobility   This therapist stayed at bottom of steps while pt performed FF with confidence  Pt reports a good understanding of all ex as per HEP  Gt improving with use of cane but at this time, still rec use of RW for home   Pt plans on home PT at first but encouraged to progress to OP PT   Barriers to Discharge Inaccessible home environment;Decreased caregiver support   Goals   Patient Goals less stiffness   STG Expiration Date 10/18/19   LTG Expiration Date 10/18/19   PT Treatment Day 8   Plan   Treatment/Interventions   (cont as per POC)   Progress Improving as expected   PT Frequency   (6x/wk BID)   Recommendation   Equipment Recommended   (getting hospital bed , RW and commode)

## 2019-10-05 NOTE — OCCUPATIONAL THERAPY NOTE
TIME: 10:56 - 11:40 (44 mins)   PAIN: Denies  Had ice packs on her hip on arrival and put back on R hip at end of session  COGNITION: WFL  PRECAUTIONS: THP's     EXPIRATION DATE:  10/7/19  TREATMENT DAY: 4  FREQUENCY: 5-7x/week  DISCHARGE RECOMMENDATION: Home with family support  ADDITIONAL COMMENTS: Remains seated in bedside chair w/ all needs at end of session      ASSESSMENT:  Pt doing well and meeting most goals  Difficulties w/ back of shoes folding down and struggles w/ shoe horn  Re- educated on foot funnel and pt did well  OT provided reacher and foot funnel for home  Pt reports h/o subluxation of shoulders and therefore would like further strengthening  Educated pt on isometrics w/ towel roll to increase strength even at home  Recommend f/u for consistency prior to discharge  Pt reports issue w/ hospital bed delivery today  Instructed pt to have brother call and attempt different time and/or date for delivery prior to Monday  Anticipated discharge home on Monday and pt on tract  Remains in recliner w/ all needs at end of session       GOALS:     Goals STG achieved within 1 week Performance at Initial Evaluation (9/29) Current Performance (last date completed)   Grooming while standing at sink Marni/I  (10/1) (10/2) Supervision  10/5  MET w/ good safety + tolerance   ADL transfers  Marni/I  (10/1) (10/2) (10/3) Supervision  10/2, 10/3, 10/4, 10/5  MI with RW   Bathroom mobility  Marni/I  (10/1) (10/2) Supervision w  Edgar Neighbours 10/1, 10/2, 10/4, 10/5  MI + RW     UB ADL  Marni/I   (10/2) Set-up 10/2  MI  (9/30) Setup/Supervision    LB ADL, AE PRN Marni/I  (10/3), (10/4), (10/5) maxA 10/5: MI don/doff shoes w/ LHAE; Re- educated on foot funnel due to back of shoes being pushed down    10/3: MI LB dressing don/doff pants and socks with LHAE    (10/1)  Ed on AE use for socks and shoe management    Elastic laces provided to 2 sneakers therefore practiced 2x     Doff socks MI post 1 visual aide with dressing stick  Krista Medina tiagokers Supervision + foot funnel   Toileting/clothing management and hygiene Marni/I  (10/2) Darylene Angiegaviota 10/2, 10/4, 10/5  MI CM and hygiene   Ed to take caution with management of flushing toilet and adhering to THP's  10/1  Min CM post shower (moisture) and Supervision hygiene    Increase standing tolerance for inc'd safety with standing purposeful tasks >10 minutes  (10/1) (10/2)  (10/3) ~4 minutes 10/4: 12 mins w/ dynamic unsupported stand w/ RW, intermittent use of hallway handrails  10/3: 11 min dynamic/static stand with RW  10/2  8-10 minutes tolerated with functional activities  10/1  15 minutes sinkside for grooming/oral care  (9/29) 4 minutes   (9/30) 2-3 minutes with hygiene and clothing mgmt    Participate in therex 1-3x/week for inc'd overall stamina/activity tolerance for purposeful tasks To complete N/A  10/1, 10/2, 10/4  With functional activities    10/5 Elbow flexion w/ 2# wt x 30 reps; Educated on shld isometrics w/ towel roll x 10 reps each   Tub/shower transfer  Marni/I with DME as needed N/A 10/4   Plans to spongebathe at home for first few weeks as bathroom is on 2nd floor  Kitchen mobility for inc'd independence and safety negotiating kitchen environment Marni/I  (10/2) N/A 10/2  MI mobility-- ed for safe walker placement recommendations initially as patient never used a walker previously  Alonso Ledbetter 148 follow through with education   No issues noted       Light meal prep Marni/I N/A 10/2  MI + RW with microwave task   Patient reports light meals and take out/left overs prime   Ed for safe transport with continued trail needs on DME options to allow SW to be made aware if arrangements are needed  Denise Rogers does however indicate that she orders from [place] and since items can be purchased there she may get on her own      Bed mobility with HOB flat  Marni/I modA   10/5 MI in and out using SR and HOB elevated   Pt getting hospital bed for home    10/2  Supine to sit min assist and sit to supine Supervision  Patient still plans to have a hospital bed at home   (9/29) Petra Monday, [employer] (cleaning) Eva/I N/A 10/2  Cleaning of beverage post kitchen task with MI demonstrating good safety   Would benefit from continued ed on management of functional tasks with adherence to THP lucas with twisting    (9/30) utilizing reacher from seated positioning picking up items from floor -- adhering to precautions        Cristal Briceño MS, OTR/L

## 2019-10-05 NOTE — PLAN OF CARE
Problem: OCCUPATIONAL THERAPY ADULT  Goal: Performs self-care activities at highest level of function for planned discharge setting  See evaluation for individualized goals  Description  Treatment Interventions: ADL retraining, Functional transfer training, UE strengthening/ROM, Endurance training          See flowsheet documentation for full assessment, interventions and recommendations  Outcome: Progressing  Note:   Limitation: Decreased ADL status, Decreased Safe judgement during ADL, Decreased endurance, Decreased high-level ADLs  Prognosis: Good    Pt doing well and meeting most goals  Difficulties w/ back of shoes folding down and struggles w/ shoe horn  Re- educated on foot funnel and pt did well  OT provided reacher and foot funnel for home  Pt reports h/o subluxation of shoulders and therefore would like further strengthening  Educated pt on isometrics w/ towel roll to increase strength even at home  Recommend f/u for consistency prior to discharge  Pt reports issue w/ hospital bed delivery today  Instructed pt to have brother call and attempt different time and/or date for delivery prior to Monday  Anticipated discharge home on Monday and pt on tract  Remains in recliner w/ all needs at end of session        OT Discharge Recommendation: Home with family support

## 2019-10-06 PROCEDURE — 99316 NF DSCHRG MGMT 30 MIN+: CPT | Performed by: FAMILY MEDICINE

## 2019-10-06 RX ORDER — SENNOSIDES 8.6 MG
1 TABLET ORAL 2 TIMES DAILY
Qty: 120 EACH | Refills: 0 | Status: SHIPPED | OUTPATIENT
Start: 2019-10-06 | End: 2022-03-02 | Stop reason: ALTCHOICE

## 2019-10-06 RX ORDER — LOSARTAN POTASSIUM 50 MG/1
50 TABLET ORAL DAILY
Qty: 30 TABLET | Refills: 0 | Status: SHIPPED | OUTPATIENT
Start: 2019-10-07 | End: 2019-10-15 | Stop reason: SDUPTHER

## 2019-10-06 RX ORDER — METHOCARBAMOL 500 MG/1
500 TABLET, FILM COATED ORAL 2 TIMES DAILY
Qty: 30 TABLET | Refills: 0 | Status: SHIPPED | OUTPATIENT
Start: 2019-10-06 | End: 2019-11-18 | Stop reason: SDUPTHER

## 2019-10-06 RX ORDER — OXYCODONE HYDROCHLORIDE 5 MG/1
5 TABLET ORAL EVERY 6 HOURS PRN
Qty: 30 TABLET | Refills: 0 | Status: SHIPPED | OUTPATIENT
Start: 2019-10-06 | End: 2019-10-16

## 2019-10-06 RX ADMIN — METFORMIN HYDROCHLORIDE 500 MG: 500 TABLET ORAL at 08:34

## 2019-10-06 RX ADMIN — GABAPENTIN 300 MG: 300 CAPSULE ORAL at 17:13

## 2019-10-06 RX ADMIN — OXYCODONE HYDROCHLORIDE 5 MG: 5 TABLET ORAL at 11:24

## 2019-10-06 RX ADMIN — ACETAMINOPHEN 975 MG: 325 TABLET ORAL at 14:57

## 2019-10-06 RX ADMIN — OXYCODONE HYDROCHLORIDE AND ACETAMINOPHEN 500 MG: 500 TABLET ORAL at 08:33

## 2019-10-06 RX ADMIN — ACETAMINOPHEN 975 MG: 325 TABLET ORAL at 21:21

## 2019-10-06 RX ADMIN — SENNOSIDES 8.6 MG: 8.6 TABLET, FILM COATED ORAL at 08:34

## 2019-10-06 RX ADMIN — FERROUS SULFATE TAB 325 MG (65 MG ELEMENTAL FE) 325 MG: 325 (65 FE) TAB at 08:33

## 2019-10-06 RX ADMIN — METHOCARBAMOL 500 MG: 500 TABLET, FILM COATED ORAL at 08:34

## 2019-10-06 RX ADMIN — ACETAMINOPHEN 975 MG: 325 TABLET ORAL at 06:20

## 2019-10-06 RX ADMIN — LOSARTAN POTASSIUM 50 MG: 50 TABLET ORAL at 08:34

## 2019-10-06 RX ADMIN — METFORMIN HYDROCHLORIDE 500 MG: 500 TABLET ORAL at 17:13

## 2019-10-06 RX ADMIN — GABAPENTIN 300 MG: 300 CAPSULE ORAL at 08:33

## 2019-10-06 RX ADMIN — FOLIC ACID 1 MG: 1 TABLET ORAL at 08:34

## 2019-10-06 RX ADMIN — DOCUSATE SODIUM 100 MG: 100 CAPSULE, LIQUID FILLED ORAL at 17:13

## 2019-10-06 RX ADMIN — OXYCODONE HYDROCHLORIDE 5 MG: 5 TABLET ORAL at 20:44

## 2019-10-06 RX ADMIN — METOPROLOL TARTRATE 12.5 MG: 25 TABLET ORAL at 20:43

## 2019-10-06 RX ADMIN — METHOCARBAMOL 500 MG: 500 TABLET, FILM COATED ORAL at 17:13

## 2019-10-06 RX ADMIN — PRAVASTATIN SODIUM 40 MG: 40 TABLET ORAL at 17:13

## 2019-10-06 RX ADMIN — DOCUSATE SODIUM 100 MG: 100 CAPSULE, LIQUID FILLED ORAL at 08:33

## 2019-10-06 RX ADMIN — OXYCODONE HYDROCHLORIDE AND ACETAMINOPHEN 500 MG: 500 TABLET ORAL at 17:13

## 2019-10-06 RX ADMIN — METOPROLOL TARTRATE 12.5 MG: 25 TABLET ORAL at 08:33

## 2019-10-06 RX ADMIN — ENOXAPARIN SODIUM 40 MG: 40 INJECTION SUBCUTANEOUS at 08:34

## 2019-10-06 RX ADMIN — GABAPENTIN 300 MG: 300 CAPSULE ORAL at 20:45

## 2019-10-06 NOTE — ASSESSMENT & PLAN NOTE
Patient had an episode of supraventricular tachycardia with heart rate in the 150s while she was in the [facility]   sHe converted spontaneously however she was placed on metoprolol 12 5 mg daily which we will continue for now    Patient is to have outpatient follow-up with Cardiology after discharge from rehab  No further arrhythmia continue metoprolol at present time

## 2019-10-06 NOTE — ASSESSMENT & PLAN NOTE
Patient had right hip replacement surgery done which was uneventful with no complications reported  She is here for course of rehab requiring physical therapy and occupational therapy which she is pursuing well  Continue Oxycodone for pain control along with Tylenol as well  Also on Robaxin for muscle relaxation which have decreased to every 12 hours    Also on Lovenox 40 mg daily for 28 days post surgery  Progressing well  Continue home VNA and PT services upon discharge

## 2019-10-06 NOTE — NURSING NOTE
Had patient self inject Lovenox with good technique  Independent ambulating on unit with walker  Call bell within reach

## 2019-10-06 NOTE — DISCHARGE SUMMARY
Discharge- Beryle Rather 1950, 68 y o  female MRN: [id number]    Unit/Bed#: -02 Encounter: [id number]    Primary Care Provider: Chilo Dickey MD   Date and time admitted to hospital: 9/27/2019  2:31 PM        * Aftercare following right hip joint replacement surgery  Assessment & Plan  Patient had right hip replacement surgery done which was uneventful with no complications reported  She is here for course of rehab requiring physical therapy and occupational therapy which she is pursuing well  Continue Oxycodone for pain control along with Tylenol as well  Also on Robaxin for muscle relaxation which have decreased to every 12 hours  Also on Lovenox 40 mg daily for 28 days post surgery  Progressing well  Continue home VNA and PT services upon discharge    Supraventricular tachycardia [facility])  Assessment & Plan  Patient had an episode of supraventricular tachycardia with heart rate in the 150s while she was in the acute [facility] patient's hospital   sHe converted spontaneously however she was placed on metoprolol 12 5 mg daily which we will continue for now  Patient is to have outpatient follow-up with Cardiology after discharge from rehab  No further arrhythmia continue metoprolol at present time    Obesity (BMI 30-39  9)  Assessment & Plan  Received dietary recommendations    Left knee pain  Assessment & Plan  Continue physical therapy and occupational therapy    Diabetes mellitus type 2, controlled [facility])  Assessment & Plan    Lab Results   Component Value Date    HGBA1C 6 3 08/05/2019    Patient's diabetes is well controlled  Continue metformin oral only  No need to do Accu-Cheks    Benign essential hypertension  Assessment & Plan  Patient's blood pressure is well controlled at this time    Continue low-dose metoprolol and losartan  Blood pressure well controlled      Discharging Physician / Practitioner: Enmanuel Muñiz MD  PCP: Chilo Dickey MD  Admission Date:   Admission Orders (From admission, onward) Ordered        09/27/19 1721  Admit Patient to  Once                   Discharge Date: 10/06/19    Resolved Problems  Date Reviewed: 10/6/2019    None          Consultations During Hospital Stay:  · Physical therapy and occupational therapy    Procedures Performed:   · None    Significant Findings / Test Results:   · none    Incidental Findings:   · None     Test Results Pending at Discharge (will require follow up):   · none     Outpatient Tests Requested:  · None    Complications:  None    Reason for Admission:  Right hip pain following right total hip replacement surgery    Hospital Course:     Arti Woodard is a 68 y o  female patient who originally presented to the hospital on 9/27/2019 due to right hip pain and recent right total hip replacement surgery  She came to our rehab after having total hip replacement surgery done and completed a course of rehab complications reported  She is being discharged home in stable condition with a services  Incision site is healing well and staples are still present    Please see above list of diagnoses and related plan for additional information  Condition at Discharge: good     Discharge Day Visit / Exam:     Subjective:  Patient denies any chest pain or shortness of breath or abdominal pain  Denies any calf pain  She states her right hip pain is well controlled on current pain meds  Vitals: Blood Pressure: 136/88 (10/06/19 0754)  Pulse: 80 (10/06/19 0754)  Temperature: (!) 96 1 °F (35 6 °C) (10/06/19 0754)  Temp Source: Temporal (10/06/19 0754)  Respirations: 16 (10/06/19 0754)  Height: 5' 2" (157 5 cm) (09/27/19 1405)  Weight - Scale: 94 1 kg (207 lb 7 3 oz) (10/06/19 0620)  SpO2: 97 % (10/06/19 0754)  Exam:   Physical Exam   Constitutional: She is oriented to person, place, and time  She appears well-developed and well-nourished  HENT:   Head: Normocephalic and atraumatic     Mouth/Throat: Oropharynx is clear and moist    Eyes: Conjunctivae and EOM are normal    Neck: Normal range of motion  Neck supple  Cardiovascular: Normal rate, regular rhythm and normal heart sounds  Pulmonary/Chest: Effort normal and breath sounds normal    Abdominal: Soft  Bowel sounds are normal  She exhibits no mass  There is no tenderness  There is no rebound and no guarding  Genitourinary:   Genitourinary Comments: deferred   Musculoskeletal:   Right hip incision site healing with staples present   Neurological: She is alert and oriented to person, place, and time  She has normal reflexes  Cranial nerves 2-12 are normal   Normal neurological exam   Skin: Skin is warm and dry  No rash noted  Psychiatric: She has a normal mood and affect  Nursing note and vitals reviewed  Discussion with Family:  None    Discharge instructions/Information to patient and family:   See after visit summary for information provided to patient and family  Provisions for Follow-Up Care:  See after visit summary for information related to follow-up care and any pertinent home health orders  Disposition:     Home with VNA Services (Reminder: Complete face to face encounter)    For Discharges to [facility]:   · Not Applicable to this Patient - Not Applicable to this Patient    Planned Readmission:  No     Discharge Statement:  I spent 35 minutes discharging the patient  This time was spent on the day of discharge  I had direct contact with the patient on the day of discharge  Greater than 50% of the total time was spent examining patient, answering all patient questions, arranging and discussing plan of care with patient as well as directly providing post-discharge instructions  Additional time then spent on discharge activities  Discharge Medications:  See after visit summary for reconciled discharge medications provided to patient and family        ** Please Note: This note has been constructed using a voice recognition system **

## 2019-10-07 VITALS
OXYGEN SATURATION: 96 % | HEIGHT: 62 IN | RESPIRATION RATE: 16 BRPM | WEIGHT: 207.45 LBS | BODY MASS INDEX: 38.18 KG/M2 | HEART RATE: 85 BPM | SYSTOLIC BLOOD PRESSURE: 147 MMHG | DIASTOLIC BLOOD PRESSURE: 70 MMHG | TEMPERATURE: 96.6 F

## 2019-10-07 PROCEDURE — 97116 GAIT TRAINING THERAPY: CPT

## 2019-10-07 PROCEDURE — 90662 IIV NO PRSV INCREASED AG IM: CPT | Performed by: FAMILY MEDICINE

## 2019-10-07 PROCEDURE — G8980 MOBILITY D/C STATUS: HCPCS

## 2019-10-07 PROCEDURE — 97535 SELF CARE MNGMENT TRAINING: CPT

## 2019-10-07 PROCEDURE — G0008 ADMIN INFLUENZA VIRUS VAC: HCPCS | Performed by: FAMILY MEDICINE

## 2019-10-07 PROCEDURE — 97530 THERAPEUTIC ACTIVITIES: CPT

## 2019-10-07 PROCEDURE — G8979 MOBILITY GOAL STATUS: HCPCS

## 2019-10-07 PROCEDURE — G8978 MOBILITY CURRENT STATUS: HCPCS

## 2019-10-07 PROCEDURE — 97110 THERAPEUTIC EXERCISES: CPT

## 2019-10-07 RX ADMIN — OXYCODONE HYDROCHLORIDE AND ACETAMINOPHEN 500 MG: 500 TABLET ORAL at 10:30

## 2019-10-07 RX ADMIN — FOLIC ACID 1 MG: 1 TABLET ORAL at 10:30

## 2019-10-07 RX ADMIN — ACETAMINOPHEN 975 MG: 325 TABLET ORAL at 06:02

## 2019-10-07 RX ADMIN — SENNOSIDES 8.6 MG: 8.6 TABLET, FILM COATED ORAL at 10:30

## 2019-10-07 RX ADMIN — FERROUS SULFATE TAB 325 MG (65 MG ELEMENTAL FE) 325 MG: 325 (65 FE) TAB at 10:30

## 2019-10-07 RX ADMIN — GABAPENTIN 300 MG: 300 CAPSULE ORAL at 10:30

## 2019-10-07 RX ADMIN — METFORMIN HYDROCHLORIDE 500 MG: 500 TABLET ORAL at 10:30

## 2019-10-07 RX ADMIN — ACETAMINOPHEN 975 MG: 325 TABLET ORAL at 14:43

## 2019-10-07 RX ADMIN — ENOXAPARIN SODIUM 40 MG: 40 INJECTION SUBCUTANEOUS at 10:30

## 2019-10-07 RX ADMIN — INFLUENZA A VIRUS A/MICHIGAN/45/2015 X-275 (H1N1) ANTIGEN (FORMALDEHYDE INACTIVATED), INFLUENZA A VIRUS A/SINGAPORE/INFIMH-16-0019/2016 IVR-186 (H3N2) ANTIGEN (FORMALDEHYDE INACTIVATED), AND INFLUENZA B VIRUS B/MARYLAND/15/2016 BX-69A (A B/COLORADO/6/2017-LIKE VIRUS) ANTIGEN (FORMALDEHYDE INACTIVATED) 0.5 ML: 60; 60; 60 INJECTION, SUSPENSION INTRAMUSCULAR at 12:50

## 2019-10-07 RX ADMIN — OXYCODONE HYDROCHLORIDE 5 MG: 5 TABLET ORAL at 16:03

## 2019-10-07 RX ADMIN — LOSARTAN POTASSIUM 50 MG: 50 TABLET ORAL at 10:30

## 2019-10-07 RX ADMIN — PRAVASTATIN SODIUM 40 MG: 40 TABLET ORAL at 16:04

## 2019-10-07 RX ADMIN — METOPROLOL TARTRATE 12.5 MG: 25 TABLET ORAL at 10:30

## 2019-10-07 RX ADMIN — GABAPENTIN 300 MG: 300 CAPSULE ORAL at 16:04

## 2019-10-07 RX ADMIN — OXYCODONE HYDROCHLORIDE 5 MG: 5 TABLET ORAL at 10:30

## 2019-10-07 RX ADMIN — METHOCARBAMOL 500 MG: 500 TABLET, FILM COATED ORAL at 10:30

## 2019-10-07 RX ADMIN — DOCUSATE SODIUM 100 MG: 100 CAPSULE, LIQUID FILLED ORAL at 10:30

## 2019-10-07 RX ADMIN — METFORMIN HYDROCHLORIDE 500 MG: 500 TABLET ORAL at 16:04

## 2019-10-07 NOTE — DISCHARGE INSTRUCTIONS
Pain Management After Surgery   AMBULATORY CARE:   What you need to know about pain management after surgery:  Good control of your pain will help you heal from surgery and return to your normal activities  It will also help you do important activities such as walking and deep breathing  If your pain prevents you from doing these activities, you may be at risk for complications  Examples include a blood clot or pneumonia  Tell your healthcare provider if your pain is not controlled  You may need changes to your medicine or treatment plan  Seek care immediately if:   · You have severe pain  Contact your healthcare provider if:   · Your pain does not get better after take your pain medicine  · You have questions or concerns about your condition or care  Pain medicine: You may  need any of the following:  · Acetaminophen  decreases pain and fever  It is available without a doctor's order  Ask how much to take and how often to take it  Follow directions  Read the labels of all other medicines you are using to see if they also contain acetaminophen, or ask your doctor or pharmacist  Acetaminophen can cause liver damage if not taken correctly  Do not use more than 4 grams (4,000 milligrams) total of acetaminophen in one day  · NSAIDs , such as ibuprofen, help decrease swelling, pain, and fever  This medicine is available with or without a doctor's order  NSAIDs can cause stomach bleeding or kidney problems in certain people  If you take blood thinner medicine, always ask your healthcare provider if NSAIDs are safe for you  Always read the medicine label and follow directions  · Prescription pain medicine  may be given  Ask your healthcare provider how to take this medicine safely  Some prescription pain medicines contain acetaminophen  Do not take other medicines that contain acetaminophen without talking to your healthcare provider  Too much acetaminophen may cause liver damage   Prescription pain medicine may cause constipation  Ask your healthcare provider how to prevent or treat constipation  · Anxiety medicine  decreases anxiety  High levels of anxiety make pain harder to manage  · Muscle relaxers  help decrease pain by relaxing your muscles and preventing spasms  Manage your pain:   · Apply heat  on your surgery area for 20 to 30 minutes every 2 hours for as directed  Heat helps decrease pain and muscle spasms  · Apply ice  on your surgery area for 15 to 20 minutes every hour or as directed  Use an ice pack, or put crushed ice in a plastic bag  Cover it with a towel  Ice helps prevent tissue damage and decreases swelling and pain  · Elevate  the painful area above the level of your heart if possible  This will help decrease swelling and pain  Prop your painful area on pillows or blankets to keep it elevated comfortably  · Apply compression  with an elastic bandage or abdominal binder as directed  An elastic bandage may be used after surgery on your joint, such as your knee  An abdominal binder may be used for surgeries in your abdomen  · Sleep in a comfortable position,  such as upright or on your side  Use pillows to support painful areas  · Ask your healthcare provider about other ways to manage pain without medicine  Examples include relaxation techniques, music, or acupuncture  Follow up with your healthcare provider as directed:  Write down your questions so you remember to ask them during your visits  © 2017 [address] Information is for End User's use only and may not be sold, redistributed or otherwise used for commercial purposes  All illustrations and images included in CareNotes® are the copyrighted property of A D [employer] M [employer]  or Ishaan Bell  The above information is an  only  It is not intended as medical advice for individual conditions or treatments   Talk to your doctor, nurse or pharmacist before following any medical regimen to see if it is safe and effective for you  Minimally Invasive Total Hip Replacement   WHAT YOU SHOULD KNOW:   Minimally invasive total hip replacement is surgery to replace a damaged hip joint with an implant  AFTER YOU LEAVE:   Medicines:   · Anticoagulants    are a type of blood thinner medicine that helps prevent clots  Clots can cause strokes, heart attacks, and death  These medicines may cause you to bleed or bruise more easily  ¨ Watch for bleeding from your gums or nose  Watch for blood in your urine and bowel movements  Use a soft washcloth and a soft toothbrush  If you shave, use an electric razor  Avoid activities that can cause bruising or bleeding  ¨ Tell your healthcare provider about all medicines you take because many medicines cannot be used with anticoagulants  Do not start or stop any medicines unless your healthcare provider tells you to  Tell your dentist and other healthcare providers that you take anticoagulants  Wear a bracelet or necklace that says you take this medicine  ¨ You will need regular blood tests so your healthcare provider can decide how much medicine you need  Take anticoagulants exactly as directed  Tell your healthcare provider right away if you forget to take the medicine, or if you take too much  ¨ If you take warfarin, some foods can change how your blood clots  Do not make major changes to your diet while you take warfarin  Warfarin works best when you eat about the same amount of vitamin K every day  Vitamin K is found in green leafy vegetables, broccoli, grapes, and other foods  Ask for more information about what to eat when you take warfarin  · NSAIDs  help decrease swelling, pain, and fever  This medicine is available with or without a doctor's order  NSAIDs can cause stomach bleeding or kidney problems in certain people  If you take blood thinner medicine, always ask your PHP if NSAIDs are safe for you   Always read the medicine label and follow directions  · Prescription pain medicine  may be given  Ask your PHP how to take this medicine safely  · Antibiotics  help treat or prevent an infection caused by bacteria  · Stool softeners  make it easier for you to have a bowel movement  You may need this medicine to treat or prevent constipation  · Take your medicine as directed  Contact your PHP if you think your medicine is not helping or if you have side effects  Tell him if you are allergic to any medicine  Keep a list of the medicines, vitamins, and herbs you take  Include the amounts, and when and why you take them  Bring the list or the pill bottles to follow-up visits  Carry your medicine list with you in case of an emergency  Follow up with your PHP or orthopedist as directed: You may need to return to have your wound checked and stitches or staples removed  Write down your questions so you remember to ask them during your visits  Physical therapy:  A physical therapist teaches you exercises to help improve movement and strength, and to decrease pain  Self-care:   · Use a cane, walker, or crutches as directed  These devices will help decrease your risk of falling  · Wear pressure stockings  These are long, tight stockings that put pressure on your legs to promote blood flow and prevent clots  · Keep your knees apart  Place a pillow or wedge between your knees when you sit or lie down  This helps support your hip  · Prevent dislocation of your hip implant:      ¨ Do not lean forward when you are in bed or sit up with your legs straight out in front of you  ¨ Do not  sit on a low chair  Use armrests when you rise from a sitting position to decrease the force and pressure on your hips  ¨ Do not cross your legs  ¨ Lift objects with your knees bent rather than straight  Contact your PHP or orthopedist if:   · You have a fever      · You have chills, a cough, or feel weak and achy     · You have nausea and are vomiting  · You have more pain and swelling in your hip joint, even after you take pain medicine  · You have questions or concerns about your condition or care  Seek care immediately or call 911 if:   · You have a seizure or feel confused  · Blood soaks through your bandage  · Your incision comes apart  · Your incision is red, swollen, or draining pus  · You urinate less than usual or not at all  · Your leg feels warm, tender, and painful  It may look swollen and red  · You feel lightheaded, short of breath, and have chest pain  · You cough up blood  © 2014 [address] is for End User's use only and may not be sold, redistributed or otherwise used for commercial purposes  All illustrations and images included in CareNotes® are the copyrighted property of A D A M [employer] Inc  or Ishaan Bell  The above information is an  only  It is not intended as medical advice for individual conditions or treatments  Talk to your doctor, nurse or pharmacist before following any medical regimen to see if it is safe and effective for you

## 2019-10-07 NOTE — PHYSICAL THERAPY NOTE
PHYSICAL THERAPY  [facility] TREATMENT AND DISCHARGE SUMMARY  NAME:  Sacha Mojica  DATE: 10/07/19     AGE:             68 y o  Mrn:               [id number]  ADMIT DX:    Status post total hip replacement, right [[id number]]     Medical History        Past Medical History:   Diagnosis Date    Alopecia      Calcaneal spur      Diabetes insipidus [facility] [address] [facility]      Diabetes mellitus [facility] [address] [facility]      Hyperlipidemia      Hypertension      Osteoarthrosis 2/13/2013    Pes planus       unspecified laterality    Plantar fasciitis           Surgical History         Past Surgical History:   Procedure Laterality Date    CARPAL TUNNEL RELEASE        COLONOSCOPY         May 2006    INCISIONAL BREAST BIOPSY        HI COLONOSCOPY FLX DX W/COLLJ SPEC WHEN PFRMD N/A 5/4/2018     Procedure: COLONOSCOPY;  Surgeon: Misty Saldana MD;  Location: [facility];   Service: Colorectal    HI TOTAL HIP ARTHROPLASTY Right 9/23/2019     Procedure: ARTHROPLASTY HIP TOTAL;  Surgeon: Ramírez Scott MD;  [facility]: [facility];  Service: Orthopedics    WISDOM TOOTH EXTRACTION                   10/07/19 1410   Pain Assessment   Pain Assessment 0-10   Pain Score 2   Pain Type Surgical pain   Pain Location Hip   Pain Orientation Right   Pain Descriptors Sore   Pain Frequency Intermittent   Pain Onset Ongoing   Clinical Progression Gradually improving   Patient's Stated Pain Goal No pain   Restrictions/Precautions   Weight Bearing Precautions Per Order Yes   RUE Weight Bearing Per Order WBAT   LUE Weight Bearing Per Order WBAT   RLE Weight Bearing Per Order WBAT   LLE Weight Bearing Per Order WBAT   Cognition   Overall Cognitive Status WFL   Orientation Level Oriented X4   Memory Within functional limits   Following Commands Follows all commands and directions without difficulty   Subjective   Subjective "I can't wait to go home"    Bed Mobility   Supine to Sit 6  Modified independent   Sit to Supine 6  Modified independent   Transfers   Sit to Stand 6  Modified independent   Stand to Sit 6  Modified independent   Stand pivot 6  Modified independent   Toilet transfer 6  Modified independent   Additional Comments does not p/u object from floor 2* safety 2* ELSA precautions    Ambulation/Elevation   Gait pattern Decreased R stance;Decreased foot clearance   Gait Assistance 6  Modified independent  (w/ RW S w/ cane- pt will use RW at home on d/c )   Assistive Device Rolling walker   Distance >300 w/ turning and obstacle negotiation and w/o LOB    Stair Management Assistance 6  Modified independent   Stair Management Technique One rail L  (and SPC )   Number of Stairs 12   Balance   Static Sitting Normal   Dynamic Sitting Good   Static Standing Good   Dynamic Standing Good   Ambulatory Good  (rw)   Endurance Deficit   Endurance Deficit No   Activity Tolerance   Activity Tolerance Patient tolerated treatment well   Medical Staff Made Aware RN CM    Nurse Made Aware yes-    Exercises   THR Supine;Sitting;Standing  (reviewed w/ handouts-)   Equipment Use   Comments pt able to recall and abide by 3/3 ELSA precautions throughout session today    Assessment   Prognosis Good   Assessment pt seen for final PT tx session prior to d/c w/ focus on safety; review of transfers (including car xfer) and gait training in prep for d/c home- pt is able to ambualte MI w/ RW >300' w/o turning and obstacles  pt displays good/ safe judgemetn and problem solving and negotiates steps MI w/ SPC   pt seated in chair post session- awaiting d/c from [facility]- see d/c summary for futher details    Barriers to Discharge None   Goals   Patient Goals go home    STG Expiration Date 10/18/19   PT Treatment Day 9   Plan   Progress Discontinue PT   Recommendation   Recommendation Home PT  (w/ progression to outpt )     Length Of Stay: 10     Time-  9499-4976 (40')   Vitals:    10/07/19 1122   BP:    Pulse:    Resp:    Temp:    SpO2: 96%     Vitals- see above)   Standardized Assessment- barthel             Physical Therapy Discharge Summary:      Disposition: Patient will be discharged from skilled PT interventions effective 10/07/19    Pt will be d/c home/ with home PT/ OT             DME: RW SPC; Hospital bed and commode ordered for home        Discharge Summary: Pt participated in 9/9  sessions  Pt achieved 5/5 goals (see below)   Upon evaluation, pt required supervision> Coreen for all phases of functional mobility and required cues for 1/3 ELSA precautions  Pt presently functioning at MI w/ RW distances of 300'  and S  W/ SPC; is MI w/ all functional xfers and is able to negotiate steps w/ SPC and rail w/ MI  Pt is to d/c home alone but has supportive brother nearby to assist prn  Pt has lifeline at home for safety  pt able  To recall and consistently abide by 3/3 ELSA precautions    Deficits remain in:  LE strength/AROM; flexibility; soreness/ pain stiffness of hip;  community ambulation distances  On evaluation, patient scored a 55/100 on the Barthel Index and has progressed to 95/100 on discharge   Pt and family deny concerns with d/c home  Pt will have VNA services, home OT/PT to ensure safety in home environment as well as functional progression  PT met with pt, pt denies questions/concerns for PT at this time  Is eager to d/c home       Recommendations: PT recommends pt return home with home PT/OT use of RW until progressed by home PT w/ progression to outpt-   focusing on noted deficits in order to maximize safety and independence during functional activity; prevent falls and hospital readmission          GOALS:       1 ) pt will amb 300ft or greater mod (I) with RW vs SPC for return home  MET/ ACHIEVED   2) Pt will ascend/descend 1FF stairs to access second floor bedroom using required UE support  MET/ ACHIEVED    3) pt will be (I) for bed mobility to improve home access  MET/ ACHIEVED   4) Pt will be (I) with hip precautions for safety   MET/ ACHIEVED   5) pt will be (I) with HEP for continued strengthening MET ACHIEVED            Pat Ferrera, PT

## 2019-10-07 NOTE — OCCUPATIONAL THERAPY NOTE
OCCUPATIONAL THERAPY TREATMENT AND DISCHARGE SUMMARY    TIME: 4488-8125 ( 48 min)   VITALS: None taken; stable t/o session  PAIN: No pain   COGNITION: WFL   PRECAUTIONS: THP - Independent in room     EXPIRATION DATE:10/7/19  TREATMENT DAY:5    ASSESSMENT:    Pt pleasant and cooperative during session  Session focused on ADL performance- MI with AE  Pt limited by ROM and strength  Pt educated on safe d/c planning, discussed carryover of UE isometrics from previous session  Pt ready for d/c  Pt seated in recliner with call bell in reach to conclude session  DISPOSITION: Home OT     AE/DME: RW; BSC; Hospital bed; walker tray; reacher; foot funnel; sock aid       DISCHARGE SUMMARY: Pt discharged home on 10/7/19, recommending Home OT   Participated in a total of 5/5 OT sessions  Pt achieved 12/12 goals  Tub transfer goal discharged as pt plans to spongebathe at home for first few weeks as bathroom is on 2nd floor  Recommend home therapy focus UE strengthening and ROM and further achieve independence in ADL related tasks  Pt is safe to return home  No further concerns noted       Goals STG achieved within 1 week Performance at Initial Evaluation (9/29) Current Performance (last date completed)   Grooming while standing at sink Marni/I  MET Supervision  10/7  Mod I standing at sink   ADL transfers  Marni/I  MET Supervision  10/2, 10/3, 10/4, 10/5, 10/7  MI with RW   Bathroom mobility  Marni/I  MET Supervision w  [facility] [place] 10/1, 10/2, 10/4, 10/5, 10/7  MI + RW      UB ADL  Marni/I  MET Set-up 10/7  MI   LB ADL, AE PRN Marni/I  MET maxA 10/7 MI with AE - good carryover and safety   10/5: MI don/doff shoes w/ Martin Stanton; Re- educated on foot funnel due to back of shoes being pushed down     10/3: MI LB dressing don/doff pants and socks with LHAE      Toileting/clothing management and hygiene Marni/I  MET modA 10/2, 10/4, 10/5, 10/7  MI CM and hygiene   Ed to take caution with management of flushing toilet and adhering to THP's  10/1  Min CM post shower (moisture) and Supervision hygiene    Increase standing tolerance for inc'd safety with standing purposeful tasks >10 minutes  MET ~4 minutes 10/4: 12 mins w/ dynamic unsupported stand w/ RW, intermittent use of hallway handrails  10/3: 11 min dynamic/static stand with RW  10/2  8-10 minutes tolerated with functional activities  10/1  15 minutes sinkside for grooming/oral care  (9/29) 4 minutes   (9/30) 2-3 minutes with hygiene and clothing mgmt    Participate in therex 1-3x/week for inc'd overall stamina/activity tolerance for purposeful tasks To complete  MET N/A  10/1, 10/2, 10/4, 10/7  With functional activities     10/5 Elbow flexion w/ 2# wt x 30 reps; Educated on shld isometrics w/ towel roll x 10 reps each   Tub/shower transfer D/C GOAL Marni/I with DME as needed N/A 10/7   Plans to spongebathe at home for first few weeks as bathroom is on 2nd floor  Kitchen mobility for inc'd independence and safety negotiating kitchen environment Marni/I  MET N/A 10/2  MI mobility-- ed for safe walker placement recommendations initially as patient never used a walker previously  [address] follow through with education   No issues noted       Light meal prep Marni/I  MET N/A 10/2  MI + RW with microwave task   Patient reports light meals and take out/left overs prime   Ed for safe transport with continued trail needs on DME options to allow SW to be made aware if arrangements are needed  Son Devine does however indicate that she orders from [employer] and since items can be purchased there she may get on her own        Bed mobility with HOB flat  Marni/I  MET modA  10/5 MI in and out using SR and HOB elevated   Pt getting hospital bed for home   10/2  Supine to sit min assist and sit to supine Supervision  Patient still plans to have a hospital bed at home   (9/29) CAMI Ocasio Wote (cleaning) Marni/I  MET N/A 10/2  Cleaning of beverage post kitchen task with MI demonstrating good safety   Would benefit from continued ed on management of functional tasks with adherence to THP lucas with twisting    (9/30) utilizing reacher from seated positioning picking up items from floor -- adhering to precautions           Smith Collins MS, OTR/L

## 2019-10-07 NOTE — PLAN OF CARE
Problem: OCCUPATIONAL THERAPY ADULT  Goal: Performs self-care activities at highest level of function for planned discharge setting  See evaluation for individualized goals  Description  Treatment Interventions: ADL retraining, Functional transfer training, UE strengthening/ROM, Endurance training          See flowsheet documentation for full assessment, interventions and recommendations  Outcome: Progressing  Note:   Limitation: Decreased ADL status, Decreased Safe judgement during ADL, Decreased endurance, Decreased high-level ADLs  Prognosis: Good  Assessment: Pt pleasant and cooperative during session  Session focused on ADL performance- MI with AE  Pt limited by ROM and strength  Pt educated on safe d/c planning, discussed carryover of UE isometrics from previous session  Pt ready for d/c  Pt seated in recliner with call bell in reach to conclude session        OT Discharge Recommendation: Home with family support  Ready for d/c: Yes

## 2019-10-08 ENCOUNTER — TELEPHONE (OUTPATIENT)
Dept: OBGYN CLINIC | Facility: HOSPITAL | Age: 69
End: 2019-10-08

## 2019-10-08 ENCOUNTER — OFFICE VISIT (OUTPATIENT)
Dept: OBGYN CLINIC | Facility: HOSPITAL | Age: 69
End: 2019-10-08

## 2019-10-08 ENCOUNTER — HOSPITAL ENCOUNTER (OUTPATIENT)
Dept: RADIOLOGY | Facility: HOSPITAL | Age: 69
Discharge: HOME | End: 2019-10-08
Attending: ORTHOPAEDIC SURGERY

## 2019-10-08 VITALS
HEIGHT: 62 IN | DIASTOLIC BLOOD PRESSURE: 80 MMHG | HEART RATE: 82 BPM | BODY MASS INDEX: 38.09 KG/M2 | WEIGHT: 207 LBS | SYSTOLIC BLOOD PRESSURE: 123 MMHG

## 2019-10-08 DIAGNOSIS — Z47.1 AFTERCARE FOLLOWING RIGHT HIP JOINT REPLACEMENT SURGERY: Primary | ICD-10-CM

## 2019-10-08 DIAGNOSIS — Z96.641 AFTERCARE FOLLOWING RIGHT HIP JOINT REPLACEMENT SURGERY: Primary | ICD-10-CM

## 2019-10-08 DIAGNOSIS — Z96.641 AFTERCARE FOLLOWING RIGHT HIP JOINT REPLACEMENT SURGERY: ICD-10-CM

## 2019-10-08 DIAGNOSIS — Z47.1 AFTERCARE FOLLOWING RIGHT HIP JOINT REPLACEMENT SURGERY: ICD-10-CM

## 2019-10-08 PROCEDURE — 99024 POSTOP FOLLOW-UP VISIT: CPT | Performed by: ORTHOPAEDIC SURGERY

## 2019-10-08 NOTE — TELEPHONE ENCOUNTER
Patient is calling wondering if she needs to refill the folic acid  She says that she would run out tomorrow and she has been taking it since before her surgery

## 2019-10-08 NOTE — PROGRESS NOTES
Assessment:   Diagnosis ICD-10-CM Associated Orders   1  Aftercare following right hip joint replacement surgery Z47 1     [id number]        Plan:  Second postop visit 2 weeks status post right total hip arthroplasty  Incision is healed appropriately and her staples removed successfully  She will continue with weight-bearing activities as tolerated total hip precautions  Continue to complete her Lovenox injections  Continue with outpatient physical therapy to maximize her recovery  To do next visit:  Return in about 4 weeks (around 11/5/2019) for re-check  The above stated was discussed in layman's terms and the patient expressed understanding  All questions were answered to the patient's satisfaction  Scribe Attestation    I,:   Ann-Marie Pang am acting as a scribe while in the presence of the attending physician :        I,:   Rocael Méndez MD personally performed the services described in this documentation    as scribed in my presence :              Subjective:   Alethea Buerger is a 68 y o  female who presents 2nd postop visit 2 weeks status post right total hip arthroplasty  She is here for staple removal   She has been administering her Lovenox injections as instructed  She presents using a rolling walker for ambulatory assistance  She she states that she continues to feel better with every day        Review of systems negative unless otherwise specified in HPI    Past Medical History:   Diagnosis Date    Alopecia     Calcaneal spur     [facility] insipidus [facility] [address]     Diabetes mellitus ([facility] [address] [facility]     Hyperlipidemia     Hypertension     Osteoarthrosis 2/13/2013    Pes planus     unspecified laterality    Plantar fasciitis        Past Surgical History:   Procedure Laterality Date    CARPAL TUNNEL RELEASE      COLONOSCOPY      May 2006    INCISIONAL BREAST BIOPSY      LA COLONOSCOPY FLX DX W/COLLJ SPEC WHEN PFRMD N/A 5/4/2018    Procedure: COLONOSCOPY;  Surgeon: Muna Vazquez MD; [facility]; Service: Colorectal    MA TOTAL HIP ARTHROPLASTY Right 9/23/2019    Procedure: ARTHROPLASTY HIP TOTAL;  Surgeon: Ramírez Scott MD;  [facility];  Service: Orthopedics    WISDOM TOOTH EXTRACTION         Family History   Problem Relation Age of Onset    Diabetes Mother     Hypertension Mother     Emphysema Father     Cancer Brother     Colon cancer Brother 40       Social History     Occupational History    Not on file   Tobacco Use    Smoking status: Never Smoker    Smokeless tobacco: Never Used   Substance and Sexual Activity    Alcohol use:  Yes     Alcohol/week: 0 0 standard drinks     Frequency: 2-4 times a month     Drinks per session: 1 or 2     Binge frequency: Never     Comment: 0    Drug use: No    Sexual activity: Not Currently         Current Outpatient Medications:     acetaminophen (TYLENOL) 500 mg tablet, Take 1,000 mg by mouth every 6 (six) hours as needed for mild pain, Disp: , Rfl:     ascorbic acid (VITAMIN C) 500 mg tablet, Take 1 tablet (500 mg total) by mouth 2 (two) times a day, Disp: 60 tablet, Rfl: 0    Biotin 1 MG CAPS, Take by mouth daily , Disp: , Rfl:     Calcium Carb-Cholecalciferol (CALCIUM 1000 + D) 1000-800 MG-UNIT TABS, Take by mouth, Disp: , Rfl:     cholecalciferol (VITAMIN D3) 1,000 units tablet, Take 1,000 Units by mouth daily, Disp: , Rfl:     enoxaparin (LOVENOX) 40 mg/0 4 mL, Inject 0 4 mL (40 mg total) under the skin every 24 hours for 14 days, Disp: 5 6 mL, Rfl: 0    ferrous sulfate 324 (65 Fe) mg, Take one tablet daily, every other day, Disp: 15 tablet, Rfl: 0    fluticasone (FLONASE) 50 mcg/act nasal spray, 1 spray into each nostril daily, Disp: 16 g, Rfl: 0    folic acid (FOLVITE) 1 mg tablet, Take 1 tablet (1 mg total) by mouth daily, Disp: 30 tablet, Rfl: 0    gabapentin (NEURONTIN) 300 mg capsule, Take 1 capsule (300 mg total) by mouth 3 (three) times a day for 30 days, Disp: 90 capsule, Rfl: 5    losartan (COZAAR) 50 mg tablet, Take 1 tablet (50 mg total) by mouth daily, Disp: 30 tablet, Rfl: 0    metFORMIN (GLUCOPHAGE) 500 mg tablet, TAKE 1 TABLET BY MOUTH TWO TIMES DAILY WITH MEALS, Disp: 180 tablet, Rfl: 3    methocarbamol (ROBAXIN) 500 mg tablet, Take 1 tablet (500 mg total) by mouth 2 (two) times a day, Disp: 30 tablet, Rfl: 0    metoprolol tartrate (LOPRESSOR) 25 mg tablet, Take 0 5 tablets (12 5 mg total) by mouth every 12 (twelve) hours, Disp: 30 tablet, Rfl: 0    mometasone (ELOCON) 0 1 % ointment, APPLY TO RASH LESIONS TWO TIMES DAILY FOR 2 WEEKS THEN THREE TO FOUR DAYS PER WEEK AS NEEDED, Disp: , Rfl: 1    Multiple Vitamin (MULTI-VITAMIN DAILY PO), Take by mouth daily , Disp: , Rfl:     oxyCODONE (ROXICODONE) 5 mg immediate release tablet, Take 1 tablet (5 mg total) by mouth every 6 (six) hours as needed for moderate pain for up to 10 daysMax Daily Amount: 20 mg, Disp: 30 tablet, Rfl: 0    senna (SENOKOT) 8 6 mg, Take 1 tablet (8 6 mg total) by mouth 2 (two) times a day, Disp: 120 each, Rfl: 0    simvastatin (ZOCOR) 20 mg tablet, Take 1 tablet (20 mg total) by mouth daily for 90 days, Disp: 90 tablet, Rfl: 3  No current facility-administered medications for this visit  Allergies   Allergen Reactions    Meloxicam Hives    Nifedipine Edema    Sulfamethoxazole-Trimethoprim Edema            Vitals:    10/08/19 1610   BP: 123/80   Pulse: 82       Objective:                    Right Hip Exam     Comments:    Healed posterior-lateral incision with staples in place, minimal erythema around her staples  Mild swelling  Calf and thigh are soft and non-tender without signs of DVT  Diagnostics, reviewed and taken today if performed as documented:    None performed        Procedures, if performed today:    Procedures    None performed      Portions of the record may have been created with voice recognition software    Occasional wrong word or "sound a like" substitutions may have occurred due to the inherent limitations of voice recognition software  Read the chart carefully and recognize, using context, where substitutions have occurred

## 2019-10-08 NOTE — SOCIAL WORK
Transfer letter provided in patient's discharge folder, copy placed in scan [facility]'s Medical contacted patient to arrange delivery of RW, walker tray, BSC and hospital bed  RW and tray delivered to patient's room by [facility]  Patient has no further questions/concerns at this time  Patient was accepted with [facility] for RN/PT/OT services

## 2019-10-14 ENCOUNTER — APPOINTMENT (OUTPATIENT)
Dept: LAB | Facility: HOSPITAL | Age: 69
End: 2019-10-14
Payer: MEDICARE

## 2019-10-14 DIAGNOSIS — E78.5 HYPERLIPIDEMIA, UNSPECIFIED HYPERLIPIDEMIA TYPE: ICD-10-CM

## 2019-10-14 DIAGNOSIS — E11.65 CONTROLLED TYPE 2 DIABETES MELLITUS WITH HYPERGLYCEMIA, WITHOUT LONG-TERM CURRENT USE OF INSULIN (HCC): ICD-10-CM

## 2019-10-14 DIAGNOSIS — I10 BENIGN ESSENTIAL HYPERTENSION: ICD-10-CM

## 2019-10-14 DIAGNOSIS — E66.9 OBESITY (BMI 30-39.9): ICD-10-CM

## 2019-10-14 LAB
ALBUMIN SERPL BCP-MCNC: 4.2 G/DL (ref 3.5–5)
ALP SERPL-CCNC: 86 U/L (ref 46–116)
ALT SERPL W P-5'-P-CCNC: 15 U/L (ref 12–78)
ANION GAP SERPL CALCULATED.3IONS-SCNC: 6 MMOL/L (ref 4–13)
AST SERPL W P-5'-P-CCNC: 18 U/L (ref 5–45)
BILIRUB SERPL-MCNC: 0.46 MG/DL (ref 0.2–1)
BUN SERPL-MCNC: 12 MG/DL (ref 5–25)
CALCIUM SERPL-MCNC: 9.7 MG/DL (ref 8.3–10.1)
CHLORIDE SERPL-SCNC: 105 MMOL/L (ref 100–108)
CHOLEST SERPL-MCNC: 188 MG/DL (ref 50–200)
CO2 SERPL-SCNC: 30 MMOL/L (ref 21–32)
CREAT SERPL-MCNC: 0.6 MG/DL (ref 0.6–1.3)
CREAT UR-MCNC: 111 MG/DL
ERYTHROCYTE [DISTWIDTH] IN BLOOD BY AUTOMATED COUNT: 13.1 % (ref 11.6–15.1)
EST. AVERAGE GLUCOSE BLD GHB EST-MCNC: 117 MG/DL
GFR SERPL CREATININE-BSD FRML MDRD: 92 ML/MIN/1.73SQ M
GLUCOSE P FAST SERPL-MCNC: 86 MG/DL (ref 65–99)
HBA1C MFR BLD: 5.7 % (ref 4.2–6.3)
HCT VFR BLD AUTO: 36.2 % (ref 34.8–46.1)
HDLC SERPL-MCNC: 47 MG/DL (ref 40–60)
HGB BLD-MCNC: 11.3 G/DL (ref 11.5–15.4)
LDLC SERPL CALC-MCNC: 100 MG/DL (ref 0–100)
MCH RBC QN AUTO: 31.4 PG (ref 26.8–34.3)
MCHC RBC AUTO-ENTMCNC: 31.2 G/DL (ref 31.4–37.4)
MCV RBC AUTO: 101 FL (ref 82–98)
MICROALBUMIN UR-MCNC: 25 MG/L (ref 0–20)
MICROALBUMIN/CREAT 24H UR: 23 MG/G CREATININE (ref 0–30)
NONHDLC SERPL-MCNC: 141 MG/DL
PLATELET # BLD AUTO: 327 THOUSANDS/UL (ref 149–390)
PMV BLD AUTO: 8.7 FL (ref 8.9–12.7)
POTASSIUM SERPL-SCNC: 4.1 MMOL/L (ref 3.5–5.3)
PROT SERPL-MCNC: 8.1 G/DL (ref 6.4–8.2)
RBC # BLD AUTO: 3.6 MILLION/UL (ref 3.81–5.12)
SODIUM SERPL-SCNC: 141 MMOL/L (ref 136–145)
TRIGL SERPL-MCNC: 203 MG/DL
TSH SERPL DL<=0.05 MIU/L-ACNC: 1.7 UIU/ML (ref 0.36–3.74)
WBC # BLD AUTO: 4.92 THOUSAND/UL (ref 4.31–10.16)

## 2019-10-14 PROCEDURE — 83036 HEMOGLOBIN GLYCOSYLATED A1C: CPT

## 2019-10-14 PROCEDURE — 36415 COLL VENOUS BLD VENIPUNCTURE: CPT

## 2019-10-14 PROCEDURE — 82570 ASSAY OF URINE CREATININE: CPT

## 2019-10-14 PROCEDURE — 80061 LIPID PANEL: CPT

## 2019-10-14 PROCEDURE — 85027 COMPLETE CBC AUTOMATED: CPT

## 2019-10-14 PROCEDURE — 82043 UR ALBUMIN QUANTITATIVE: CPT

## 2019-10-14 PROCEDURE — 80053 COMPREHEN METABOLIC PANEL: CPT

## 2019-10-14 PROCEDURE — 84443 ASSAY THYROID STIM HORMONE: CPT

## 2019-10-15 ENCOUNTER — OFFICE VISIT (OUTPATIENT)
Dept: FAMILY MEDICINE CLINIC | Facility: CLINIC | Age: 69
End: 2019-10-15
Payer: MEDICARE

## 2019-10-15 VITALS
RESPIRATION RATE: 16 BRPM | TEMPERATURE: 99 F | HEART RATE: 80 BPM | BODY MASS INDEX: 37.98 KG/M2 | SYSTOLIC BLOOD PRESSURE: 132 MMHG | DIASTOLIC BLOOD PRESSURE: 82 MMHG | WEIGHT: 206.4 LBS | HEIGHT: 62 IN | OXYGEN SATURATION: 96 %

## 2019-10-15 DIAGNOSIS — I47.1 SUPRAVENTRICULAR TACHYCARDIA (HCC): ICD-10-CM

## 2019-10-15 DIAGNOSIS — I10 BENIGN ESSENTIAL HYPERTENSION: Primary | ICD-10-CM

## 2019-10-15 DIAGNOSIS — E78.5 HYPERLIPIDEMIA, UNSPECIFIED HYPERLIPIDEMIA TYPE: ICD-10-CM

## 2019-10-15 DIAGNOSIS — E11.21 CONTROLLED TYPE 2 DIABETES MELLITUS WITH DIABETIC NEPHROPATHY, WITHOUT LONG-TERM CURRENT USE OF INSULIN (HCC): ICD-10-CM

## 2019-10-15 DIAGNOSIS — E66.9 OBESITY (BMI 30-39.9): ICD-10-CM

## 2019-10-15 DIAGNOSIS — M48.062 LUMBAR STENOSIS WITH NEUROGENIC CLAUDICATION: ICD-10-CM

## 2019-10-15 PROCEDURE — 99214 OFFICE O/P EST MOD 30 MIN: CPT | Performed by: FAMILY MEDICINE

## 2019-10-15 RX ORDER — LOSARTAN POTASSIUM 50 MG/1
50 TABLET ORAL DAILY
Qty: 90 TABLET | Refills: 3 | Status: SHIPPED | OUTPATIENT
Start: 2019-10-15 | End: 2019-10-30 | Stop reason: ALTCHOICE

## 2019-10-15 NOTE — ASSESSMENT & PLAN NOTE
Lab Results   Component Value Date    HGBA1C 5 7 10/14/2019     Controlled  Low carb diet  Continue metformin 500mg bid

## 2019-10-15 NOTE — PROGRESS NOTES
Chief Complaint   Patient presents with    Follow-up     6 months and review labs  Health Maintenance   Topic Date Due    HEPATITIS B VACCINES (1 of 3 - Risk 3-dose series) 09[birth year]20/1967    DTaP,Tdap,and Td Vaccines (1 - Tdap) 09/20/1969    Falls: Plan of Care  09/20/2013    DXA SCAN  11/07/2019    Cervical Cancer Screening  01/16/2020    Urinary Incontinence Screening  04/10/2020    Medicare Annual Wellness Visit (AWV)  04/10/2020    BMI: Followup Plan  04/10/2020    Diabetic Foot Exam  04/10/2020    HEMOGLOBIN A1C  04/14/2020    DM Eye Exam  08/23/2020    Depression Screening PHQ  09/16/2020    URINE MICROALBUMIN  10/14/2020    Fall Risk  10/15/2020    BMI: Adult  10/15/2020    CRC Screening: Colonoscopy  05/04/2023    Hepatitis C Screening  Completed    INFLUENZA VACCINE  Completed    Pneumococcal Vaccine: 65+ Years  Completed    Pneumococcal Vaccine: Pediatrics (0 to 5 Years) and At-Risk Patients (6 to 59 Years)  Aged Out     BMI Counseling: Body mass index is 37 75 kg/m²  The BMI is above normal  Nutrition recommendations include reducing portion sizes, decreasing overall calorie intake and 3-5 servings of fruits/vegetables daily  Exercise recommendations include moderate aerobic physical activity for 150 minutes/week  Falls Plan of Care: Balance, strength, and gait training instructions were provided  Assessment/Plan:    Benign essential hypertension  Controlled  DASH diet  Continue losartan 50mg QD and metoprolol 12 5mg bid  Diabetes mellitus type 2, controlled ([facility] [address] )    Lab Results   Component Value Date    HGBA1C 5 7 10/14/2019     Controlled  Low carb diet  Continue metformin 500mg bid  Hyperlipidemia  10/2019 Lipid 188/203/47/100  Advised pt to follow low fat diet  Continue simvastatin 20mg qhs  Lumbar stenosis with neurogenic claudication  Continue gabapentin 300mg tid       Reviewed lab in 10/2019  TSH normal  CBC ok  BMP ok  HgA1C 5 7 controlled  Lipid 188/203/47/100 elevated TG  M/c 23 good     Got flu shot yearly  Got pneumovax at age of 70  Got prevnar 13 2/2016  Got zostavax in 3/2017 per pt  Will check insurance for coverage of Tdap and shingrix  [facility]eri [address] [facility] for mammogram and pap  Mammogram 3/2019 negative  Pap 2017, negative per pt  Had colonoscopy 5/2018 Dr Barahona  Repeat in 5 years    Dexa scan 11/2016 normal  Got script already  RTO in 6 months       POA----brother Ata Sahu  No living will            Diagnoses and all orders for this visit:    Benign essential hypertension  -     losartan (COZAAR) 50 mg tablet; Take 1 tablet (50 mg total) by mouth daily  -     Comprehensive metabolic panel; Future    Supraventricular tachycardia ([address] )    Controlled type 2 diabetes mellitus with diabetic nephropathy, without long-term current use of insulin (HCC)  -     CBC; Future  -     Comprehensive metabolic panel; Future  -     Hemoglobin A1C With EAG; Future    Hyperlipidemia, unspecified hyperlipidemia type  -     Lipid panel; Future    Lumbar stenosis with neurogenic claudication    Obesity (BMI 30-39  9)          Subjective:      Patient ID: Bhavani Balbuena is a 68 y o  female  HPI    Pt is here by herself  Had right hip replacement in 9/2019  Went well  Had episode of SVT in hospital  Add metoprolol 12 5mg bid and decreased losartan to 50mg QD  Discharged to rehab  Got home on 10/7/2019  Now she has home PT/OT  Feels fine  She will finish lovenox on 10/21/2019  She uses oxycodone 5mg 1-2 tabs per day as needed  She uses robaxin 500mg twice per day  HTN---Does not check BP at home  Denies headache, vision change, SOB or CP  Today BP is good  She is on losartan 50mg QD  SVT---[facility] cardiology  She is on metoprolol 12 5mg bid  Denies palpitation, SOB or chest pain       DM---10/2019 HgA1C 5 7 She is on metformin 500mg bid now    Tried to follow Low carb diet  Some neuropathy on toes  Denies hypoglycemia   Does not check BS at home    [facility] opthalmology 8/2019 Dr Yousif Haile  No retinopathy  No podiatry      Hyperlipidemia---She is on simvastatin 20mg qhs  Denies SE       Lumbar stenosis---She is on gabapentin 300mg tid which helped  [facility] pain specialist  Got injection which helped  [facility] dermatology as needed for 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  Got steroid shot which helped a lot       Morbid obesity---BMI 37 75 today      Live by herself  Does all ADL's  Still drive  Denies recent falls  Denies depression        The following portions of the patient's history were reviewed and updated as appropriate: allergies, current medications, past family history, past medical history, past social history, past surgical history and problem list     Review of Systems   Constitutional: Negative for appetite change, chills and fever  HENT: Negative for congestion, ear pain, sinus pain and sore throat  Eyes: Negative for discharge and itching  Respiratory: Negative for apnea, cough, chest tightness, shortness of breath and wheezing  Cardiovascular: Negative for chest pain, palpitations and leg swelling  Gastrointestinal: Negative for abdominal pain, anal bleeding, constipation, diarrhea, nausea and vomiting  Endocrine: Negative for cold intolerance, heat intolerance and polyuria  Genitourinary: Negative for difficulty urinating and dysuria  Musculoskeletal: Negative for arthralgias, back pain and myalgias  Skin: Negative for rash  Neurological: Negative for dizziness and headaches  Psychiatric/Behavioral: Negative for agitation  Objective:      /82 (BP Location: Left arm, Patient Position: Sitting, Cuff Size: Adult)   Pulse 80   Temp 99 °F (37 2 °C) (Tympanic)   Resp 16   Ht 5' 2" (1 575 m)   Wt 93 6 kg (206 lb 6 4 oz)   SpO2 96%   BMI 37 75 kg/m²          Physical Exam   Constitutional: She appears well-developed  No distress  HENT:   Head: Normocephalic  Neck: Normal range of motion  No thyromegaly present  Cardiovascular: Normal rate, regular rhythm and normal heart sounds  Exam reveals no gallop and no friction rub  No murmur heard  Pulmonary/Chest: Effort normal and breath sounds normal  No respiratory distress  She has no wheezes  She has no rales  She exhibits no tenderness  Abdominal: Soft  Bowel sounds are normal    Musculoskeletal: Normal range of motion  She exhibits no edema, tenderness or deformity  Lymphadenopathy:     She has no cervical adenopathy  Neurological: She is alert  Psychiatric: She has a normal mood and affect

## 2019-10-22 RX ORDER — METHOCARBAMOL 500 MG/1
500 TABLET, FILM COATED ORAL 2 TIMES DAILY
Qty: 60 TABLET | Refills: 0 | Status: SHIPPED | OUTPATIENT
Start: 2019-10-22 | End: 2019-10-30 | Stop reason: SDUPTHER

## 2019-10-23 ENCOUNTER — HOSPITAL ENCOUNTER (OUTPATIENT)
Dept: NON INVASIVE DIAGNOSTICS | Facility: HOSPITAL | Age: 69
Discharge: HOME | End: 2019-10-23
Payer: MEDICARE

## 2019-10-23 DIAGNOSIS — I47.1 SUPRAVENTRICULAR TACHYCARDIA (HCC): ICD-10-CM

## 2019-10-23 DIAGNOSIS — I47.1 SVT (SUPRAVENTRICULAR TACHYCARDIA) (HCC): ICD-10-CM

## 2019-10-23 PROCEDURE — 93306 TTE W/DOPPLER COMPLETE: CPT | Performed by: INTERNAL MEDICINE

## 2019-10-23 PROCEDURE — 93308 TTE F-UP OR LMTD: CPT

## 2019-10-30 ENCOUNTER — OFFICE VISIT (OUTPATIENT)
Dept: CARDIOLOGY CLINIC | Facility: CLINIC | Age: 69
End: 2019-10-30
Payer: MEDICARE

## 2019-10-30 ENCOUNTER — OFFICE VISIT (OUTPATIENT)
Dept: OBGYN CLINIC | Facility: HOSPITAL | Age: 69
End: 2019-10-30

## 2019-10-30 VITALS
WEIGHT: 206 LBS | DIASTOLIC BLOOD PRESSURE: 94 MMHG | SYSTOLIC BLOOD PRESSURE: 166 MMHG | HEIGHT: 60 IN | BODY MASS INDEX: 40.44 KG/M2 | HEART RATE: 78 BPM

## 2019-10-30 VITALS
SYSTOLIC BLOOD PRESSURE: 150 MMHG | BODY MASS INDEX: 40.54 KG/M2 | DIASTOLIC BLOOD PRESSURE: 98 MMHG | OXYGEN SATURATION: 97 % | HEIGHT: 60 IN | HEART RATE: 83 BPM | WEIGHT: 206.5 LBS

## 2019-10-30 DIAGNOSIS — I47.1 SUPRAVENTRICULAR TACHYCARDIA (HCC): Primary | ICD-10-CM

## 2019-10-30 DIAGNOSIS — I10 BENIGN ESSENTIAL HYPERTENSION: ICD-10-CM

## 2019-10-30 DIAGNOSIS — Z47.1 AFTERCARE FOLLOWING RIGHT HIP JOINT REPLACEMENT SURGERY: Primary | ICD-10-CM

## 2019-10-30 DIAGNOSIS — E78.5 HYPERLIPIDEMIA, UNSPECIFIED HYPERLIPIDEMIA TYPE: ICD-10-CM

## 2019-10-30 DIAGNOSIS — Z96.641 STATUS POST TOTAL HIP REPLACEMENT, RIGHT: ICD-10-CM

## 2019-10-30 DIAGNOSIS — I47.1 SVT (SUPRAVENTRICULAR TACHYCARDIA) (HCC): ICD-10-CM

## 2019-10-30 DIAGNOSIS — Z96.641 AFTERCARE FOLLOWING RIGHT HIP JOINT REPLACEMENT SURGERY: Primary | ICD-10-CM

## 2019-10-30 PROCEDURE — 99214 OFFICE O/P EST MOD 30 MIN: CPT | Performed by: INTERNAL MEDICINE

## 2019-10-30 PROCEDURE — 99024 POSTOP FOLLOW-UP VISIT: CPT | Performed by: ORTHOPAEDIC SURGERY

## 2019-10-30 NOTE — PROGRESS NOTES
68 y o female 6 weeks following right total hip replacement describes very little pain in the right hip    Review of Systems  Review of systems negative unless otherwise specified in HPI    Past Medical History  Past Medical History:   Diagnosis Date    Alopecia     Calcaneal spur     [facility] insipidus ([facility] [address]     Diabetes mellitus ([facility] [address] [facility]     Hyperlipidemia     Hypertension     Osteoarthrosis 2/13/2013    Pes planus     unspecified laterality    Plantar fasciitis        Past Surgical History  Past Surgical History:   Procedure Laterality Date    CARPAL TUNNEL RELEASE      COLONOSCOPY      May 2006    INCISIONAL BREAST BIOPSY      FL COLONOSCOPY FLX DX W/COLLJ SPEC WHEN PFRMD N/A 5/4/2018    Procedure: COLONOSCOPY;  Surgeon: Ynes Longoria MD;  [facility];   Service: Colorectal    FL TOTAL HIP ARTHROPLASTY Right 9/23/2019    Procedure: ARTHROPLASTY HIP TOTAL;  Surgeon: Janna Ibarra MD;  [facility];  Service: Orthopedics    WISDOM TOOTH EXTRACTION         Current Medications  Current Outpatient Medications on File Prior to Visit   Medication Sig Dispense Refill    acetaminophen (TYLENOL) 500 mg tablet Take 1,000 mg by mouth every 6 (six) hours as needed for mild pain      ascorbic acid (VITAMIN C) 500 mg tablet Take 1 tablet (500 mg total) by mouth 2 (two) times a day 60 tablet 0    Biotin 1 MG CAPS Take by mouth daily       Calcium Carb-Cholecalciferol (CALCIUM 1000 + D) 1000-800 MG-UNIT TABS Take by mouth      cholecalciferol (VITAMIN D3) 1,000 units tablet Take 1,000 Units by mouth daily      ferrous sulfate 324 (65 Fe) mg Take one tablet daily, every other day 15 tablet 0    fluticasone (FLONASE) 50 mcg/act nasal spray 1 spray into each nostril daily (Patient not taking: Reported on 10/30/2019) 16 g 0    gabapentin (NEURONTIN) 300 mg capsule Take 1 capsule (300 mg total) by mouth 3 (three) times a day for 30 days 90 capsule 5    metFORMIN (GLUCOPHAGE) 500 mg tablet TAKE 1 TABLET BY MOUTH TWO TIMES DAILY WITH MEALS 180 tablet 3    methocarbamol (ROBAXIN) 500 mg tablet Take 1 tablet (500 mg total) by mouth 2 (two) times a day 30 tablet 0    mometasone (ELOCON) 0 1 % ointment APPLY TO RASH LESIONS TWO TIMES DAILY FOR 2 WEEKS THEN THREE TO FOUR DAYS PER WEEK AS NEEDED  1    Multiple Vitamin (MULTI-VITAMIN DAILY PO) Take by mouth daily       senna (SENOKOT) 8 6 mg Take 1 tablet (8 6 mg total) by mouth 2 (two) times a day (Patient not taking: Reported on 10/30/2019) 120 each 0    simvastatin (ZOCOR) 20 mg tablet Take 1 tablet (20 mg total) by mouth daily for 90 days 90 tablet 3    [DISCONTINUED] enoxaparin (LOVENOX) 40 mg/0 4 mL Inject 0 4 mL (40 mg total) under the skin every 24 hours for 14 days 5 6 mL 0    [DISCONTINUED] folic acid (FOLVITE) 1 mg tablet Take 1 tablet (1 mg total) by mouth daily 30 tablet 0    [DISCONTINUED] losartan (COZAAR) 50 mg tablet Take 1 tablet (50 mg total) by mouth daily 90 tablet 3    [DISCONTINUED] methocarbamol (ROBAXIN) 500 mg tablet Take 1 tablet (500 mg total) by mouth 2 (two) times a day 60 tablet 0    [DISCONTINUED] metoprolol tartrate (LOPRESSOR) 25 mg tablet Take 0 5 tablets (12 5 mg total) by mouth every 12 (twelve) hours 30 tablet 0     No current facility-administered medications on file prior to visit  Recent Labs [facility]  0   Lab Value Date/Time    HCT 36 2 10/14/2019 1203    HGB 11 3 (L) 10/14/2019 1203    WBC 4 92 10/14/2019 1203    INR 1 14 08/05/2019 1423    CRP <3 0 08/05/2019 1423    GLUCOSE 95 04/21/2015 1430    HGBA1C 5 7 10/14/2019 1203    HGBA1C 6 2 (H) 04/21/2015 1430         Physical exam  · General: Awake, Alert, Oriented  · Eyes: Pupils equal, round and reactive to light  · Heart: regular rate and rhythm  · Lungs: No audible wheezing  · Abdomen: soft  Examination finds a right hip with the heel postal incision  There is good right hip motion all without groin pain    There is no tenderness the right calf  The foot and toes strongly dorsiflex    Imaging  None performed today    Procedure  None indicated or performed today    Assessment/Plan:   68 y  o female 6 weeks following right total hip replacement with well clinical appearance today  She will continue hip strengthening on the right side  She will soft and hip precautions    Next follow-up will be in 6 weeks time which point time x-rays two views right hip will be obtained

## 2019-10-30 NOTE — PROGRESS NOTES
Cardiology Follow Up    Jaquan Panda  1950  [id number]  [facility] [place]  [address]  [address]  [phone]  [phone]    No diagnosis found  There are no diagnoses linked to this encounter  I had the pleasure of seeing Jaquan Panda for a follow up visit  INTERVAL HISTORY: none    History of the presenting illness, Discussion/Summary and My Plan are as follows:::    She is a pleasant 68-year-old lady with history of hypertension, hyperlipidemia, type 2 diabetes, who recently underwent elective right hip total arthroplasty, postoperatively had an episode of asymptomatic supraventricular tachycardia for about 30-40 minutes, converted spontaneously to sinus rhythm  Her TSH was normal   Electrolytes were unremarkable  An echocardiogram showed preserved LV systolic function but also showed LVOT obstruction-up to 40 mm with Valsalva  She presents for a follow-up visit and denies any complaints at this time  She denies any palpitations  Plan:    Supraventricular tachycardia:  Introduced metoprolol in the hospital   I will increase this to 25 mg twice daily and discontinue her losartan  This will help not only the SVT but also her tendency for left ventricular outflow tract obstruction  Check 24 hour Holter    LVOT obstruction:  Discovered on echo in the hospital, on not symptomatic, adequate hydration is important  Increasing metoprolol at this time  Hypertension:  Mildly elevated but usually well controlled, did not take her losartan today, increasing metoprolol today to replace her losartan  Hyperlipidemia:  On simvastatin with adequate control, her values in April were better than in October  Continue to watch for now  Follow-up in 6 months    Results for Noe Morris (MRN [id number]) as of 10/30/2019 09:18   Ref   Range 4/8/2019 15:50 10/14/2019 12:03   Cholesterol Latest Ref Range: 50 - 200 mg/dL 185 188   Triglycerides Latest Ref Range: <=150 mg/dL 154 (H) 203 (H)   HDL Latest Ref Range: 40 - 60 mg/dL 54 47   Non-HDL Cholesterol Latest Units: mg/dl 131 141   LDL Direct Latest Ref Range: 0 - 100 mg/dL 100 100   Results for Vivian Steinberg (MRN [id number]) as of 10/30/2019 09:18   Ref  Range 10/14/2019 12:03   Hemoglobin A1C Latest Ref Range: 4 2 - 6 3 % 5 7         Patient Active Problem List   Diagnosis    Benign essential hypertension    Cervical radiculopathy    Chronic lower back pain    Diabetes mellitus type 2, controlled ([address] )    Hyperlipidemia    Left knee pain    Lumbar stenosis with neurogenic claudication    Obesity (BMI 30-39  9)    Osteoarthrosis    Post-menopausal bleeding    Primary osteoarthritis of both knees    Psoriasis    Right knee pain    Right shoulder pain    Spondylolisthesis, lumbar region    Vaginal lump    Vitamin D deficiency    Effusion of left knee    Right hip pain    Arthritis of right hip    Lumbar radiculopathy    DDD (degenerative disc disease), lumbar    Lumbar spondylosis    Sacroiliitis (HCC)    Status post total hip replacement, right    Supraventricular tachycardia ([address] )    Aftercare following right hip joint replacement surgery     Past Medical History:   Diagnosis Date    Alopecia     Calcaneal spur     Diabetes insipidus ([address] )     Diabetes mellitus ([address] )     Hyperlipidemia     Hypertension     Osteoarthrosis 2/13/2013    Pes planus     unspecified laterality    Plantar fasciitis      Social History     Socioeconomic History    Marital status: Single     Spouse name: Not on file    Number of children: Not on file    Years of education: Not on file    Highest education level: Not on file   Occupational History    Not on file   Social Needs    Financial resource strain: Not on file    Food insecurity:     Worry: Not on file     Inability: Not on file    Transportation needs:     Medical: Not on file Non-medical: Not on file   Tobacco Use    Smoking status: Never Smoker    Smokeless tobacco: Never Used   Substance and Sexual Activity    Alcohol use: Yes     Alcohol/week: 0 0 standard drinks     Frequency: 2-4 times a month     Drinks per session: 1 or 2     Binge frequency: Never     Comment: 0    Drug use: No    Sexual activity: Not Currently   Lifestyle    Physical activity:     Days per week: Not on file     Minutes per session: Not on file    Stress: Not on file   Relationships    Social connections:     Talks on phone: Not on file     Gets together: Not on file     Attends Mu-ism service: Not on file     Active member of club or organization: Not on file     Attends meetings of clubs or organizations: Not on file     Relationship status: Not on file    Intimate partner violence:     Fear of current or ex partner: Not on file     Emotionally abused: Not on file     Physically abused: Not on file     Forced sexual activity: Not on file   Other Topics Concern    Not on file   Social History Narrative    Not on file      Family History   Problem Relation Age of Onset    Diabetes Mother     Hypertension Mother     Emphysema Father     Cancer Brother     Colon cancer Brother 37     Past Surgical History:   Procedure Laterality Date    CARPAL TUNNEL RELEASE      COLONOSCOPY      May 2006    INCISIONAL BREAST BIOPSY      NM COLONOSCOPY FLX DX W/COLLJ SPEC WHEN PFRMD N/A 5/4/2018    Procedure: COLONOSCOPY;  Surgeon: Manoj Gamino MD;  Location: [facility];   Service: Colorectal    NM TOTAL HIP ARTHROPLASTY Right 9/23/2019    Procedure: ARTHROPLASTY HIP TOTAL;  Surgeon: Tonja Hernández MD;  Location: [facility];  Service: Orthopedics    WISDOM TOOTH EXTRACTION         Current Outpatient Medications:     acetaminophen (TYLENOL) 500 mg tablet, Take 1,000 mg by mouth every 6 (six) hours as needed for mild pain, Disp: , Rfl:     ascorbic acid (VITAMIN C) 500 mg tablet, Take 1 tablet (500 mg total) by mouth 2 (two) times a day, Disp: 60 tablet, Rfl: 0    Biotin 1 MG CAPS, Take by mouth daily , Disp: , Rfl:     Calcium Carb-Cholecalciferol (CALCIUM 1000 + D) 1000-800 MG-UNIT TABS, Take by mouth, Disp: , Rfl:     cholecalciferol (VITAMIN D3) 1,000 units tablet, Take 1,000 Units by mouth daily, Disp: , Rfl:     ferrous sulfate 324 (65 Fe) mg, Take one tablet daily, every other day, Disp: 15 tablet, Rfl: 0    gabapentin (NEURONTIN) 300 mg capsule, Take 1 capsule (300 mg total) by mouth 3 (three) times a day for 30 days, Disp: 90 capsule, Rfl: 5    losartan (COZAAR) 50 mg tablet, Take 1 tablet (50 mg total) by mouth daily, Disp: 90 tablet, Rfl: 3    metFORMIN (GLUCOPHAGE) 500 mg tablet, TAKE 1 TABLET BY MOUTH TWO TIMES DAILY WITH MEALS, Disp: 180 tablet, Rfl: 3    methocarbamol (ROBAXIN) 500 mg tablet, Take 1 tablet (500 mg total) by mouth 2 (two) times a day, Disp: 30 tablet, Rfl: 0    metoprolol tartrate (LOPRESSOR) 25 mg tablet, Take 0 5 tablets (12 5 mg total) by mouth every 12 (twelve) hours, Disp: 30 tablet, Rfl: 0    mometasone (ELOCON) 0 1 % ointment, APPLY TO RASH LESIONS TWO TIMES DAILY FOR 2 WEEKS THEN THREE TO FOUR DAYS PER WEEK AS NEEDED, Disp: , Rfl: 1    simvastatin (ZOCOR) 20 mg tablet, Take 1 tablet (20 mg total) by mouth daily for 90 days, Disp: 90 tablet, Rfl: 3    fluticasone (FLONASE) 50 mcg/act nasal spray, 1 spray into each nostril daily (Patient not taking: Reported on 10/30/2019), Disp: 16 g, Rfl: 0    Multiple Vitamin (MULTI-VITAMIN DAILY PO), Take by mouth daily , Disp: , Rfl:     senna (SENOKOT) 8 6 mg, Take 1 tablet (8 6 mg total) by mouth 2 (two) times a day (Patient not taking: Reported on 10/30/2019), Disp: 120 each, Rfl: 0  Allergies   Allergen Reactions    Meloxicam Hives    Nifedipine Edema    Sulfamethoxazole-Trimethoprim Edema       Imaging: Xr Hip/pelv 2-3 Vws Right If Performed    Result Date: 10/4/2019  Narrative: RIGHT HIP INDICATION:   Z47 1: Aftercare following joint replacement surgery Z96 641: Presence of right artificial hip joint  COMPARISON:  Right hip plain films from 3/20/2019  VIEWS:  XR HIP/PELV 2-3 VWS RIGHT W PELVIS IF PERFORMED FINDINGS: There is no acute fracture or dislocation  Right total hip arthroplasty is identified in satisfactory position without evidence of hardware complication  Moderate left hip osteoarthritis with joint space narrowing and marginal osteophytes  No lytic or blastic osseous lesions  Skin staples are seen lateral to the right hip  Degenerative changes visualized lower lumbar spine  Impression: Unremarkable appearance of right total hip arthroplasty  Workstation performed: [id number]       Review of Systems:  Review of Systems   Constitutional: Negative  HENT: Negative  Eyes: Negative  Respiratory: Negative  Cardiovascular: Negative  Endocrine: Negative  Musculoskeletal: Negative  Physical Exam:  /98 (BP Location: Right arm, Patient Position: Sitting, Cuff Size: Standard)   Pulse 83   Ht 5' (1 524 m)   Wt 93 7 kg (206 lb 8 oz)   SpO2 97%   BMI 40 33 kg/m²   Physical Exam   Constitutional: She appears well-developed and well-nourished  No distress  HENT:   Head: Normocephalic and atraumatic  Eyes: Pupils are equal, round, and reactive to light  Conjunctivae are normal  Right eye exhibits no discharge  Left eye exhibits no discharge  No scleral icterus  Neck: Normal range of motion  No tracheal deviation present  No thyromegaly present  Cardiovascular: Normal rate and regular rhythm  Exam reveals no friction rub  Murmur (ESM) heard  Pulmonary/Chest: Effort normal and breath sounds normal  No stridor  No respiratory distress  She has no wheezes  Abdominal: Soft  Bowel sounds are normal  She exhibits no distension  There is no tenderness  Musculoskeletal: Normal range of motion  She exhibits no edema or deformity  Skin: Skin is warm  No rash noted   She is not diaphoretic  No erythema  No pallor

## 2019-10-31 ENCOUNTER — TELEPHONE (OUTPATIENT)
Dept: OBGYN CLINIC | Facility: HOSPITAL | Age: 69
End: 2019-10-31

## 2019-10-31 DIAGNOSIS — Z96.641 STATUS POST TOTAL HIP REPLACEMENT, RIGHT: ICD-10-CM

## 2019-10-31 DIAGNOSIS — I47.1 SVT (SUPRAVENTRICULAR TACHYCARDIA) (HCC): ICD-10-CM

## 2019-10-31 DIAGNOSIS — Z51.89 AFTER CARE: Primary | ICD-10-CM

## 2019-10-31 NOTE — TELEPHONE ENCOUNTER
Patient is requesting a new order for outpatient physical therapy on her right hip      Callback NS#[phone]

## 2019-10-31 NOTE — TELEPHONE ENCOUNTER
Additional therapy script was written    Patient is allowed to drive now,   Avoid dislocating type positions

## 2019-11-04 ENCOUNTER — EVALUATION (OUTPATIENT)
Dept: PHYSICAL THERAPY | Facility: CLINIC | Age: 69
End: 2019-11-04
Payer: MEDICARE

## 2019-11-04 DIAGNOSIS — Z51.89 AFTER CARE: ICD-10-CM

## 2019-11-04 DIAGNOSIS — Z96.641 STATUS POST RIGHT HIP REPLACEMENT: Primary | ICD-10-CM

## 2019-11-04 PROCEDURE — 97161 PT EVAL LOW COMPLEX 20 MIN: CPT | Performed by: PHYSICAL THERAPIST

## 2019-11-04 PROCEDURE — 97110 THERAPEUTIC EXERCISES: CPT | Performed by: PHYSICAL THERAPIST

## 2019-11-04 NOTE — PROGRESS NOTES
PT Evaluation     Today's date: 2019  Patient name: Bhavani Balbuena  : 1950  MRN: [id number]  Referring provider: Armando Flores PA-C  Dx:   Encounter Diagnosis     ICD-10-CM    1  Status post right hip replacement Z96 641    2  After care Z51 89 Ambulatory referral to Physical Therapy                  Assessment  Assessment details: Pt is a 69 yo female s/p R ELSA on 19 presenting with decreased strength, decreased standing tolerance, limited standing balance, decreased flexibility, and the listed functional limitations  She is a good candidate for outpatient physical therapy to address the above impairments and optimize functional status  Functional limitations: unable to get in/out of high car, decreased balance, limited standing/walking toleranceUnderstanding of Dx/Px/POC: good   Prognosis: good    Goals  4 weeks - STG  1  Independent with HEP  2  Amb household distances without AD to allow increased independence    8 weeks - LTG  1  Normalize strength at right hip to improve functional mobility  2  Normalize flexibility at right hip to improve functional mobility  3  Amb community distances without AD to allow increased independence  4  Negotiate full flight of stairs in reciprocal pattern with one handrail and no AD    Plan  Plan details: Provided with and reviewed initial written HEP  Reviewed physical exam findings and plan of care  All questions answered to patient's satisfaction      Patient would benefit from: skilled physical therapy  Planned modality interventions: cryotherapy and thermotherapy: hydrocollator packs  Planned therapy interventions: manual therapy, neuromuscular re-education, patient education, therapeutic activities, therapeutic exercise and home exercise program  Frequency: 2x week  Duration in weeks: 8  Treatment plan discussed with: patient        Subjective Evaluation    History of Present Illness  Date of surgery: 2019  Mechanism of injury: Pt had a right hip replacement 9/23/19 followed by 1 5 weeks of inpatient rehab and 2 5 weeks of home care PT  Pt reports she is no longer having hip pain; it is now stiffness and tightness  She arrives today with referral for outpatient PT  PMH significant for B knee OA, HTN, DM II, cervical/lumbar  radiculopathy  Pain  No pain reported  Progression: improved    Social Support  Steps to enter house: yes  Stairs in house: yes   Lives in: multiple-level home  Lives with: alone    Employment status: not working  Exercise history: minimum      Diagnostic Tests  No diagnostic tests performed  Treatments  Previous treatment: physical therapy and occupational therapy  Patient Goals  Patient goals for therapy: increased strength and increased motion  Patient goal: getting in/out of car; normalize gait        Objective     Tenderness     Additional Tenderness Details  Mild TTP along right IT band    Active Range of Motion     Right Hip   Flexion: WFL  Extension: 5 degrees   Abduction: WFL  External rotation (90/90): 10 degrees     Passive Range of Motion     Right Hip   Flexion: WFL  Extension: 5 degrees   Abduction: WFL  External rotation (90/90): 20 degrees     Additional Passive Range of Motion Details  Moderately restricted quad/hip flexor and piriformis    Strength/Myotome Testing     Right Hip   Planes of Motion   Flexion: 4+  Extension: 4-  Abduction: 4  External rotation: 4-    Ambulation   Weight-Bearing Status   Weight-Bearing Status (Left): weight-bearing as tolerated   Assistive device used: single point cane    Ambulation: Level Surfaces     Additional Level Surfaces Ambulation Details  Pt amb with SPC with antalgic pattern    Ambulation: Stairs     Additional Stairs Ambulation Details  Pt negotiates stairs in reciprocal pattern with handrail, SPC, and supervision with additional time                     Precautions: WBAT, total hip precauations    Daily Treatment Diary       Manuals 11/4 Exercise Diary              Recumb bike             Std SLRx 3 *demo            Step up F/L 1x flight            Sidestepping, Marching             Sit to stand no UE 10x            Supine hip flexor str 30"x3            Supine piriformis str 30"x3            Bridge + ball sq             Bridge + hip abd             Prone quad str w/ strap             Prone hip ext                                                                                                        Gait Training                                                    Modalities

## 2019-11-07 ENCOUNTER — OFFICE VISIT (OUTPATIENT)
Dept: PHYSICAL THERAPY | Facility: CLINIC | Age: 69
End: 2019-11-07
Payer: MEDICARE

## 2019-11-07 DIAGNOSIS — Z51.89 AFTER CARE: Primary | ICD-10-CM

## 2019-11-07 DIAGNOSIS — Z96.641 STATUS POST RIGHT HIP REPLACEMENT: ICD-10-CM

## 2019-11-07 PROCEDURE — 97110 THERAPEUTIC EXERCISES: CPT

## 2019-11-07 PROCEDURE — 97112 NEUROMUSCULAR REEDUCATION: CPT

## 2019-11-07 NOTE — PROGRESS NOTES
Daily Note     Today's date: 2019  Patient name: Won Richard  : 1950  MRN: [id number]  Referring provider: Michelle Monson PA-C  Dx:   Encounter Diagnosis     ICD-10-CM    1  After care Z51 89    2  Status post right hip replacement Z96 641        Start Time: 1530  Stop Time: 1615  Total time in clinic (min): 45 minutes    Subjective: Patient states, "I have very little hip pain today  My L knee actually bugs me more  I will be getting this joint replaced next"  Objective: See treatment diary below    Assessment: Tolerated treatment well  Patient would benefit from continued PT  Patient demonstrated a good gait pattern this visit with a SPC  She is able to complete all TE with no increase in pain  She has most of her trouble transferring from sit to supine and supine to sit (prone as well)  Plan: Continue per plan of care        Precautions: WBAT, total hip precauations    Daily Treatment Diary Wedge and Pillow    Manuals                                                                Exercise Diary              Recumb bike  5'           Std SLRx 3 *demo 2x10 ea           Step up F/L 1x flight 1x   flight           Sidestepping, Marching  2 laps ea           Sit to stand no UE 10x 2x10 foam @ chair           Supine hip flexor str 30"x3 30"x3           Supine piriformis str 30"x3 manual 30"x3           Bridge + ball sq  2x10           Bridge + hip abd  2x10           Prone quad str w/ strap  30"x3           Prone hip ext  2x10                                                                                                      Gait Training                                                    Modalities

## 2019-11-12 ENCOUNTER — OFFICE VISIT (OUTPATIENT)
Dept: PHYSICAL THERAPY | Facility: CLINIC | Age: 69
End: 2019-11-12
Payer: MEDICARE

## 2019-11-12 DIAGNOSIS — Z96.641 STATUS POST RIGHT HIP REPLACEMENT: Primary | ICD-10-CM

## 2019-11-12 PROCEDURE — 97110 THERAPEUTIC EXERCISES: CPT | Performed by: PHYSICAL THERAPIST

## 2019-11-12 PROCEDURE — 97112 NEUROMUSCULAR REEDUCATION: CPT | Performed by: PHYSICAL THERAPIST

## 2019-11-12 NOTE — PROGRESS NOTES
Daily Note     Today's date: 2019  Patient name: Kevan Zimmer  : 1950  MRN: [id number]  Referring provider: Linda Doe PA-C  Dx:   Encounter Diagnosis     ICD-10-CM    1  Status post right hip replacement Z96 641                   Subjective: Pt reports she's starting to walk a little bit without her cane  Objective: See treatment diary below      Assessment: Tolerated treatment well  Patient exhibited good technique with therapeutic exercises and would benefit from continued PT      Plan: Continue per plan of care        Precautions: WBAT, total hip precautions    Daily Treatment Diary Wedge and Pillow    Manuals                                                               Exercise Diary              Recumb bike  5' 8'          Std SLRx 3 *demo 2x10 ea 20x ea          Step up F/L 1x flight 1x   flight 6" 10x ea          Sidestepping, Marching  2 laps ea 2 laps ea no UE          Sit to stand no UE 10x 2x10 foam @ chair 20x foam on chair          Supine hip flexor str 30"x3 30"x3 30"x3          Supine piriformis str 30"x3 manual 30"x3 30"x3 with strap          Bridge + ball sq  2x10 5"x20          Bridge + hip abd  2x10 20x grn          Prone quad str w/ strap  30"x3 30"x3          Prone hip ext  2x10 20x                       Alt step tap   6" 10x                                                                           Gait Training                                                    Modalities

## 2019-11-14 ENCOUNTER — OFFICE VISIT (OUTPATIENT)
Dept: PHYSICAL THERAPY | Facility: CLINIC | Age: 69
End: 2019-11-14
Payer: MEDICARE

## 2019-11-14 DIAGNOSIS — Z51.89 AFTER CARE: ICD-10-CM

## 2019-11-14 DIAGNOSIS — Z96.641 STATUS POST RIGHT HIP REPLACEMENT: Primary | ICD-10-CM

## 2019-11-14 PROCEDURE — 97110 THERAPEUTIC EXERCISES: CPT

## 2019-11-14 PROCEDURE — 97112 NEUROMUSCULAR REEDUCATION: CPT

## 2019-11-14 NOTE — PROGRESS NOTES
Daily Note     Today's date: 2019  Patient name: Mechelle Rudd  : 1950  MRN: [id number]  Referring provider: Bindu Paul PA-C  Dx:   Encounter Diagnosis     ICD-10-CM    1  Status post right hip replacement Z96 641    2  After care Z51 89                   Subjective: Pt reports she continues to walk with her cane feeling comfortable  Notes that with the cold weather her hip feels stiff and achy today  Objective: See treatment diary below      Assessment: Tolerated treatment well continuing to focus on LE strengthening this session  She did have difficulty with table top xfers requiring extra time  Patient exhibited good technique with therapeutic exercises and would benefit from continued PT      Plan: Continue per plan of care        Precautions: WBAT, total hip precautions    Daily Treatment Diary Wedge and Pillow    Manuals                                                              Exercise Diary              Recumb bike  5' 8' 8'         Std SLRx 3 *demo 2x10 ea 20x ea 20x ea         Step up F/L 1x flight 1x   flight 6" 10x ea 6" 20x ea         Sidestepping, Marching  2 laps ea 2 laps ea no UE 2 laps ea no UE         Sit to stand no UE 10x 2x10 foam @ chair 20x foam on chair 20x foam on chair         Supine hip flexor str 30"x3 30"x3 30"x3 30"x3         Supine piriformis str 30"x3 manual 30"x3 30"x3 with strap 30"x3 w strap         Bridge + ball sq  2x10 5"x20 5"x20         Bridge + hip abd  2x10 20x grn 20x grn         Prone quad str w/ strap  30"x3 30"x3 30"x3         Prone hip ext  2x10 20x 20x                      Alt step tap   6" 10x 6" 10x                                                                          Gait Training                                                    Modalities

## 2019-11-18 ENCOUNTER — TELEPHONE (OUTPATIENT)
Dept: FAMILY MEDICINE CLINIC | Facility: CLINIC | Age: 69
End: 2019-11-18

## 2019-11-18 DIAGNOSIS — Z96.641 AFTERCARE FOLLOWING RIGHT HIP JOINT REPLACEMENT SURGERY: ICD-10-CM

## 2019-11-18 DIAGNOSIS — Z47.1 AFTERCARE FOLLOWING RIGHT HIP JOINT REPLACEMENT SURGERY: ICD-10-CM

## 2019-11-18 RX ORDER — METHOCARBAMOL 500 MG/1
500 TABLET, FILM COATED ORAL 2 TIMES DAILY
Qty: 30 TABLET | Refills: 0 | Status: SHIPPED | OUTPATIENT
Start: 2019-11-18 | End: 2020-07-02 | Stop reason: ALTCHOICE

## 2019-11-18 NOTE — TELEPHONE ENCOUNTER
Patient had hip surgery in September by Dr Rebecca Donovan, was prescribed Robaxin 500 mg 1 tab bid     Will be out in a couple days and wants to know if she should continue this medication, if so, would need order from PCP      [employer]

## 2019-11-19 ENCOUNTER — OFFICE VISIT (OUTPATIENT)
Dept: PHYSICAL THERAPY | Facility: CLINIC | Age: 69
End: 2019-11-19
Payer: MEDICARE

## 2019-11-19 DIAGNOSIS — Z96.641 STATUS POST RIGHT HIP REPLACEMENT: Primary | ICD-10-CM

## 2019-11-19 DIAGNOSIS — Z51.89 AFTER CARE: ICD-10-CM

## 2019-11-19 PROCEDURE — 97112 NEUROMUSCULAR REEDUCATION: CPT | Performed by: PHYSICAL THERAPIST

## 2019-11-19 PROCEDURE — 97116 GAIT TRAINING THERAPY: CPT | Performed by: PHYSICAL THERAPIST

## 2019-11-19 PROCEDURE — 97110 THERAPEUTIC EXERCISES: CPT | Performed by: PHYSICAL THERAPIST

## 2019-11-19 NOTE — PROGRESS NOTES
Daily Note     Today's date: 2019  Patient name: Shubham Lezama  : 1950  MRN: [id number]  Referring provider: Lanre Soares PA-C  Dx:   Encounter Diagnosis     ICD-10-CM    1  Status post right hip replacement Z96 641    2  After care Z51 89                   Subjective: Pt reports she's feeling well but is concerned about breaking her hip precautions with car transfers into her SUV  Objective: See treatment diary below      Assessment: Tolerated treatment well  Patient with min gait deviations outdoors today  Plan: Continue per plan of care        Precautions: WBAT, total hip precautions    Daily Treatment Diary Wedge and Pillow    Manuals                                                             Exercise Diary              Recumb bike  5' 8' 8' 10'        Std SLRx 3 *demo 2x10 ea 20x ea 20x ea 30x ea        Step up F/L 1x flight 1x   flight 6" 10x ea 6" 20x ea 2x flight recip        Sidestepping, Marching  2 laps ea 2 laps ea no UE 2 laps ea no UE 2 laps ea no UE        Sit to stand no UE 10x 2x10 foam @ chair 20x foam on chair 20x foam on chair 20x foam on chair        Supine hip flexor str 30"x3 30"x3 30"x3 30"x3         Supine piriformis str 30"x3 manual 30"x3 30"x3 with strap 30"x3 w strap         Bridge + ball sq  2x10 5"x20 5"x20         Bridge + hip abd  2x10 20x grn 20x grn         Prone quad str w/ strap  30"x3 30"x3 30"x3         Prone hip ext  2x10 20x 20x                      Alt step tap   6" 10x 6" 10x 6" 20x                                                                          Gait Training     10' outdoors        Car transfer     5'                                  Modalities

## 2019-11-21 ENCOUNTER — OFFICE VISIT (OUTPATIENT)
Dept: PHYSICAL THERAPY | Facility: CLINIC | Age: 69
End: 2019-11-21
Payer: MEDICARE

## 2019-11-21 DIAGNOSIS — Z51.89 AFTER CARE: ICD-10-CM

## 2019-11-21 DIAGNOSIS — Z96.641 STATUS POST RIGHT HIP REPLACEMENT: Primary | ICD-10-CM

## 2019-11-21 PROCEDURE — 97110 THERAPEUTIC EXERCISES: CPT

## 2019-11-21 PROCEDURE — 97112 NEUROMUSCULAR REEDUCATION: CPT

## 2019-11-21 PROCEDURE — 97116 GAIT TRAINING THERAPY: CPT

## 2019-11-21 NOTE — PROGRESS NOTES
Daily Note     Today's date: 2019  Patient name: Yunior Beaulieu  : 1950  MRN: [id number]  Referring provider: mEanuel Ward PA-C  Dx:   Encounter Diagnosis     ICD-10-CM    1  Status post right hip replacement Z96 641    2  After care Z51 89                   Subjective: Patient stated she has no pain in her hip but some slight "twinges", reporting discomfort in bilateral knees at this time  Objective: See treatment diary below      Assessment: Tolerated treatment well  Patient requires mod cues for body mechanics and exercise technique  Patient experiences LOB and requires cues to focus on ambulation to maintain balance  Patient exhibited good technique with therapeutic exercises      Plan: Continue per plan of care        Precautions: WBAT, total hip precautions    Daily Treatment Diary Wedge and Pillow    Manuals                                                            Exercise Diary              Recumb bike  5' 8' 8' 10' 10'       Std SLRx 3 *demo 2x10 ea 20x ea 20x ea 30x ea 30x ea       Step up F/L 1x flight 1x   flight 6" 10x ea 6" 20x ea 2x flight recip 6" 20x       Sidestepping, Marching  2 laps ea 2 laps ea no UE 2 laps ea no UE 2 laps ea no UE 2 laps each no UE       Sit to stand no UE 10x 2x10 foam @ chair 20x foam on chair 20x foam on chair 20x foam on chair 20x foam on chair        Supine hip flexor str 30"x3 30"x3 30"x3 30"x3         Supine piriformis str 30"x3 manual 30"x3 30"x3 with strap 30"x3 w strap         Bridge + ball sq  2x10 5"x20 5"x20         Bridge + hip abd  2x10 20x grn 20x grn         Prone quad str w/ strap  30"x3 30"x3 30"x3         Prone hip ext  2x10 20x 20x                      Alt step tap   6" 10x 6" 10x 6" 20x  6" 20x                                                                        Gait Training     10' outdoors 10' outdoor       Car transfer     5'                                  Modalities

## 2019-11-26 ENCOUNTER — OFFICE VISIT (OUTPATIENT)
Dept: PHYSICAL THERAPY | Facility: CLINIC | Age: 69
End: 2019-11-26
Payer: MEDICARE

## 2019-11-26 DIAGNOSIS — Z96.641 STATUS POST RIGHT HIP REPLACEMENT: Primary | ICD-10-CM

## 2019-11-26 PROCEDURE — 97110 THERAPEUTIC EXERCISES: CPT | Performed by: PHYSICAL THERAPIST

## 2019-11-26 PROCEDURE — 97116 GAIT TRAINING THERAPY: CPT | Performed by: PHYSICAL THERAPIST

## 2019-11-26 PROCEDURE — 97112 NEUROMUSCULAR REEDUCATION: CPT | Performed by: PHYSICAL THERAPIST

## 2019-11-26 NOTE — PROGRESS NOTES
Daily Note     Today's date: 2019  Patient name: Arcadio Taveras  : 1950  MRN: [id number]  Referring provider: Lorelle Curling, PA-C  Dx:   Encounter Diagnosis     ICD-10-CM    1  Status post right hip replacement Z96 641                   Subjective: Pt reports her left knee is bothering her and she'd like to not do the bike today  Objective: See treatment diary below      Assessment: Tolerated treatment well  Patient demonstrated fatigue post treatment, exhibited good technique with therapeutic exercises and would benefit from continued PT      Plan: Continue per plan of care        Precautions: WBAT, total hip precautions    Daily Treatment Diary Wedge and Pillow    Manuals                                                           Exercise Diary              Recumb bike  5' 8' 8' 10' 10'       Std SLRx 3 *demo 2x10 ea 20x ea 20x ea 30x ea 30x ea 30x ea      Step up F/L 1x flight 1x   flight 6" 10x ea 6" 20x ea 2x flight recip 6" 20x 2x flight recip      Sidestepping, Marching  2 laps ea 2 laps ea no UE 2 laps ea no UE 2 laps ea no UE 2 laps each no UE 2 laps ea no UE      Sit to stand no UE 10x 2x10 foam @ chair 20x foam on chair 20x foam on chair 20x foam on chair 20x foam on chair  20x no foam      Supine hip flexor str 30"x3 30"x3 30"x3 30"x3         Supine piriformis str 30"x3 manual 30"x3 30"x3 with strap 30"x3 w strap         Bridge + ball sq  2x10 5"x20 5"x20         Bridge + hip abd  2x10 20x grn 20x grn         Prone quad str w/ strap  30"x3 30"x3 30"x3         Prone hip ext  2x10 20x 20x                      Alt step tap   6" 10x 6" 10x 6" 20x  6" 20x       Cone Tap       20x      Balloon tap       3'                                             Gait Training     10' outdoors 10' outdoor 12' outdoors      Car transfer     5'                                  Modalities

## 2019-12-02 ENCOUNTER — APPOINTMENT (OUTPATIENT)
Dept: PHYSICAL THERAPY | Facility: CLINIC | Age: 69
End: 2019-12-02
Payer: MEDICARE

## 2019-12-05 ENCOUNTER — EVALUATION (OUTPATIENT)
Dept: PHYSICAL THERAPY | Facility: CLINIC | Age: 69
End: 2019-12-05
Payer: MEDICARE

## 2019-12-05 DIAGNOSIS — Z96.641 STATUS POST RIGHT HIP REPLACEMENT: Primary | ICD-10-CM

## 2019-12-05 DIAGNOSIS — Z47.1 AFTERCARE FOLLOWING RIGHT HIP JOINT REPLACEMENT SURGERY: Primary | ICD-10-CM

## 2019-12-05 DIAGNOSIS — Z96.641 AFTERCARE FOLLOWING RIGHT HIP JOINT REPLACEMENT SURGERY: Primary | ICD-10-CM

## 2019-12-05 PROCEDURE — 97112 NEUROMUSCULAR REEDUCATION: CPT | Performed by: PHYSICAL THERAPIST

## 2019-12-05 PROCEDURE — 97110 THERAPEUTIC EXERCISES: CPT | Performed by: PHYSICAL THERAPIST

## 2019-12-05 PROCEDURE — 97116 GAIT TRAINING THERAPY: CPT | Performed by: PHYSICAL THERAPIST

## 2019-12-05 RX ORDER — IBUPROFEN 600 MG/1
TABLET ORAL
Refills: 3 | COMMUNITY
Start: 2019-11-30 | End: 2020-06-18

## 2019-12-05 RX ORDER — GABAPENTIN 300 MG/1
CAPSULE ORAL 2 TIMES DAILY
Refills: 5 | COMMUNITY
Start: 2019-11-30 | End: 2020-02-25

## 2019-12-05 NOTE — PROGRESS NOTES
Daily Note - Reassessment    Today's date: 2019  Patient name: Shubham Lezama  : 1950  MRN: [id number]  Referring provider: Lanre Soares PA-C  Dx:   Encounter Diagnosis     ICD-10-CM    1  Status post right hip replacement Z96 641                   Assessment  Assessment details: Pt demonstrates excellent gains in strength, flexibility, and functional mobility since beginning PT  She continues to present with deficits primarily in flexibility and somewhat with activity tolerance  She remains a good candidate for outpatient physical therapy to address the above impairments and optimize functional status  Recommend continued PT 2x/week x 2 weeks  Functional limitations: somewhat limited standing/walking tolerance    Goals  4 weeks - STG  1  Independent with HEP - met  2  Amb household distances without AD to allow increased independence - met    8 weeks - LTG  1  Normalize strength at right hip to improve functional mobility - MET  2  Normalize flexibility at right hip to improve functional mobility - progressing very well  3  Amb community distances without AD to allow increased independence - progressing very well; met at times  4  Negotiate full flight of stairs in reciprocal pattern with one handrail and no AD - MET    Plan      Patient would benefit from: skilled physical therapy  Planned modality interventions: cryotherapy and thermotherapy: hydrocollator packs  Planned therapy interventions: manual therapy, neuromuscular re-education, patient education, therapeutic activities, therapeutic exercise and home exercise program  Frequency: 2x week  Duration in weeks: 2  Treatment plan discussed with: patient        Subjective Evaluation  Pt reports she feels she can move much more easily and has more stamina with functional activity since starting PT  She uses a cane only occasionally at this point      Pain  No pain reported    Patient Goals  Patient goals for therapy: increased strength and increased motion  Patient goal:  normalize gait        Objective     Passive Range of Motion     Right Hip   Flexion: WFL  Extension: 5 degrees   Abduction: WFL  External rotation (90/90): 37 degrees     Additional Passive Range of Motion Details  Mildly restricted quad/hip flexor and piriformis    Strength/Myotome Testing     Right Hip   Planes of Motion   Flexion: 5  Extension: 5  Abduction: 5  External rotation: 4-    Ambulation   Weight-Bearing Status   Weight-Bearing Status (Left): weight-bearing as tolerated   Assistive device used: single point cane    Ambulation: Level Surfaces     Additional Level Surfaces Ambulation Details  Pt amb with SPC with mildly antalgic pattern    Ambulation: Stairs     Additional Stairs Ambulation Details  Pt negotiates stairs in reciprocal pattern with handrail                   Precautions: WBAT, total hip precautions    Daily Treatment Diary Wedge and Pillow    Manuals 11/4 11/7 11/12 11/14 11/19 11/21 11/26 12/5                                                         Exercise Diary              Recumb bike  5' 8' 8' 10' 10'       Std SLRx 3 *demo 2x10 ea 20x ea 20x ea 30x ea 30x ea 30x ea      Step up F/L 1x flight 1x   flight 6" 10x ea 6" 20x ea 2x flight recip 6" 20x 2x flight recip 2x flight recip     Sidestepping, Marching  2 laps ea 2 laps ea no UE 2 laps ea no UE 2 laps ea no UE 2 laps each no UE 2 laps ea no UE      Sit to stand no UE 10x 2x10 foam @ chair 20x foam on chair 20x foam on chair 20x foam on chair 20x foam on chair  20x no foam      Supine hip flexor str 30"x3 30"x3 30"x3 30"x3    30"x3 w/ strap     Supine piriformis str 30"x3 manual 30"x3 30"x3 with strap 30"x3 w strap    30"x3 w/ strap     Bridge + ball sq  2x10 5"x20 5"x20    5"x20     Bridge + hip abd  2x10 20x grn 20x grn    20x grn     Prone quad str w/ strap  30"x3 30"x3 30"x3         Prone hip ext  2x10 20x 20x    20x                  Alt step tap   6" 10x 6" 10x 6" 20x  6" 20x       Cone Tap 20x      Balloon tap       3'                                Treadmill warm up        2' @ 0 7mph     Gait Training     10' outdoors 10' outdoor 12' outdoors      Car transfer     5'                                  Modalities

## 2019-12-10 ENCOUNTER — OFFICE VISIT (OUTPATIENT)
Dept: PHYSICAL THERAPY | Facility: CLINIC | Age: 69
End: 2019-12-10
Payer: MEDICARE

## 2019-12-10 DIAGNOSIS — Z96.641 STATUS POST RIGHT HIP REPLACEMENT: Primary | ICD-10-CM

## 2019-12-10 PROCEDURE — 97112 NEUROMUSCULAR REEDUCATION: CPT | Performed by: PHYSICAL THERAPIST

## 2019-12-10 PROCEDURE — 97110 THERAPEUTIC EXERCISES: CPT | Performed by: PHYSICAL THERAPIST

## 2019-12-10 PROCEDURE — 97116 GAIT TRAINING THERAPY: CPT | Performed by: PHYSICAL THERAPIST

## 2019-12-10 NOTE — PROGRESS NOTES
Daily Note     Today's date: 12/10/2019  Patient name: Sierra Olivarez  : 1950  MRN: [id number]  Referring provider: Justin Negron PA-C  Dx:   Encounter Diagnosis     ICD-10-CM    1  Status post right hip replacement Z96 641                   Subjective: Pt reports she's feeling stiff and achy over today  Objective: See treatment diary below      Assessment: Tolerated treatment well  Patient demonstrated fatigue post treatment and exhibited good technique with therapeutic exercises      Plan: Continue per plan of care        Precautions: WBAT, total hip precautions    Daily Treatment Diary Wedge and Pillow    Manuals 11/4 11/7 11/12 11/14 11/19 11/21 11/26 12/5 12/10                                                        Exercise Diary              Recumb bike  5' 8' 8' 10' 10'       Std SLRx 3 *demo 2x10 ea 20x ea 20x ea 30x ea 30x ea 30x ea      Step up F/L 1x flight 1x   flight 6" 10x ea 6" 20x ea 2x flight recip 6" 20x 2x flight recip 2x flight recip     Sidestepping, Marching  2 laps ea 2 laps ea no UE 2 laps ea no UE 2 laps ea no UE 2 laps each no UE 2 laps ea no UE      Sit to stand no UE 10x 2x10 foam @ chair 20x foam on chair 20x foam on chair 20x foam on chair 20x foam on chair  20x no foam      Supine hip flexor str 30"x3 30"x3 30"x3 30"x3    30"x3 w/ strap 30"x3    Supine piriformis str 30"x3 manual 30"x3 30"x3 with strap 30"x3 w strap    30"x3 w/ strap 30"x3 w/ strap    Bridge + ball sq  2x10 5"x20 5"x20    5"x20 5"x20    Bridge + hip abd  2x10 20x grn 20x grn    20x grn 20x blue    Prone quad str w/ strap  30"x3 30"x3 30"x3         Prone hip ext  2x10 20x 20x    20x 20x                 Alt step tap   6" 10x 6" 10x 6" 20x  6" 20x       Cone Tap       20x      Balloon tap       3'      Foam beam step overs         2 laps     Hurdles F/L         2 laps ea    Treadmill warm up        2' @ 0 7mph 3' @ 1 0mph    Gait Training     10' outdoors 10' outdoor 12' outdoors      Car transfer     5' Modalities

## 2019-12-11 ENCOUNTER — OFFICE VISIT (OUTPATIENT)
Dept: OBGYN CLINIC | Facility: HOSPITAL | Age: 69
End: 2019-12-11

## 2019-12-11 ENCOUNTER — HOSPITAL ENCOUNTER (OUTPATIENT)
Dept: RADIOLOGY | Facility: HOSPITAL | Age: 69
Discharge: HOME | End: 2019-12-11
Attending: ORTHOPAEDIC SURGERY
Payer: MEDICARE

## 2019-12-11 VITALS
HEART RATE: 83 BPM | WEIGHT: 204 LBS | BODY MASS INDEX: 40.05 KG/M2 | DIASTOLIC BLOOD PRESSURE: 99 MMHG | SYSTOLIC BLOOD PRESSURE: 159 MMHG | HEIGHT: 60 IN

## 2019-12-11 DIAGNOSIS — M25.562 CHRONIC PAIN OF LEFT KNEE: ICD-10-CM

## 2019-12-11 DIAGNOSIS — G89.29 CHRONIC PAIN OF LEFT KNEE: ICD-10-CM

## 2019-12-11 DIAGNOSIS — M17.12 ARTHRITIS OF LEFT KNEE: ICD-10-CM

## 2019-12-11 DIAGNOSIS — Z96.641 AFTERCARE FOLLOWING RIGHT HIP JOINT REPLACEMENT SURGERY: ICD-10-CM

## 2019-12-11 DIAGNOSIS — Z47.1 AFTERCARE FOLLOWING RIGHT HIP JOINT REPLACEMENT SURGERY: ICD-10-CM

## 2019-12-11 DIAGNOSIS — Z96.641 STATUS POST TOTAL HIP REPLACEMENT, RIGHT: Primary | ICD-10-CM

## 2019-12-11 PROCEDURE — 73502 X-RAY EXAM HIP UNI 2-3 VIEWS: CPT

## 2019-12-11 PROCEDURE — 99024 POSTOP FOLLOW-UP VISIT: CPT | Performed by: ORTHOPAEDIC SURGERY

## 2019-12-11 NOTE — PROGRESS NOTES
Subjective; Adult female patient now 3 months status post right total hip replacement  She has had significant impact to her life and her activity level due to right total hip replacement  She reports pain-free range of motion as well as function  She also reports incidental relief of previous right knee arthralgia  She continues to have a modest amount of left knee arthralgia  She had Euflexxa provided 6 months ago  She would be appropriate to consider repeat injection series    On arrival to the office she had x-rays of the right hip    Past Medical History:   Diagnosis Date    Alopecia     Calcaneal spur     Diabetes insipidus ([address]     [facility] [address]     Hyperlipidemia     Hypertension     Osteoarthrosis 2/13/2013    Pes planus     unspecified laterality    Plantar fasciitis        Past Surgical History:   Procedure Laterality Date    CARPAL TUNNEL RELEASE      COLONOSCOPY      May 2006    INCISIONAL BREAST BIOPSY      [place] COLONOSCOPY FLX DX W/COLLJ SPEC WHEN PFRMD N/A 5/4/2018    Procedure: COLONOSCOPY;  Surgeon: Dajuan Saleem MD;  [facility]; Service: Colorectal    [place] TOTAL HIP ARTHROPLASTY Right 9/23/2019    Procedure: ARTHROPLASTY HIP TOTAL;  Surgeon: Fahad Saleem MD;  [facility];  Service: Orthopedics    WISDOM TOOTH EXTRACTION         Family History   Problem Relation Age of Onset    Diabetes Mother     Hypertension Mother     Emphysema Father     Cancer Brother     Colon cancer Brother 40       Social History     Tobacco Use    Smoking status: Never Smoker    Smokeless tobacco: Never Used   Substance Use Topics    Alcohol use:  Yes     Alcohol/week: 0 0 standard drinks     Frequency: 2-4 times a month     Drinks per session: 1 or 2     Binge frequency: Never     Comment: 0    Drug use: No     Exam;    Her right hip is tolerant of hip flexion internal and external rotation all done without pain  Her left hip however is held in external rotation of approximately 10° and when internal rotation is attempted to the flexed left hip she has significant pain and lifts the buttock  She has modest amount of medial greater than lateral knee discomfort without a knee effusion left knee    X-rays;    Right hip series, total hip components in excellent position and age-appropriate arthritic changes of the left femoral head    Impression; Follow-up for right total hip replacement  Left hip arthritis by x-ray  Left knee arthralgia    Plan;    She may walk as desired  For objects on the floor she is to use a Grabber and avoid hyperflexion at the waist  For her left knee arthralgia a repeat Visco elastic supplement i e  Euflexxa was ordered    Her next follow-up appointment for her right total hip will be in 3 additional months x-rays would be appropriate at that time  She will continue to observe whether her left hip offers her arthralgia or not      Her exam was supervised by me plan formulated by the attending surgeon it was my privilege to assist him in its delivery

## 2019-12-12 ENCOUNTER — OFFICE VISIT (OUTPATIENT)
Dept: PAIN MEDICINE | Facility: CLINIC | Age: 69
End: 2019-12-12
Payer: MEDICARE

## 2019-12-12 ENCOUNTER — OFFICE VISIT (OUTPATIENT)
Dept: PHYSICAL THERAPY | Facility: CLINIC | Age: 69
End: 2019-12-12
Payer: MEDICARE

## 2019-12-12 VITALS
BODY MASS INDEX: 40.05 KG/M2 | HEIGHT: 60 IN | WEIGHT: 204 LBS | DIASTOLIC BLOOD PRESSURE: 85 MMHG | SYSTOLIC BLOOD PRESSURE: 148 MMHG | HEART RATE: 66 BPM

## 2019-12-12 DIAGNOSIS — M43.16 SPONDYLOLISTHESIS, LUMBAR REGION: ICD-10-CM

## 2019-12-12 DIAGNOSIS — M47.816 LUMBAR SPONDYLOSIS: ICD-10-CM

## 2019-12-12 DIAGNOSIS — M51.36 DDD (DEGENERATIVE DISC DISEASE), LUMBAR: ICD-10-CM

## 2019-12-12 DIAGNOSIS — M54.16 LUMBAR RADICULOPATHY: Primary | ICD-10-CM

## 2019-12-12 DIAGNOSIS — Z96.641 STATUS POST TOTAL HIP REPLACEMENT, RIGHT: ICD-10-CM

## 2019-12-12 DIAGNOSIS — M48.062 LUMBAR STENOSIS WITH NEUROGENIC CLAUDICATION: ICD-10-CM

## 2019-12-12 DIAGNOSIS — Z96.641 STATUS POST RIGHT HIP REPLACEMENT: Primary | ICD-10-CM

## 2019-12-12 PROCEDURE — 99214 OFFICE O/P EST MOD 30 MIN: CPT | Performed by: NURSE PRACTITIONER

## 2019-12-12 PROCEDURE — 97116 GAIT TRAINING THERAPY: CPT | Performed by: PHYSICAL THERAPIST

## 2019-12-12 PROCEDURE — 97112 NEUROMUSCULAR REEDUCATION: CPT | Performed by: PHYSICAL THERAPIST

## 2019-12-12 PROCEDURE — 97110 THERAPEUTIC EXERCISES: CPT | Performed by: PHYSICAL THERAPIST

## 2019-12-12 NOTE — PROGRESS NOTES
Daily Note     Today's date: 2019  Patient name: Hyacinth Scruggs  : 1950  MRN: [id number]  Referring provider: Mary Lou Chew PA-C  Dx:   Encounter Diagnosis     ICD-10-CM    1  Status post right hip replacement Z96 641                   Subjective: Pt reports she saw her surgeon and she no longer has hip precautions  Objective: See treatment diary below      Assessment: Tolerated treatment well  Patient exhibited good technique with therapeutic exercises and would benefit from continued PT      Plan: Continue per plan of care  Precautions: WBAT    Daily Treatment Diary Wedge and Pillow    Manuals 11/4 11/7 11/12 11/14 11/19 11/21 11/26 12/5 12/10 12/12                                                       Exercise Diary              Recumb bike  5' 8' 8' 10' 10'       Std SLRx 3 *demo 2x10 ea 20x ea 20x ea 30x ea 30x ea 30x ea      Step up F/L 1x flight 1x   flight 6" 10x ea 6" 20x ea 2x flight recip 6" 20x 2x flight recip 2x flight recip     Sidestepping, Marching  2 laps ea 2 laps ea no UE 2 laps ea no UE 2 laps ea no UE 2 laps each no UE 2 laps ea no UE      Sit to stand no UE 10x 2x10 foam @ chair 20x foam on chair 20x foam on chair 20x foam on chair 20x foam on chair  20x no foam   20x   Supine hip flexor str 30"x3 30"x3 30"x3 30"x3    30"x3 w/ strap 30"x3 30"x3 w/ strap   Supine piriformis str 30"x3 manual 30"x3 30"x3 with strap 30"x3 w strap    30"x3 w/ strap 30"x3 w/ strap 30"x3 w/ strap   Bridge + ball sq  2x10 5"x20 5"x20    5"x20 5"x20 5"x20   Bridge + hip abd  2x10 20x grn 20x grn    20x grn 20x blue 20x blue   Prone quad str w/ strap  30"x3 30"x3 30"x3         Prone hip ext  2x10 20x 20x    20x 20x 20x                Alt step tap   6" 10x 6" 10x 6" 20x  6" 20x       Cone Tap       20x      Balloon tap       3'      Foam beam step overs         2 laps     Hurdles F/L         2 laps ea    Treadmill warm up        2' @ 0 7mph 3' @ 1 0mph 3' @ 1  3mph   Gait Training     10' outdoors 10' outdoor 12' outdoors   12' outdoors   Car transfer     5'                                  Modalities

## 2019-12-12 NOTE — PROGRESS NOTES
Assessment:  1  Lumbar radiculopathy    2  Spondylolisthesis, lumbar region    3  DDD (degenerative disc disease), lumbar    4  Lumbar spondylosis    5  Lumbar stenosis with neurogenic claudication    6  Status post total hip replacement, right        Plan:  1  I offered to order EMG of the BLE as she does continue with occasional neuropathic symptoms in her feet, right greater than left, but she declines at this time as she does feel it is improving  2  Patient continues with ongoing relief from L5-S1 interlaminar epidural steroid injection on July 9th 1019  We can repeat this injection needed  3  Patient may continue gabapentin, and methocarbamol as prescribed  No changes were made to this medication at this time  4  Patient will continue physical therapy as directed by Orthopedics  5  The patient will follow-up in 8 weeks for medication prescription refill and reevaluation  The patient was advised to contact the office should their symptoms worsen in the interim  The patient was agreeable and verbalized an understanding  M*Modal software was used to dictate this note  It may contain errors with dictating incorrect words or incorrect spelling  Please contact the provider directly with any questions  History of Present Illness: The patient is a 68 y o  female last seen on 9/19/19 who presents for a follow up office visit in regards to chronic pain syndrome secondary to lumbar spondylosis, lumbar stenosis, lumbar radiculopathy and right hip osteoarthritis  The patient is status post right total hip replacement in September 2019 and feels that her pain in her right leg has significantly improved ever since having her surgery  She does continue with some neuropathic symptoms in the lower extremities, along with some low back pain, however back pain is also much improved from LESI on July 9, 2019  She currently rates her pain a 1/10 on the numeric pain rating scale    She states her pain is occasional in nature and follows no particular pattern throughout the day  She characterizes the pain as dull aching, numbness, and pins and needles  Current pain medications includes:  Gabapentin 300 mg b i d     The patient reports that this regimen is providing 50% pain relief  The patient is reporting no side effects from this pain medication regimen  I have personally reviewed and/or updated the patient's past medical history, past surgical history, family history, social history, current medications, allergies, and vital signs today  Review of Systems:    Review of Systems   Respiratory: Negative for shortness of breath  Cardiovascular: Negative for chest pain  Gastrointestinal: Negative for constipation, diarrhea, nausea and vomiting  Musculoskeletal: Positive for gait problem  Negative for arthralgias, joint swelling and myalgias  Skin: Negative for rash  Neurological: Negative for dizziness, seizures and weakness  All other systems reviewed and are negative  Past Medical History:   Diagnosis Date    Alopecia     Calcaneal spur     [facility] [address]     Diabetes mellitus [facility] [address]     Hyperlipidemia     Hypertension     Osteoarthrosis 2/13/2013    Pes planus     unspecified laterality    Plantar fasciitis        Past Surgical History:   Procedure Laterality Date    CARPAL TUNNEL RELEASE      COLONOSCOPY      May 2006    INCISIONAL BREAST BIOPSY      [place] COLONOSCOPY FLX DX W/COLLJ SPEC WHEN PFRMD N/A 5/4/2018    Procedure: COLONOSCOPY;  Surgeon: Sharon Wagner MD;  [facility];   Service: Colorectal    [place] TOTAL HIP ARTHROPLASTY Right 9/23/2019    Procedure: ARTHROPLASTY HIP TOTAL;  Surgeon: Salas Nelson MD;  [facility];  Service: Orthopedics    WISDOM TOOTH EXTRACTION         Family History   Problem Relation Age of Onset    Diabetes Mother     Hypertension Mother     Emphysema Father     Cancer Brother     Colon cancer Brother 40       Social History     Occupational History    Not on file   Tobacco Use    Smoking status: Never Smoker    Smokeless tobacco: Never Used   Substance and Sexual Activity    Alcohol use:  Yes     Alcohol/week: 0 0 standard drinks     Frequency: 2-4 times a month     Drinks per session: 1 or 2     Binge frequency: Never     Comment: 0    Drug use: No    Sexual activity: Not Currently         Current Outpatient Medications:     acetaminophen (TYLENOL) 500 mg tablet, Take 1,000 mg by mouth every 6 (six) hours as needed for mild pain, Disp: , Rfl:     ascorbic acid (VITAMIN C) 500 mg tablet, Take 1 tablet (500 mg total) by mouth 2 (two) times a day, Disp: 60 tablet, Rfl: 0    Biotin 1 MG CAPS, Take by mouth daily , Disp: , Rfl:     Calcium Carb-Cholecalciferol (CALCIUM 1000 + D) 1000-800 MG-UNIT TABS, Take by mouth, Disp: , Rfl:     cholecalciferol (VITAMIN D3) 1,000 units tablet, Take 1,000 Units by mouth daily, Disp: , Rfl:     ferrous sulfate 324 (65 Fe) mg, Take one tablet daily, every other day, Disp: 15 tablet, Rfl: 0    fluticasone (FLONASE) 50 mcg/act nasal spray, 1 spray into each nostril daily, Disp: 16 g, Rfl: 0    gabapentin (NEURONTIN) 300 mg capsule, TAKE 1 CAPSULE BY MOUTH THREE TIMES DAILY FOR 30 DAYS, Disp: , Rfl: 5    ibuprofen (MOTRIN) 600 mg tablet, TAKE 1 TABLET BY MOUTH EVERY 8 HOURS AS NEEDED FOR MODERATE PAIN, Disp: , Rfl: 3    metFORMIN (GLUCOPHAGE) 500 mg tablet, TAKE 1 TABLET BY MOUTH TWO TIMES DAILY WITH MEALS, Disp: 180 tablet, Rfl: 3    methocarbamol (ROBAXIN) 500 mg tablet, Take 1 tablet (500 mg total) by mouth 2 (two) times a day, Disp: 30 tablet, Rfl: 0    metoprolol tartrate (LOPRESSOR) 25 mg tablet, Take 1 tablet (25 mg total) by mouth every 12 (twelve) hours, Disp: 60 tablet, Rfl: 5    mometasone (ELOCON) 0 1 % ointment, APPLY TO RASH LESIONS TWO TIMES DAILY FOR 2 WEEKS THEN THREE TO FOUR DAYS PER WEEK AS NEEDED, Disp: , Rfl: 1    Multiple Vitamin (MULTI-VITAMIN DAILY PO), Take by mouth daily , Disp: , Rfl:     senna (SENOKOT) 8 6 mg, Take 1 tablet (8 6 mg total) by mouth 2 (two) times a day, Disp: 120 each, Rfl: 0    simvastatin (ZOCOR) 20 mg tablet, Take 1 tablet (20 mg total) by mouth daily for 90 days, Disp: 90 tablet, Rfl: 3    Allergies   Allergen Reactions    Meloxicam Hives    Nifedipine Edema    Sulfamethoxazole-Trimethoprim Edema       Physical Exam:    /85   Pulse 66   Ht 5' (1 524 m)   Wt 92 5 kg (204 lb)   BMI 39 84 kg/m²     Constitutional:normal, well developed, well nourished, alert, in no distress and non-toxic and no overt pain behavior  Eyes:anicteric  HEENT:grossly intact  Neck:supple, symmetric, trachea midline and no masses   Pulmonary:even and unlabored  Cardiovascular:No edema or pitting edema present  Skin:Normal without rashes or lesions and well hydrated  Psychiatric:Mood and affect appropriate  Neurologic:Cranial Nerves II-XII grossly intact  Musculoskeletal:Slightly antalgic gait but steady without the use of assistive devices      Imaging  No orders to display     MRI LUMBAR SPINE WITHOUT CONTRAST     INDICATION: 64-year-old female, back pain, leg pain     COMPARISON:  5/8/2019 x-rays     TECHNIQUE:  Sagittal T1, sagittal T2, sagittal inversion recovery, axial T1 and axial T2, coronal T2     IMAGE QUALITY:  Diagnostic     FINDINGS:     VERTEBRAL BODIES:    Mild to moderate levoscoliosis  Grade 1 degenerative retrolisthesis T12-L1  Grade 1 degenerative anterolisthesis L3-4, L4-5  No compression fracture  Normal marrow signal is identified within the visualized bony structures  No discrete marrow   lesion      SACRUM:  Normal signal within the sacrum   No evidence of insufficiency or stress fracture      DISTAL CORD AND CONUS:  Normal size and signal within the distal cord and conus        PARASPINAL SOFT TISSUES:  Paraspinal soft tissues are unremarkable      LOWER THORACIC DISC SPACES: Grade 1 degenerative retrolisthesis T12-L1, mild central canal stenosis, no overt neural element impingement     LUMBAR DISC SPACES:     L1-L2:  Moderate degenerative spondylosis, moderate central disc herniation, moderate central stenosis, mild bilateral foraminal stenosis, probable bilateral L2 nerve root encroachment      L2-L3:  Moderate degenerative spondylosis and bulging annulus, mild central canal and bilateral foraminal stenosis     L3-L4:  Moderate degenerative spondylosis, grade 1 anterolisthesis, moderate central canal stenosis, mild bilateral foraminal stenosis, possible bilateral L4 nerve root encroachment     L4-L5:  Moderate degenerative spondylosis, grade 1 anterolisthesis, mild bilateral foraminal stenosis, no overt neural element impingement     L5-S1:  Normal disc, mild degenerative facet arthrosis, no stenosis     IMPRESSION:  Multilevel degenerative spondylosis, levoscoliosis, consistent with x-rays     Grade 1 retrolisthesis, mild central canal stenosis T12-L1     Moderate central canal stenosis, moderate central disc herniation, mild bilateral foraminal stenosis L1-2     Mild central canal and bilateral foraminal stenosis L2-3     Grade 1 anterolisthesis, moderate central canal stenosis, mild bilateral foraminal stenosis L3-4     Grade 1 anterolisthesis, mild bilateral foraminal stenosis L4-5          No orders of the defined types were placed in this encounter

## 2019-12-17 ENCOUNTER — OFFICE VISIT (OUTPATIENT)
Dept: PHYSICAL THERAPY | Facility: CLINIC | Age: 69
End: 2019-12-17
Payer: MEDICARE

## 2019-12-17 DIAGNOSIS — Z96.641 STATUS POST RIGHT HIP REPLACEMENT: Primary | ICD-10-CM

## 2019-12-17 PROCEDURE — 97112 NEUROMUSCULAR REEDUCATION: CPT | Performed by: PHYSICAL THERAPIST

## 2019-12-17 PROCEDURE — 97530 THERAPEUTIC ACTIVITIES: CPT | Performed by: PHYSICAL THERAPIST

## 2019-12-17 PROCEDURE — 97110 THERAPEUTIC EXERCISES: CPT | Performed by: PHYSICAL THERAPIST

## 2019-12-17 NOTE — PROGRESS NOTES
Daily Note     Today's date: 2019  Patient name: Brian Boss  : 1950  MRN: [id number]  Referring provider: Idalmis Reynaga PA-C  Dx:   Encounter Diagnosis     ICD-10-CM    1  Status post right hip replacement Z96 641                   Subjective: "I completely forgot my cane today "      Objective: See treatment diary below      Assessment: Pt able to complete 4' on TM today  Pt becomes easily winded during standing activities  Anticipate DC next session  Plan: Continue per plan of care  Precautions: WBAT    Daily Treatment Diary Wedge and Pillow    Manuals 12/17      11/26 12/5 12/10 12/12                                                       Exercise Diary              Recumb bike             Std SLRx 3       30x ea      Step up F/L 2x flight recip      2x flight recip 2x flight recip     Sidestepping, Marching x2 laps ea      2 laps ea no UE      Sit to stand no UE x20      20x no foam   20x   Supine hip flexor str 30"x3 w/strap       30"x3 w/ strap 30"x3 30"x3 w/ strap   Supine piriformis str 30"x3 w/strap       30"x3 w/ strap 30"x3 w/ strap 30"x3 w/ strap   Bridge + ball sq 5"x20       5"x20 5"x20 5"x20   Bridge + hip abd 5"x20 blue       20x grn 20x blue 20x blue   Prone quad str w/ strap             Prone hip ext 20x       20x 20x 20x                Alt step tap             Cone Tap       20x      Balloon tap       3'      Foam beam step overs 2 laps        2 laps     Hurdles F/L x2 laps ea        2 laps ea    Treadmill warm up 4' @1 3 mph       2' @ 0 7mph 3' @ 1 0mph 3' @ 1  3mph   Gait Training       12' outdoors   12' outdoors   Car transfer                                       Modalities

## 2019-12-19 ENCOUNTER — APPOINTMENT (OUTPATIENT)
Dept: PHYSICAL THERAPY | Facility: CLINIC | Age: 69
End: 2019-12-19
Payer: MEDICARE

## 2019-12-19 NOTE — PROGRESS NOTES
Pt called to cancel her scheduled discharge appointment due to a personal emergency  She did not feel like she needed to reschedule as she is independent with her HEP  Discharge episode of care

## 2019-12-30 ENCOUNTER — TELEPHONE (OUTPATIENT)
Dept: OBGYN CLINIC | Facility: HOSPITAL | Age: 69
End: 2019-12-30

## 2019-12-30 NOTE — TELEPHONE ENCOUNTER
Message given to Ehsan from [facility], who left a message for the pt to contact her back

## 2019-12-30 NOTE — TELEPHONE ENCOUNTER
Jung Farley Sat    Patient needs antibiotic RX for upcomming dental visit Thursday  Please use [facility] pharmacy on file

## 2019-12-30 NOTE — TELEPHONE ENCOUNTER
Please call in a prescription for this patient  It should read:    Cephalexin 5 mg   4 tablets 1 hour before   dental treatments  Repeat as necessary   number 40, no refills   please call patient when complete    Thank you

## 2019-12-30 NOTE — TELEPHONE ENCOUNTER
Dr Quintero   #: [phone]           Patient called in requesting a call back  She received a bill of $235 for the hospital bed that was order back in September  She stated she contacted [facility] and also [facility] to get more info but she has not heard back yet  She also stated she contacted her insurance and they stated it was not medically  Necessary  She would like to know what cans he do?  Please advise, thank you

## 2019-12-31 ENCOUNTER — APPOINTMENT (OUTPATIENT)
Dept: PHYSICAL THERAPY | Facility: CLINIC | Age: 69
End: 2019-12-31
Payer: MEDICARE

## 2020-01-07 ENCOUNTER — TELEPHONE (OUTPATIENT)
Dept: OBGYN CLINIC | Facility: OTHER | Age: 70
End: 2020-01-07

## 2020-01-07 NOTE — TELEPHONE ENCOUNTER
TO BE COMPLETED BY CENTRAL AUTH TEAM:     Physician: DR Luis Golden    Medication: Gillian Longoria     Number of Injections in Series  3 (Appointments scheduled 1 week apart from one another):     Schedule after this date:  1/24/20    Billing Info: Buy and Bill/Specialty Pharmacy-Patient Supply  BUY AND BILL    Appointment Message Line:    L KNEE EUFLEXXA #[phone]    (please copy and paste appointment message line when scheduling appointment)    Additional Comments:  LEFT MSG TO VERIFY MEDICARE PRIMARY AND SCHEDULE VISCO 3 SHOT SERIES

## 2020-01-20 ENCOUNTER — TELEPHONE (OUTPATIENT)
Dept: OBGYN CLINIC | Facility: OTHER | Age: 70
End: 2020-01-20

## 2020-01-29 NOTE — TELEPHONE ENCOUNTER
TO BE COMPLETED BY :     Office location appointment scheduled: [place]     Date of First Appointment scheduled: 2/19 @3pm    Additional Comments:

## 2020-02-19 ENCOUNTER — TELEPHONE (OUTPATIENT)
Dept: OBGYN CLINIC | Facility: HOSPITAL | Age: 70
End: 2020-02-19

## 2020-02-19 NOTE — TELEPHONE ENCOUNTER
Patient sees Dr Bonny Leung    After her hip surgery she was told she has to take antibiotics  She is asking if the antibiotic has to be taken before the Euflexxa injections  I asked Fazal Urena, she said no  Told patient, she understood        #[phone]

## 2020-02-19 NOTE — TELEPHONE ENCOUNTER
Patient was scheduled for her first visco injection today and is sick  Patient is now doing #1 on 2/26, #2 on 3/4 and we're looking at #3 on 3/11  She is already scheduled for a PO for her right hip on 03/11   would Dr Anthony Emanuel do the third injection (left knee) in the same appointment, on 03/11/2020 at 3:30PM or should we schedule for a different day? Please advise

## 2020-02-20 ENCOUNTER — OFFICE VISIT (OUTPATIENT)
Dept: PAIN MEDICINE | Facility: CLINIC | Age: 70
End: 2020-02-20
Payer: MEDICARE

## 2020-02-20 VITALS
HEART RATE: 70 BPM | SYSTOLIC BLOOD PRESSURE: 146 MMHG | BODY MASS INDEX: 39.85 KG/M2 | HEIGHT: 60 IN | DIASTOLIC BLOOD PRESSURE: 96 MMHG | WEIGHT: 203 LBS

## 2020-02-20 DIAGNOSIS — G89.29 CHRONIC BILATERAL LOW BACK PAIN, UNSPECIFIED WHETHER SCIATICA PRESENT: Primary | ICD-10-CM

## 2020-02-20 DIAGNOSIS — M54.2 NECK PAIN: ICD-10-CM

## 2020-02-20 DIAGNOSIS — M47.816 LUMBAR SPONDYLOSIS: ICD-10-CM

## 2020-02-20 DIAGNOSIS — M54.50 CHRONIC BILATERAL LOW BACK PAIN, UNSPECIFIED WHETHER SCIATICA PRESENT: Primary | ICD-10-CM

## 2020-02-20 PROCEDURE — 2022F DILAT RTA XM EVC RTNOPTHY: CPT | Performed by: NURSE PRACTITIONER

## 2020-02-20 PROCEDURE — 99214 OFFICE O/P EST MOD 30 MIN: CPT | Performed by: NURSE PRACTITIONER

## 2020-02-20 PROCEDURE — 3077F SYST BP >= 140 MM HG: CPT | Performed by: NURSE PRACTITIONER

## 2020-02-20 PROCEDURE — 1036F TOBACCO NON-USER: CPT | Performed by: NURSE PRACTITIONER

## 2020-02-20 PROCEDURE — 1160F RVW MEDS BY RX/DR IN RCRD: CPT | Performed by: NURSE PRACTITIONER

## 2020-02-20 PROCEDURE — 4040F PNEUMOC VAC/ADMIN/RCVD: CPT | Performed by: NURSE PRACTITIONER

## 2020-02-20 PROCEDURE — 3008F BODY MASS INDEX DOCD: CPT | Performed by: NURSE PRACTITIONER

## 2020-02-20 PROCEDURE — 3080F DIAST BP >= 90 MM HG: CPT | Performed by: NURSE PRACTITIONER

## 2020-02-20 NOTE — PROGRESS NOTES
Assessment:  1  Chronic bilateral low back pain, unspecified whether sciatica present    2  Lumbar spondylosis    3  Neck pain        Plan:  1  Patient continues with ongoing relief of low back pain and neuropathic symptoms into the lower extremity status post LESI in July 2019  We can repeat this injection as needed  2  Patient is complaining of intermittent neck pain with radiation into the upper extremities  She denies any neuropathic complaints  It seems largely myofascial in nature  She will try taking some methocarbamol that she has at home as well as topical heat application  She will keep me updated on improvement  I offered to order a cervical xray however shed like to hold off on this at this time  3  Patient may continue gabapentin 300 mg b i d  As prescribed by her PCP  She might consider titrating down to 300mg daily to see how her pain does  4  Patient may benefit from EMG of the BLE since her symptoms may be secondary to a peripheral neuropathy  5  The patient will follow-up in 8 weeks for medication prescription refill and reevaluation  The patient was advised to contact the office should their symptoms worsen in the interim  The patient was agreeable and verbalized an understanding  M*Modal software was used to dictate this note  It may contain errors with dictating incorrect words or incorrect spelling  Please contact the provider directly with any questions  I attest that I have spent at least 25 minutes face to face with the patient and that at least 50% of the time was spent educating and/or discussing the patient's symptoms and treatment plan options  History of Present Illness: The patient is a 68 y o  female status post right total hip replacement last seen on 12/12/19 who presents for a follow up office visit in regards to chronic lumbosacral back pain secondary to lumbar spondylosis, lumbar stenosis, lumbar radiculopathy and chronic pain syndrome    The patient will occasionally experience neuropathic symptoms into the feet, however states they are intermittent in self-limiting  She is status post LESI in July 2019 with ongoing relief of her low back pain  She was complaining of right knee pain in the past, however states this has improved since her right total hip replacement  She is currently receiving Euflexxa injections into the left knee from orthopedics  At today's office visit, the patient complains of intermittent neck pain with radiation into the upper extremities in no specific dermatomal distribution  She denies numbness, tingling or paresthesias  The patient currently rates her pain a 2/10 on the numeric pain rating scale  She states her pain is intermittent in nature bothersome the morning and at night  She characterizes the pain as dull aching, sharp, pressure like and pins and needles  Current pain medications includes:  Gabapentin 300 mg b i d, ibuprofen p r n  And methocarbamol 500 mg p r n  Myofascial pain     The patient reports that this regimen is providing 75% pain relief  The patient is reporting no side effects from this pain medication regimen  I have personally reviewed and/or updated the patient's past medical history, past surgical history, family history, social history, current medications, allergies, and vital signs today  Review of Systems:    Review of Systems   Respiratory: Negative for shortness of breath  Cardiovascular: Negative for chest pain  Gastrointestinal: Negative for constipation, diarrhea, nausea and vomiting  Musculoskeletal: Negative for arthralgias, gait problem, joint swelling and myalgias  Skin: Negative for rash  Neurological: Negative for dizziness, seizures and weakness  All other systems reviewed and are negative          Past Medical History:   Diagnosis Date    Alopecia     Calcaneal spur     [facility]     [facility]     Hyperlipidemia     Hypertension     Osteoarthrosis 2/13/2013    Pes planus     unspecified laterality    Plantar fasciitis        Past Surgical History:   Procedure Laterality Date    CARPAL TUNNEL RELEASE      COLONOSCOPY      May 2006    INCISIONAL BREAST BIOPSY      LA COLONOSCOPY FLX DX W/COLLJ SPEC WHEN PFRMD N/A 5/4/2018    Procedure: COLONOSCOPY;  Surgeon: Ynes Longoria MD;  [facility]; Service: Colorectal    LA TOTAL HIP ARTHROPLASTY Right 9/23/2019    Procedure: ARTHROPLASTY HIP TOTAL;  Surgeon: Janna Ibarra MD;  [facility];  Service: Orthopedics    WISDOM TOOTH EXTRACTION         Family History   Problem Relation Age of Onset    Diabetes Mother     Hypertension Mother     Emphysema Father     Cancer Brother     Colon cancer Brother 40       Social History     Occupational History    Not on file   Tobacco Use    Smoking status: Never Smoker    Smokeless tobacco: Never Used   Substance and Sexual Activity    Alcohol use:  Yes     Alcohol/week: 0 0 standard drinks     Frequency: 2-4 times a month     Drinks per session: 1 or 2     Binge frequency: Never     Comment: 0    Drug use: No    Sexual activity: Not Currently         Current Outpatient Medications:     acetaminophen (TYLENOL) 500 mg tablet, Take 1,000 mg by mouth every 6 (six) hours as needed for mild pain, Disp: , Rfl:     ascorbic acid (VITAMIN C) 500 mg tablet, Take 1 tablet (500 mg total) by mouth 2 (two) times a day, Disp: 60 tablet, Rfl: 0    Biotin 1 MG CAPS, Take by mouth daily , Disp: , Rfl:     Calcium Carb-Cholecalciferol (CALCIUM 1000 + D) 1000-800 MG-UNIT TABS, Take by mouth, Disp: , Rfl:     cholecalciferol (VITAMIN D3) 1,000 units tablet, Take 1,000 Units by mouth daily, Disp: , Rfl:     ferrous sulfate 324 (65 Fe) mg, Take one tablet daily, every other day, Disp: 15 tablet, Rfl: 0    fluticasone (FLONASE) 50 mcg/act nasal spray, 1 spray into each nostril daily, Disp: 16 g, Rfl: 0    gabapentin (NEURONTIN) 300 mg capsule, 2 (two) times a day , Disp: , Rfl: 5    ibuprofen (MOTRIN) 600 mg tablet, TAKE 1 TABLET BY MOUTH EVERY 8 HOURS AS NEEDED FOR MODERATE PAIN, Disp: , Rfl: 3    metFORMIN (GLUCOPHAGE) 500 mg tablet, TAKE 1 TABLET BY MOUTH TWO TIMES DAILY WITH MEALS, Disp: 180 tablet, Rfl: 3    methocarbamol (ROBAXIN) 500 mg tablet, Take 1 tablet (500 mg total) by mouth 2 (two) times a day, Disp: 30 tablet, Rfl: 0    metoprolol tartrate (LOPRESSOR) 25 mg tablet, Take 1 tablet (25 mg total) by mouth every 12 (twelve) hours, Disp: 60 tablet, Rfl: 5    mometasone (ELOCON) 0 1 % ointment, APPLY TO RASH LESIONS TWO TIMES DAILY FOR 2 WEEKS THEN THREE TO FOUR DAYS PER WEEK AS NEEDED, Disp: , Rfl: 1    Multiple Vitamin (MULTI-VITAMIN DAILY PO), Take by mouth daily , Disp: , Rfl:     senna (SENOKOT) 8 6 mg, Take 1 tablet (8 6 mg total) by mouth 2 (two) times a day, Disp: 120 each, Rfl: 0    simvastatin (ZOCOR) 20 mg tablet, , Disp: , Rfl:     cephalexin (KEFLEX) 500 mg capsule, , Disp: , Rfl:     Allergies   Allergen Reactions    Meloxicam Hives    Nifedipine Edema    Sulfamethoxazole-Trimethoprim Edema       Physical Exam:    /96   Pulse 70   Ht 5' (1 524 m)   Wt 92 1 kg (203 lb)   BMI 39 65 kg/m²     Constitutional:normal, well developed, well nourished, alert, in no distress and non-toxic and no overt pain behavior  Eyes:anicteric  HEENT:grossly intact  Neck:supple, symmetric, trachea midline and no masses   Pulmonary:even and unlabored  Cardiovascular:No edema or pitting edema present  Skin:Normal without rashes or lesions and well hydrated  Psychiatric:Mood and affect appropriate  Neurologic:Cranial Nerves II-XII grossly intact  Musculoskeletal:Slightly antalgic gait, but steady without the use of assistive devices  Bilateral cervical paraspinal and trapezii musculature tender to palpation  Bilateral upper extremity strength 5/5 in all muscle groups    Negative Spurling's bilaterally  Imaging  No orders to display         No orders of the defined types were placed in this encounter

## 2020-02-20 NOTE — PATIENT INSTRUCTIONS
Cervical neck pillow  Start the methocarbamol for muscle pain and heat 20 minutes on and then off per hour

## 2020-02-23 DIAGNOSIS — M54.16 LUMBAR RADICULOPATHY: Primary | ICD-10-CM

## 2020-02-25 RX ORDER — GABAPENTIN 300 MG/1
CAPSULE ORAL
Qty: 90 CAPSULE | Refills: 5 | Status: SHIPPED | OUTPATIENT
Start: 2020-02-25 | End: 2020-11-09 | Stop reason: SDUPTHER

## 2020-02-26 ENCOUNTER — HOSPITAL ENCOUNTER (OUTPATIENT)
Dept: RADIOLOGY | Facility: HOSPITAL | Age: 70
Discharge: HOME | End: 2020-02-26
Attending: ORTHOPAEDIC SURGERY
Payer: MEDICARE

## 2020-02-26 ENCOUNTER — OFFICE VISIT (OUTPATIENT)
Dept: OBGYN CLINIC | Facility: HOSPITAL | Age: 70
End: 2020-02-26
Payer: MEDICARE

## 2020-02-26 VITALS
BODY MASS INDEX: 40.44 KG/M2 | HEIGHT: 60 IN | HEART RATE: 69 BPM | DIASTOLIC BLOOD PRESSURE: 98 MMHG | SYSTOLIC BLOOD PRESSURE: 153 MMHG | WEIGHT: 206 LBS

## 2020-02-26 DIAGNOSIS — Z96.641 AFTERCARE FOLLOWING RIGHT HIP JOINT REPLACEMENT SURGERY: Primary | ICD-10-CM

## 2020-02-26 DIAGNOSIS — M25.551 RIGHT HIP PAIN: ICD-10-CM

## 2020-02-26 DIAGNOSIS — Z96.641 AFTERCARE FOLLOWING RIGHT HIP JOINT REPLACEMENT SURGERY: ICD-10-CM

## 2020-02-26 DIAGNOSIS — Z47.1 AFTERCARE FOLLOWING RIGHT HIP JOINT REPLACEMENT SURGERY: ICD-10-CM

## 2020-02-26 DIAGNOSIS — M17.12 PRIMARY OSTEOARTHRITIS OF LEFT KNEE: ICD-10-CM

## 2020-02-26 DIAGNOSIS — Z47.1 AFTERCARE FOLLOWING RIGHT HIP JOINT REPLACEMENT SURGERY: Primary | ICD-10-CM

## 2020-02-26 PROCEDURE — 3080F DIAST BP >= 90 MM HG: CPT | Performed by: ORTHOPAEDIC SURGERY

## 2020-02-26 PROCEDURE — 4040F PNEUMOC VAC/ADMIN/RCVD: CPT | Performed by: ORTHOPAEDIC SURGERY

## 2020-02-26 PROCEDURE — 73502 X-RAY EXAM HIP UNI 2-3 VIEWS: CPT

## 2020-02-26 PROCEDURE — 20610 DRAIN/INJ JOINT/BURSA W/O US: CPT | Performed by: ORTHOPAEDIC SURGERY

## 2020-02-26 PROCEDURE — 99213 OFFICE O/P EST LOW 20 MIN: CPT | Performed by: ORTHOPAEDIC SURGERY

## 2020-02-26 PROCEDURE — 3077F SYST BP >= 140 MM HG: CPT | Performed by: ORTHOPAEDIC SURGERY

## 2020-02-26 PROCEDURE — 2022F DILAT RTA XM EVC RTNOPTHY: CPT | Performed by: ORTHOPAEDIC SURGERY

## 2020-02-26 PROCEDURE — 1036F TOBACCO NON-USER: CPT | Performed by: ORTHOPAEDIC SURGERY

## 2020-02-26 PROCEDURE — 3008F BODY MASS INDEX DOCD: CPT | Performed by: ORTHOPAEDIC SURGERY

## 2020-02-26 PROCEDURE — 1160F RVW MEDS BY RX/DR IN RCRD: CPT | Performed by: ORTHOPAEDIC SURGERY

## 2020-02-26 RX ORDER — HYALURONATE SODIUM 10 MG/ML
20 SYRINGE (ML) INTRAARTICULAR
Status: COMPLETED | OUTPATIENT
Start: 2020-02-26 | End: 2020-02-26

## 2020-02-26 RX ADMIN — Medication 20 MG: at 16:08

## 2020-02-26 NOTE — PROGRESS NOTES
Assessment:  1  Aftercare following right hip joint replacement surgery  XR hip/pelv 2-3 vws right if performed   2  Primary osteoarthritis of left knee  Large joint arthrocentesis: L knee       Plan:  The right hip is doing well  The patient is counseled on prophylactic antibiotic use prior to dental visits  She should follow up for hip in 6 months  The patient is provided with 1st left knee Euflexxa injection  She should follow up in one week for 2nd Euflexxa injection  The patient has had a hospital bed in her house due to medical necessity following her right total hip arthroplasty  To do next visit:  Return in about 1 week (around 3/4/2020) for also 6 months f/u for 1yr right ELSA   The above stated was discussed in layman's terms and the patient expressed understanding  All questions were answered to the patient's satisfaction  Scribe Attestation    I,:   Melissa Louise am acting as a scribe while in the presence of the attending physician :        I,:   Ramírez Scott MD personally performed the services described in this documentation    as scribed in my presence :              Subjective:   Sacha Mojica is a 68 y o  female who presents 5 months s/p right ELSA, 9/23/19 and 1st left knee Euflexxa injection  Today she complains of generalized left knee pain and no right hip complaints  Weight bearing can aggravate the left knee  Rest allevaites the knee pain          Review of systems negative unless otherwise specified in HPI    Past Medical History:   Diagnosis Date    Alopecia     Calcaneal spur     Diabetes insipidus ([address]     Diabetes mellitus ([address]     Hyperlipidemia     Hypertension     Osteoarthrosis 2/13/2013    Pes planus     unspecified laterality    Plantar fasciitis        Past Surgical History:   Procedure Laterality Date    CARPAL TUNNEL RELEASE      COLONOSCOPY      May 2006    INCISIONAL BREAST BIOPSY      NE COLONOSCOPY FLX DX W/COLLJ SPEC WHEN PFRMD N/A 5/4/2018    Procedure: COLONOSCOPY;  Surgeon: Alejandro Hwang MD;  [facility]; Service: Colorectal    [place] TOTAL HIP ARTHROPLASTY Right 9/23/2019    Procedure: ARTHROPLASTY HIP TOTAL;  Surgeon: Van Fernandez MD;  [facility];  Service: Orthopedics    WISDOM TOOTH EXTRACTION         Family History   Problem Relation Age of Onset    Diabetes Mother     Hypertension Mother     Emphysema Father     Cancer Brother     Colon cancer Brother 40       Social History     Occupational History    Not on file   Tobacco Use    Smoking status: Never Smoker    Smokeless tobacco: Never Used   Substance and Sexual Activity    Alcohol use:  Yes     Alcohol/week: 0 0 standard drinks     Frequency: 2-4 times a month     Drinks per session: 1 or 2     Binge frequency: Never     Comment: 0    Drug use: No    Sexual activity: Not Currently         Current Outpatient Medications:     acetaminophen (TYLENOL) 500 mg tablet, Take 1,000 mg by mouth every 6 (six) hours as needed for mild pain, Disp: , Rfl:     ascorbic acid (VITAMIN C) 500 mg tablet, Take 1 tablet (500 mg total) by mouth 2 (two) times a day, Disp: 60 tablet, Rfl: 0    Biotin 1 MG CAPS, Take by mouth daily , Disp: , Rfl:     Calcium Carb-Cholecalciferol (CALCIUM 1000 + D) 1000-800 MG-UNIT TABS, Take by mouth, Disp: , Rfl:     cephalexin (KEFLEX) 500 mg capsule, , Disp: , Rfl:     cholecalciferol (VITAMIN D3) 1,000 units tablet, Take 1,000 Units by mouth daily, Disp: , Rfl:     ferrous sulfate 324 (65 Fe) mg, Take one tablet daily, every other day, Disp: 15 tablet, Rfl: 0    fluticasone (FLONASE) 50 mcg/act nasal spray, 1 spray into each nostril daily, Disp: 16 g, Rfl: 0    gabapentin (NEURONTIN) 300 mg capsule, TAKE 1 CAPSULE BY MOUTH THREE TIMES DAILY FOR 30 DAYS, Disp: 90 capsule, Rfl: 5    ibuprofen (MOTRIN) 600 mg tablet, TAKE 1 TABLET BY MOUTH EVERY 8 HOURS AS NEEDED FOR MODERATE PAIN, Disp: , Rfl: 3    metFORMIN (GLUCOPHAGE) 500 mg tablet, TAKE 1 TABLET BY MOUTH TWO TIMES DAILY WITH MEALS, Disp: 180 tablet, Rfl: 3    methocarbamol (ROBAXIN) 500 mg tablet, Take 1 tablet (500 mg total) by mouth 2 (two) times a day, Disp: 30 tablet, Rfl: 0    metoprolol tartrate (LOPRESSOR) 25 mg tablet, Take 1 tablet (25 mg total) by mouth every 12 (twelve) hours, Disp: 60 tablet, Rfl: 5    mometasone (ELOCON) 0 1 % ointment, APPLY TO RASH LESIONS TWO TIMES DAILY FOR 2 WEEKS THEN THREE TO FOUR DAYS PER WEEK AS NEEDED, Disp: , Rfl: 1    Multiple Vitamin (MULTI-VITAMIN DAILY PO), Take by mouth daily , Disp: , Rfl:     senna (SENOKOT) 8 6 mg, Take 1 tablet (8 6 mg total) by mouth 2 (two) times a day, Disp: 120 each, Rfl: 0    simvastatin (ZOCOR) 20 mg tablet, , Disp: , Rfl:     Allergies   Allergen Reactions    Meloxicam Hives    Nifedipine Edema    Sulfamethoxazole-Trimethoprim Edema            Vitals:    02/26/20 1530   BP: 153/98   Pulse: 69       Objective:  Physical exam  · General: Awake, Alert, Oriented  · Eyes: Pupils equal, round and reactive to light  · Heart: regular rate and rhythm  · Lungs: No audible wheezing  · Abdomen: soft                    Ortho Exam   Left knee:  Varus alignment  No erythema or ecchymosis  No effusion or swelling  Normal strength  Good ROM with crepitus  Calf compartments soft and supple  Sensation intact  Toes are warm sensate and mobile    Right hip:  Well healed posterior scar  Appropriate warmth and swelling  Good arc of motion with no pain  Patient sits comfortably in chair with hip flexed at 90 degrees  Patient stands from seated position without assistance  Calf compartments soft and supple  Sensation intact  Toes are warm sensate and mobile      Diagnostics, reviewed and taken today if performed as documented: The attending physician has personally reviewed the pertinent films in PACS and interpretation is as follows:  Right hip x-ray:  Well aligned prosthesis with no acute changes        Procedures, if performed today:    Large joint arthrocentesis: L knee  Date/Time: 2/26/2020 4:08 PM  Consent given by: patient  Site marked: site marked  Timeout: Immediately prior to procedure a time out was called to verify the correct patient, procedure, equipment, support staff and site/side marked as required   Supporting Documentation  Indications: pain   Procedure Details  Location: knee - L knee  Preparation: Patient was prepped and draped in the usual sterile fashion  Needle size: 22 G  Ultrasound guidance: no  Approach: anterolateral  Medications administered: 20 mg Sodium Hyaluronate 20 MG/2ML    Patient tolerance: patient tolerated the procedure well with no immediate complications  Dressing:  Sterile dressing applied              Portions of the record may have been created with voice recognition software  Occasional wrong word or "sound a like" substitutions may have occurred due to the inherent limitations of voice recognition software  Read the chart carefully and recognize, using context, where substitutions have occurred

## 2020-03-04 ENCOUNTER — OFFICE VISIT (OUTPATIENT)
Dept: OBGYN CLINIC | Facility: HOSPITAL | Age: 70
End: 2020-03-04
Payer: MEDICARE

## 2020-03-04 VITALS
BODY MASS INDEX: 40.23 KG/M2 | DIASTOLIC BLOOD PRESSURE: 86 MMHG | HEART RATE: 68 BPM | HEIGHT: 60 IN | SYSTOLIC BLOOD PRESSURE: 151 MMHG

## 2020-03-04 DIAGNOSIS — Z96.641 STATUS POST TOTAL HIP REPLACEMENT, RIGHT: ICD-10-CM

## 2020-03-04 DIAGNOSIS — G89.29 CHRONIC PAIN OF LEFT KNEE: Primary | ICD-10-CM

## 2020-03-04 DIAGNOSIS — M17.12 LOCALIZED OSTEOARTHRITIS OF LEFT KNEE: ICD-10-CM

## 2020-03-04 DIAGNOSIS — M25.562 CHRONIC PAIN OF LEFT KNEE: Primary | ICD-10-CM

## 2020-03-04 PROCEDURE — 2022F DILAT RTA XM EVC RTNOPTHY: CPT | Performed by: PHYSICIAN ASSISTANT

## 2020-03-04 PROCEDURE — 1036F TOBACCO NON-USER: CPT | Performed by: PHYSICIAN ASSISTANT

## 2020-03-04 PROCEDURE — 20610 DRAIN/INJ JOINT/BURSA W/O US: CPT | Performed by: PHYSICIAN ASSISTANT

## 2020-03-04 PROCEDURE — 4040F PNEUMOC VAC/ADMIN/RCVD: CPT | Performed by: PHYSICIAN ASSISTANT

## 2020-03-04 PROCEDURE — 3077F SYST BP >= 140 MM HG: CPT | Performed by: PHYSICIAN ASSISTANT

## 2020-03-04 PROCEDURE — 1160F RVW MEDS BY RX/DR IN RCRD: CPT | Performed by: PHYSICIAN ASSISTANT

## 2020-03-04 PROCEDURE — 3079F DIAST BP 80-89 MM HG: CPT | Performed by: PHYSICIAN ASSISTANT

## 2020-03-04 RX ORDER — HYALURONATE SODIUM 10 MG/ML
20 SYRINGE (ML) INTRAARTICULAR
Status: COMPLETED | OUTPATIENT
Start: 2020-03-04 | End: 2020-03-04

## 2020-03-04 RX ADMIN — Medication 20 MG: at 15:40

## 2020-03-04 NOTE — PROGRESS NOTES
Assessment:  1  Chronic pain of left knee     2  Status post total hip replacement, right     3  Localized osteoarthritis of left knee       Patient Active Problem List   Diagnosis    Benign essential hypertension    Cervical radiculopathy    Chronic lower back pain    Diabetes mellitus type 2, controlled ([address] )    Hyperlipidemia    Left knee pain    Lumbar stenosis with neurogenic claudication    Obesity (BMI 30-39  9)    Osteoarthrosis    Post-menopausal bleeding    Primary osteoarthritis of both knees    Psoriasis    Right knee pain    Right shoulder pain    Spondylolisthesis, lumbar region    Vaginal lump    Vitamin D deficiency    Effusion of left knee    Right hip pain    Arthritis of right hip    Lumbar radiculopathy    DDD (degenerative disc disease), lumbar    Lumbar spondylosis    Sacroiliitis (HCC)    Status post total hip replacement, right    Supraventricular tachycardia ([address] )    Aftercare following right hip joint replacement surgery           Plan            Left knee Euflexxa #2    Large joint arthrocentesis: L knee  Date/Time: 3/4/2020 3:40 PM  Consent given by: patient  Supporting Documentation  Indications: pain   Procedure Details  Location: knee - L knee  Needle size: 22 G  Medications administered: 20 mg Sodium Hyaluronate 20 MG/2ML    Patient tolerance: patient tolerated the procedure well with no immediate complications          Subjective:     Patient ID:    Chief Complaint:Pagona [de-identified] 68 y o  female      HPI    Here for 2nd Euflexxa injection into the left knee    No complaints or post-injection issues last week      The following portions of the patient's history were reviewed and updated as appropriate: allergies, current medications, past family history, past social history, past surgical history and problem list     All organ systems normal    Social History     Socioeconomic History    Marital status: Single     Spouse name: Not on file    Number of children: Not on file    Years of education: Not on file    Highest education level: Not on file   Occupational History    Not on file   Social Needs    Financial resource strain: Not on file    Food insecurity:     Worry: Not on file     Inability: Not on file    Transportation needs:     Medical: Not on file     Non-medical: Not on file   Tobacco Use    Smoking status: Never Smoker    Smokeless tobacco: Never Used   Substance and Sexual Activity    Alcohol use: Yes     Alcohol/week: 0 0 standard drinks     Frequency: 2-4 times a month     Drinks per session: 1 or 2     Binge frequency: Never     Comment: 0    Drug use: No    Sexual activity: Not Currently   Lifestyle    Physical activity:     Days per week: Not on file     Minutes per session: Not on file    Stress: Not on file   Relationships    Social connections:     Talks on phone: Not on file     Gets together: Not on file     Attends Jew service: Not on file     Active member of club or organization: Not on file     Attends meetings of clubs or organizations: Not on file     Relationship status: Not on file    Intimate partner violence:     Fear of current or ex partner: Not on file     Emotionally abused: Not on file     Physically abused: Not on file     Forced sexual activity: Not on file   Other Topics Concern    Not on file   Social History Narrative    Not on file     Past Medical History:   Diagnosis Date    Alopecia     Calcaneal spur     Diabetes insipidus [facility]     Diabetes mellitus ([facility]     Hyperlipidemia     Hypertension     Osteoarthrosis 2/13/2013    Pes planus     unspecified laterality    Plantar fasciitis      Past Surgical History:   Procedure Laterality Date    CARPAL TUNNEL RELEASE      COLONOSCOPY      May 2006    INCISIONAL BREAST BIOPSY      [place] COLONOSCOPY FLX DX W/COLLJ SPEC WHEN PFRMD N/A 5/4/2018    Procedure: COLONOSCOPY;  Surgeon: Isak Rogers MD;  [facility];   Service: Colorectal    [place] TOTAL HIP ARTHROPLASTY Right 9/23/2019    Procedure: ARTHROPLASTY HIP TOTAL;  Surgeon: Ramírez Scott MD;  [facility];  Service: Orthopedics    WISDOM TOOTH EXTRACTION       Allergies   Allergen Reactions    Meloxicam Hives    Nifedipine Edema    Sulfamethoxazole-Trimethoprim Edema     Current Outpatient Medications on File Prior to Visit   Medication Sig Dispense Refill    acetaminophen (TYLENOL) 500 mg tablet Take 1,000 mg by mouth every 6 (six) hours as needed for mild pain      ascorbic acid (VITAMIN C) 500 mg tablet Take 1 tablet (500 mg total) by mouth 2 (two) times a day 60 tablet 0    Biotin 1 MG CAPS Take by mouth daily       Calcium Carb-Cholecalciferol (CALCIUM 1000 + D) 1000-800 MG-UNIT TABS Take by mouth      cephalexin (KEFLEX) 500 mg capsule       cholecalciferol (VITAMIN D3) 1,000 units tablet Take 1,000 Units by mouth daily      ferrous sulfate 324 (65 Fe) mg Take one tablet daily, every other day 15 tablet 0    fluticasone (FLONASE) 50 mcg/act nasal spray 1 spray into each nostril daily 16 g 0    gabapentin (NEURONTIN) 300 mg capsule TAKE 1 CAPSULE BY MOUTH THREE TIMES DAILY FOR 30 DAYS 90 capsule 5    ibuprofen (MOTRIN) 600 mg tablet TAKE 1 TABLET BY MOUTH EVERY 8 HOURS AS NEEDED FOR MODERATE PAIN  3    metFORMIN (GLUCOPHAGE) 500 mg tablet TAKE 1 TABLET BY MOUTH TWO TIMES DAILY WITH MEALS 180 tablet 3    methocarbamol (ROBAXIN) 500 mg tablet Take 1 tablet (500 mg total) by mouth 2 (two) times a day 30 tablet 0    metoprolol tartrate (LOPRESSOR) 25 mg tablet Take 1 tablet (25 mg total) by mouth every 12 (twelve) hours 60 tablet 5    mometasone (ELOCON) 0 1 % ointment APPLY TO RASH LESIONS TWO TIMES DAILY FOR 2 WEEKS THEN THREE TO FOUR DAYS PER WEEK AS NEEDED  1    Multiple Vitamin (MULTI-VITAMIN DAILY PO) Take by mouth daily       senna (SENOKOT) 8 6 mg Take 1 tablet (8 6 mg total) by mouth 2 (two) times a day 120 each 0    simvastatin (ZOCOR) 20 mg tablet        No current facility-administered medications on file prior to visit  Objective:        Left Knee Exam     Other   Effusion: no effusion present                      Portions of the record may have been created with voice recognition software   Occasional wrong word or "sound a like" substitutions may have occurred due to the inherent limitations of voice recognition software   Read the chart carefully and recognize, using context, where substitutions have occurred

## 2020-03-05 ENCOUNTER — TELEPHONE (OUTPATIENT)
Dept: OBGYN CLINIC | Facility: HOSPITAL | Age: 70
End: 2020-03-05

## 2020-03-05 NOTE — TELEPHONE ENCOUNTER
Patient is calling about a letter that was supposed to be put in her chart & sent to [facility] for a hospital bed  Patient is stating that she was billed much more than expected  Patient was told in October that she would pay $5 per month but instead she is being billed over $100 because the claim was denied by her insurance due to no information recording that it was a medical necessity  Patient was told at yesterday's appt that it should have been taken care of but she didn't know  Patient called [facility] this morning & discovered that there has been no communication to them  Patient had previously spoke with Josh Brunner at our office about this   I called to speak to [address] she said she would see what she could do        [phone]

## 2020-03-06 NOTE — TELEPHONE ENCOUNTER
[facility] was contacted to question what they actually needed  They report they do not need anything from us, as this is an insurance issue  Both of the patients insurance denied the hospital bed coverage  Per [facility] they do not need any further documentation  I called patient and left a VM for her to call me  She will have to contact insurance for discussion of coverage  I requested patient contact me to discuss

## 2020-03-06 NOTE — TELEPHONE ENCOUNTER
This note should be found in the communication section of her chart more in her last office visit  Once located this can be faxed to [address] on behalf of this patient    Thank you

## 2020-03-11 ENCOUNTER — OFFICE VISIT (OUTPATIENT)
Dept: OBGYN CLINIC | Facility: HOSPITAL | Age: 70
End: 2020-03-11
Payer: MEDICARE

## 2020-03-11 VITALS
SYSTOLIC BLOOD PRESSURE: 160 MMHG | HEART RATE: 67 BPM | BODY MASS INDEX: 40.44 KG/M2 | DIASTOLIC BLOOD PRESSURE: 97 MMHG | WEIGHT: 206 LBS | HEIGHT: 60 IN

## 2020-03-11 DIAGNOSIS — G89.29 CHRONIC PAIN OF LEFT KNEE: Primary | ICD-10-CM

## 2020-03-11 DIAGNOSIS — M17.12 PRIMARY OSTEOARTHRITIS OF LEFT KNEE: ICD-10-CM

## 2020-03-11 DIAGNOSIS — M25.562 CHRONIC PAIN OF LEFT KNEE: Primary | ICD-10-CM

## 2020-03-11 PROCEDURE — 3077F SYST BP >= 140 MM HG: CPT | Performed by: ORTHOPAEDIC SURGERY

## 2020-03-11 PROCEDURE — 1160F RVW MEDS BY RX/DR IN RCRD: CPT | Performed by: ORTHOPAEDIC SURGERY

## 2020-03-11 PROCEDURE — 3008F BODY MASS INDEX DOCD: CPT | Performed by: ORTHOPAEDIC SURGERY

## 2020-03-11 PROCEDURE — 20610 DRAIN/INJ JOINT/BURSA W/O US: CPT | Performed by: ORTHOPAEDIC SURGERY

## 2020-03-11 PROCEDURE — 4040F PNEUMOC VAC/ADMIN/RCVD: CPT | Performed by: ORTHOPAEDIC SURGERY

## 2020-03-11 PROCEDURE — 3080F DIAST BP >= 90 MM HG: CPT | Performed by: ORTHOPAEDIC SURGERY

## 2020-03-11 PROCEDURE — 2022F DILAT RTA XM EVC RTNOPTHY: CPT | Performed by: ORTHOPAEDIC SURGERY

## 2020-03-11 RX ORDER — HYALURONATE SODIUM 10 MG/ML
20 SYRINGE (ML) INTRAARTICULAR
Status: COMPLETED | OUTPATIENT
Start: 2020-03-11 | End: 2020-03-11

## 2020-03-11 RX ADMIN — Medication 20 MG: at 16:21

## 2020-03-11 NOTE — PROGRESS NOTES
Assessment:   Diagnosis ICD-10-CM Associated Orders   1  Chronic pain of left knee M25 562 Large joint arthrocentesis: L knee    G89 29    2  Primary osteoarthritis of left knee M17 12 Large joint arthrocentesis: L knee       Plan:  Left knee osteoarthritis  Her left knee was injected with the 3rd and last euflexxa injection today  She tolerated the injection well  Ice and post-injection protocol advised  Weight bearing and activities as tolerated  Patient was educated on maintaining a healthy lifestyle with proper weight management/loss and physician recommended home exercise program/routine for regular fitness  To do next visit:  Return in about 3 months (around 6/11/2020) for re-check  The above stated was discussed in layman's terms and the patient expressed understanding  All questions were answered to the patient's satisfaction  Scribe Attestation    I,:   Dilcia Alvares am acting as a scribe while in the presence of the attending physician :        I,:   Vena Blizzard, MD personally performed the services described in this documentation    as scribed in my presence :              Subjective:   Gemma Duverney is a 68 y o  female who presents today for the 3rd and last injection of the Euflexxa series to her left knee for ongoing treatment of her known osteoarthritis  She has felt some improvement of her left knee complaints thus far after the first 2 injections  No indications not to conclude the visco series today         Review of systems negative unless otherwise specified in HPI    Past Medical History:   Diagnosis Date    Alopecia     Calcaneal spur     Diabetes insipidus ([address] )     Diabetes mellitus ([address] )     Hyperlipidemia     Hypertension     Osteoarthrosis 2/13/2013    Pes planus     unspecified laterality    Plantar fasciitis        Past Surgical History:   Procedure Laterality Date    CARPAL TUNNEL RELEASE      COLONOSCOPY      May 2006    INCISIONAL BREAST BIOPSY      [place] COLONOSCOPY FLX DX W/COLLJ SPEC WHEN PFRMD N/A 5/4/2018    Procedure: COLONOSCOPY;  Surgeon: Lia Rankin MD;  [facility]; Service: Colorectal    [place] TOTAL HIP ARTHROPLASTY Right 9/23/2019    Procedure: ARTHROPLASTY HIP TOTAL;  Surgeon: Reyna Mccarty MD;  [facility];  Service: Orthopedics    WISDOM TOOTH EXTRACTION         Family History   Problem Relation Age of Onset    Diabetes Mother     Hypertension Mother     Emphysema Father     Cancer Brother     Colon cancer Brother 40       Social History     Occupational History    Not on file   Tobacco Use    Smoking status: Never Smoker    Smokeless tobacco: Never Used   Substance and Sexual Activity    Alcohol use:  Yes     Alcohol/week: 0 0 standard drinks     Frequency: 2-4 times a month     Drinks per session: 1 or 2     Binge frequency: Never     Comment: 0    Drug use: No    Sexual activity: Not Currently         Current Outpatient Medications:     acetaminophen (TYLENOL) 500 mg tablet, Take 1,000 mg by mouth every 6 (six) hours as needed for mild pain, Disp: , Rfl:     ascorbic acid (VITAMIN C) 500 mg tablet, Take 1 tablet (500 mg total) by mouth 2 (two) times a day, Disp: 60 tablet, Rfl: 0    Biotin 1 MG CAPS, Take by mouth daily , Disp: , Rfl:     Calcium Carb-Cholecalciferol (CALCIUM 1000 + D) 1000-800 MG-UNIT TABS, Take by mouth, Disp: , Rfl:     cephalexin (KEFLEX) 500 mg capsule, , Disp: , Rfl:     cholecalciferol (VITAMIN D3) 1,000 units tablet, Take 1,000 Units by mouth daily, Disp: , Rfl:     ferrous sulfate 324 (65 Fe) mg, Take one tablet daily, every other day, Disp: 15 tablet, Rfl: 0    gabapentin (NEURONTIN) 300 mg capsule, TAKE 1 CAPSULE BY MOUTH THREE TIMES DAILY FOR 30 DAYS, Disp: 90 capsule, Rfl: 5    ibuprofen (MOTRIN) 600 mg tablet, TAKE 1 TABLET BY MOUTH EVERY 8 HOURS AS NEEDED FOR MODERATE PAIN, Disp: , Rfl: 3    metFORMIN (GLUCOPHAGE) 500 mg tablet, TAKE 1 TABLET BY MOUTH TWO TIMES DAILY WITH MEALS, Disp: 180 tablet, Rfl: 3    methocarbamol (ROBAXIN) 500 mg tablet, Take 1 tablet (500 mg total) by mouth 2 (two) times a day, Disp: 30 tablet, Rfl: 0    metoprolol tartrate (LOPRESSOR) 25 mg tablet, Take 1 tablet (25 mg total) by mouth every 12 (twelve) hours, Disp: 60 tablet, Rfl: 5    Multiple Vitamin (MULTI-VITAMIN DAILY PO), Take by mouth daily , Disp: , Rfl:     senna (SENOKOT) 8 6 mg, Take 1 tablet (8 6 mg total) by mouth 2 (two) times a day, Disp: 120 each, Rfl: 0    simvastatin (ZOCOR) 20 mg tablet, , Disp: , Rfl:     fluticasone (FLONASE) 50 mcg/act nasal spray, 1 spray into each nostril daily, Disp: 16 g, Rfl: 0    mometasone (ELOCON) 0 1 % ointment, APPLY TO RASH LESIONS TWO TIMES DAILY FOR 2 WEEKS THEN THREE TO FOUR DAYS PER WEEK AS NEEDED, Disp: , Rfl: 1    Allergies   Allergen Reactions    Meloxicam Hives    Nifedipine Edema    Sulfamethoxazole-Trimethoprim Edema            Vitals:    03/11/20 1613   BP: 160/97   Pulse: 67       Objective:                    Left Knee Exam     Muscle Strength   The patient has normal left knee strength  Tenderness   The patient is experiencing tenderness in the medial joint line  Range of Motion   The patient has normal left knee ROM  Left knee flexion: with less crepitation and stiffness       Other   Erythema: absent  Sensation: normal  Swelling: mild  Effusion: no effusion present    Comments:    Moderate varus alignment             Diagnostics, reviewed and taken today if performed as documented:    None performed          Procedures, if performed today:    Large joint arthrocentesis: L knee  Date/Time: 3/11/2020 4:21 PM  Consent given by: patient  Site marked: site marked  Timeout: Immediately prior to procedure a time out was called to verify the correct patient, procedure, equipment, support staff and site/side marked as required   Supporting Documentation  Indications: pain and diagnostic evaluation   Procedure Details  Location: knee - L knee  Preparation: Patient was prepped and draped in the usual sterile fashion  Needle size: 22 G  Ultrasound guidance: no  Approach: anterolateral  Medications administered: 20 mg Sodium Hyaluronate 20 MG/2ML    Patient tolerance: patient tolerated the procedure well with no immediate complications  Dressing:  Sterile dressing applied            Portions of the record may have been created with voice recognition software  Occasional wrong word or "sound a like" substitutions may have occurred due to the inherent limitations of voice recognition software  Read the chart carefully and recognize, using context, where substitutions have occurred

## 2020-03-12 NOTE — TELEPHONE ENCOUNTER
Pt called me back today, and I relayed the message I left her last week  That at this point [facility] reports this is an insurance issue, and that they have "all documentation" needed  Pt going to call insurance and update me with any way we can help

## 2020-04-20 ENCOUNTER — TELEMEDICINE (OUTPATIENT)
Dept: PAIN MEDICINE | Facility: CLINIC | Age: 70
End: 2020-04-20
Payer: MEDICARE

## 2020-04-20 DIAGNOSIS — M43.16 SPONDYLOLISTHESIS, LUMBAR REGION: ICD-10-CM

## 2020-04-20 DIAGNOSIS — M48.062 LUMBAR STENOSIS WITH NEUROGENIC CLAUDICATION: ICD-10-CM

## 2020-04-20 DIAGNOSIS — M54.12 CERVICAL RADICULOPATHY: ICD-10-CM

## 2020-04-20 DIAGNOSIS — M51.36 DDD (DEGENERATIVE DISC DISEASE), LUMBAR: ICD-10-CM

## 2020-04-20 DIAGNOSIS — M47.816 LUMBAR SPONDYLOSIS: ICD-10-CM

## 2020-04-20 DIAGNOSIS — M54.16 LUMBAR RADICULOPATHY: Primary | ICD-10-CM

## 2020-04-20 PROCEDURE — 99443 PR PHYS/QHP TELEPHONE EVALUATION 21-30 MIN: CPT | Performed by: NURSE PRACTITIONER

## 2020-04-25 DIAGNOSIS — Z96.641 STATUS POST TOTAL HIP REPLACEMENT, RIGHT: ICD-10-CM

## 2020-04-25 DIAGNOSIS — I47.1 SVT (SUPRAVENTRICULAR TACHYCARDIA) (HCC): ICD-10-CM

## 2020-06-18 DIAGNOSIS — M54.16 LUMBAR RADICULOPATHY: ICD-10-CM

## 2020-06-18 DIAGNOSIS — M54.12 CERVICAL RADICULOPATHY: Primary | ICD-10-CM

## 2020-06-18 RX ORDER — IBUPROFEN 600 MG/1
TABLET ORAL
Qty: 30 TABLET | Refills: 3 | Status: SHIPPED | OUTPATIENT
Start: 2020-06-18 | End: 2022-03-02 | Stop reason: ALTCHOICE

## 2020-06-30 ENCOUNTER — OFFICE VISIT (OUTPATIENT)
Dept: OBGYN CLINIC | Facility: HOSPITAL | Age: 70
End: 2020-06-30
Payer: MEDICARE

## 2020-06-30 VITALS
BODY MASS INDEX: 40.84 KG/M2 | HEIGHT: 60 IN | HEART RATE: 80 BPM | SYSTOLIC BLOOD PRESSURE: 165 MMHG | WEIGHT: 208 LBS | DIASTOLIC BLOOD PRESSURE: 107 MMHG

## 2020-06-30 DIAGNOSIS — M25.562 CHRONIC PAIN OF LEFT KNEE: ICD-10-CM

## 2020-06-30 DIAGNOSIS — G89.29 CHRONIC PAIN OF LEFT KNEE: ICD-10-CM

## 2020-06-30 DIAGNOSIS — M17.12 PRIMARY OSTEOARTHRITIS OF LEFT KNEE: Primary | ICD-10-CM

## 2020-06-30 PROCEDURE — 1160F RVW MEDS BY RX/DR IN RCRD: CPT | Performed by: ORTHOPAEDIC SURGERY

## 2020-06-30 PROCEDURE — 3008F BODY MASS INDEX DOCD: CPT | Performed by: ORTHOPAEDIC SURGERY

## 2020-06-30 PROCEDURE — 4040F PNEUMOC VAC/ADMIN/RCVD: CPT | Performed by: ORTHOPAEDIC SURGERY

## 2020-06-30 PROCEDURE — 3080F DIAST BP >= 90 MM HG: CPT | Performed by: ORTHOPAEDIC SURGERY

## 2020-06-30 PROCEDURE — 2022F DILAT RTA XM EVC RTNOPTHY: CPT | Performed by: ORTHOPAEDIC SURGERY

## 2020-06-30 PROCEDURE — 20610 DRAIN/INJ JOINT/BURSA W/O US: CPT | Performed by: ORTHOPAEDIC SURGERY

## 2020-06-30 PROCEDURE — S0020 INJECTION, BUPIVICAINE HYDRO: HCPCS | Performed by: ORTHOPAEDIC SURGERY

## 2020-06-30 PROCEDURE — 1036F TOBACCO NON-USER: CPT | Performed by: ORTHOPAEDIC SURGERY

## 2020-06-30 PROCEDURE — 99213 OFFICE O/P EST LOW 20 MIN: CPT | Performed by: ORTHOPAEDIC SURGERY

## 2020-06-30 PROCEDURE — 3077F SYST BP >= 140 MM HG: CPT | Performed by: ORTHOPAEDIC SURGERY

## 2020-06-30 RX ORDER — BUPIVACAINE HYDROCHLORIDE 2.5 MG/ML
2 INJECTION, SOLUTION INFILTRATION; PERINEURAL
Status: COMPLETED | OUTPATIENT
Start: 2020-06-30 | End: 2020-06-30

## 2020-06-30 RX ORDER — LIDOCAINE HYDROCHLORIDE 10 MG/ML
2 INJECTION, SOLUTION INFILTRATION; PERINEURAL
Status: COMPLETED | OUTPATIENT
Start: 2020-06-30 | End: 2020-06-30

## 2020-06-30 RX ORDER — KETOROLAC TROMETHAMINE 30 MG/ML
60 INJECTION, SOLUTION INTRAMUSCULAR; INTRAVENOUS
Status: COMPLETED | OUTPATIENT
Start: 2020-06-30 | End: 2020-06-30

## 2020-06-30 RX ADMIN — LIDOCAINE HYDROCHLORIDE 2 ML: 10 INJECTION, SOLUTION INFILTRATION; PERINEURAL at 16:21

## 2020-06-30 RX ADMIN — BUPIVACAINE HYDROCHLORIDE 2 ML: 2.5 INJECTION, SOLUTION INFILTRATION; PERINEURAL at 16:21

## 2020-06-30 RX ADMIN — KETOROLAC TROMETHAMINE 60 MG: 30 INJECTION, SOLUTION INTRAMUSCULAR; INTRAVENOUS at 16:21

## 2020-07-02 ENCOUNTER — OFFICE VISIT (OUTPATIENT)
Dept: CARDIOLOGY CLINIC | Facility: CLINIC | Age: 70
End: 2020-07-02
Payer: MEDICARE

## 2020-07-02 VITALS
HEART RATE: 76 BPM | HEIGHT: 60 IN | OXYGEN SATURATION: 98 % | WEIGHT: 207 LBS | SYSTOLIC BLOOD PRESSURE: 162 MMHG | TEMPERATURE: 97.5 F | DIASTOLIC BLOOD PRESSURE: 98 MMHG | BODY MASS INDEX: 40.64 KG/M2

## 2020-07-02 DIAGNOSIS — E78.5 HYPERLIPIDEMIA, UNSPECIFIED HYPERLIPIDEMIA TYPE: ICD-10-CM

## 2020-07-02 DIAGNOSIS — I10 BENIGN ESSENTIAL HYPERTENSION: Primary | ICD-10-CM

## 2020-07-02 DIAGNOSIS — I47.1 SUPRAVENTRICULAR TACHYCARDIA (HCC): ICD-10-CM

## 2020-07-02 DIAGNOSIS — I47.1 SVT (SUPRAVENTRICULAR TACHYCARDIA) (HCC): ICD-10-CM

## 2020-07-02 PROCEDURE — 2022F DILAT RTA XM EVC RTNOPTHY: CPT | Performed by: INTERNAL MEDICINE

## 2020-07-02 PROCEDURE — 3077F SYST BP >= 140 MM HG: CPT | Performed by: INTERNAL MEDICINE

## 2020-07-02 PROCEDURE — 99214 OFFICE O/P EST MOD 30 MIN: CPT | Performed by: INTERNAL MEDICINE

## 2020-07-02 PROCEDURE — 3008F BODY MASS INDEX DOCD: CPT | Performed by: INTERNAL MEDICINE

## 2020-07-02 PROCEDURE — 1036F TOBACCO NON-USER: CPT | Performed by: INTERNAL MEDICINE

## 2020-07-02 PROCEDURE — 3080F DIAST BP >= 90 MM HG: CPT | Performed by: INTERNAL MEDICINE

## 2020-07-02 PROCEDURE — 1160F RVW MEDS BY RX/DR IN RCRD: CPT | Performed by: INTERNAL MEDICINE

## 2020-07-02 PROCEDURE — 4040F PNEUMOC VAC/ADMIN/RCVD: CPT | Performed by: INTERNAL MEDICINE

## 2020-07-02 NOTE — PROGRESS NOTES
Cardiology Follow Up    Misael Watt  1950  [id number]  [facility] [place]  [address]  [address]  [phone]    No diagnosis found  There are no diagnoses linked to this encounter  I had the pleasure of seeing Misael Watt for a follow up visit  INTERVAL HISTORY: none    History of the presenting illness, Discussion/Summary and My Plan are as follows:::    She is a pleasant 77-year-old lady with history of hypertension, hyperlipidemia, type 2 diabetes, who in Oct 2019, post elective right hip total arthroplasty, had asymptomatic supraventricular tachycardia for about 30-40 minutes, converted spontaneously to sinus rhythm  Her TSH was normal   Electrolytes were unremarkable  An echocardiogram showed preserved LV systolic function but also showed LVOT obstruction-up to 40 mm with Valsalva  She presents for a follow-up visit and denies any complaints at this time  She denies any palpitations  Plan:    Supraventricular tachycardia: Changed Losartan to Metoprolol and uptitrated and no further episodes  Increase it to 50 bid  This will help not only the SVT but also her tendency for left ventricular outflow tract obstruction  Ordered but not performed - 24 hour Holter - will check    LVOT/Mid cavity obstruction:  Discovered on echo in the hospital, on not symptomatic, adequate hydration is important  Increasing metoprolol at this time  Hypertension:  Elevated almost consistently  increasing metoprolol to 50 bid further  Will check at home and send us a log, If still elevated, will restart losartan    Hyperlipidemia:  On simvastatin with adequate control, her values in April were better than in October  Continue to watch for now  Recheck in 6 mo    Follow-up in 6 months    Results for Davi Zimmer (MRN [id number]) as of 10/30/2019 09:18   Ref   Range 4/8/2019 15:50 10/14/2019 12:03 Cholesterol Latest Ref Range: 50 - 200 mg/dL 185 188   Triglycerides Latest Ref Range: <=150 mg/dL 154 (H) 203 (H)   HDL Latest Ref Range: 40 - 60 mg/dL 54 47   Non-HDL Cholesterol Latest Units: mg/dl 131 141   LDL Direct Latest Ref Range: 0 - 100 mg/dL 100 100   Results for Cindy Roche (MRN [id number]) as of 10/30/2019 09:18   Ref  Range 10/14/2019 12:03   Hemoglobin A1C Latest Ref Range: 4 2 - 6 3 % 5 7         Patient Active Problem List   Diagnosis    Benign essential hypertension    Cervical radiculopathy    Chronic lower back pain    Diabetes mellitus type 2, controlled ([address] )    Hyperlipidemia    Left knee pain    Lumbar stenosis with neurogenic claudication    Obesity (BMI 30-39  9)    Osteoarthrosis    Post-menopausal bleeding    Primary osteoarthritis of both knees    Psoriasis    Right knee pain    Right shoulder pain    Spondylolisthesis, lumbar region    Vaginal lump    Vitamin D deficiency    Effusion of left knee    Right hip pain    Arthritis of right hip    Lumbar radiculopathy    DDD (degenerative disc disease), lumbar    Lumbar spondylosis    Sacroiliitis (HCC)    Status post total hip replacement, right    Supraventricular tachycardia ([address] )    Aftercare following right hip joint replacement surgery     Past Medical History:   Diagnosis Date    Alopecia     Calcaneal spur     Diabetes insipidus ([address] )     Diabetes mellitus ([address] )     Hyperlipidemia     Hypertension     Osteoarthrosis 2/13/2013    Pes planus     unspecified laterality    Plantar fasciitis      Social History     Socioeconomic History    Marital status: Single     Spouse name: Not on file    Number of children: Not on file    Years of education: Not on file    Highest education level: Not on file   Occupational History    Not on file   Social Needs    Financial resource strain: Not on file    Food insecurity:     Worry: Not on file     Inability: Not on file    Transportation needs: Medical: Not on file     Non-medical: Not on file   Tobacco Use    Smoking status: Never Smoker    Smokeless tobacco: Never Used   Substance and Sexual Activity    Alcohol use: Yes     Alcohol/week: 0 0 standard drinks     Frequency: 2-4 times a month     Drinks per session: 1 or 2     Binge frequency: Never     Comment: 0    Drug use: No    Sexual activity: Not Currently   Lifestyle    Physical activity:     Days per week: Not on file     Minutes per session: Not on file    Stress: Not on file   Relationships    Social connections:     Talks on phone: Not on file     Gets together: Not on file     Attends Religion service: Not on file     Active member of club or organization: Not on file     Attends meetings of clubs or organizations: Not on file     Relationship status: Not on file    Intimate partner violence:     Fear of current or ex partner: Not on file     Emotionally abused: Not on file     Physically abused: Not on file     Forced sexual activity: Not on file   Other Topics Concern    Not on file   Social History Narrative    Not on file      Family History   Problem Relation Age of Onset    Diabetes Mother     Hypertension Mother     Emphysema Father     Cancer Brother     Colon cancer Brother 37     Past Surgical History:   Procedure Laterality Date    CARPAL TUNNEL RELEASE      COLONOSCOPY      May 2006    INCISIONAL BREAST BIOPSY      [place] COLONOSCOPY FLX DX W/COLLJ SPEC WHEN PFRMD N/A 5/4/2018    Procedure: COLONOSCOPY;  Surgeon: Yousif Hernandez MD;  [facility];   Service: Colorectal    [place] TOTAL HIP ARTHROPLASTY Right 9/23/2019    Procedure: ARTHROPLASTY HIP TOTAL;  Surgeon: Timi Renae MD;  [facility];  Service: Orthopedics    WISDOM TOOTH EXTRACTION         Current Outpatient Medications:     acetaminophen (TYLENOL) 500 mg tablet, Take 1,000 mg by mouth every 6 (six) hours as needed for mild pain, Disp: , Rfl:     Biotin 1 MG CAPS, Take by mouth daily , Disp: , Rfl:     cholecalciferol (VITAMIN D3) 1,000 units tablet, Take 1,000 Units by mouth daily, Disp: , Rfl:     gabapentin (NEURONTIN) 300 mg capsule, TAKE 1 CAPSULE BY MOUTH THREE TIMES DAILY FOR 30 DAYS (Patient taking differently: Take 300 mg by mouth 2 (two) times a day ), Disp: 90 capsule, Rfl: 5    ibuprofen (MOTRIN) 600 mg tablet, TAKE 1 TABLET BY MOUTH EVERY 8 HOURS AS NEEDED FOR MODERATE PAIN, Disp: 30 tablet, Rfl: 3    metFORMIN (GLUCOPHAGE) 500 mg tablet, TAKE 1 TABLET BY MOUTH TWO TIMES DAILY WITH MEALS, Disp: 180 tablet, Rfl: 3    metoprolol tartrate (LOPRESSOR) 25 mg tablet, TAKE 1 TABLET BY MOUTH EVERY 12 HOURS, Disp: 60 tablet, Rfl: 5    simvastatin (ZOCOR) 20 mg tablet, 20 mg daily at bedtime , Disp: , Rfl:     ascorbic acid (VITAMIN C) 500 mg tablet, Take 1 tablet (500 mg total) by mouth 2 (two) times a day (Patient not taking: Reported on 7/2/2020), Disp: 60 tablet, Rfl: 0    Calcium Carb-Cholecalciferol (Calcium 1000 + D) 1000-800 MG-UNIT TABS, Take by mouth , Disp: , Rfl:     cephalexin (KEFLEX) 500 mg capsule, , Disp: , Rfl:     ferrous sulfate 324 (65 Fe) mg, Take one tablet daily, every other day (Patient not taking: Reported on 7/2/2020), Disp: 15 tablet, Rfl: 0    fluticasone (FLONASE) 50 mcg/act nasal spray, 1 spray into each nostril daily, Disp: 16 g, Rfl: 0    mometasone (ELOCON) 0 1 % ointment, APPLY TO RASH LESIONS TWO TIMES DAILY FOR 2 WEEKS THEN THREE TO FOUR DAYS PER WEEK AS NEEDED, Disp: , Rfl: 1    Multiple Vitamin (MULTI-VITAMIN DAILY PO), Take by mouth daily , Disp: , Rfl:     senna (SENOKOT) 8 6 mg, Take 1 tablet (8 6 mg total) by mouth 2 (two) times a day (Patient not taking: Reported on 7/2/2020), Disp: 120 each, Rfl: 0  Allergies   Allergen Reactions    Meloxicam Hives    Nifedipine Edema    Sulfamethoxazole-Trimethoprim Edema       Imaging: Xr Hip/pelv 2-3 Vws Right If Performed    Result Date: 10/4/2019  Narrative: RIGHT HIP INDICATION:   Z47 1: Aftercare following joint replacement surgery Z96 641: Presence of right artificial hip joint  COMPARISON:  Right hip plain films from 3/20/2019  VIEWS:  XR HIP/PELV 2-3 VWS RIGHT W PELVIS IF PERFORMED FINDINGS: There is no acute fracture or dislocation  Right total hip arthroplasty is identified in satisfactory position without evidence of hardware complication  Moderate left hip osteoarthritis with joint space narrowing and marginal osteophytes  No lytic or blastic osseous lesions  Skin staples are seen lateral to the right hip  Degenerative changes visualized lower lumbar spine  Impression: Unremarkable appearance of right total hip arthroplasty  Workstation performed: [id number]       Review of Systems:  Review of Systems   Constitutional: Negative  HENT: Negative  Eyes: Negative  Respiratory: Negative  Cardiovascular: Negative  Endocrine: Negative  Musculoskeletal: Negative  Physical Exam:  /98 (BP Location: Right arm, Patient Position: Sitting, Cuff Size: Standard)   Pulse 76   Temp 97 5 °F (36 4 °C)   Ht 5' (1 524 m)   Wt 93 9 kg (207 lb)   SpO2 98%   BMI 40 43 kg/m²   Physical Exam   Constitutional: She appears well-developed and well-nourished  No distress  HENT:   Head: Normocephalic and atraumatic  Eyes: Pupils are equal, round, and reactive to light  Conjunctivae are normal  Right eye exhibits no discharge  Left eye exhibits no discharge  No scleral icterus  Neck: Normal range of motion  No tracheal deviation present  No thyromegaly present  Cardiovascular: Normal rate and regular rhythm  Exam reveals no friction rub  Murmur (ESM) heard  Pulmonary/Chest: Effort normal and breath sounds normal  No stridor  No respiratory distress  She has no wheezes  Abdominal: Soft  Bowel sounds are normal  She exhibits no distension  There is no tenderness  Musculoskeletal: Normal range of motion  She exhibits no edema or deformity  Skin: Skin is warm   No rash noted  She is not diaphoretic  No erythema  No pallor

## 2020-07-06 ENCOUNTER — TELEPHONE (OUTPATIENT)
Dept: OBGYN CLINIC | Facility: HOSPITAL | Age: 70
End: 2020-07-06

## 2020-07-06 NOTE — TELEPHONE ENCOUNTER
Called and left  for patient letting her know that she is not due until 9/11   She can also call and make an appointment after 9/11

## 2020-07-06 NOTE — TELEPHONE ENCOUNTER
TO BE COMPLETED BY CENTRAL AUTH TEAM:     Physician: Martin Fair    Medication: Pelon Davila    Number of Injections in Series (Appointments scheduled 1 week apart from one another): 3    Schedule after this date: AFTER 9/11/2020    Billing Info: Buy and Bill/Specialty Pharmacy-Patient Supply <<<< BUY AND BILL     Appointment Message Line: LT Manasa Thompson # 1, 2, 3/ BUY AND BILL   (please copy and paste appointment message line when scheduling appointment)    Additional Comments:  PATIENTS LAST INJECTION WAS ON 3/11/2020

## 2020-07-13 ENCOUNTER — TELEPHONE (OUTPATIENT)
Dept: OBGYN CLINIC | Facility: OTHER | Age: 70
End: 2020-07-13

## 2020-07-16 DIAGNOSIS — E78.5 HYPERLIPIDEMIA, UNSPECIFIED HYPERLIPIDEMIA TYPE: Primary | ICD-10-CM

## 2020-07-16 RX ORDER — SIMVASTATIN 20 MG
TABLET ORAL
Qty: 90 TABLET | Refills: 3 | Status: SHIPPED | OUTPATIENT
Start: 2020-07-16 | End: 2021-07-22

## 2020-07-16 NOTE — TELEPHONE ENCOUNTER
Requested medication(s) are due for refill today: Unknown  Patient has already received a courtesy refill: No  Other reason request has been forwarded to provider: Renewal

## 2020-07-20 ENCOUNTER — OFFICE VISIT (OUTPATIENT)
Dept: PAIN MEDICINE | Facility: CLINIC | Age: 70
End: 2020-07-20
Payer: MEDICARE

## 2020-07-20 VITALS
SYSTOLIC BLOOD PRESSURE: 168 MMHG | WEIGHT: 206 LBS | HEART RATE: 65 BPM | DIASTOLIC BLOOD PRESSURE: 91 MMHG | HEIGHT: 60 IN | BODY MASS INDEX: 40.44 KG/M2 | TEMPERATURE: 97.6 F

## 2020-07-20 DIAGNOSIS — G62.9 NEUROPATHY: ICD-10-CM

## 2020-07-20 DIAGNOSIS — M54.16 LUMBAR RADICULOPATHY: Primary | ICD-10-CM

## 2020-07-20 DIAGNOSIS — M43.16 SPONDYLOLISTHESIS, LUMBAR REGION: ICD-10-CM

## 2020-07-20 DIAGNOSIS — M51.36 DDD (DEGENERATIVE DISC DISEASE), LUMBAR: ICD-10-CM

## 2020-07-20 DIAGNOSIS — M47.816 LUMBAR SPONDYLOSIS: ICD-10-CM

## 2020-07-20 PROCEDURE — 1036F TOBACCO NON-USER: CPT | Performed by: NURSE PRACTITIONER

## 2020-07-20 PROCEDURE — 1160F RVW MEDS BY RX/DR IN RCRD: CPT | Performed by: NURSE PRACTITIONER

## 2020-07-20 PROCEDURE — 2022F DILAT RTA XM EVC RTNOPTHY: CPT | Performed by: NURSE PRACTITIONER

## 2020-07-20 PROCEDURE — 4040F PNEUMOC VAC/ADMIN/RCVD: CPT | Performed by: NURSE PRACTITIONER

## 2020-07-20 PROCEDURE — 99214 OFFICE O/P EST MOD 30 MIN: CPT | Performed by: NURSE PRACTITIONER

## 2020-07-20 PROCEDURE — 3077F SYST BP >= 140 MM HG: CPT | Performed by: NURSE PRACTITIONER

## 2020-07-20 PROCEDURE — 3008F BODY MASS INDEX DOCD: CPT | Performed by: NURSE PRACTITIONER

## 2020-07-20 PROCEDURE — 3080F DIAST BP >= 90 MM HG: CPT | Performed by: NURSE PRACTITIONER

## 2020-07-20 NOTE — PROGRESS NOTES
Assessment  1  Lumbar radiculopathy    2  Neuropathy    3  Spondylolisthesis, lumbar region    4  DDD (degenerative disc disease), lumbar    5  Lumbar spondylosis        Plan  1  I will order an EMG of the bilateral lower extremities  2  The patient will increase gabapentin to 600 mg at bedtime  If no relief or side effects after 5 days, she will then increase to 900 mg at bedtime  3  We can repeat LESI p r n   4  The patient will continue with her home exercise program  5  The patient will follow-up in 12 weeks for medication prescription refill and reevaluation  The patient was advised to contact the office should their symptoms worsen in the interim  The patient was agreeable and verbalized an understanding  [employer] software was used to dictate this note  It may contain errors with dictating incorrect words or incorrect spelling  Please contact the provider directly with any questions  My impressions and treatment recommendations were discussed in detail with the patient who verbalized understanding and had no further questions  Discharge instructions were provided  I personally saw and examined the patient and I agree with the above discussed plan of care  Orders Placed This Encounter   Procedures    EMG 2 limb lower extremity     Standing Status:   Future     Standing Expiration Date:   7/20/2021     Order Specific Question:   Possible Diagnosis: Answer:   lumbar radiculopathy     No orders of the defined types were placed in this encounter  History of Present Illness    Tracie Gauthier is a 68 y o  female status post right total hip replacement last seen on April 20, 2020 returns for follow-up visit in regards to chronic low back pain and neuropathic pain in her bilateral feet secondary to lumbar spondylosis, lumbar stenosis, lumbar radiculopathy and chronic pain syndrome  The patient denies bowel or bladder incontinence or saddle anesthesia    The patient is status post LESI in July 2019 with ongoing relief of her low back pain that is radiating into her bilateral hips  She does state that the epidural has not helped improve the neuropathic symptoms in her feet  She is currently taking gabapentin 300 mg q h s  And does feel that since decreasing the dose from b i d , the neuropathic symptoms in her feet have worsened  She rates her pain a 5/10 on the numeric pain rating scale  She states her pain is occasional in nature follows no particular pattern throughout the day  She characterizes the pain as burning, dull aching, numbness and pins and needles  I have personally reviewed and/or updated the patient's past medical history, past surgical history, family history, social history, current medications, allergies, and vital signs today  Review of Systems   Constitutional: Negative for fever and unexpected weight change  HENT: Negative for trouble swallowing  Eyes: Negative for visual disturbance  Respiratory: Negative for shortness of breath and wheezing  Cardiovascular: Negative for chest pain and palpitations  Gastrointestinal: Negative for constipation, diarrhea, nausea and vomiting  Endocrine: Negative for cold intolerance, heat intolerance and polydipsia  Genitourinary: Negative for difficulty urinating and frequency  Musculoskeletal: Positive for joint swelling  Negative for arthralgias, gait problem and myalgias  Skin: Positive for rash  Neurological: Negative for dizziness, seizures, syncope, weakness and headaches  Hematological: Does not bruise/bleed easily  Psychiatric/Behavioral: Negative for dysphoric mood  All other systems reviewed and are negative  Patient Active Problem List   Diagnosis    Benign essential hypertension    Cervical radiculopathy    Chronic lower back pain    Diabetes mellitus type 2, controlled [address] [address]    Hyperlipidemia    Left knee pain    Lumbar stenosis with neurogenic claudication    Obesity (BMI 30-39  9)    Osteoarthrosis    Post-menopausal bleeding    Primary osteoarthritis of both knees    Psoriasis    Right knee pain    Right shoulder pain    Spondylolisthesis, lumbar region    Vaginal lump    Vitamin D deficiency    Effusion of left knee    Right hip pain    Arthritis of right hip    Lumbar radiculopathy    DDD (degenerative disc disease), lumbar    Lumbar spondylosis    Sacroiliitis (HCC)    Status post total hip replacement, right    Supraventricular tachycardia ([address] )    Aftercare following right hip joint replacement surgery       Past Medical History:   Diagnosis Date    Alopecia     Calcaneal spur     Diabetes insipidus ([address] )     Diabetes mellitus ([address] )     Hyperlipidemia     Hypertension     Osteoarthrosis 2/13/2013    Pes planus     unspecified laterality    Plantar fasciitis        Past Surgical History:   Procedure Laterality Date    CARPAL TUNNEL RELEASE      COLONOSCOPY      May 2006    INCISIONAL BREAST BIOPSY      [place] COLONOSCOPY FLX DX W/COLLJ SPEC WHEN PFRMD N/A 5/4/2018    Procedure: COLONOSCOPY;  Surgeon: Prem Hussein MD;  [facility]; Service: Colorectal    [place] TOTAL HIP ARTHROPLASTY Right 9/23/2019    Procedure: ARTHROPLASTY HIP TOTAL;  Surgeon: Domenic Lange MD;  [facility];  Service: Orthopedics    WISDOM TOOTH EXTRACTION         Family History   Problem Relation Age of Onset    Diabetes Mother     Hypertension Mother     Emphysema Father     Cancer Brother     Colon cancer Brother 40       Social History     Occupational History    Not on file   Tobacco Use    Smoking status: Never Smoker    Smokeless tobacco: Never Used   Substance and Sexual Activity    Alcohol use:  Yes     Alcohol/week: 0 0 standard drinks     Frequency: 2-4 times a month     Drinks per session: 1 or 2     Binge frequency: Never     Comment: 0    Drug use: No    Sexual activity: Not Currently       Current Outpatient Medications on File Prior to Visit Medication Sig    acetaminophen (TYLENOL) 500 mg tablet Take 1,000 mg by mouth every 6 (six) hours as needed for mild pain    Biotin 1 MG CAPS Take by mouth daily     Blood Pressure Monitoring (SPHYGMOMANOMETER) MISC by Does not apply route 2 (two) times a day    Calcium Carb-Cholecalciferol (Calcium 1000 + D) 1000-800 MG-UNIT TABS Take by mouth     cephalexin (KEFLEX) 500 mg capsule     cholecalciferol (VITAMIN D3) 1,000 units tablet Take 1,000 Units by mouth daily    fluticasone (FLONASE) 50 mcg/act nasal spray 1 spray into each nostril daily    gabapentin (NEURONTIN) 300 mg capsule TAKE 1 CAPSULE BY MOUTH THREE TIMES DAILY FOR 30 DAYS (Patient taking differently: Take 300 mg by mouth 2 (two) times a day )    ibuprofen (MOTRIN) 600 mg tablet TAKE 1 TABLET BY MOUTH EVERY 8 HOURS AS NEEDED FOR MODERATE PAIN    metFORMIN (GLUCOPHAGE) 500 mg tablet TAKE 1 TABLET BY MOUTH TWO TIMES DAILY WITH MEALS    metoprolol tartrate (LOPRESSOR) 25 mg tablet Take 2 tablets (50 mg total) by mouth every 12 (twelve) hours    mometasone (ELOCON) 0 1 % ointment APPLY TO RASH LESIONS TWO TIMES DAILY FOR 2 WEEKS THEN THREE TO FOUR DAYS PER WEEK AS NEEDED    Multiple Vitamin (MULTI-VITAMIN DAILY PO) Take by mouth daily     simvastatin (ZOCOR) 20 mg tablet TAKE 1 TABLET BY MOUTH EVERY DAY    ascorbic acid (VITAMIN C) 500 mg tablet Take 1 tablet (500 mg total) by mouth 2 (two) times a day (Patient not taking: Reported on 7/2/2020)    ferrous sulfate 324 (65 Fe) mg Take one tablet daily, every other day (Patient not taking: Reported on 7/2/2020)    senna (SENOKOT) 8 6 mg Take 1 tablet (8 6 mg total) by mouth 2 (two) times a day (Patient not taking: Reported on 7/2/2020)     No current facility-administered medications on file prior to visit          Allergies   Allergen Reactions    Meloxicam Hives    Nifedipine Edema    Sulfamethoxazole-Trimethoprim Edema       Physical Exam    /91   Pulse 65   Temp 97 6 °F (36 4 °C) (Oral)   Ht 5' (1 524 m)   Wt 93 4 kg (206 lb)   BMI 40 23 kg/m²     Constitutional: normal, well developed, well nourished, alert, in no distress and non-toxic and no overt pain behavior  Eyes: anicteric  HEENT: grossly intact  Neck: supple, symmetric, trachea midline and no masses   Pulmonary:even and unlabored  Cardiovascular:No edema or pitting edema present  Skin:Normal without rashes or lesions and well hydrated  Psychiatric:Mood and affect appropriate  Neurologic:Cranial Nerves II-XII grossly intact  Musculoskeletal:normal gait    Imaging    MRI LUMBAR SPINE WITHOUT CONTRAST     INDICATION: 59-year-old female, back pain, leg pain     COMPARISON:  5/8/2019 x-rays     TECHNIQUE:  Sagittal T1, sagittal T2, sagittal inversion recovery, axial T1 and axial T2, coronal T2     IMAGE QUALITY:  Diagnostic     FINDINGS:     VERTEBRAL BODIES:    Mild to moderate levoscoliosis  Grade 1 degenerative retrolisthesis T12-L1  Grade 1 degenerative anterolisthesis L3-4, L4-5  No compression fracture  Normal marrow signal is identified within the visualized bony structures  No discrete marrow   lesion      SACRUM:  Normal signal within the sacrum   No evidence of insufficiency or stress fracture      DISTAL CORD AND CONUS:  Normal size and signal within the distal cord and conus        PARASPINAL SOFT TISSUES:  Paraspinal soft tissues are unremarkable      LOWER THORACIC DISC SPACES: Grade 1 degenerative retrolisthesis T12-L1, mild central canal stenosis, no overt neural element impingement     LUMBAR DISC SPACES:     L1-L2:  Moderate degenerative spondylosis, moderate central disc herniation, moderate central stenosis, mild bilateral foraminal stenosis, probable bilateral L2 nerve root encroachment      L2-L3:  Moderate degenerative spondylosis and bulging annulus, mild central canal and bilateral foraminal stenosis     L3-L4:  Moderate degenerative spondylosis, grade 1 anterolisthesis, moderate central canal stenosis, mild bilateral foraminal stenosis, possible bilateral L4 nerve root encroachment     L4-L5:  Moderate degenerative spondylosis, grade 1 anterolisthesis, mild bilateral foraminal stenosis, no overt neural element impingement     L5-S1:  Normal disc, mild degenerative facet arthrosis, no stenosis     IMPRESSION:  Multilevel degenerative spondylosis, levoscoliosis, consistent with x-rays     Grade 1 retrolisthesis, mild central canal stenosis T12-L1     Moderate central canal stenosis, moderate central disc herniation, mild bilateral foraminal stenosis L1-2     Mild central canal and bilateral foraminal stenosis L2-3     Grade 1 anterolisthesis, moderate central canal stenosis, mild bilateral foraminal stenosis L3-4     Grade 1 anterolisthesis, mild bilateral foraminal stenosis L4-5

## 2020-07-23 DIAGNOSIS — E11.65 CONTROLLED TYPE 2 DIABETES MELLITUS WITH HYPERGLYCEMIA, WITHOUT LONG-TERM CURRENT USE OF INSULIN (HCC): ICD-10-CM

## 2020-07-27 ENCOUNTER — APPOINTMENT (OUTPATIENT)
Dept: LAB | Facility: HOSPITAL | Age: 70
End: 2020-07-27
Payer: MEDICARE

## 2020-07-27 DIAGNOSIS — E11.21 CONTROLLED TYPE 2 DIABETES MELLITUS WITH DIABETIC NEPHROPATHY, WITHOUT LONG-TERM CURRENT USE OF INSULIN (HCC): ICD-10-CM

## 2020-07-27 DIAGNOSIS — E78.5 HYPERLIPIDEMIA, UNSPECIFIED HYPERLIPIDEMIA TYPE: ICD-10-CM

## 2020-07-27 DIAGNOSIS — I10 BENIGN ESSENTIAL HYPERTENSION: ICD-10-CM

## 2020-07-27 LAB
ALBUMIN SERPL BCP-MCNC: 4 G/DL (ref 3.5–5)
ALP SERPL-CCNC: 88 U/L (ref 46–116)
ALT SERPL W P-5'-P-CCNC: 21 U/L (ref 12–78)
ANION GAP SERPL CALCULATED.3IONS-SCNC: 6 MMOL/L (ref 4–13)
AST SERPL W P-5'-P-CCNC: 17 U/L (ref 5–45)
BILIRUB SERPL-MCNC: 0.65 MG/DL (ref 0.2–1)
BUN SERPL-MCNC: 11 MG/DL (ref 5–25)
CALCIUM ALBUM COR SERPL-MCNC: 10.4 MG/DL (ref 8.3–10.1)
CALCIUM SERPL-MCNC: 10.4 MG/DL (ref 8.3–10.1)
CHLORIDE SERPL-SCNC: 106 MMOL/L (ref 100–108)
CHOLEST SERPL-MCNC: 194 MG/DL (ref 50–200)
CO2 SERPL-SCNC: 27 MMOL/L (ref 21–32)
CREAT SERPL-MCNC: 0.72 MG/DL (ref 0.6–1.3)
CREAT UR-MCNC: 117 MG/DL
ERYTHROCYTE [DISTWIDTH] IN BLOOD BY AUTOMATED COUNT: 12.7 % (ref 11.6–15.1)
EST. AVERAGE GLUCOSE BLD GHB EST-MCNC: 123 MG/DL
GFR SERPL CREATININE-BSD FRML MDRD: 85 ML/MIN/1.73SQ M
GLUCOSE P FAST SERPL-MCNC: 86 MG/DL (ref 65–99)
HBA1C MFR BLD: 5.9 %
HCT VFR BLD AUTO: 42.4 % (ref 34.8–46.1)
HCV AB SER QL: NORMAL
HDLC SERPL-MCNC: 52 MG/DL
HGB BLD-MCNC: 13.7 G/DL (ref 11.5–15.4)
LDLC SERPL CALC-MCNC: 104 MG/DL (ref 0–100)
MCH RBC QN AUTO: 31 PG (ref 26.8–34.3)
MCHC RBC AUTO-ENTMCNC: 32.3 G/DL (ref 31.4–37.4)
MCV RBC AUTO: 96 FL (ref 82–98)
MICROALBUMIN UR-MCNC: 45 MG/L (ref 0–20)
MICROALBUMIN/CREAT 24H UR: 38 MG/G CREATININE (ref 0–30)
NONHDLC SERPL-MCNC: 142 MG/DL
PLATELET # BLD AUTO: 240 THOUSANDS/UL (ref 149–390)
PMV BLD AUTO: 9.2 FL (ref 8.9–12.7)
POTASSIUM SERPL-SCNC: 4 MMOL/L (ref 3.5–5.3)
PROT SERPL-MCNC: 8.1 G/DL (ref 6.4–8.2)
RBC # BLD AUTO: 4.42 MILLION/UL (ref 3.81–5.12)
SODIUM SERPL-SCNC: 139 MMOL/L (ref 136–145)
TRIGL SERPL-MCNC: 191 MG/DL
TSH SERPL DL<=0.05 MIU/L-ACNC: 2.76 UIU/ML (ref 0.36–3.74)
WBC # BLD AUTO: 5.94 THOUSAND/UL (ref 4.31–10.16)

## 2020-07-27 PROCEDURE — 36415 COLL VENOUS BLD VENIPUNCTURE: CPT

## 2020-07-27 PROCEDURE — 85027 COMPLETE CBC AUTOMATED: CPT

## 2020-07-27 PROCEDURE — 82570 ASSAY OF URINE CREATININE: CPT

## 2020-07-27 PROCEDURE — 80053 COMPREHEN METABOLIC PANEL: CPT

## 2020-07-27 PROCEDURE — 84443 ASSAY THYROID STIM HORMONE: CPT

## 2020-07-27 PROCEDURE — 86803 HEPATITIS C AB TEST: CPT

## 2020-07-27 PROCEDURE — 80061 LIPID PANEL: CPT

## 2020-07-27 PROCEDURE — 82043 UR ALBUMIN QUANTITATIVE: CPT

## 2020-07-27 PROCEDURE — 83036 HEMOGLOBIN GLYCOSYLATED A1C: CPT

## 2020-07-30 ENCOUNTER — OFFICE VISIT (OUTPATIENT)
Dept: FAMILY MEDICINE CLINIC | Facility: CLINIC | Age: 70
End: 2020-07-30
Payer: MEDICARE

## 2020-07-30 VITALS
SYSTOLIC BLOOD PRESSURE: 135 MMHG | DIASTOLIC BLOOD PRESSURE: 90 MMHG | WEIGHT: 206 LBS | RESPIRATION RATE: 16 BRPM | HEART RATE: 64 BPM | TEMPERATURE: 80 F | BODY MASS INDEX: 41.53 KG/M2 | OXYGEN SATURATION: 97 % | HEIGHT: 59 IN

## 2020-07-30 DIAGNOSIS — I10 BENIGN ESSENTIAL HYPERTENSION: ICD-10-CM

## 2020-07-30 DIAGNOSIS — Z00.00 MEDICARE ANNUAL WELLNESS VISIT, SUBSEQUENT: ICD-10-CM

## 2020-07-30 DIAGNOSIS — N39.3 STRESS INCONTINENCE OF URINE: ICD-10-CM

## 2020-07-30 DIAGNOSIS — E78.5 HYPERLIPIDEMIA, UNSPECIFIED HYPERLIPIDEMIA TYPE: ICD-10-CM

## 2020-07-30 DIAGNOSIS — E11.21 CONTROLLED TYPE 2 DIABETES MELLITUS WITH DIABETIC NEPHROPATHY, WITHOUT LONG-TERM CURRENT USE OF INSULIN (HCC): ICD-10-CM

## 2020-07-30 DIAGNOSIS — L30.9 DERMATITIS: Primary | ICD-10-CM

## 2020-07-30 DIAGNOSIS — E66.01 MORBID OBESITY (HCC): ICD-10-CM

## 2020-07-30 PROCEDURE — G0439 PPPS, SUBSEQ VISIT: HCPCS | Performed by: FAMILY MEDICINE

## 2020-07-30 PROCEDURE — 4040F PNEUMOC VAC/ADMIN/RCVD: CPT | Performed by: FAMILY MEDICINE

## 2020-07-30 PROCEDURE — 1170F FXNL STATUS ASSESSED: CPT | Performed by: FAMILY MEDICINE

## 2020-07-30 PROCEDURE — 3075F SYST BP GE 130 - 139MM HG: CPT | Performed by: FAMILY MEDICINE

## 2020-07-30 PROCEDURE — 3066F NEPHROPATHY DOC TX: CPT | Performed by: FAMILY MEDICINE

## 2020-07-30 PROCEDURE — 3008F BODY MASS INDEX DOCD: CPT | Performed by: FAMILY MEDICINE

## 2020-07-30 PROCEDURE — 1123F ACP DISCUSS/DSCN MKR DOCD: CPT | Performed by: FAMILY MEDICINE

## 2020-07-30 PROCEDURE — 1125F AMNT PAIN NOTED PAIN PRSNT: CPT | Performed by: FAMILY MEDICINE

## 2020-07-30 PROCEDURE — 1036F TOBACCO NON-USER: CPT | Performed by: FAMILY MEDICINE

## 2020-07-30 PROCEDURE — 99214 OFFICE O/P EST MOD 30 MIN: CPT | Performed by: FAMILY MEDICINE

## 2020-07-30 PROCEDURE — 1160F RVW MEDS BY RX/DR IN RCRD: CPT | Performed by: FAMILY MEDICINE

## 2020-07-30 PROCEDURE — 2022F DILAT RTA XM EVC RTNOPTHY: CPT | Performed by: FAMILY MEDICINE

## 2020-07-30 PROCEDURE — 3044F HG A1C LEVEL LT 7.0%: CPT | Performed by: FAMILY MEDICINE

## 2020-07-30 PROCEDURE — 3080F DIAST BP >= 90 MM HG: CPT | Performed by: FAMILY MEDICINE

## 2020-07-30 PROCEDURE — 3060F POS MICROALBUMINURIA REV: CPT | Performed by: FAMILY MEDICINE

## 2020-07-30 RX ORDER — MOMETASONE FUROATE 1 MG/G
OINTMENT TOPICAL DAILY
Qty: 45 G | Refills: 1 | Status: SHIPPED | OUTPATIENT
Start: 2020-07-30 | End: 2022-03-02 | Stop reason: ALTCHOICE

## 2020-07-30 NOTE — PROGRESS NOTES
Chief Complaint   Patient presents with   [facility] Wellness Visit     Subsequent   Follow-up     Routine overdue and review labs  Health Maintenance   Topic Date Due    DTaP,Tdap,and Td Vaccines (1 - Tdap) 09/20/1961    DXA SCAN  11/07/2019    PT PLAN OF CARE  01/04/2020    Cervical Cancer Screening  01/16/2020    Medicare Annual Wellness Visit (AWV)  04/10/2020    Diabetic Foot Exam  04/10/2020    Influenza Vaccine  07/01/2020    DM Eye Exam  08/23/2020    BMI: Followup Plan  10/15/2020    Depression Screening PHQ  11/04/2020    Fall Risk  11/04/2020    HEMOGLOBIN A1C  01/27/2021    BMI: Adult  07/20/2021    URINE MICROALBUMIN  07/27/2021    CRC Screening: Colonoscopy  05/04/2023    Hepatitis C Screening  Completed    Pneumococcal Vaccine: 65+ Years  Completed    Pneumococcal Vaccine: Pediatrics (0 to 5 Years) and At-Risk Patients (6 to 59 Years)  Aged Out    HIB Vaccine  Aged Out    Hepatitis B Vaccine  Aged Out    IPV Vaccine  Aged Out    Hepatitis A Vaccine  Aged Out    Meningococcal ACWY Vaccine  Aged Out    HPV Vaccine  Aged Out        Assessment and Plan:     Problem List Items Addressed This Visit     None        BMI Counseling: Body mass index is 42 32 kg/m²  The BMI is above normal  Nutrition recommendations include decreasing portion sizes, encouraging healthy choices of fruits and vegetables and decreasing fast food intake  Exercise recommendations include moderate physical activity 150 minutes/week  No pharmacotherapy was ordered  Falls Plan of Care: balance, strength, and gait training instructions were provided  Preventive health issues were discussed with patient, and age appropriate screening tests were ordered as noted in patient's After Visit Summary  Personalized health advice and appropriate referrals for health education or preventive services given if needed, as noted in patient's After Visit Summary       History of Present Illness:     Patient presents for Medicare Annual Wellness visit    Patient Care Team:  Rufino Hernandez MD as PCP - General  DO Aurora Tejeda MD as Endoscopist  Mitzy Lyles DO (Anesthesiology)     Problem List:     Patient Active Problem List   Diagnosis    Benign essential hypertension    Cervical radiculopathy    Chronic lower back pain    Diabetes mellitus type 2, controlled ([address] )    Hyperlipidemia    Left knee pain    Lumbar stenosis with neurogenic claudication    Obesity (BMI 30-39  9)    Osteoarthrosis    Post-menopausal bleeding    Primary osteoarthritis of both knees    Psoriasis    Right knee pain    Right shoulder pain    Spondylolisthesis, lumbar region    Vaginal lump    Vitamin D deficiency    Effusion of left knee    Right hip pain    Arthritis of right hip    Lumbar radiculopathy    DDD (degenerative disc disease), lumbar    Lumbar spondylosis    Sacroiliitis (HCC)    Status post total hip replacement, right    Supraventricular tachycardia ([address] )    Aftercare following right hip joint replacement surgery      Past Medical and Surgical History:     Past Medical History:   Diagnosis Date    Alopecia     Calcaneal spur     Diabetes insipidus ([address] )     Diabetes mellitus ([address] )     Hyperlipidemia     Hypertension     Osteoarthrosis 2/13/2013    Pes planus     unspecified laterality    Plantar fasciitis      Past Surgical History:   Procedure Laterality Date    CARPAL TUNNEL RELEASE      COLONOSCOPY      May 2006    INCISIONAL BREAST BIOPSY      ID COLONOSCOPY FLX DX W/COLLJ SPEC WHEN PFRMD N/A 5/4/2018    Procedure: COLONOSCOPY;  Surgeon: Aurora Mcfarland MD;  Location: [facility];   Service: Colorectal    ID TOTAL HIP ARTHROPLASTY Right 9/23/2019    Procedure: ARTHROPLASTY HIP TOTAL;  Surgeon: Moe Gonzales MD;  Location: [facility];  Service: Orthopedics    WISDOM TOOTH EXTRACTION        Family History:     Family History   Problem Relation Age of Onset    Diabetes Mother     Hypertension Mother     Emphysema Father     Cancer Brother     Colon cancer Brother 40      Social History:        Social History     Socioeconomic History    Marital status: Single     Spouse name: Not on file    Number of children: Not on file    Years of education: Not on file    Highest education level: Not on file   Occupational History    Not on file   Social Needs    Financial resource strain: Not on file    Food insecurity:     Worry: Not on file     Inability: Not on file    Transportation needs:     Medical: Not on file     Non-medical: Not on file   Tobacco Use    Smoking status: Never Smoker    Smokeless tobacco: Never Used   Substance and Sexual Activity    Alcohol use:  Yes     Alcohol/week: 0 0 standard drinks     Frequency: 2-4 times a month     Drinks per session: 1 or 2     Binge frequency: Never     Comment: 0    Drug use: No    Sexual activity: Not Currently   Lifestyle    Physical activity:     Days per week: Not on file     Minutes per session: Not on file    Stress: Not on file   Relationships    Social connections:     Talks on phone: Not on file     Gets together: Not on file     Attends Yazdanism service: Not on file     Active member of club or organization: Not on file     Attends meetings of clubs or organizations: Not on file     Relationship status: Not on file    Intimate partner violence:     Fear of current or ex partner: Not on file     Emotionally abused: Not on file     Physically abused: Not on file     Forced sexual activity: Not on file   Other Topics Concern    Not on file   Social History Narrative    Not on file      Medications and Allergies:     Current Outpatient Medications   Medication Sig Dispense Refill    acetaminophen (TYLENOL) 500 mg tablet Take 1,000 mg by mouth every 6 (six) hours as needed for mild pain      Biotin 1 MG CAPS Take by mouth daily       Blood Pressure Monitoring (SPHYGMOMANOMETER) MISC by Does not apply route 2 (two) times a day 1 each 0    cholecalciferol (VITAMIN D3) 1,000 units tablet Take 1,000 Units by mouth daily      gabapentin (NEURONTIN) 300 mg capsule TAKE 1 CAPSULE BY MOUTH THREE TIMES DAILY FOR 30 DAYS (Patient taking differently: Take 300 mg by mouth 2 (two) times a day ) 90 capsule 5    ibuprofen (MOTRIN) 600 mg tablet TAKE 1 TABLET BY MOUTH EVERY 8 HOURS AS NEEDED FOR MODERATE PAIN 30 tablet 3    metFORMIN (GLUCOPHAGE) 500 mg tablet TAKE 1 TABLET BY MOUTH TWO TIMES DAILY WITH MEALS 180 tablet 3    metoprolol tartrate (LOPRESSOR) 25 mg tablet Take 2 tablets (50 mg total) by mouth every 12 (twelve) hours 180 tablet 3    simvastatin (ZOCOR) 20 mg tablet TAKE 1 TABLET BY MOUTH EVERY DAY 90 tablet 3    ascorbic acid (VITAMIN C) 500 mg tablet Take 1 tablet (500 mg total) by mouth 2 (two) times a day (Patient not taking: Reported on 7/2/2020) 60 tablet 0    Calcium Carb-Cholecalciferol (Calcium 1000 + D) 1000-800 MG-UNIT TABS Take by mouth       cephalexin (KEFLEX) 500 mg capsule       ferrous sulfate 324 (65 Fe) mg Take one tablet daily, every other day (Patient not taking: Reported on 7/2/2020) 15 tablet 0    fluticasone (FLONASE) 50 mcg/act nasal spray 1 spray into each nostril daily (Patient not taking: Reported on 7/30/2020) 16 g 0    mometasone (ELOCON) 0 1 % ointment APPLY TO RASH LESIONS TWO TIMES DAILY FOR 2 WEEKS THEN THREE TO FOUR DAYS PER WEEK AS NEEDED  1    Multiple Vitamin (MULTI-VITAMIN DAILY PO) Take by mouth daily       senna (SENOKOT) 8 6 mg Take 1 tablet (8 6 mg total) by mouth 2 (two) times a day (Patient not taking: Reported on 7/2/2020) 120 each 0     No current facility-administered medications for this visit        Allergies   Allergen Reactions    Meloxicam Hives    Nifedipine Edema    Sulfamethoxazole-Trimethoprim Edema      Immunizations:     Immunization History   Administered Date(s) Administered    Influenza Split High Dose Preservative Free IM 11/14/2018    Influenza TIV (IM) 11/01/2012, 10/05/2015    Influenza, high dose seasonal 0 5 mL 10/07/2019    Pneumococcal Conjugate 13-Valent 02/25/2016    Pneumococcal Polysaccharide PPV23 05/15/2014    Tuberculin Skin Test-PPD Intradermal 09/29/2019      Health Maintenance:         Topic Date Due    DXA SCAN  11/07/2019    Cervical Cancer Screening  01/16/2020    CRC Screening: Colonoscopy  05/04/2023    Hepatitis C Screening  Completed         Topic Date Due    DTaP,Tdap,and Td Vaccines (1 - Tdap) 09[birth year]/1959    Influenza Vaccine  07/01/2020      Medicare Health Risk Assessment:     /92 (BP Location: Left arm, Patient Position: Sitting, Cuff Size: Large) Comment: 140/92 Comment (BP Location): Right Comment (Patient Position): Sitting Comment (Cuff Size): Large  Pulse 64   Temp (!) 80 °F (26 7 °C) (Oral)   Resp 16   Ht 4' 10 5" (1 486 m)   Wt 93 4 kg (206 lb)   SpO2 97%   BMI 42 32 kg/m²      Lew is here for her Subsequent Wellness visit  Health Risk Assessment:   Patient rates overall health as very good  Patient feels that their physical health rating is slightly better  Eyesight was rated as same  Hearing was rated as same  Patient feels that their emotional and mental health rating is same  Pain experienced in the last 7 days has been none  Depression Screening:   PHQ-2 Score: 0      Fall Risk Screening: In the past year, patient has experienced: history of falling in past year    Number of falls: 2 or more  Injured during fall?: No    Feels unsteady when standing or walking?: No    Worried about falling?: Yes      Urinary Incontinence Screening:   Patient has leaked urine accidently in the last six months  Home Safety:  Patient does not have trouble with stairs inside or outside of their home  Patient has working smoke alarms Home safety hazards include: loose rugs on the floor  Nutrition:   Current diet is Regular and Limited junk food       Medications:   Patient is currently taking over-the-counter supplements  OTC medications include: see medication list  Patient is able to manage medications  Activities of Daily Living (ADLs)/Instrumental Activities of Daily Living (IADLs):   Walk and transfer into and out of bed and chair?: Yes  Dress and groom yourself?: Yes    Bathe or shower yourself?: Yes    Feed yourself? Yes  Do your laundry/housekeeping?: Yes  Manage your money, pay your bills and track your expenses?: Yes  Make your own meals?: Yes    Do your own shopping?: Yes    Previous Hospitalizations:   Any hospitalizations or ED visits within the last 12 months?: Yes    How many hospitalizations have you had in the last year?: 1-2    Advance Care Planning:   Living will: No    Durable POA for healthcare:  Yes    Advanced directive: No    Advanced directive counseling given: Yes    Five wishes given: Yes      Cognitive Screening:   Provider or family/friend/caregiver concerned regarding cognition?: No    PREVENTIVE SCREENINGS      Cardiovascular Screening:    General: History Lipid Disorder and Screening Current      Diabetes Screening:     General: History Diabetes and Screening Current      Colorectal Cancer Screening:     General: Screening Current      Breast Cancer Screening:     General: Risks and Benefits Discussed    Due for: Mammogram        Cervical Cancer Screening:    General: Screening Not Indicated      Lung Cancer Screening:     General: Screening Not Indicated      Hepatitis C Screening:    General: Screening Current      John Villalobos MD

## 2020-07-30 NOTE — ASSESSMENT & PLAN NOTE
Lab Results   Component Value Date    HGBA1C 5 9 (H) 07/27/2020   controlled  Low carb diet  Continue metformin 500mg bid

## 2020-07-30 NOTE — ASSESSMENT & PLAN NOTE
Uncontrolled  DASH diet  Pt is on metoprolol 25mg bid  BP elevated in office today  Pt denies symptoms like headache, vision change, SOB or chest pain  DASH diet  Pt will call cardiology to see if she can take leftover losartan 50mg 1/2 tab

## 2020-07-30 NOTE — PROGRESS NOTES
Assessment/Plan:    Diabetes mellitus type 2, controlled ([facility] )    Lab Results   Component Value Date    HGBA1C 5 9 (H) 07/27/2020   controlled  Low carb diet  Continue metformin 500mg bid  Benign essential hypertension  Uncontrolled  DASH diet  Pt is on metoprolol 25mg bid  BP elevated in office today  Pt denies symptoms like headache, vision change, SOB or chest pain  DASH diet  Pt will call cardiology to see if she can take leftover losartan 50mg 1/2 tab  Hyperlipidemia  7/2020 Lipid 194/191/52/104 ok  Low fat diet  Continue simvastatin 20mg qhs  Reviewed lab in 7/2020  HgA1C 5 9 stable  Hep C negative  TSH normal  M/c 38 elevated  Lipid 194/191/52/104  CMP showed Calcium 10 4 elevated  Will monitor  CBC normal    Got flu shot yearly    Got pneumovax at age of 70  Got prevnar 13 2/2016  Got zostavax in 3/2017 per pt  Will check insurance for coverage of Tdap and [facility] Distance Dr Mere Hoffman for mammogram and pap  Mammogram 3/2019 negative  Order done  Had colonoscopy 5/2018 Dr Barahona  Repeat in 5 years    Dexa scan 11/2016 normal  Got script already  RTO in 6 months         Diagnoses and all orders for this visit:    Dermatitis  -     mometasone (ELOCON) 0 1 % ointment; Apply topically daily    Stress incontinence of urine  -     Ambulatory referral to Physical Therapy; Future    Controlled type 2 diabetes mellitus with diabetic nephropathy, without long-term current use of insulin (HCC)  -     Comprehensive metabolic panel; Future  -     Hemoglobin A1C With EAG; Future  -     Lipid panel; Future    Benign essential hypertension  -     Comprehensive metabolic panel; Future  -     Hemoglobin A1C With EAG; Future  -     Lipid panel; Future    Hyperlipidemia, unspecified hyperlipidemia type  -     Comprehensive metabolic panel; Future  -     Hemoglobin A1C With EAG; Future  -     Lipid panel;  Future    Morbid obesity ([facility] )    Medicare annual wellness visit, subsequent          Subjective: Patient ID: Kajal Hernandez is a 68 y o  female  HPI    Pt is here by herself  C/o rash on arms and legs for 3 months  Itchy  No pain  Denies new lotion, detergent or soap or medications  Denies fever, SOB, CP, n/v/abd pain  FU dermatology as needed for 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  Got steroid shot before which helped a lot  C/o Urine incontinence---She wears pads  Would like to try PT  DM---7/2020 HgA1C 5 9 She is on metformin 500mg bid now    Tried to follow Low carb diet  Some neuropathy on toes  She is on gabapentin 300mg bid not help much  Denies hypoglycemia  FU opthalmology 8/2019 Dr Barbara Jordan  No retinopathy    No podiatry  Plan to see Dr Lorri Thomas  HTN---BP at home elevated per pt  Denies headache, vision change, SOB or CP  She is on metoprolol 25mg bid  SVT---FU cardiology  Denies palpitation, SOB, CP etc       Hyperlipidemia---She is on simvastatin 20mg qhs  Denies SE       Lumbar stenosis---She is on gabapentin 300mg tid which helped  FU pain specialist  Got injection which helped      Morbid obesity---BMI 42 32 today      Live by herself  Does all ADL's  Still drive  Denies recent falls  Denies depression      The following portions of the patient's history were reviewed and updated as appropriate: allergies, current medications, past family history, past medical history, past social history, past surgical history and problem list     Review of Systems   Constitutional: Negative for appetite change, chills and fever  HENT: Negative for congestion, ear pain, sinus pain and sore throat  Eyes: Negative for discharge and itching  Respiratory: Negative for apnea, cough, chest tightness, shortness of breath and wheezing  Cardiovascular: Negative for chest pain, palpitations and leg swelling  Gastrointestinal: Negative for abdominal pain, anal bleeding, constipation, diarrhea, nausea and vomiting  Endocrine: Negative for cold intolerance, heat intolerance and polyuria  Genitourinary: Negative for difficulty urinating and dysuria  Musculoskeletal: Negative for arthralgias, back pain and myalgias  Skin: Positive for rash  Neurological: Negative for dizziness and headaches  Psychiatric/Behavioral: Negative for agitation  Objective:      /90 (BP Location: Left arm, Patient Position: Sitting, Cuff Size: Large) Comment: 140/92 Comment (BP Location): Right Comment (Patient Position): Sitting Comment (Cuff Size): Large  Pulse 64   Temp (!) 80 °F (26 7 °C) (Oral)   Resp 16   Ht 4' 10 5" (1 486 m)   Wt 93 4 kg (206 lb)   SpO2 97%   BMI 42 32 kg/m²          Physical Exam   Constitutional: She appears well-developed  No distress  HENT:   Head: Normocephalic  Eyes: Conjunctivae are normal  Right eye exhibits no discharge  Left eye exhibits no discharge  Neck: Normal range of motion  No thyromegaly present  Cardiovascular: Normal rate, regular rhythm and normal heart sounds  Exam reveals no gallop and no friction rub  Pulses are no weak pulses  No murmur heard  Pulses:       Dorsalis pedis pulses are 2+ on the right side, and 2+ on the left side  Pulmonary/Chest: Effort normal and breath sounds normal  No respiratory distress  She has no wheezes  She has no rales  She exhibits no tenderness  Abdominal: Soft  Bowel sounds are normal  She exhibits no distension and no mass  There is no tenderness  There is no rebound and no guarding  Feet:   Right Foot:   Skin Integrity: Negative for ulcer, skin breakdown, erythema, warmth, callus or dry skin  Left Foot:   Skin Integrity: Negative for ulcer, skin breakdown, erythema, warmth, callus or dry skin  Lymphadenopathy:     She has no cervical adenopathy  Skin: Rash noted  Maculopapular lesions on arms and legs  Size 1-3mm, erythema, no exudate       Patient's shoes and socks removed  Right Foot/Ankle   Right Foot Inspection  Skin Exam: skin normal and skin intact no dry skin, no warmth, no callus, no erythema, no maceration, no abnormal color, no pre-ulcer, no ulcer and no callus                            Sensory       Monofilament testing: intact  Vascular    The right DP pulse is 2+  Left Foot/Ankle  Left Foot Inspection  Skin Exam: skin normal and skin intactno dry skin, no warmth, no erythema, no maceration, normal color, no pre-ulcer, no ulcer and no callus                                         Sensory       Monofilament: intact  Vascular    The left DP pulse is 2+  Assign Risk Category:  No deformity present; No loss of protective sensation;  No weak pulses       Risk: 0

## 2020-08-26 ENCOUNTER — OFFICE VISIT (OUTPATIENT)
Dept: OBGYN CLINIC | Facility: HOSPITAL | Age: 70
End: 2020-08-26
Payer: MEDICARE

## 2020-08-26 ENCOUNTER — HOSPITAL ENCOUNTER (OUTPATIENT)
Dept: RADIOLOGY | Facility: HOSPITAL | Age: 70
Discharge: HOME | End: 2020-08-26
Attending: ORTHOPAEDIC SURGERY
Payer: MEDICARE

## 2020-08-26 VITALS
HEART RATE: 66 BPM | WEIGHT: 209 LBS | SYSTOLIC BLOOD PRESSURE: 166 MMHG | DIASTOLIC BLOOD PRESSURE: 91 MMHG | HEIGHT: 59 IN | BODY MASS INDEX: 42.13 KG/M2

## 2020-08-26 DIAGNOSIS — Z96.641 STATUS POST TOTAL HIP REPLACEMENT, RIGHT: ICD-10-CM

## 2020-08-26 DIAGNOSIS — M17.12 PRIMARY OSTEOARTHRITIS OF LEFT KNEE: ICD-10-CM

## 2020-08-26 DIAGNOSIS — Z96.641 STATUS POST TOTAL HIP REPLACEMENT, RIGHT: Primary | ICD-10-CM

## 2020-08-26 PROCEDURE — 1160F RVW MEDS BY RX/DR IN RCRD: CPT | Performed by: ORTHOPAEDIC SURGERY

## 2020-08-26 PROCEDURE — 3066F NEPHROPATHY DOC TX: CPT | Performed by: ORTHOPAEDIC SURGERY

## 2020-08-26 PROCEDURE — 2022F DILAT RTA XM EVC RTNOPTHY: CPT | Performed by: ORTHOPAEDIC SURGERY

## 2020-08-26 PROCEDURE — 3008F BODY MASS INDEX DOCD: CPT | Performed by: ORTHOPAEDIC SURGERY

## 2020-08-26 PROCEDURE — 73502 X-RAY EXAM HIP UNI 2-3 VIEWS: CPT

## 2020-08-26 PROCEDURE — 3080F DIAST BP >= 90 MM HG: CPT | Performed by: ORTHOPAEDIC SURGERY

## 2020-08-26 PROCEDURE — 20610 DRAIN/INJ JOINT/BURSA W/O US: CPT | Performed by: ORTHOPAEDIC SURGERY

## 2020-08-26 PROCEDURE — 3060F POS MICROALBUMINURIA REV: CPT | Performed by: ORTHOPAEDIC SURGERY

## 2020-08-26 PROCEDURE — 3077F SYST BP >= 140 MM HG: CPT | Performed by: ORTHOPAEDIC SURGERY

## 2020-08-26 PROCEDURE — 1170F FXNL STATUS ASSESSED: CPT | Performed by: ORTHOPAEDIC SURGERY

## 2020-08-26 PROCEDURE — 1036F TOBACCO NON-USER: CPT | Performed by: ORTHOPAEDIC SURGERY

## 2020-08-26 PROCEDURE — 4040F PNEUMOC VAC/ADMIN/RCVD: CPT | Performed by: ORTHOPAEDIC SURGERY

## 2020-08-26 PROCEDURE — 3044F HG A1C LEVEL LT 7.0%: CPT | Performed by: ORTHOPAEDIC SURGERY

## 2020-08-26 PROCEDURE — 99213 OFFICE O/P EST LOW 20 MIN: CPT | Performed by: ORTHOPAEDIC SURGERY

## 2020-08-26 RX ORDER — LIDOCAINE HYDROCHLORIDE 10 MG/ML
2 INJECTION, SOLUTION INFILTRATION; PERINEURAL
Status: COMPLETED | OUTPATIENT
Start: 2020-08-26 | End: 2020-08-26

## 2020-08-26 RX ORDER — BUPIVACAINE HYDROCHLORIDE 2.5 MG/ML
2 INJECTION, SOLUTION INFILTRATION; PERINEURAL
Status: COMPLETED | OUTPATIENT
Start: 2020-08-26 | End: 2020-08-26

## 2020-08-26 RX ADMIN — LIDOCAINE HYDROCHLORIDE 2 ML: 10 INJECTION, SOLUTION INFILTRATION; PERINEURAL at 16:03

## 2020-08-26 RX ADMIN — BUPIVACAINE HYDROCHLORIDE 2 ML: 2.5 INJECTION, SOLUTION INFILTRATION; PERINEURAL at 16:03

## 2020-08-26 NOTE — PROGRESS NOTES
Assessment:  1  Status post total hip replacement, right  XR hip/pelv 2-3 vws right if performed       Plan:  The patient is doing well  She is encouraged to remain active  The patient is counseled on prophylactic antibiotic use prior to dental visits  The idea of left total knee arthroplasty is discussed  She is provided with left knee Toradol injection  She will return within the month for 1st visco-supplement for the left knee  To do next visit:  Return for already scheduled visco #1  The above stated was discussed in layman's terms and the patient expressed understanding  All questions were answered to the patient's satisfaction  Scribe Attestation    I,:   Lucio Perez am acting as a scribe while in the presence of the attending physician :        I,:   Doron Davis MD personally performed the services described in this documentation    as scribed in my presence :              Subjective:   Debra Camilo is a 68 y o  female who presents one year s/p right ELSA, 9/23/19  She is anticipating left knee visco-supplement in 9/2020  She is doing well with the right knee  Today she has no pain complaints  She is active without repercussion to the hip  He denies fever, chills or shortness of breath  The patient also complains of left knee pain  Weight bearing aggravates while rest alleviates          Review of systems negative unless otherwise specified in HPI    Past Medical History:   Diagnosis Date    Alopecia     Calcaneal spur     [facility]ipidus [address] [address]     Diabetes mellitus [address] [address]     Hyperlipidemia     Hypertension     Osteoarthrosis 2/13/2013    Pes planus     unspecified laterality    Plantar fasciitis        Past Surgical History:   Procedure Laterality Date    CARPAL TUNNEL RELEASE      COLONOSCOPY      May 2006    INCISIONAL BREAST BIOPSY      ME COLONOSCOPY FLX DX W/COLLJ SPEC WHEN PFRMD N/A 5/4/2018    Procedure: COLONOSCOPY;  Surgeon: Kinsey Onofre MD; [facility]; Service: Colorectal    [place] TOTAL HIP ARTHROPLASTY Right 9/23/2019    Procedure: ARTHROPLASTY HIP TOTAL;  Surgeon: Nikolas Perkins MD;  [facility];  Service: Orthopedics    WISDOM TOOTH EXTRACTION         Family History   Problem Relation Age of Onset    Diabetes Mother     Hypertension Mother     Emphysema Father     Cancer Brother     Colon cancer Brother 40       Social History     Occupational History    Not on file   Tobacco Use    Smoking status: Never Smoker    Smokeless tobacco: Never Used   Substance and Sexual Activity    Alcohol use:  Yes     Alcohol/week: 0 0 standard drinks     Frequency: 2-4 times a month     Drinks per session: 1 or 2     Binge frequency: Never     Comment: 0    Drug use: No    Sexual activity: Not Currently         Current Outpatient Medications:     acetaminophen (TYLENOL) 500 mg tablet, Take 1,000 mg by mouth every 6 (six) hours as needed for mild pain, Disp: , Rfl:     ascorbic acid (VITAMIN C) 500 mg tablet, Take 1 tablet (500 mg total) by mouth 2 (two) times a day (Patient not taking: Reported on 7/2/2020), Disp: 60 tablet, Rfl: 0    Biotin 1 MG CAPS, Take by mouth daily , Disp: , Rfl:     Blood Pressure Monitoring (SPHYGMOMANOMETER) MISC, by Does not apply route 2 (two) times a day, Disp: 1 each, Rfl: 0    Calcium Carb-Cholecalciferol (Calcium 1000 + D) 1000-800 MG-UNIT TABS, Take by mouth , Disp: , Rfl:     cephalexin (KEFLEX) 500 mg capsule, , Disp: , Rfl:     cholecalciferol (VITAMIN D3) 1,000 units tablet, Take 1,000 Units by mouth daily, Disp: , Rfl:     ferrous sulfate 324 (65 Fe) mg, Take one tablet daily, every other day (Patient not taking: Reported on 7/2/2020), Disp: 15 tablet, Rfl: 0    fluticasone (FLONASE) 50 mcg/act nasal spray, 1 spray into each nostril daily (Patient not taking: Reported on 7/30/2020), Disp: 16 g, Rfl: 0    gabapentin (NEURONTIN) 300 mg capsule, TAKE 1 CAPSULE BY MOUTH THREE TIMES DAILY FOR 30 DAYS (Patient taking differently: Take 300 mg by mouth 2 (two) times a day ), Disp: 90 capsule, Rfl: 5    ibuprofen (MOTRIN) 600 mg tablet, TAKE 1 TABLET BY MOUTH EVERY 8 HOURS AS NEEDED FOR MODERATE PAIN, Disp: 30 tablet, Rfl: 3    metFORMIN (GLUCOPHAGE) 500 mg tablet, TAKE 1 TABLET BY MOUTH TWO TIMES DAILY WITH MEALS, Disp: 180 tablet, Rfl: 3    metoprolol tartrate (LOPRESSOR) 25 mg tablet, Take 2 tablets (50 mg total) by mouth every 12 (twelve) hours, Disp: 180 tablet, Rfl: 3    mometasone (ELOCON) 0 1 % ointment, Apply topically daily, Disp: 45 g, Rfl: 1    Multiple Vitamin (MULTI-VITAMIN DAILY PO), Take by mouth daily , Disp: , Rfl:     senna (SENOKOT) 8 6 mg, Take 1 tablet (8 6 mg total) by mouth 2 (two) times a day (Patient not taking: Reported on 7/2/2020), Disp: 120 each, Rfl: 0    simvastatin (ZOCOR) 20 mg tablet, TAKE 1 TABLET BY MOUTH EVERY DAY, Disp: 90 tablet, Rfl: 3    Allergies   Allergen Reactions    Meloxicam Hives    Nifedipine Edema    Sulfamethoxazole-Trimethoprim Edema            Vitals:    08/26/20 1505   BP: 166/91   Pulse: 66       Objective:  Physical exam  · General: Awake, Alert, Oriented  · Eyes: Pupils equal, round and reactive to light  · Heart: regular rate and rhythm  · Lungs: No audible wheezing  · Abdomen: soft                    Ortho Exam   Right hip:  Well healed posterior incision  Good arc of motion with no pain  Patient sits comfortably in chair with hip flexed at 90 degrees  Patient stands from seated position without assistance  Calf compartments soft and supple  Sensation intact  Toes are warm sensate and mobile      Diagnostics, reviewed and taken today if performed as documented: The attending physician has personally reviewed the pertinent films in PACS and interpretation is as follows:  Right hip x-ray:  Well aligned prosthesis with no acute changes          Procedures, if performed today:    Large joint arthrocentesis: L knee  Date/Time: 8/26/2020 4:03 PM  Consent given by: patient  Site marked: site marked  Supporting Documentation  Indications: pain   Procedure Details  Location: knee - L knee  Preparation: Patient was prepped and draped in the usual sterile fashion  Needle size: 22 G  Ultrasound guidance: no  Approach: anterolateral  Medications administered: 2 mL bupivacaine 0 25 %; 2 mL lidocaine 1 % (2ml 30mg/ml Ketorolc Tromethamine)    Patient tolerance: patient tolerated the procedure well with no immediate complications  Dressing:  Sterile dressing applied                Portions of the record may have been created with voice recognition software  Occasional wrong word or "sound a like" substitutions may have occurred due to the inherent limitations of voice recognition software  Read the chart carefully and recognize, using context, where substitutions have occurred

## 2020-09-16 ENCOUNTER — OFFICE VISIT (OUTPATIENT)
Dept: OBGYN CLINIC | Facility: HOSPITAL | Age: 70
End: 2020-09-16
Payer: MEDICARE

## 2020-09-16 VITALS
DIASTOLIC BLOOD PRESSURE: 91 MMHG | SYSTOLIC BLOOD PRESSURE: 170 MMHG | BODY MASS INDEX: 42.94 KG/M2 | HEIGHT: 59 IN | HEART RATE: 60 BPM

## 2020-09-16 DIAGNOSIS — M17.0 PRIMARY OSTEOARTHRITIS OF BOTH KNEES: Primary | ICD-10-CM

## 2020-09-16 DIAGNOSIS — M25.562 CHRONIC PAIN OF LEFT KNEE: ICD-10-CM

## 2020-09-16 DIAGNOSIS — G89.29 CHRONIC PAIN OF LEFT KNEE: ICD-10-CM

## 2020-09-16 PROCEDURE — 20610 DRAIN/INJ JOINT/BURSA W/O US: CPT | Performed by: PHYSICIAN ASSISTANT

## 2020-09-16 RX ORDER — HYALURONATE SODIUM 10 MG/ML
20 SYRINGE (ML) INTRAARTICULAR
Status: COMPLETED | OUTPATIENT
Start: 2020-09-16 | End: 2020-09-16

## 2020-09-16 RX ADMIN — Medication 20 MG: at 15:16

## 2020-09-16 NOTE — PROGRESS NOTES
Subjective;    75-year-old adult female who is a little over a year status post right total hip replacement  She has documented osteoarthritis of her left knee and knee pain  She is been authorized for Euflexxa brand product 3 shot series and begins with the 1st injection to her left knee today  Past Medical History:   Diagnosis Date    Alopecia     Calcaneal spur     Diabetes insipidus ([address]     Diabetes mellitus ([address]     Hyperlipidemia     Hypertension     Osteoarthrosis 2/13/2013    Pes planus     unspecified laterality    Plantar fasciitis        Past Surgical History:   Procedure Laterality Date    CARPAL TUNNEL RELEASE      COLONOSCOPY      May 2006    INCISIONAL BREAST BIOPSY      [place] COLONOSCOPY FLX DX W/COLLJ SPEC WHEN PFRMD N/A 5/4/2018    Procedure: COLONOSCOPY;  Surgeon: Yasemin Brunner MD;  [facility]; Service: Colorectal    IN TOTAL HIP ARTHROPLASTY Right 9/23/2019    Procedure: ARTHROPLASTY HIP TOTAL;  Surgeon: Hunter Barrera MD;  [facility];  Service: Orthopedics    WISDOM TOOTH EXTRACTION         Family History   Problem Relation Age of Onset    Diabetes Mother     Hypertension Mother     Emphysema Father     Cancer Brother     Colon cancer Brother 40       Social History     Tobacco Use    Smoking status: Never Smoker    Smokeless tobacco: Never Used   Substance Use Topics    Alcohol use:  Yes     Alcohol/week: 0 0 standard drinks     Frequency: 2-4 times a month     Drinks per session: 1 or 2     Binge frequency: Never     Comment: 0    Drug use: No     Exam;    Her left knee is devoid of any significant bruising redness or fluid distension that would preclude safe administration of medication    Large joint arthrocentesis: L knee  Date/Time: 9/16/2020 3:16 PM  Procedure Details  Location: knee - L knee  Medications administered: 20 mg Sodium Hyaluronate 20 MG/2ML    Patient tolerance: patient tolerated the procedure well with no immediate complications  Dressing:  Sterile dressing applied      Impression; Left knee osteoarthritis  Left knee pain    Plan;    Injection 1  Of the 3 shot series administered from a lateral parapatellar tendon approach  She will return weekly for the next 2 weeks to complete the 3 shot series  Her experience was supervised by and plan formulated by the attending surgeon it was my privilege to assist him in the delivery of her care

## 2020-09-23 ENCOUNTER — OFFICE VISIT (OUTPATIENT)
Dept: OBGYN CLINIC | Facility: HOSPITAL | Age: 70
End: 2020-09-23
Payer: MEDICARE

## 2020-09-23 VITALS
BODY MASS INDEX: 42.94 KG/M2 | SYSTOLIC BLOOD PRESSURE: 159 MMHG | HEART RATE: 65 BPM | HEIGHT: 59 IN | DIASTOLIC BLOOD PRESSURE: 93 MMHG

## 2020-09-23 DIAGNOSIS — M17.12 LOCALIZED OSTEOARTHRITIS OF LEFT KNEE: Primary | ICD-10-CM

## 2020-09-23 PROCEDURE — 20610 DRAIN/INJ JOINT/BURSA W/O US: CPT | Performed by: ORTHOPAEDIC SURGERY

## 2020-09-23 RX ORDER — LIDOCAINE HYDROCHLORIDE 10 MG/ML
3 INJECTION, SOLUTION INFILTRATION; PERINEURAL
Status: COMPLETED | OUTPATIENT
Start: 2020-09-23 | End: 2020-09-23

## 2020-09-23 RX ORDER — HYALURONATE SODIUM 10 MG/ML
20 SYRINGE (ML) INTRAARTICULAR
Status: COMPLETED | OUTPATIENT
Start: 2020-09-23 | End: 2020-09-23

## 2020-09-23 RX ADMIN — LIDOCAINE HYDROCHLORIDE 3 ML: 10 INJECTION, SOLUTION INFILTRATION; PERINEURAL at 15:00

## 2020-09-23 RX ADMIN — Medication 20 MG: at 15:00

## 2020-09-23 NOTE — PROGRESS NOTES
Assessment:  1  Localized osteoarthritis of left knee         Plan:  The patient was provided with 2nd left knee Euflexxa (RP)  She tolerated the procedure well  She should return in one week for 3rd left knee Euflexxa  To do next visit:  Return in about 1 week (around 9/30/2020)  The above stated was discussed in layman's terms and the patient expressed understanding  All questions were answered to the patient's satisfaction  Scribe Attestation    I,:   Jovanni Vargas am acting as a scribe while in the presence of the attending physician :        I,:   Saud Sow MD personally performed the services described in this documentation    as scribed in my presence :              Subjective:   Migdalia Brunner is a 65 y o  female who presents for 2nd left knee Euflexxa  Today she complains of generalized left knee pain  Weight bearing aggravates while rest alleviates  Review of systems negative unless otherwise specified in HPI    Past Medical History:   Diagnosis Date    Alopecia     Calcaneal spur     Diabetes insipidus ([address]     Diabetes mellitus ([address]     Hyperlipidemia     Hypertension     Osteoarthrosis 2/13/2013    Pes planus     unspecified laterality    Plantar fasciitis        Past Surgical History:   Procedure Laterality Date    CARPAL TUNNEL RELEASE      COLONOSCOPY      May 2006    INCISIONAL BREAST BIOPSY      [place] COLONOSCOPY FLX DX W/COLLJ SPEC WHEN PFRMD N/A 5/4/2018    Procedure: COLONOSCOPY;  Surgeon: Radha Franklin MD;  [facility];   Service: Colorectal    [place] TOTAL HIP ARTHROPLASTY Right 9/23/2019    Procedure: ARTHROPLASTY HIP TOTAL;  Surgeon: Saud Sow MD;  [facility];  Service: Orthopedics    WISDOM TOOTH EXTRACTION         Family History   Problem Relation Age of Onset    Diabetes Mother     Hypertension Mother     Emphysema Father     Cancer Brother     Colon cancer Brother 40       Social History     Occupational History  Not on file   Tobacco Use    Smoking status: Never Smoker    Smokeless tobacco: Never Used   Substance and Sexual Activity    Alcohol use:  Yes     Alcohol/week: 0 0 standard drinks     Frequency: 2-4 times a month     Drinks per session: 1 or 2     Binge frequency: Never     Comment: 0    Drug use: No    Sexual activity: Not Currently         Current Outpatient Medications:     acetaminophen (TYLENOL) 500 mg tablet, Take 1,000 mg by mouth every 6 (six) hours as needed for mild pain, Disp: , Rfl:     ascorbic acid (VITAMIN C) 500 mg tablet, Take 1 tablet (500 mg total) by mouth 2 (two) times a day (Patient not taking: Reported on 7/2/2020), Disp: 60 tablet, Rfl: 0    Biotin 1 MG CAPS, Take by mouth daily , Disp: , Rfl:     Blood Pressure Monitoring (SPHYGMOMANOMETER) MISC, by Does not apply route 2 (two) times a day, Disp: 1 each, Rfl: 0    Calcium Carb-Cholecalciferol (Calcium 1000 + D) 1000-800 MG-UNIT TABS, Take by mouth , Disp: , Rfl:     cephalexin (KEFLEX) 500 mg capsule, , Disp: , Rfl:     cholecalciferol (VITAMIN D3) 1,000 units tablet, Take 1,000 Units by mouth daily, Disp: , Rfl:     ferrous sulfate 324 (65 Fe) mg, Take one tablet daily, every other day (Patient not taking: Reported on 7/2/2020), Disp: 15 tablet, Rfl: 0    fluticasone (FLONASE) 50 mcg/act nasal spray, 1 spray into each nostril daily (Patient not taking: Reported on 7/30/2020), Disp: 16 g, Rfl: 0    gabapentin (NEURONTIN) 300 mg capsule, TAKE 1 CAPSULE BY MOUTH THREE TIMES DAILY FOR 30 DAYS (Patient taking differently: Take 300 mg by mouth 2 (two) times a day ), Disp: 90 capsule, Rfl: 5    ibuprofen (MOTRIN) 600 mg tablet, TAKE 1 TABLET BY MOUTH EVERY 8 HOURS AS NEEDED FOR MODERATE PAIN, Disp: 30 tablet, Rfl: 3    metFORMIN (GLUCOPHAGE) 500 mg tablet, TAKE 1 TABLET BY MOUTH TWO TIMES DAILY WITH MEALS, Disp: 180 tablet, Rfl: 3    metoprolol tartrate (LOPRESSOR) 25 mg tablet, Take 2 tablets (50 mg total) by mouth every 12 (twelve) hours, Disp: 180 tablet, Rfl: 3    mometasone (ELOCON) 0 1 % ointment, Apply topically daily, Disp: 45 g, Rfl: 1    Multiple Vitamin (MULTI-VITAMIN DAILY PO), Take by mouth daily , Disp: , Rfl:     senna (SENOKOT) 8 6 mg, Take 1 tablet (8 6 mg total) by mouth 2 (two) times a day (Patient not taking: Reported on 7/2/2020), Disp: 120 each, Rfl: 0    simvastatin (ZOCOR) 20 mg tablet, TAKE 1 TABLET BY MOUTH EVERY DAY, Disp: 90 tablet, Rfl: 3    Allergies   Allergen Reactions    Meloxicam Hives    Nifedipine Edema    Sulfamethoxazole-Trimethoprim Edema            Vitals:    09/23/20 1435   BP: 159/93   Pulse: 65       Objective:  Physical exam  · General: Awake, Alert, Oriented  · Eyes: Pupils equal, round and reactive to light  · Heart: regular rate and rhythm  · Lungs: No audible wheezing  · Abdomen: soft                    Ortho Exam   Left knee:  No erythema or ecchymosis  No effusion or swelling  Normal strength  Good ROM   Calf compartments soft and supple  Sensation intact  Toes are warm sensate and mobile        Diagnostics, reviewed and taken today if performed as documented:    None performed     Procedures, if performed today:    Large joint arthrocentesis: L knee  Date/Time: 9/23/2020 3:00 PM  Consent given by: patient  Site marked: site marked  Timeout: Immediately prior to procedure a time out was called to verify the correct patient, procedure, equipment, support staff and site/side marked as required   Supporting Documentation  Indications: pain   Procedure Details  Location: knee - L knee  Preparation: Patient was prepped and draped in the usual sterile fashion  Needle size: 22 G  Ultrasound guidance: no  Approach: anterolateral  Medications administered: 20 mg Sodium Hyaluronate 20 MG/2ML; 3 mL lidocaine 1 %    Patient tolerance: patient tolerated the procedure well with no immediate complications  Dressing:  Sterile dressing applied              Portions of the record may have been created with voice recognition software  Occasional wrong word or "sound a like" substitutions may have occurred due to the inherent limitations of voice recognition software  Read the chart carefully and recognize, using context, where substitutions have occurred

## 2020-09-30 ENCOUNTER — OFFICE VISIT (OUTPATIENT)
Dept: OBGYN CLINIC | Facility: HOSPITAL | Age: 70
End: 2020-09-30
Payer: MEDICARE

## 2020-09-30 VITALS
SYSTOLIC BLOOD PRESSURE: 149 MMHG | HEIGHT: 59 IN | DIASTOLIC BLOOD PRESSURE: 96 MMHG | BODY MASS INDEX: 42.94 KG/M2 | HEART RATE: 81 BPM

## 2020-09-30 DIAGNOSIS — M17.12 LOCALIZED OSTEOARTHRITIS OF LEFT KNEE: Primary | ICD-10-CM

## 2020-09-30 PROCEDURE — 20610 DRAIN/INJ JOINT/BURSA W/O US: CPT | Performed by: ORTHOPAEDIC SURGERY

## 2020-09-30 RX ORDER — HYALURONATE SODIUM 10 MG/ML
20 SYRINGE (ML) INTRAARTICULAR
Status: COMPLETED | OUTPATIENT
Start: 2020-09-30 | End: 2020-09-30

## 2020-09-30 RX ADMIN — Medication 20 MG: at 14:42

## 2020-09-30 NOTE — PROGRESS NOTES
Assessment:   Diagnosis ICD-10-CM Associated Orders   1  Localized osteoarthritis of left knee  M17 12        Plan:      To do next visit:  No follow-ups on file  The above stated was discussed in layman's terms and the patient expressed understanding  All questions were answered to the patient's satisfaction  Scribe Attestation    I,:    am acting as a scribe while in the presence of the attending physician :        I,:    personally performed the services described in this documentation    as scribed in my presence :              Subjective:   Rach Abbasi is a 65 y o  female who presents today for her 3rd Euflexxa injection for the Left knee  Review of systems negative unless otherwise specified in HPI    Past Medical History:   Diagnosis Date    Alopecia     Calcaneal spur     Diabetes insipidus ([address]     [facility] mellitus ([facility] [address]     Hyperlipidemia     Hypertension     Osteoarthrosis 2/13/2013    Pes planus     unspecified laterality    Plantar fasciitis        Past Surgical History:   Procedure Laterality Date    CARPAL TUNNEL RELEASE      COLONOSCOPY      May 2006    INCISIONAL BREAST BIOPSY      [place] COLONOSCOPY FLX DX W/COLLJ SPEC WHEN PFRMD N/A 5/4/2018    Procedure: COLONOSCOPY;  Surgeon: Charlee Hinojosa MD;  [facility]; Service: Colorectal    [place] TOTAL HIP ARTHROPLASTY Right 9/23/2019    Procedure: ARTHROPLASTY HIP TOTAL;  Surgeon: Padmaja Valentine MD;  [facility];  Service: Orthopedics    WISDOM TOOTH EXTRACTION         Family History   Problem Relation Age of Onset    Diabetes Mother     Hypertension Mother     Emphysema Father     Cancer Brother     Colon cancer Brother 40       Social History     Occupational History    Not on file   Tobacco Use    Smoking status: Never Smoker    Smokeless tobacco: Never Used   Substance and Sexual Activity    Alcohol use:  Yes     Alcohol/week: 0 0 standard drinks     Frequency: 2-4 times a month     Drinks per session: 1 or 2     Binge frequency: Never     Comment: 0    Drug use: No    Sexual activity: Not Currently         Current Outpatient Medications:     acetaminophen (TYLENOL) 500 mg tablet, Take 1,000 mg by mouth every 6 (six) hours as needed for mild pain, Disp: , Rfl:     ascorbic acid (VITAMIN C) 500 mg tablet, Take 1 tablet (500 mg total) by mouth 2 (two) times a day (Patient not taking: Reported on 7/2/2020), Disp: 60 tablet, Rfl: 0    Biotin 1 MG CAPS, Take by mouth daily , Disp: , Rfl:     Blood Pressure Monitoring (SPHYGMOMANOMETER) MISC, by Does not apply route 2 (two) times a day, Disp: 1 each, Rfl: 0    Calcium Carb-Cholecalciferol (Calcium 1000 + D) 1000-800 MG-UNIT TABS, Take by mouth , Disp: , Rfl:     cephalexin (KEFLEX) 500 mg capsule, , Disp: , Rfl:     cholecalciferol (VITAMIN D3) 1,000 units tablet, Take 1,000 Units by mouth daily, Disp: , Rfl:     ferrous sulfate 324 (65 Fe) mg, Take one tablet daily, every other day (Patient not taking: Reported on 7/2/2020), Disp: 15 tablet, Rfl: 0    fluticasone (FLONASE) 50 mcg/act nasal spray, 1 spray into each nostril daily (Patient not taking: Reported on 7/30/2020), Disp: 16 g, Rfl: 0    gabapentin (NEURONTIN) 300 mg capsule, TAKE 1 CAPSULE BY MOUTH THREE TIMES DAILY FOR 30 DAYS (Patient taking differently: Take 300 mg by mouth 2 (two) times a day ), Disp: 90 capsule, Rfl: 5    ibuprofen (MOTRIN) 600 mg tablet, TAKE 1 TABLET BY MOUTH EVERY 8 HOURS AS NEEDED FOR MODERATE PAIN, Disp: 30 tablet, Rfl: 3    metFORMIN (GLUCOPHAGE) 500 mg tablet, TAKE 1 TABLET BY MOUTH TWO TIMES DAILY WITH MEALS, Disp: 180 tablet, Rfl: 3    metoprolol tartrate (LOPRESSOR) 25 mg tablet, Take 2 tablets (50 mg total) by mouth every 12 (twelve) hours, Disp: 180 tablet, Rfl: 3    mometasone (ELOCON) 0 1 % ointment, Apply topically daily, Disp: 45 g, Rfl: 1    Multiple Vitamin (MULTI-VITAMIN DAILY PO), Take by mouth daily , Disp: , Rfl:     senna (SENOKOT) 8 6 mg, Take 1 tablet (8 6 mg total) by mouth 2 (two) times a day (Patient not taking: Reported on 7/2/2020), Disp: 120 each, Rfl: 0    simvastatin (ZOCOR) 20 mg tablet, TAKE 1 TABLET BY MOUTH EVERY DAY, Disp: 90 tablet, Rfl: 3    Allergies   Allergen Reactions    Meloxicam Hives    Nifedipine Edema    Sulfamethoxazole-Trimethoprim Edema            Vitals:    09/30/20 1437   BP: 149/96   Pulse: 81       Objective:                    Left Knee Exam     Other   Erythema: absent  Sensation: normal  Pulse: present  Swelling: none  Effusion: no effusion present            Diagnostics, reviewed and taken today if performed as documented:    None performed      The attending physician has personally reviewed the pertinent films in PACS and interpretation is as follows:      Procedures, if performed today:    Large joint arthrocentesis: L knee  Date/Time: 9/30/2020 2:42 PM  Consent given by: patient  Site marked: site marked  Timeout: Immediately prior to procedure a time out was called to verify the correct patient, procedure, equipment, support staff and site/side marked as required   Supporting Documentation  Indications: pain   Procedure Details  Location: knee - L knee  Preparation: Patient was prepped and draped in the usual sterile fashion  Needle size: 22 G  Ultrasound guidance: no  Approach: anterolateral  Medications administered: 20 mg Sodium Hyaluronate 20 MG/2ML    Patient tolerance: patient tolerated the procedure well with no immediate complications  Dressing:  Sterile dressing applied          Portions of the record may have been created with voice recognition software  Occasional wrong word or "sound a like" substitutions may have occurred due to the inherent limitations of voice recognition software  Read the chart carefully and recognize, using context, where substitutions have occurred

## 2020-10-09 PROBLEM — G89.4 CHRONIC PAIN SYNDROME: Status: ACTIVE | Noted: 2020-10-09

## 2020-10-14 ENCOUNTER — ANNUAL EXAM (OUTPATIENT)
Dept: OBGYN CLINIC | Facility: CLINIC | Age: 70
End: 2020-10-14
Payer: MEDICARE

## 2020-10-14 VITALS
HEIGHT: 58 IN | BODY MASS INDEX: 43.66 KG/M2 | SYSTOLIC BLOOD PRESSURE: 140 MMHG | TEMPERATURE: 97 F | WEIGHT: 208 LBS | DIASTOLIC BLOOD PRESSURE: 78 MMHG

## 2020-10-14 DIAGNOSIS — Z12.39 ENCOUNTER FOR SCREENING FOR MALIGNANT NEOPLASM OF BREAST, UNSPECIFIED SCREENING MODALITY: ICD-10-CM

## 2020-10-14 DIAGNOSIS — Z12.31 ENCOUNTER FOR SCREENING MAMMOGRAM FOR MALIGNANT NEOPLASM OF BREAST: ICD-10-CM

## 2020-10-14 DIAGNOSIS — Z01.419 ENCOUNTER FOR ANNUAL ROUTINE GYNECOLOGICAL EXAMINATION: Primary | ICD-10-CM

## 2020-10-14 PROCEDURE — G0101 CA SCREEN;PELVIC/BREAST EXAM: HCPCS | Performed by: OBSTETRICS & GYNECOLOGY

## 2020-11-09 ENCOUNTER — OFFICE VISIT (OUTPATIENT)
Dept: PAIN MEDICINE | Facility: CLINIC | Age: 70
End: 2020-11-09
Payer: MEDICARE

## 2020-11-09 VITALS
DIASTOLIC BLOOD PRESSURE: 98 MMHG | BODY MASS INDEX: 43.66 KG/M2 | SYSTOLIC BLOOD PRESSURE: 152 MMHG | HEART RATE: 74 BPM | WEIGHT: 208 LBS | HEIGHT: 58 IN | TEMPERATURE: 97.9 F

## 2020-11-09 DIAGNOSIS — M54.16 LUMBAR RADICULOPATHY: ICD-10-CM

## 2020-11-09 DIAGNOSIS — G89.29 CHRONIC LOW BACK PAIN WITH SCIATICA, SCIATICA LATERALITY UNSPECIFIED, UNSPECIFIED BACK PAIN LATERALITY: ICD-10-CM

## 2020-11-09 DIAGNOSIS — M46.1 SACROILIITIS (HCC): ICD-10-CM

## 2020-11-09 DIAGNOSIS — M43.16 SPONDYLOLISTHESIS, LUMBAR REGION: ICD-10-CM

## 2020-11-09 DIAGNOSIS — M54.40 CHRONIC LOW BACK PAIN WITH SCIATICA, SCIATICA LATERALITY UNSPECIFIED, UNSPECIFIED BACK PAIN LATERALITY: ICD-10-CM

## 2020-11-09 DIAGNOSIS — M48.062 LUMBAR STENOSIS WITH NEUROGENIC CLAUDICATION: ICD-10-CM

## 2020-11-09 DIAGNOSIS — G89.4 CHRONIC PAIN SYNDROME: Primary | ICD-10-CM

## 2020-11-09 DIAGNOSIS — M51.36 DDD (DEGENERATIVE DISC DISEASE), LUMBAR: ICD-10-CM

## 2020-11-09 DIAGNOSIS — M54.12 CERVICAL RADICULOPATHY: ICD-10-CM

## 2020-11-09 DIAGNOSIS — M47.816 LUMBAR SPONDYLOSIS: ICD-10-CM

## 2020-11-09 PROCEDURE — 99214 OFFICE O/P EST MOD 30 MIN: CPT | Performed by: NURSE PRACTITIONER

## 2020-11-09 RX ORDER — GABAPENTIN 300 MG/1
300 CAPSULE ORAL 2 TIMES DAILY
Qty: 60 CAPSULE | Refills: 3 | Status: SHIPPED | OUTPATIENT
Start: 2020-11-09 | End: 2020-11-11

## 2020-11-11 DIAGNOSIS — M54.16 LUMBAR RADICULOPATHY: ICD-10-CM

## 2020-11-11 RX ORDER — GABAPENTIN 300 MG/1
CAPSULE ORAL
Qty: 90 CAPSULE | Refills: 5 | Status: SHIPPED | OUTPATIENT
Start: 2020-11-11 | End: 2021-04-26 | Stop reason: SDUPTHER

## 2020-11-16 ENCOUNTER — TELEPHONE (OUTPATIENT)
Dept: CARDIOLOGY CLINIC | Facility: CLINIC | Age: 70
End: 2020-11-16

## 2020-11-16 DIAGNOSIS — I10 BENIGN ESSENTIAL HYPERTENSION: Primary | ICD-10-CM

## 2020-11-16 RX ORDER — LOSARTAN POTASSIUM 25 MG/1
25 TABLET ORAL DAILY
Qty: 60 TABLET | Refills: 3 | Status: SHIPPED | OUTPATIENT
Start: 2020-11-16 | End: 2021-07-08 | Stop reason: SDUPTHER

## 2021-01-19 DIAGNOSIS — I47.1 SVT (SUPRAVENTRICULAR TACHYCARDIA) (HCC): ICD-10-CM

## 2021-03-02 DIAGNOSIS — Z23 ENCOUNTER FOR IMMUNIZATION: ICD-10-CM

## 2021-03-06 ENCOUNTER — IMMUNIZATIONS (OUTPATIENT)
Dept: FAMILY MEDICINE CLINIC | Facility: HOSPITAL | Age: 71
End: 2021-03-06

## 2021-03-06 DIAGNOSIS — Z23 ENCOUNTER FOR IMMUNIZATION: Primary | ICD-10-CM

## 2021-03-06 PROCEDURE — 0001A SARS-COV-2 / COVID-19 MRNA VACCINE (PFIZER-BIONTECH) 30 MCG: CPT

## 2021-03-06 PROCEDURE — 91300 SARS-COV-2 / COVID-19 MRNA VACCINE (PFIZER-BIONTECH) 30 MCG: CPT

## 2021-03-19 ENCOUNTER — TELEPHONE (OUTPATIENT)
Dept: PAIN MEDICINE | Facility: CLINIC | Age: 71
End: 2021-03-19

## 2021-03-19 DIAGNOSIS — M54.16 LUMBAR RADICULOPATHY: Primary | ICD-10-CM

## 2021-03-19 NOTE — TELEPHONE ENCOUNTER
Pt needs a new EMG lower extremity script since central scheduling is booking into august  Her EMG script expires in July   Ph# [phone]

## 2021-03-27 ENCOUNTER — IMMUNIZATIONS (OUTPATIENT)
Dept: FAMILY MEDICINE CLINIC | Facility: HOSPITAL | Age: 71
End: 2021-03-27

## 2021-03-27 DIAGNOSIS — Z23 ENCOUNTER FOR IMMUNIZATION: Primary | ICD-10-CM

## 2021-03-27 PROCEDURE — 91300 SARS-COV-2 / COVID-19 MRNA VACCINE (PFIZER-BIONTECH) 30 MCG: CPT

## 2021-03-27 PROCEDURE — 0002A SARS-COV-2 / COVID-19 MRNA VACCINE (PFIZER-BIONTECH) 30 MCG: CPT

## 2021-04-25 DIAGNOSIS — M54.16 LUMBAR RADICULOPATHY: ICD-10-CM

## 2021-04-26 ENCOUNTER — TELEPHONE (OUTPATIENT)
Dept: PAIN MEDICINE | Facility: CLINIC | Age: 71
End: 2021-04-26

## 2021-04-26 DIAGNOSIS — M54.16 LUMBAR RADICULOPATHY: ICD-10-CM

## 2021-04-26 RX ORDER — GABAPENTIN 300 MG/1
CAPSULE ORAL
Qty: 60 CAPSULE | Refills: 3 | OUTPATIENT
Start: 2021-04-26

## 2021-04-26 RX ORDER — GABAPENTIN 300 MG/1
300 CAPSULE ORAL 3 TIMES DAILY
Qty: 90 CAPSULE | Refills: 5 | Status: SHIPPED | OUTPATIENT
Start: 2021-04-26 | End: 2022-05-01

## 2021-04-26 NOTE — TELEPHONE ENCOUNTER
Pt contacted Call Center requested refill of their medication  Medication Name: Gabapentin      Dosage of Med: 300 mg      Frequency of Med: TAKE 1 CAPSULE BY MOUTH THREE TIMES DAILY      Remaining Medication: 2 wks supply      Pharmacy and Location: [facility] [address] [facility] on file         Pt  Preferred Callback Phone Number: [phone]       Thank you

## 2021-04-28 ENCOUNTER — OFFICE VISIT (OUTPATIENT)
Dept: OBGYN CLINIC | Facility: HOSPITAL | Age: 71
End: 2021-04-28
Payer: MEDICARE

## 2021-04-28 VITALS
HEIGHT: 58 IN | DIASTOLIC BLOOD PRESSURE: 95 MMHG | WEIGHT: 206 LBS | BODY MASS INDEX: 43.24 KG/M2 | SYSTOLIC BLOOD PRESSURE: 170 MMHG | HEART RATE: 62 BPM

## 2021-04-28 DIAGNOSIS — M17.12 PRIMARY OSTEOARTHRITIS OF LEFT KNEE: Primary | ICD-10-CM

## 2021-04-28 DIAGNOSIS — M25.562 LEFT KNEE PAIN, UNSPECIFIED CHRONICITY: ICD-10-CM

## 2021-04-28 PROCEDURE — 99213 OFFICE O/P EST LOW 20 MIN: CPT | Performed by: ORTHOPAEDIC SURGERY

## 2021-04-28 PROCEDURE — 20610 DRAIN/INJ JOINT/BURSA W/O US: CPT | Performed by: ORTHOPAEDIC SURGERY

## 2021-04-28 RX ORDER — LIDOCAINE HYDROCHLORIDE 10 MG/ML
2 INJECTION, SOLUTION INFILTRATION; PERINEURAL
Status: COMPLETED | OUTPATIENT
Start: 2021-04-28 | End: 2021-04-28

## 2021-04-28 RX ORDER — BUPIVACAINE HYDROCHLORIDE 2.5 MG/ML
2 INJECTION, SOLUTION INFILTRATION; PERINEURAL
Status: COMPLETED | OUTPATIENT
Start: 2021-04-28 | End: 2021-04-28

## 2021-04-28 RX ORDER — KETOROLAC TROMETHAMINE 30 MG/ML
60 INJECTION, SOLUTION INTRAMUSCULAR; INTRAVENOUS
Status: COMPLETED | OUTPATIENT
Start: 2021-04-28 | End: 2021-04-28

## 2021-04-28 RX ADMIN — KETOROLAC TROMETHAMINE 60 MG: 30 INJECTION, SOLUTION INTRAMUSCULAR; INTRAVENOUS at 16:28

## 2021-04-28 RX ADMIN — LIDOCAINE HYDROCHLORIDE 2 ML: 10 INJECTION, SOLUTION INFILTRATION; PERINEURAL at 16:28

## 2021-04-28 RX ADMIN — BUPIVACAINE HYDROCHLORIDE 2 ML: 2.5 INJECTION, SOLUTION INFILTRATION; PERINEURAL at 16:28

## 2021-04-28 NOTE — PROGRESS NOTES
65 y o female presents for evaluation of left knee pain  Patient has been previously seen evaluated by Dr Erik Pride and ice with left knee osteoarthritis  She has undergone multiple injections in the past with significant symptomatic relief  Today patient describes significant pain and discomfort over the anterior medial aspect of her left knee  This pain is made significantly worse with increased activity and improved somewhat at rest   She takes minimal over-the-counter medication for symptomatic relief  She denies any associated numbness or tingling  She has previously undergone both viscosupplementation injections with significant pain reduction as well as Toradol with significant pain reduction  Patient recently changed insurances but is interested in pursuing future viscosupplementation injections  Patient does have a history of diabetes and would like to avoid any corticosteroid injections if possible  Review of Systems  Review of systems negative unless otherwise specified in HPI    Past Medical History  Past Medical History:   Diagnosis Date    Alopecia     Calcaneal spur     Diabetes insipidus ([facility] [address]     Diabetes mellitus ([facility] [address] [facility]     Hyperlipidemia     Hypertension     Osteoarthrosis 2/13/2013    Pes planus     unspecified laterality    Plantar fasciitis        Past Surgical History  Past Surgical History:   Procedure Laterality Date    CARPAL TUNNEL RELEASE      COLONOSCOPY      May 2006    INCISIONAL BREAST BIOPSY      TN COLONOSCOPY FLX DX W/COLLJ SPEC WHEN PFRMD N/A 5/4/2018    Procedure: COLONOSCOPY;  Surgeon: Kinsey Onofre MD;  [facility];   Service: Colorectal    TN TOTAL HIP ARTHROPLASTY Right 9/23/2019    Procedure: ARTHROPLASTY HIP TOTAL;  Surgeon: Doron Davis MD;  [facility];  Service: Orthopedics    WISDOM TOOTH EXTRACTION         Current Medications  Current Outpatient Medications on File Prior to Visit   Medication Sig Dispense Refill    acetaminophen (TYLENOL) 500 mg tablet Take 1,000 mg by mouth every 6 (six) hours as needed for mild pain      ascorbic acid (VITAMIN C) 500 mg tablet Take 1 tablet (500 mg total) by mouth 2 (two) times a day (Patient not taking: Reported on 7/2/2020) 60 tablet 0    Biotin 1 MG CAPS Take by mouth daily       Blood Pressure Monitoring (SPHYGMOMANOMETER) MISC by Does not apply route 2 (two) times a day 1 each 0    Calcium Carb-Cholecalciferol (Calcium 1000 + D) 1000-800 MG-UNIT TABS Take by mouth       cephalexin (KEFLEX) 500 mg capsule       cholecalciferol (VITAMIN D3) 1,000 units tablet Take 1,000 Units by mouth daily      ferrous sulfate 324 (65 Fe) mg Take one tablet daily, every other day (Patient not taking: Reported on 7/2/2020) 15 tablet 0    fluticasone (FLONASE) 50 mcg/act nasal spray 1 spray into each nostril daily (Patient not taking: Reported on 7/30/2020) 16 g 0    gabapentin (NEURONTIN) 300 mg capsule Take 1 capsule (300 mg total) by mouth 3 (three) times a day 90 capsule 5    ibuprofen (MOTRIN) 600 mg tablet TAKE 1 TABLET BY MOUTH EVERY 8 HOURS AS NEEDED FOR MODERATE PAIN 30 tablet 3    losartan (COZAAR) 25 mg tablet Take 1 tablet (25 mg total) by mouth daily 60 tablet 3    metFORMIN (GLUCOPHAGE) 500 mg tablet TAKE 1 TABLET BY MOUTH TWO TIMES DAILY WITH MEALS 180 tablet 3    metoprolol tartrate (LOPRESSOR) 25 mg tablet TAKE 2 TABLETS BY MOUTH EVERY 12 HOURS 180 tablet 3    mometasone (ELOCON) 0 1 % ointment Apply topically daily 45 g 1    Multiple Vitamin (MULTI-VITAMIN DAILY PO) Take by mouth daily       senna (SENOKOT) 8 6 mg Take 1 tablet (8 6 mg total) by mouth 2 (two) times a day (Patient not taking: Reported on 7/2/2020) 120 each 0    simvastatin (ZOCOR) 20 mg tablet TAKE 1 TABLET BY MOUTH EVERY DAY 90 tablet 3     No current facility-administered medications on file prior to visit          Recent Labs (HCT,HGB,PT,INR,ESR,CRP,GLU,HgA1C)  0   Lab Value Date/Time    HCT 42 4 07/27/2020 1501    HGB 13 7 07/27/2020 1501    WBC 5 94 07/27/2020 1501    INR 1 14 08/05/2019 1423    CRP <3 0 08/05/2019 1423    GLUCOSE 95 04/21/2015 1430    HGBA1C 5 9 (H) 07/27/2020 1501    HGBA1C 6 2 (H) 04/21/2015 1430         Physical exam  · General: Awake, Alert, Oriented  · Eyes: Pupils equal, round and reactive to light  · Heart: regular rate and rhythm  · Lungs: No audible wheezing  · Abdomen: soft  left Knee exam  · Skin intact, no erythema, no ecchymosis   · No palpable knee effusion   · Genu varum  · Knee range of motion 0 to greater than 120°   · Significant crepitation with knee flexion extension   · Tenderness palpation along medial joint line, no tenderness palpation lateral joint line   · Distal extremity warm and pink   · Distal extremity motor grossly intact     Procedure  Large joint arthrocentesis: L knee  Universal Protocol:  Consent given by: patient  Time out: Immediately prior to procedure a "time out" was called to verify the correct patient, procedure, equipment, support staff and site/side marked as required  Supporting Documentation  Indications: pain   Procedure Details  Location: knee - L knee  Needle size: 22 G  Ultrasound guidance: no  Medications administered: 2 mL bupivacaine 0 25 %; 2 mL lidocaine 1 %; 60 mg ketorolac 60 mg/2 mL    Patient tolerance: patient tolerated the procedure well with no immediate complications  Dressing:  Sterile dressing applied            Imaging  Previously obtained x-rays of knee taken reviewed with patient today's visit  Again this reveals significant medial joint space narrowing with periarticular osteophyte formation consistent with left knee osteoarthritis    64-year-old female with persistent symptomatic left knee osteoarthritis  Patient was provided with a an intra-articular left knee Toradol injection for symptomatic relief  This was administered without complication    Preauthorization process was initiated for repeat series of 3 viscosupplementation injections  Patient should follow up in office in 6 weeks for repeat evaluation and injection of viscosupplementation

## 2021-06-04 ENCOUNTER — TRANSCRIBE ORDERS (OUTPATIENT)
Dept: LAB | Facility: HOSPITAL | Age: 71
End: 2021-06-04

## 2021-06-04 ENCOUNTER — LAB (OUTPATIENT)
Dept: LAB | Facility: HOSPITAL | Age: 71
End: 2021-06-04
Attending: INTERNAL MEDICINE
Payer: MEDICARE

## 2021-06-04 DIAGNOSIS — I10 BENIGN ESSENTIAL HYPERTENSION: ICD-10-CM

## 2021-06-04 DIAGNOSIS — E11.21 CONTROLLED TYPE 2 DIABETES MELLITUS WITH DIABETIC NEPHROPATHY, WITHOUT LONG-TERM CURRENT USE OF INSULIN (HCC): Primary | ICD-10-CM

## 2021-06-04 DIAGNOSIS — E78.5 HYPERLIPIDEMIA, UNSPECIFIED HYPERLIPIDEMIA TYPE: ICD-10-CM

## 2021-06-04 DIAGNOSIS — E11.21 CONTROLLED TYPE 2 DIABETES MELLITUS WITH DIABETIC NEPHROPATHY, WITHOUT LONG-TERM CURRENT USE OF INSULIN (HCC): ICD-10-CM

## 2021-06-04 LAB
ALBUMIN SERPL BCP-MCNC: 4.2 G/DL (ref 3.5–5)
ALP SERPL-CCNC: 81 U/L (ref 46–116)
ALT SERPL W P-5'-P-CCNC: 21 U/L (ref 12–78)
ANION GAP SERPL CALCULATED.3IONS-SCNC: 5 MMOL/L (ref 4–13)
AST SERPL W P-5'-P-CCNC: 18 U/L (ref 5–45)
BILIRUB SERPL-MCNC: 0.72 MG/DL (ref 0.2–1)
BUN SERPL-MCNC: 13 MG/DL (ref 5–25)
CALCIUM SERPL-MCNC: 9.9 MG/DL (ref 8.3–10.1)
CHLORIDE SERPL-SCNC: 104 MMOL/L (ref 100–108)
CHOLEST SERPL-MCNC: 194 MG/DL (ref 50–200)
CO2 SERPL-SCNC: 29 MMOL/L (ref 21–32)
CREAT SERPL-MCNC: 0.65 MG/DL (ref 0.6–1.3)
EST. AVERAGE GLUCOSE BLD GHB EST-MCNC: 120 MG/DL
GFR SERPL CREATININE-BSD FRML MDRD: 89 ML/MIN/1.73SQ M
GLUCOSE P FAST SERPL-MCNC: 89 MG/DL (ref 65–99)
HBA1C MFR BLD: 5.8 %
HDLC SERPL-MCNC: 58 MG/DL
LDLC SERPL CALC-MCNC: 106 MG/DL (ref 0–100)
POTASSIUM SERPL-SCNC: 4.1 MMOL/L (ref 3.5–5.3)
PROT SERPL-MCNC: 8 G/DL (ref 6.4–8.2)
SODIUM SERPL-SCNC: 138 MMOL/L (ref 136–145)
TRIGL SERPL-MCNC: 151 MG/DL

## 2021-06-04 PROCEDURE — 36415 COLL VENOUS BLD VENIPUNCTURE: CPT

## 2021-06-04 PROCEDURE — 80061 LIPID PANEL: CPT

## 2021-06-04 PROCEDURE — 83036 HEMOGLOBIN GLYCOSYLATED A1C: CPT

## 2021-06-04 PROCEDURE — 80053 COMPREHEN METABOLIC PANEL: CPT

## 2021-06-08 ENCOUNTER — OFFICE VISIT (OUTPATIENT)
Dept: FAMILY MEDICINE CLINIC | Facility: CLINIC | Age: 71
End: 2021-06-08
Payer: MEDICARE

## 2021-06-08 VITALS
OXYGEN SATURATION: 98 % | HEART RATE: 68 BPM | TEMPERATURE: 97.9 F | RESPIRATION RATE: 20 BRPM | BODY MASS INDEX: 43.07 KG/M2 | DIASTOLIC BLOOD PRESSURE: 86 MMHG | SYSTOLIC BLOOD PRESSURE: 140 MMHG | WEIGHT: 205.2 LBS | HEIGHT: 58 IN

## 2021-06-08 DIAGNOSIS — E66.01 MORBID OBESITY (HCC): ICD-10-CM

## 2021-06-08 DIAGNOSIS — I10 BENIGN ESSENTIAL HYPERTENSION: ICD-10-CM

## 2021-06-08 DIAGNOSIS — E78.5 HYPERLIPIDEMIA, UNSPECIFIED HYPERLIPIDEMIA TYPE: ICD-10-CM

## 2021-06-08 DIAGNOSIS — E11.21 CONTROLLED TYPE 2 DIABETES MELLITUS WITH DIABETIC NEPHROPATHY, WITHOUT LONG-TERM CURRENT USE OF INSULIN (HCC): Primary | ICD-10-CM

## 2021-06-08 DIAGNOSIS — E11.42 DIABETIC POLYNEUROPATHY ASSOCIATED WITH TYPE 2 DIABETES MELLITUS (HCC): ICD-10-CM

## 2021-06-08 DIAGNOSIS — Z78.0 POSTMENOPAUSAL: ICD-10-CM

## 2021-06-08 PROCEDURE — 99214 OFFICE O/P EST MOD 30 MIN: CPT | Performed by: FAMILY MEDICINE

## 2021-06-08 NOTE — PROGRESS NOTES
Chief Complaint   Patient presents with    Follow-up     Routine overdue and review labs  Health Maintenance   Topic Date Due    DTaP,Tdap,and Td Vaccines (1 - Tdap) Never done    DXA SCAN  11/07/2019    PT PLAN OF CARE  01/04/2020    DM Eye Exam  08/23/2020    Medicare Annual Wellness Visit (AWV)  07/30/2021    Influenza Vaccine (Season Ended) 09/01/2021    Cervical Cancer Screening  06/08/2022 (Originally 1/16/2020)    HEMOGLOBIN A1C  12/04/2021    Fall Risk  06/08/2022    Depression Screening PHQ  06/08/2022    BMI: Followup Plan  06/08/2022    Falls: Plan of Care  06/08/2022    Diabetic Foot Exam  06/08/2022    BMI: Adult  06/09/2022    Colorectal Cancer Screening  05/04/2023    Hepatitis C Screening  Completed    Pneumococcal Vaccine: 65+ Years  Completed    COVID-19 Vaccine  Completed    HIB Vaccine  Aged Out    Hepatitis B Vaccine  Aged Out    IPV Vaccine  Aged Out    Hepatitis A Vaccine  Aged Out    Meningococcal ACWY Vaccine  Aged Out    HPV Vaccine  Aged Out       BMI Counseling: Body mass index is 42 89 kg/m²  The BMI is above normal  Nutrition recommendations include decreasing portion sizes, encouraging healthy choices of fruits and vegetables, decreasing fast food intake, consuming healthier snacks and limiting drinks that contain sugar  Exercise recommendations include moderate physical activity 150 minutes/week  No pharmacotherapy was ordered  Falls Plan of Care: balance, strength, and gait training instructions were provided  Assessment/Plan:    Diabetes mellitus type 2, controlled ([facility] [place] [address] )    Lab Results   Component Value Date    HGBA1C 5 8 (H) 06/04/2021     Controlled  Low carb diet  Continue metformin 500mg bid  Advised pt to follow ophthalmology  Benign essential hypertension  Continue losartan 25mg QD and metoprolol 25mg bid  FU cardiology  Hyperlipidemia  6/2021 Lipid 194/151/58/106 ok  Low fat diet   Continue simvastatin 20mg qhs  Diabetic polyneuropathy associated with type 2 diabetes mellitus ([facility] [address] )    Lab Results   Component Value Date    HGBA1C 5 8 (H) 06/04/2021     Continue gabapentin  Reviewed lab in 6/2021  HgA1C 5 8 stable  CMP ok  Lipid 194/151/58/106 stable    Got flu shot yearly    Got Covid19 vaccine  Denies SE  Got pneumovax at age of 70  Got prevnar 13 2/2016  Got zostavax in 3/2017 per pt  Insurance wont cover shingrix per pt  Ab Garcia Pontiff for mammogram and pap  Mammogram 3/2019 negative  Order done  Had colonoscopy 5/2018 Dr Barahona  Repeat in 5 years    Dexa scan 11/2016 normal  Give script today  RTO in 6 months         Diagnoses and all orders for this visit:    Controlled type 2 diabetes mellitus with diabetic nephropathy, without long-term current use of insulin ([facility] )  -     Ambulatory referral to Podiatry; Future  -     CBC; Future  -     Comprehensive metabolic panel; Future  -     Hemoglobin A1C; Future  -     Lipid panel; Future  -     TSH, 3rd generation with Free T4 reflex; Future  -     Microalbumin / creatinine urine ratio; Future    Benign essential hypertension  -     CBC; Future  -     Comprehensive metabolic panel; Future  -     Hemoglobin A1C; Future  -     Lipid panel; Future  -     TSH, 3rd generation with Free T4 reflex; Future  -     Microalbumin / creatinine urine ratio; Future    Hyperlipidemia, unspecified hyperlipidemia type  -     CBC; Future  -     Comprehensive metabolic panel; Future  -     Hemoglobin A1C; Future  -     Lipid panel; Future  -     TSH, 3rd generation with Free T4 reflex; Future  -     Microalbumin / creatinine urine ratio; Future    Postmenopausal  -     DXA bone density spine hip and pelvis; Future    Morbid obesity ([facility] )    Diabetic polyneuropathy associated with type 2 diabetes mellitus ([facility] )    Other orders  -     Cancel: Ambulatory referral to Obstetrics / Gynecology; Future          Subjective:      Patient ID: Abhishek Ochoa is a 65 y o  female  HPI    Pt is here by herself  DM---6/2021 HgA1C 5 8 controlled  She is on metformin 500mg bid  Denies SE  Tried to follow Low carb diet  Some neuropathy on toes  She is on gabapentin 300mg   Denies hypoglycemia  FU opthalmology Dr Phillip Goncalves  Will make an appt  No podiatry  Plan to see Dr Shalini Rico       HTN---BP at home 140-150/80-90  Denies headache, vision change, SOB or CP  She is on losartan 25mg qhs and metoprolol 25mg bid     SVT---FU cardiology  Denies palpitation, SOB, CP etc       Hyperlipidemia---She is on simvastatin 20mg qhs  Denies SE       Lumbar stenosis---She is on gabapentin 300mg tid and ibuprofen  FU pain specialist  Got injection which helped      Morbid obesity---BMI 42 32 today      Live by herself  Does all ADL's  Still drive  Denies recent falls  Denies depression      The following portions of the patient's history were reviewed and updated as appropriate: allergies, current medications, past family history, past medical history, past social history, past surgical history and problem list     Review of Systems   Constitutional: Negative for appetite change, chills and fever  HENT: Negative for congestion, ear pain, sinus pain and sore throat  Eyes: Negative for discharge and itching  Respiratory: Negative for apnea, cough, chest tightness, shortness of breath and wheezing  Cardiovascular: Negative for chest pain, palpitations and leg swelling  Gastrointestinal: Negative for abdominal pain, anal bleeding, constipation, diarrhea, nausea and vomiting  Endocrine: Negative for cold intolerance, heat intolerance and polyuria  Genitourinary: Negative for difficulty urinating and dysuria  Musculoskeletal: Negative for arthralgias, back pain and myalgias  Skin: Negative for rash  Neurological: Negative for dizziness and headaches  Psychiatric/Behavioral: Negative for agitation           Objective:      /86 (BP Location: Left arm, Patient Position: Sitting, Cuff Size: Large)   Pulse 68   Temp 97 9 °F (36 6 °C) (Temporal)   Resp 20   Ht 4' 10" (1 473 m)   Wt 93 1 kg (205 lb 3 2 oz)   SpO2 98%   BMI 42 89 kg/m²          Physical Exam  Constitutional:       General: She is not in acute distress  Appearance: She is well-developed  HENT:      Head: Normocephalic  Eyes:      General:         Right eye: No discharge  Left eye: No discharge  Conjunctiva/sclera: Conjunctivae normal    Neck:      Musculoskeletal: Normal range of motion  Thyroid: No thyromegaly  Cardiovascular:      Rate and Rhythm: Normal rate and regular rhythm  Pulses: no weak pulses          Dorsalis pedis pulses are 2+ on the right side and 2+ on the left side  Heart sounds: Normal heart sounds  No murmur  No friction rub  No gallop  Pulmonary:      Effort: Pulmonary effort is normal  No respiratory distress  Breath sounds: Normal breath sounds  No wheezing or rales  Chest:      Chest wall: No tenderness  Abdominal:      General: Bowel sounds are normal  There is no distension  Palpations: Abdomen is soft  There is no mass  Tenderness: There is no abdominal tenderness  There is no guarding or rebound  Musculoskeletal: Normal range of motion  General: No tenderness or deformity  Feet:      Right foot:      Skin integrity: No ulcer, skin breakdown, erythema, warmth, callus or dry skin  Left foot:      Skin integrity: No ulcer, skin breakdown, erythema, warmth, callus or dry skin  Lymphadenopathy:      Cervical: No cervical adenopathy  Neurological:      Mental Status: She is alert  Patient's shoes and socks removed  Right Foot/Ankle   Right Foot Inspection  Skin Exam: skin normal and skin intact no dry skin, no warmth, no callus, no erythema, no maceration, no abnormal color, no pre-ulcer, no ulcer and no callus                            Sensory       Monofilament testing: intact  Vascular    The right DP pulse is 2+  Left Foot/Ankle  Left Foot Inspection  Skin Exam: skin normal and skin intactno dry skin, no warmth, no erythema, no maceration, normal color, no pre-ulcer, no ulcer and no callus                                         Sensory       Monofilament: intact  Vascular    The left DP pulse is 2+  Assign Risk Category:  No deformity present; No loss of protective sensation;  No weak pulses       Risk: 0

## 2021-06-08 NOTE — ASSESSMENT & PLAN NOTE
Lab Results   Component Value Date    HGBA1C 5 8 (H) 06/04/2021     Controlled  Low carb diet  Continue metformin 500mg bid  Advised pt to follow ophthalmology

## 2021-06-09 ENCOUNTER — OFFICE VISIT (OUTPATIENT)
Dept: OBGYN CLINIC | Facility: HOSPITAL | Age: 71
End: 2021-06-09
Payer: MEDICARE

## 2021-06-09 VITALS
HEART RATE: 74 BPM | WEIGHT: 205 LBS | BODY MASS INDEX: 43.03 KG/M2 | SYSTOLIC BLOOD PRESSURE: 163 MMHG | DIASTOLIC BLOOD PRESSURE: 97 MMHG | HEIGHT: 58 IN

## 2021-06-09 DIAGNOSIS — M25.562 ARTHRALGIA OF LEFT KNEE: ICD-10-CM

## 2021-06-09 DIAGNOSIS — M17.12 PRIMARY OSTEOARTHRITIS OF LEFT KNEE: Primary | ICD-10-CM

## 2021-06-09 PROCEDURE — 20610 DRAIN/INJ JOINT/BURSA W/O US: CPT | Performed by: ORTHOPAEDIC SURGERY

## 2021-06-09 RX ORDER — HYALURONATE SODIUM 10 MG/ML
20 SYRINGE (ML) INTRAARTICULAR
Status: COMPLETED | OUTPATIENT
Start: 2021-06-09 | End: 2021-06-09

## 2021-06-09 RX ADMIN — Medication 20 MG: at 15:14

## 2021-06-09 NOTE — PROGRESS NOTES
65 y o female presents for evaluation of left knee pain  Patient has been previously seen evaluated diagnosed with right knee osteoarthritis  She has undergone multiple intra-articular injections in the past with relief from both portal as well as viscosupplementation injections  Patient had previously been preauthorized for initiation of Orthovisc series of 3 viscosupplementation gel injections at today's office visit  Patient reports pain at the level joint is made worse with increased ambulation subsided somewhat at rest   She has history of total hip arthroplasty that she states has been performing well  Review of Systems  Review of systems negative unless otherwise specified in HPI    Past Medical History  Past Medical History:   Diagnosis Date    Alopecia     Calcaneal spur     Diabetes insipidus ([address] )     Diabetes mellitus ([facility] [address] [facility]     Hyperlipidemia     Hypertension     Osteoarthrosis 2/13/2013    Pes planus     unspecified laterality    Plantar fasciitis        Past Surgical History  Past Surgical History:   Procedure Laterality Date    CARPAL TUNNEL RELEASE      COLONOSCOPY      May 2006    INCISIONAL BREAST BIOPSY      ID COLONOSCOPY FLX DX W/COLLJ SPEC WHEN PFRMD N/A 5/4/2018    Procedure: COLONOSCOPY;  Surgeon: Laverne Boeck, MD;  [facility];   Service: Colorectal    ID TOTAL HIP ARTHROPLASTY Right 9/23/2019    Procedure: ARTHROPLASTY HIP TOTAL;  Surgeon: Sarthak Whiteside MD;  [facility];  Service: Orthopedics    WISDOM TOOTH EXTRACTION         Current Medications  Current Outpatient Medications on File Prior to Visit   Medication Sig Dispense Refill    acetaminophen (TYLENOL) 500 mg tablet Take 1,000 mg by mouth every 6 (six) hours as needed for mild pain      Biotin 1 MG CAPS Take by mouth daily       Blood Pressure Monitoring (SPHYGMOMANOMETER) MISC by Does not apply route 2 (two) times a day 1 each 0    Calcium Carb-Cholecalciferol (Calcium 1000 + D) 1000-800 MG-UNIT TABS Take by mouth       cephalexin (KEFLEX) 500 mg capsule       cholecalciferol (VITAMIN D3) 1,000 units tablet Take 1,000 Units by mouth daily      gabapentin (NEURONTIN) 300 mg capsule Take 1 capsule (300 mg total) by mouth 3 (three) times a day 90 capsule 5    ibuprofen (MOTRIN) 600 mg tablet TAKE 1 TABLET BY MOUTH EVERY 8 HOURS AS NEEDED FOR MODERATE PAIN 30 tablet 3    losartan (COZAAR) 25 mg tablet Take 1 tablet (25 mg total) by mouth daily 60 tablet 3    metFORMIN (GLUCOPHAGE) 500 mg tablet TAKE 1 TABLET BY MOUTH TWO TIMES DAILY WITH MEALS 180 tablet 3    metoprolol tartrate (LOPRESSOR) 25 mg tablet TAKE 2 TABLETS BY MOUTH EVERY 12 HOURS 180 tablet 3    mometasone (ELOCON) 0 1 % ointment Apply topically daily 45 g 1    Multiple Vitamin (MULTI-VITAMIN DAILY PO) Take by mouth daily       senna (SENOKOT) 8 6 mg Take 1 tablet (8 6 mg total) by mouth 2 (two) times a day (Patient not taking: Reported on 7/2/2020) 120 each 0    simvastatin (ZOCOR) 20 mg tablet TAKE 1 TABLET BY MOUTH EVERY DAY 90 tablet 3     No current facility-administered medications on file prior to visit          Recent Labs [facility])  0   Lab Value Date/Time    HCT 42 4 07/27/2020 1501    HGB 13 7 07/27/2020 1501    WBC 5 94 07/27/2020 1501    INR 1 14 08/05/2019 1423    CRP <3 0 08/05/2019 1423    GLUCOSE 95 04/21/2015 1430    HGBA1C 5 8 (H) 06/04/2021 1445    HGBA1C 6 2 (H) 04/21/2015 1430         Physical exam  · General: Awake, Alert, Oriented  · Eyes: Pupils equal, round and reactive to light  · Heart: regular rate and rhythm  · Lungs: No audible wheezing  · Abdomen: soft  left Knee exam  · Skin intact, no erythema, no ecchymosis   · No palpable knee effusion   · Genu varum  · Tenderness palpation along medial joint line, minimal tenderness palpation lateral joint line  · Unrestricted knee flexion extension   · Distal extremity warm and pink    Procedure  Large joint arthrocentesis: L knee  Universal Protocol:  Consent given by: patient  Time out: Immediately prior to procedure a "time out" was called to verify the correct patient, procedure, equipment, support staff and site/side marked as required  Supporting Documentation  Indications: pain   Procedure Details  Location: knee - L knee  Needle size: 18 G  Ultrasound guidance: no  Medications administered: 20 mg Sodium Hyaluronate 20 MG/2ML    Patient tolerance: patient tolerated the procedure well with no immediate complications  Dressing:  Sterile dressing applied            Imaging  Previously obtained x-rays of left knee reviewed at today's visit  This reveals tricompartmental degenerative change with significant medial joint space narrowing as well as periarticular osteophytes    75-year-old female with symptomatic left knee osteoarthritis     Weightbearing as tolerated left lower extremity  Activity as tolerated   Recommend anti-inflammatory medication icing as needed following injection at today's visit   Patient was provided 1st of 3 Euflexxa viscosupplementation gel injections  This was administered without complication patient tolerated procedure well    Follow up in office in 1 week for repeat evaluation with 2nd of 3 Euflexxa viscosupplementation gel injections

## 2021-06-16 ENCOUNTER — OFFICE VISIT (OUTPATIENT)
Dept: OBGYN CLINIC | Facility: HOSPITAL | Age: 71
End: 2021-06-16
Payer: MEDICARE

## 2021-06-16 VITALS
HEIGHT: 58 IN | DIASTOLIC BLOOD PRESSURE: 81 MMHG | BODY MASS INDEX: 42.85 KG/M2 | HEART RATE: 73 BPM | SYSTOLIC BLOOD PRESSURE: 146 MMHG

## 2021-06-16 DIAGNOSIS — M17.12 PRIMARY OSTEOARTHRITIS OF LEFT KNEE: Primary | ICD-10-CM

## 2021-06-16 DIAGNOSIS — M25.562 CHRONIC PAIN OF LEFT KNEE: ICD-10-CM

## 2021-06-16 DIAGNOSIS — G89.29 CHRONIC PAIN OF LEFT KNEE: ICD-10-CM

## 2021-06-16 PROBLEM — E11.42 DIABETIC POLYNEUROPATHY ASSOCIATED WITH TYPE 2 DIABETES MELLITUS (HCC): Status: ACTIVE | Noted: 2021-06-16

## 2021-06-16 PROCEDURE — 20610 DRAIN/INJ JOINT/BURSA W/O US: CPT | Performed by: ORTHOPAEDIC SURGERY

## 2021-06-16 RX ORDER — HYALURONATE SODIUM 10 MG/ML
20 SYRINGE (ML) INTRAARTICULAR
Status: COMPLETED | OUTPATIENT
Start: 2021-06-16 | End: 2021-06-16

## 2021-06-16 RX ADMIN — Medication 20 MG: at 15:08

## 2021-06-16 NOTE — PROGRESS NOTES
Assessment:  1  Primary osteoarthritis of left knee     2  Chronic pain of left knee         Plan:  The patient was provided with 2nd left knee Euflexxa injection  She tolerated the procedure well  She should follow up in one week for 3rd left knee Orthovisc injection  To do next visit:  Return in about 1 week (around 6/23/2021) for 3rd left knee Orthovisc injection  The above stated was discussed in layman's terms and the patient expressed understanding  All questions were answered to the patient's satisfaction  Scribe Attestation    I,:  Bree Sun am acting as a scribe while in the presence of the attending physician :       I,:  Tiny Block MD personally performed the services described in this documentation    as scribed in my presence :             Subjective:   Leonid Anna is a 65 y o  female who presents for 2nd left knee Orthovisc injection  Today she complains of generalized left knee pain  Prolonged weight bearing and repetitive bending aggravates while rest alleviates  Review of systems negative unless otherwise specified in HPI    Past Medical History:   Diagnosis Date    Alopecia     Calcaneal spur     Diabetes insipidus ([address]     Diabetes mellitus ([address]     Hyperlipidemia     Hypertension     Osteoarthrosis 2/13/2013    Pes planus     unspecified laterality    Plantar fasciitis        Past Surgical History:   Procedure Laterality Date    CARPAL TUNNEL RELEASE      COLONOSCOPY      May 2006    INCISIONAL BREAST BIOPSY      OR COLONOSCOPY FLX DX W/COLLJ SPEC WHEN PFRMD N/A 5/4/2018    Procedure: COLONOSCOPY;  Surgeon: Hannah Mcadams MD;  [facility];   Service: Colorectal    OR TOTAL HIP ARTHROPLASTY Right 9/23/2019    Procedure: ARTHROPLASTY HIP TOTAL;  Surgeon: Tiny Block MD;  [facility];  Service: Orthopedics    WISDOM TOOTH EXTRACTION         Family History   Problem Relation Age of Onset    Diabetes Mother    Severo Romano Hypertension Mother     Emphysema Father     Cancer Brother     Colon cancer Brother 40       Social History     Occupational History    Not on file   Tobacco Use    Smoking status: Never Smoker    Smokeless tobacco: Never Used   Substance and Sexual Activity    Alcohol use:  Yes     Alcohol/week: 0 0 standard drinks     Comment: 0    Drug use: No    Sexual activity: Not Currently         Current Outpatient Medications:     acetaminophen (TYLENOL) 500 mg tablet, Take 1,000 mg by mouth every 6 (six) hours as needed for mild pain, Disp: , Rfl:     Biotin 1 MG CAPS, Take by mouth daily , Disp: , Rfl:     Blood Pressure Monitoring (SPHYGMOMANOMETER) MISC, by Does not apply route 2 (two) times a day, Disp: 1 each, Rfl: 0    Calcium Carb-Cholecalciferol (Calcium 1000 + D) 1000-800 MG-UNIT TABS, Take by mouth , Disp: , Rfl:     cephalexin (KEFLEX) 500 mg capsule, , Disp: , Rfl:     cholecalciferol (VITAMIN D3) 1,000 units tablet, Take 1,000 Units by mouth daily, Disp: , Rfl:     gabapentin (NEURONTIN) 300 mg capsule, Take 1 capsule (300 mg total) by mouth 3 (three) times a day, Disp: 90 capsule, Rfl: 5    ibuprofen (MOTRIN) 600 mg tablet, TAKE 1 TABLET BY MOUTH EVERY 8 HOURS AS NEEDED FOR MODERATE PAIN, Disp: 30 tablet, Rfl: 3    losartan (COZAAR) 25 mg tablet, Take 1 tablet (25 mg total) by mouth daily, Disp: 60 tablet, Rfl: 3    metFORMIN (GLUCOPHAGE) 500 mg tablet, TAKE 1 TABLET BY MOUTH TWO TIMES DAILY WITH MEALS, Disp: 180 tablet, Rfl: 3    metoprolol tartrate (LOPRESSOR) 25 mg tablet, TAKE 2 TABLETS BY MOUTH EVERY 12 HOURS, Disp: 180 tablet, Rfl: 3    mometasone (ELOCON) 0 1 % ointment, Apply topically daily, Disp: 45 g, Rfl: 1    Multiple Vitamin (MULTI-VITAMIN DAILY PO), Take by mouth daily , Disp: , Rfl:     senna (SENOKOT) 8 6 mg, Take 1 tablet (8 6 mg total) by mouth 2 (two) times a day (Patient not taking: Reported on 7/2/2020), Disp: 120 each, Rfl: 0    simvastatin (ZOCOR) 20 mg tablet, TAKE 1 TABLET BY MOUTH EVERY DAY, Disp: 90 tablet, Rfl: 3    Allergies   Allergen Reactions    Meloxicam Hives    Nifedipine Edema    Sulfamethoxazole-Trimethoprim Edema            Vitals:    06/16/21 1443   BP: 146/81   Pulse: 73       Objective:  Physical exam  · General: Awake, Alert, Oriented  · Eyes: Pupils equal, round and reactive to light  · Heart: regular rate and rhythm  · Lungs: No audible wheezing  · Abdomen: soft                    Ortho Exam  Left knee:  No erythema or ecchymosis  No effusion or swelling  Normal strength  Good ROM   Calf compartments soft and supple  Sensation intact  Toes are warm sensate and mobile        Diagnostics, reviewed and taken today if performed as documented:    None performed     Procedures, if performed today:    Large joint arthrocentesis: L knee  Universal Protocol:  Consent: Verbal consent obtained  Risks and benefits: risks, benefits and alternatives were discussed  Consent given by: patient  Time out: Immediately prior to procedure a "time out" was called to verify the correct patient, procedure, equipment, support staff and site/side marked as required  Timeout called at: 6/16/2021 3:06 PM   Patient understanding: patient states understanding of the procedure being performed  Patient identity confirmed: verbally with patient    Supporting Documentation  Indications: pain   Procedure Details  Location: knee - L knee  Preparation: Patient was prepped and draped in the usual sterile fashion  Needle size: 22 G  Ultrasound guidance: no  Approach: anterolateral  Medications administered: 20 mg Sodium Hyaluronate 20 MG/2ML    Patient tolerance: patient tolerated the procedure well with no immediate complications  Dressing:  Sterile dressing applied                Portions of the record may have been created with voice recognition software    Occasional wrong word or "sound a like" substitutions may have occurred due to the inherent limitations of voice recognition software  Read the chart carefully and recognize, using context, where substitutions have occurred

## 2021-06-23 ENCOUNTER — PROCEDURE VISIT (OUTPATIENT)
Dept: OBGYN CLINIC | Facility: HOSPITAL | Age: 71
End: 2021-06-23
Payer: MEDICARE

## 2021-06-23 VITALS
DIASTOLIC BLOOD PRESSURE: 70 MMHG | HEART RATE: 87 BPM | SYSTOLIC BLOOD PRESSURE: 165 MMHG | BODY MASS INDEX: 42.85 KG/M2 | HEIGHT: 58 IN

## 2021-06-23 DIAGNOSIS — G89.29 CHRONIC PAIN OF LEFT KNEE: ICD-10-CM

## 2021-06-23 DIAGNOSIS — M25.562 CHRONIC PAIN OF LEFT KNEE: ICD-10-CM

## 2021-06-23 DIAGNOSIS — M17.12 PRIMARY OSTEOARTHRITIS OF LEFT KNEE: Primary | ICD-10-CM

## 2021-06-23 PROCEDURE — 20610 DRAIN/INJ JOINT/BURSA W/O US: CPT | Performed by: ORTHOPAEDIC SURGERY

## 2021-06-23 RX ORDER — HYALURONATE SODIUM 10 MG/ML
20 SYRINGE (ML) INTRAARTICULAR
Status: COMPLETED | OUTPATIENT
Start: 2021-06-23 | End: 2021-06-23

## 2021-06-23 RX ADMIN — Medication 20 MG: at 15:08

## 2021-06-23 NOTE — PROGRESS NOTES
Assessment:   Diagnosis ICD-10-CM Associated Orders   1  Primary osteoarthritis of left knee  M17 12 Large joint arthrocentesis: L knee   2  Chronic pain of left knee  M25 562 Large joint arthrocentesis: L knee    G89 29        Plan:    Left knee known osteoarthritis  Patient's left knee was injected with the 3rd and final Euflexxa injection  Patient tolerated procedure well  Ice and post injection protocol advised  Weightbearing activities as tolerated  Patient was again counseled on weight loss measures  To do next visit:  Return in about 3 months (around 9/23/2021) for re-check  The above stated was discussed in layman's terms and the patient expressed understanding  All questions were answered to the patient's satisfaction  Scribe Attestation    I,:  Wale Taveras am acting as a scribe while in the presence of the attending physician :       I,:  Salomón Stephens MD personally performed the services described in this documentation    as scribed in my presence :             Subjective:   Nicolas Ring is a 65 y o  female who presents  today for repeat evaluation of her left knee, known osteoarthritis  Today she has an at office evaluation for her 3rd and final Euflexxa [place] injection to her left knee  She tolerated the previous 2 injections well  She notes mild improvement of some of her symptoms  She still has joint line pain with weight-bearing activities  She has stiffness getting up from prolonged sedentary positions        Review of systems negative unless otherwise specified in HPI  Review of Systems    Past Medical History:   Diagnosis Date    Alopecia     Calcaneal spur     [facility] [address]     Diabetes mellitus [facility] [address]     Hyperlipidemia     Hypertension     Osteoarthrosis 2/13/2013    Pes planus     unspecified laterality    Plantar fasciitis        Past Surgical History:   Procedure Laterality Date    CARPAL TUNNEL RELEASE      COLONOSCOPY      May 2006    INCISIONAL BREAST BIOPSY      WA COLONOSCOPY FLX DX W/COLLJ SPEC WHEN PFRMD N/A 5/4/2018    Procedure: COLONOSCOPY;  Surgeon: Kinsey Onofre MD;  [facility]; Service: Colorectal    [place] TOTAL HIP ARTHROPLASTY Right 9/23/2019    Procedure: ARTHROPLASTY HIP TOTAL;  Surgeon: Doron Davis MD;  [facility];  Service: Orthopedics    WISDOM TOOTH EXTRACTION         Family History   Problem Relation Age of Onset    Diabetes Mother     Hypertension Mother     Emphysema Father     Cancer Brother     Colon cancer Brother 40       Social History     Occupational History    Not on file   Tobacco Use    Smoking status: Never Smoker    Smokeless tobacco: Never Used   Substance and Sexual Activity    Alcohol use:  Yes     Alcohol/week: 0 0 standard drinks     Comment: 0    Drug use: No    Sexual activity: Not Currently         Current Outpatient Medications:     acetaminophen (TYLENOL) 500 mg tablet, Take 1,000 mg by mouth every 6 (six) hours as needed for mild pain, Disp: , Rfl:     Biotin 1 MG CAPS, Take by mouth daily , Disp: , Rfl:     Blood Pressure Monitoring (SPHYGMOMANOMETER) MISC, by Does not apply route 2 (two) times a day, Disp: 1 each, Rfl: 0    Calcium Carb-Cholecalciferol (Calcium 1000 + D) 1000-800 MG-UNIT TABS, Take by mouth , Disp: , Rfl:     cephalexin (KEFLEX) 500 mg capsule, , Disp: , Rfl:     cholecalciferol (VITAMIN D3) 1,000 units tablet, Take 1,000 Units by mouth daily, Disp: , Rfl:     gabapentin (NEURONTIN) 300 mg capsule, Take 1 capsule (300 mg total) by mouth 3 (three) times a day, Disp: 90 capsule, Rfl: 5    ibuprofen (MOTRIN) 600 mg tablet, TAKE 1 TABLET BY MOUTH EVERY 8 HOURS AS NEEDED FOR MODERATE PAIN, Disp: 30 tablet, Rfl: 3    losartan (COZAAR) 25 mg tablet, Take 1 tablet (25 mg total) by mouth daily, Disp: 60 tablet, Rfl: 3    metFORMIN (GLUCOPHAGE) 500 mg tablet, TAKE 1 TABLET BY MOUTH TWO TIMES DAILY WITH MEALS, Disp: 180 tablet, Rfl: 3   metoprolol tartrate (LOPRESSOR) 25 mg tablet, TAKE 2 TABLETS BY MOUTH EVERY 12 HOURS, Disp: 180 tablet, Rfl: 3    mometasone (ELOCON) 0 1 % ointment, Apply topically daily, Disp: 45 g, Rfl: 1    Multiple Vitamin (MULTI-VITAMIN DAILY PO), Take by mouth daily , Disp: , Rfl:     senna (SENOKOT) 8 6 mg, Take 1 tablet (8 6 mg total) by mouth 2 (two) times a day (Patient not taking: Reported on 7/2/2020), Disp: 120 each, Rfl: 0    simvastatin (ZOCOR) 20 mg tablet, TAKE 1 TABLET BY MOUTH EVERY DAY, Disp: 90 tablet, Rfl: 3    Allergies   Allergen Reactions    Meloxicam Hives    Nifedipine Edema    Sulfamethoxazole-Trimethoprim Edema            Vitals:    06/23/21 1443   BP: 165/70   Pulse: 87       Objective:                    Left Knee Exam     Muscle Strength   The patient has normal left knee strength  Tenderness   The patient is experiencing tenderness in the medial joint line  Range of Motion   The patient has normal left knee ROM  Left knee flexion: With less crepitation and stiffness  Other   Erythema: absent  Sensation: normal  Swelling: mild  Effusion: no effusion present    Comments:    Varus alignment            Diagnostics, reviewed and taken today if performed as documented:    None performed          Procedures, if performed today:    Large joint arthrocentesis: L knee  Universal Protocol:  Consent: Verbal consent obtained  Risks and benefits: risks, benefits and alternatives were discussed  Consent given by: patient  Time out: Immediately prior to procedure a "time out" was called to verify the correct patient, procedure, equipment, support staff and site/side marked as required    Timeout called at: 6/23/2021 3:06 PM   Patient understanding: patient states understanding of the procedure being performed  Site marked: the operative site was marked  Patient identity confirmed: verbally with patient    Supporting Documentation  Indications: pain and diagnostic evaluation   Procedure Details  Location: knee - L knee  Preparation: Patient was prepped and draped in the usual sterile fashion  Needle size: 22 G  Ultrasound guidance: no  Approach: anterolateral  Medications administered: 20 mg Sodium Hyaluronate 20 MG/2ML    Patient tolerance: patient tolerated the procedure well with no immediate complications  Dressing:  Sterile dressing applied            Portions of the record may have been created with voice recognition software  Occasional wrong word or "sound a like" substitutions may have occurred due to the inherent limitations of voice recognition software  Read the chart carefully and recognize, using context, where substitutions have occurred

## 2021-07-04 DIAGNOSIS — I47.1 SVT (SUPRAVENTRICULAR TACHYCARDIA) (HCC): ICD-10-CM

## 2021-07-08 DIAGNOSIS — I10 BENIGN ESSENTIAL HYPERTENSION: ICD-10-CM

## 2021-07-08 RX ORDER — LOSARTAN POTASSIUM 25 MG/1
25 TABLET ORAL DAILY
Qty: 60 TABLET | Refills: 3 | Status: SHIPPED | OUTPATIENT
Start: 2021-07-08 | End: 2022-02-23 | Stop reason: SDUPTHER

## 2021-09-23 ENCOUNTER — HOSPITAL ENCOUNTER (OUTPATIENT)
Dept: RADIOLOGY | Facility: HOSPITAL | Age: 71
Discharge: HOME | End: 2021-09-23
Attending: ORTHOPAEDIC SURGERY
Payer: MEDICARE

## 2021-09-23 ENCOUNTER — OFFICE VISIT (OUTPATIENT)
Dept: OBGYN CLINIC | Facility: HOSPITAL | Age: 71
End: 2021-09-23
Payer: MEDICARE

## 2021-09-23 VITALS
HEIGHT: 58 IN | SYSTOLIC BLOOD PRESSURE: 153 MMHG | HEART RATE: 74 BPM | BODY MASS INDEX: 44.29 KG/M2 | WEIGHT: 211 LBS | DIASTOLIC BLOOD PRESSURE: 94 MMHG

## 2021-09-23 DIAGNOSIS — M17.0 PRIMARY OSTEOARTHRITIS OF BOTH KNEES: ICD-10-CM

## 2021-09-23 DIAGNOSIS — M25.562 PAIN IN BOTH KNEES, UNSPECIFIED CHRONICITY: Primary | ICD-10-CM

## 2021-09-23 DIAGNOSIS — M25.561 PAIN IN BOTH KNEES, UNSPECIFIED CHRONICITY: ICD-10-CM

## 2021-09-23 DIAGNOSIS — G89.29 CHRONIC PAIN OF LEFT KNEE: ICD-10-CM

## 2021-09-23 DIAGNOSIS — M25.561 PAIN IN BOTH KNEES, UNSPECIFIED CHRONICITY: Primary | ICD-10-CM

## 2021-09-23 DIAGNOSIS — M25.562 PAIN IN BOTH KNEES, UNSPECIFIED CHRONICITY: ICD-10-CM

## 2021-09-23 DIAGNOSIS — M25.562 CHRONIC PAIN OF LEFT KNEE: ICD-10-CM

## 2021-09-23 PROCEDURE — 73560 X-RAY EXAM OF KNEE 1 OR 2: CPT

## 2021-09-23 PROCEDURE — 99212 OFFICE O/P EST SF 10 MIN: CPT | Performed by: PHYSICIAN ASSISTANT

## 2021-09-23 PROCEDURE — 20610 DRAIN/INJ JOINT/BURSA W/O US: CPT | Performed by: PHYSICIAN ASSISTANT

## 2021-09-23 RX ORDER — KETOROLAC TROMETHAMINE 30 MG/ML
60 INJECTION, SOLUTION INTRAMUSCULAR; INTRAVENOUS
Status: COMPLETED | OUTPATIENT
Start: 2021-09-23 | End: 2021-09-23

## 2021-09-23 RX ORDER — LIDOCAINE HYDROCHLORIDE 10 MG/ML
2 INJECTION, SOLUTION INFILTRATION; PERINEURAL
Status: COMPLETED | OUTPATIENT
Start: 2021-09-23 | End: 2021-09-23

## 2021-09-23 RX ORDER — BUPIVACAINE HYDROCHLORIDE 2.5 MG/ML
2 INJECTION, SOLUTION INFILTRATION; PERINEURAL
Status: COMPLETED | OUTPATIENT
Start: 2021-09-23 | End: 2021-09-23

## 2021-09-23 RX ADMIN — KETOROLAC TROMETHAMINE 60 MG: 30 INJECTION, SOLUTION INTRAMUSCULAR; INTRAVENOUS at 16:01

## 2021-09-23 RX ADMIN — BUPIVACAINE HYDROCHLORIDE 2 ML: 2.5 INJECTION, SOLUTION INFILTRATION; PERINEURAL at 16:01

## 2021-09-23 RX ADMIN — LIDOCAINE HYDROCHLORIDE 2 ML: 10 INJECTION, SOLUTION INFILTRATION; PERINEURAL at 16:01

## 2021-09-23 NOTE — PROGRESS NOTES
Subjective;     17-year-old adult female well known to the practice   she has established arthritic changes of the left knee in recently completed a 3 shot preparation of Euflexxa  she comes the office today in follow-up   she feels that Euflexxa offered her benefit     it is noticed that her most recent x-rays are 3years old     new x-rays of both knees with standing bilateral AP was requested     she is already contemplating the use of Euflexxa for her left knee in 3 additional months  She also wonders if it may well be indicated for her opposite knee    Past Medical History:   Diagnosis Date    Alopecia     Calcaneal spur     Diabetes insipidus ([address]     [facility] [address] [address]     Hyperlipidemia     Hypertension     Osteoarthrosis 2/13/2013    Pes planus     unspecified laterality    Plantar fasciitis        Past Surgical History:   Procedure Laterality Date    CARPAL TUNNEL RELEASE      COLONOSCOPY      May 2006    INCISIONAL BREAST BIOPSY      OR COLONOSCOPY FLX DX W/COLLJ SPEC WHEN PFRMD N/A 5/4/2018    Procedure: COLONOSCOPY;  Surgeon: Rainer Albarado MD;  [facility]; Service: Colorectal    OR TOTAL HIP ARTHROPLASTY Right 9/23/2019    Procedure: ARTHROPLASTY HIP TOTAL;  Surgeon: Latisha Jensen MD;  [facility];  Service: Orthopedics    WISDOM TOOTH EXTRACTION         Family History   Problem Relation Age of Onset    Diabetes Mother     Hypertension Mother     Emphysema Father     Cancer Brother     Colon cancer Brother 40       Social History     Tobacco Use    Smoking status: Never Smoker    Smokeless tobacco: Never Used   Substance Use Topics    Alcohol use: Yes     Alcohol/week: 0 0 standard drinks     Comment: 0    Drug use: No      exam;     her knees have no significant overlying redness or bruising   she has discomfort medial greater than lateral compartment left knee   Negative patellar compression test both knees   she does have pain to palpation medial compartment of both knees  x-rays; bilateral knee series with standing AP OA of Knees Left more severe than the Right    Large joint arthrocentesis: R knee  Universal Protocol:  Consent given by: patient    Supporting Documentation  Indications: pain   Procedure Details  Location: knee - R knee  Needle size: 22 G  Ultrasound guidance: no  Medications administered: 2 mL bupivacaine 0 25 %; 2 mL lidocaine 1 %; 60 mg ketorolac 60 mg/2 mL      Large joint arthrocentesis: L knee  Universal Protocol:  Consent given by: patient    Supporting Documentation  Indications: pain   Procedure Details  Location: knee - L knee  Needle size: 22 G  Medications administered: 2 mL bupivacaine 0 25 %; 2 mL lidocaine 1 %; 60 mg ketorolac 60 mg/2 mL          Impression:    Bilateral Knee arthrits,   Left greater than Right Knee pain     Plan;     Toradol placed today  Precert for Euflexxa in 3 mos,  Both knees

## 2021-10-30 ENCOUNTER — NURSE TRIAGE (OUTPATIENT)
Dept: OTHER | Facility: OTHER | Age: 71
End: 2021-10-30

## 2021-10-30 ENCOUNTER — OFFICE VISIT (OUTPATIENT)
Dept: URGENT CARE | Age: 71
End: 2021-10-30
Payer: MEDICARE

## 2021-10-30 VITALS
DIASTOLIC BLOOD PRESSURE: 76 MMHG | WEIGHT: 211.13 LBS | TEMPERATURE: 97.4 F | HEART RATE: 70 BPM | BODY MASS INDEX: 44.32 KG/M2 | SYSTOLIC BLOOD PRESSURE: 164 MMHG | HEIGHT: 58 IN

## 2021-10-30 DIAGNOSIS — H57.12 DISCOMFORT OF LEFT EYE: Primary | ICD-10-CM

## 2021-10-30 PROCEDURE — 99213 OFFICE O/P EST LOW 20 MIN: CPT | Performed by: STUDENT IN AN ORGANIZED HEALTH CARE EDUCATION/TRAINING PROGRAM

## 2021-10-30 PROCEDURE — G0463 HOSPITAL OUTPT CLINIC VISIT: HCPCS | Performed by: STUDENT IN AN ORGANIZED HEALTH CARE EDUCATION/TRAINING PROGRAM

## 2021-11-13 ENCOUNTER — IMMUNIZATIONS (OUTPATIENT)
Dept: FAMILY MEDICINE CLINIC | Facility: HOSPITAL | Age: 71
End: 2021-11-13

## 2021-11-13 DIAGNOSIS — Z23 ENCOUNTER FOR IMMUNIZATION: Primary | ICD-10-CM

## 2021-11-13 PROCEDURE — 0001A COVID-19 PFIZER VACC 0.3 ML: CPT

## 2021-11-13 PROCEDURE — 91300 COVID-19 PFIZER VACC 0.3 ML: CPT

## 2021-12-12 DIAGNOSIS — I47.1 SVT (SUPRAVENTRICULAR TACHYCARDIA) (HCC): ICD-10-CM

## 2021-12-23 NOTE — OCCUPATIONAL THERAPY NOTE
Occupational Therapy Treatment Note           Lew Martins              Patient Active Problem List   Diagnosis    Benign essential hypertension    Cervical radiculopathy    Chronic lower back pain    Diabetes mellitus type 2, controlled ([address] )    Hyperlipidemia    Left knee pain    Lumbar stenosis with neurogenic claudication    Obesity (BMI 30-39  9)    Osteoarthrosis    Post-menopausal bleeding    Primary osteoarthritis of both knees    Psoriasis    Right knee pain    Right shoulder pain    Spondylolisthesis, lumbar region    Vaginal lump    Vitamin D deficiency    Effusion of left knee    Right hip pain    Arthritis of right hip    Lumbar radiculopathy    DDD (degenerative disc disease), lumbar    Lumbar spondylosis    Sacroiliitis (HCC)    Status post total hip replacement, right    Supraventricular tachycardia ([address] )    Aftercare following right hip joint replacement surgery         Medical History           Past Medical History:   Diagnosis Date    Alopecia      Calcaneal spur      Diabetes insipidus ([address] )      Diabetes mellitus ([address] )      Hyperlipidemia      Hypertension      Osteoarthrosis 2/13/2013    Pes planus       unspecified laterality    Plantar fasciitis              TIME: (9699-3796) 83 minutes  VITALS: stable throughout session  PAIN:  c/o ache but tolerable with current medication schedule  Patient does use cold packs as needed  COGNITION: WFL  PRECAUTIONS: WBAT; THR; Fall risk; Pain      EXPIRATION DATE: 10/07/19  TREATMENT DAY: 4  DISCHARGE RECOMMENDATION: Home with family support (brother)  ADDITIONAL COMMENTS:   · Ed on DME options for safe transport of items  At this time patient with preference to walker tray and then use of a bag  Ed was provided also on escobar walker options  Trail of both tray and escobar walker  · Patient continues to work on AE and adherence to [facility] with functional activities    No equipment in room except for long handled Urgent Care      Patient:  Mark Workman 59 y.o. female     Date of Service: 11/12/21      Chief complaint:   Chief Complaint   Patient presents with    Nasal Congestion     started one week ago    Cough           History ofPresent Illness   The patient is a 59 y.o. female  presented to the clinic with complaints as above. Nasal congestion  -new issue  -started a week ago  -having rhinorrhea  -feels like the cough is mostly dry at times can be productive  -denies any fever, chills, or SOB  -vaccinated, gets booster this Wednesday  -has been taking OTC meds, not helping       Past Medical History:      Diagnosis Date    Hypertension     Thyroid disease        PastSurgical History:        Procedure Laterality Date    JOINT REPLACEMENT         Allergies:    Bactrim [sulfamethoxazole-trimethoprim]    Social History:   Social History     Socioeconomic History    Marital status:      Spouse name: Not on file    Number of children: Not on file    Years of education: Not on file    Highest education level: Not on file   Occupational History    Not on file   Tobacco Use    Smoking status: Never Smoker    Smokeless tobacco: Never Used   Substance and Sexual Activity    Alcohol use: Yes     Comment: occasionally    Drug use: No    Sexual activity: Not on file   Other Topics Concern    Not on file   Social History Narrative    Not on file     Social Determinants of Health     Financial Resource Strain: Low Risk     Difficulty of Paying Living Expenses: Not hard at all   Food Insecurity: No Food Insecurity    Worried About [address] in the Last Year: Never true    [address]  in the Last Year: Never true   Transportation Needs:     Lack of Transportation (Medical): Not on file    Lack of Transportation (Non-Medical):  Not on file   Physical Activity:     Days of Exercise per Week: Not on file    Minutes of Exercise per Session: Not on file   Stress:     Feeling of Stress : Not on file   Social Connections:     Frequency of Communication with Friends and Family: Not on file    Frequency of Social Gatherings with Friends and Family: Not on file    Attends Nondenominational Services: Not on file    Active Member of [address] [place] [address] [employer] or Organizations: Not on file    Attends Club or Organization Meetings: Not on file    Marital Status: Not on file   Intimate Partner Violence:     Fear of Current or Ex-Partner: Not on file    Emotionally Abused: Not on file    Physically Abused: Not on file    Sexually Abused: Not on file   Housing Stability:     Unable to Pay for Housing in the Last Year: Not on file    Number of [place] in the Last Year: Not on file    Unstable Housing in the Last Year: Not on file        Family History:       Problem Relation Age of Onset    Breast Cancer Mother     Breast Cancer Maternal Aunt        Review of Systems:   Review of Systems - as above     Physical Exam   Vitals: BP (!) 152/80   Pulse 78   Temp 97.5 °F (36.4 °C) (Infrared)   Resp 19   Ht 5' 3\" (1.6 m)   Wt 204 lb (92.5 kg)   SpO2 99%   BMI 36.14 kg/m²   Physical Exam  Constitutional:       Appearance: She is well-developed. HENT:      Head: Normocephalic. Nose: Mucosal edema, congestion and rhinorrhea present. Rhinorrhea is clear. Right Turbinates: Enlarged and swollen. Left Turbinates: Enlarged and swollen. Right Sinus: Frontal sinus tenderness present. Left Sinus: Frontal sinus tenderness present. Cardiovascular:      Rate and Rhythm: Normal rate and regular rhythm. Heart sounds: Normal heart sounds. No murmur heard. Pulmonary:      Effort: Pulmonary effort is normal. No respiratory distress. Breath sounds: Normal breath sounds. No wheezing. Abdominal:      General: Bowel sounds are normal.      Palpations: Abdomen is soft. Musculoskeletal:         General: No tenderness. Normal range of motion. Skin:     General: Skin is warm and dry.    Neurological: sponge at this time however will need foot funnel, reacher, sock aide and possibly the dressing stick  · Leg  may be needed for bed mobility-- continue to trail  Patient may get a hospital bed for home  Care conference:  · Patient along with her brother, Vinod Nicole and self SAUCEDO present  · Ortho appointment scheduled for tomorrow 10/3/19 in PM     · Goals and progress along with projected d/c discussed with patient agreeable to POC  · No further goals needed at this time and POC appropriate  · Patient reports laundry completed by brother except for some personal items that she will hand wash in sink and hang  Brother confirms  · Per patient and brother agrees that assist is provided as needed with transportation, meals (but mainly takeout)  ASSESSMENT:    Patient seen this date for OT with focus on goals as set by OTR  See note for specific details  Patient agreeable to skilled OT session with focus on ADL's (bathing, dressing, toileting), Transfers, Standing tolerance/balance, Home management, Activity tolerance and Education on safety, fall prevention and energy conservation techniques  Barriers to treatment include pain, PRN  Patient educated on safe functional transfers techniques, the appropriate use of AE/DME to improve functional performance, and activity modification techniques for energy conservation  Plans for d/c are home with family support  Patient is making gains toward OT goals with continued OT recommended at this time to max tolerance, safety and function for appropriate d/c planning  At end of session patient remains in room with all needs within reach         Goals STG achieved within 1 week Performance at Initial Evaluation (9/29) Current Performance (last date completed)   Grooming while standing at sink Marni/I  (10/1) (10/2) Supervision  10/2  MI with good safety and tolerance  10/1  MI with good tolerance and no LOB for oral care and grooming including applying hair Mental Status: She is alert and oriented to person, place, and time. Psychiatric:         Behavior: Behavior normal.             Assessment and Plan       1. Acute bacterial sinusitis  New issue  -Given duration and symptoms will treat for bacterial infection   -Advised to call PCP if no improvement in symptoms   - amoxicillin-clavulanate (AUGMENTIN) 875-125 MG per tablet; Take 1 tablet by mouth 2 times daily for 7 days  Dispense: 14 tablet; Refill: 0      Counseled regarding above diagnosis, including possible risks and complications,  especially if left uncontrolled. Counseled regarding the possible side effects, risks, benefits and alternatives to treatment;patient and/or guardian verbalizes understanding, agrees, feels comfortable with and wishes to proceed with above treatment plan. Call or go to [address] if symptoms worsen or persist. Advised patient to call with any new medication issues, and, as applicable, read all Rx info from pharmacy to assure aware of all possible risks and side effects of medicationbefore taking. Patient and/or guardian given opportunity to ask questions/raise concerns. The patient verbalized comfort and understanding ofinstructions. I encourage further reading and education about your health conditions. Information on many health conditions is provided by [facility] [employer]: [email or web address]  Please bring any questions to me at your nextvisit. Return to Office: Return if symptoms worsen or fail to improve.     Medication List:    Current Outpatient Medications   Medication Sig Dispense Refill    amoxicillin-clavulanate (AUGMENTIN) 875-125 MG per tablet Take 1 tablet by mouth 2 times daily for 7 days 14 tablet 0    prednisoLONE acetate (PRED FORTE) 1 % ophthalmic suspension INSTILL 1 DROP BY OPHTHALMIC ROUTE EVERY 2 HOURS INTO THE RIGHT EYE AFTER SURGERY      carbamide peroxide (DEBROX) 6.5 % otic solution Place 5 drops in ear(s) products and rollers  (9/30) supervision    ADL transfers  Marni/I  (10/1) (10/2) Supervision  10/2  MI with RW  10/1  MI within room with good safety and no LOB using RW  Dynamic balance Fair+, unsupported Fair+ for ADL  (9/30) D/S   Bathroom mobility  Marni/I  (10/1) (10/2) Supervision w  Arther Gong 10/2  MI + RW  (10/1)  MI + RW  (9/30) supervision w RW    UB ADL  Marni/I   (10/2) Set-up 10/2  MI  (9/30) Setup/Supervision    LB ADL, AE PRN Marni/I maxA (10/1)  Ed on AE use for socks and shoe management  Elastic laces provided to 2 sneakers therefore practiced 2x  Doff socks MI post 1 visual aide with dressing stick  Don sneakers Supervision + foot funnel  (9/29) maxA socks, underwear    (9/30) Min A - to wash buttocks; AE trial - Supervision with demonstration and minimal cueing    Toileting/clothing management and hygiene Marni/I  (10/2) Petra Monday 10/2  MI CM and hygiene   Ed to take caution with management of flushing toilet and adhering to THP's  10/1  Min CM post shower (moisture) and Supervision hygiene   (9/29) modA to manage underwear and gown, hygiene following a BM   Increase standing tolerance for inc'd safety with standing purposeful tasks >10 minutes  (10/1) (10/2) ~4 minutes 10/2  8-10 minutes tolerated with functional activities  10/1  15 minutes sinkside for grooming/oral care  (9/29) 4 minutes   (9/30) 2-3 minutes with hygiene and clothing mgmt    Participate in therex 1-3x/week for inc'd overall stamina/activity tolerance for purposeful tasks To complete N/A  10/1, 10/2  With functional activities   Tub/shower transfer  Marni/I with DME as needed N/A     Kitchen mobility for inc'd independence and safety negotiating kitchen environment Marni/I  (10/2) N/A 10/2  MI mobility-- ed for safe walker placement recommendations initially as patient never used a walker previously  Good follow through with education  No issues noted       Light meal prep Marni/I N/A 10/2  MI + RW with microwave task    Patient reports daily 1 each 0    brompheniramine-pseudoephedrine-DM (BROMFED DM) 2-30-10 MG/5ML syrup Take 10 mLs by mouth 4 times daily as needed for Congestion or Cough 120 mL 0    fluticasone (FLONASE) 50 MCG/ACT nasal spray 2 sprays by Each Nostril route daily 16 g 5    LUMIGAN 0.01 % SOLN ophthalmic drops       COMBIGAN 0.2-0.5 % ophthalmic solution       dorzolamide (TRUSOPT) 2 % ophthalmic solution INSTILL ONE DROP INTO EACH EYE TWICE DAILY      KLOR-CON M20 20 MEQ extended release tablet TAKE 1 TABLET BY MOUTH  DAILY 90 tablet 3    SYNTHROID 75 MCG tablet TAKE 1 TABLET BY MOUTH  DAILY 90 tablet 3    benazepril-hydrochlorthiazide (LOTENSIN HCT) 10-12.5 MG per tablet TAKE 1 TABLET BY MOUTH  DAILY 90 tablet 3    montelukast (SINGULAIR) 10 MG tablet Take 1 tablet by mouth nightly (Patient taking differently: Take 10 mg by mouth as needed ) 30 tablet 5    meloxicam (MOBIC) 7.5 MG tablet as needed   3     No current facility-administered medications for this visit. Pradip Acevedo,        This document may have been prepared at least partially through the use of voice recognition software. Although effort is taken to assure the accuracy ofthis document, it is possible that grammatical, syntax,  or spelling errors may occur. light meals and take out/left overs prime  Ed for safe transport with continued trail needs on DME options to allow SW to be made aware if arrangements are needed  Patient does however indicate that she orders from [employer] and since items can be purchased there she may get on her own      Bed mobility with HOB flat  Eva/I modA 10/2  Supine to sit min assist and sit to supine Supervision  Patient still plans to have a hospital bed at home   (9/29) [address] management (cleaning) Eva/I N/A 10/2  Cleaning of beverage post kitchen task with MI demonstrating good safety  Would benefit from continued ed on management of functional tasks with adherence to THP lucas with twisting    (9/30) utilizing reacher from seated positioning picking up items from floor -- adhering to precautions          Helena Letters  10/2/2019

## 2021-12-29 ENCOUNTER — PROCEDURE VISIT (OUTPATIENT)
Dept: OBGYN CLINIC | Facility: HOSPITAL | Age: 71
End: 2021-12-29
Payer: MEDICARE

## 2021-12-29 VITALS
WEIGHT: 209 LBS | HEIGHT: 58 IN | BODY MASS INDEX: 43.87 KG/M2 | SYSTOLIC BLOOD PRESSURE: 158 MMHG | HEART RATE: 76 BPM | DIASTOLIC BLOOD PRESSURE: 91 MMHG

## 2021-12-29 DIAGNOSIS — M17.0 PRIMARY OSTEOARTHRITIS OF BOTH KNEES: Primary | ICD-10-CM

## 2021-12-29 DIAGNOSIS — M25.561 CHRONIC PAIN OF BOTH KNEES: ICD-10-CM

## 2021-12-29 DIAGNOSIS — M25.562 CHRONIC PAIN OF BOTH KNEES: ICD-10-CM

## 2021-12-29 DIAGNOSIS — G89.29 CHRONIC PAIN OF BOTH KNEES: ICD-10-CM

## 2021-12-29 PROCEDURE — 99212 OFFICE O/P EST SF 10 MIN: CPT | Performed by: PHYSICIAN ASSISTANT

## 2021-12-29 PROCEDURE — 20610 DRAIN/INJ JOINT/BURSA W/O US: CPT | Performed by: PHYSICIAN ASSISTANT

## 2021-12-29 RX ORDER — HYALURONATE SODIUM 10 MG/ML
20 SYRINGE (ML) INTRAARTICULAR
Status: COMPLETED | OUTPATIENT
Start: 2021-12-29 | End: 2021-12-29

## 2021-12-29 RX ADMIN — Medication 20 MG: at 14:35

## 2022-01-05 ENCOUNTER — PROCEDURE VISIT (OUTPATIENT)
Dept: OBGYN CLINIC | Facility: HOSPITAL | Age: 72
End: 2022-01-05
Payer: MEDICARE

## 2022-01-05 VITALS
HEART RATE: 68 BPM | HEIGHT: 58 IN | SYSTOLIC BLOOD PRESSURE: 146 MMHG | BODY MASS INDEX: 43.68 KG/M2 | DIASTOLIC BLOOD PRESSURE: 83 MMHG

## 2022-01-05 DIAGNOSIS — M17.0 PRIMARY OSTEOARTHRITIS OF BOTH KNEES: Primary | ICD-10-CM

## 2022-01-05 PROCEDURE — 20610 DRAIN/INJ JOINT/BURSA W/O US: CPT | Performed by: PHYSICIAN ASSISTANT

## 2022-01-05 RX ORDER — HYALURONATE SODIUM 10 MG/ML
20 SYRINGE (ML) INTRAARTICULAR
Status: COMPLETED | OUTPATIENT
Start: 2022-01-05 | End: 2022-01-05

## 2022-01-05 RX ADMIN — Medication 20 MG: at 14:37

## 2022-01-05 NOTE — PROGRESS NOTES
Subjective;    64-year-old adult female  She is undergoing Visco supplement injections for both knees  Presents the office today for the 2nd of 3 injections to both knees    Past Medical History:   Diagnosis Date    Alopecia     Calcaneal spur     Diabetes insipidus ([facility] [address]     Diabetes mellitus ([facility] )     Hyperlipidemia     Hypertension     Osteoarthrosis 2/13/2013    Pes planus     unspecified laterality    Plantar fasciitis        Past Surgical History:   Procedure Laterality Date    CARPAL TUNNEL RELEASE      COLONOSCOPY      May 2006    INCISIONAL BREAST BIOPSY      IN COLONOSCOPY FLX DX W/COLLJ SPEC WHEN PFRMD N/A 5/4/2018    Procedure: COLONOSCOPY;  Surgeon: Linda Moss MD;  [facility]; Service: Colorectal    IN TOTAL HIP ARTHROPLASTY Right 9/23/2019    Procedure: ARTHROPLASTY HIP TOTAL;  Surgeon: Michael Flynn MD;  [facility];  Service: Orthopedics    WISDOM TOOTH EXTRACTION         Family History   Problem Relation Age of Onset    Diabetes Mother     Hypertension Mother     Emphysema Father     Cancer Brother     Colon cancer Brother 40       Social History     Tobacco Use    Smoking status: Never Smoker    Smokeless tobacco: Never Used   Substance Use Topics    Alcohol use: Yes     Alcohol/week: 0 0 standard drinks     Comment: 0    Drug use: No     Exam;    Her knees are devoid of any significant redness bruising palpable warmth or fluid distension  Both knees allow for safe administration of medication, a medial parapatellar tendon approach will be utilized    Large joint arthrocentesis: R knee  Universal Protocol:  Consent: Verbal consent obtained  Time out: Immediately prior to procedure a "time out" was called to verify the correct patient, procedure, equipment, support staff and site/side marked as required    Procedure Details  Location: knee - R knee  Needle size: 22 G (RP 27 G)  Medications administered: 20 mg Sodium Hyaluronate 20 MG/2ML    Patient tolerance: patient tolerated the procedure well with no immediate complications  Dressing:  Sterile dressing applied    Large joint arthrocentesis: L knee  Procedure Details  Location: knee - L knee  Needle size: 22 G (RP 27 G)  Medications administered: 20 mg Sodium Hyaluronate 20 MG/2ML    Patient tolerance: patient tolerated the procedure well with no immediate complications  Dressing:  Sterile dressing applied      Impression;    Bilateral knee osteoarthritis  Bilateral knee pain    Plan;    She received her 2nd injection of Euflexxa to both knees  She returns next week to complete the 3 shot series    Her entire experience was supervised by and plan formulated by the attending, it was my privilege to assist him in the delivery of her care

## 2022-01-12 ENCOUNTER — PROCEDURE VISIT (OUTPATIENT)
Dept: OBGYN CLINIC | Facility: HOSPITAL | Age: 72
End: 2022-01-12
Payer: MEDICARE

## 2022-01-12 VITALS
BODY MASS INDEX: 43.68 KG/M2 | HEIGHT: 58 IN | SYSTOLIC BLOOD PRESSURE: 169 MMHG | HEART RATE: 70 BPM | DIASTOLIC BLOOD PRESSURE: 96 MMHG

## 2022-01-12 DIAGNOSIS — G89.29 CHRONIC PAIN OF BOTH KNEES: ICD-10-CM

## 2022-01-12 DIAGNOSIS — M17.0 PRIMARY OSTEOARTHRITIS OF BOTH KNEES: Primary | ICD-10-CM

## 2022-01-12 DIAGNOSIS — M25.562 CHRONIC PAIN OF BOTH KNEES: ICD-10-CM

## 2022-01-12 DIAGNOSIS — M25.561 CHRONIC PAIN OF BOTH KNEES: ICD-10-CM

## 2022-01-12 PROCEDURE — 20610 DRAIN/INJ JOINT/BURSA W/O US: CPT | Performed by: ORTHOPAEDIC SURGERY

## 2022-01-12 RX ORDER — HYALURONATE SODIUM 10 MG/ML
20 SYRINGE (ML) INTRAARTICULAR
Status: COMPLETED | OUTPATIENT
Start: 2022-01-12 | End: 2022-01-12

## 2022-01-12 RX ORDER — LIDOCAINE HYDROCHLORIDE 10 MG/ML
3 INJECTION, SOLUTION INFILTRATION; PERINEURAL
Status: COMPLETED | OUTPATIENT
Start: 2022-01-12 | End: 2022-01-12

## 2022-01-12 RX ADMIN — LIDOCAINE HYDROCHLORIDE 3 ML: 10 INJECTION, SOLUTION INFILTRATION; PERINEURAL at 14:43

## 2022-01-12 RX ADMIN — Medication 20 MG: at 14:43

## 2022-01-12 NOTE — PROGRESS NOTES
Assessment:  1  Primary osteoarthritis of both knees     2  Chronic pain of both knees         Plan:  The patient was provided with 3rd bilateral Euflexxa injections (RP-27g)  She tolerated the procedure well  She should follow up in 3 months  To do next visit:  Return in about 3 months (around 4/12/2022)  The above stated was discussed in layman's terms and the patient expressed understanding  All questions were answered to the patient's satisfaction  Scribe Attestation    I,:  Ej Hoffman am acting as a scribe while in the presence of the attending physician :       I,:  Hollie Ortiz MD personally performed the services described in this documentation    as scribed in my presence :             Subjective:   Levy Fall is a 66 y o  female who presents for 3rd bilateral Euflexxa injections  Today she complains of bilateral generalized knee pain  Prolonged weight bearing and repetitive bending aggravates while rest alleviates  Review of systems negative unless otherwise specified in HPI    Past Medical History:   Diagnosis Date    Alopecia     Calcaneal spur     Diabetes insipidus ([address] )     Diabetes mellitus ([address] )     Hyperlipidemia     Hypertension     Osteoarthrosis 2/13/2013    Pes planus     unspecified laterality    Plantar fasciitis        Past Surgical History:   Procedure Laterality Date    CARPAL TUNNEL RELEASE      COLONOSCOPY      May 2006    INCISIONAL BREAST BIOPSY      PA COLONOSCOPY FLX DX W/COLLJ SPEC WHEN PFRMD N/A 5/4/2018    Procedure: COLONOSCOPY;  Surgeon: Lakeshia Cartwright MD;  [facility];   Service: Colorectal    PA TOTAL HIP ARTHROPLASTY Right 9/23/2019    Procedure: ARTHROPLASTY HIP TOTAL;  Surgeon: Ricki Nunez MD;  [facility];  Service: Orthopedics    WISDOM TOOTH EXTRACTION         Family History   Problem Relation Age of Onset    Diabetes Mother     Hypertension Mother     Emphysema Father     Cancer Brother     Colon cancer Brother 40       Social History     Occupational History    Not on file   Tobacco Use    Smoking status: Never Smoker    Smokeless tobacco: Never Used   Substance and Sexual Activity    Alcohol use:  Yes     Alcohol/week: 0 0 standard drinks     Comment: 0    Drug use: No    Sexual activity: Not Currently         Current Outpatient Medications:     acetaminophen (TYLENOL) 500 mg tablet, Take 1,000 mg by mouth every 6 (six) hours as needed for mild pain, Disp: , Rfl:     Biotin 1 MG CAPS, Take by mouth daily , Disp: , Rfl:     Blood Pressure Monitoring (SPHYGMOMANOMETER) MISC, by Does not apply route 2 (two) times a day, Disp: 1 each, Rfl: 0    Calcium Carb-Cholecalciferol (Calcium 1000 + D) 1000-800 MG-UNIT TABS, Take by mouth , Disp: , Rfl:     cephalexin (KEFLEX) 500 mg capsule, , Disp: , Rfl:     cholecalciferol (VITAMIN D3) 1,000 units tablet, Take 1,000 Units by mouth daily, Disp: , Rfl:     gabapentin (NEURONTIN) 300 mg capsule, Take 1 capsule (300 mg total) by mouth 3 (three) times a day, Disp: 90 capsule, Rfl: 5    ibuprofen (MOTRIN) 600 mg tablet, TAKE 1 TABLET BY MOUTH EVERY 8 HOURS AS NEEDED FOR MODERATE PAIN, Disp: 30 tablet, Rfl: 3    losartan (COZAAR) 25 mg tablet, Take 1 tablet (25 mg total) by mouth daily, Disp: 60 tablet, Rfl: 3    metFORMIN (GLUCOPHAGE) 500 mg tablet, TAKE 1 TABLET BY MOUTH TWO TIMES DAILY WITH MEALS, Disp: 180 tablet, Rfl: 1    metoprolol tartrate (LOPRESSOR) 25 mg tablet, TAKE 2 TABLETS BY MOUTH EVERY 12 HOURS, Disp: 180 tablet, Rfl: 1    mometasone (ELOCON) 0 1 % ointment, Apply topically daily, Disp: 45 g, Rfl: 1    Multiple Vitamin (MULTI-VITAMIN DAILY PO), Take by mouth daily , Disp: , Rfl:     senna (SENOKOT) 8 6 mg, Take 1 tablet (8 6 mg total) by mouth 2 (two) times a day (Patient not taking: Reported on 7/2/2020), Disp: 120 each, Rfl: 0    simvastatin (ZOCOR) 20 mg tablet, TAKE 1 TABLET BY MOUTH EVERY DAY, Disp: 90 tablet, Rfl: 1    Allergies   Allergen Reactions    Meloxicam Hives    Nifedipine Edema    Sulfamethoxazole-Trimethoprim Edema            Vitals:    01/12/22 1419   BP: 169/96   Pulse: 70       Objective:  Physical exam  · General: Awake, Alert, Oriented  · Eyes: Pupils equal, round and reactive to light  · Heart: regular rate and rhythm  · Lungs: No audible wheezing  · Abdomen: soft                    Ortho Exam  Bilateral knees:  No erythema or ecchymosis  No effusion or swelling  Normal strength  Good ROM   Calf compartments soft and supple  Sensation intact  Toes are warm sensate and mobile      Diagnostics, reviewed and taken today if performed as documented:    None performed    Procedures, if performed today:    Large joint arthrocentesis: R knee  Universal Protocol:  Consent: Verbal consent obtained  Risks and benefits: risks, benefits and alternatives were discussed  Consent given by: patient  Time out: Immediately prior to procedure a "time out" was called to verify the correct patient, procedure, equipment, support staff and site/side marked as required  Timeout called at: 1/12/2022 2:40 PM   Patient understanding: patient states understanding of the procedure being performed  Patient identity confirmed: verbally with patient    Supporting Documentation  Indications: pain   Procedure Details  Location: knee - R knee  Preparation: Patient was prepped and draped in the usual sterile fashion  Needle size: 22 G  Ultrasound guidance: no  Approach: anterolateral  Medications administered: 20 mg Sodium Hyaluronate 20 MG/2ML; 3 mL lidocaine 1 %    Patient tolerance: patient tolerated the procedure well with no immediate complications  Dressing:  Sterile dressing applied    Large joint arthrocentesis: L knee  Universal Protocol:  Consent: Verbal consent obtained    Risks and benefits: risks, benefits and alternatives were discussed  Consent given by: patient  Time out: Immediately prior to procedure a "time out" was called to verify the correct patient, procedure, equipment, support staff and site/side marked as required  Timeout called at: 1/12/2022 2:41 PM   Patient understanding: patient states understanding of the procedure being performed  Patient identity confirmed: verbally with patient    Supporting Documentation  Indications: pain   Procedure Details  Location: knee - L knee  Preparation: Patient was prepped and draped in the usual sterile fashion  Needle size: 22 G  Ultrasound guidance: no  Approach: anterolateral  Medications administered: 3 mL lidocaine 1 %; 20 mg Sodium Hyaluronate 20 MG/2ML    Patient tolerance: patient tolerated the procedure well with no immediate complications  Dressing:  Sterile dressing applied              Portions of the record may have been created with voice recognition software  Occasional wrong word or "sound a like" substitutions may have occurred due to the inherent limitations of voice recognition software  Read the chart carefully and recognize, using context, where substitutions have occurred

## 2022-02-09 ENCOUNTER — HOSPITAL ENCOUNTER (OUTPATIENT)
Dept: RADIOLOGY | Age: 72
Discharge: HOME | End: 2022-02-09
Payer: MEDICARE

## 2022-02-09 VITALS — BODY MASS INDEX: 43.87 KG/M2 | WEIGHT: 209 LBS | HEIGHT: 58 IN

## 2022-02-09 DIAGNOSIS — Z12.31 ENCOUNTER FOR SCREENING MAMMOGRAM FOR MALIGNANT NEOPLASM OF BREAST: ICD-10-CM

## 2022-02-09 PROCEDURE — 77063 BREAST TOMOSYNTHESIS BI: CPT

## 2022-02-09 PROCEDURE — 77067 SCR MAMMO BI INCL CAD: CPT

## 2022-02-14 DIAGNOSIS — E11.65 CONTROLLED TYPE 2 DIABETES MELLITUS WITH HYPERGLYCEMIA, WITHOUT LONG-TERM CURRENT USE OF INSULIN (HCC): ICD-10-CM

## 2022-02-23 ENCOUNTER — TELEPHONE (OUTPATIENT)
Dept: PAIN MEDICINE | Facility: CLINIC | Age: 72
End: 2022-02-23

## 2022-02-23 ENCOUNTER — TELEPHONE (OUTPATIENT)
Dept: CARDIOLOGY CLINIC | Facility: CLINIC | Age: 72
End: 2022-02-23

## 2022-02-23 DIAGNOSIS — I10 BENIGN ESSENTIAL HYPERTENSION: ICD-10-CM

## 2022-02-23 RX ORDER — LOSARTAN POTASSIUM 25 MG/1
25 TABLET ORAL DAILY
Qty: 30 TABLET | Refills: 0 | Status: SHIPPED | OUTPATIENT
Start: 2022-02-23 | End: 2022-03-03 | Stop reason: SDUPTHER

## 2022-02-23 NOTE — TELEPHONE ENCOUNTER
Patient called asking for a new EMG script - her script is going to  on 3/19/2022 - thank you         [phone]

## 2022-02-23 NOTE — TELEPHONE ENCOUNTER
This test was ordered originally 2 years ago and we havent seen her in over a year - would recommend a f/u OV at some point

## 2022-02-24 ENCOUNTER — APPOINTMENT (OUTPATIENT)
Dept: LAB | Facility: HOSPITAL | Age: 72
End: 2022-02-24
Payer: MEDICARE

## 2022-02-24 DIAGNOSIS — E78.5 HYPERLIPIDEMIA, UNSPECIFIED HYPERLIPIDEMIA TYPE: ICD-10-CM

## 2022-02-24 DIAGNOSIS — I10 BENIGN ESSENTIAL HYPERTENSION: ICD-10-CM

## 2022-02-24 DIAGNOSIS — E11.21 CONTROLLED TYPE 2 DIABETES MELLITUS WITH DIABETIC NEPHROPATHY, WITHOUT LONG-TERM CURRENT USE OF INSULIN (HCC): ICD-10-CM

## 2022-02-24 LAB
ALBUMIN SERPL BCP-MCNC: 4.2 G/DL (ref 3.5–5)
ALP SERPL-CCNC: 85 U/L (ref 46–116)
ALT SERPL W P-5'-P-CCNC: 24 U/L (ref 12–78)
ANION GAP SERPL CALCULATED.3IONS-SCNC: 5 MMOL/L (ref 4–13)
AST SERPL W P-5'-P-CCNC: 20 U/L (ref 5–45)
BILIRUB SERPL-MCNC: 0.63 MG/DL (ref 0.2–1)
BUN SERPL-MCNC: 14 MG/DL (ref 5–25)
CALCIUM SERPL-MCNC: 9.7 MG/DL (ref 8.3–10.1)
CHLORIDE SERPL-SCNC: 104 MMOL/L (ref 100–108)
CHOLEST SERPL-MCNC: 187 MG/DL
CO2 SERPL-SCNC: 29 MMOL/L (ref 21–32)
CREAT SERPL-MCNC: 0.67 MG/DL (ref 0.6–1.3)
CREAT UR-MCNC: 123 MG/DL
ERYTHROCYTE [DISTWIDTH] IN BLOOD BY AUTOMATED COUNT: 12.7 % (ref 11.6–15.1)
GFR SERPL CREATININE-BSD FRML MDRD: 87 ML/MIN/1.73SQ M
GLUCOSE P FAST SERPL-MCNC: 83 MG/DL (ref 65–99)
HCT VFR BLD AUTO: 42.7 % (ref 34.8–46.1)
HDLC SERPL-MCNC: 56 MG/DL
HGB BLD-MCNC: 13.8 G/DL (ref 11.5–15.4)
LDLC SERPL CALC-MCNC: 101 MG/DL (ref 0–100)
MCH RBC QN AUTO: 31.5 PG (ref 26.8–34.3)
MCHC RBC AUTO-ENTMCNC: 32.3 G/DL (ref 31.4–37.4)
MCV RBC AUTO: 98 FL (ref 82–98)
MICROALBUMIN UR-MCNC: 35.8 MG/L (ref 0–20)
MICROALBUMIN/CREAT 24H UR: 29 MG/G CREATININE (ref 0–30)
NONHDLC SERPL-MCNC: 131 MG/DL
PLATELET # BLD AUTO: 266 THOUSANDS/UL (ref 149–390)
PMV BLD AUTO: 9.4 FL (ref 8.9–12.7)
POTASSIUM SERPL-SCNC: 4.1 MMOL/L (ref 3.5–5.3)
PROT SERPL-MCNC: 8.6 G/DL (ref 6.4–8.2)
RBC # BLD AUTO: 4.38 MILLION/UL (ref 3.81–5.12)
SODIUM SERPL-SCNC: 138 MMOL/L (ref 136–145)
TRIGL SERPL-MCNC: 149 MG/DL
TSH SERPL DL<=0.05 MIU/L-ACNC: 3.34 UIU/ML (ref 0.36–3.74)
WBC # BLD AUTO: 8.04 THOUSAND/UL (ref 4.31–10.16)

## 2022-02-24 PROCEDURE — 80061 LIPID PANEL: CPT

## 2022-02-24 PROCEDURE — 85027 COMPLETE CBC AUTOMATED: CPT

## 2022-02-24 PROCEDURE — 84443 ASSAY THYROID STIM HORMONE: CPT

## 2022-02-24 PROCEDURE — 82043 UR ALBUMIN QUANTITATIVE: CPT

## 2022-02-24 PROCEDURE — 82570 ASSAY OF URINE CREATININE: CPT

## 2022-02-24 PROCEDURE — 80053 COMPREHEN METABOLIC PANEL: CPT

## 2022-02-24 PROCEDURE — 83036 HEMOGLOBIN GLYCOSYLATED A1C: CPT

## 2022-02-24 PROCEDURE — 36415 COLL VENOUS BLD VENIPUNCTURE: CPT

## 2022-02-25 LAB
EST. AVERAGE GLUCOSE BLD GHB EST-MCNC: 126 MG/DL
HBA1C MFR BLD: 6 %

## 2022-03-02 ENCOUNTER — OFFICE VISIT (OUTPATIENT)
Dept: FAMILY MEDICINE CLINIC | Facility: CLINIC | Age: 72
End: 2022-03-02
Payer: MEDICARE

## 2022-03-02 VITALS
TEMPERATURE: 98.7 F | SYSTOLIC BLOOD PRESSURE: 132 MMHG | DIASTOLIC BLOOD PRESSURE: 78 MMHG | HEART RATE: 76 BPM | BODY MASS INDEX: 41.53 KG/M2 | HEIGHT: 59 IN | WEIGHT: 206 LBS | RESPIRATION RATE: 16 BRPM

## 2022-03-02 DIAGNOSIS — E11.21 CONTROLLED TYPE 2 DIABETES MELLITUS WITH DIABETIC NEPHROPATHY, WITHOUT LONG-TERM CURRENT USE OF INSULIN (HCC): Primary | ICD-10-CM

## 2022-03-02 DIAGNOSIS — E78.5 HYPERLIPIDEMIA, UNSPECIFIED HYPERLIPIDEMIA TYPE: ICD-10-CM

## 2022-03-02 DIAGNOSIS — I10 BENIGN ESSENTIAL HYPERTENSION: ICD-10-CM

## 2022-03-02 DIAGNOSIS — Z00.00 MEDICARE ANNUAL WELLNESS VISIT, SUBSEQUENT: ICD-10-CM

## 2022-03-02 DIAGNOSIS — M48.062 LUMBAR STENOSIS WITH NEUROGENIC CLAUDICATION: ICD-10-CM

## 2022-03-02 PROCEDURE — 1123F ACP DISCUSS/DSCN MKR DOCD: CPT | Performed by: FAMILY MEDICINE

## 2022-03-02 PROCEDURE — G0439 PPPS, SUBSEQ VISIT: HCPCS | Performed by: FAMILY MEDICINE

## 2022-03-02 PROCEDURE — 99214 OFFICE O/P EST MOD 30 MIN: CPT | Performed by: FAMILY MEDICINE

## 2022-03-02 NOTE — PROGRESS NOTES
Assessment and Plan:     Problem List Items Addressed This Visit        Endocrine    Diabetes mellitus type 2, controlled ([facility] [address] ) - Primary       Lab Results   Component Value Date    HGBA1C 6 0 (H) 02/24/2022     Controlled  Low carb diet  Continue metformin 500mg bid  Relevant Orders    Comprehensive metabolic panel    Hemoglobin A1C    Lipid panel       Cardiovascular and Mediastinum    Benign essential hypertension     Controlled  DASH diet  Continue losartan and metoprolol per cardiology  Relevant Orders    Comprehensive metabolic panel    Hemoglobin A1C    Lipid panel       Other    Hyperlipidemia     2/2022 Lipid good  Low fat diet  Continue simvastatin 20mg qhs  Relevant Orders    Comprehensive metabolic panel    Hemoglobin A1C    Lipid panel    Lumbar stenosis with neurogenic claudication     Continue gabapentin  FU pain specialist             Other Visit Diagnoses     Medicare annual wellness visit, subsequent            BMI Counseling: Body mass index is 41 61 kg/m²  The BMI is above normal  Nutrition recommendations include decreasing portion sizes, encouraging healthy choices of fruits and vegetables, decreasing fast food intake, consuming healthier snacks and limiting drinks that contain sugar  Exercise recommendations include moderate physical activity 150 minutes/week  No pharmacotherapy was ordered  Rationale for BMI follow-up plan is due to patient being overweight or obese  Depression Screening and Follow-up Plan: Patient was screened for depression during today's encounter  They screened negative with a PHQ-2 score of 1  Preventive health issues were discussed with patient, and age appropriate screening tests were ordered as noted in patient's After Visit Summary  Personalized health advice and appropriate referrals for health education or preventive services given if needed, as noted in patient's After Visit Summary       History of Present Illness: Patient presents for Medicare Annual Wellness visit    Patient Care Team:  Rehan Lucero MD as PCP - General  Juan Farley MD as Endoscopist  Asher Cedillo DO (Anesthesiology)     Problem List:     Patient Active Problem List   Diagnosis    Benign essential hypertension    Cervical radiculopathy    Chronic lower back pain    Diabetes mellitus type 2, controlled ([address] )    Hyperlipidemia    Left knee pain    Lumbar stenosis with neurogenic claudication    Morbid obesity ([address] )    Osteoarthrosis    Post-menopausal bleeding    Primary osteoarthritis of both knees    Psoriasis    Right knee pain    Right shoulder pain    Spondylolisthesis, lumbar region    Vaginal lump    Vitamin D deficiency    Effusion of left knee    Right hip pain    Arthritis of right hip    Lumbar radiculopathy    DDD (degenerative disc disease), lumbar    Lumbar spondylosis    Sacroiliitis ([address] )    Status post total hip replacement, right    Supraventricular tachycardia ([address] )    Aftercare following right hip joint replacement surgery    Stress incontinence of urine    Chronic pain syndrome    Diabetic polyneuropathy associated with type 2 diabetes mellitus ([address] )      Past Medical and Surgical History:     Past Medical History:   Diagnosis Date    Alopecia     Calcaneal spur     Diabetes insipidus ([address] )     Diabetes mellitus ([address] )     Hyperlipidemia     Hypertension     Osteoarthrosis 2/13/2013    Pes planus     unspecified laterality    Plantar fasciitis      Past Surgical History:   Procedure Laterality Date    BREAST BIOPSY Right     patient not sure when , negative results    BREAST CYST ASPIRATION Left     patient not sure when negative results    CARPAL TUNNEL RELEASE      COLONOSCOPY      May 2006    INCISIONAL BREAST BIOPSY      NV COLONOSCOPY FLX DX W/COLLJ SPEC WHEN PFRMD N/A 5/4/2018    Procedure: COLONOSCOPY;  Surgeon: Tyesha Duque MD;  Location: [facility]; Service: Colorectal    IN TOTAL HIP ARTHROPLASTY Right 9/23/2019    Procedure: ARTHROPLASTY HIP TOTAL;  Surgeon: Doron Davis MD;  Location: [facility];  Service: Orthopedics    WISDOM TOOTH EXTRACTION        Family History:     Family History   Problem Relation Age of Onset    Diabetes Mother     Hypertension Mother     Emphysema Father     Colon cancer Brother 40    No Known Problems Maternal Grandmother     No Known Problems Maternal Grandfather     No Known Problems Paternal Grandmother     No Known Problems Paternal Grandfather     No Known Problems Brother     No Known Problems Maternal Aunt     No Known Problems Maternal Aunt     No Known Problems Paternal Aunt     No Known Problems Paternal Aunt     No Known Problems Paternal Aunt       Social History:     Social History     Socioeconomic History    Marital status: Single     Spouse name: None    Number of children: None    Years of education: None    Highest education level: None   Occupational History    None   Tobacco Use    Smoking status: Never Smoker    Smokeless tobacco: Never Used   Substance and Sexual Activity    Alcohol use:  Yes     Alcohol/week: 0 0 standard drinks     Comment: 0    Drug use: No    Sexual activity: Not Currently   Other Topics Concern    None   Social History Narrative    None     Social Determinants of Health     Financial Resource Strain: Not on file   Food Insecurity: Not on file   Transportation Needs: Not on file   Physical Activity: Not on file   Stress: Not on file   Social Connections: Not on file   Intimate Partner Violence: Not on file   Housing Stability: Not on file      Medications and Allergies:     Current Outpatient Medications   Medication Sig Dispense Refill    acetaminophen (TYLENOL) 500 mg tablet Take 1,000 mg by mouth every 6 (six) hours as needed for mild pain      Biotin 1 MG CAPS Take by mouth daily       Blood Pressure Monitoring (SPHYGMOMANOMETER) MISC by Does not apply route 2 (two) times a day 1 each 0    Calcium Carb-Cholecalciferol (Calcium 1000 + D) 1000-800 MG-UNIT TABS Take by mouth       cephalexin (KEFLEX) 500 mg capsule       cholecalciferol (VITAMIN D3) 1,000 units tablet Take 1,000 Units by mouth daily      gabapentin (NEURONTIN) 300 mg capsule Take 1 capsule (300 mg total) by mouth 3 (three) times a day 90 capsule 5    losartan (COZAAR) 25 mg tablet Take 1 tablet (25 mg total) by mouth daily 30 tablet 0    metFORMIN (GLUCOPHAGE) 500 mg tablet TAKE 1 TABLET BY MOUTH TWO TIMES DAILY WITH MEALS 180 tablet 1    metoprolol tartrate (LOPRESSOR) 25 mg tablet TAKE 2 TABLETS BY MOUTH EVERY 12 HOURS 180 tablet 1    Multiple Vitamin (MULTI-VITAMIN DAILY PO) Take by mouth daily       simvastatin (ZOCOR) 20 mg tablet TAKE 1 TABLET BY MOUTH EVERY DAY 90 tablet 1     No current facility-administered medications for this visit       Allergies   Allergen Reactions    Meloxicam Hives    Nifedipine Edema    Sulfamethoxazole-Trimethoprim Edema      Immunizations:     Immunization History   Administered Date(s) Administered    COVID-19 PFIZER VACCINE 0 3 ML IM 03/06/2021, 03/27/2021, 11/13/2021    INFLUENZA 10/19/2021    Influenza Split High Dose Preservative Free IM 11/14/2018    Influenza, high dose seasonal 0 7 mL 10/07/2019    Influenza, seasonal, injectable 11/01/2012, 10/05/2015    Pneumococcal Conjugate 13-Valent 02/25/2016    Pneumococcal Polysaccharide PPV23 05/15/2014    Tuberculin Skin Test-PPD Intradermal 09/29/2019      Health Maintenance:         Topic Date Due    DXA SCAN  11/07/2019    Cervical Cancer Screening  06/08/2022 (Originally 1/16/2020)    Colorectal Cancer Screening  05/04/2023    Hepatitis C Screening  Completed         Topic Date Due    DTaP,Tdap,and Td Vaccines (1 - Tdap) Never done      Medicare Health Risk Assessment:     /78   Pulse 76   Temp 98 7 °F (37 1 °C) (Tympanic)   Resp 16   Ht 4' 11" (1 499 m)   Wt 93 4 kg (206 lb) BMI 41 61 kg/m²      Arabella Rogers is here for her Subsequent Wellness visit  Health Risk Assessment:   Patient rates overall health as good  Patient feels that their physical health rating is same  Patient is satisfied with their life  Eyesight was rated as slightly worse  Hearing was rated as same  Patient feels that their emotional and mental health rating is same  Patients states they are never, rarely angry  Patient states they are often unusually tired/fatigued  Pain experienced in the last 7 days has been none  Patient states that she has experienced no weight loss or gain in last 6 months  Depression Screening:   PHQ-2 Score: 1      Fall Risk Screening: In the past year, patient has experienced: no history of falling in past year      Urinary Incontinence Screening:   Patient has leaked urine accidently in the last six months  Home Safety:  Patient does not have trouble with stairs inside or outside of their home  Patient has working smoke alarms and has no working carbon monoxide detector  Home safety hazards include: loose rugs on the floor  Nutrition:   Current diet is Regular and No Added Salt  Medications:   Patient is currently taking over-the-counter supplements  OTC medications include: see medication list  Patient is able to manage medications  Activities of Daily Living (ADLs)/Instrumental Activities of Daily Living (IADLs):   Walk and transfer into and out of bed and chair?: Yes  Dress and groom yourself?: Yes    Bathe or shower yourself?: Yes    Feed yourself?  Yes  Do your laundry/housekeeping?: Yes  Manage your money, pay your bills and track your expenses?: Yes  Make your own meals?: Yes    Do your own shopping?: Yes    Previous Hospitalizations:   Any hospitalizations or ED visits within the last 12 months?: No      Advance Care Planning:   Living will: No    Durable POA for healthcare: No    Advanced directive: No      Cognitive Screening:   Provider or family/friend/caregiver concerned regarding cognition?: No    PREVENTIVE SCREENINGS      Cardiovascular Screening:    General: History Lipid Disorder and Screening Current      Diabetes Screening:     General: History Diabetes and Screening Current      Colorectal Cancer Screening:     General: Screening Current      Breast Cancer Screening:     General: Screening Current      Cervical Cancer Screening:    General: Screening Not Indicated      Osteoporosis Screening:    General: Risks and Benefits Discussed    Due for: DXA Axial      Lung Cancer Screening:     General: Screening Not Indicated      Hepatitis C Screening:    General: Screening Current    Screening, Brief Intervention, and Referral to Treatment (SBIRT)    Screening      AUDIT-C Screenin) How often did you have a drink containing alcohol in the past year? monthly or less  2) How many drinks did you have on a typical day when you were drinking in the past year?  1 to 2  3) How often did you have 6 or more drinks on one occasion in the past year? never    AUDIT-C Score: 1  Interpretation: Score 0-2 (female): Negative screen for alcohol misuse    Single Item Drug Screening:  How often have you used an illegal drug (including marijuana) or a prescription medication for non-medical reasons in the past year? never    Single Item Drug Screen Score: 0  Interpretation: Negative screen for possible drug use disorder      Freddie Vazquez MD

## 2022-03-02 NOTE — ASSESSMENT & PLAN NOTE
Lab Results   Component Value Date    HGBA1C 6 0 (H) 02/24/2022     Controlled  Low carb diet  Continue metformin 500mg bid

## 2022-03-02 NOTE — PROGRESS NOTES
No chief complaint on file  Health Maintenance   Topic Date Due    DTaP,Tdap,and Td Vaccines (1 - Tdap) Never done    DXA SCAN  11/07/2019    PT PLAN OF CARE  01/04/2020    Medicare Annual Wellness Visit (AWV)  07/30/2021    Influenza Vaccine (1) 09/01/2021    BMI: Followup Plan  06/08/2022    Cervical Cancer Screening  06/08/2022 (Originally 1/16/2020)    Diabetic Foot Exam  06/08/2022    HEMOGLOBIN A1C  08/24/2022    Fall Risk  03/02/2023    Depression Screening  03/02/2023    BMI: Adult  03/02/2023    Colorectal Cancer Screening  05/04/2023    DM Eye Exam  07/28/2023    Hepatitis C Screening  Completed    Osteoporosis Screening  Completed    Pneumococcal Vaccine: 65+ Years  Completed    COVID-19 Vaccine  Completed    HIB Vaccine  Aged Out    Hepatitis B Vaccine  Aged Out    IPV Vaccine  Aged Out    Hepatitis A Vaccine  Aged Out    Meningococcal ACWY Vaccine  Aged Out    HPV Vaccine  Aged Out     Assessment/Plan:    Diabetes mellitus type 2, controlled ([facility] [address] )    Lab Results   Component Value Date    HGBA1C 6 0 (H) 02/24/2022     Controlled  Low carb diet  Continue metformin 500mg bid  Benign essential hypertension  Controlled  DASH diet  Continue losartan and metoprolol per cardiology  Hyperlipidemia  2/2022 Lipid good  Low fat diet  Continue simvastatin 20mg qhs  Lumbar stenosis with neurogenic claudication  Continue gabapentin  FU pain specialist      Reviewed lab in 2/2022  M/c 29 good  CBC normal  CMP ok  HgA1C 6 0 controlled  Lipid 187/149/56/101 ok  TSH normal    Got flu shot yearly    Got Covid19 vaccines and booster  Got pneumovax at age of 70  Got prevnar 13 2/2016  Got zostavax in 3/2017 per pt  Will get shingrix at pharmacy  Mammogram 2/2022 normal    Had colonoscopy 5/2018 Dr Barahona  Repeat in 5 years    Dexa scan 11/2016 normal  Scheduled already  RTO in 6 months      Got advance directives          Diagnoses and all orders for this visit:    Controlled type 2 diabetes mellitus with diabetic nephropathy, without long-term current use of insulin (HCC)  -     Comprehensive metabolic panel; Future  -     Hemoglobin A1C; Future  -     Lipid panel; Future    Benign essential hypertension  -     Comprehensive metabolic panel; Future  -     Hemoglobin A1C; Future  -     Lipid panel; Future    Hyperlipidemia, unspecified hyperlipidemia type  -     Comprehensive metabolic panel; Future  -     Hemoglobin A1C; Future  -     Lipid panel; Future    Lumbar stenosis with neurogenic claudication    Medicare annual wellness visit, subsequent          Subjective:      Patient ID: Debra Camilo is a 66 y o  female  HPI    Pt is here by herself  DM---2/2022 HgA1C 6 0 controlled  She is on metformin 500mg bid  Denies SE  Tried to follow Low carb diet  Some neuropathy on toes  She is on gabapentin 300mg QD  Denies hypoglycemia  FU opthalmology Dr Emmanuel Blair  Will make an appt  Plan to see Dr Carmine Corrigan     HTN---BP at home 140/80  Denies headache, vision change, SOB or CP  She is on losartan 25mg qhs and metoprolol 25mg bid     SVT---FU cardiology  Denies palpitation, SOB, CP etc       Hyperlipidemia---She is on simvastatin 20mg qhs  Denies SE       Lumbar stenosis---She is on gabapentin 300mg qhs and ibuprofen prn  FU pain specialist  Got injection before       Live by herself  Does all ADL's  Still drive  Denies recent falls  Denies depression        The following portions of the patient's history were reviewed and updated as appropriate: allergies, current medications, past family history, past medical history, past social history, past surgical history and problem list     Review of Systems   Constitutional: Negative for appetite change, chills and fever  HENT: Negative for congestion, ear pain, sinus pain and sore throat  Eyes: Negative for discharge and itching  Respiratory: Negative for apnea, cough, chest tightness, shortness of breath and wheezing  Cardiovascular: Negative for chest pain, palpitations and leg swelling  Gastrointestinal: Negative for abdominal pain, anal bleeding, constipation, diarrhea, nausea and vomiting  Endocrine: Negative for cold intolerance, heat intolerance and polyuria  Genitourinary: Negative for difficulty urinating and dysuria  Musculoskeletal: Negative for arthralgias, back pain and myalgias  Skin: Negative for rash  Neurological: Negative for dizziness and headaches  Psychiatric/Behavioral: Negative for agitation  Objective:      /78   Pulse 76   Temp 98 7 °F (37 1 °C) (Tympanic)   Resp 16   Ht 4' 11" (1 499 m)   Wt 93 4 kg (206 lb)   BMI 41 61 kg/m²          Physical Exam  Constitutional:       General: She is not in acute distress  Appearance: She is well-developed  HENT:      Head: Normocephalic  Eyes:      General:         Right eye: No discharge  Left eye: No discharge  Conjunctiva/sclera: Conjunctivae normal       Pupils: Pupils are equal, round, and reactive to light  Neck:      Thyroid: No thyromegaly  Cardiovascular:      Rate and Rhythm: Normal rate and regular rhythm  Heart sounds: Normal heart sounds  No murmur heard  No friction rub  No gallop  Pulmonary:      Effort: Pulmonary effort is normal  No respiratory distress  Breath sounds: Normal breath sounds  No wheezing or rales  Chest:      Chest wall: No tenderness  Abdominal:      General: Bowel sounds are normal  There is no distension  Palpations: Abdomen is soft  There is no mass  Tenderness: There is no abdominal tenderness  There is no guarding or rebound  Musculoskeletal:         General: No tenderness or deformity  Normal range of motion  Cervical back: Normal range of motion  Lymphadenopathy:      Cervical: No cervical adenopathy  Neurological:      Mental Status: She is alert

## 2022-03-02 NOTE — PATIENT INSTRUCTIONS
Medicare Preventive Visit Patient Instructions  Thank you for completing your Welcome to Medicare Visit or Medicare Annual Wellness Visit today  Your next wellness visit will be due in one year (3/3/2023)  The screening/preventive services that you may require over the next 5-10 years are detailed below  Some tests may not apply to you based off risk factors and/or age  Screening tests ordered at today's visit but not completed yet may show as past due  Also, please note that scanned in results may not display below  Preventive Screenings:  Service Recommendations Previous Testing/Comments   Colorectal Cancer Screening  * Colonoscopy    * Fecal Occult Blood Test (FOBT)/Fecal Immunochemical Test (FIT)  * Fecal DNA/Cologuard Test  * Flexible Sigmoidoscopy Age: 54-65 years old   Colonoscopy: every 10 years (may be performed more frequently if at higher risk)  OR  FOBT/FIT: every 1 year  OR  Cologuard: every 3 years  OR  Sigmoidoscopy: every 5 years  Screening may be recommended earlier than age 48 if at higher risk for colorectal cancer  Also, an individualized decision between you and your healthcare provider will decide whether screening between the ages of 74-80 would be appropriate  Colonoscopy: 05/04/2018  FOBT/FIT: Not on file  Cologuard: Not on file  Sigmoidoscopy: Not on file          Breast Cancer Screening Age: 36 years old  Frequency: every 1-2 years  Not required if history of left and right mastectomy Mammogram: 02/09/2022        Cervical Cancer Screening Between the ages of 21-29, pap smear recommended once every 3 years  Between the ages of 33-67, can perform pap smear with HPV co-testing every 5 years     Recommendations may differ for women with a history of total hysterectomy, cervical cancer, or abnormal pap smears in past  Pap Smear: 10/14/2020        Hepatitis C Screening Once for adults born between 1945 and 1965  More frequently in patients at high risk for Hepatitis C Hep C Antibody: 07/27/2020        Diabetes Screening 1-2 times per year if you're at risk for diabetes or have pre-diabetes Fasting glucose: 83 mg/dL   A1C: 6 0 %        Cholesterol Screening Once every 5 years if you don't have a lipid disorder  May order more often based on risk factors  Lipid panel: 02/24/2022          Other Preventive Screenings Covered by Medicare:  1  Abdominal Aortic Aneurysm (AAA) Screening: covered once if your at risk  You're considered to be at risk if you have a family history of AAA  2  Lung Cancer Screening: covers low dose CT scan once per year if you meet all of the following conditions: (1) Age 50-69; (2) No signs or symptoms of lung cancer; (3) Current smoker or have quit smoking within the last 15 years; (4) You have a tobacco smoking history of at least 30 pack years (packs per day multiplied by number of years you smoked); (5) You get a written order from a healthcare provider  3  Glaucoma Screening: covered annually if you're considered high risk: (1) You have diabetes OR (2) Family history of glaucoma OR (3)  aged 48 and older OR (3)  American aged 72 and older  3  Osteoporosis Screening: covered every 2 years if you meet one of the following conditions: (1) You're estrogen deficient and at risk for osteoporosis based off medical history and other findings; (2) Have a vertebral abnormality; (3) On glucocorticoid therapy for more than 3 months; (4) Have primary hyperparathyroidism; (5) On osteoporosis medications and need to assess response to drug therapy  · Last bone density test (DXA Scan): 11/07/2016   5  HIV Screening: covered annually if you're between the age of 15-65  Also covered annually if you are younger than 13 and older than 72 with risk factors for HIV infection  For pregnant patients, it is covered up to 3 times per pregnancy      Immunizations:  Immunization Recommendations   Influenza Vaccine Annual influenza vaccination during flu season is recommended for all persons aged >= 6 months who do not have contraindications   Pneumococcal Vaccine (Prevnar and Pneumovax)  * Prevnar = PCV13  * Pneumovax = PPSV23   Adults 25-60 years old: 1-3 doses may be recommended based on certain risk factors  Adults 72 years old: Prevnar (PCV13) vaccine recommended followed by Pneumovax (PPSV23) vaccine  If already received PPSV23 since turning 65, then PCV13 recommended at least one year after PPSV23 dose  Hepatitis B Vaccine 3 dose series if at intermediate or high risk (ex: diabetes, end stage renal disease, liver disease)   Tetanus (Td) Vaccine - COST NOT COVERED BY MEDICARE PART B Following completion of primary series, a booster dose should be given every 10 years to maintain immunity against tetanus  Td may also be given as tetanus wound prophylaxis  Tdap Vaccine - COST NOT COVERED BY MEDICARE PART B Recommended at least once for all adults  For pregnant patients, recommended with each pregnancy  Shingles Vaccine (Shingrix) - COST NOT COVERED BY MEDICARE PART B  2 shot series recommended in those aged 48 and above     Health Maintenance Due:      Topic Date Due    DXA SCAN  11/07/2019    Cervical Cancer Screening  06/08/2022 (Originally 1/16/2020)    Colorectal Cancer Screening  05/04/2023    Hepatitis C Screening  Completed     Immunizations Due:      Topic Date Due    DTaP,Tdap,and Td Vaccines (1 - Tdap) Never done    Influenza Vaccine (1) 09/01/2021     Advance Directives   What are advance directives? Advance directives are legal documents that state your wishes and plans for medical care  These plans are made ahead of time in case you lose your ability to make decisions for yourself  Advance directives can apply to any medical decision, such as the treatments you want, and if you want to donate organs  What are the types of advance directives? There are many types of advance directives, and each state has rules about how to use them   You may choose a combination of any of the following:  · Living will: This is a written record of the treatment you want  You can also choose which treatments you do not want, which to limit, and which to stop at a certain time  This includes surgery, medicine, IV fluid, and tube feedings  · Durable power of  for healthcare [facility]): This is a written record that states who you want to make healthcare choices for you when you are unable to make them for yourself  This person, called a proxy, is usually a family member or a friend  You may choose more than 1 proxy  · Do not resuscitate (DNR) order:  A DNR order is used in case your heart stops beating or you stop breathing  It is a request not to have certain forms of treatment, such as CPR  A DNR order may be included in other types of advance directives  · Medical directive: This covers the care that you want if you are in a coma, near death, or unable to make decisions for yourself  You can list the treatments you want for each condition  Treatment may include pain medicine, surgery, blood transfusions, dialysis, IV or tube feedings, and a ventilator (breathing machine)  · Values history: This document has questions about your views, beliefs, and how you feel and think about life  This information can help others choose the care that you would choose  Why are advance directives important? An advance directive helps you control your care  Although spoken wishes may be used, it is better to have your wishes written down  Spoken wishes can be misunderstood, or not followed  Treatments may be given even if you do not want them  An advance directive may make it easier for your family to make difficult choices about your care  Weight Management   Why it is important to manage your weight:  Being overweight increases your risk of health conditions such as heart disease, high blood pressure, type 2 diabetes, and certain types of cancer   It can also increase your risk for osteoarthritis, sleep apnea, and other respiratory problems  Aim for a slow, steady weight loss  Even a small amount of weight loss can lower your risk of health problems  How to lose weight safely:  A safe and healthy way to lose weight is to eat fewer calories and get regular exercise  You can lose up about 1 pound a week by decreasing the number of calories you eat by 500 calories each day  Healthy meal plan for weight management:  A healthy meal plan includes a variety of foods, contains fewer calories, and helps you stay healthy  A healthy meal plan includes the following:  · Eat whole-grain foods more often  A healthy meal plan should contain fiber  Fiber is the part of grains, fruits, and vegetables that is not broken down by your body  Whole-grain foods are healthy and provide extra fiber in your diet  Some examples of whole-grain foods are whole-wheat breads and pastas, oatmeal, brown rice, and bulgur  · Eat a variety of vegetables every day  Include dark, leafy greens such as spinach, kale, mechelle greens, and mustard greens  Eat yellow and orange vegetables such as carrots, sweet potatoes, and winter squash  · Eat a variety of fruits every day  Choose fresh or canned fruit (canned in its own juice or light syrup) instead of juice  Fruit juice has very little or no fiber  · Eat low-fat dairy foods  Drink fat-free (skim) milk or 1% milk  Eat fat-free yogurt and low-fat cottage cheese  Try low-fat cheeses such as mozzarella and other reduced-fat cheeses  · Choose meat and other protein foods that are low in fat  Choose beans or other legumes such as split peas or lentils  Choose fish, skinless poultry (chicken or turkey), or lean cuts of red meat (beef or pork)  Before you cook meat or poultry, cut off any visible fat  · Use less fat and oil  Try baking foods instead of frying them  Add less fat, such as margarine, sour cream, regular salad dressing and mayonnaise to foods   Eat fewer high-fat foods  Some examples of high-fat foods include french fries, doughnuts, ice cream, and cakes  · Eat fewer sweets  Limit foods and drinks that are high in sugar  This includes candy, cookies, regular soda, and sweetened drinks  Exercise:  Exercise at least 30 minutes per day on most days of the week  Some examples of exercise include walking, biking, dancing, and swimming  You can also fit in more physical activity by taking the stairs instead of the elevator or parking farther away from stores  Ask your healthcare provider about the best exercise plan for you  © Copyright [employer] 2018 Information is for End User's use only and may not be sold, redistributed or otherwise used for commercial purposes   All illustrations and images included in CareNotes® are the copyrighted property of A D A M , Inc  or [address] [place] [address] no

## 2022-03-03 ENCOUNTER — OFFICE VISIT (OUTPATIENT)
Dept: CARDIOLOGY CLINIC | Facility: CLINIC | Age: 72
End: 2022-03-03
Payer: MEDICARE

## 2022-03-03 VITALS
WEIGHT: 207 LBS | SYSTOLIC BLOOD PRESSURE: 148 MMHG | HEIGHT: 59 IN | DIASTOLIC BLOOD PRESSURE: 88 MMHG | HEART RATE: 70 BPM | BODY MASS INDEX: 41.73 KG/M2

## 2022-03-03 DIAGNOSIS — I10 BENIGN ESSENTIAL HYPERTENSION: Primary | ICD-10-CM

## 2022-03-03 DIAGNOSIS — I47.1 SUPRAVENTRICULAR TACHYCARDIA (HCC): ICD-10-CM

## 2022-03-03 DIAGNOSIS — E66.01 MORBID OBESITY (HCC): ICD-10-CM

## 2022-03-03 PROCEDURE — 99214 OFFICE O/P EST MOD 30 MIN: CPT | Performed by: INTERNAL MEDICINE

## 2022-03-03 PROCEDURE — 93000 ELECTROCARDIOGRAM COMPLETE: CPT | Performed by: INTERNAL MEDICINE

## 2022-03-03 RX ORDER — LOSARTAN POTASSIUM 50 MG/1
50 TABLET ORAL DAILY
Qty: 90 TABLET | Refills: 3 | Status: SHIPPED | OUTPATIENT
Start: 2022-03-03

## 2022-03-03 NOTE — PROGRESS NOTES
Cardiology Follow Up    Nieves Boston  1950  [id number]  [facility] [address]  [phone]    1  Benign essential hypertension     2  Supraventricular tachycardia ([address] )         Diagnoses and all orders for this visit:    Benign essential hypertension    Supraventricular tachycardia ([address] )      I had the pleasure of seeing Nieves Boston for a follow up visit  INTERVAL HISTORY: none    History of the presenting illness, Discussion/Summary and My Plan are as follows:::    She is a pleasant 75-year-old lady with history of hypertension, hyperlipidemia, type 2 diabetes, who in Oct 2019, post elective right hip total arthroplasty, had asymptomatic supraventricular tachycardia for about 30-40 minutes, converted spontaneously to sinus rhythm  Her TSH was normal   Electrolytes were unremarkable  An echocardiogram showed preserved LV systolic function but also showed LVOT obstruction-up to 40 mm with Valsalva  She presents for a follow-up visit and denies any complaints at this time  She denies any palpitations  Moderately active without any symptoms    Plan:    Supraventricular tachycardia: Now on Metoprolol and losartan readded for better BP control  Ordered but not performed - 24 hour Holter, no palpitations now    LVOT/Mid cavity obstruction:  Discovered on echo in the hospital, on not symptomatic, adequate hydration is important and emphasized  Hypertension:  Elevated almost consistently  increasing losatan to 50 daily further  Will check at home and send us a log, If still elevated, will increase Metoprolol further    Hyperlipidemia:  On simvastatin with adequate control,     Follow-up in 6 months    Results for Lianna Cherry (MRN [id number]) as of 3/3/2022 16:22   Ref   Range 2/24/2022 15:24   Cholesterol Latest Ref Range: See Comment mg/dL 187   Triglycerides Latest Ref Range: See Comment mg/dL 149   HDL Latest Ref Range: >=50 mg/dL 56   Non-HDL Cholesterol Latest Units: mg/dl 131   LDL Calculated Latest Ref Range: 0 - 100 mg/dL 101 (H)       Results for Bradley Hagan (MRN [id number]) as of 3/3/2022 16:22   Ref  Range 2/24/2022 15:24   Hemoglobin A1C  6 0 (H)       Patient Active Problem List   Diagnosis    Benign essential hypertension    Cervical radiculopathy    Chronic lower back pain    Diabetes mellitus type 2, controlled ([address] )    Hyperlipidemia    Left knee pain    Lumbar stenosis with neurogenic claudication    Morbid obesity ([address] )    Osteoarthrosis    Post-menopausal bleeding    Primary osteoarthritis of both knees    Psoriasis    Right knee pain    Right shoulder pain    Spondylolisthesis, lumbar region    Vaginal lump    Vitamin D deficiency    Effusion of left knee    Right hip pain    Arthritis of right hip    Lumbar radiculopathy    DDD (degenerative disc disease), lumbar    Lumbar spondylosis    Sacroiliitis (HCC)    Status post total hip replacement, right    Supraventricular tachycardia ([address] )    Aftercare following right hip joint replacement surgery    Stress incontinence of urine    Chronic pain syndrome    Diabetic polyneuropathy associated with type 2 diabetes mellitus ([address] )     Past Medical History:   Diagnosis Date    Alopecia     Calcaneal spur     Diabetes insipidus ([address] )     Diabetes mellitus ([address] )     Hyperlipidemia     Hypertension     Osteoarthrosis 2/13/2013    Pes planus     unspecified laterality    Plantar fasciitis      Social History     Socioeconomic History    Marital status: Single     Spouse name: Not on file    Number of children: Not on file    Years of education: Not on file    Highest education level: Not on file   Occupational History    Not on file   Tobacco Use    Smoking status: Never Smoker    Smokeless tobacco: Never Used   Substance and Sexual Activity    Alcohol use:  Yes Alcohol/week: 0 0 standard drinks     Comment: 0    Drug use: No    Sexual activity: Not Currently   Other Topics Concern    Not on file   Social History Narrative    Not on file     Social Determinants of Health     Financial Resource Strain: Not on file   Food Insecurity: Not on file   Transportation Needs: Not on file   Physical Activity: Not on file   Stress: Not on file   Social Connections: Not on file   Intimate Partner Violence: Not on file   Housing Stability: Not on file      Family History   Problem Relation Age of Onset    Diabetes Mother     Hypertension Mother     Emphysema Father     Colon cancer Brother 40    No Known Problems Maternal Grandmother     No Known Problems Maternal Grandfather     No Known Problems Paternal Grandmother     No Known Problems Paternal Grandfather     No Known Problems Brother     No Known Problems Maternal Aunt     No Known Problems Maternal Aunt     No Known Problems Paternal Aunt     No Known Problems Paternal Aunt     No Known Problems Paternal Aunt      Past Surgical History:   Procedure Laterality Date    BREAST BIOPSY Right     patient not sure when , negative results    BREAST CYST ASPIRATION Left     patient not sure when negative results    CARPAL TUNNEL RELEASE      COLONOSCOPY      May 2006    INCISIONAL BREAST BIOPSY      MO COLONOSCOPY FLX DX W/COLLJ SPEC WHEN PFRMD N/A 5/4/2018    Procedure: COLONOSCOPY;  Surgeon: Dede Perez MD;  Location: [facility];   Service: Colorectal    MO TOTAL HIP ARTHROPLASTY Right 9/23/2019    Procedure: ARTHROPLASTY HIP TOTAL;  Surgeon: Nae Block MD;  [facility];  Service: Orthopedics    WISDOM TOOTH EXTRACTION         Current Outpatient Medications:     acetaminophen (TYLENOL) 500 mg tablet, Take 1,000 mg by mouth every 6 (six) hours as needed for mild pain, Disp: , Rfl:     Biotin 1 MG CAPS, Take by mouth daily , Disp: , Rfl:     cephalexin (KEFLEX) 500 mg capsule, Take 500 mg by mouth if needed Prior to procedure , Disp: , Rfl:     cholecalciferol (VITAMIN D3) 1,000 units tablet, Take 1,000 Units by mouth daily, Disp: , Rfl:     gabapentin (NEURONTIN) 300 mg capsule, Take 1 capsule (300 mg total) by mouth 3 (three) times a day (Patient taking differently: Take 300 mg by mouth daily at bedtime  ), Disp: 90 capsule, Rfl: 5    losartan (COZAAR) 25 mg tablet, Take 1 tablet (25 mg total) by mouth daily, Disp: 30 tablet, Rfl: 0    metFORMIN (GLUCOPHAGE) 500 mg tablet, TAKE 1 TABLET BY MOUTH TWO TIMES DAILY WITH MEALS, Disp: 180 tablet, Rfl: 1    metoprolol tartrate (LOPRESSOR) 25 mg tablet, TAKE 2 TABLETS BY MOUTH EVERY 12 HOURS, Disp: 180 tablet, Rfl: 1    simvastatin (ZOCOR) 20 mg tablet, TAKE 1 TABLET BY MOUTH EVERY DAY, Disp: 90 tablet, Rfl: 1    Blood Pressure Monitoring (SPHYGMOMANOMETER) MISC, by Does not apply route 2 (two) times a day, Disp: 1 each, Rfl: 0    Calcium Carb-Cholecalciferol (Calcium 1000 + D) 1000-800 MG-UNIT TABS, Take by mouth  (Patient not taking: Reported on 3/3/2022 ), Disp: , Rfl:     Multiple Vitamin (MULTI-VITAMIN DAILY PO), Take by mouth daily  (Patient not taking: Reported on 3/3/2022 ), Disp: , Rfl:   Allergies   Allergen Reactions    Meloxicam Hives    Nifedipine Edema    Sulfamethoxazole-Trimethoprim Edema       Imaging: Xr Hip/pelv 2-3 Vws Right If Performed    Result Date: 10/4/2019  Narrative: RIGHT HIP INDICATION:   Z47 1: Aftercare following joint replacement surgery Z96 [id number]: Presence of right artificial hip joint  COMPARISON:  Right hip plain films from 3/20/2019  VIEWS:  XR HIP/PELV 2-3 VWS RIGHT W PELVIS IF PERFORMED FINDINGS: There is no acute fracture or dislocation  Right total hip arthroplasty is identified in satisfactory position without evidence of hardware complication  Moderate left hip osteoarthritis with joint space narrowing and marginal osteophytes  No lytic or blastic osseous lesions   Skin staples are seen lateral to the right hip  Degenerative changes visualized lower lumbar spine  Impression: Unremarkable appearance of right total hip arthroplasty  Workstation performed: [id number]       Review of Systems:  Review of Systems   Constitutional: Negative  HENT: Negative  Eyes: Negative  Respiratory: Negative  Cardiovascular: Negative  Endocrine: Negative  Musculoskeletal: Negative  Physical Exam:  /88 (BP Location: Right arm, Patient Position: Sitting, Cuff Size: Large)   Pulse 70   Ht 4' 11" (1 499 m)   Wt 93 9 kg (207 lb)   BMI 41 81 kg/m²   Physical Exam  Constitutional:       General: She is not in acute distress  Appearance: She is well-developed  She is not diaphoretic  HENT:      Head: Normocephalic and atraumatic  Eyes:      General: No scleral icterus  Right eye: No discharge  Left eye: No discharge  Conjunctiva/sclera: Conjunctivae normal       Pupils: Pupils are equal, round, and reactive to light  Neck:      Thyroid: No thyromegaly  Trachea: No tracheal deviation  Cardiovascular:      Rate and Rhythm: Normal rate and regular rhythm  Heart sounds: Murmur (ESM) heard  No friction rub  Pulmonary:      Effort: Pulmonary effort is normal  No respiratory distress  Breath sounds: Normal breath sounds  No stridor  No wheezing  Abdominal:      General: Bowel sounds are normal  There is no distension  Palpations: Abdomen is soft  Tenderness: There is no abdominal tenderness  Musculoskeletal:         General: No deformity  Normal range of motion  Cervical back: Normal range of motion  Skin:     General: Skin is warm  Coloration: Skin is not pale  Findings: No erythema or rash

## 2022-03-09 ENCOUNTER — OFFICE VISIT (OUTPATIENT)
Dept: PAIN MEDICINE | Facility: CLINIC | Age: 72
End: 2022-03-09
Payer: MEDICARE

## 2022-03-09 VITALS
SYSTOLIC BLOOD PRESSURE: 133 MMHG | HEIGHT: 59 IN | DIASTOLIC BLOOD PRESSURE: 75 MMHG | BODY MASS INDEX: 41.73 KG/M2 | WEIGHT: 207 LBS | HEART RATE: 62 BPM

## 2022-03-09 DIAGNOSIS — M48.062 LUMBAR STENOSIS WITH NEUROGENIC CLAUDICATION: ICD-10-CM

## 2022-03-09 DIAGNOSIS — E11.42 DIABETIC POLYNEUROPATHY ASSOCIATED WITH TYPE 2 DIABETES MELLITUS (HCC): ICD-10-CM

## 2022-03-09 DIAGNOSIS — M54.16 LUMBAR RADICULOPATHY: ICD-10-CM

## 2022-03-09 DIAGNOSIS — M51.36 DDD (DEGENERATIVE DISC DISEASE), LUMBAR: ICD-10-CM

## 2022-03-09 DIAGNOSIS — M47.816 LUMBAR SPONDYLOSIS: ICD-10-CM

## 2022-03-09 DIAGNOSIS — G89.4 CHRONIC PAIN SYNDROME: Primary | ICD-10-CM

## 2022-03-09 PROCEDURE — 99214 OFFICE O/P EST MOD 30 MIN: CPT | Performed by: NURSE PRACTITIONER

## 2022-03-09 NOTE — PROGRESS NOTES
Assessment:  1  Chronic pain syndrome    2  Lumbar radiculopathy    3  Diabetic polyneuropathy associated with type 2 diabetes mellitus ([facility] [address] )    4  DDD (degenerative disc disease), lumbar    5  Lumbar spondylosis    6  Lumbar stenosis with neurogenic claudication        Plan:  1  I will schedule the patient for an LESI to address the inflammatory component the patient's pain  Complete risks and benefits including bleeding, infection, tissue reaction, nerve injury and allergic reaction were discussed  The patient was agreeable and verbalized an understanding  2  Patient may continue gabapentin 300 mg q h s  as prescribed  She does not wish to increase dosing at this time  3  The patient will continue with her home exercise program  4  The patient will follow-up after her procedure sooner if needed  Depending on response, may consider reordering EMG of bilateral lower extremity    M*Modal software was used to dictate this note  It may contain errors with dictating incorrect words or incorrect spelling  Please contact the provider directly with any questions  History of Present Illness: The patient is a 66 y o  female status post right ELSA last seen on 11/9/20 who presents for a follow up office visit in regards to chronic low back pain with neuropathic pain, numbness and tingling in the bilateral feet secondary to lumbar spondylosis, lumbar stenosis, lumbar radiculopathy, diabetic neuropathy and chronic pain syndrome  The patient denies bowel or bladder incontinence or saddle anesthesia  The patient has had LESI in July 2019 and states that it provided essentially full relief of her pain up until about 6 months ago where the pain started to return  She is interested in scheduling a repeat procedure at this time  She never had the EMG completed  She is taking gabapentin 300 mg q h s  without side effects    The patient rates her pain a 4/10 on the numeric pain rating scale    She intermittently has pain throughout the day which is described as numbness    I have personally reviewed and/or updated the patient's past medical history, past surgical history, family history, social history, current medications, allergies, and vital signs today  Review of Systems:    Review of Systems      Past Medical History:   Diagnosis Date    Alopecia     Calcaneal spur     Diabetes insipidus ([facility] [address] )     Diabetes mellitus ([facility] [address] )     Hyperlipidemia     Hypertension     Osteoarthrosis 2/13/2013    Pes planus     unspecified laterality    Plantar fasciitis        Past Surgical History:   Procedure Laterality Date    BREAST BIOPSY Right     patient not sure when , negative results    BREAST CYST ASPIRATION Left     patient not sure when negative results    CARPAL TUNNEL RELEASE      COLONOSCOPY      May 2006    INCISIONAL BREAST BIOPSY      MI COLONOSCOPY FLX DX W/COLLJ SPEC WHEN PFRMD N/A 5/4/2018    Procedure: COLONOSCOPY;  Surgeon: Javed Oden MD;  Location:  GI LAB; Service: Colorectal    MI TOTAL HIP ARTHROPLASTY Right 9/23/2019    Procedure: ARTHROPLASTY HIP TOTAL;  Surgeon: Curt Danielle MD;  Location:  MAIN OR;  Service: Orthopedics    WISDOM TOOTH EXTRACTION         Family History   Problem Relation Age of Onset    Diabetes Mother     Hypertension Mother     Emphysema Father     Colon cancer Brother 40    No Known Problems Maternal Grandmother     No Known Problems Maternal Grandfather     No Known Problems Paternal Grandmother     No Known Problems Paternal Grandfather     No Known Problems Brother     No Known Problems Maternal Aunt     No Known Problems Maternal Aunt     No Known Problems Paternal Aunt     No Known Problems Paternal Aunt     No Known Problems Paternal Aunt        Social History     Occupational History    Not on file   Tobacco Use    Smoking status: Never Smoker    Smokeless tobacco: Never Used   Substance and Sexual Activity    Alcohol use:  Yes Alcohol/week: 0 0 standard drinks     Comment: 0    Drug use: No    Sexual activity: Not Currently         Current Outpatient Medications:     acetaminophen (TYLENOL) 500 mg tablet, Take 1,000 mg by mouth every 6 (six) hours as needed for mild pain, Disp: , Rfl:     Biotin 1 MG CAPS, Take by mouth daily , Disp: , Rfl:     Blood Pressure Monitoring (SPHYGMOMANOMETER) MISC, by Does not apply route 2 (two) times a day, Disp: 1 each, Rfl: 0    Calcium Carb-Cholecalciferol (Calcium 1000 + D) 1000-800 MG-UNIT TABS, Take by mouth  (Patient not taking: Reported on 3/3/2022 ), Disp: , Rfl:     cephalexin (KEFLEX) 500 mg capsule, Take 500 mg by mouth if needed Prior to procedure , Disp: , Rfl:     cholecalciferol (VITAMIN D3) 1,000 units tablet, Take 1,000 Units by mouth daily, Disp: , Rfl:     gabapentin (NEURONTIN) 300 mg capsule, Take 1 capsule (300 mg total) by mouth 3 (three) times a day (Patient taking differently: Take 300 mg by mouth daily at bedtime  ), Disp: 90 capsule, Rfl: 5    losartan (COZAAR) 50 mg tablet, Take 1 tablet (50 mg total) by mouth daily, Disp: 90 tablet, Rfl: 3    metFORMIN (GLUCOPHAGE) 500 mg tablet, TAKE 1 TABLET BY MOUTH TWO TIMES DAILY WITH MEALS, Disp: 180 tablet, Rfl: 1    metoprolol tartrate (LOPRESSOR) 25 mg tablet, TAKE 2 TABLETS BY MOUTH EVERY 12 HOURS, Disp: 180 tablet, Rfl: 1    Multiple Vitamin (MULTI-VITAMIN DAILY PO), Take by mouth daily  (Patient not taking: Reported on 3/3/2022 ), Disp: , Rfl:     simvastatin (ZOCOR) 20 mg tablet, TAKE 1 TABLET BY MOUTH EVERY DAY, Disp: 90 tablet, Rfl: 1    Allergies   Allergen Reactions    Meloxicam Hives    Nifedipine Edema    Sulfamethoxazole-Trimethoprim Edema       Physical Exam:    /75   Pulse 62   Ht 4' 11" (1 499 m)   Wt 93 9 kg (207 lb)   BMI 41 81 kg/m²     Constitutional:normal, well developed, well nourished, alert, in no distress and non-toxic and no overt pain behavior    Eyes:anicteric  HEENT:grossly intact  Neck:supple, symmetric, trachea midline and no masses   Pulmonary:even and unlabored  Cardiovascular:No edema or pitting edema present  Skin:Normal without rashes or lesions and well hydrated  Psychiatric:Mood and affect appropriate  Neurologic:Cranial Nerves II-XII grossly intact  Musculoskeletal:Slightly antalgic gait but steady without assistive devices      Imaging  FL spine and pain procedure    (Results Pending)     MRI LUMBAR SPINE WITHOUT CONTRAST   INDICATION: 15-year-old female, back pain, leg pain   COMPARISON: 5/8/2019 x-rays   TECHNIQUE: Sagittal T1, sagittal T2, sagittal inversion recovery, axial T1 and axial T2, coronal T2    IMAGE QUALITY: Diagnostic   FINDINGS:   VERTEBRAL BODIES:   Mild to moderate levoscoliosis  Grade 1 degenerative retrolisthesis T12-L1  Grade 1 degenerative anterolisthesis L3-4, L4-5  No compression fracture  Normal marrow signal is identified within the visualized bony structures  No discrete marrow   lesion  SACRUM: Normal signal within the sacrum  No evidence of insufficiency or stress fracture  DISTAL CORD AND CONUS: Normal size and signal within the distal cord and conus  PARASPINAL SOFT TISSUES: Paraspinal soft tissues are unremarkable  LOWER THORACIC DISC SPACES: Grade 1 degenerative retrolisthesis T12-L1, mild central canal stenosis, no overt neural element impingement   LUMBAR DISC SPACES:   L1-L2: Moderate degenerative spondylosis, moderate central disc herniation, moderate central stenosis, mild bilateral foraminal stenosis, probable bilateral L2 nerve root encroachment     L2-L3: Moderate degenerative spondylosis and bulging annulus, mild central canal and bilateral foraminal stenosis   L3-L4: Moderate degenerative spondylosis, grade 1 anterolisthesis, moderate central canal stenosis, mild bilateral foraminal stenosis, possible bilateral L4 nerve root encroachment   L4-L5: Moderate degenerative spondylosis, grade 1 anterolisthesis, mild bilateral foraminal stenosis, no overt neural element impingement   L5-S1: Normal disc, mild degenerative facet arthrosis, no stenosis   IMPRESSION:   Multilevel degenerative spondylosis, levoscoliosis, consistent with x-rays   Grade 1 retrolisthesis, mild central canal stenosis T12-L1   Moderate central canal stenosis, moderate central disc herniation, mild bilateral foraminal stenosis L1-2   Mild central canal and bilateral foraminal stenosis L2-3   Grade 1 anterolisthesis, moderate central canal stenosis, mild bilateral foraminal stenosis L3-4   Grade 1 anterolisthesis, mild bilateral foraminal stenosis L4-5       Orders Placed This Encounter   Procedures    FL spine and pain procedure

## 2022-03-10 NOTE — PATIENT INSTRUCTIONS
Epidural Steroid Injection   AMBULATORY CARE:   What you need to know about an epidural steroid injection (MARYLIN):  An MARYLIN is a procedure to inject steroid medicine into the epidural space  The epidural space is between your spinal cord and vertebrae  Steroids reduce inflammation and fluid buildup in your spine that may be causing pain  You may be given pain medicine along with the steroids  How to prepare for an MARYLIN:  Your healthcare provider will talk to you about how to prepare for your procedure  He or she will tell you what medicines to take or not take on the day of your procedure  You may need to stop taking blood thinners or other medicines several days before your procedure  You may need to adjust any diabetes medicine you take on the day of your procedure  Steroid medicine can increase your blood sugar level  Arrange for someone to drive you home when you are discharged  What will happen during an MARYLIN:   · You will be given medicine to numb the procedure area  You will be awake for the procedure, but you will not feel pain  You may also be given medicine to help you relax  Contrast liquid will be used to help your healthcare provider see the area better  Tell the healthcare provider if you have ever had an allergic reaction to contrast liquid  · Your healthcare provider may place the needle into your neck area, middle of your back, or tailbone area  He may inject the medicine next to the nerves that are causing your pain  He may instead inject the medicine into a larger area of the epidural space  This helps the medicine spread to more nerves  Your healthcare provider will use a fluoroscope to help guide the needle to the right place  A fluoroscope is a type of x-ray  After the procedure, a bandage will be placed over the injection site to prevent infection  What will happen after an MARYLIN:  You will have a bandage over the injection site to prevent infection   Your healthcare provider will tell you when you can bathe and any activity guidelines  You will be able to go home  Risks of an MARYLIN:  You may have temporary or permanent nerve damage or paralysis  You may have bleeding or develop a serious infection, such as meningitis (swelling of the brain coverings)  An abscess may also develop  An abscess is a pus-filled area under the skin  You may need surgery to fix the abscess  You may have a seizure, anxiety, or trouble sleeping  If you are a man, you may have temporary erectile dysfunction (not able to have an erection)  Call your local emergency number (911 in the [address] [place] [address],[address]) if:   · You have a seizure  · You have trouble moving your legs  Seek care immediately if:   · Blood soaks through your bandage  · You have a fever or chills, severe back pain, and the procedure area is sensitive to the touch  · You cannot control when you urinate or have a bowel movement  Call your doctor if:   · You have weakness or numbness in your legs  · Your wound is red, swollen, or draining pus  · You have nausea or are vomiting  · Your face or neck is red and you feel warm  · You have more pain than you had before the procedure  · You have swelling in your hands or feet  · You have questions or concerns about your condition or care  Care for your wound as directed: You may remove the bandage before you go to bed the day of your procedure  You may take a shower, but do not take a bath for at least 24 hours  Self-care:   · Do not drive,  use machines, or do strenuous activity for 24 hours after your procedure or as directed  · Continue other treatments  as directed  Steroid injections alone will not control your pain  The injections are meant to be used with other treatments, such as physical therapy  Follow up with your doctor as directed:  Write down your questions so you remember to ask them during your visits     © Copyright [employer] 2022 Information is for End User's use only and may not be sold, redistributed or otherwise used for commercial purposes  All illustrations and images included in CareNotes® are the copyrighted property of A [employer] M [employer] Inc  or [employer]  The above information is an  only  It is not intended as medical advice for individual conditions or treatments  Talk to your doctor, nurse or pharmacist before following any medical regimen to see if it is safe and effective for you

## 2022-03-13 DIAGNOSIS — I47.1 SVT (SUPRAVENTRICULAR TACHYCARDIA) (HCC): ICD-10-CM

## 2022-03-15 ENCOUNTER — OFFICE VISIT (OUTPATIENT)
Dept: PODIATRY | Facility: CLINIC | Age: 72
End: 2022-03-15
Payer: MEDICARE

## 2022-03-15 VITALS
WEIGHT: 208 LBS | DIASTOLIC BLOOD PRESSURE: 84 MMHG | HEIGHT: 59 IN | HEART RATE: 72 BPM | BODY MASS INDEX: 41.93 KG/M2 | SYSTOLIC BLOOD PRESSURE: 130 MMHG

## 2022-03-15 DIAGNOSIS — E11.21 CONTROLLED TYPE 2 DIABETES MELLITUS WITH DIABETIC NEPHROPATHY, WITHOUT LONG-TERM CURRENT USE OF INSULIN (HCC): ICD-10-CM

## 2022-03-15 PROCEDURE — 99202 OFFICE O/P NEW SF 15 MIN: CPT | Performed by: PODIATRIST

## 2022-03-15 NOTE — PROGRESS NOTES
Assessment/Plan:    Discussed principles of diabetic foot care  Vascular status is within normal limits and sensorium is intact  Urged patient to refrain from walking barefoot  All toenails are thickened yellow secondary to onychomycosis  Treatment consisted of nail trimming  Oral antifungals not advised due to potential hepatotoxicity  Yearly evaluation is recommended  No problem-specific Assessment & Plan notes found for this encounter  Diagnoses and all orders for this visit:    Controlled type 2 diabetes mellitus with diabetic nephropathy, without long-term current use of insulin ([facility] [address] )  -     Ambulatory referral to Podiatry          Subjective:      Patient ID: Nieves Boston is a 66 y o  female  HPI     Patient, a 57-year-old female with known type 2 diabetes presents for examination and care  Chief complaint involves thick toenails that she cannot cut  Patient notes numbness and tingling in her feet but she feels that it is due to spinal stenosis in her back  Her blood sugar has been under excellent control  Patient currently takes gabapentin 300 mg daily but notes drowsiness with the medication  Patient taking metformin for blood sugar control  I personally reviewed A1c dated 02/24/2022  It is 6 0  I personally reviewed A1c dated 06/04/2021  It is 5 8  The following portions of the patient's history were reviewed and updated as appropriate: allergies, current medications, past family history, past medical history, past social history, past surgical history and problem list     Review of Systems   Cardiovascular:        Supraventricular tachycardia   Musculoskeletal: Positive for back pain  Spinal stenosis   Neurological:        Lumbar radiculopathy   Psychiatric/Behavioral: Negative                Objective:      /84   Pulse 72   Ht 4' 11" (1 499 m) Comment: verbal  Wt 94 3 kg (208 lb)   BMI 42 01 kg/m²          Physical Exam  Cardiovascular:      Pulses: no weak pulses  Feet:      Right foot:      Skin integrity: No ulcer, skin breakdown, erythema, warmth, callus or dry skin  Left foot:      Skin integrity: No ulcer, skin breakdown, erythema, warmth, callus or dry skin  Diabetic Foot Exam    Patient's shoes and socks removed  Right Foot/Ankle   Right Foot Inspection  Skin Exam: skin normal and skin intact  No dry skin, no warmth, no callus, no erythema, no maceration, no abnormal color, no pre-ulcer, no ulcer and no callus  Toe Exam: ROM and strength within normal limits  Sensory   Vibration: intact  Proprioception: intact  Monofilament testing: intact    Vascular  Capillary refills: < 3 seconds        Right Toe  - Comprehensive Exam  Ecchymosis: none  Arch: normal  Hammertoes: absent  Claw Toes: absent  Swelling: none   Tenderness: none         Left Foot/Ankle  Left Foot Inspection  Skin Exam: skin normal and skin intact  No dry skin, no warmth, no erythema, no maceration, normal color, no pre-ulcer, no ulcer and no callus  Toe Exam: ROM and strength within normal limits       Sensory   Vibration: intact  Proprioception: intact  Monofilament testing: intact    Vascular  Capillary refills: < 3 seconds        Left Toe  - Comprehensive Exam  Ecchymosis: none  Arch: normal  Hammertoes: absent  Claw toes: absent  Swelling: none   Tenderness: none           Assign Risk Category  No deformity present  No loss of protective sensation  No weak pulses  Risk: 0

## 2022-03-30 ENCOUNTER — HOSPITAL ENCOUNTER (OUTPATIENT)
Dept: RADIOLOGY | Facility: CLINIC | Age: 72
Discharge: HOME | End: 2022-03-30
Attending: ANESTHESIOLOGY | Admitting: ANESTHESIOLOGY
Payer: MEDICARE

## 2022-03-30 VITALS
RESPIRATION RATE: 20 BRPM | OXYGEN SATURATION: 94 % | HEART RATE: 68 BPM | DIASTOLIC BLOOD PRESSURE: 85 MMHG | SYSTOLIC BLOOD PRESSURE: 136 MMHG | TEMPERATURE: 96.1 F

## 2022-03-30 DIAGNOSIS — M54.16 LUMBAR RADICULOPATHY: ICD-10-CM

## 2022-03-30 PROCEDURE — 62323 NJX INTERLAMINAR LMBR/SAC: CPT | Performed by: ANESTHESIOLOGY

## 2022-03-30 RX ORDER — METHYLPREDNISOLONE ACETATE 80 MG/ML
80 INJECTION, SUSPENSION INTRA-ARTICULAR; INTRALESIONAL; INTRAMUSCULAR; PARENTERAL; SOFT TISSUE ONCE
Status: COMPLETED | OUTPATIENT
Start: 2022-03-30 | End: 2022-03-30

## 2022-03-30 RX ADMIN — METHYLPREDNISOLONE ACETATE 80 MG: 80 INJECTION, SUSPENSION INTRA-ARTICULAR; INTRALESIONAL; INTRAMUSCULAR; SOFT TISSUE at 14:48

## 2022-03-30 RX ADMIN — IOHEXOL 1 ML: 300 INJECTION, SOLUTION INTRAVENOUS at 14:48

## 2022-03-30 NOTE — DISCHARGE INSTRUCTIONS
Epidural Steroid Injection   WHAT YOU NEED TO KNOW:   An epidural steroid injection (MARYLIN) is a procedure to inject steroid medicine into the epidural space  The epidural space is between your spinal cord and vertebrae  Steroids reduce inflammation and fluid buildup in your spine that may be causing pain  You may be given pain medicine along with the steroids  ACTIVITY  · Do not drive or operate machinery today  · No strenuous activity today - bending, lifting, etc   · You may resume normal activites starting tomorrow - start slowly and as tolerated  · You may shower today, but no tub baths or hot tubs  · You may have numbness for several hours from the local anesthetic  Please use caution and common sense, especially with weight-bearing activities  CARE OF THE INJECTION SITE  · If you have soreness or pain, apply ice to the area today (20 minutes on/20 minutes off)  · Starting tomorrow, you may use warm, moist heat or ice if needed  · You may have an increase or change in your discomfort for 36-48 hours after your treatment  · Apply ice and continue with any pain medication you have been prescribed  · Notify [facility] if you have any of the following: redness, drainage, swelling, headache, stiff neck or fever above 100°F     SPECIAL INSTRUCTIONS  · Our office will contact you in approximately 7 days for a progress report  MEDICATIONS  · Continue to take all routine medications  · Our office may have instructed you to hold some medications  As no general anesthesia was used in today's procedure, you should not experience any side effects related to anesthesia  If you have a problem specifically related to your procedure, please call our office at [phone]  Problems not related to your procedure should be directed to your primary care physician

## 2022-03-30 NOTE — H&P
History of Present Illness: The patient is a 66 y o  female who presents with complaints of low back and leg pain  Patient Active Problem List   Diagnosis    Benign essential hypertension    Cervical radiculopathy    Chronic lower back pain    Diabetes mellitus type 2, controlled ([address] )    Hyperlipidemia    Left knee pain    Lumbar stenosis with neurogenic claudication    Morbid obesity ([address]    Osteoarthrosis    Post-menopausal bleeding    Primary osteoarthritis of both knees    Psoriasis    Right knee pain    Right shoulder pain    Spondylolisthesis, lumbar region    Vaginal lump    Vitamin D deficiency    Effusion of left knee    Right hip pain    Arthritis of right hip    Lumbar radiculopathy    DDD (degenerative disc disease), lumbar    Lumbar spondylosis    Sacroiliitis (HCC)    Status post total hip replacement, right    Supraventricular tachycardia ([address]    Aftercare following right hip joint replacement surgery    Stress incontinence of urine    Chronic pain syndrome    Diabetic polyneuropathy associated with type 2 diabetes mellitus ([address] )       Past Medical History:   Diagnosis Date    Alopecia     Calcaneal spur     Diabetes insipidus ([address]     Diabetes mellitus ([address] )     Hyperlipidemia     Hypertension     Osteoarthrosis 2/13/2013    Pes planus     unspecified laterality    Plantar fasciitis        Past Surgical History:   Procedure Laterality Date    BREAST BIOPSY Right     patient not sure when , negative results    BREAST CYST ASPIRATION Left     patient not sure when negative results    CARPAL TUNNEL RELEASE      COLONOSCOPY      May 2006    INCISIONAL BREAST BIOPSY      AL COLONOSCOPY FLX DX W/COLLJ SPEC WHEN PFRMD N/A 5/4/2018    Procedure: COLONOSCOPY;  Surgeon: Lia Rankin MD;  Location: [facility];   Service: Colorectal    AL TOTAL HIP ARTHROPLASTY Right 9/23/2019    Procedure: ARTHROPLASTY HIP TOTAL;  Surgeon: Reyna Mccarty MD;  Location: [facility];  Service: Orthopedics    WISDOM TOOTH EXTRACTION           Current Outpatient Medications:     acetaminophen (TYLENOL) 500 mg tablet, Take 1,000 mg by mouth every 6 (six) hours as needed for mild pain, Disp: , Rfl:     Biotin 1 MG CAPS, Take by mouth daily , Disp: , Rfl:     Blood Pressure Monitoring (SPHYGMOMANOMETER) MISC, by Does not apply route 2 (two) times a day, Disp: 1 each, Rfl: 0    Calcium Carb-Cholecalciferol (Calcium 1000 + D) 1000-800 MG-UNIT TABS, Take by mouth   (Patient not taking: Reported on 3/15/2022 ), Disp: , Rfl:     cephalexin (KEFLEX) 500 mg capsule, Take 500 mg by mouth if needed Prior to procedure , Disp: , Rfl:     cholecalciferol (VITAMIN D3) 1,000 units tablet, Take 1,000 Units by mouth daily, Disp: , Rfl:     gabapentin (NEURONTIN) 300 mg capsule, Take 1 capsule (300 mg total) by mouth 3 (three) times a day (Patient taking differently: Take 300 mg by mouth daily at bedtime  ), Disp: 90 capsule, Rfl: 5    losartan (COZAAR) 50 mg tablet, Take 1 tablet (50 mg total) by mouth daily, Disp: 90 tablet, Rfl: 3    metFORMIN (GLUCOPHAGE) 500 mg tablet, TAKE 1 TABLET BY MOUTH TWO TIMES DAILY WITH MEALS, Disp: 180 tablet, Rfl: 1    metoprolol tartrate (LOPRESSOR) 25 mg tablet, TAKE 2 TABLET BY MOUTH EVERY 12 HOURS, Disp: 180 tablet, Rfl: 1    Multiple Vitamin (MULTI-VITAMIN DAILY PO), Take by mouth daily  (Patient not taking: Reported on 3/3/2022 ), Disp: , Rfl:     simvastatin (ZOCOR) 20 mg tablet, TAKE 1 TABLET BY MOUTH EVERY DAY, Disp: 90 tablet, Rfl: 1    Current Facility-Administered Medications:     iohexol (OMNIPAQUE) 300 mg/mL injection 50 mL, 50 mL, Epidural, Once, Oleg Pierre,     methylPREDNISolone acetate (DEPO-MEDROL) injection 80 mg, 80 mg, Epidural, Once, Oleg Pierre, DO    Allergies   Allergen Reactions    Meloxicam Hives    Nifedipine Edema    Sulfamethoxazole-Trimethoprim Edema       Physical Exam:   Vitals:    03/30/22 1423   BP: 146/90 Pulse: 79   Resp: 20   Temp: (!) 96 1 °F (35 6 °C)   SpO2: 96%     General: Awake, Alert, Oriented x 3, Mood and affect appropriate  Respiratory: Respirations even and unlabored  Cardiovascular: Peripheral pulses intact; no edema  Musculoskeletal Exam:  Bilateral lumbar paraspinals tender to palpation  ASA Score: 3         Assessment:   1   Lumbar radiculopathy        Plan: ROSEMARY

## 2022-04-06 ENCOUNTER — TELEPHONE (OUTPATIENT)
Dept: PAIN MEDICINE | Facility: CLINIC | Age: 72
End: 2022-04-06

## 2022-04-29 ENCOUNTER — OFFICE VISIT (OUTPATIENT)
Dept: PAIN MEDICINE | Facility: CLINIC | Age: 72
End: 2022-04-29
Payer: MEDICARE

## 2022-04-29 VITALS
SYSTOLIC BLOOD PRESSURE: 136 MMHG | HEIGHT: 59 IN | HEART RATE: 76 BPM | BODY MASS INDEX: 42.13 KG/M2 | DIASTOLIC BLOOD PRESSURE: 84 MMHG | WEIGHT: 209 LBS

## 2022-04-29 DIAGNOSIS — M43.16 SPONDYLOLISTHESIS, LUMBAR REGION: ICD-10-CM

## 2022-04-29 DIAGNOSIS — M51.36 DDD (DEGENERATIVE DISC DISEASE), LUMBAR: ICD-10-CM

## 2022-04-29 DIAGNOSIS — M54.16 LUMBAR RADICULOPATHY: ICD-10-CM

## 2022-04-29 DIAGNOSIS — M54.12 CERVICAL RADICULOPATHY: ICD-10-CM

## 2022-04-29 DIAGNOSIS — M48.062 LUMBAR STENOSIS WITH NEUROGENIC CLAUDICATION: ICD-10-CM

## 2022-04-29 DIAGNOSIS — M46.1 SACROILIITIS (HCC): ICD-10-CM

## 2022-04-29 DIAGNOSIS — M79.2 NEUROPATHIC PAIN: ICD-10-CM

## 2022-04-29 DIAGNOSIS — G89.4 CHRONIC PAIN SYNDROME: Primary | ICD-10-CM

## 2022-04-29 DIAGNOSIS — E11.42 DIABETIC POLYNEUROPATHY ASSOCIATED WITH TYPE 2 DIABETES MELLITUS (HCC): ICD-10-CM

## 2022-04-29 DIAGNOSIS — M47.816 LUMBAR SPONDYLOSIS: ICD-10-CM

## 2022-04-29 PROCEDURE — 99214 OFFICE O/P EST MOD 30 MIN: CPT | Performed by: NURSE PRACTITIONER

## 2022-04-29 NOTE — PROGRESS NOTES
Assessment:  1  Chronic pain syndrome    2  Diabetic polyneuropathy associated with type 2 diabetes mellitus (HCC)    3  Cervical radiculopathy    4  Lumbar radiculopathy    5  Spondylolisthesis, lumbar region    6  DDD (degenerative disc disease), lumbar    7  Lumbar spondylosis    8  Sacroiliitis ([address] )    9  Lumbar stenosis with neurogenic claudication    10  Neuropathic pain        Plan:  1  We can repeat LESI p r n   2  I will order EMG of the lower extremities  3  Patient will try gently increasing gabapentin to 600 mg q h s     She does not require refills today  4  Patient will follow-up in 8 weeks or sooner if needed    M*Modal software was used to dictate this note  It may contain errors with dictating incorrect words or incorrect spelling  Please contact the provider directly with any questions  History of Present Illness: The patient is a 66 y o  female status post right total hip arthroplasty last seen on 3/9/22 who presents for a follow up office visit in regards to chronic low back pain with neuropathic symptoms in the feet  Patient is status post LESI on March 30, 2022  She states the injection helped her low back pain but is unsure if it provided any relief to her foot symptoms  She does follow with podiatry  She had an EMG scheduled in the past however never completed  She takes gabapentin 300 mg q h s  without side effects    The patient rates her pain a 3 to 4/10 on the numeric pain rating scale  She intermittently has pain at night which is described as burning, dull aching, sharp and numbness    I have personally reviewed and/or updated the patient's past medical history, past surgical history, family history, social history, current medications, allergies, and vital signs today  Review of Systems:    Review of Systems   Respiratory: Negative for shortness of breath  Cardiovascular: Negative for chest pain     Gastrointestinal: Negative for constipation, diarrhea, nausea and vomiting  Musculoskeletal: Negative for arthralgias, gait problem, joint swelling and myalgias  Skin: Negative for rash  Neurological: Negative for dizziness, seizures and weakness  All other systems reviewed and are negative  Past Medical History:   Diagnosis Date    Alopecia     Calcaneal spur     [facility] [address] [facility]     Diabetes mellitus [facility] [address] [facility]     Hyperlipidemia     Hypertension     Osteoarthrosis 2/13/2013    Pes planus     unspecified laterality    Plantar fasciitis        Past Surgical History:   Procedure Laterality Date    BREAST BIOPSY Right     patient not sure when , negative results    BREAST CYST ASPIRATION Left     patient not sure when negative results    CARPAL TUNNEL RELEASE      COLONOSCOPY      May 2006    INCISIONAL BREAST BIOPSY      DE COLONOSCOPY FLX DX W/COLLJ SPEC WHEN PFRMD N/A 5/4/2018    Procedure: COLONOSCOPY;  Surgeon: Alejandro Hwang MD;  [facility]; Service: Colorectal    DE TOTAL HIP ARTHROPLASTY Right 9/23/2019    Procedure: ARTHROPLASTY HIP TOTAL;  Surgeon: Van Fernandez MD;  [facility];  Service: Orthopedics    WISDOM TOOTH EXTRACTION         Family History   Problem Relation Age of Onset    Diabetes Mother     Hypertension Mother     Emphysema Father     Colon cancer Brother 40    No Known Problems Maternal Grandmother     No Known Problems Maternal Grandfather     No Known Problems Paternal Grandmother     No Known Problems Paternal Grandfather     No Known Problems Brother     No Known Problems Maternal Aunt     No Known Problems Maternal Aunt     No Known Problems Paternal Aunt     No Known Problems Paternal Aunt     No Known Problems Paternal Aunt        Social History     Occupational History    Not on file   Tobacco Use    Smoking status: Never Smoker    Smokeless tobacco: Never Used   Vaping Use    Vaping Use: Never used   Substance and Sexual Activity    Alcohol use:  Yes     Alcohol/week: 0 0 standard drinks     Comment: 0    Drug use: No    Sexual activity: Not Currently         Current Outpatient Medications:     acetaminophen (TYLENOL) 500 mg tablet, Take 1,000 mg by mouth every 6 (six) hours as needed for mild pain, Disp: , Rfl:     Biotin 1 MG CAPS, Take by mouth daily , Disp: , Rfl:     Blood Pressure Monitoring (SPHYGMOMANOMETER) MISC, by Does not apply route 2 (two) times a day, Disp: 1 each, Rfl: 0    Calcium Carb-Cholecalciferol (Calcium 1000 + D) 1000-800 MG-UNIT TABS, Take by mouth   (Patient not taking: Reported on 3/15/2022 ), Disp: , Rfl:     cephalexin (KEFLEX) 500 mg capsule, Take 500 mg by mouth if needed Prior to procedure , Disp: , Rfl:     cholecalciferol (VITAMIN D3) 1,000 units tablet, Take 1,000 Units by mouth daily, Disp: , Rfl:     gabapentin (NEURONTIN) 300 mg capsule, Take 1 capsule (300 mg total) by mouth 3 (three) times a day (Patient taking differently: Take 300 mg by mouth daily at bedtime  ), Disp: 90 capsule, Rfl: 5    losartan (COZAAR) 50 mg tablet, Take 1 tablet (50 mg total) by mouth daily, Disp: 90 tablet, Rfl: 3    metFORMIN (GLUCOPHAGE) 500 mg tablet, TAKE 1 TABLET BY MOUTH TWO TIMES DAILY WITH MEALS, Disp: 180 tablet, Rfl: 1    metoprolol tartrate (LOPRESSOR) 25 mg tablet, TAKE 2 TABLET BY MOUTH EVERY 12 HOURS, Disp: 180 tablet, Rfl: 1    Multiple Vitamin (MULTI-VITAMIN DAILY PO), Take by mouth daily  (Patient not taking: Reported on 3/3/2022 ), Disp: , Rfl:     simvastatin (ZOCOR) 20 mg tablet, TAKE 1 TABLET BY MOUTH EVERY DAY, Disp: 90 tablet, Rfl: 1    Allergies   Allergen Reactions    Meloxicam Hives    Nifedipine Edema    Sulfamethoxazole-Trimethoprim Edema       Physical Exam:    /84   Pulse 76   Ht 4' 11" (1 499 m)   Wt 94 8 kg (209 lb)   BMI 42 21 kg/m²     Constitutional:normal, well developed, well nourished, alert, in no distress and non-toxic and no overt pain behavior    Eyes:anicteric  HEENT:grossly intact  Neck:supple, symmetric, trachea midline and no masses   Pulmonary:even and unlabored  Cardiovascular:No edema or pitting edema present  Skin:Normal without rashes or lesions and well hydrated  Psychiatric:Mood and affect appropriate  Neurologic:Cranial Nerves II-XII grossly intact  Musculoskeletal:Slightly antalgic gait but steady without assistive devices      Imaging  No orders to display   MRI LUMBAR SPINE WITHOUT CONTRAST   INDICATION: 25-year-old female, back pain, leg pain   COMPARISON: 5/8/2019 x-rays   TECHNIQUE: Sagittal T1, sagittal T2, sagittal inversion recovery, axial T1 and axial T2, coronal T2    IMAGE QUALITY: Diagnostic   FINDINGS:   VERTEBRAL BODIES:   Mild to moderate levoscoliosis  Grade 1 degenerative retrolisthesis T12-L1  Grade 1 degenerative anterolisthesis L3-4, L4-5  No compression fracture  Normal marrow signal is identified within the visualized bony structures  No discrete marrow   lesion  SACRUM: Normal signal within the sacrum  No evidence of insufficiency or stress fracture  DISTAL CORD AND CONUS: Normal size and signal within the distal cord and conus  PARASPINAL SOFT TISSUES: Paraspinal soft tissues are unremarkable  LOWER THORACIC DISC SPACES: Grade 1 degenerative retrolisthesis T12-L1, mild central canal stenosis, no overt neural element impingement   LUMBAR DISC SPACES:   L1-L2: Moderate degenerative spondylosis, moderate central disc herniation, moderate central stenosis, mild bilateral foraminal stenosis, probable bilateral L2 nerve root encroachment     L2-L3: Moderate degenerative spondylosis and bulging annulus, mild central canal and bilateral foraminal stenosis   L3-L4: Moderate degenerative spondylosis, grade 1 anterolisthesis, moderate central canal stenosis, mild bilateral foraminal stenosis, possible bilateral L4 nerve root encroachment   L4-L5: Moderate degenerative spondylosis, grade 1 anterolisthesis, mild bilateral foraminal stenosis, no overt neural element impingement L5-S1: Normal disc, mild degenerative facet arthrosis, no stenosis   IMPRESSION:   Multilevel degenerative spondylosis, levoscoliosis, consistent with x-rays   Grade 1 retrolisthesis, mild central canal stenosis T12-L1   Moderate central canal stenosis, moderate central disc herniation, mild bilateral foraminal stenosis L1-2   Mild central canal and bilateral foraminal stenosis L2-3   Grade 1 anterolisthesis, moderate central canal stenosis, mild bilateral foraminal stenosis L3-4   Grade 1 anterolisthesis, mild bilateral foraminal stenosis L4-5   Workstation performed: [id number]         Orders Placed This Encounter   Procedures    EMG 2 limb lower extremity

## 2022-04-30 DIAGNOSIS — M54.16 LUMBAR RADICULOPATHY: ICD-10-CM

## 2022-05-01 RX ORDER — GABAPENTIN 300 MG/1
CAPSULE ORAL
Qty: 90 CAPSULE | Refills: 5 | Status: SHIPPED | OUTPATIENT
Start: 2022-05-01

## 2022-05-02 ENCOUNTER — HOSPITAL ENCOUNTER (OUTPATIENT)
Dept: RADIOLOGY | Age: 72
Discharge: HOME | End: 2022-05-02
Payer: MEDICARE

## 2022-05-02 DIAGNOSIS — Z78.0 POSTMENOPAUSAL: ICD-10-CM

## 2022-05-02 PROCEDURE — 77080 DXA BONE DENSITY AXIAL: CPT

## 2022-05-25 ENCOUNTER — OFFICE VISIT (OUTPATIENT)
Dept: OBGYN CLINIC | Facility: HOSPITAL | Age: 72
End: 2022-05-25
Payer: MEDICARE

## 2022-05-25 VITALS
BODY MASS INDEX: 42.54 KG/M2 | SYSTOLIC BLOOD PRESSURE: 132 MMHG | HEIGHT: 59 IN | WEIGHT: 211 LBS | DIASTOLIC BLOOD PRESSURE: 78 MMHG | HEART RATE: 60 BPM

## 2022-05-25 DIAGNOSIS — M17.0 BILATERAL PRIMARY OSTEOARTHRITIS OF KNEE: Primary | ICD-10-CM

## 2022-05-25 PROCEDURE — 20610 DRAIN/INJ JOINT/BURSA W/O US: CPT | Performed by: PHYSICIAN ASSISTANT

## 2022-05-25 PROCEDURE — 99212 OFFICE O/P EST SF 10 MIN: CPT | Performed by: PHYSICIAN ASSISTANT

## 2022-05-25 RX ORDER — BETAMETHASONE SODIUM PHOSPHATE AND BETAMETHASONE ACETATE 3; 3 MG/ML; MG/ML
12 INJECTION, SUSPENSION INTRA-ARTICULAR; INTRALESIONAL; INTRAMUSCULAR; SOFT TISSUE
Status: COMPLETED | OUTPATIENT
Start: 2022-05-25 | End: 2022-05-25

## 2022-05-25 RX ORDER — LIDOCAINE HYDROCHLORIDE 10 MG/ML
2 INJECTION, SOLUTION INFILTRATION; PERINEURAL
Status: COMPLETED | OUTPATIENT
Start: 2022-05-25 | End: 2022-05-25

## 2022-05-25 RX ORDER — BUPIVACAINE HYDROCHLORIDE 2.5 MG/ML
2 INJECTION, SOLUTION INFILTRATION; PERINEURAL
Status: COMPLETED | OUTPATIENT
Start: 2022-05-25 | End: 2022-05-25

## 2022-05-25 RX ADMIN — BUPIVACAINE HYDROCHLORIDE 2 ML: 2.5 INJECTION, SOLUTION INFILTRATION; PERINEURAL at 17:04

## 2022-05-25 RX ADMIN — BETAMETHASONE SODIUM PHOSPHATE AND BETAMETHASONE ACETATE 12 MG: 3; 3 INJECTION, SUSPENSION INTRA-ARTICULAR; INTRALESIONAL; INTRAMUSCULAR; SOFT TISSUE at 17:04

## 2022-05-25 RX ADMIN — LIDOCAINE HYDROCHLORIDE 2 ML: 10 INJECTION, SOLUTION INFILTRATION; PERINEURAL at 17:04

## 2022-05-25 NOTE — PROGRESS NOTES
Subjective; Patient with established osteoarthritis of the knees  She received Euflexxa    She has a return of discomfort and desires symptomatic improvement    He is not due for repeat Euflexxa shots for 3 additional months    Past Medical History:   Diagnosis Date    Alopecia     Calcaneal spur     [facility]ip[facility]us [facility] [address] [facility]     Diabetes mellitus ([facility] [address] [facility]     Hyperlipidemia     Hypertension     Osteoarthrosis 2/13/2013    Pes planus     unspecified laterality    Plantar fasciitis        Past Surgical History:   Procedure Laterality Date    BREAST BIOPSY Right     patient not sure when , negative results    BREAST CYST ASPIRATION Left     patient not sure when negative results    CARPAL TUNNEL RELEASE      COLONOSCOPY      May 2006    INCISIONAL BREAST BIOPSY      OR COLONOSCOPY FLX DX W/COLLJ SPEC WHEN PFRMD N/A 5/4/2018    Procedure: COLONOSCOPY;  Surgeon: Eileen Henson MD;  [facility]; Service: Colorectal    OR TOTAL HIP ARTHROPLASTY Right 9/23/2019    Procedure: ARTHROPLASTY HIP TOTAL;  Surgeon: Carine Sung MD;  [facility];  Service: Orthopedics    WISDOM TOOTH EXTRACTION         Family History   Problem Relation Age of Onset    Diabetes Mother     Hypertension Mother     Emphysema Father     Colon cancer Brother 40    No Known Problems Maternal Grandmother     No Known Problems Maternal Grandfather     No Known Problems Paternal Grandmother     No Known Problems Paternal Grandfather     No Known Problems Brother     No Known Problems Maternal Aunt     No Known Problems Maternal Aunt     No Known Problems Paternal Aunt     No Known Problems Paternal Aunt     No Known Problems Paternal Aunt        Social History     Tobacco Use    Smoking status: Never Smoker    Smokeless tobacco: Never Used   Vaping Use    Vaping Use: Never used   Substance Use Topics    Alcohol use: Yes     Alcohol/week: 0 0 standard drinks     Comment: 0    Drug use:  No Exam;    Her knees are devoid of any significant redness bruising or palpable warmth  No fluid distension or effusion    Large joint arthrocentesis: R knee  Universal Protocol:  Consent given by: patient    Supporting Documentation  Indications: pain   Procedure Details  Location: knee - R knee  Needle size: 22 G  Ultrasound guidance: no  Medications administered: 2 mL bupivacaine 0 25 %; 2 mL lidocaine 1 %; 12 mg betamethasone acetate-betamethasone sodium phosphate 6 (3-3) mg/mL      Large joint arthrocentesis: L knee  Universal Protocol:  Consent given by: patient    Supporting Documentation  Indications: pain   Procedure Details  Location: knee - L knee  Needle size: 22 G  Medications administered: 2 mL bupivacaine 0 25 %; 2 mL lidocaine 1 %; 12 mg betamethasone acetate-betamethasone sodium phosphate 6 (3-3) mg/mL        Impression;    Bilateral knee osteoarthritis    Plan;    She received steroid injections at today's appointment  These will cause a transient rise in her sugar most likely,   That we accept    She also had Euflexxa authorized for the future    Her entire experience was supervised by and plan formulated by the attending surgeon it was my privilege to assist him in the delivery of her care

## 2022-06-13 DIAGNOSIS — I47.1 SVT (SUPRAVENTRICULAR TACHYCARDIA) (HCC): ICD-10-CM

## 2022-06-24 ENCOUNTER — OFFICE VISIT (OUTPATIENT)
Dept: PAIN MEDICINE | Facility: CLINIC | Age: 72
End: 2022-06-24
Payer: MEDICARE

## 2022-06-24 VITALS
DIASTOLIC BLOOD PRESSURE: 95 MMHG | SYSTOLIC BLOOD PRESSURE: 158 MMHG | HEIGHT: 59 IN | HEART RATE: 73 BPM | BODY MASS INDEX: 42.94 KG/M2 | WEIGHT: 213 LBS

## 2022-06-24 DIAGNOSIS — M43.16 SPONDYLOLISTHESIS, LUMBAR REGION: ICD-10-CM

## 2022-06-24 DIAGNOSIS — M48.062 LUMBAR STENOSIS WITH NEUROGENIC CLAUDICATION: ICD-10-CM

## 2022-06-24 DIAGNOSIS — M47.816 LUMBAR SPONDYLOSIS: ICD-10-CM

## 2022-06-24 DIAGNOSIS — M54.12 CERVICAL RADICULOPATHY: ICD-10-CM

## 2022-06-24 DIAGNOSIS — M51.36 DDD (DEGENERATIVE DISC DISEASE), LUMBAR: ICD-10-CM

## 2022-06-24 DIAGNOSIS — G89.4 CHRONIC PAIN SYNDROME: Primary | ICD-10-CM

## 2022-06-24 DIAGNOSIS — M54.16 LUMBAR RADICULOPATHY: ICD-10-CM

## 2022-06-24 PROCEDURE — 99213 OFFICE O/P EST LOW 20 MIN: CPT | Performed by: NURSE PRACTITIONER

## 2022-06-24 NOTE — PROGRESS NOTES
Assessment:  1  Chronic pain syndrome    2  Cervical radiculopathy    3  Lumbar radiculopathy    4  Spondylolisthesis, lumbar region    5  DDD (degenerative disc disease), lumbar    6  Lumbar spondylosis    7  Lumbar stenosis with neurogenic claudication        Plan:  1  Patient will try increasing gabapentin to 300 mg twice a day  She  does not require refills today  2  We can repeat LESI p r n    3  Patient will move forward with the EMG as scheduled   4  Patient will follow up in 6 months or sooner if needed    M*Modal software was used to dictate this note  It may contain errors with dictating incorrect words or incorrect spelling  Please contact the provider directly with any questions  History of Present Illness: The patient is a 66 y o  female status post right total hip arthroplasty last seen on 4/29/22 who presents for a follow up office visit in regards to chronic low back pain with neuropathic complaints in the lower extremities   Patient is status post LESI in March 2022 which helped her back pain, but did not improve her lower extremity symptoms  She is scheduled for an EMG November  She is taking gabapentin 300 mg q h s  without side effects  She rates her pain a 4/10 on the numerical pain rating scale  She intermittently has pain at night which is described as burning, pressure-like, numbness and pins and needles    I have personally reviewed and/or updated the patient's past medical history, past surgical history, family history, social history, current medications, allergies, and vital signs today  Review of Systems:    Review of Systems   Respiratory: Negative for shortness of breath  Cardiovascular: Negative for chest pain  Gastrointestinal: Negative for constipation, diarrhea, nausea and vomiting  Musculoskeletal: Negative for arthralgias, gait problem, joint swelling and myalgias  Skin: Negative for rash  Neurological: Negative for dizziness, seizures and weakness  All other systems reviewed and are negative  Past Medical History:   Diagnosis Date    Alopecia     Calcaneal spur     [facility]us [facility] [address] [facility]     Diabetes mellitus ([facility] [address] [facility]     Hyperlipidemia     Hypertension     Osteoarthrosis 2/13/2013    Pes planus     unspecified laterality    Plantar fasciitis        Past Surgical History:   Procedure Laterality Date    BREAST BIOPSY Right     patient not sure when , negative results    BREAST CYST ASPIRATION Left     patient not sure when negative results    CARPAL TUNNEL RELEASE      COLONOSCOPY      May 2006    INCISIONAL BREAST BIOPSY      HI COLONOSCOPY FLX DX W/COLLJ SPEC WHEN PFRMD N/A 5/4/2018    Procedure: COLONOSCOPY;  Surgeon: Ynes Longoria MD;  [facility]; Service: Colorectal    HI TOTAL HIP ARTHROPLASTY Right 9/23/2019    Procedure: ARTHROPLASTY HIP TOTAL;  Surgeon: Janna Ibarra MD;  [facility];  Service: Orthopedics    WISDOM TOOTH EXTRACTION         Family History   Problem Relation Age of Onset    Diabetes Mother     Hypertension Mother     Emphysema Father     Colon cancer Brother 40    No Known Problems Maternal Grandmother     No Known Problems Maternal Grandfather     No Known Problems Paternal Grandmother     No Known Problems Paternal Grandfather     No Known Problems Brother     No Known Problems Maternal Aunt     No Known Problems Maternal Aunt     No Known Problems Paternal Aunt     No Known Problems Paternal Aunt     No Known Problems Paternal Aunt        Social History     Occupational History    Not on file   Tobacco Use    Smoking status: Never Smoker    Smokeless tobacco: Never Used   Vaping Use    Vaping Use: Never used   Substance and Sexual Activity    Alcohol use:  Yes     Alcohol/week: 0 0 standard drinks     Comment: 0    Drug use: No    Sexual activity: Not Currently         Current Outpatient Medications:     acetaminophen (TYLENOL) 500 mg tablet, Take 1,000 mg by mouth every 6 (six) hours as needed for mild pain, Disp: , Rfl:     Biotin 1 MG CAPS, Take by mouth daily , Disp: , Rfl:     Blood Pressure Monitoring (SPHYGMOMANOMETER) MISC, by Does not apply route 2 (two) times a day, Disp: 1 each, Rfl: 0    Calcium Carb-Cholecalciferol (Calcium 1000 + D) 1000-800 MG-UNIT TABS, Take by mouth   (Patient not taking: Reported on 3/15/2022 ), Disp: , Rfl:     cephalexin (KEFLEX) 500 mg capsule, Take 500 mg by mouth if needed Prior to procedure , Disp: , Rfl:     cholecalciferol (VITAMIN D3) 1,000 units tablet, Take 1,000 Units by mouth daily, Disp: , Rfl:     gabapentin (NEURONTIN) 300 mg capsule, TAKE 1 CAPSULE BY MOUTH THREE TIMES DAILY, Disp: 90 capsule, Rfl: 5    losartan (COZAAR) 50 mg tablet, Take 1 tablet (50 mg total) by mouth daily, Disp: 90 tablet, Rfl: 3    metFORMIN (GLUCOPHAGE) 500 mg tablet, TAKE 1 TABLET BY MOUTH TWO TIMES DAILY WITH MEALS, Disp: 180 tablet, Rfl: 1    metoprolol tartrate (LOPRESSOR) 25 mg tablet, Take 2 tablets (50 mg total) by mouth every 12 (twelve) hours, Disp: 360 tablet, Rfl: 0    Multiple Vitamin (MULTI-VITAMIN DAILY PO), Take by mouth daily  (Patient not taking: Reported on 3/3/2022 ), Disp: , Rfl:     simvastatin (ZOCOR) 20 mg tablet, TAKE 1 TABLET BY MOUTH EVERY DAY, Disp: 90 tablet, Rfl: 1    Allergies   Allergen Reactions    Meloxicam Hives    Nifedipine Edema    Sulfamethoxazole-Trimethoprim Edema       Physical Exam:    /95   Pulse 73   Ht 4' 11" (1 499 m)   Wt 96 6 kg (213 lb)   BMI 43 02 kg/m²     Constitutional:normal, well developed, well nourished, alert, in no distress and non-toxic and no overt pain behavior    Eyes:anicteric  HEENT:grossly intact  Neck:supple, symmetric, trachea midline and no masses   Pulmonary:even and unlabored  Cardiovascular:No edema or pitting edema present  Skin:Normal without rashes or lesions and well hydrated  Psychiatric:Mood and affect appropriate  Neurologic:Cranial Nerves II-XII grossly intact  Musculoskeletal:normal gait      Imaging  No orders to display   MRI LUMBAR SPINE WITHOUT CONTRAST   INDICATION: 68-year-old female, back pain, leg pain   COMPARISON: 5/8/2019 x-rays   TECHNIQUE: Sagittal T1, sagittal T2, sagittal inversion recovery, axial T1 and axial T2, coronal T2    IMAGE QUALITY: Diagnostic   FINDINGS:   VERTEBRAL BODIES:   Mild to moderate levoscoliosis  Grade 1 degenerative retrolisthesis T12-L1  Grade 1 degenerative anterolisthesis L3-4, L4-5  No compression fracture  Normal marrow signal is identified within the visualized bony structures  No discrete marrow   lesion  SACRUM: Normal signal within the sacrum  No evidence of insufficiency or stress fracture  DISTAL CORD AND CONUS: Normal size and signal within the distal cord and conus  PARASPINAL SOFT TISSUES: Paraspinal soft tissues are unremarkable  LOWER THORACIC DISC SPACES: Grade 1 degenerative retrolisthesis T12-L1, mild central canal stenosis, no overt neural element impingement   LUMBAR DISC SPACES:   L1-L2: Moderate degenerative spondylosis, moderate central disc herniation, moderate central stenosis, mild bilateral foraminal stenosis, probable bilateral L2 nerve root encroachment     L2-L3: Moderate degenerative spondylosis and bulging annulus, mild central canal and bilateral foraminal stenosis   L3-L4: Moderate degenerative spondylosis, grade 1 anterolisthesis, moderate central canal stenosis, mild bilateral foraminal stenosis, possible bilateral L4 nerve root encroachment   L4-L5: Moderate degenerative spondylosis, grade 1 anterolisthesis, mild bilateral foraminal stenosis, no overt neural element impingement   L5-S1: Normal disc, mild degenerative facet arthrosis, no stenosis   IMPRESSION:   Multilevel degenerative spondylosis, levoscoliosis, consistent with x-rays   Grade 1 retrolisthesis, mild central canal stenosis T12-L1   Moderate central canal stenosis, moderate central disc herniation, mild bilateral foraminal stenosis L1-2   Mild central canal and bilateral foraminal stenosis L2-3   Grade 1 anterolisthesis, moderate central canal stenosis, mild bilateral foraminal stenosis L3-4   Grade 1 anterolisthesis, mild bilateral foraminal stenosis L4-5         No orders of the defined types were placed in this encounter

## 2022-07-07 DIAGNOSIS — E78.5 HYPERLIPIDEMIA, UNSPECIFIED HYPERLIPIDEMIA TYPE: ICD-10-CM

## 2022-07-07 RX ORDER — SIMVASTATIN 20 MG
TABLET ORAL
Qty: 90 TABLET | Refills: 1 | Status: SHIPPED | OUTPATIENT
Start: 2022-07-07

## 2022-08-07 DIAGNOSIS — E11.65 CONTROLLED TYPE 2 DIABETES MELLITUS WITH HYPERGLYCEMIA, WITHOUT LONG-TERM CURRENT USE OF INSULIN (HCC): ICD-10-CM

## 2022-08-18 ENCOUNTER — PROCEDURE VISIT (OUTPATIENT)
Dept: OBGYN CLINIC | Facility: HOSPITAL | Age: 72
End: 2022-08-18
Payer: MEDICARE

## 2022-08-18 VITALS
HEIGHT: 59 IN | BODY MASS INDEX: 42.74 KG/M2 | HEART RATE: 75 BPM | DIASTOLIC BLOOD PRESSURE: 79 MMHG | WEIGHT: 212 LBS | SYSTOLIC BLOOD PRESSURE: 152 MMHG

## 2022-08-18 DIAGNOSIS — G89.29 CHRONIC PAIN OF BOTH KNEES: ICD-10-CM

## 2022-08-18 DIAGNOSIS — M25.561 CHRONIC PAIN OF BOTH KNEES: ICD-10-CM

## 2022-08-18 DIAGNOSIS — M25.562 CHRONIC PAIN OF BOTH KNEES: ICD-10-CM

## 2022-08-18 DIAGNOSIS — M17.0 BILATERAL PRIMARY OSTEOARTHRITIS OF KNEE: Primary | ICD-10-CM

## 2022-08-18 PROCEDURE — 20610 DRAIN/INJ JOINT/BURSA W/O US: CPT | Performed by: ORTHOPAEDIC SURGERY

## 2022-08-18 RX ORDER — LIDOCAINE HYDROCHLORIDE 10 MG/ML
3 INJECTION, SOLUTION INFILTRATION; PERINEURAL
Status: COMPLETED | OUTPATIENT
Start: 2022-08-18 | End: 2022-08-18

## 2022-08-18 RX ORDER — HYALURONATE SODIUM 10 MG/ML
20 SYRINGE (ML) INTRAARTICULAR
Status: COMPLETED | OUTPATIENT
Start: 2022-08-18 | End: 2022-08-18

## 2022-08-18 RX ADMIN — LIDOCAINE HYDROCHLORIDE 3 ML: 10 INJECTION, SOLUTION INFILTRATION; PERINEURAL at 15:39

## 2022-08-18 RX ADMIN — Medication 20 MG: at 15:39

## 2022-08-18 NOTE — PROGRESS NOTES
Assessment:   Diagnosis ICD-10-CM Associated Orders   1  Bilateral primary osteoarthritis of knee  M17 0 Large joint arthrocentesis: bilateral knee   2  Chronic pain of both knees  M25 561 Large joint arthrocentesis: bilateral knee    M25 562     G89 29        Plan:  Bilateral knees known osteoarthritis  Marleny Soares returns today for the 1st of 3 Euflexxa injections  She tolerated both injections very well (RP)  Ice and post injection protocol advised  Weightbearing activities as tolerated  She will follow up each of the next 2 weeks to complete the 3 shot Visco series  To do next visit:  Return in about 1 week (around 8/25/2022) for re-check and Euflexxa #2 B/L knees  The above stated was discussed in layman's terms and the patient expressed understanding  All questions were answered to the patient's satisfaction  Scribe Attestation    I,:  Carolina Varghese am acting as a scribe while in the presence of the attending physician :       I,:  Wenceslao Simmonds, MD personally performed the services described in this documentation    as scribed in my presence :             Subjective:   Angelica Carter is a 66 y o  female who presents today for repeat evaluation of her bilateral knees, known osteoarthritis  She returns today for the initial injection of the 3 shot Euflexxa Visco series  At her visit 3 months ago both knees were injected with cortisone  She completed 3 shot Visco series January 2022        Review of systems negative unless otherwise specified in HPI  Review of Systems    Past Medical History:   Diagnosis Date    Alopecia     Calcaneal spur     Diabetes insipidus ([address]     Diabetes mellitus ([address]     Hyperlipidemia     Hypertension     Osteoarthrosis 2/13/2013    Pes planus     unspecified laterality    Plantar fasciitis        Past Surgical History:   Procedure Laterality Date    BREAST BIOPSY Right     patient not sure when , negative results    BREAST CYST ASPIRATION Left patient not sure when negative results    CARPAL TUNNEL RELEASE      COLONOSCOPY      May 2006    INCISIONAL BREAST BIOPSY      [place] COLONOSCOPY FLX DX W/COLLJ SPEC WHEN PFRMD N/A 5/4/2018    Procedure: COLONOSCOPY;  Surgeon: Aric Walden MD;  [facility]; Service: Colorectal    VT TOTAL HIP ARTHROPLASTY Right 9/23/2019    Procedure: ARTHROPLASTY HIP TOTAL;  Surgeon: Ruel Centeno MD;  [facility];  Service: Orthopedics    WISDOM TOOTH EXTRACTION         Family History   Problem Relation Age of Onset    Diabetes Mother     Hypertension Mother     Emphysema Father     Colon cancer Brother 40    No Known Problems Maternal Grandmother     No Known Problems Maternal Grandfather     No Known Problems Paternal Grandmother     No Known Problems Paternal Grandfather     No Known Problems Brother     No Known Problems Maternal Aunt     No Known Problems Maternal Aunt     No Known Problems Paternal Aunt     No Known Problems Paternal Aunt     No Known Problems Paternal Aunt        Social History     Occupational History    Not on file   Tobacco Use    Smoking status: Never Smoker    Smokeless tobacco: Never Used   Vaping Use    Vaping Use: Never used   Substance and Sexual Activity    Alcohol use:  Yes     Alcohol/week: 0 0 standard drinks     Comment: 0    Drug use: No    Sexual activity: Not Currently         Current Outpatient Medications:     acetaminophen (TYLENOL) 500 mg tablet, Take 1,000 mg by mouth every 6 (six) hours as needed for mild pain, Disp: , Rfl:     Biotin 1 MG CAPS, Take by mouth daily , Disp: , Rfl:     Blood Pressure Monitoring (SPHYGMOMANOMETER) MISC, by Does not apply route 2 (two) times a day, Disp: 1 each, Rfl: 0    Calcium Carb-Cholecalciferol (Calcium 1000 + D) 1000-800 MG-UNIT TABS, Take by mouth   (Patient not taking: Reported on 3/15/2022 ), Disp: , Rfl:     cephalexin (KEFLEX) 500 mg capsule, Take 500 mg by mouth if needed Prior to procedure , Disp: , Rfl:     cholecalciferol (VITAMIN D3) 1,000 units tablet, Take 1,000 Units by mouth daily, Disp: , Rfl:     gabapentin (NEURONTIN) 300 mg capsule, TAKE 1 CAPSULE BY MOUTH THREE TIMES DAILY, Disp: 90 capsule, Rfl: 5    losartan (COZAAR) 50 mg tablet, Take 1 tablet (50 mg total) by mouth daily, Disp: 90 tablet, Rfl: 3    metFORMIN (GLUCOPHAGE) 500 mg tablet, TAKE 1 TABLET BY MOUTH TWO TIMES DAILY WITH MEALS, Disp: 180 tablet, Rfl: 1    metoprolol tartrate (LOPRESSOR) 25 mg tablet, Take 2 tablets (50 mg total) by mouth every 12 (twelve) hours, Disp: 360 tablet, Rfl: 0    Multiple Vitamin (MULTI-VITAMIN DAILY PO), Take by mouth daily  (Patient not taking: Reported on 3/3/2022 ), Disp: , Rfl:     simvastatin (ZOCOR) 20 mg tablet, TAKE 1 TABLET BY MOUTH EVERY DAY, Disp: 90 tablet, Rfl: 1    Allergies   Allergen Reactions    Meloxicam Hives    Nifedipine Edema    Sulfamethoxazole-Trimethoprim Edema            Vitals:    08/18/22 1525   BP: 152/79   Pulse: 75       Objective:                    Right Knee Exam     Muscle Strength   The patient has normal right knee strength  Tenderness   The patient is experiencing tenderness in the medial joint line  Range of Motion   Extension: 0   Flexion: 120 (with crepitation and stiffness)     Other   Erythema: absent  Sensation: normal  Swelling: mild  Effusion: no effusion present      Left Knee Exam     Muscle Strength   The patient has normal left knee strength  Tenderness   The patient is experiencing tenderness in the medial joint line      Range of Motion   Extension: 0   Flexion: 120 (With crepitation and stiffness)     Other   Erythema: absent  Sensation: normal  Swelling: mild  Effusion: no effusion present    Comments:    Varus alignment at both knees            Diagnostics, reviewed and taken today if performed as documented:    None performed            Procedures, if performed today:    Large joint arthrocentesis: bilateral knee  Universal Protocol:  Consent: Verbal consent obtained  Risks and benefits: risks, benefits and alternatives were discussed  Consent given by: patient  Time out: Immediately prior to procedure a "time out" was called to verify the correct patient, procedure, equipment, support staff and site/side marked as required  Timeout called at: 8/18/2022 3:27 PM   Patient understanding: patient states understanding of the procedure being performed  Site marked: the operative site was marked  Patient identity confirmed: verbally with patient    Supporting Documentation  Indications: pain and diagnostic evaluation   Procedure Details  Location: knee - bilateral knee  Preparation: Patient was prepped and draped in the usual sterile fashion  Needle size: 22 G  Ultrasound guidance: no  Approach: anterolateral    Medications (Right): 3 mL lidocaine 1 %; 20 mg Sodium Hyaluronate 20 MG/2MLMedications (Left): 3 mL lidocaine 1 %; 20 mg Sodium Hyaluronate 20 MG/2ML   Patient tolerance: patient tolerated the procedure well with no immediate complications  Dressing:  Sterile dressing applied               Portions of the record may have been created with voice recognition software  Occasional wrong word or "sound a like" substitutions may have occurred due to the inherent limitations of voice recognition software  Read the chart carefully and recognize, using context, where substitutions have occurred

## 2022-08-19 ENCOUNTER — ESTABLISHED COMPREHENSIVE EXAM (OUTPATIENT)
Dept: URBAN - METROPOLITAN AREA CLINIC 6 | Facility: CLINIC | Age: 72
End: 2022-08-19

## 2022-08-19 DIAGNOSIS — H25.13: ICD-10-CM

## 2022-08-19 DIAGNOSIS — H04.123: ICD-10-CM

## 2022-08-19 DIAGNOSIS — E11.9: ICD-10-CM

## 2022-08-19 PROCEDURE — 92014 COMPRE OPH EXAM EST PT 1/>: CPT

## 2022-08-19 ASSESSMENT — TONOMETRY
OD_IOP_MMHG: 20
OS_IOP_MMHG: 17

## 2022-08-19 ASSESSMENT — VISUAL ACUITY
OU_CC: J1
OD_CC: 20/20
OS_CC: 20/25-1

## 2022-08-25 ENCOUNTER — PROCEDURE VISIT (OUTPATIENT)
Dept: OBGYN CLINIC | Facility: HOSPITAL | Age: 72
End: 2022-08-25
Payer: MEDICARE

## 2022-08-25 VITALS
HEART RATE: 76 BPM | BODY MASS INDEX: 42.82 KG/M2 | HEIGHT: 59 IN | SYSTOLIC BLOOD PRESSURE: 120 MMHG | DIASTOLIC BLOOD PRESSURE: 75 MMHG

## 2022-08-25 DIAGNOSIS — M25.562 CHRONIC PAIN OF BOTH KNEES: ICD-10-CM

## 2022-08-25 DIAGNOSIS — M25.561 CHRONIC PAIN OF BOTH KNEES: ICD-10-CM

## 2022-08-25 DIAGNOSIS — M17.0 BILATERAL PRIMARY OSTEOARTHRITIS OF KNEE: Primary | ICD-10-CM

## 2022-08-25 DIAGNOSIS — G89.29 CHRONIC PAIN OF BOTH KNEES: ICD-10-CM

## 2022-08-25 PROCEDURE — 20610 DRAIN/INJ JOINT/BURSA W/O US: CPT | Performed by: PHYSICIAN ASSISTANT

## 2022-08-25 RX ORDER — LIDOCAINE HYDROCHLORIDE 10 MG/ML
4 INJECTION, SOLUTION INFILTRATION; PERINEURAL
Status: COMPLETED | OUTPATIENT
Start: 2022-08-25 | End: 2022-08-25

## 2022-08-25 RX ORDER — HYALURONATE SODIUM 10 MG/ML
20 SYRINGE (ML) INTRAARTICULAR
Status: COMPLETED | OUTPATIENT
Start: 2022-08-25 | End: 2022-08-25

## 2022-08-25 RX ADMIN — Medication 20 MG: at 16:29

## 2022-08-25 RX ADMIN — LIDOCAINE HYDROCHLORIDE 4 ML: 10 INJECTION, SOLUTION INFILTRATION; PERINEURAL at 16:29

## 2022-08-25 NOTE — PROGRESS NOTES
Assessment:   Diagnosis ICD-10-CM Associated Orders   1  Bilateral primary osteoarthritis of knee  M17 0 Large joint arthrocentesis: bilateral knee   2  Chronic pain of both knees  M25 561 Large joint arthrocentesis: bilateral knee    M25 562     G89 29        Plan:  Bilateral knees known osteoarthritis  Cookie Bosworth returns today for the 2nd of 3 Euflexxa injections  She tolerated both injections very well (RP)  Ice and post injection protocol advised  Weightbearing activities as tolerated  She will follow up next week to complete the 3 shot Visco series  To do next visit:  Return in about 1 week (around 9/1/2022) for re-check and Euflexxa #3 B/L knees  The above stated was discussed in layman's terms and the patient expressed understanding  All questions were answered to the patient's satisfaction        ---------------------------------------------------------------------------------------------------------      Subjective:   Latonia Chan is a 66 y o  female who presents today for repeat evaluation of her bilateral knees, known osteoarthritis  She returns today for the 2nd of 3 euflexxa visco injections  She has felt improvement of her knee symptoms following last week's initial visco injections         Review of systems negative unless otherwise specified in HPI  Review of Systems    Past Medical History:   Diagnosis Date    Alopecia     Calcaneal spur     [address] [address]     Diabetes mellitus ([facility] [address]     Hyperlipidemia     Hypertension     Osteoarthrosis 2/13/2013    Pes planus     unspecified laterality    Plantar fasciitis        Past Surgical History:   Procedure Laterality Date    BREAST BIOPSY Right     patient not sure when , negative results    BREAST CYST ASPIRATION Left     patient not sure when negative results    CARPAL TUNNEL RELEASE      COLONOSCOPY      May 2006    INCISIONAL BREAST BIOPSY      [place] COLONOSCOPY FLX DX W/COLLJ SPEC WHEN PFRMD N/A 5/4/2018    Procedure: COLONOSCOPY;  Surgeon: Sofiya Johnston MD;  [facility]; Service: Colorectal    UT TOTAL HIP ARTHROPLASTY Right 9/23/2019    Procedure: ARTHROPLASTY HIP TOTAL;  Surgeon: Halie Lofton MD;  [facility];  Service: Orthopedics    WISDOM TOOTH EXTRACTION         Family History   Problem Relation Age of Onset    Diabetes Mother     Hypertension Mother     Emphysema Father     Colon cancer Brother 40    No Known Problems Maternal Grandmother     No Known Problems Maternal Grandfather     No Known Problems Paternal Grandmother     No Known Problems Paternal Grandfather     No Known Problems Brother     No Known Problems Maternal Aunt     No Known Problems Maternal Aunt     No Known Problems Paternal Aunt     No Known Problems Paternal Aunt     No Known Problems Paternal Aunt        Social History     Occupational History    Not on file   Tobacco Use    Smoking status: Never Smoker    Smokeless tobacco: Never Used   Vaping Use    Vaping Use: Never used   Substance and Sexual Activity    Alcohol use:  Yes     Alcohol/week: 0 0 standard drinks     Comment: 0    Drug use: No    Sexual activity: Not Currently         Current Outpatient Medications:     acetaminophen (TYLENOL) 500 mg tablet, Take 1,000 mg by mouth every 6 (six) hours as needed for mild pain, Disp: , Rfl:     Biotin 1 MG CAPS, Take by mouth daily , Disp: , Rfl:     Blood Pressure Monitoring (SPHYGMOMANOMETER) MISC, by Does not apply route 2 (two) times a day, Disp: 1 each, Rfl: 0    Calcium Carb-Cholecalciferol (Calcium 1000 + D) 1000-800 MG-UNIT TABS, Take by mouth   (Patient not taking: Reported on 3/15/2022 ), Disp: , Rfl:     cephalexin (KEFLEX) 500 mg capsule, Take 500 mg by mouth if needed Prior to procedure , Disp: , Rfl:     cholecalciferol (VITAMIN D3) 1,000 units tablet, Take 1,000 Units by mouth daily, Disp: , Rfl:     gabapentin (NEURONTIN) 300 mg capsule, TAKE 1 CAPSULE BY MOUTH THREE TIMES DAILY, Disp: 90 capsule, Rfl: 5    losartan (COZAAR) 50 mg tablet, Take 1 tablet (50 mg total) by mouth daily, Disp: 90 tablet, Rfl: 3    metFORMIN (GLUCOPHAGE) 500 mg tablet, TAKE 1 TABLET BY MOUTH TWO TIMES DAILY WITH MEALS, Disp: 180 tablet, Rfl: 1    metoprolol tartrate (LOPRESSOR) 25 mg tablet, Take 2 tablets (50 mg total) by mouth every 12 (twelve) hours, Disp: 360 tablet, Rfl: 0    Multiple Vitamin (MULTI-VITAMIN DAILY PO), Take by mouth daily  (Patient not taking: Reported on 3/3/2022 ), Disp: , Rfl:     simvastatin (ZOCOR) 20 mg tablet, TAKE 1 TABLET BY MOUTH EVERY DAY, Disp: 90 tablet, Rfl: 1    Allergies   Allergen Reactions    Meloxicam Hives    Nifedipine Edema    Sulfamethoxazole-Trimethoprim Edema            Vitals:    08/25/22 1559   BP: 120/75   Pulse: 76       Objective:                    Right Knee Exam     Muscle Strength   The patient has normal right knee strength  Tenderness   The patient is experiencing tenderness in the medial joint line  Range of Motion   Extension: 0   Flexion: 120 (with crepitation and stiffness)     Other   Erythema: absent  Sensation: normal  Swelling: mild  Effusion: no effusion present      Left Knee Exam     Muscle Strength   The patient has normal left knee strength  Tenderness   The patient is experiencing tenderness in the medial joint line  Range of Motion   Extension: 0   Flexion: 120 (With crepitation and stiffness)     Other   Erythema: absent  Sensation: normal  Swelling: mild  Effusion: no effusion present    Comments:    Varus alignment at both knees            Diagnostics, reviewed and taken today if performed as documented:    None performed            Procedures, if performed today:    Large joint arthrocentesis: bilateral knee  Universal Protocol:  Consent: Verbal consent obtained    Risks and benefits: risks, benefits and alternatives were discussed  Consent given by: patient  Time out: Immediately prior to procedure a "time out" was called to verify the correct patient, procedure, equipment, support staff and site/side marked as required  Timeout called at: 8/25/2022 4:27 PM   Patient understanding: patient states understanding of the procedure being performed  Site marked: the operative site was marked  Patient identity confirmed: verbally with patient    Supporting Documentation  Indications: pain and diagnostic evaluation   Procedure Details  Location: knee - bilateral knee  Preparation: Patient was prepped and draped in the usual sterile fashion  Needle size: 25 G  Ultrasound guidance: no  Approach: anteromedial    Medications (Right): 4 mL lidocaine 1 %; 20 mg Sodium Hyaluronate 20 MG/2MLMedications (Left): 4 mL lidocaine 1 %; 20 mg Sodium Hyaluronate 20 MG/2ML   Patient tolerance: patient tolerated the procedure well with no immediate complications  Dressing:  Sterile dressing applied              Portions of the record may have been created with voice recognition software  Occasional wrong word or "sound a like" substitutions may have occurred due to the inherent limitations of voice recognition software  Read the chart carefully and recognize, using context, where substitutions have occurred

## 2022-09-01 ENCOUNTER — PROCEDURE VISIT (OUTPATIENT)
Dept: OBGYN CLINIC | Facility: HOSPITAL | Age: 72
End: 2022-09-01
Payer: MEDICARE

## 2022-09-01 VITALS
SYSTOLIC BLOOD PRESSURE: 147 MMHG | DIASTOLIC BLOOD PRESSURE: 87 MMHG | HEIGHT: 59 IN | HEART RATE: 83 BPM | BODY MASS INDEX: 42.82 KG/M2

## 2022-09-01 DIAGNOSIS — M25.561 CHRONIC PAIN OF BOTH KNEES: ICD-10-CM

## 2022-09-01 DIAGNOSIS — M17.0 BILATERAL PRIMARY OSTEOARTHRITIS OF KNEE: Primary | ICD-10-CM

## 2022-09-01 DIAGNOSIS — G89.29 CHRONIC PAIN OF BOTH KNEES: ICD-10-CM

## 2022-09-01 DIAGNOSIS — M25.562 CHRONIC PAIN OF BOTH KNEES: ICD-10-CM

## 2022-09-01 PROCEDURE — 20610 DRAIN/INJ JOINT/BURSA W/O US: CPT | Performed by: ORTHOPAEDIC SURGERY

## 2022-09-01 RX ORDER — LIDOCAINE HYDROCHLORIDE 10 MG/ML
4 INJECTION, SOLUTION INFILTRATION; PERINEURAL
Status: COMPLETED | OUTPATIENT
Start: 2022-09-01 | End: 2022-09-01

## 2022-09-01 RX ORDER — HYALURONATE SODIUM 10 MG/ML
20 SYRINGE (ML) INTRAARTICULAR
Status: COMPLETED | OUTPATIENT
Start: 2022-09-01 | End: 2022-09-01

## 2022-09-01 RX ADMIN — Medication 20 MG: at 16:30

## 2022-09-01 RX ADMIN — LIDOCAINE HYDROCHLORIDE 4 ML: 10 INJECTION, SOLUTION INFILTRATION; PERINEURAL at 16:30

## 2022-09-01 NOTE — PROGRESS NOTES
Assessment:   Diagnosis ICD-10-CM Associated Orders   1  Bilateral primary osteoarthritis of knee  M17 0 Large joint arthrocentesis: bilateral knee   2  Chronic pain of both knees  M25 561 Large joint arthrocentesis: bilateral knee    M25 562     G89 29        Plan:  Bilateral knees known osteoarthritis  Anthony Walker returns today for the 3rd and final Euflexxa in the 3 shot series  She tolerated today's injections very well (RP)  Ice and post injection protocol advised  Weightbearing activities as tolerated  To do next visit:  Return in about 3 months (around 12/1/2022) for re-check  The above stated was discussed in layman's terms and the patient expressed understanding  All questions were answered to the patient's satisfaction  Scribe Attestation    I,:  Seble Laura am acting as a scribe while in the presence of the attending physician :       I,:  Tracy Moody MD personally performed the services described in this documentation    as scribed in my presence :             Subjective:   Misael Watt is a 66 y o  female who presents today for repeat evaluation of her bilateral knees, known osteoarthritis  She continues to have left greater than right knee pain  However she finds relief with the Euflexxa 3 shot series in which she presents today for the conclusion of the series  She felt better relief following the 1st injection 2 weeks ago compared to last week's injection that was administered from the anteromedial aspect        Review of systems negative unless otherwise specified in HPI  Review of Systems    Past Medical History:   Diagnosis Date    Alopecia     Calcaneal spur     Diabetes insipidus ([address]     Diabetes mellitus ([address]     Hyperlipidemia     Hypertension     Osteoarthrosis 2/13/2013    Pes planus     unspecified laterality    Plantar fasciitis        Past Surgical History:   Procedure Laterality Date    BREAST BIOPSY Right     patient not sure when , negative results    BREAST CYST ASPIRATION Left     patient not sure when negative results    CARPAL TUNNEL RELEASE      COLONOSCOPY      May 2006    INCISIONAL BREAST BIOPSY      HI COLONOSCOPY FLX DX W/COLLJ SPEC WHEN PFRMD N/A 5/4/2018    Procedure: COLONOSCOPY;  Surgeon: Surinder Hinojosa MD;  [facility]; Service: Colorectal    HI TOTAL HIP ARTHROPLASTY Right 9/23/2019    Procedure: ARTHROPLASTY HIP TOTAL;  Surgeon: Ingrid Merritt MD;  [facility];  Service: Orthopedics    WISDOM TOOTH EXTRACTION         Family History   Problem Relation Age of Onset    Diabetes Mother     Hypertension Mother     Emphysema Father     Colon cancer Brother 40    No Known Problems Maternal Grandmother     No Known Problems Maternal Grandfather     No Known Problems Paternal Grandmother     No Known Problems Paternal Grandfather     No Known Problems Brother     No Known Problems Maternal Aunt     No Known Problems Maternal Aunt     No Known Problems Paternal Aunt     No Known Problems Paternal Aunt     No Known Problems Paternal Aunt        Social History     Occupational History    Not on file   Tobacco Use    Smoking status: Never Smoker    Smokeless tobacco: Never Used   Vaping Use    Vaping Use: Never used   Substance and Sexual Activity    Alcohol use:  Yes     Alcohol/week: 0 0 standard drinks     Comment: 0    Drug use: No    Sexual activity: Not Currently         Current Outpatient Medications:     acetaminophen (TYLENOL) 500 mg tablet, Take 1,000 mg by mouth every 6 (six) hours as needed for mild pain, Disp: , Rfl:     Biotin 1 MG CAPS, Take by mouth daily , Disp: , Rfl:     Blood Pressure Monitoring (SPHYGMOMANOMETER) MISC, by Does not apply route 2 (two) times a day, Disp: 1 each, Rfl: 0    Calcium Carb-Cholecalciferol (Calcium 1000 + D) 1000-800 MG-UNIT TABS, Take by mouth   (Patient not taking: Reported on 3/15/2022 ), Disp: , Rfl:     cephalexin (KEFLEX) 500 mg capsule, Take 500 mg by mouth if needed Prior to procedure , Disp: , Rfl:     cholecalciferol (VITAMIN D3) 1,000 units tablet, Take 1,000 Units by mouth daily, Disp: , Rfl:     gabapentin (NEURONTIN) 300 mg capsule, TAKE 1 CAPSULE BY MOUTH THREE TIMES DAILY, Disp: 90 capsule, Rfl: 5    losartan (COZAAR) 50 mg tablet, Take 1 tablet (50 mg total) by mouth daily, Disp: 90 tablet, Rfl: 3    metFORMIN (GLUCOPHAGE) 500 mg tablet, TAKE 1 TABLET BY MOUTH TWO TIMES DAILY WITH MEALS, Disp: 180 tablet, Rfl: 1    metoprolol tartrate (LOPRESSOR) 25 mg tablet, Take 2 tablets (50 mg total) by mouth every 12 (twelve) hours, Disp: 360 tablet, Rfl: 0    Multiple Vitamin (MULTI-VITAMIN DAILY PO), Take by mouth daily  (Patient not taking: Reported on 3/3/2022 ), Disp: , Rfl:     simvastatin (ZOCOR) 20 mg tablet, TAKE 1 TABLET BY MOUTH EVERY DAY, Disp: 90 tablet, Rfl: 1    Allergies   Allergen Reactions    Meloxicam Hives    Nifedipine Edema    Sulfamethoxazole-Trimethoprim Edema            Vitals:    09/01/22 1614   BP: 147/87   Pulse: 83       Objective:                    Right Knee Exam     Muscle Strength   The patient has normal right knee strength  Tenderness   Right knee tenderness location: less tenderness medial joint line  Range of Motion   Extension: 0   Flexion: 120 (with less crepitation and stiffness)     Other   Erythema: absent  Sensation: normal  Swelling: mild  Effusion: no effusion present      Left Knee Exam     Muscle Strength   The patient has normal left knee strength  Tenderness   Left knee tenderness location: less tenderness medial joint line      Range of Motion   Extension: 0   Flexion: 120 (with less crepitation and stiffness)     Other   Erythema: absent  Sensation: normal  Swelling: mild  Effusion: no effusion present    Comments:    Varus alignment at both knees            Diagnostics, reviewed and taken today if performed as documented:    None performed            Procedures, if performed today:    Large joint arthrocentesis: bilateral knee  Universal Protocol:  Consent: Verbal consent obtained  Risks and benefits: risks, benefits and alternatives were discussed  Consent given by: patient  Time out: Immediately prior to procedure a "time out" was called to verify the correct patient, procedure, equipment, support staff and site/side marked as required  Timeout called at: 9/1/2022 4:21 PM   Patient understanding: patient states understanding of the procedure being performed  Site marked: the operative site was marked  Patient identity confirmed: verbally with patient    Supporting Documentation  Indications: pain and diagnostic evaluation   Procedure Details  Location: knee - bilateral knee  Preparation: Patient was prepped and draped in the usual sterile fashion  Needle size: 25 G  Ultrasound guidance: no  Approach: anterolateral    Medications (Right): 4 mL lidocaine 1 %; 20 mg Sodium Hyaluronate 20 MG/2MLMedications (Left): 4 mL lidocaine 1 %; 20 mg Sodium Hyaluronate 20 MG/2ML   Patient tolerance: patient tolerated the procedure well with no immediate complications  Dressing:  Sterile dressing applied            Portions of the record may have been created with voice recognition software  Occasional wrong word or "sound a like" substitutions may have occurred due to the inherent limitations of voice recognition software  Read the chart carefully and recognize, using context, where substitutions have occurred

## 2022-09-08 DIAGNOSIS — I47.1 SVT (SUPRAVENTRICULAR TACHYCARDIA) (HCC): ICD-10-CM

## 2022-11-01 ENCOUNTER — RA CDI HCC (OUTPATIENT)
Dept: OTHER | Facility: HOSPITAL | Age: 72
End: 2022-11-01

## 2022-11-01 NOTE — PROGRESS NOTES
[address]  coding opportunities       Chart reviewed, no opportunity found: CHART REVIEWED, NO OPPORTUNITY FOUND        Patients Insurance     Medicare Insurance: Medicare

## 2022-12-01 ENCOUNTER — OFFICE VISIT (OUTPATIENT)
Dept: OBGYN CLINIC | Facility: HOSPITAL | Age: 72
End: 2022-12-01

## 2022-12-01 VITALS
SYSTOLIC BLOOD PRESSURE: 148 MMHG | DIASTOLIC BLOOD PRESSURE: 79 MMHG | BODY MASS INDEX: 43.14 KG/M2 | WEIGHT: 214 LBS | HEART RATE: 63 BPM | HEIGHT: 59 IN

## 2022-12-01 DIAGNOSIS — G89.29 CHRONIC PAIN OF BOTH KNEES: Primary | ICD-10-CM

## 2022-12-01 DIAGNOSIS — M25.562 CHRONIC PAIN OF BOTH KNEES: Primary | ICD-10-CM

## 2022-12-01 DIAGNOSIS — M25.561 CHRONIC PAIN OF BOTH KNEES: Primary | ICD-10-CM

## 2022-12-01 DIAGNOSIS — M17.0 BILATERAL PRIMARY OSTEOARTHRITIS OF KNEE: ICD-10-CM

## 2022-12-01 RX ORDER — KETOROLAC TROMETHAMINE 30 MG/ML
60 INJECTION, SOLUTION INTRAMUSCULAR; INTRAVENOUS
Status: COMPLETED | OUTPATIENT
Start: 2022-12-01 | End: 2022-12-01

## 2022-12-01 RX ORDER — BUPIVACAINE HYDROCHLORIDE 2.5 MG/ML
2 INJECTION, SOLUTION INFILTRATION; PERINEURAL
Status: COMPLETED | OUTPATIENT
Start: 2022-12-01 | End: 2022-12-01

## 2022-12-01 RX ORDER — LIDOCAINE HYDROCHLORIDE 10 MG/ML
2 INJECTION, SOLUTION INFILTRATION; PERINEURAL
Status: COMPLETED | OUTPATIENT
Start: 2022-12-01 | End: 2022-12-01

## 2022-12-01 RX ADMIN — LIDOCAINE HYDROCHLORIDE 2 ML: 10 INJECTION, SOLUTION INFILTRATION; PERINEURAL at 16:03

## 2022-12-01 RX ADMIN — BUPIVACAINE HYDROCHLORIDE 2 ML: 2.5 INJECTION, SOLUTION INFILTRATION; PERINEURAL at 16:03

## 2022-12-01 RX ADMIN — KETOROLAC TROMETHAMINE 60 MG: 30 INJECTION, SOLUTION INTRAMUSCULAR; INTRAVENOUS at 16:03

## 2022-12-01 NOTE — PROGRESS NOTES
Assessment:  1  Chronic pain of both knees  Injection procedure prior authorization      2  Bilateral primary osteoarthritis of knee  Injection procedure prior authorization          Plan:  Bilateral knee osteoarthritis  The patient was provided with bilateral knee Toradol injections  The patient tolerated the procedure well  Bilateral knee visco-supplement was ordered as this had provided significant decrease of pain, inflammation and crepitus with last application  The patient should follow up in 3 months  To do next visit:  Return in about 3 months (around 3/1/2023) for visco x3  The above stated was discussed in layman's terms and the patient expressed understanding  All questions were answered to the patient's satisfaction  Scribe Attestation    I,:  Elba Sheridan am acting as a scribe while in the presence of the attending physician :       I,:  Diamond Hill MD personally performed the services described in this documentation    as scribed in my presence :             Subjective:   Jose Vazquez is a 74 y o  female who presents for follow up of bilateral knees and low back  She is s/p bilateral knee Euflexxa injections with benefit, 9/1/2022  Today she complains of left > right generalized knee pain with right low back/buttock pain  Prolonged weight bearing and repetitive bending aggravates while rest alleviates          Review of systems negative unless otherwise specified in HPI    Past Medical History:   Diagnosis Date   • Alopecia    • Calcaneal spur    • Diabetes insipidus ([address] )    • Diabetes mellitus ([address] )    • Hyperlipidemia    • Hypertension    • Osteoarthrosis 2/13/2013   • Pes planus     unspecified laterality   • Plantar fasciitis        Past Surgical History:   Procedure Laterality Date   • BREAST BIOPSY Right     patient not sure when , negative results   • BREAST CYST ASPIRATION Left     patient not sure when negative results   • CARPAL TUNNEL RELEASE     • COLONOSCOPY      May 2006   • INCISIONAL BREAST BIOPSY     • DE COLONOSCOPY FLX DX W/COLLJ SPEC WHEN PFRMD N/A 5/4/2018    Procedure: COLONOSCOPY;  Surgeon: Kalyn Jerome MD;  Location: [facility]; Service: Colorectal   • DE TOTAL HIP ARTHROPLASTY Right 9/23/2019    Procedure: ARTHROPLASTY HIP TOTAL;  Surgeon: Tone Green MD;  Location: [facility];  Service: Orthopedics   • WISDOM TOOTH EXTRACTION         Family History   Problem Relation Age of Onset   • Diabetes Mother    • Hypertension Mother    • Emphysema Father    • Colon cancer Brother 40   • No Known Problems Maternal Grandmother    • No Known Problems Maternal Grandfather    • No Known Problems Paternal Grandmother    • No Known Problems Paternal Grandfather    • No Known Problems Brother    • No Known Problems Maternal Aunt    • No Known Problems Maternal Aunt    • No Known Problems Paternal Aunt    • No Known Problems Paternal Aunt    • No Known Problems Paternal Aunt        Social History     Occupational History   • Not on file   Tobacco Use   • Smoking status: Never   • Smokeless tobacco: Never   Vaping Use   • Vaping Use: Never used   Substance and Sexual Activity   • Alcohol use:  Yes     Alcohol/week: 0 0 standard drinks     Comment: 0   • Drug use: No   • Sexual activity: Not Currently         Current Outpatient Medications:   •  acetaminophen (TYLENOL) 500 mg tablet, Take 1,000 mg by mouth every 6 (six) hours as needed for mild pain, Disp: , Rfl:   •  Biotin 1 MG CAPS, Take by mouth daily , Disp: , Rfl:   •  Blood Pressure Monitoring (SPHYGMOMANOMETER) MISC, by Does not apply route 2 (two) times a day, Disp: 1 each, Rfl: 0  •  Calcium Carb-Cholecalciferol (Calcium 1000 + D) 1000-800 MG-UNIT TABS, Take by mouth   (Patient not taking: Reported on 3/15/2022 ), Disp: , Rfl:   •  cephalexin (KEFLEX) 500 mg capsule, Take 500 mg by mouth if needed Prior to procedure , Disp: , Rfl:   •  cholecalciferol (VITAMIN D3) 1,000 units tablet, Take 1,000 Units by mouth daily, Disp: , Rfl:   •  gabapentin (NEURONTIN) 300 mg capsule, TAKE 1 CAPSULE BY MOUTH THREE TIMES DAILY, Disp: 90 capsule, Rfl: 5  •  losartan (COZAAR) 50 mg tablet, Take 1 tablet (50 mg total) by mouth daily, Disp: 90 tablet, Rfl: 3  •  metFORMIN (GLUCOPHAGE) 500 mg tablet, TAKE 1 TABLET BY MOUTH TWO TIMES DAILY WITH MEALS, Disp: 180 tablet, Rfl: 1  •  metoprolol tartrate (LOPRESSOR) 25 mg tablet, Take 2 tablets (50 mg total) by mouth every 12 (twelve) hours, Disp: 360 tablet, Rfl: 0  •  Multiple Vitamin (MULTI-VITAMIN DAILY PO), Take by mouth daily  (Patient not taking: Reported on 3/3/2022 ), Disp: , Rfl:   •  simvastatin (ZOCOR) 20 mg tablet, TAKE 1 TABLET BY MOUTH EVERY DAY, Disp: 90 tablet, Rfl: 1    Allergies   Allergen Reactions   • Meloxicam Hives   • Nifedipine Edema   • Sulfamethoxazole-Trimethoprim Edema            Vitals:    12/01/22 1513   BP: 148/79   Pulse: 63       Objective:  Physical exam  · General: Awake, Alert, Oriented  · Eyes: Pupils equal, round and reactive to light  · Heart: regular rate and rhythm  · Lungs: No audible wheezing  · Abdomen: soft                    Ortho Exam  Bilateral knees:  No erythema or ecchymosis  No effusion or swelling  Normal strength  Good ROM with crepitus   Calf compartments soft and supple  Sensation intact  Toes are warm sensate and mobile      Diagnostics, reviewed and taken today if performed as documented:    None performed     Procedures, if performed today:    Large joint arthrocentesis: R knee  Universal Protocol:  Consent: Verbal consent obtained  Risks and benefits: risks, benefits and alternatives were discussed  Consent given by: patient  Time out: Immediately prior to procedure a "time out" was called to verify the correct patient, procedure, equipment, support staff and site/side marked as required    Timeout called at: 12/1/2022 3:58 PM   Patient understanding: patient states understanding of the procedure being performed  Site marked: the operative site was marked  Patient identity confirmed: verbally with patient    Supporting Documentation  Indications: pain   Procedure Details  Location: knee - R knee  Preparation: Patient was prepped and draped in the usual sterile fashion  Needle size: 22 G  Ultrasound guidance: no  Approach: anterolateral  Medications administered: 2 mL bupivacaine 0 25 %; 2 mL lidocaine 1 %; 60 mg ketorolac 60 mg/2 mL    Patient tolerance: patient tolerated the procedure well with no immediate complications  Dressing:  Sterile dressing applied    Large joint arthrocentesis: L knee  Universal Protocol:  Consent: Verbal consent obtained  Risks and benefits: risks, benefits and alternatives were discussed  Consent given by: patient  Time out: Immediately prior to procedure a "time out" was called to verify the correct patient, procedure, equipment, support staff and site/side marked as required  Timeout called at: 12/1/2022 3:58 PM   Patient understanding: patient states understanding of the procedure being performed  Site marked: the operative site was marked  Patient identity confirmed: verbally with patient    Supporting Documentation  Indications: pain   Procedure Details  Location: knee - L knee  Preparation: Patient was prepped and draped in the usual sterile fashion  Needle size: 22 G  Ultrasound guidance: no  Approach: anterolateral  Medications administered: 2 mL bupivacaine 0 25 %; 2 mL lidocaine 1 %; 60 mg ketorolac 60 mg/2 mL    Patient tolerance: patient tolerated the procedure well with no immediate complications  Dressing:  Sterile dressing applied            Portions of the record may have been created with voice recognition software  Occasional wrong word or "sound a like" substitutions may have occurred due to the inherent limitations of voice recognition software  Read the chart carefully and recognize, using context, where substitutions have occurred

## 2022-12-10 DIAGNOSIS — I47.1 SVT (SUPRAVENTRICULAR TACHYCARDIA) (HCC): ICD-10-CM

## 2022-12-10 NOTE — TELEPHONE ENCOUNTER
Medication Refill Request     Name metoprolol tartrate (LOPRESSOR) 25 mg tablet  Dose/Frequency Take 2 tablets (50 mg total) by mouth every 12 (twelve) hours  Quantity 360 tablet  Verified pharmacy   [x]  Verified ordering Provider   [x]  Does patient have enough for the next 3 days?  Yes [] No [x]

## 2022-12-12 ENCOUNTER — TELEPHONE (OUTPATIENT)
Dept: CARDIOLOGY CLINIC | Facility: CLINIC | Age: 72
End: 2022-12-12

## 2022-12-12 RX ORDER — METOPROLOL TARTRATE 50 MG/1
50 TABLET, FILM COATED ORAL EVERY 12 HOURS SCHEDULED
Qty: 180 TABLET | Refills: 2 | Status: SHIPPED | OUTPATIENT
Start: 2022-12-12

## 2023-01-01 DIAGNOSIS — E78.5 HYPERLIPIDEMIA, UNSPECIFIED HYPERLIPIDEMIA TYPE: ICD-10-CM

## 2023-01-01 RX ORDER — SIMVASTATIN 20 MG
TABLET ORAL
Qty: 90 TABLET | Refills: 1 | Status: SHIPPED | OUTPATIENT
Start: 2023-01-01

## 2023-02-02 DIAGNOSIS — E11.65 CONTROLLED TYPE 2 DIABETES MELLITUS WITH HYPERGLYCEMIA, WITHOUT LONG-TERM CURRENT USE OF INSULIN (HCC): ICD-10-CM

## 2023-02-13 ENCOUNTER — HOSPITAL ENCOUNTER (OUTPATIENT)
Dept: RADIOLOGY | Age: 73
Discharge: HOME | End: 2023-02-13

## 2023-02-13 VITALS — BODY MASS INDEX: 43.14 KG/M2 | WEIGHT: 214 LBS | HEIGHT: 59 IN

## 2023-02-13 DIAGNOSIS — Z12.31 ENCOUNTER FOR SCREENING MAMMOGRAM FOR MALIGNANT NEOPLASM OF BREAST: ICD-10-CM

## 2023-02-14 ENCOUNTER — TELEPHONE (OUTPATIENT)
Dept: FAMILY MEDICINE CLINIC | Facility: CLINIC | Age: 73
End: 2023-02-14

## 2023-02-14 DIAGNOSIS — I10 BENIGN ESSENTIAL HYPERTENSION: ICD-10-CM

## 2023-02-14 DIAGNOSIS — E11.21 CONTROLLED TYPE 2 DIABETES MELLITUS WITH DIABETIC NEPHROPATHY, WITHOUT LONG-TERM CURRENT USE OF INSULIN (HCC): Primary | ICD-10-CM

## 2023-02-14 DIAGNOSIS — E78.5 HYPERLIPIDEMIA, UNSPECIFIED HYPERLIPIDEMIA TYPE: ICD-10-CM

## 2023-02-14 NOTE — TELEPHONE ENCOUNTER
Patient ([phone]) called to reschedule 6 month follow up  Appointment rescheduled for 3/27/23  Patient had lab work to be done for visit that will  3/2/23  Asked to have orders extended to have done prior to appointment on 3/27/23

## 2023-02-15 DIAGNOSIS — M54.16 LUMBAR RADICULOPATHY: ICD-10-CM

## 2023-02-15 RX ORDER — GABAPENTIN 300 MG/1
CAPSULE ORAL
Qty: 90 CAPSULE | Refills: 5 | Status: SHIPPED | OUTPATIENT
Start: 2023-02-15

## 2023-02-19 DIAGNOSIS — I10 BENIGN ESSENTIAL HYPERTENSION: ICD-10-CM

## 2023-02-21 RX ORDER — LOSARTAN POTASSIUM 50 MG/1
TABLET ORAL
Qty: 90 TABLET | Refills: 1 | Status: SHIPPED | OUTPATIENT
Start: 2023-02-21

## 2023-03-15 ENCOUNTER — TELEPHONE (OUTPATIENT)
Dept: OBGYN CLINIC | Facility: HOSPITAL | Age: 73
End: 2023-03-15

## 2023-03-15 NOTE — TELEPHONE ENCOUNTER
Caller: Karyn Gomez    Doctor: Velma Mclaughlin    Reason for call: Patient asking what the difference between OrthoVisc vs Euflexxa is?     Call back#: [phone]

## 2023-03-15 NOTE — TELEPHONE ENCOUNTER
Caller: Rosalee Kothari    Doctor: rachel    Reason for call: Patient returning call, given message details below  Patient verbally understood      Call back#: na

## 2023-03-21 ENCOUNTER — RA CDI HCC (OUTPATIENT)
Dept: OTHER | Facility: HOSPITAL | Age: 73
End: 2023-03-21

## 2023-03-21 NOTE — PROGRESS NOTES
[facility]  coding opportunities       Chart reviewed, no opportunity found:   [address]        Patients Insurance     Medicare Insurance: [employer]

## 2023-05-16 LAB
CREAT ?TM UR-SCNC: 120.7 UMOL/L
EXT ALBUMIN URINE RANDOM: 7.3
EXTERNAL CREATININE: 0.61
EXTERNAL EGFR: 93
HBA1C MFR BLD HPLC: 6.2 %
MICROALBUMIN/CREAT UR: 60.5 MG/G{CREAT}

## 2023-05-17 ENCOUNTER — OFFICE VISIT (OUTPATIENT)
Dept: CARDIOLOGY CLINIC | Facility: CLINIC | Age: 73
End: 2023-05-17

## 2023-05-17 VITALS
BODY MASS INDEX: 42.74 KG/M2 | DIASTOLIC BLOOD PRESSURE: 80 MMHG | OXYGEN SATURATION: 97 % | HEART RATE: 76 BPM | WEIGHT: 212 LBS | SYSTOLIC BLOOD PRESSURE: 148 MMHG | HEIGHT: 59 IN

## 2023-05-17 DIAGNOSIS — I10 BENIGN ESSENTIAL HYPERTENSION: Primary | ICD-10-CM

## 2023-05-17 DIAGNOSIS — I47.1 SUPRAVENTRICULAR TACHYCARDIA (HCC): ICD-10-CM

## 2023-05-17 DIAGNOSIS — E78.5 HYPERLIPIDEMIA, UNSPECIFIED HYPERLIPIDEMIA TYPE: ICD-10-CM

## 2023-05-17 RX ORDER — LOSARTAN POTASSIUM 100 MG/1
100 TABLET ORAL DAILY
Qty: 90 TABLET | Refills: 3 | Status: SHIPPED | OUTPATIENT
Start: 2023-05-17

## 2023-05-17 NOTE — PATIENT INSTRUCTIONS
Please check your blood pressures-in a seated posture, empty bladder; while you are relaxed, for at least for 5-10 minutes, preferably arm blood pressures-please check it twice daily for 1 week and send me the blood pressure log      If you have started a new medication or there has been a change in the dose of a current medication, wait 1 week for the medication to take effect/BP to stablize, prior to assessing your blood pressures  Continue dietary and lifestyle changes, limiting salt intake

## 2023-05-17 NOTE — PROGRESS NOTES
Cardiology Follow Up    Pattie Villagomez  1950  [id number]  [facility] [address]  [phone]    1  Benign essential hypertension        2  Supraventricular tachycardia ([address] )        3  Hyperlipidemia, unspecified hyperlipidemia type            Diagnoses and all orders for this visit:    Benign essential hypertension    Supraventricular tachycardia ([address] )    Hyperlipidemia, unspecified hyperlipidemia type      I had the pleasure of seeing Pattie Villagomez for a follow up visit  INTERVAL HISTORY: none    History of the presenting illness, Discussion/Summary and My Plan are as follows:::    She is a pleasant 47-year-old lady with history of hypertension, hyperlipidemia, type 2 diabetes, who in Oct 2019, post elective right hip total arthroplasty, had asymptomatic supraventricular tachycardia for about 30-40 minutes, converted spontaneously to sinus rhythm  Her TSH was normal   Electrolytes were unremarkable  An echocardiogram showed preserved LV systolic function but also showed LVOT obstruction-up to 40 mm with Valsalva  She presents for a follow-up visit and denies any complaints at this time  She denies any palpitations  Moderately active (limited by OA) without any symptoms    Plan:    Supraventricular tachycardia: Now on Metoprolol and losartan readded for better BP control  Ordered but not performed - 24 hour Holter, no palpitations now    LVOT/Mid cavity obstruction:  Discovered on echo in the hospital, on not symptomatic, adequate hydration is important and emphasized  Hypertension:  Elevated almost consistently  increasing losatan to 50 daily further   Will check at home and send us a log, If still elevated, will increase Metoprolol further  Also has fatigue since she went on Gabapentin    Hyperlipidemia:  On simvastatin with adequate control,     Poor sleep, anxiety might be contributing to her fatigue - has an upcoming visit with Pedro Stuart when she will discuss further      Follow-up in 9 months       Latest Reference Range & Units 06/04/21 14:45 02/24/22 15:24   Cholesterol See Comment mg/dL 194 187   Triglycerides See Comment mg/dL 151 (H) 149   HDL >=50 mg/dL 58 56   Non-HDL Cholesterol mg/dl  131   LDL Calculated 0 - 100 mg/dL 106 (H) 101 (H)   (H): Data is abnormally high     Latest Reference Range & Units 06/04/21 14:45 02/24/22 15:24   Hemoglobin A1C Normal 3 8-5 6%; PreDiabetic 5 7-6 4%;  Diabetic >=6 5%; Glycemic control for adults with diabetes <7 0% % 5 8 (H) 6 0 (H)   (H): Data is abnormally high    Patient Active Problem List   Diagnosis   • Benign essential hypertension   • Cervical radiculopathy   • Chronic lower back pain   • Diabetes mellitus type 2, controlled ([address] )   • Hyperlipidemia   • Left knee pain   • Lumbar stenosis with neurogenic claudication   • Morbid obesity ([address] )   • Osteoarthrosis   • Post-menopausal bleeding   • Primary osteoarthritis of both knees   • Psoriasis   • Right knee pain   • Right shoulder pain   • Spondylolisthesis, lumbar region   • Vaginal lump   • Vitamin D deficiency   • Effusion of left knee   • Right hip pain   • Arthritis of right hip   • Lumbar radiculopathy   • DDD (degenerative disc disease), lumbar   • Lumbar spondylosis   • Sacroiliitis (HCC)   • Status post total hip replacement, right   • Supraventricular tachycardia ([address] )   • Aftercare following right hip joint replacement surgery   • Stress incontinence of urine   • Chronic pain syndrome   • Diabetic polyneuropathy associated with type 2 diabetes mellitus ([address] )     Past Medical History:   Diagnosis Date   • Alopecia    • Calcaneal spur    • Diabetes insipidus ([address] )    • Diabetes mellitus ([address] )    • Hyperlipidemia    • Hypertension    • Osteoarthrosis 2/13/2013   • Pes planus     unspecified laterality   • Plantar fasciitis      Social History     Socioeconomic History • Marital status: Single     Spouse name: Not on file   • Number of children: Not on file   • Years of education: Not on file   • Highest education level: Not on file   Occupational History   • Not on file   Tobacco Use   • Smoking status: Never   • Smokeless tobacco: Never   Vaping Use   • Vaping Use: Never used   Substance and Sexual Activity   • Alcohol use: Yes     Alcohol/week: 0 0 standard drinks     Comment: 0   • Drug use: No   • Sexual activity: Not Currently   Other Topics Concern   • Not on file   Social History Narrative   • Not on file     Social Determinants of Health     Financial Resource Strain: Medium Risk   • Difficulty of Paying Living Expenses: Somewhat hard   Food Insecurity: Not on file   Transportation Needs: No Transportation Needs   • Lack of Transportation (Medical): No   • Lack of Transportation (Non-Medical):  No   Physical Activity: Not on file   Stress: Not on file   Social Connections: Not on file   Intimate Partner Violence: Not on file   Housing Stability: Not on file      Family History   Problem Relation Age of Onset   • Diabetes Mother    • Hypertension Mother    • Emphysema Father    • No Known Problems Maternal Grandmother    • No Known Problems Maternal Grandfather    • No Known Problems Paternal Grandmother    • No Known Problems Paternal Grandfather    • Colon cancer Brother 40   • No Known Problems Brother    • No Known Problems Maternal Aunt    • No Known Problems Maternal Aunt    • No Known Problems Paternal Aunt    • No Known Problems Paternal Aunt    • No Known Problems Paternal Aunt      Past Surgical History:   Procedure Laterality Date   • BREAST BIOPSY Right     patient not sure when , negative results   • BREAST CYST ASPIRATION Left     patient not sure when negative results   • CARPAL TUNNEL RELEASE     • COLONOSCOPY      May 2006   • INCISIONAL BREAST BIOPSY     • HI ARTHRP ACETBLR/PROX FEM PROSTC AGRFT/ALGRFT Right 9/23/2019    Procedure: ARTHROPLASTY HIP TOTAL;  Surgeon: Jackquline Boeck, MD;  [facility];  Service: Orthopedics   • VA COLONOSCOPY FLX DX W/COLLJ SPEC WHEN PFRMD N/A 5/4/2018    Procedure: COLONOSCOPY;  Surgeon: Troy Cantu MD;  Location: [facility]; Service: Colorectal   • WISDOM TOOTH EXTRACTION         Current Outpatient Medications:   •  acetaminophen (TYLENOL) 500 mg tablet, Take 1,000 mg by mouth every 6 (six) hours as needed for mild pain, Disp: , Rfl:   •  Biotin 1 MG CAPS, Take by mouth daily , Disp: , Rfl:   •  Blood Pressure Monitoring (SPHYGMOMANOMETER) MISC, by Does not apply route 2 (two) times a day, Disp: 1 each, Rfl: 0  •  Calcium Carb-Cholecalciferol (Calcium 1000 + D) 1000-800 MG-UNIT TABS, Take by mouth, Disp: , Rfl:   •  cephalexin (KEFLEX) 500 mg capsule, Take 500 mg by mouth if needed Prior to procedure , Disp: , Rfl:   •  cholecalciferol (VITAMIN D3) 1,000 units tablet, Take 1,000 Units by mouth daily, Disp: , Rfl:   •  gabapentin (NEURONTIN) 300 mg capsule, TAKE 1 CAPSULE BY MOUTH THREE TIMES DAILY, Disp: 90 capsule, Rfl: 5  •  losartan (COZAAR) 50 mg tablet, TAKE 1 TABLET BY MOUTH EVERY DAY, Disp: 90 tablet, Rfl: 1  •  metFORMIN (GLUCOPHAGE) 500 mg tablet, TAKE 1 TABLET BY MOUTH TWO TIMES DAILY WITH MEALS, Disp: 180 tablet, Rfl: 1  •  metoprolol tartrate (LOPRESSOR) 50 mg tablet, Take 1 tablet (50 mg total) by mouth every 12 (twelve) hours, Disp: 180 tablet, Rfl: 2  •  simvastatin (ZOCOR) 20 mg tablet, TAKE 1 TABLET BY MOUTH EVERY DAY, Disp: 90 tablet, Rfl: 1  •  Multiple Vitamin (MULTI-VITAMIN DAILY PO), Take by mouth daily  (Patient not taking: Reported on 3/3/2022), Disp: , Rfl:   Allergies   Allergen Reactions   • Meloxicam Hives   • Nifedipine Edema   • Sulfamethoxazole-Trimethoprim Edema       Imaging: Xr Hip/pelv 2-3 Vws Right If Performed    Result Date: 10/4/2019  Narrative: RIGHT HIP INDICATION:   Z47 1: Aftercare following joint replacement surgery Z96 641: Presence of right artificial hip joint  "COMPARISON:  Right hip plain films from 3/20/2019  VIEWS:  XR HIP/PELV 2-3 VWS RIGHT W PELVIS IF PERFORMED FINDINGS: There is no acute fracture or dislocation  Right total hip arthroplasty is identified in satisfactory position without evidence of hardware complication  Moderate left hip osteoarthritis with joint space narrowing and marginal osteophytes  No lytic or blastic osseous lesions  Skin staples are seen lateral to the right hip  Degenerative changes visualized lower lumbar spine  Impression: Unremarkable appearance of right total hip arthroplasty  Workstation performed: [id number]       Review of Systems:  Review of Systems   Constitutional: Negative  HENT: Negative  Eyes: Negative  Respiratory: Negative  Cardiovascular: Negative  Endocrine: Negative  Musculoskeletal: Negative  Physical Exam:  /80 (BP Location: Right arm, Patient Position: Sitting, Cuff Size: Standard)   Pulse 76   Ht 4' 11\" (1 499 m)   Wt 96 2 kg (212 lb)   SpO2 97%   BMI 42 82 kg/m²   Physical Exam  Constitutional:       General: She is not in acute distress  Appearance: She is well-developed  She is not diaphoretic  HENT:      Head: Normocephalic and atraumatic  Eyes:      General: No scleral icterus  Right eye: No discharge  Left eye: No discharge  Conjunctiva/sclera: Conjunctivae normal       Pupils: Pupils are equal, round, and reactive to light  Neck:      Thyroid: No thyromegaly  Trachea: No tracheal deviation  Cardiovascular:      Rate and Rhythm: Normal rate and regular rhythm  Heart sounds: Murmur (ESM) heard  No friction rub  Pulmonary:      Effort: Pulmonary effort is normal  No respiratory distress  Breath sounds: Normal breath sounds  No stridor  No wheezing  Abdominal:      General: Bowel sounds are normal  There is no distension  Palpations: Abdomen is soft  Tenderness: There is no abdominal tenderness   " Musculoskeletal:         General: No deformity  Normal range of motion  Cervical back: Normal range of motion  Right lower leg: Edema (minimal) present  Left lower leg: Edema (minimal) present  Skin:     General: Skin is warm  Coloration: Skin is not pale  Findings: No erythema or rash

## 2023-05-18 ENCOUNTER — PROCEDURE VISIT (OUTPATIENT)
Dept: OBGYN CLINIC | Facility: HOSPITAL | Age: 73
End: 2023-05-18

## 2023-05-18 VITALS
HEIGHT: 59 IN | DIASTOLIC BLOOD PRESSURE: 81 MMHG | WEIGHT: 210 LBS | SYSTOLIC BLOOD PRESSURE: 129 MMHG | HEART RATE: 80 BPM | BODY MASS INDEX: 42.33 KG/M2

## 2023-05-18 DIAGNOSIS — M17.0 PRIMARY OSTEOARTHRITIS OF BOTH KNEES: Primary | ICD-10-CM

## 2023-05-18 NOTE — PROGRESS NOTES
Assessment:  No diagnosis found  Plan:  Patient provided with ortho visc one injection today  Sore soreness after the injection is normal and she may ice if needed  I will see her back in 1 week for orthovisc #2  To do next visit:  Return in about 1 week (around 5/25/2023)  The above stated was discussed in layman's terms and the patient expressed understanding  All questions were answered to the patient's satisfaction  Scribe Attestation    I,:  Renuka Bean am acting as a scribe while in the presence of the attending physician :       I,:  Jose Alberto MD personally performed the services described in this documentation    as scribed in my presence :             Subjective:   Rylee Abreu is a 74 y o  female who presents today for bilateral orthovisc injections  Patient denies any interval changes from last visit  Review of systems negative unless otherwise specified in HPI    Past Medical History:   Diagnosis Date   • Alopecia    • Calcaneal spur    • Diabetes insipidus ([address] )    • Diabetes mellitus ([address] )    • Hyperlipidemia    • Hypertension    • Osteoarthrosis 2/13/2013   • Pes planus     unspecified laterality   • Plantar fasciitis        Past Surgical History:   Procedure Laterality Date   • BREAST BIOPSY Right     patient not sure when , negative results   • BREAST CYST ASPIRATION Left     patient not sure when negative results   • CARPAL TUNNEL RELEASE     • COLONOSCOPY      May 2006   • INCISIONAL BREAST BIOPSY     • IN ARTHRP ACETBLR/PROX FEM PROSTC AGRFT/ALGRFT Right 9/23/2019    Procedure: ARTHROPLASTY HIP TOTAL;  Surgeon: Ronny Rodriguez MD;  [facility];  Service: Orthopedics   • IN COLONOSCOPY FLX DX W/COLLJ SPEC WHEN PFRMD N/A 5/4/2018    Procedure: COLONOSCOPY;  Surgeon: Farhana Rodriguez MD;  Location: [facility];   Service: Colorectal   • WISDOM TOOTH EXTRACTION         Family History   Problem Relation Age of Onset   • Diabetes Mother    • Hypertension Mother    • Emphysema Father    • No Known Problems Maternal Grandmother    • No Known Problems Maternal Grandfather    • No Known Problems Paternal Grandmother    • No Known Problems Paternal Grandfather    • Colon cancer Brother 40   • No Known Problems Brother    • No Known Problems Maternal Aunt    • No Known Problems Maternal Aunt    • No Known Problems Paternal Aunt    • No Known Problems Paternal Aunt    • No Known Problems Paternal Aunt        Social History     Occupational History   • Not on file   Tobacco Use   • Smoking status: Never   • Smokeless tobacco: Never   Vaping Use   • Vaping Use: Never used   Substance and Sexual Activity   • Alcohol use:  Yes     Alcohol/week: 0 0 standard drinks     Comment: 0   • Drug use: No   • Sexual activity: Not Currently         Current Outpatient Medications:   •  acetaminophen (TYLENOL) 500 mg tablet, Take 1,000 mg by mouth every 6 (six) hours as needed for mild pain, Disp: , Rfl:   •  Biotin 1 MG CAPS, Take by mouth daily , Disp: , Rfl:   •  Blood Pressure Monitoring (SPHYGMOMANOMETER) MISC, by Does not apply route 2 (two) times a day, Disp: 1 each, Rfl: 0  •  Calcium Carb-Cholecalciferol (Calcium 1000 + D) 1000-800 MG-UNIT TABS, Take by mouth, Disp: , Rfl:   •  cephalexin (KEFLEX) 500 mg capsule, Take 500 mg by mouth if needed Prior to procedure , Disp: , Rfl:   •  cholecalciferol (VITAMIN D3) 1,000 units tablet, Take 1,000 Units by mouth daily, Disp: , Rfl:   •  gabapentin (NEURONTIN) 300 mg capsule, TAKE 1 CAPSULE BY MOUTH THREE TIMES DAILY, Disp: 90 capsule, Rfl: 5  •  losartan (COZAAR) 100 MG tablet, Take 1 tablet (100 mg total) by mouth daily, Disp: 90 tablet, Rfl: 3  •  metFORMIN (GLUCOPHAGE) 500 mg tablet, TAKE 1 TABLET BY MOUTH TWO TIMES DAILY WITH MEALS, Disp: 180 tablet, Rfl: 1  •  metoprolol tartrate (LOPRESSOR) 50 mg tablet, Take 1 tablet (50 mg total) by mouth every 12 (twelve) hours, Disp: 180 tablet, Rfl: 2  •  Multiple Vitamin (MULTI-VITAMIN DAILY PO), Take by mouth daily  (Patient not taking: Reported on 3/3/2022), Disp: , Rfl:   •  simvastatin (ZOCOR) 20 mg tablet, TAKE 1 TABLET BY MOUTH EVERY DAY, Disp: 90 tablet, Rfl: 1    Allergies   Allergen Reactions   • Meloxicam Hives   • Nifedipine Edema   • Sulfamethoxazole-Trimethoprim Edema            Vitals:    05/18/23 1519   BP: 129/81   Pulse: 80       Objective:  Physical exam  · General: Awake, Alert, Oriented  · Eyes: Pupils equal, round and reactive to light  · Heart: regular rate and rhythm  · Lungs: No audible wheezing  · Abdomen: soft                    Right Knee Exam     Range of Motion   Extension: normal   Flexion: normal     Tests   Varus: negative Valgus: negative    Other   Erythema: absent  Sensation: normal  Pulse: present  Swelling: none      Left Knee Exam     Range of Motion   Extension: normal   Flexion: normal     Tests   Varus: negative Valgus: negative    Other   Erythema: absent  Sensation: normal  Pulse: present  Swelling: none            Diagnostics, reviewed and taken today if performed as documented:    None performed     Procedures, if performed today:    Large joint arthrocentesis: bilateral knee  Universal Protocol:  Consent: Verbal consent obtained    Risks and benefits: risks, benefits and alternatives were discussed  Consent given by: patient  Patient understanding: patient states understanding of the procedure being performed  Patient consent: the patient's understanding of the procedure matches consent given  Site marked: the operative site was marked  Patient identity confirmed: verbally with patient    Supporting Documentation  Indications: pain   Procedure Details  Location: knee - bilateral knee  Preparation: Patient was prepped and draped in the usual sterile fashion  Needle size: 22 G  Ultrasound guidance: no    Patient tolerance: patient tolerated the procedure well with no immediate complications  Dressing:  Sterile dressing applied          Portions of the record "may have been created with voice recognition software  Occasional wrong word or \"sound a like\" substitutions may have occurred due to the inherent limitations of voice recognition software  Read the chart carefully and recognize, using context, where substitutions have occurred        "

## 2023-05-24 ENCOUNTER — OFFICE VISIT (OUTPATIENT)
Dept: FAMILY MEDICINE CLINIC | Facility: CLINIC | Age: 73
End: 2023-05-24

## 2023-05-24 VITALS
TEMPERATURE: 97.3 F | WEIGHT: 211.2 LBS | BODY MASS INDEX: 42.58 KG/M2 | DIASTOLIC BLOOD PRESSURE: 80 MMHG | RESPIRATION RATE: 18 BRPM | SYSTOLIC BLOOD PRESSURE: 128 MMHG | HEART RATE: 73 BPM | HEIGHT: 59 IN | OXYGEN SATURATION: 97 %

## 2023-05-24 DIAGNOSIS — E66.01 MORBID OBESITY (HCC): ICD-10-CM

## 2023-05-24 DIAGNOSIS — E11.42 DIABETIC POLYNEUROPATHY ASSOCIATED WITH TYPE 2 DIABETES MELLITUS (HCC): ICD-10-CM

## 2023-05-24 DIAGNOSIS — I10 BENIGN ESSENTIAL HYPERTENSION: ICD-10-CM

## 2023-05-24 DIAGNOSIS — F41.9 ANXIETY AND DEPRESSION: ICD-10-CM

## 2023-05-24 DIAGNOSIS — M48.062 LUMBAR STENOSIS WITH NEUROGENIC CLAUDICATION: ICD-10-CM

## 2023-05-24 DIAGNOSIS — E78.5 HYPERLIPIDEMIA, UNSPECIFIED HYPERLIPIDEMIA TYPE: ICD-10-CM

## 2023-05-24 DIAGNOSIS — G47.00 INSOMNIA, UNSPECIFIED TYPE: Primary | ICD-10-CM

## 2023-05-24 DIAGNOSIS — Z12.11 SCREEN FOR COLON CANCER: ICD-10-CM

## 2023-05-24 DIAGNOSIS — F32.A ANXIETY AND DEPRESSION: ICD-10-CM

## 2023-05-24 DIAGNOSIS — Z00.00 MEDICARE ANNUAL WELLNESS VISIT, SUBSEQUENT: ICD-10-CM

## 2023-05-24 PROBLEM — B35.1 ONYCHOMYCOSIS: Status: ACTIVE | Noted: 2022-11-28

## 2023-05-24 LAB — SL AMB POCT HEMOGLOBIN AIC: 6 (ref ?–6.5)

## 2023-05-24 RX ORDER — DULOXETIN HYDROCHLORIDE 20 MG/1
20 CAPSULE, DELAYED RELEASE ORAL DAILY
Qty: 30 CAPSULE | Refills: 1 | Status: SHIPPED | OUTPATIENT
Start: 2023-05-24

## 2023-05-24 NOTE — PROGRESS NOTES
Assessment and Plan:     Problem List Items Addressed This Visit        Endocrine    Diabetic polyneuropathy associated with type 2 diabetes mellitus ([facility] [address] )       Lab Results   Component Value Date    HGBA1C 6 0 05/24/2023     Controlled  Low carb diet  Continue metformin 500mg bid  Relevant Orders    POCT hemoglobin A1c (Completed)       Cardiovascular and Mediastinum    Benign essential hypertension     Controlled  DASH diet  Continue losartan and metoprolol per cardiology  Other    Hyperlipidemia     Low fat diet  Continue simvastatin 20mg qhs  Lumbar stenosis with neurogenic claudication     Continue gabapentin  Morbid obesity ([facility] [address] )    Insomnia - Primary     Educated pt about sleep hygiene  Anxiety and depression     Give cymbalta 20mg QD  SE educated pt  Refer to therapist           Relevant Medications    DULoxetine (CYMBALTA) 20 mg capsule    Other Relevant Orders    Ambulatory Referral to [address] [place] [address] Therapists   Other Visit Diagnoses     Screen for colon cancer        Relevant Orders    Ambulatory Referral to Colorectal Surgery    Medicare annual wellness visit, subsequent                 Went to [facility] on 5/16/2023 per pt  Wait for report  Got flu shot yearly    Got Covid19 vaccines and booster     Got pneumovax at age of 70  Got prevnar 13 2/2016  Will get shingrix at pharmacy  Mammogram 2/2023 normal    Had colonoscopy 5/2018 Dr Barahona  Repeat in 5 years    Dexa 5/2022 normal     RTO in 2 months      Will think about POA and living will  Preventive health issues were discussed with patient, and age appropriate screening tests were ordered as noted in patient's After Visit Summary  Personalized health advice and appropriate referrals for health education or preventive services given if needed, as noted in patient's After Visit Summary       History of Present Illness:     Patient presents for a Medicare Wellness Visit    HPI     Pt is here by herself    Insomnia/anxiety/depression---She can not sleep well for a while  Went to bed at 3-4am, get up at [facility] up a lot per pt  Took nap in her chair sometimes  Knee pain affect her sleep per pt  She has a pet at home and she is worried about it  DM---Today HgA1C 6 0 in office  She is on metformin 500mg bid  Denies SE    Tried to follow Low carb diet  Some neuropathy on toes  She is on gabapentin 300mg QD  Denies hypoglycemia  [facility] opthalmology yearly  Plan to see Dr Gonzalez Rodriguez     HTN--- She is on losartan 100mg qhs and metoprolol 25mg bid     SVT---[facility] cardiology  Denies palpitation, SOB, CP etc       Hyperlipidemia---She is on simvastatin 20mg qhs  Denies SE       Lumbar stenosis---She is on gabapentin 300-600mg qhs and ibuprofen prn  [facility] pain specialist  Will get EMG next month  Knee pain---[facility] orthopedics  Got injection  BMI 42 66       Live by herself  Does all ADL's  Still drive  Denies recent falls  Patient Care Team:  Garrett Chavez MD as PCP - Sejal De Paz MD as Endoscopist  Mariah Altamirano DO (Anesthesiology)     Review of Systems:     Review of Systems   Constitutional: Negative for appetite change, chills and fever  HENT: Negative for congestion, ear pain, sinus pain and sore throat  Eyes: Negative for discharge and itching  Respiratory: Negative for apnea, cough, chest tightness, shortness of breath and wheezing  Cardiovascular: Negative for chest pain, palpitations and leg swelling  Gastrointestinal: Negative for abdominal pain, anal bleeding, constipation, diarrhea, nausea and vomiting  Endocrine: Negative for cold intolerance, heat intolerance and polyuria  Genitourinary: Negative for difficulty urinating and dysuria  Musculoskeletal: Positive for arthralgias  Negative for back pain and myalgias  Skin: Negative for rash  Neurological: Negative for dizziness and headaches     Psychiatric/Behavioral: Positive for sleep disturbance  Negative for agitation, self-injury and suicidal ideas  The patient is nervous/anxious           Problem List:     Patient Active Problem List   Diagnosis   • Benign essential hypertension   • Cervical radiculopathy   • Chronic lower back pain   • Diabetes mellitus type 2, controlled ([facility] [address] )   • Hyperlipidemia   • Left knee pain   • Lumbar stenosis with neurogenic claudication   • Morbid obesity ([facility] [address] )   • Osteoarthrosis   • Post-menopausal bleeding   • Primary osteoarthritis of both knees   • Psoriasis   • Right knee pain   • Right shoulder pain   • Spondylolisthesis, lumbar region   • Vaginal lump   • Vitamin D deficiency   • Effusion of left knee   • Right hip pain   • Arthritis of right hip   • Lumbar radiculopathy   • DDD (degenerative disc disease), lumbar   • Lumbar spondylosis   • Sacroiliitis ([facility] [address] )   • Status post total hip replacement, right   • Supraventricular tachycardia ([facility] [address] )   • Aftercare following right hip joint replacement surgery   • Stress incontinence of urine   • Chronic pain syndrome   • Diabetic polyneuropathy associated with type 2 diabetes mellitus ([facility] [address] )   • Onychomycosis   • Insomnia   • Anxiety and depression      Past Medical and Surgical History:     Past Medical History:   Diagnosis Date   • Alopecia    • Calcaneal spur    • Diabetes insipidus ([facility] [address] )    • Diabetes mellitus ([facility] [address] )    • Hyperlipidemia    • Hypertension    • Osteoarthrosis 2/13/2013   • Pes planus     unspecified laterality   • Plantar fasciitis      Past Surgical History:   Procedure Laterality Date   • BREAST BIOPSY Right     patient not sure when , negative results   • BREAST CYST ASPIRATION Left     patient not sure when negative results   • CARPAL TUNNEL RELEASE     • COLONOSCOPY      May 2006   • INCISIONAL BREAST BIOPSY     • [place] ARTHRP ACETBLR/PROX FEM PROSTC AGRFT/ALGRFT Right 9/23/2019    Procedure: ARTHROPLASTY HIP TOTAL;  Surgeon: Luis Michael MD;  [facility];  Service: Orthopedics   • CT COLONOSCOPY FLX DX W/COLLJ SPEC WHEN PFRMD N/A 5/4/2018    Procedure: COLONOSCOPY;  Surgeon: Iris Pacheco MD;  [facility]: [facility]; Service: Colorectal   • WISDOM TOOTH EXTRACTION        Family History:     Family History   Problem Relation Age of Onset   • Diabetes Mother    • Hypertension Mother    • Emphysema Father    • No Known Problems Maternal Grandmother    • No Known Problems Maternal Grandfather    • No Known Problems Paternal Grandmother    • No Known Problems Paternal Grandfather    • Colon cancer Brother 40   • No Known Problems Brother    • No Known Problems Maternal Aunt    • No Known Problems Maternal Aunt    • No Known Problems Paternal Aunt    • No Known Problems Paternal Aunt    • No Known Problems Paternal Aunt       Social History:     Social History     Socioeconomic History   • Marital status: Single     Spouse name: None   • Number of children: None   • Years of education: None   • Highest education level: None   Occupational History   • None   Tobacco Use   • Smoking status: Never   • Smokeless tobacco: Never   Vaping Use   • Vaping Use: Never used   Substance and Sexual Activity   • Alcohol use: Yes     Alcohol/week: 0 0 standard drinks of alcohol     Comment: 0   • Drug use: No   • Sexual activity: Not Currently   Other Topics Concern   • None   Social History Narrative   • None     Social Determinants of Health     Financial Resource Strain: Medium Risk (5/17/2023)    Overall Financial Resource Strain (CARDIA)    • Difficulty of Paying Living Expenses: Somewhat hard   Food Insecurity: Not on file   Transportation Needs: No Transportation Needs (5/17/2023)    PRAPARE - Transportation    • Lack of Transportation (Medical): No    • Lack of Transportation (Non-Medical):  No   Physical Activity: Not on file   Stress: Not on file   Social Connections: Not on file   Intimate Partner Violence: Not on file   Housing Stability: Not on file      Medications and Allergies: Current Outpatient Medications   Medication Sig Dispense Refill   • Biotin 1 MG CAPS Take by mouth daily      • Blood Pressure Monitoring (SPHYGMOMANOMETER) MISC by Does not apply route 2 (two) times a day 1 each 0   • cholecalciferol (VITAMIN D3) 1,000 units tablet Take 1,000 Units by mouth daily     • DULoxetine (CYMBALTA) 20 mg capsule Take 1 capsule (20 mg total) by mouth daily 30 capsule 1   • gabapentin (NEURONTIN) 300 mg capsule TAKE 1 CAPSULE BY MOUTH THREE TIMES DAILY 90 capsule 5   • losartan (COZAAR) 100 MG tablet Take 1 tablet (100 mg total) by mouth daily 90 tablet 3   • metFORMIN (GLUCOPHAGE) 500 mg tablet TAKE 1 TABLET BY MOUTH TWO TIMES DAILY WITH MEALS 180 tablet 1   • metoprolol tartrate (LOPRESSOR) 50 mg tablet Take 1 tablet (50 mg total) by mouth every 12 (twelve) hours 180 tablet 2   • simvastatin (ZOCOR) 20 mg tablet TAKE 1 TABLET BY MOUTH EVERY DAY 90 tablet 1   • acetaminophen (TYLENOL) 500 mg tablet Take 1,000 mg by mouth every 6 (six) hours as needed for mild pain (Patient not taking: Reported on 5/24/2023)     • Calcium Carb-Cholecalciferol (Calcium 1000 + D) 1000-800 MG-UNIT TABS Take by mouth (Patient not taking: Reported on 5/24/2023)     • cephalexin (KEFLEX) 500 mg capsule Take 500 mg by mouth if needed Prior to procedure  (Patient not taking: Reported on 5/24/2023)     • Multiple Vitamin (MULTI-VITAMIN DAILY PO) Take by mouth daily  (Patient not taking: Reported on 3/3/2022)       No current facility-administered medications for this visit       Allergies   Allergen Reactions   • Meloxicam Hives   • Nifedipine Edema   • Sulfamethoxazole-Trimethoprim Edema      Immunizations:     Immunization History   Administered Date(s) Administered   • COVID-19 PFIZER VACCINE 0 3 ML IM 03/06/2021, 03/27/2021, 11/13/2021   • INFLUENZA 10/19/2021   • Influenza Split High Dose Preservative Free IM 11/14/2018   • Influenza, high dose seasonal 0 7 mL 10/07/2019   • Influenza, seasonal, injectable 11/01/2012, 10/05/2015   • Pneumococcal Conjugate 13-Valent 02/25/2016   • Pneumococcal Polysaccharide PPV23 05/15/2014   • Tuberculin Skin Test-PPD Intradermal 09/29/2019      Health Maintenance:         Topic Date Due   • Colorectal Cancer Screening  05/04/2023   • DXA SCAN  05/02/2025   • Hepatitis C Screening  Completed         Topic Date Due   • COVID-19 Vaccine (4 - Pfizer series) 01/08/2022      Medicare Screening Tests and Risk Assessments:     Maria Elena Ingram is here for her Subsequent Wellness visit  Health Risk Assessment:   Patient rates overall health as fair  Patient feels that their physical health rating is same  Patient is satisfied with their life  Eyesight was rated as slightly worse  Hearing was rated as same  Patient feels that their emotional and mental health rating is same  Patients states they are never, rarely angry  Patient states they are often unusually tired/fatigued  Pain experienced in the last 7 days has been some  Patient's pain rating has been 6/10  Patient states that she has experienced no weight loss or gain in last 6 months  Answered questions as accurately as possible  Depression Screening:   PHQ-2 Score: 1      Fall Risk Screening: In the past year, patient has experienced: no history of falling in past year      Urinary Incontinence Screening:   Patient has leaked urine accidently in the last six months  Home Safety:  Patient has trouble with stairs inside or outside of their home  Patient has working smoke alarms and has no working carbon monoxide detector  Home safety hazards include: household clutter and medications that cause fatigue  Nutrition:   Current diet is Other (please comment)  A mix of healthy & unhealthy --some junk food, sweets; better meal planning; should eat at regular hours  Medications:   Patient is currently taking over-the-counter supplements  OTC medications include: Biotin, Folic Acid, D3  [place] Patient is able to manage medications  Activities of Daily Living (ADLs)/Instrumental Activities of Daily Living (IADLs):   Walk and transfer into and out of bed and chair?: Yes  Dress and groom yourself?: Yes    Bathe or shower yourself?: Yes    Feed yourself? Yes  Do your laundry/housekeeping?: Yes  Manage your money, pay your bills and track your expenses?: Yes  Make your own meals?: Yes    Do your own shopping?: Yes    ADL comments: Certain activities take more effort, like housekeeping & laundry, but I manage       Previous Hospitalizations:   Any hospitalizations or ED visits within the last 12 months?: No      Advance Care Planning:   Living will: No    Durable POA for healthcare: No    Advanced directive: No    Advanced directive counseling given: Yes    Five wishes given: Yes      PREVENTIVE SCREENINGS      Cardiovascular Screening:    General: History Lipid Disorder and Risks and Benefits Discussed    Due for: Lipid Panel      Diabetes Screening:     General: History Diabetes and Risks and Benefits Discussed    Due for: Blood Glucose      Colorectal Cancer Screening:     General: Screening Current    Due for: Colonoscopy - Low Risk      Breast Cancer Screening:     General: Screening Current      Cervical Cancer Screening:    General: Screening Not Indicated      Osteoporosis Screening:    General: Screening Current      Lung Cancer Screening:     General: Screening Not Indicated      Hepatitis C Screening:    General: Screening Current    Screening, Brief Intervention, and Referral to Treatment (SBIRT)    Screening  Typical number of drinks in a day: 0  Typical number of drinks in a week: 0  Interpretation: Low risk drinking behavior      AUDIT-C Screenin) How often did you have a drink containing alcohol in the past year? 2 to 4 times a month  2) How many drinks did you have on a typical day when you were drinking in the past year? 0  3) How often did you have 6 or more drinks on one occasion in the past year? never    AUDIT-C Score: "2  Interpretation: Score 0-2 (female): Negative screen for alcohol misuse    Single Item Drug Screening:  How often have you used an illegal drug (including marijuana) or a prescription medication for non-medical reasons in the past year? never    Single Item Drug Screen Score: 0  Interpretation: Negative screen for possible drug use disorder    No results found  Physical Exam:     /80   Pulse 73   Temp (!) 97 3 °F (36 3 °C) (Temporal)   Resp 18   Ht 4' 11\" (1 499 m)   Wt 95 8 kg (211 lb 3 2 oz)   SpO2 97%   BMI 42 66 kg/m²     Physical Exam  Vitals reviewed  Constitutional:       Appearance: Normal appearance  HENT:      Head: Normocephalic and atraumatic  Eyes:      General:         Right eye: No discharge  Left eye: No discharge  Conjunctiva/sclera: Conjunctivae normal    Neck:      Vascular: No carotid bruit  Cardiovascular:      Rate and Rhythm: Normal rate and regular rhythm  Pulses: Pulses are weak  Dorsalis pedis pulses are 1+ on the right side and 1+ on the left side  Heart sounds: Normal heart sounds  No murmur heard  No friction rub  No gallop  Pulmonary:      Effort: Pulmonary effort is normal  No respiratory distress  Breath sounds: Normal breath sounds  No wheezing or rales  Abdominal:      General: Bowel sounds are normal  There is no distension  Palpations: Abdomen is soft  Tenderness: There is no abdominal tenderness  Musculoskeletal:         General: Tenderness present  No swelling or deformity  Cervical back: Normal range of motion and neck supple  No muscular tenderness  Feet:      Right foot:      Skin integrity: No ulcer, skin breakdown, erythema, warmth, callus or dry skin  Left foot:      Skin integrity: No ulcer, skin breakdown, erythema, warmth, callus or dry skin  Lymphadenopathy:      Cervical: No cervical adenopathy  Neurological:      Mental Status: She is alert     Psychiatric:         " Mood and Affect: Mood normal         Patient's shoes and socks removed  Right Foot/Ankle   Right Foot Inspection  Skin Exam: skin normal and skin intact  No dry skin, no warmth, no callus, no erythema, no maceration, no abnormal color, no pre-ulcer, no ulcer and no callus  Sensory   Monofilament testing: intact    Vascular  The right DP pulse is 1+  Left Foot/Ankle  Left Foot Inspection  Skin Exam: skin normal and skin intact  No dry skin, no warmth, no erythema, no maceration, normal color, no pre-ulcer, no ulcer and no callus  Sensory   Monofilament testing: intact    Vascular  The left DP pulse is 1+       Assign Risk Category  No deformity present  No loss of protective sensation  Weak pulses  Risk: 2      Wilder Bolden MD

## 2023-05-24 NOTE — PATIENT INSTRUCTIONS
Medicare Preventive Visit Patient Instructions  Thank you for completing your Welcome to Medicare Visit or Medicare Annual Wellness Visit today  Your next wellness visit will be due in one year (5/24/2024)  The screening/preventive services that you may require over the next 5-10 years are detailed below  Some tests may not apply to you based off risk factors and/or age  Screening tests ordered at today's visit but not completed yet may show as past due  Also, please note that scanned in results may not display below  Preventive Screenings:  Service Recommendations Previous Testing/Comments   Colorectal Cancer Screening  * Colonoscopy    * Fecal Occult Blood Test (FOBT)/Fecal Immunochemical Test (FIT)  * Fecal DNA/Cologuard Test  * Flexible Sigmoidoscopy Age: 39-70 years old   Colonoscopy: every 10 years (may be performed more frequently if at higher risk)  OR  FOBT/FIT: every 1 year  OR  Cologuard: every 3 years  OR  Sigmoidoscopy: every 5 years  Screening may be recommended earlier than age 39 if at higher risk for colorectal cancer  Also, an individualized decision between you and your healthcare provider will decide whether screening between the ages of 74-80 would be appropriate  Colonoscopy: 05/04/2018  FOBT/FIT: Not on file  Cologuard: Not on file  Sigmoidoscopy: Not on file    Screening Current     Breast Cancer Screening Age: 36 years old  Frequency: every 1-2 years  Not required if history of left and right mastectomy Mammogram: 02/13/2023    Screening Current   Cervical Cancer Screening Between the ages of 21-29, pap smear recommended once every 3 years  Between the ages of 33-67, can perform pap smear with HPV co-testing every 5 years     Recommendations may differ for women with a history of total hysterectomy, cervical cancer, or abnormal pap smears in past  Pap Smear: 10/14/2020    Screening Not Indicated   Hepatitis C Screening Once for adults born between 1945 and 1965  More frequently in patients at high risk for Hepatitis C Hep C Antibody: 07/27/2020    Screening Current   Diabetes Screening 1-2 times per year if you're at risk for diabetes or have pre-diabetes Fasting glucose: 83 mg/dL (2/24/2022)  A1C: 6 0 % (2/24/2022)  Screening Not Indicated  History Diabetes   Cholesterol Screening Once every 5 years if you don't have a lipid disorder  May order more often based on risk factors  Lipid panel: 02/24/2022    Screening Not Indicated  History Lipid Disorder     Other Preventive Screenings Covered by Medicare:  1  Abdominal Aortic Aneurysm (AAA) Screening: covered once if your at risk  You're considered to be at risk if you have a family history of AAA  2  Lung Cancer Screening: covers low dose CT scan once per year if you meet all of the following conditions: (1) Age 50-69; (2) No signs or symptoms of lung cancer; (3) Current smoker or have quit smoking within the last 15 years; (4) You have a tobacco smoking history of at least 20 pack years (packs per day multiplied by number of years you smoked); (5) You get a written order from a healthcare provider  3  Glaucoma Screening: covered annually if you're considered high risk: (1) You have diabetes OR (2) Family history of glaucoma OR (3)  aged 48 and older OR (3)  American aged 72 and older  3  Osteoporosis Screening: covered every 2 years if you meet one of the following conditions: (1) You're estrogen deficient and at risk for osteoporosis based off medical history and other findings; (2) Have a vertebral abnormality; (3) On glucocorticoid therapy for more than 3 months; (4) Have primary hyperparathyroidism; (5) On osteoporosis medications and need to assess response to drug therapy  · Last bone density test (DXA Scan): 05/02/2022   5  HIV Screening: covered annually if you're between the age of 15-65  Also covered annually if you are younger than 13 and older than 72 with risk factors for HIV infection   For pregnant patients, it is covered up to 3 times per pregnancy  Immunizations:  Immunization Recommendations   Influenza Vaccine Annual influenza vaccination during flu season is recommended for all persons aged >= 6 months who do not have contraindications   Pneumococcal Vaccine   * Pneumococcal conjugate vaccine = PCV13 (Prevnar 13), PCV15 (Vaxneuvance), PCV20 (Prevnar 20)  * Pneumococcal polysaccharide vaccine = PPSV23 (Pneumovax) Adults 25-60 years old: 1-3 doses may be recommended based on certain risk factors  Adults 72 years old: 1-2 doses may be recommended based off what pneumonia vaccine you previously received   Hepatitis B Vaccine 3 dose series if at intermediate or high risk (ex: diabetes, end stage renal disease, liver disease)   Tetanus (Td) Vaccine - COST NOT COVERED BY MEDICARE PART B Following completion of primary series, a booster dose should be given every 10 years to maintain immunity against tetanus  Td may also be given as tetanus wound prophylaxis  Tdap Vaccine - COST NOT COVERED BY MEDICARE PART B Recommended at least once for all adults  For pregnant patients, recommended with each pregnancy  Shingles Vaccine (Shingrix) - COST NOT COVERED BY MEDICARE PART B  2 shot series recommended in those aged 48 and above     Health Maintenance Due:      Topic Date Due   • Cervical Cancer Screening  01/16/2020   • Colorectal Cancer Screening  05/04/2023   • DXA SCAN  05/02/2025   • Hepatitis C Screening  Completed     Immunizations Due:      Topic Date Due   • COVID-19 Vaccine (4 - Pfizer series) 01/08/2022     Advance Directives   What are advance directives? Advance directives are legal documents that state your wishes and plans for medical care  These plans are made ahead of time in case you lose your ability to make decisions for yourself  Advance directives can apply to any medical decision, such as the treatments you want, and if you want to donate organs     What are the types of advance directives? There are many types of advance directives, and each state has rules about how to use them  You may choose a combination of any of the following:  · Living will: This is a written record of the treatment you want  You can also choose which treatments you do not want, which to limit, and which to stop at a certain time  This includes surgery, medicine, IV fluid, and tube feedings  · Durable power of  for healthcare [facility]): This is a written record that states who you want to make healthcare choices for you when you are unable to make them for yourself  This person, called a proxy, is usually a family member or a friend  You may choose more than 1 proxy  · Do not resuscitate (DNR) order:  A DNR order is used in case your heart stops beating or you stop breathing  It is a request not to have certain forms of treatment, such as CPR  A DNR order may be included in other types of advance directives  · Medical directive: This covers the care that you want if you are in a coma, near death, or unable to make decisions for yourself  You can list the treatments you want for each condition  Treatment may include pain medicine, surgery, blood transfusions, dialysis, IV or tube feedings, and a ventilator (breathing machine)  · Values history: This document has questions about your views, beliefs, and how you feel and think about life  This information can help others choose the care that you would choose  Why are advance directives important? An advance directive helps you control your care  Although spoken wishes may be used, it is better to have your wishes written down  Spoken wishes can be misunderstood, or not followed  Treatments may be given even if you do not want them  An advance directive may make it easier for your family to make difficult choices about your care  Urinary Incontinence   Urinary incontinence (UI)  is when you lose control of your bladder   UI develops because your bladder cannot store or empty urine properly  The 3 most common types of UI are stress incontinence, urge incontinence, or both  Medicines:   · May be given to help strengthen your bladder control  Report any side effects of medication to your healthcare provider  Do pelvic muscle exercises often:  Your pelvic muscles help you stop urinating  Squeeze these muscles tight for 5 seconds, then relax for 5 seconds  Gradually work up to squeezing for 10 seconds  Do 3 sets of 15 repetitions a day, or as directed  This will help strengthen your pelvic muscles and improve bladder control  Train your bladder:  Go to the bathroom at set times, such as every 2 hours, even if you do not feel the urge to go  You can also try to hold your urine when you feel the urge to go  For example, hold your urine for 5 minutes when you feel the urge to go  As that becomes easier, hold your urine for 10 minutes  Self-care:   · Keep a UI record  Write down how often you leak urine and how much you leak  Make a note of what you were doing when you leaked urine  · Drink liquids as directed  You may need to limit the amount of liquid you drink to help control your urine leakage  Do not drink any liquid right before you go to bed  Limit or do not have drinks that contain caffeine or alcohol  · Prevent constipation  Eat a variety of high-fiber foods  Good examples are high-fiber cereals, beans, vegetables, and whole-grain breads  Walking is the best way to trigger your intestines to have a bowel movement  · Exercise regularly and maintain a healthy weight  Weight loss and exercise will decrease pressure on your bladder and help you control your leakage  · Use a catheter as directed  to help empty your bladder  A catheter is a tiny, plastic tube that is put into your bladder to drain your urine  · Go to behavior therapy as directed  Behavior therapy may be used to help you learn to control your urge to urinate      Weight Management Why it is important to manage your weight:  Being overweight increases your risk of health conditions such as heart disease, high blood pressure, type 2 diabetes, and certain types of cancer  It can also increase your risk for osteoarthritis, sleep apnea, and other respiratory problems  Aim for a slow, steady weight loss  Even a small amount of weight loss can lower your risk of health problems  How to lose weight safely:  A safe and healthy way to lose weight is to eat fewer calories and get regular exercise  You can lose up about 1 pound a week by decreasing the number of calories you eat by 500 calories each day  Healthy meal plan for weight management:  A healthy meal plan includes a variety of foods, contains fewer calories, and helps you stay healthy  A healthy meal plan includes the following:  · Eat whole-grain foods more often  A healthy meal plan should contain fiber  Fiber is the part of grains, fruits, and vegetables that is not broken down by your body  Whole-grain foods are healthy and provide extra fiber in your diet  Some examples of whole-grain foods are whole-wheat breads and pastas, oatmeal, brown rice, and bulgur  · Eat a variety of vegetables every day  Include dark, leafy greens such as spinach, kale, mechelle greens, and mustard greens  Eat yellow and orange vegetables such as carrots, sweet potatoes, and winter squash  · Eat a variety of fruits every day  Choose fresh or canned fruit (canned in its own juice or light syrup) instead of juice  Fruit juice has very little or no fiber  · Eat low-fat dairy foods  Drink fat-free (skim) milk or 1% milk  Eat fat-free yogurt and low-fat cottage cheese  Try low-fat cheeses such as mozzarella and other reduced-fat cheeses  · Choose meat and other protein foods that are low in fat  Choose beans or other legumes such as split peas or lentils  Choose fish, skinless poultry (chicken or turkey), or lean cuts of red meat (beef or pork)   Before you cook meat or poultry, cut off any visible fat  · Use less fat and oil  Try baking foods instead of frying them  Add less fat, such as margarine, sour cream, regular salad dressing and mayonnaise to foods  Eat fewer high-fat foods  Some examples of high-fat foods include french fries, doughnuts, ice cream, and cakes  · Eat fewer sweets  Limit foods and drinks that are high in sugar  This includes candy, cookies, regular soda, and sweetened drinks  Exercise:  Exercise at least 30 minutes per day on most days of the week  Some examples of exercise include walking, biking, dancing, and swimming  You can also fit in more physical activity by taking the stairs instead of the elevator or parking farther away from stores  Ask your healthcare provider about the best exercise plan for you  © Copyright [address] 2018 Information is for End User's use only and may not be sold, redistributed or otherwise used for commercial purposes   All illustrations and images included in CareNotes® are the copyrighted property of A D A M , Inc  or [address] [place] [address]

## 2023-05-25 ENCOUNTER — TELEPHONE (OUTPATIENT)
Dept: PSYCHIATRY | Facility: CLINIC | Age: 73
End: 2023-05-25

## 2023-05-25 ENCOUNTER — PROCEDURE VISIT (OUTPATIENT)
Dept: OBGYN CLINIC | Facility: HOSPITAL | Age: 73
End: 2023-05-25

## 2023-05-25 VITALS
BODY MASS INDEX: 42.66 KG/M2 | SYSTOLIC BLOOD PRESSURE: 145 MMHG | HEIGHT: 59 IN | DIASTOLIC BLOOD PRESSURE: 86 MMHG | HEART RATE: 86 BPM

## 2023-05-25 DIAGNOSIS — M17.0 PRIMARY OSTEOARTHRITIS OF BOTH KNEES: Primary | ICD-10-CM

## 2023-05-25 NOTE — PROGRESS NOTES
Subjective; Adult female patient known to the practice  Has established osteoarthritis of both knees  She has received capable care from the use of Euflexxa in the past   Due to changes she is now required to observe and use Orthovisc  Is undergoing her first 3 shot series of Orthovisc for both knees    Pain scale   7 5  / 10    Past Medical History:   Diagnosis Date   • Alopecia    • Calcaneal spur    • Diabetes insipidus ([address] )    • Diabetes mellitus ([facility] [address] )    • Hyperlipidemia    • Hypertension    • Osteoarthrosis 2/13/2013   • Pes planus     unspecified laterality   • Plantar fasciitis        Past Surgical History:   Procedure Laterality Date   • BREAST BIOPSY Right     patient not sure when , negative results   • BREAST CYST ASPIRATION Left     patient not sure when negative results   • CARPAL TUNNEL RELEASE     • COLONOSCOPY      May 2006   • INCISIONAL BREAST BIOPSY     • LA ARTHRP ACETBLR/PROX FEM PROSTC AGRFT/ALGRFT Right 9/23/2019    Procedure: ARTHROPLASTY HIP TOTAL;  Surgeon: Aspen Jackson MD;  [facility];  Service: Orthopedics   • LA COLONOSCOPY FLX DX W/COLLJ SPEC WHEN PFRMD N/A 5/4/2018    Procedure: COLONOSCOPY;  Surgeon: Sandoval Mejia MD;  Location: [facility];   Service: Colorectal   • WISDOM TOOTH EXTRACTION         Family History   Problem Relation Age of Onset   • Diabetes Mother    • Hypertension Mother    • Emphysema Father    • No Known Problems Maternal Grandmother    • No Known Problems Maternal Grandfather    • No Known Problems Paternal Grandmother    • No Known Problems Paternal Grandfather    • Colon cancer Brother 40   • No Known Problems Brother    • No Known Problems Maternal Aunt    • No Known Problems Maternal Aunt    • No Known Problems Paternal Aunt    • No Known Problems Paternal Aunt    • No Known Problems Paternal Aunt        Social History     Tobacco Use   • Smoking status: Never   • Smokeless tobacco: Never   Vaping Use   • Vaping Use: Never used Substance Use Topics   • Alcohol use: Yes     Alcohol/week: 0 0 standard drinks of alcohol     Comment: 0   • Drug use: No     Exam;    Her knees have no evidence of fluid distention or effusion  Knees have no redness no bruising no palpable warmth and allow for safe administration of medication  Large joint arthrocentesis: R knee  Procedure Details  Location: knee - R knee  Needle size: 22 G (RP 27 G)  Medications administered: 30 mg sodium hyaluronate 30 mg/2 mL    Patient tolerance: patient tolerated the procedure well with no immediate complications  Dressing:  Sterile dressing applied    Large joint arthrocentesis: L knee  Procedure Details  Location: knee - L knee  Needle size: 22 G (RP 27 G)  Medications administered: 30 mg sodium hyaluronate 30 mg/2 mL    Patient tolerance: patient tolerated the procedure well with no immediate complications  Dressing:  Sterile dressing applied      Impression;    Bilateral knee osteoarthritis    Plan;    She received Orthovisc injections for both knees  Returns next week to complete the 3 shot regimen  Her experience was supervised by and plan formulated by the attending surgeon

## 2023-06-01 ENCOUNTER — PROCEDURE VISIT (OUTPATIENT)
Dept: OBGYN CLINIC | Facility: HOSPITAL | Age: 73
End: 2023-06-01

## 2023-06-01 VITALS
HEIGHT: 59 IN | BODY MASS INDEX: 42.66 KG/M2 | SYSTOLIC BLOOD PRESSURE: 127 MMHG | HEART RATE: 81 BPM | DIASTOLIC BLOOD PRESSURE: 75 MMHG

## 2023-06-01 DIAGNOSIS — M25.561 CHRONIC PAIN OF BOTH KNEES: Primary | ICD-10-CM

## 2023-06-01 DIAGNOSIS — M25.562 CHRONIC PAIN OF BOTH KNEES: Primary | ICD-10-CM

## 2023-06-01 DIAGNOSIS — M17.0 PRIMARY OSTEOARTHRITIS OF BOTH KNEES: ICD-10-CM

## 2023-06-01 DIAGNOSIS — G89.29 CHRONIC PAIN OF BOTH KNEES: Primary | ICD-10-CM

## 2023-06-01 NOTE — PROGRESS NOTES
Assessment:  1  Chronic pain of both knees  Large joint arthrocentesis: R knee    Large joint arthrocentesis: L knee      2  Primary osteoarthritis of both knees  Large joint arthrocentesis: R knee    Large joint arthrocentesis: L knee          Plan:  3rd bilateral Orthovisc (RP-27g)  The patient was provided with 3rd bilateral Orthovisc injections  The patient tolerated the procedure well  The patient should follow up in 3 months  To do next visit:  Return in about 3 months (around 9/1/2023)  The above stated was discussed in layman's terms and the patient expressed understanding  All questions were answered to the patient's satisfaction  Scribe Attestation    I,:  Keyon Stiles am acting as a scribe while in the presence of the attending physician :       I,:  Genna Phoenix, MD personally performed the services described in this documentation    as scribed in my presence :             Subjective:   Cain Albarran is a 74 y o  female who presents for 3rd bilateral Orthovisc  Today she complains of bilateral generalized knee pain  Prolonged weight bearing and repetitive bending aggravates while rest alleviates          Review of systems negative unless otherwise specified in HPI    Past Medical History:   Diagnosis Date   • Alopecia    • Calcaneal spur    • Diabetes insipidus ([address] )    • Diabetes mellitus ([address] )    • Hyperlipidemia    • Hypertension    • Osteoarthrosis 2/13/2013   • Pes planus     unspecified laterality   • Plantar fasciitis        Past Surgical History:   Procedure Laterality Date   • BREAST BIOPSY Right     patient not sure when , negative results   • BREAST CYST ASPIRATION Left     patient not sure when negative results   • CARPAL TUNNEL RELEASE     • COLONOSCOPY      May 2006   • INCISIONAL BREAST BIOPSY     • AR ARTHRP ACETBLR/PROX FEM PROSTC AGRFT/ALGRFT Right 9/23/2019    Procedure: ARTHROPLASTY HIP TOTAL;  Surgeon: Allyson Velasquez MD;  [facility];  Service: Orthopedics   • NE COLONOSCOPY FLX DX W/COLLJ SPEC WHEN PFRMD N/A 5/4/2018    Procedure: COLONOSCOPY;  Surgeon: Montrell Clark MD;  [facility]: [facility]; Service: Colorectal   • WISDOM TOOTH EXTRACTION         Family History   Problem Relation Age of Onset   • Diabetes Mother    • Hypertension Mother    • Emphysema Father    • No Known Problems Maternal Grandmother    • No Known Problems Maternal Grandfather    • No Known Problems Paternal Grandmother    • No Known Problems Paternal Grandfather    • Colon cancer Brother 40   • No Known Problems Brother    • No Known Problems Maternal Aunt    • No Known Problems Maternal Aunt    • No Known Problems Paternal Aunt    • No Known Problems Paternal Aunt    • No Known Problems Paternal Aunt        Social History     Occupational History   • Not on file   Tobacco Use   • Smoking status: Never   • Smokeless tobacco: Never   Vaping Use   • Vaping Use: Never used   Substance and Sexual Activity   • Alcohol use:  Yes     Alcohol/week: 0 0 standard drinks of alcohol     Comment: 0   • Drug use: No   • Sexual activity: Not Currently         Current Outpatient Medications:   •  acetaminophen (TYLENOL) 500 mg tablet, Take 1,000 mg by mouth every 6 (six) hours as needed for mild pain (Patient not taking: Reported on 5/24/2023), Disp: , Rfl:   •  Biotin 1 MG CAPS, Take by mouth daily , Disp: , Rfl:   •  Blood Pressure Monitoring (SPHYGMOMANOMETER) MISC, by Does not apply route 2 (two) times a day, Disp: 1 each, Rfl: 0  •  Calcium Carb-Cholecalciferol (Calcium 1000 + D) 1000-800 MG-UNIT TABS, Take by mouth (Patient not taking: Reported on 5/24/2023), Disp: , Rfl:   •  cephalexin (KEFLEX) 500 mg capsule, Take 500 mg by mouth if needed Prior to procedure  (Patient not taking: Reported on 5/24/2023), Disp: , Rfl:   •  cholecalciferol (VITAMIN D3) 1,000 units tablet, Take 1,000 Units by mouth daily, Disp: , Rfl:   •  DULoxetine (CYMBALTA) 20 mg capsule, Take 1 capsule (20 mg total) by "mouth daily, Disp: 30 capsule, Rfl: 1  •  gabapentin (NEURONTIN) 300 mg capsule, TAKE 1 CAPSULE BY MOUTH THREE TIMES DAILY, Disp: 90 capsule, Rfl: 5  •  losartan (COZAAR) 100 MG tablet, Take 1 tablet (100 mg total) by mouth daily, Disp: 90 tablet, Rfl: 3  •  metFORMIN (GLUCOPHAGE) 500 mg tablet, TAKE 1 TABLET BY MOUTH TWO TIMES DAILY WITH MEALS, Disp: 180 tablet, Rfl: 1  •  metoprolol tartrate (LOPRESSOR) 50 mg tablet, Take 1 tablet (50 mg total) by mouth every 12 (twelve) hours, Disp: 180 tablet, Rfl: 2  •  Multiple Vitamin (MULTI-VITAMIN DAILY PO), Take by mouth daily  (Patient not taking: Reported on 3/3/2022), Disp: , Rfl:   •  simvastatin (ZOCOR) 20 mg tablet, TAKE 1 TABLET BY MOUTH EVERY DAY, Disp: 90 tablet, Rfl: 1    Allergies   Allergen Reactions   • Meloxicam Hives   • Nifedipine Edema   • Sulfamethoxazole-Trimethoprim Edema            Vitals:    06/01/23 1542   BP: 127/75   Pulse: 81       Objective:  Physical exam  · General: Awake, Alert, Oriented  · Eyes: Pupils equal, round and reactive to light  · Heart: regular rate and rhythm  · Lungs: No audible wheezing  · Abdomen: soft                    Ortho Exam  Bilateral knees:  No erythema or ecchymosis  No effusion or swelling  Normal strength  Good ROM with crepitus   Calf compartments soft and supple  Sensation intact  Toes are warm sensate and mobile      Diagnostics, reviewed and taken today if performed as documented:    None performed     Procedures, if performed today:    Large joint arthrocentesis: R knee  Universal Protocol:  Consent: Verbal consent obtained  Risks and benefits: risks, benefits and alternatives were discussed  Consent given by: patient  Time out: Immediately prior to procedure a \"time out\" was called to verify the correct patient, procedure, equipment, support staff and site/side marked as required    Timeout called at: 6/1/2023 3:45 PM   Patient understanding: patient states understanding of the procedure being " "performed  Patient identity confirmed: verbally with patient    Supporting Documentation  Indications: pain   Procedure Details  Location: knee - R knee  Preparation: Patient was prepped and draped in the usual sterile fashion  Needle size: 22 G  Ultrasound guidance: no  Approach: anterolateral  Medications administered: 30 mg sodium hyaluronate 30 mg/2 mL    Patient tolerance: patient tolerated the procedure well with no immediate complications  Dressing:  Sterile dressing applied    Large joint arthrocentesis: L knee  Universal Protocol:  Consent: Verbal consent obtained  Risks and benefits: risks, benefits and alternatives were discussed  Consent given by: patient  Time out: Immediately prior to procedure a \"time out\" was called to verify the correct patient, procedure, equipment, support staff and site/side marked as required  Timeout called at: 6/1/2023 3:45 PM   Patient understanding: patient states understanding of the procedure being performed  Patient identity confirmed: verbally with patient    Supporting Documentation  Indications: pain   Procedure Details  Location: knee - L knee  Preparation: Patient was prepped and draped in the usual sterile fashion  Needle size: 22 G  Ultrasound guidance: no  Approach: anterolateral  Medications administered: 30 mg sodium hyaluronate 30 mg/2 mL    Patient tolerance: patient tolerated the procedure well with no immediate complications  Dressing:  Sterile dressing applied            Portions of the record may have been created with voice recognition software  Occasional wrong word or \"sound a like\" substitutions may have occurred due to the inherent limitations of voice recognition software  Read the chart carefully and recognize, using context, where substitutions have occurred      "

## 2023-06-06 ENCOUNTER — TELEPHONE (OUTPATIENT)
Dept: FAMILY MEDICINE CLINIC | Facility: CLINIC | Age: 73
End: 2023-06-06

## 2023-06-06 NOTE — TELEPHONE ENCOUNTER
Left detailed message----- Message from Hugo Calderon MD sent at 6/5/2023  2:06 PM EDT -----  Labs from 5/2023 are stable

## 2023-06-07 ENCOUNTER — TELEPHONE (OUTPATIENT)
Age: 73
End: 2023-06-07

## 2023-06-07 NOTE — TELEPHONE ENCOUNTER
Last FC 5/4/2018, CE, DENNIS; WNL  5 yr recall  Fm hx of colon CA in brother  BMI 42  Scheduled for 8/16/20523, DENNIS, TR  Mailed information to pt

## 2023-06-07 NOTE — TELEPHONE ENCOUNTER
Patient calling to schedule recall colonoscopy  [facility] 5/4/18 STEFFANY CARMONA 5 yr recall  Verified information  Please call to schedule

## 2023-06-07 NOTE — LETTER
[address] [place] [address]        FLEXIBLE COLONOSCOPY INSTRUCTIONS  PLEASE NOTE    AS OF JUNE 1, 2014, OUR OFFICE REQUIRES 72 HOURS NOTICE OF CANCELLATION/RESCHEDULE OF A PROCEDURE TO AVOID INCURRING A MISSED APPOINTMENT FEE  Your Colonoscopy Procedure has been scheduled at:[facility]  [address] , [facility], [address] [facility]     The Date of your Procedure is: August 16, 2023        The hospital facility will contact you the evening prior to your scheduled procedure with your arrival time  Use the bowel preparation as directed  Check with your family doctor if you are taking a blood thinner (Coumadin, Plavix, Xarelto, Pradaxa, Gingko biloba, Ginseng, Feverfew, St  Yevgeniy's Wort)  We suggest stopping these for 3 days  Special instructions may be needed if you are taking aspirin or any aspirin-containing medication  Check with your family physician  If you are on DIABETIC MEDICATION (tablets or insulin) your doctor may make changes in your preparation  Take all medications usual unless otherwise instructed  As always, if you have any questions or concerns, feel free to call the office  Our  staff will be happy to connect you with one of the nurses to answer any questions or address any concerns you have regarding your procedure  Do not wear any jewelry the day of your procedure including wedding rings  Arrangements must be made for a responsible party to drive you home from the procedure  If you do not have a responsible family member or friend to drive you home, the procedure will not be done  Vast or a taxi is not acceptable  It is important you notify our office of any insurance changes prior to your procedure and, if necessary, supply us with referrals from your primary care physician          COLONOSCOPY PREPARATION INSTRUCTIONS    Purchase (prescription not required):  · 238 gram bottle of Miralax® (Glycolax®)  · 4 Dulcolax® (Bisacodyl) Laxative Tablets  · 64 oz  bottle of Gatorade® or your preference of a non-carbonated clear liquid - NOT RED OR PURPLE     One Day Prior to Colonoscopy Procedure  · Nothing to eat the day before your procedure, only clear liquids  · It is important that you drink plenty of clear liquids throughout the day to prevent dehydration  Clear Liquids include:  o Water/Iced Tea/Lemonade/Gatorade®/Black Coffee or tea (no milk or creamers  o Soft drinks: orange, ginger ale, cola, Pepsi®, Sprite®, 7Up®  o Drew-Aid® (lemonade or orange flavors only)  o Strained fruit juices without pulp such as apple, white grape, white cranberry  o Jell-O®, lemon, lime or orange (no fruit or toppings)  o Popsicles, [place] Ice (No Ice Cream, sherbets, or fruit bars)  o Chicken or beef bouillon/broth  DO NOT EAT OR DRINK ANYTHING RED OR PURPLE  DO NOT DRINK ANY ALCOHOLIC BEVERAGES  DIABETIC PATIENTS: Consult your physician    At 4:00 pm, take (2) Dulcolax® (Bisacodyl) Laxative Tablets  Swallow the tablets whole with an 8 oz  glass of water  At 8:00 pm, take the additional (2) Dulcolax® (Bisacodyl) Laxative Tablets with 8 oz  of water  The package may direct you not to exceed (2) tablets at any time but for the purpose of this examination, you should take (4) total     Mix the 238 gm of Miralax® in 64 oz  of Gatorade® and shake the solution until the Miralax® is dissolved  You will drink half (32 oz) of this solution this evening, beginning between 4 and 6 o'clock  Drink 8 oz glassfuls at your own pace  It may take several hours to drink the solution  Remember to stay close to toilet facilities  DAY OF COLONOSCOPY PROCEDURE    Five (5) hours before your procedure, drink the other half (32 oz) of the Miralax®/Gatorade® mixture within a two (2) hour period  This may require you to get up very early if you are scheduled for an early procedure      NOTHING IS TO BE TAKEN BY MOUTH 3 HOURS PRIOR TO PROCEDURE  If you use an inhaler, please bring it with you to your procedure

## 2023-06-20 ENCOUNTER — HOSPITAL ENCOUNTER (OUTPATIENT)
Dept: NEUROLOGY | Facility: CLINIC | Age: 73
Discharge: HOME | End: 2023-06-20
Payer: COMMERCIAL

## 2023-06-20 DIAGNOSIS — M79.2 NEURALGIA AND NEURITIS, UNSPECIFIED: ICD-10-CM

## 2023-06-20 PROCEDURE — 95910 NRV CNDJ TEST 7-8 STUDIES: CPT | Performed by: PSYCHIATRY & NEUROLOGY

## 2023-06-20 PROCEDURE — 95886 MUSC TEST DONE W/N TEST COMP: CPT | Performed by: PSYCHIATRY & NEUROLOGY

## 2023-06-26 ENCOUNTER — OFFICE VISIT (OUTPATIENT)
Dept: PAIN MEDICINE | Facility: CLINIC | Age: 73
End: 2023-06-26
Payer: COMMERCIAL

## 2023-06-26 VITALS
SYSTOLIC BLOOD PRESSURE: 158 MMHG | HEIGHT: 59 IN | DIASTOLIC BLOOD PRESSURE: 84 MMHG | HEART RATE: 72 BPM | BODY MASS INDEX: 41.73 KG/M2 | WEIGHT: 207 LBS

## 2023-06-26 DIAGNOSIS — G89.4 CHRONIC PAIN SYNDROME: Primary | ICD-10-CM

## 2023-06-26 DIAGNOSIS — M54.16 LUMBAR RADICULOPATHY: ICD-10-CM

## 2023-06-26 DIAGNOSIS — M51.36 DDD (DEGENERATIVE DISC DISEASE), LUMBAR: ICD-10-CM

## 2023-06-26 DIAGNOSIS — E11.42 DIABETIC POLYNEUROPATHY ASSOCIATED WITH TYPE 2 DIABETES MELLITUS (HCC): ICD-10-CM

## 2023-06-26 DIAGNOSIS — M54.12 CERVICAL RADICULOPATHY: ICD-10-CM

## 2023-06-26 DIAGNOSIS — M47.816 LUMBAR SPONDYLOSIS: ICD-10-CM

## 2023-06-26 DIAGNOSIS — M43.16 SPONDYLOLISTHESIS, LUMBAR REGION: ICD-10-CM

## 2023-06-26 PROCEDURE — 99214 OFFICE O/P EST MOD 30 MIN: CPT | Performed by: NURSE PRACTITIONER

## 2023-06-26 NOTE — PROGRESS NOTES
Assessment:  1  Chronic pain syndrome    2  Diabetic polyneuropathy associated with type 2 diabetes mellitus (HCC)    3  Cervical radiculopathy    4  Lumbar radiculopathy    5  Spondylolisthesis, lumbar region    6  Lumbar spondylosis    7  DDD (degenerative disc disease), lumbar        Plan:  1  Patient will be scheduled for an L5-S1 LESI to address the inflammatory component of the patient's pain  Complete risks and benefits including bleeding, infection, tissue reaction, nerve injury and allergic reaction were discussed  The patient was agreeable and verbalized an understanding  2  Patient may continue gabapentin and duloxetine as prescribed  3  Continue to follow with orthopedics as scheduled  4  Continue with home exercise program  5  Follow-up pending results of procedure or sooner if needed    History of Present Illness: The patient is a 74 y o  female s/p right ELSA last seen on 06/24/2022 who presents for a follow up office visit in regards to chronic low back pain with neuropathic complaints in the lower extremities   Patient is status post LESI in March 2022 which she states provided relief for a few months  EMG of the BLE resulted as rather nonspecific findings, but no signs of radiculopathy or peripheral neuropathy  She continues on gabapentin which she mostly takes 300mg QHS and duloxetine 20mg was just recently started about 4 weeks ago  She does not notice much relief of her leg symptoms at this time  She rates her pain a 6/10 on the numeric pain rating scale  She describes her pain as burning, dull acing, sharp, throbbing, numbness and pins and needles  I have personally reviewed and/or updated the patient's past medical history, past surgical history, family history, social history, current medications, allergies, and vital signs today  Review of Systems:    Review of Systems   Respiratory: Negative for shortness of breath  Cardiovascular: Negative for chest pain  Gastrointestinal: Negative for constipation, diarrhea, nausea and vomiting  Musculoskeletal: Positive for gait problem  Negative for arthralgias, joint swelling and myalgias  Skin: Negative for rash  Neurological: Negative for dizziness, seizures and weakness  All other systems reviewed and are negative  Past Medical History:   Diagnosis Date   • Alopecia    • Calcaneal spur    • Diabetes insipidus ([facility] [address] )    • Diabetes mellitus ([facility] [address] )    • Hyperlipidemia    • Hypertension    • Osteoarthrosis 2/13/2013   • Pes planus     unspecified laterality   • Plantar fasciitis        Past Surgical History:   Procedure Laterality Date   • BREAST BIOPSY Right     patient not sure when , negative results   • BREAST CYST ASPIRATION Left     patient not sure when negative results   • CARPAL TUNNEL RELEASE     • COLONOSCOPY      May 2006   • INCISIONAL BREAST BIOPSY     • [place] ARTHRP ACETBLR/PROX FEM PROSTC AGRFT/ALGRFT Right 9/23/2019    Procedure: ARTHROPLASTY HIP TOTAL;  Surgeon: Anthony Hogan MD;  [facility];  Service: Orthopedics   • CA COLONOSCOPY FLX DX W/COLLJ SPEC WHEN PFRMD N/A 5/4/2018    Procedure: COLONOSCOPY;  Surgeon: Rosalba Romano MD;  Location: [facility];   Service: Colorectal   • WISDOM TOOTH EXTRACTION         Family History   Problem Relation Age of Onset   • Diabetes Mother    • Hypertension Mother    • Emphysema Father    • No Known Problems Maternal Grandmother    • No Known Problems Maternal Grandfather    • No Known Problems Paternal Grandmother    • No Known Problems Paternal Grandfather    • Colon cancer Brother 40   • No Known Problems Brother    • No Known Problems Maternal Aunt    • No Known Problems Maternal Aunt    • No Known Problems Paternal Aunt    • No Known Problems Paternal Aunt    • No Known Problems Paternal Aunt        Social History     Occupational History   • Not on file   Tobacco Use   • Smoking status: Never   • Smokeless tobacco: Never   Vaping Use   • Vaping Use: Never used   Substance and Sexual Activity   • Alcohol use:  Yes     Alcohol/week: 0 0 standard drinks of alcohol     Comment: 0   • Drug use: No   • Sexual activity: Not Currently         Current Outpatient Medications:   •  Biotin 1 MG CAPS, Take by mouth daily , Disp: , Rfl:   •  Blood Pressure Monitoring (SPHYGMOMANOMETER) MISC, by Does not apply route 2 (two) times a day, Disp: 1 each, Rfl: 0  •  cholecalciferol (VITAMIN D3) 1,000 units tablet, Take 1,000 Units by mouth daily, Disp: , Rfl:   •  DULoxetine (CYMBALTA) 20 mg capsule, Take 1 capsule (20 mg total) by mouth daily, Disp: 30 capsule, Rfl: 1  •  gabapentin (NEURONTIN) 300 mg capsule, TAKE 1 CAPSULE BY MOUTH THREE TIMES DAILY, Disp: 90 capsule, Rfl: 5  •  losartan (COZAAR) 100 MG tablet, Take 1 tablet (100 mg total) by mouth daily, Disp: 90 tablet, Rfl: 3  •  metFORMIN (GLUCOPHAGE) 500 mg tablet, TAKE 1 TABLET BY MOUTH TWO TIMES DAILY WITH MEALS, Disp: 180 tablet, Rfl: 1  •  metoprolol tartrate (LOPRESSOR) 50 mg tablet, Take 1 tablet (50 mg total) by mouth every 12 (twelve) hours, Disp: 180 tablet, Rfl: 2  •  simvastatin (ZOCOR) 20 mg tablet, TAKE 1 TABLET BY MOUTH EVERY DAY, Disp: 90 tablet, Rfl: 1  •  acetaminophen (TYLENOL) 500 mg tablet, Take 1,000 mg by mouth every 6 (six) hours as needed for mild pain (Patient not taking: Reported on 5/24/2023), Disp: , Rfl:   •  Calcium Carb-Cholecalciferol (Calcium 1000 + D) 1000-800 MG-UNIT TABS, Take by mouth (Patient not taking: Reported on 5/24/2023), Disp: , Rfl:   •  cephalexin (KEFLEX) 500 mg capsule, Take 500 mg by mouth if needed Prior to procedure  (Patient not taking: Reported on 5/24/2023), Disp: , Rfl:   •  Multiple Vitamin (MULTI-VITAMIN DAILY PO), Take by mouth daily  (Patient not taking: Reported on 3/3/2022), Disp: , Rfl:     Allergies   Allergen Reactions   • Meloxicam Hives   • Nifedipine Edema   • Sulfamethoxazole-Trimethoprim Edema       Physical Exam:    /84   Pulse 72   Ht 4' "11\" (1 499 m)   Wt 93 9 kg (207 lb)   BMI 41 81 kg/m²     Constitutional:normal, well developed, well nourished, alert, in no distress and non-toxic and no overt pain behavior    Eyes:anicteric  HEENT:grossly intact  Neck:supple, symmetric, trachea midline and no masses   Pulmonary:even and unlabored  Cardiovascular:No edema or pitting edema present  Skin:Normal without rashes or lesions and well hydrated  Psychiatric:Mood and affect appropriate  Neurologic:Cranial Nerves II-XII grossly intact  Musculoskeletal:antalgic gait but steady without assistive devices      Imaging  FL spine and pain procedure    (Results Pending)         Orders Placed This Encounter   Procedures   • FL spine and pain procedure     "

## 2023-06-29 DIAGNOSIS — E78.5 HYPERLIPIDEMIA, UNSPECIFIED HYPERLIPIDEMIA TYPE: ICD-10-CM

## 2023-06-29 RX ORDER — SIMVASTATIN 20 MG
TABLET ORAL
Qty: 90 TABLET | Refills: 1 | Status: SHIPPED | OUTPATIENT
Start: 2023-06-29

## 2023-07-08 NOTE — OCCUPATIONAL THERAPY NOTE
Occupational Therapy Treatment Note:     09/25/19 1040   Restrictions/Precautions   Weight Bearing Precautions Per Order Yes   RLE Weight Bearing Per Order WBAT   Pain Assessment   Pain Score 7   Pain Type Acute pain   Pain Location Hip   Pain Orientation Right   ADL   Where Assessed Chair   LB Dressing Assistance 4  Minimal Assistance   LB Dressing Deficit Don/doff R sock; Don/doff L sock; Thread RLE into underwear; Thread LLE into underwear;Pull up over hips   LB Dressing Comments LHAE, VC for carryover of use as well as in stance for stability for [facility]    Functional Standing Tolerance   Time ~ 2 mins   Activity standing during LBCM   Comments RW level, close SBA- Min A   Bed Mobility   Additional Comments OOB in chair upon presenatiton and at end of session   Transfers   Sit to Stand 5  Supervision   Additional items Increased time required   Stand to Sit 5  Supervision   Additional items Increased time required;Verbal cues   Stand pivot 5  Supervision   Additional items Increased time required   Additional Comments RW level, VC for technique and safety   Cognition   Overall Cognitive Status [facility]   Arousal/Participation Alert; Cooperative   Attention Attends with cues to redirect   Orientation Level Oriented X4   Memory Decreased recall of precautions   Following Commands Follows multistep commands without difficulty   Comments Pt is able to recall THP, however requires cues for functional carroyover of same during LB dressing, increased time spend this date on LHAE educaiton and practce with same, Pt was given handouts from OT tool kit on THP, LHAE for LB dressing as well as sit to stand progressions to improve funciontal carryover of THP      Activity Tolerance   Activity Tolerance Patient limited by fatigue   Medical Staff Made Aware NSG aware   Assessment   Assessment Pt was seen this date for OT tx session focusing on self care tasks, sit to stand progressions, tranfers, LHAE education, THP reinforcment and reeducation, LB dressing with LHAE education and practice, home set up and problem solving discussion, energy conservation educaiton, fall prevention educaiton and overall activity toelrance  Pt presnts seated OOB in chair, compeltes previously mentioned tasks at documented assist levels, please see above in flowsheet for details  Pt continues to be unsure about level of support available upon d/c reporting her brother lives only 2 blocks away however she would be uncomfortable asking to to assist her with self care tasks, toileting tasks or physicallying having to "handle" her as he is her little brother and having him assist is not to her comfort level  Educated pt on improtance of use of Neri [place] to be able to independently complete self cares and practiced with same, pt continues to require Min A at this time for overall stability in stance as well as increased cues on funciotnal use of LHAE while maintaining THP  Pt also educated on importance of having support at home upon d/c to maximize safety and decrease risk of falls, reports understanding however continues to report she is unsure if her brother will be staying with her upon d/c  Pt resting in chair at end of session with all needs in reach, Will continue to follow pt to assess funcitonal level in completion of full ADL task with good carryover of understanidng of THP with LHAE and overall safety with same as pt reports she would need to be completely independent with bathing, dressing, and toileting as she will not request assistance from her brother with same  Continue to follow with current POC      Plan   Treatment Interventions ADL retraining   Goal Expiration Date 10/04/19   OT Treatment Day 1   OT Frequency 3-5x/wk   Recommendation   OT Discharge Recommendation Other (Comment)  (Pending clarification of support available upon D/C)       LEWIS Perkins 0 = understands/communicates without difficulty

## 2023-07-18 ENCOUNTER — TELEPHONE (OUTPATIENT)
Dept: PAIN MEDICINE | Facility: MEDICAL CENTER | Age: 73
End: 2023-07-18

## 2023-07-18 ENCOUNTER — HOSPITAL ENCOUNTER (OUTPATIENT)
Dept: RADIOLOGY | Facility: CLINIC | Age: 73
Discharge: HOME | End: 2023-07-18
Payer: COMMERCIAL

## 2023-07-18 VITALS
DIASTOLIC BLOOD PRESSURE: 74 MMHG | TEMPERATURE: 97.8 F | SYSTOLIC BLOOD PRESSURE: 163 MMHG | RESPIRATION RATE: 18 BRPM | HEART RATE: 74 BPM | OXYGEN SATURATION: 92 %

## 2023-07-18 DIAGNOSIS — M54.16 LUMBAR RADICULOPATHY: ICD-10-CM

## 2023-07-18 PROCEDURE — 62323 NJX INTERLAMINAR LMBR/SAC: CPT | Performed by: ANESTHESIOLOGY

## 2023-07-18 RX ORDER — METHYLPREDNISOLONE ACETATE 80 MG/ML
80 INJECTION, SUSPENSION INTRA-ARTICULAR; INTRALESIONAL; INTRAMUSCULAR; PARENTERAL; SOFT TISSUE ONCE
Status: COMPLETED | OUTPATIENT
Start: 2023-07-18 | End: 2023-07-18

## 2023-07-18 RX ADMIN — IOHEXOL 1 ML: 300 INJECTION, SOLUTION INTRAVENOUS at 13:45

## 2023-07-18 RX ADMIN — METHYLPREDNISOLONE ACETATE 80 MG: 80 INJECTION, SUSPENSION INTRA-ARTICULAR; INTRALESIONAL; INTRAMUSCULAR; SOFT TISSUE at 13:46

## 2023-07-18 NOTE — TELEPHONE ENCOUNTER
Attempted to reach pt, left detailed message (per medical communication consent on file) advising of the same. Provided CB# if pt were to have any questions.

## 2023-07-18 NOTE — TELEPHONE ENCOUNTER
Caller: Rikki Nolan     Doctor: Dr Jerry Garcia   Reason for call: Patient calling stating have having dental work done and needs to speak to nurse.     Call back#: [phone]

## 2023-07-18 NOTE — H&P
History of Present Illness: The patient is a 74 y.o. female who presents with complaints of low back and leg pain. Past Medical History:   Diagnosis Date   • Alopecia    • Calcaneal spur    • Diabetes insipidus ([address])    • Diabetes mellitus ([address])    • Hyperlipidemia    • Hypertension    • Osteoarthrosis 2/13/2013   • Pes planus     unspecified laterality   • Plantar fasciitis        Past Surgical History:   Procedure Laterality Date   • BREAST BIOPSY Right     patient not sure when , negative results   • BREAST CYST ASPIRATION Left     patient not sure when negative results   • CARPAL TUNNEL RELEASE     • COLONOSCOPY      May 2006   • INCISIONAL BREAST BIOPSY     • [place] ARTHRP ACETBLR/PROX FEM PROSTC AGRFT/ALGRFT Right 9/23/2019    Procedure: ARTHROPLASTY HIP TOTAL;  Surgeon: Anita Meckel, MD;  [facility];  Service: Orthopedics   • [place] COLONOSCOPY FLX DX W/COLLJ SPEC WHEN PFRMD N/A 5/4/2018    Procedure: COLONOSCOPY;  Surgeon: Farhana Carpio MD;  Location: [facility];   Service: Colorectal   • WISDOM TOOTH EXTRACTION           Current Outpatient Medications:   •  acetaminophen (TYLENOL) 500 mg tablet, Take 1,000 mg by mouth every 6 (six) hours as needed for mild pain (Patient not taking: Reported on 5/24/2023), Disp: , Rfl:   •  Biotin 1 MG CAPS, Take by mouth daily , Disp: , Rfl:   •  Blood Pressure Monitoring (SPHYGMOMANOMETER) MISC, by Does not apply route 2 (two) times a day, Disp: 1 each, Rfl: 0  •  Calcium Carb-Cholecalciferol (Calcium 1000 + D) 1000-800 MG-UNIT TABS, Take by mouth (Patient not taking: Reported on 5/24/2023), Disp: , Rfl:   •  cephalexin (KEFLEX) 500 mg capsule, Take 500 mg by mouth if needed Prior to procedure  (Patient not taking: Reported on 5/24/2023), Disp: , Rfl:   •  cholecalciferol (VITAMIN D3) 1,000 units tablet, Take 1,000 Units by mouth daily, Disp: , Rfl:   •  DULoxetine (CYMBALTA) 20 mg capsule, Take 1 capsule (20 mg total) by mouth daily, Disp: 30 capsule, Rfl: 1  • gabapentin (NEURONTIN) 300 mg capsule, TAKE 1 CAPSULE BY MOUTH THREE TIMES DAILY, Disp: 90 capsule, Rfl: 5  •  losartan (COZAAR) 100 MG tablet, Take 1 tablet (100 mg total) by mouth daily, Disp: 90 tablet, Rfl: 3  •  metFORMIN (GLUCOPHAGE) 500 mg tablet, TAKE 1 TABLET BY MOUTH TWO TIMES DAILY WITH MEALS, Disp: 180 tablet, Rfl: 1  •  metoprolol tartrate (LOPRESSOR) 50 mg tablet, Take 1 tablet (50 mg total) by mouth every 12 (twelve) hours, Disp: 180 tablet, Rfl: 2  •  Multiple Vitamin (MULTI-VITAMIN DAILY PO), Take by mouth daily  (Patient not taking: Reported on 3/3/2022), Disp: , Rfl:   •  simvastatin (ZOCOR) 20 mg tablet, TAKE 1 TABLET BY MOUTH EVERY DAY, Disp: 90 tablet, Rfl: 1    Allergies   Allergen Reactions   • Meloxicam Hives   • Nifedipine Edema   • Sulfamethoxazole-Trimethoprim Edema       Physical Exam:   Vitals:    07/18/23 1318   BP: 149/86   Pulse: 75   Resp: 18   Temp: 97.8 °F (36.6 °C)   SpO2: 96%     General: Awake, Alert, Oriented x 3, Mood and affect appropriate  Respiratory: Respirations even and unlabored  Cardiovascular: Peripheral pulses intact; no edema  Musculoskeletal Exam: Bilateral lumbar paraspinals tender to palpation    ASA Score: 3         Assessment: Lumbar radiculopathy    Plan: L5-S1 LESI

## 2023-07-18 NOTE — DISCHARGE INSTRUCTIONS
Epidural Steroid Injection   WHAT YOU NEED TO KNOW:   An epidural steroid injection (MARYLIN) is a procedure to inject steroid medicine into the epidural space. The epidural space is between your spinal cord and vertebrae. Steroids reduce inflammation and fluid buildup in your spine that may be causing pain. You may be given pain medicine along with the steroids. ACTIVITY  Do not drive or operate machinery today. No strenuous activity today - bending, lifting, etc.  You may resume normal activites starting tomorrow - start slowly and as tolerated. You may shower today, but no tub baths or hot tubs. You may have numbness for several hours from the local anesthetic. Please use caution and common sense, especially with weight-bearing activities. CARE OF THE INJECTION SITE  If you have soreness or pain, apply ice to the area today (20 minutes on/20 minutes off). Starting tomorrow, you may use warm, moist heat or ice if needed. You may have an increase or change in your discomfort for 36-48 hours after your treatment. Apply ice and continue with any pain medication you have been prescribed. Notify the [facility] if you have any of the following: redness, drainage, swelling, headache, stiff neck or fever above 100°F.    SPECIAL INSTRUCTIONS  Our office will contact you in approximately 7 days for a progress report. MEDICATIONS  Continue to take all routine medications. Our office may have instructed you to hold some medications. As no general anesthesia was used in today's procedure, you should not experience any side effects related to anesthesia. If you are diabetic, the steroids used in today's injection may temporarily increase your blood sugar levels after the first few days after your injection. Please keep a close eye on your sugars and alert the doctor who manages your diabetes if your sugars are significantly high from your baseline or you are symptomatic.      If you have a problem specifically related to your procedure, please call our office at [phone]. Problems not related to your procedure should be directed to your primary care physician.

## 2023-07-18 NOTE — TELEPHONE ENCOUNTER
RN s/w pt regarding previous. Per pt she was going over her procedure paperwork and wanted to check if ok to still have her LESI today. Per pt she does not have an active infection and is not treating any infections at present. 2 weeks ago she had a temp grown placed on her tooth and will be having the permanent crown placed. Also had an existing crown fall off that cannot be replaced and they will be extracting that tooth, not because of infection because there is not enough tooth to place a new crown on.    RN advise that because she does not have a current infection that she may come in for her LESI today with DAYNA at 13:20 and is aware RN will send this to [address] [place] [address] for his recs on same. --please advise thank you--  Agree ok to continue with planned LESI today?

## 2023-07-20 DIAGNOSIS — F32.A ANXIETY AND DEPRESSION: ICD-10-CM

## 2023-07-20 DIAGNOSIS — F41.9 ANXIETY AND DEPRESSION: ICD-10-CM

## 2023-07-20 RX ORDER — DULOXETIN HYDROCHLORIDE 20 MG/1
20 CAPSULE, DELAYED RELEASE ORAL DAILY
Qty: 30 CAPSULE | Refills: 1 | Status: SHIPPED | OUTPATIENT
Start: 2023-07-20

## 2023-07-25 ENCOUNTER — TELEPHONE (OUTPATIENT)
Dept: PAIN MEDICINE | Facility: CLINIC | Age: 73
End: 2023-07-25

## 2023-07-25 NOTE — TELEPHONE ENCOUNTER
Caller: Nasreen Marte  Doctor/office: Ignacio / Shruthi Fox      #: [phone]  % of improvement: 50%  Pain Scale (1-10): 3/10

## 2023-07-29 DIAGNOSIS — E11.65 CONTROLLED TYPE 2 DIABETES MELLITUS WITH HYPERGLYCEMIA, WITHOUT LONG-TERM CURRENT USE OF INSULIN (HCC): ICD-10-CM

## 2023-08-07 ENCOUNTER — TELEPHONE (OUTPATIENT)
Age: 73
End: 2023-08-07

## 2023-08-07 NOTE — TELEPHONE ENCOUNTER
Scheduled date of colonoscopy (as of today):10/11/2023  Physician performing colonoscopy: Dr Kay Beck  Location of colonoscopy:[facility]  Bowel prep reviewed with patient:miralax/dul  Instructions reviewed with patient by: pt already have instructions   Clearances: n/a

## 2023-08-26 DIAGNOSIS — I47.1 SVT (SUPRAVENTRICULAR TACHYCARDIA) (HCC): ICD-10-CM

## 2023-08-28 RX ORDER — METOPROLOL TARTRATE 50 MG/1
50 TABLET, FILM COATED ORAL EVERY 12 HOURS
Qty: 180 TABLET | Refills: 2 | Status: SHIPPED | OUTPATIENT
Start: 2023-08-28

## 2023-09-05 ENCOUNTER — OFFICE VISIT (OUTPATIENT)
Dept: OBGYN CLINIC | Facility: HOSPITAL | Age: 73
End: 2023-09-05
Payer: COMMERCIAL

## 2023-09-05 VITALS
HEART RATE: 80 BPM | BODY MASS INDEX: 41.73 KG/M2 | DIASTOLIC BLOOD PRESSURE: 91 MMHG | SYSTOLIC BLOOD PRESSURE: 144 MMHG | WEIGHT: 207 LBS | HEIGHT: 59 IN

## 2023-09-05 DIAGNOSIS — G89.29 CHRONIC PAIN OF BOTH KNEES: Primary | ICD-10-CM

## 2023-09-05 DIAGNOSIS — M25.561 CHRONIC PAIN OF BOTH KNEES: Primary | ICD-10-CM

## 2023-09-05 DIAGNOSIS — M25.562 CHRONIC PAIN OF BOTH KNEES: Primary | ICD-10-CM

## 2023-09-05 DIAGNOSIS — M17.0 PRIMARY OSTEOARTHRITIS OF BOTH KNEES: ICD-10-CM

## 2023-09-05 PROCEDURE — 20610 DRAIN/INJ JOINT/BURSA W/O US: CPT | Performed by: ORTHOPAEDIC SURGERY

## 2023-09-05 PROCEDURE — 99213 OFFICE O/P EST LOW 20 MIN: CPT | Performed by: ORTHOPAEDIC SURGERY

## 2023-09-05 RX ORDER — KETOROLAC TROMETHAMINE 30 MG/ML
60 INJECTION, SOLUTION INTRAMUSCULAR; INTRAVENOUS
Status: COMPLETED | OUTPATIENT
Start: 2023-09-05 | End: 2023-09-05

## 2023-09-05 RX ORDER — LIDOCAINE HYDROCHLORIDE 10 MG/ML
2 INJECTION, SOLUTION INFILTRATION; PERINEURAL
Status: COMPLETED | OUTPATIENT
Start: 2023-09-05 | End: 2023-09-05

## 2023-09-05 RX ORDER — BUPIVACAINE HYDROCHLORIDE 2.5 MG/ML
2 INJECTION, SOLUTION INFILTRATION; PERINEURAL
Status: COMPLETED | OUTPATIENT
Start: 2023-09-05 | End: 2023-09-05

## 2023-09-05 RX ADMIN — BUPIVACAINE HYDROCHLORIDE 2 ML: 2.5 INJECTION, SOLUTION INFILTRATION; PERINEURAL at 15:15

## 2023-09-05 RX ADMIN — LIDOCAINE HYDROCHLORIDE 2 ML: 10 INJECTION, SOLUTION INFILTRATION; PERINEURAL at 15:15

## 2023-09-05 RX ADMIN — KETOROLAC TROMETHAMINE 60 MG: 30 INJECTION, SOLUTION INTRAMUSCULAR; INTRAVENOUS at 15:15

## 2023-09-05 NOTE — PROGRESS NOTES
Assessment:  1. Chronic pain of both knees        2. Primary osteoarthritis of both knees            Plan:  Bilateral knee osteoarthritis. The patient was provided with bilateral knee Toradol injections. The patient tolerated the procedure well. Bilateral knee visco-supplement was ordered as this had provided significant decrease of pain, inflammation and crepitus with last application. The patient has on-going knee pain and stiffness in which interferes with their daily activity and sleep. Examination of knee includes crepitus with range of motion. The patient rates their pain a 10/10 at times. The patient has tried home exercise program learned from past physical therapy. Bracing has not provided relief. The patient has tried oral medications and steroid injections with limited benefit. The patient has been counseled on weight loss. She should follow up in 3 months for bilateral visco-supplement. To do next visit:  Return in about 3 months (around 12/5/2023) for visco-supplementation. The above stated was discussed in layman's terms and the patient expressed understanding. All questions were answered to the patient's satisfaction. Scribe Attestation    I,:  Laney Parrish am acting as a scribe while in the presence of the attending physician.:       I,:  Fish Nascimento MD personally performed the services described in this documentation    as scribed in my presence.:             Subjective:   Bong Monique is a 74 y.o. female who presents for follow up of bilateral knees. She is s/p bilateral Orthovisc injections with benefit, 6/1/023. Today she complains of return of bilateral generalized knee pain. Prolonged weight bearing and repetitive bending aggravates while rest alleviates. She has had steroid and Toradol injections in past with benefit.           Review of systems negative unless otherwise specified in HPI    Past Medical History:   Diagnosis Date   • Alopecia    • Calcaneal spur    • Diabetes insipidus (HCC)    • Diabetes mellitus ([address])    • Hyperlipidemia    • Hypertension    • Osteoarthrosis 2/13/2013   • Pes planus     unspecified laterality   • Plantar fasciitis        Past Surgical History:   Procedure Laterality Date   • BREAST BIOPSY Right     patient not sure when , negative results   • BREAST CYST ASPIRATION Left     patient not sure when negative results   • CARPAL TUNNEL RELEASE     • COLONOSCOPY      May 2006   • INCISIONAL BREAST BIOPSY     • ND ARTHRP ACETBLR/PROX FEM PROSTC AGRFT/ALGRFT Right 9/23/2019    Procedure: ARTHROPLASTY HIP TOTAL;  Surgeon: Prabha Arevalo MD;  Location: [facility];  Service: Orthopedics   • ND COLONOSCOPY FLX DX W/COLLJ SPEC WHEN PFRMD N/A 5/4/2018    Procedure: COLONOSCOPY;  Surgeon: Arelis Perez MD;  Location: [facility]; Service: Colorectal   • WISDOM TOOTH EXTRACTION         Family History   Problem Relation Age of Onset   • Diabetes Mother    • Hypertension Mother    • Emphysema Father    • No Known Problems Maternal Grandmother    • No Known Problems Maternal Grandfather    • No Known Problems Paternal Grandmother    • No Known Problems Paternal Grandfather    • Colon cancer Brother 40   • No Known Problems Brother    • No Known Problems Maternal Aunt    • No Known Problems Maternal Aunt    • No Known Problems Paternal Aunt    • No Known Problems Paternal Aunt    • No Known Problems Paternal Aunt        Social History     Occupational History   • Not on file   Tobacco Use   • Smoking status: Never   • Smokeless tobacco: Never   Vaping Use   • Vaping Use: Never used   Substance and Sexual Activity   • Alcohol use:  Yes     Alcohol/week: 0.0 standard drinks of alcohol     Comment: 0   • Drug use: No   • Sexual activity: Not Currently         Current Outpatient Medications:   •  acetaminophen (TYLENOL) 500 mg tablet, Take 1,000 mg by mouth every 6 (six) hours as needed for mild pain (Patient not taking: Reported on 5/24/2023), Disp: , Rfl:   •  Biotin 1 MG CAPS, Take by mouth daily , Disp: , Rfl:   •  Blood Pressure Monitoring (SPHYGMOMANOMETER) MISC, by Does not apply route 2 (two) times a day, Disp: 1 each, Rfl: 0  •  Calcium Carb-Cholecalciferol (Calcium 1000 + D) 1000-800 MG-UNIT TABS, Take by mouth (Patient not taking: Reported on 5/24/2023), Disp: , Rfl:   •  cephalexin (KEFLEX) 500 mg capsule, Take 500 mg by mouth if needed Prior to procedure  (Patient not taking: Reported on 5/24/2023), Disp: , Rfl:   •  cholecalciferol (VITAMIN D3) 1,000 units tablet, Take 1,000 Units by mouth daily, Disp: , Rfl:   •  DULoxetine (CYMBALTA) 20 mg capsule, TAKE 1 CAPSULE BY MOUTH EVERY DAY, Disp: 30 capsule, Rfl: 1  •  gabapentin (NEURONTIN) 300 mg capsule, TAKE 1 CAPSULE BY MOUTH THREE TIMES DAILY, Disp: 90 capsule, Rfl: 5  •  losartan (COZAAR) 100 MG tablet, Take 1 tablet (100 mg total) by mouth daily, Disp: 90 tablet, Rfl: 3  •  metFORMIN (GLUCOPHAGE) 500 mg tablet, TAKE 1 TABLET BY MOUTH TWO TIMES DAILY WITH MEALS, Disp: 180 tablet, Rfl: 1  •  metoprolol tartrate (LOPRESSOR) 50 mg tablet, TAKE 1 TABLET BY MOUTH EVERY 12 HOURS, Disp: 180 tablet, Rfl: 2  •  Multiple Vitamin (MULTI-VITAMIN DAILY PO), Take by mouth daily  (Patient not taking: Reported on 3/3/2022), Disp: , Rfl:   •  simvastatin (ZOCOR) 20 mg tablet, TAKE 1 TABLET BY MOUTH EVERY DAY, Disp: 90 tablet, Rfl: 1    Allergies   Allergen Reactions   • Meloxicam Hives   • Nifedipine Edema   • Sulfamethoxazole-Trimethoprim Edema            Vitals:    09/05/23 1536   BP: 144/91   Pulse: 80       Objective:  Physical exam  · General: Awake, Alert, Oriented  · Eyes: Pupils equal, round and reactive to light  · Heart: regular rate and rhythm  · Lungs: No audible wheezing  · Abdomen: soft                    Ortho Exam  Bilateral knees:  TTP over joint line  No erythema or ecchymosis  No effusion or swelling  Normal strength  Good ROM with crepitus   Calf compartments soft and supple  Sensation intact  Toes are warm sensate and mobile      Diagnostics, reviewed and taken today if performed as documented:    None performed    Procedures, if performed today:    Large joint arthrocentesis: R knee  Universal Protocol:  Consent: Verbal consent obtained. Risks and benefits: risks, benefits and alternatives were discussed  Consent given by: patient  Time out: Immediately prior to procedure a "time out" was called to verify the correct patient, procedure, equipment, support staff and site/side marked as required. Timeout called at: 9/5/2023 3:52 PM.  Patient understanding: patient states understanding of the procedure being performed  Site marked: the operative site was marked  Patient identity confirmed: verbally with patient    Supporting Documentation  Indications: pain   Procedure Details  Location: knee - R knee  Preparation: Patient was prepped and draped in the usual sterile fashion  Needle size: 22 G  Ultrasound guidance: no  Approach: anterolateral  Medications administered: 2 mL bupivacaine 0.25 %; 2 mL lidocaine 1 %; 60 mg ketorolac 60 mg/2 mL    Patient tolerance: patient tolerated the procedure well with no immediate complications  Dressing:  Sterile dressing applied    Large joint arthrocentesis: L knee  Universal Protocol:  Consent: Verbal consent obtained. Risks and benefits: risks, benefits and alternatives were discussed  Consent given by: patient  Time out: Immediately prior to procedure a "time out" was called to verify the correct patient, procedure, equipment, support staff and site/side marked as required.   Timeout called at: 9/5/2023 3:52 PM.  Patient understanding: patient states understanding of the procedure being performed  Site marked: the operative site was marked  Patient identity confirmed: verbally with patient    Supporting Documentation  Indications: pain   Procedure Details  Location: knee - L knee  Preparation: Patient was prepped and draped in the usual sterile fashion  Needle size: 22 G  Ultrasound guidance: no  Approach: anterolateral  Medications administered: 2 mL bupivacaine 0.25 %; 2 mL lidocaine 1 %; 60 mg ketorolac 60 mg/2 mL    Patient tolerance: patient tolerated the procedure well with no immediate complications  Dressing:  Sterile dressing applied               Portions of the record may have been created with voice recognition software. Occasional wrong word or "sound a like" substitutions may have occurred due to the inherent limitations of voice recognition software. Read the chart carefully and recognize, using context, where substitutions have occurred.

## 2023-09-13 ENCOUNTER — OFFICE VISIT (OUTPATIENT)
Dept: FAMILY MEDICINE CLINIC | Facility: CLINIC | Age: 73
End: 2023-09-13
Payer: COMMERCIAL

## 2023-09-13 VITALS
SYSTOLIC BLOOD PRESSURE: 124 MMHG | DIASTOLIC BLOOD PRESSURE: 78 MMHG | TEMPERATURE: 96.5 F | RESPIRATION RATE: 16 BRPM | BODY MASS INDEX: 42.5 KG/M2 | HEART RATE: 75 BPM | OXYGEN SATURATION: 96 % | WEIGHT: 210.8 LBS | HEIGHT: 59 IN

## 2023-09-13 DIAGNOSIS — F32.A ANXIETY AND DEPRESSION: ICD-10-CM

## 2023-09-13 DIAGNOSIS — I10 BENIGN ESSENTIAL HYPERTENSION: ICD-10-CM

## 2023-09-13 DIAGNOSIS — E11.65 CONTROLLED TYPE 2 DIABETES MELLITUS WITH HYPERGLYCEMIA, WITHOUT LONG-TERM CURRENT USE OF INSULIN (HCC): Primary | ICD-10-CM

## 2023-09-13 DIAGNOSIS — E78.5 HYPERLIPIDEMIA, UNSPECIFIED HYPERLIPIDEMIA TYPE: ICD-10-CM

## 2023-09-13 DIAGNOSIS — F41.9 ANXIETY AND DEPRESSION: ICD-10-CM

## 2023-09-13 PROBLEM — E11.40 DIABETES MELLITUS WITH NEUROPATHY (HCC): Status: ACTIVE | Noted: 2023-06-05

## 2023-09-13 LAB — SL AMB POCT HEMOGLOBIN AIC: 6 (ref ?–6.5)

## 2023-09-13 PROCEDURE — 99214 OFFICE O/P EST MOD 30 MIN: CPT | Performed by: FAMILY MEDICINE

## 2023-09-13 PROCEDURE — 83036 HEMOGLOBIN GLYCOSYLATED A1C: CPT | Performed by: FAMILY MEDICINE

## 2023-09-13 RX ORDER — METFORMIN HYDROCHLORIDE 500 MG/1
500 TABLET, EXTENDED RELEASE ORAL
Qty: 30 TABLET | Refills: 5 | Status: SHIPPED | OUTPATIENT
Start: 2023-09-13 | End: 2023-12-12

## 2023-09-13 RX ORDER — FOLIC ACID 1 MG/1
TABLET ORAL DAILY
COMMUNITY

## 2023-09-13 NOTE — PROGRESS NOTES
Name: Wilberto Bang      : 1950      MRN: [id number]  Encounter Provider: Naun Mensah MD  Encounter Date: 2023   Encounter department: [address] [place] [address],[address]     1. Controlled type 2 diabetes mellitus with hyperglycemia, without long-term current use of insulin (HCC)  Assessment & Plan:    Lab Results   Component Value Date    HGBA1C 6.0 2023     Change to metformin XR 500mg QD because of loose stool. Orders:  -     POCT hemoglobin A1c  -     metFORMIN (GLUCOPHAGE-XR) 500 mg 24 hr tablet; Take 1 tablet (500 mg total) by mouth daily with breakfast  -     CBC; Future; Expected date: 2024  -     Comprehensive metabolic panel; Future; Expected date: 2024  -     Lipid panel; Future; Expected date: 2024  -     TSH, 3rd generation with Free T4 reflex; Future; Expected date: 2024    2. Anxiety and depression  Assessment & Plan:  Better. Continue cymbalta 20mg QD. 3. Benign essential hypertension  Assessment & Plan:  DASH diet. Continue losartan 100mg QD. Orders:  -     CBC; Future; Expected date: 2024  -     Comprehensive metabolic panel; Future; Expected date: 2024  -     Lipid panel; Future; Expected date: 2024  -     TSH, 3rd generation with Free T4 reflex; Future; Expected date: 2024    4. Hyperlipidemia, unspecified hyperlipidemia type  Assessment & Plan:  Low fat diet. Continue simvastatin 20mg qhs. Orders:  -     CBC; Future; Expected date: 2024  -     Comprehensive metabolic panel; Future; Expected date: 2024  -     Lipid panel; Future; Expected date: 2024  -     TSH, 3rd generation with Free T4 reflex; Future; Expected date: 2024         Subjective      HPI     Pt is here by herself.   Insomnia/anxiety/depression---Better since she starts cymbalta 20mg QD. Pt states she saw pain specialist and orthopedics, got shots and her back pain/knee pain got better also.    Sleep still not great, but she is more relaxed.      DM---Today HgA1C 6.0 in office. She is on metformin 500mg bid. Had loose stool for a while. Some neuropathy on toes. She is on gabapentin 300mg qhs. Denies hypoglycemia. FU opthalmology yearly. FU podiatry.      HTN--- She is on losartan 100mg qhs and metoprolol 25mg bid.    SVT---FU cardiology. Denies palpitation, SOB, CP etc.      Hyperlipidemia---She is on simvastatin 20mg qhs. Denies SE.       Live by herself. Does all ADL's. Still drive. Denies recent falls. Review of Systems   Constitutional: Negative for appetite change, chills and fever. HENT: Negative for congestion, ear pain, sinus pain and sore throat. Eyes: Negative for discharge and itching. Respiratory: Negative for apnea, cough, chest tightness, shortness of breath and wheezing. Cardiovascular: Negative for chest pain, palpitations and leg swelling. Gastrointestinal: Negative for abdominal pain, anal bleeding, constipation, diarrhea, nausea and vomiting. Endocrine: Negative for cold intolerance, heat intolerance and polyuria. Genitourinary: Negative for difficulty urinating and dysuria. Musculoskeletal: Negative for arthralgias, back pain and myalgias. Skin: Negative for rash. Neurological: Negative for dizziness and headaches. Psychiatric/Behavioral: Positive for sleep disturbance. Negative for agitation, self-injury and suicidal ideas. The patient is not nervous/anxious.         Current Outpatient Medications on File Prior to Visit   Medication Sig   • Biotin 1 MG CAPS Take by mouth daily    • Blood Pressure Monitoring (SPHYGMOMANOMETER) MISC by Does not apply route 2 (two) times a day   • Calcium Carb-Cholecalciferol (Calcium 1000 + D) 1000-800 MG-UNIT TABS Take by mouth As needed   • cholecalciferol (VITAMIN D3) 1,000 units tablet Take 1,000 Units by mouth daily   • DULoxetine (CYMBALTA) 20 mg capsule TAKE 1 CAPSULE BY MOUTH EVERY DAY   • folic acid (FOLVITE) 1 mg tablet Take by mouth daily   • gabapentin (NEURONTIN) 300 mg capsule TAKE 1 CAPSULE BY MOUTH THREE TIMES DAILY (Patient taking differently: daily at bedtime)   • losartan (COZAAR) 100 MG tablet Take 1 tablet (100 mg total) by mouth daily   • metoprolol tartrate (LOPRESSOR) 50 mg tablet TAKE 1 TABLET BY MOUTH EVERY 12 HOURS   • simvastatin (ZOCOR) 20 mg tablet TAKE 1 TABLET BY MOUTH EVERY DAY   • [DISCONTINUED] metFORMIN (GLUCOPHAGE) 500 mg tablet TAKE 1 TABLET BY MOUTH TWO TIMES DAILY WITH MEALS   • acetaminophen (TYLENOL) 500 mg tablet Take 1,000 mg by mouth every 6 (six) hours as needed for mild pain (Patient not taking: Reported on 5/24/2023)   • cephalexin (KEFLEX) 500 mg capsule Take 500 mg by mouth if needed Prior to procedure  (Patient not taking: Reported on 5/24/2023)   • [DISCONTINUED] Multiple Vitamin (MULTI-VITAMIN DAILY PO) Take by mouth daily  (Patient not taking: Reported on 3/3/2022)       Objective     /78   Pulse 75   Temp (!) 96.5 °F (35.8 °C) (Temporal)   Resp 16   Ht 4' 11" (1.499 m)   Wt 95.6 kg (210 lb 12.8 oz)   SpO2 96%   BMI 42.58 kg/m²     Physical Exam  Constitutional:       General: She is not in acute distress. Appearance: She is well-developed. HENT:      Head: Normocephalic. Eyes:      General:         Right eye: No discharge. Left eye: No discharge. Conjunctiva/sclera: Conjunctivae normal.   Neck:      Thyroid: No thyromegaly. Cardiovascular:      Rate and Rhythm: Normal rate and regular rhythm. Heart sounds: Normal heart sounds. No murmur heard. No friction rub. No gallop. Pulmonary:      Effort: Pulmonary effort is normal. No respiratory distress. Breath sounds: Normal breath sounds. No wheezing or rales. Chest:      Chest wall: No tenderness. Abdominal:      General: Bowel sounds are normal. There is no distension. Palpations: Abdomen is soft. There is no mass. Tenderness: There is no abdominal tenderness.  There is no guarding or rebound. Musculoskeletal:         General: No tenderness or deformity. Normal range of motion. Cervical back: Normal range of motion and neck supple. No tenderness. Lymphadenopathy:      Cervical: No cervical adenopathy. Neurological:      Mental Status: She is alert.        Inez Mckeon MD

## 2023-09-18 ENCOUNTER — ESTABLISHED COMPREHENSIVE EXAM (OUTPATIENT)
Dept: URBAN - METROPOLITAN AREA CLINIC 6 | Facility: CLINIC | Age: 73
End: 2023-09-18

## 2023-09-18 DIAGNOSIS — H25.813: ICD-10-CM

## 2023-09-18 DIAGNOSIS — H04.123: ICD-10-CM

## 2023-09-18 DIAGNOSIS — E11.9: ICD-10-CM

## 2023-09-18 DIAGNOSIS — D31.32: ICD-10-CM

## 2023-09-18 PROCEDURE — 99213 OFFICE O/P EST LOW 20 MIN: CPT

## 2023-09-18 PROCEDURE — 2023F DILAT RTA XM W/O RTNOPTHY: CPT | Performed by: FAMILY MEDICINE

## 2023-09-18 ASSESSMENT — VISUAL ACUITY
OS_CC: 20/25-2
OU_CC: J1
OD_CC: 20/25

## 2023-09-18 ASSESSMENT — TONOMETRY
OS_IOP_MMHG: 15
OD_IOP_MMHG: 16

## 2023-10-20 ENCOUNTER — TELEPHONE (OUTPATIENT)
Dept: PSYCHIATRY | Facility: CLINIC | Age: 73
End: 2023-10-20

## 2023-10-20 NOTE — TELEPHONE ENCOUNTER
Spoke with patient in regard to referral for talk therapy services, patient informed me she is no longer interested at this time

## 2023-11-06 DIAGNOSIS — F32.A ANXIETY AND DEPRESSION: ICD-10-CM

## 2023-11-06 DIAGNOSIS — F41.9 ANXIETY AND DEPRESSION: ICD-10-CM

## 2023-11-06 RX ORDER — DULOXETIN HYDROCHLORIDE 20 MG/1
20 CAPSULE, DELAYED RELEASE ORAL DAILY
Qty: 30 CAPSULE | Refills: 1 | Status: SHIPPED | OUTPATIENT
Start: 2023-11-06

## 2023-11-24 ENCOUNTER — TELEPHONE (OUTPATIENT)
Dept: GASTROENTEROLOGY | Facility: CLINIC | Age: 73
End: 2023-11-24

## 2023-11-25 ENCOUNTER — VBI (OUTPATIENT)
Dept: ADMINISTRATIVE | Facility: OTHER | Age: 73
End: 2023-11-25

## 2024-01-01 DIAGNOSIS — E78.5 HYPERLIPIDEMIA, UNSPECIFIED HYPERLIPIDEMIA TYPE: ICD-10-CM

## 2024-01-02 RX ORDER — SIMVASTATIN 20 MG
TABLET ORAL
Qty: 90 TABLET | Refills: 1 | Status: SHIPPED | OUTPATIENT
Start: 2024-01-02

## 2024-01-03 ENCOUNTER — PROCEDURE VISIT (OUTPATIENT)
Dept: OBGYN CLINIC | Facility: HOSPITAL | Age: 74
End: 2024-01-03
Payer: COMMERCIAL

## 2024-01-03 VITALS
HEART RATE: 89 BPM | SYSTOLIC BLOOD PRESSURE: 158 MMHG | DIASTOLIC BLOOD PRESSURE: 100 MMHG | HEIGHT: 59 IN | BODY MASS INDEX: 43.55 KG/M2 | WEIGHT: 216 LBS

## 2024-01-03 DIAGNOSIS — G89.29 CHRONIC PAIN OF BOTH KNEES: ICD-10-CM

## 2024-01-03 DIAGNOSIS — M25.561 CHRONIC PAIN OF BOTH KNEES: ICD-10-CM

## 2024-01-03 DIAGNOSIS — M17.0 PRIMARY OSTEOARTHRITIS OF BOTH KNEES: Primary | ICD-10-CM

## 2024-01-03 DIAGNOSIS — M25.562 CHRONIC PAIN OF BOTH KNEES: ICD-10-CM

## 2024-01-03 PROCEDURE — 20610 DRAIN/INJ JOINT/BURSA W/O US: CPT

## 2024-01-03 NOTE — PROGRESS NOTES
Assessment:   Diagnosis ICD-10-CM Associated Orders   1. Primary osteoarthritis of both knees  M17.0       2. Chronic pain of both knees  M25.561     M25.562     G89.29           Plan:  - Known osteoarthritis to bilateral knees with chronic pain  - Orthovisc given to bilateral knees   - Follow up next week for second injections of series     Diagnostics reviewed and physical exam performed.  Diagnosis, treatment options and associated risks were discussed with the patient including no treatment, nonsurgical treatment and potential for surgical intervention.  The patient was given the opportunity to ask questions regarding each.        To do next visit:  Follow up next week for second of three injections of Orthovisc.     The above stated was discussed in layman's terms and the patient expressed understanding.  All questions were answered to the patient's satisfaction.         Subjective:   Lew Martins is a 73 y.o. female who presents for repeat Orthovisc injections to bilateral knees.  She last completed the series on 6/1/2023.  She admits to receiving continued relief from the injections, but does notice when the 6 months are due.    Pain score today 8/10.       Review of systems negative unless otherwise specified in HPI    Past Medical History:   Diagnosis Date    Alopecia     Calcaneal spur     Diabetes insipidus (HCC)     Diabetes mellitus (HCC)     Hyperlipidemia     Hypertension     Osteoarthrosis 2/13/2013    Pes planus     unspecified laterality    Plantar fasciitis        Past Surgical History:   Procedure Laterality Date    BREAST BIOPSY Right     patient not sure when , negative results    BREAST CYST ASPIRATION Left     patient not sure when negative results    CARPAL TUNNEL RELEASE      COLONOSCOPY      May 2006    INCISIONAL BREAST BIOPSY      [place] ARTHRP ACETBLR/PROX FEM PROSTC AGRFT/ALGRFT Right 9/23/2019    Procedure: ARTHROPLASTY HIP TOTAL;  Surgeon: Choco Quintero MD;  [facility];   Service: Orthopedics    [place] COLONOSCOPY FLX DX W/COLLJ SPEC WHEN PFRMD N/A 5/4/2018    Procedure: COLONOSCOPY;  Surgeon: Randee Beck MD;  Location: [facility];  Service: Colorectal    WISDOM TOOTH EXTRACTION         Family History   Problem Relation Age of Onset    Diabetes Mother     Hypertension Mother     Emphysema Father     No Known Problems Maternal Grandmother     No Known Problems Maternal Grandfather     No Known Problems Paternal Grandmother     No Known Problems Paternal Grandfather     Colon cancer Brother 37    No Known Problems Brother     No Known Problems Maternal Aunt     No Known Problems Maternal Aunt     No Known Problems Paternal Aunt     No Known Problems Paternal Aunt     No Known Problems Paternal Aunt        Social History     Occupational History    Not on file   Tobacco Use    Smoking status: Never    Smokeless tobacco: Never   Vaping Use    Vaping status: Never Used   Substance and Sexual Activity    Alcohol use: Yes     Alcohol/week: 0.0 standard drinks of alcohol     Comment: 0    Drug use: No    Sexual activity: Not Currently         Current Outpatient Medications:     acetaminophen (TYLENOL) 500 mg tablet, Take 1,000 mg by mouth every 6 (six) hours as needed for mild pain (Patient not taking: Reported on 5/24/2023), Disp: , Rfl:     Biotin 1 MG CAPS, Take by mouth daily , Disp: , Rfl:     Blood Pressure Monitoring (SPHYGMOMANOMETER) MISC, by Does not apply route 2 (two) times a day, Disp: 1 each, Rfl: 0    Calcium Carb-Cholecalciferol (Calcium 1000 + D) 1000-800 MG-UNIT TABS, Take by mouth As needed, Disp: , Rfl:     cephalexin (KEFLEX) 500 mg capsule, Take 500 mg by mouth if needed Prior to procedure  (Patient not taking: Reported on 5/24/2023), Disp: , Rfl:     cholecalciferol (VITAMIN D3) 1,000 units tablet, Take 1,000 Units by mouth daily, Disp: , Rfl:     DULoxetine (CYMBALTA) 20 mg capsule, Take 1 capsule (20 mg total) by mouth daily, Disp: 30 capsule, Rfl: 1    folic acid  (FOLVITE) 1 mg tablet, Take by mouth daily, Disp: , Rfl:     gabapentin (NEURONTIN) 300 mg capsule, TAKE 1 CAPSULE BY MOUTH THREE TIMES DAILY (Patient taking differently: daily at bedtime), Disp: 90 capsule, Rfl: 5    losartan (COZAAR) 100 MG tablet, Take 1 tablet (100 mg total) by mouth daily, Disp: 90 tablet, Rfl: 3    metFORMIN (GLUCOPHAGE-XR) 500 mg 24 hr tablet, Take 1 tablet (500 mg total) by mouth daily with breakfast, Disp: 30 tablet, Rfl: 5    metoprolol tartrate (LOPRESSOR) 50 mg tablet, TAKE 1 TABLET BY MOUTH EVERY 12 HOURS, Disp: 180 tablet, Rfl: 2    simvastatin (ZOCOR) 20 mg tablet, TAKE 1 TABLET BY MOUTH EVERY DAY, Disp: 90 tablet, Rfl: 1    Allergies   Allergen Reactions    Meloxicam Hives    Nifedipine Edema    Sulfamethoxazole-Trimethoprim Edema            Vitals:    01/03/24 1408   BP: 158/100   Pulse: 89       Objective:                    Right Knee Exam     Tenderness   The patient is experiencing no tenderness.     Range of Motion   Extension:  normal   Flexion:  normal     Other   Erythema: absent  Scars: absent  Swelling: none  Effusion: no effusion present      Left Knee Exam     Tenderness   The patient is experiencing no tenderness.     Range of Motion   Extension:  normal   Flexion:  normal     Other   Erythema: absent  Scars: absent  Swelling: none  Effusion: no effusion present            Diagnostics, reviewed and taken today if performed as documented:    None performed        Procedures, if performed today:    Large joint arthrocentesis: R knee  Universal Protocol:  Consent: Verbal consent obtained.  Risks and benefits: risks, benefits and alternatives were discussed  Consent given by: patient  Site marked: the operative site was marked  Supporting Documentation  Indications: pain   Procedure Details  Location: knee - R knee  Needle size: 22 G  Approach: anterolateral  Medications administered: 30 mg sodium hyaluronate 30 mg/2 mL    Patient tolerance: patient tolerated the procedure  "well with no immediate complications  Dressing:  Sterile dressing applied      Large joint arthrocentesis: L knee  Universal Protocol:  Consent: Verbal consent obtained.  Risks and benefits: risks, benefits and alternatives were discussed  Consent given by: patient  Site marked: the operative site was marked  Supporting Documentation  Indications: pain   Procedure Details  Location: knee - L knee  Needle size: 22 G  Approach: anterolateral  Medications administered: 30 mg sodium hyaluronate 30 mg/2 mL    Patient tolerance: patient tolerated the procedure well with no immediate complications  Dressing:  Sterile dressing applied          Portions of the record may have been created with voice recognition software.  Occasional wrong word or \"sound a like\" substitutions may have occurred due to the inherent limitations of voice recognition software.  Read the chart carefully and recognize, using context, where substitutions have occurred.      "

## 2024-01-05 DIAGNOSIS — F32.A ANXIETY AND DEPRESSION: ICD-10-CM

## 2024-01-05 DIAGNOSIS — F41.9 ANXIETY AND DEPRESSION: ICD-10-CM

## 2024-01-05 RX ORDER — DULOXETIN HYDROCHLORIDE 20 MG/1
20 CAPSULE, DELAYED RELEASE ORAL DAILY
Qty: 90 CAPSULE | Refills: 1 | Status: SHIPPED | OUTPATIENT
Start: 2024-01-05

## 2024-01-10 ENCOUNTER — PROCEDURE VISIT (OUTPATIENT)
Dept: OBGYN CLINIC | Facility: HOSPITAL | Age: 74
End: 2024-01-10
Payer: COMMERCIAL

## 2024-01-10 VITALS
HEART RATE: 85 BPM | BODY MASS INDEX: 43.63 KG/M2 | DIASTOLIC BLOOD PRESSURE: 84 MMHG | SYSTOLIC BLOOD PRESSURE: 130 MMHG | HEIGHT: 59 IN

## 2024-01-10 DIAGNOSIS — M25.562 CHRONIC PAIN OF BOTH KNEES: ICD-10-CM

## 2024-01-10 DIAGNOSIS — M25.561 CHRONIC PAIN OF BOTH KNEES: ICD-10-CM

## 2024-01-10 DIAGNOSIS — M17.0 PRIMARY OSTEOARTHRITIS OF BOTH KNEES: Primary | ICD-10-CM

## 2024-01-10 DIAGNOSIS — G89.29 CHRONIC PAIN OF BOTH KNEES: ICD-10-CM

## 2024-01-10 PROCEDURE — 20610 DRAIN/INJ JOINT/BURSA W/O US: CPT | Performed by: ORTHOPAEDIC SURGERY

## 2024-01-10 RX ORDER — LIDOCAINE HYDROCHLORIDE 10 MG/ML
4 INJECTION, SOLUTION INFILTRATION; PERINEURAL
Status: COMPLETED | OUTPATIENT
Start: 2024-01-10 | End: 2024-01-10

## 2024-01-10 RX ADMIN — LIDOCAINE HYDROCHLORIDE 4 ML: 10 INJECTION, SOLUTION INFILTRATION; PERINEURAL at 14:00

## 2024-01-10 NOTE — PROGRESS NOTES
Assessment:   Diagnosis ICD-10-CM Associated Orders   1. Primary osteoarthritis of both knees  M17.0 Large joint arthrocentesis: bilateral knee      2. Chronic pain of both knees  M25.561 Large joint arthrocentesis: bilateral knee    M25.562     G89.29           Plan:  Bilateral knees osteoarthritis and chronic pain.  Both of her knees were injected with Orthovisc No. 2 out of 3.  She tolerated both procedures well (RP).  Ice and postinjection protocol advised.  Weightbearing and activities as tolerated.  She will follow-up next week to complete the Visco series.    To do next visit:  Return in about 1 week (around 1/17/2024) for re-check both knees and orthovisc #3.    The above stated was discussed in layman's terms and the patient expressed understanding.  All questions were answered to the patient's satisfaction.       Scribe Attestation      I,:  Dontrell Romero am acting as a scribe while in the presence of the attending physician.:       I,:  Choco Quintero MD personally performed the services described in this documentation    as scribed in my presence.:               Subjective:   Lew Martins is a 73 y.o. female who presents today for repeat evaluation of her bilateral knees, known osteoarthritis.  She returns today for her second of 3 Orthovisc injections.  She previously had the 3 shot series with benefit.  Pain scale 8/10 at times.  She notes less stiffness following the visco series.     Review of systems negative unless otherwise specified in HPI  Review of Systems    Past Medical History:   Diagnosis Date    Alopecia     Calcaneal spur     Diabetes insipidus (HCC)     Diabetes mellitus (HCC)     Hyperlipidemia     Hypertension     Osteoarthrosis 2/13/2013    Pes planus     unspecified laterality    Plantar fasciitis        Past Surgical History:   Procedure Laterality Date    BREAST BIOPSY Right     patient not sure when , negative results    BREAST CYST ASPIRATION Left     patient not sure  when negative results    CARPAL TUNNEL RELEASE      COLONOSCOPY      May 2006    INCISIONAL BREAST BIOPSY      MI ARTHRP ACETBLR/PROX FEM PROSTC AGRFT/ALGRFT Right 9/23/2019    Procedure: ARTHROPLASTY HIP TOTAL;  Surgeon: Choco Quintero MD;  Location: [facility];  Service: Orthopedics    MI COLONOSCOPY FLX DX W/COLLJ SPEC WHEN PFRMD N/A 5/4/2018    Procedure: COLONOSCOPY;  Surgeon: Randee Beck MD;  Location: [facility];  Service: Colorectal    WISDOM TOOTH EXTRACTION         Family History   Problem Relation Age of Onset    Diabetes Mother     Hypertension Mother     Emphysema Father     No Known Problems Maternal Grandmother     No Known Problems Maternal Grandfather     No Known Problems Paternal Grandmother     No Known Problems Paternal Grandfather     Colon cancer Brother 37    No Known Problems Brother     No Known Problems Maternal Aunt     No Known Problems Maternal Aunt     No Known Problems Paternal Aunt     No Known Problems Paternal Aunt     No Known Problems Paternal Aunt        Social History     Occupational History    Not on file   Tobacco Use    Smoking status: Never    Smokeless tobacco: Never   Vaping Use    Vaping status: Never Used   Substance and Sexual Activity    Alcohol use: Yes     Alcohol/week: 0.0 standard drinks of alcohol     Comment: 0    Drug use: No    Sexual activity: Not Currently         Current Outpatient Medications:     acetaminophen (TYLENOL) 500 mg tablet, Take 1,000 mg by mouth every 6 (six) hours as needed for mild pain (Patient not taking: Reported on 5/24/2023), Disp: , Rfl:     Biotin 1 MG CAPS, Take by mouth daily , Disp: , Rfl:     Blood Pressure Monitoring (SPHYGMOMANOMETER) MISC, by Does not apply route 2 (two) times a day, Disp: 1 each, Rfl: 0    Calcium Carb-Cholecalciferol (Calcium 1000 + D) 1000-800 MG-UNIT TABS, Take by mouth As needed, Disp: , Rfl:     cephalexin (KEFLEX) 500 mg capsule, Take 500 mg by mouth if needed Prior to procedure  (Patient not  taking: Reported on 5/24/2023), Disp: , Rfl:     cholecalciferol (VITAMIN D3) 1,000 units tablet, Take 1,000 Units by mouth daily, Disp: , Rfl:     DULoxetine (CYMBALTA) 20 mg capsule, Take 1 capsule (20 mg total) by mouth daily, Disp: 90 capsule, Rfl: 1    folic acid (FOLVITE) 1 mg tablet, Take by mouth daily, Disp: , Rfl:     gabapentin (NEURONTIN) 300 mg capsule, TAKE 1 CAPSULE BY MOUTH THREE TIMES DAILY (Patient taking differently: daily at bedtime), Disp: 90 capsule, Rfl: 5    losartan (COZAAR) 100 MG tablet, Take 1 tablet (100 mg total) by mouth daily, Disp: 90 tablet, Rfl: 3    metFORMIN (GLUCOPHAGE-XR) 500 mg 24 hr tablet, Take 1 tablet (500 mg total) by mouth daily with breakfast, Disp: 30 tablet, Rfl: 5    metoprolol tartrate (LOPRESSOR) 50 mg tablet, TAKE 1 TABLET BY MOUTH EVERY 12 HOURS, Disp: 180 tablet, Rfl: 2    simvastatin (ZOCOR) 20 mg tablet, TAKE 1 TABLET BY MOUTH EVERY DAY, Disp: 90 tablet, Rfl: 1    Allergies   Allergen Reactions    Meloxicam Hives    Nifedipine Edema    Sulfamethoxazole-Trimethoprim Edema            Vitals:    01/10/24 1404   BP: 130/84   Pulse: 85       Body mass index is 43.63 kg/m².  Wt Readings from Last 3 Encounters:   01/03/24 98 kg (216 lb)   09/13/23 95.6 kg (210 lb 12.8 oz)   09/05/23 93.9 kg (207 lb)       Objective:                    Right Knee Exam     Muscle Strength   The patient has normal right knee strength.    Tenderness   Right knee tenderness location: less tenderness medial joint line.    Range of Motion   Extension:  0   Flexion:  120 (with less crepitation and stiffness)     Other   Erythema: absent  Sensation: normal  Swelling: mild  Effusion: no effusion present      Left Knee Exam     Muscle Strength   The patient has normal left knee strength.    Tenderness   Left knee tenderness location: less tenderness medial joint line.    Range of Motion   Extension:  0   Flexion:  120 (with less crepitation and stiffness)     Other   Erythema:  "absent  Sensation: normal  Swelling: mild  Effusion: no effusion present    Comments:    Varus alignment at both knees            Diagnostics, reviewed and taken today if performed as documented:    None performed        Procedures, if performed today:    Large joint arthrocentesis: bilateral knee  Universal Protocol:  Consent: Verbal consent obtained.  Risks and benefits: risks, benefits and alternatives were discussed  Consent given by: patient  Time out: Immediately prior to procedure a \"time out\" was called to verify the correct patient, procedure, equipment, support staff and site/side marked as required.  Timeout called at: 1/10/2024 2:19 PM.  Patient understanding: patient states understanding of the procedure being performed  Site marked: the operative site was marked  Patient identity confirmed: verbally with patient  Supporting Documentation  Indications: pain and diagnostic evaluation   Procedure Details  Location: knee - bilateral knee  Preparation: Patient was prepped and draped in the usual sterile fashion  Needle size: 22 G  Ultrasound guidance: no  Approach: anteromedial    Medications (Right): 4 mL lidocaine 1 %; 30 mg sodium hyaluronate 30 mg/2 mLMedications (Left): 4 mL lidocaine 1 %; 30 mg sodium hyaluronate 30 mg/2 mL   Patient tolerance: patient tolerated the procedure well with no immediate complications  Dressing:  Sterile dressing applied            Portions of the record may have been created with voice recognition software.  Occasional wrong word or \"sound a like\" substitutions may have occurred due to the inherent limitations of voice recognition software.  Read the chart carefully and recognize, using context, where substitutions have occurred.  "

## 2024-01-17 ENCOUNTER — PROCEDURE VISIT (OUTPATIENT)
Dept: OBGYN CLINIC | Facility: HOSPITAL | Age: 74
End: 2024-01-17
Payer: COMMERCIAL

## 2024-01-17 VITALS
HEIGHT: 59 IN | SYSTOLIC BLOOD PRESSURE: 135 MMHG | BODY MASS INDEX: 43.63 KG/M2 | HEART RATE: 93 BPM | DIASTOLIC BLOOD PRESSURE: 83 MMHG

## 2024-01-17 DIAGNOSIS — M17.0 PRIMARY OSTEOARTHRITIS OF BOTH KNEES: ICD-10-CM

## 2024-01-17 DIAGNOSIS — M25.561 CHRONIC PAIN OF BOTH KNEES: Primary | ICD-10-CM

## 2024-01-17 DIAGNOSIS — G89.29 CHRONIC PAIN OF BOTH KNEES: Primary | ICD-10-CM

## 2024-01-17 DIAGNOSIS — M25.562 CHRONIC PAIN OF BOTH KNEES: Primary | ICD-10-CM

## 2024-01-17 PROCEDURE — 20610 DRAIN/INJ JOINT/BURSA W/O US: CPT | Performed by: ORTHOPAEDIC SURGERY

## 2024-01-17 RX ORDER — LIDOCAINE HYDROCHLORIDE 10 MG/ML
3 INJECTION, SOLUTION INFILTRATION; PERINEURAL
Status: COMPLETED | OUTPATIENT
Start: 2024-01-17 | End: 2024-01-17

## 2024-01-17 RX ADMIN — LIDOCAINE HYDROCHLORIDE 3 ML: 10 INJECTION, SOLUTION INFILTRATION; PERINEURAL at 14:30

## 2024-01-17 NOTE — PROGRESS NOTES
Assessment:  1. Chronic pain of both knees        2. Primary osteoarthritis of both knees            Plan:  3rd bilateral Orthovisc (RP-27g)  The patient was provided with 3rd bilateral Orthovisc injections.  The patient tolerated the procedure well.    The patient should follow up in 3 months.      To do next visit:  Return in about 3 months (around 4/17/2024).    The above stated was discussed in layman's terms and the patient expressed understanding.  All questions were answered to the patient's satisfaction.       Scribe Attestation      I,:  Zeke Crooks am acting as a scribe while in the presence of the attending physician.:       I,:  Choco Quintero MD personally performed the services described in this documentation    as scribed in my presence.:               Subjective:   Lew Martins is a 73 y.o. female who presents for 3rd bilateral Orthovisc.  Today she complains of bilateral generalized knee pain.  Prolonged weight bearing and repetitive bending aggravates while rest alleviates.        Review of systems negative unless otherwise specified in HPI    Past Medical History:   Diagnosis Date    Alopecia     Calcaneal spur     Diabetes insipidus (HCC)     Diabetes mellitus (HCC)     Hyperlipidemia     Hypertension     Osteoarthrosis 2/13/2013    Pes planus     unspecified laterality    Plantar fasciitis        Past Surgical History:   Procedure Laterality Date    BREAST BIOPSY Right     patient not sure when , negative results    BREAST CYST ASPIRATION Left     patient not sure when negative results    CARPAL TUNNEL RELEASE      COLONOSCOPY      May 2006    INCISIONAL BREAST BIOPSY      HI ARTHRP ACETBLR/PROX FEM PROSTC AGRFT/ALGRFT Right 9/23/2019    Procedure: ARTHROPLASTY HIP TOTAL;  Surgeon: Choco Quintero MD;  [facility]: [facility];  Service: Orthopedics    HI COLONOSCOPY FLX DX W/COLLJ SPEC WHEN PFRMD N/A 5/4/2018    Procedure: COLONOSCOPY;  Surgeon: Randee Bcek MD;  Location: [facility];  Service: Colorectal    WISDOM TOOTH EXTRACTION         Family History   Problem Relation Age of Onset    Diabetes Mother     Hypertension Mother     Emphysema Father     No Known Problems Maternal Grandmother     No Known Problems Maternal Grandfather     No Known Problems Paternal Grandmother     No Known Problems Paternal Grandfather     Colon cancer Brother 37    No Known Problems Brother     No Known Problems Maternal Aunt     No Known Problems Maternal Aunt     No Known Problems Paternal Aunt     No Known Problems Paternal Aunt     No Known Problems Paternal Aunt        Social History     Occupational History    Not on file   Tobacco Use    Smoking status: Never    Smokeless tobacco: Never   Vaping Use    Vaping status: Never Used   Substance and Sexual Activity    Alcohol use: Yes     Alcohol/week: 0.0 standard drinks of alcohol     Comment: 0    Drug use: No    Sexual activity: Not Currently         Current Outpatient Medications:     acetaminophen (TYLENOL) 500 mg tablet, Take 1,000 mg by mouth every 6 (six) hours as needed for mild pain (Patient not taking: Reported on 5/24/2023), Disp: , Rfl:     Biotin 1 MG CAPS, Take by mouth daily , Disp: , Rfl:     Blood Pressure Monitoring (SPHYGMOMANOMETER) MISC, by Does not apply route 2 (two) times a day, Disp: 1 each, Rfl: 0    Calcium Carb-Cholecalciferol (Calcium 1000 + D) 1000-800 MG-UNIT TABS, Take by mouth As needed, Disp: , Rfl:     cephalexin (KEFLEX) 500 mg capsule, Take 500 mg by mouth if needed Prior to procedure  (Patient not taking: Reported on 5/24/2023), Disp: , Rfl:     cholecalciferol (VITAMIN D3) 1,000 units tablet, Take 1,000 Units by mouth daily, Disp: , Rfl:     DULoxetine (CYMBALTA) 20 mg capsule, Take 1 capsule (20 mg total) by mouth daily, Disp: 90 capsule, Rfl: 1    folic acid (FOLVITE) 1 mg tablet, Take by mouth daily, Disp: , Rfl:     gabapentin (NEURONTIN) 300 mg capsule, TAKE 1 CAPSULE BY MOUTH THREE TIMES DAILY (Patient taking  "differently: daily at bedtime), Disp: 90 capsule, Rfl: 5    losartan (COZAAR) 100 MG tablet, Take 1 tablet (100 mg total) by mouth daily, Disp: 90 tablet, Rfl: 3    metFORMIN (GLUCOPHAGE-XR) 500 mg 24 hr tablet, Take 1 tablet (500 mg total) by mouth daily with breakfast, Disp: 30 tablet, Rfl: 5    metoprolol tartrate (LOPRESSOR) 50 mg tablet, TAKE 1 TABLET BY MOUTH EVERY 12 HOURS, Disp: 180 tablet, Rfl: 2    simvastatin (ZOCOR) 20 mg tablet, TAKE 1 TABLET BY MOUTH EVERY DAY, Disp: 90 tablet, Rfl: 1    Allergies   Allergen Reactions    Meloxicam Hives    Nifedipine Edema    Sulfamethoxazole-Trimethoprim Edema            Vitals:    01/17/24 1443   BP: 135/83   Pulse: 93       Objective:  Physical exam  General: Awake, Alert, Oriented  Eyes: Pupils equal, round and reactive to light  Heart: regular rate and rhythm  Lungs: No audible wheezing  Abdomen: soft                    Ortho Exam  Bilateral knees:  No erythema or ecchymosis  No effusion or swelling  Normal strength  Good ROM with crepitus   Calf compartments soft and supple  Sensation intact  Toes are warm sensate and mobile      Diagnostics, reviewed and taken today if performed as documented:    None performed     Procedures, if performed today:    Large joint arthrocentesis: R knee  Universal Protocol:  Consent: Verbal consent obtained.  Risks and benefits: risks, benefits and alternatives were discussed  Consent given by: patient  Time out: Immediately prior to procedure a \"time out\" was called to verify the correct patient, procedure, equipment, support staff and site/side marked as required.  Timeout called at: 1/17/2024 3:00 PM.  Patient understanding: patient states understanding of the procedure being performed  Site marked: the operative site was marked  Patient identity confirmed: verbally with patient  Supporting Documentation  Indications: pain   Procedure Details  Location: knee - R knee  Preparation: Patient was prepped and draped in the usual " "sterile fashion  Needle size: 22 G  Ultrasound guidance: no  Approach: anterolateral  Medications administered: 30 mg sodium hyaluronate 30 mg/2 mL; 3 mL lidocaine 1 %    Patient tolerance: patient tolerated the procedure well with no immediate complications  Dressing:  Sterile dressing applied      Large joint arthrocentesis: L knee  Universal Protocol:  Consent: Verbal consent obtained.  Risks and benefits: risks, benefits and alternatives were discussed  Consent given by: patient  Time out: Immediately prior to procedure a \"time out\" was called to verify the correct patient, procedure, equipment, support staff and site/side marked as required.  Timeout called at: 1/17/2024 3:01 PM.  Patient understanding: patient states understanding of the procedure being performed  Site marked: the operative site was marked  Patient identity confirmed: verbally with patient  Supporting Documentation  Indications: pain   Procedure Details  Location: knee - L knee  Preparation: Patient was prepped and draped in the usual sterile fashion  Needle size: 22 G  Ultrasound guidance: no  Approach: anterolateral  Medications administered: 3 mL lidocaine 1 %; 30 mg sodium hyaluronate 30 mg/2 mL    Patient tolerance: patient tolerated the procedure well with no immediate complications  Dressing:  Sterile dressing applied              Portions of the record may have been created with voice recognition software.  Occasional wrong word or \"sound a like\" substitutions may have occurred due to the inherent limitations of voice recognition software.  Read the chart carefully and recognize, using context, where substitutions have occurred.    "

## 2024-01-26 ENCOUNTER — TELEPHONE (OUTPATIENT)
Age: 74
End: 2024-01-26

## 2024-01-26 NOTE — TELEPHONE ENCOUNTER
VM for patient to reschedule colonoscopy on 3/6/24 with Dr Munoz at [facility].  Waiting response from patient.

## 2024-04-03 DIAGNOSIS — E11.65 CONTROLLED TYPE 2 DIABETES MELLITUS WITH HYPERGLYCEMIA, WITHOUT LONG-TERM CURRENT USE OF INSULIN (HCC): ICD-10-CM

## 2024-04-03 RX ORDER — METFORMIN HYDROCHLORIDE 500 MG/1
500 TABLET, EXTENDED RELEASE ORAL
Qty: 30 TABLET | Refills: 5 | Status: SHIPPED | OUTPATIENT
Start: 2024-04-03 | End: 2024-04-12 | Stop reason: SDUPTHER

## 2024-04-12 ENCOUNTER — TELEPHONE (OUTPATIENT)
Dept: FAMILY MEDICINE CLINIC | Facility: CLINIC | Age: 74
End: 2024-04-12

## 2024-04-12 DIAGNOSIS — E11.65 CONTROLLED TYPE 2 DIABETES MELLITUS WITH HYPERGLYCEMIA, WITHOUT LONG-TERM CURRENT USE OF INSULIN (HCC): ICD-10-CM

## 2024-04-12 RX ORDER — METFORMIN HYDROCHLORIDE 500 MG/1
500 TABLET, EXTENDED RELEASE ORAL
Qty: 90 TABLET | Refills: 1 | Status: SHIPPED | OUTPATIENT
Start: 2024-04-12

## 2024-04-12 NOTE — TELEPHONE ENCOUNTER
Voicemail: Hi, it's [facility] calling- you guys sent over script for Metformin 501 every day with breakfast. Her insurance requires a 90 day supply. So if you can please send it over for a 90 day supply, I would appreciate it [phone], [facility] and [address]. Thank you.

## 2024-04-16 ENCOUNTER — TELEPHONE (OUTPATIENT)
Dept: FAMILY MEDICINE CLINIC | Facility: CLINIC | Age: 74
End: 2024-04-16

## 2024-04-16 NOTE — TELEPHONE ENCOUNTER
Patient returned call and provided consent to send visit notes. Sent as requested and received confirmation.

## 2024-04-16 NOTE — TELEPHONE ENCOUNTER
Voicemail: Hi, this is Mk calling from [facility] [place] regarding mutual patient - If you could just fax over a most recent office note to us, our fax number is [phone]. Thank you. esau Olguin.    Left voicemail with patient - need consent to send records.

## 2024-04-18 ENCOUNTER — OFFICE VISIT (OUTPATIENT)
Dept: OBGYN CLINIC | Facility: HOSPITAL | Age: 74
End: 2024-04-18
Payer: COMMERCIAL

## 2024-04-18 VITALS
WEIGHT: 214 LBS | HEART RATE: 80 BPM | SYSTOLIC BLOOD PRESSURE: 137 MMHG | HEIGHT: 59 IN | BODY MASS INDEX: 43.14 KG/M2 | DIASTOLIC BLOOD PRESSURE: 87 MMHG

## 2024-04-18 DIAGNOSIS — M25.562 CHRONIC PAIN OF BOTH KNEES: ICD-10-CM

## 2024-04-18 DIAGNOSIS — M25.561 CHRONIC PAIN OF BOTH KNEES: ICD-10-CM

## 2024-04-18 DIAGNOSIS — G89.29 CHRONIC PAIN OF BOTH KNEES: ICD-10-CM

## 2024-04-18 DIAGNOSIS — M17.0 PRIMARY OSTEOARTHRITIS OF BOTH KNEES: Primary | ICD-10-CM

## 2024-04-18 PROCEDURE — 99213 OFFICE O/P EST LOW 20 MIN: CPT | Performed by: ORTHOPAEDIC SURGERY

## 2024-04-18 PROCEDURE — 20610 DRAIN/INJ JOINT/BURSA W/O US: CPT

## 2024-04-18 RX ORDER — KETOROLAC TROMETHAMINE 30 MG/ML
60 INJECTION, SOLUTION INTRAMUSCULAR; INTRAVENOUS
Status: COMPLETED | OUTPATIENT
Start: 2024-04-18 | End: 2024-04-18

## 2024-04-18 RX ORDER — BUPIVACAINE HYDROCHLORIDE 2.5 MG/ML
2 INJECTION, SOLUTION INFILTRATION; PERINEURAL
Status: COMPLETED | OUTPATIENT
Start: 2024-04-18 | End: 2024-04-18

## 2024-04-18 RX ORDER — LIDOCAINE HYDROCHLORIDE 10 MG/ML
2 INJECTION, SOLUTION INFILTRATION; PERINEURAL
Status: COMPLETED | OUTPATIENT
Start: 2024-04-18 | End: 2024-04-18

## 2024-04-18 RX ORDER — CELECOXIB 100 MG/1
100 CAPSULE ORAL DAILY
Qty: 30 CAPSULE | Refills: 0 | Status: SHIPPED | OUTPATIENT
Start: 2024-04-18

## 2024-04-18 RX ADMIN — KETOROLAC TROMETHAMINE 60 MG: 30 INJECTION, SOLUTION INTRAMUSCULAR; INTRAVENOUS at 14:15

## 2024-04-18 RX ADMIN — LIDOCAINE HYDROCHLORIDE 2 ML: 10 INJECTION, SOLUTION INFILTRATION; PERINEURAL at 14:15

## 2024-04-18 RX ADMIN — BUPIVACAINE HYDROCHLORIDE 2 ML: 2.5 INJECTION, SOLUTION INFILTRATION; PERINEURAL at 14:15

## 2024-04-18 NOTE — PROGRESS NOTES
Assessment:   Diagnosis ICD-10-CM Associated Orders   1. Primary osteoarthritis of both knees  M17.0 celecoxib (CeleBREX) 100 mg capsule     Injection Procedure Prior Authorization     Large joint arthrocentesis     Large joint arthrocentesis      2. Chronic pain of both knees  M25.561 celecoxib (CeleBREX) 100 mg capsule    M25.562 Large joint arthrocentesis    G89.29 Large joint arthrocentesis          Plan:  73 y.o. female with known osteoarthritis of bilateral knees   - Toradol injections provided to both knees today   - Orthovisc re-ordered for administration in about 3 months   - Celebrex sent to pharmacy per patient request   - f/u in about 3 months      The above stated was discussed in layman's terms and the patient expressed understanding.  All questions were answered to the patient's satisfaction.   Diagnostics reviewed and physical exam performed.  Diagnosis, treatment options and associated risks were discussed with the patient including no treatment, nonsurgical treatment and potential for surgical intervention.  The patient was given the opportunity to ask questions regarding each.      To do next visit:  Return in about 3 months (around 7/18/2024) for Visco to bilateral knees .      Subjective:   Lew Martins is a 73 y.o. female who presents for 3 month follow up of bilateral knees.  Patient has known osteoarthritis of both knees that have been treated in the past with intermittent visco injections and Toradol injections.  She is s/p visco supplementation series in January and reports significant relief of pain for about a month.  She does admit to joint pain over numerous joints and asked to try Celebrex in attempt for some relief.  Offered, accepted, and provided Toradol injections to bilateral knees today.  Prior auth started today for repeat Orthovisc injections to bilateral knees.   Pain score 9/10.          Review of systems negative unless otherwise specified in HPI    Past Medical History:    Diagnosis Date    Alopecia     Calcaneal spur     Diabetes insipidus (HCC)     Diabetes mellitus (HCC)     Hyperlipidemia     Hypertension     Osteoarthrosis 2/13/2013    Pes planus     unspecified laterality    Plantar fasciitis        Past Surgical History:   Procedure Laterality Date    BREAST BIOPSY Right     patient not sure when , negative results    BREAST CYST ASPIRATION Left     patient not sure when negative results    CARPAL TUNNEL RELEASE      COLONOSCOPY      May 2006    INCISIONAL BREAST BIOPSY      KS ARTHRP ACETBLR/PROX FEM PROSTC AGRFT/ALGRFT Right 9/23/2019    Procedure: ARTHROPLASTY HIP TOTAL;  Surgeon: Choco Quintero MD;  Location: [facility];  Service: Orthopedics    KS COLONOSCOPY FLX DX W/COLLJ SPEC WHEN PFRMD N/A 5/4/2018    Procedure: COLONOSCOPY;  Surgeon: Randee Beck MD;  Location: [facility];  Service: Colorectal    WISDOM TOOTH EXTRACTION         Family History   Problem Relation Age of Onset    Diabetes Mother     Hypertension Mother     Emphysema Father     No Known Problems Maternal Grandmother     No Known Problems Maternal Grandfather     No Known Problems Paternal Grandmother     No Known Problems Paternal Grandfather     Colon cancer Brother 37    No Known Problems Brother     No Known Problems Maternal Aunt     No Known Problems Maternal Aunt     No Known Problems Paternal Aunt     No Known Problems Paternal Aunt     No Known Problems Paternal Aunt        Social History     Occupational History    Not on file   Tobacco Use    Smoking status: Never    Smokeless tobacco: Never   Vaping Use    Vaping status: Never Used   Substance and Sexual Activity    Alcohol use: Yes     Alcohol/week: 0.0 standard drinks of alcohol     Comment: 0    Drug use: No    Sexual activity: Not Currently         Current Outpatient Medications:     celecoxib (CeleBREX) 100 mg capsule, Take 1 capsule (100 mg total) by mouth daily, Disp: 30 capsule, Rfl: 0    acetaminophen (TYLENOL) 500 mg tablet,  Take 1,000 mg by mouth every 6 (six) hours as needed for mild pain (Patient not taking: Reported on 5/24/2023), Disp: , Rfl:     Biotin 1 MG CAPS, Take by mouth daily , Disp: , Rfl:     Blood Pressure Monitoring (SPHYGMOMANOMETER) MISC, by Does not apply route 2 (two) times a day, Disp: 1 each, Rfl: 0    Calcium Carb-Cholecalciferol (Calcium 1000 + D) 1000-800 MG-UNIT TABS, Take by mouth As needed, Disp: , Rfl:     cephalexin (KEFLEX) 500 mg capsule, Take 500 mg by mouth if needed Prior to procedure  (Patient not taking: Reported on 5/24/2023), Disp: , Rfl:     cholecalciferol (VITAMIN D3) 1,000 units tablet, Take 1,000 Units by mouth daily, Disp: , Rfl:     DULoxetine (CYMBALTA) 20 mg capsule, Take 1 capsule (20 mg total) by mouth daily, Disp: 90 capsule, Rfl: 1    folic acid (FOLVITE) 1 mg tablet, Take by mouth daily, Disp: , Rfl:     gabapentin (NEURONTIN) 300 mg capsule, TAKE 1 CAPSULE BY MOUTH THREE TIMES DAILY (Patient taking differently: daily at bedtime), Disp: 90 capsule, Rfl: 5    losartan (COZAAR) 100 MG tablet, Take 1 tablet (100 mg total) by mouth daily, Disp: 90 tablet, Rfl: 3    metFORMIN (GLUCOPHAGE-XR) 500 mg 24 hr tablet, Take 1 tablet (500 mg total) by mouth daily with breakfast, Disp: 90 tablet, Rfl: 1    metoprolol tartrate (LOPRESSOR) 50 mg tablet, TAKE 1 TABLET BY MOUTH EVERY 12 HOURS, Disp: 180 tablet, Rfl: 2    simvastatin (ZOCOR) 20 mg tablet, TAKE 1 TABLET BY MOUTH EVERY DAY, Disp: 90 tablet, Rfl: 1    Allergies   Allergen Reactions    Meloxicam Hives    Nifedipine Edema    Sulfamethoxazole-Trimethoprim Edema            Vitals:    04/18/24 1420   BP: 137/87   Pulse: 80       Objective:  Physical exam  General: Awake, Alert, Oriented  Eyes: Pupils equal, round and reactive to light  Heart: regular rate and rhythm  Lungs: No audible wheezing  Abdomen: soft                    Right Knee Exam     Tenderness   The patient is experiencing tenderness in the lateral joint line and medial joint  "line.    Range of Motion   Extension:  normal   Flexion:  normal     Other   Erythema: absent  Scars: absent  Sensation: normal  Swelling: mild  Effusion: no effusion present      Left Knee Exam     Tenderness   The patient is experiencing tenderness in the lateral joint line and medial joint line.    Range of Motion   Extension:  normal   Flexion:  normal     Other   Erythema: absent  Scars: absent  Sensation: normal  Swelling: mild  Effusion: no effusion present            Diagnostics, reviewed and taken today if performed as documented:    None performed        Procedures, if performed today:    Large joint arthrocentesis: R knee  Universal Protocol:  Consent: Verbal consent obtained.  Risks and benefits: risks, benefits and alternatives were discussed  Consent given by: patient  Site marked: the operative site was marked  Supporting Documentation  Indications: pain   Procedure Details  Location: knee - R knee  Needle size: 22 G  Approach: anterolateral  Medications administered: 2 mL bupivacaine 0.25 %; 2 mL lidocaine 1 %; 60 mg ketorolac 60 mg/2 mL    Patient tolerance: patient tolerated the procedure well with no immediate complications  Dressing:  Sterile dressing applied      Large joint arthrocentesis: L knee  Universal Protocol:  Consent: Verbal consent obtained.  Risks and benefits: risks, benefits and alternatives were discussed  Consent given by: patient  Site marked: the operative site was marked  Supporting Documentation  Indications: pain   Procedure Details  Location: knee - L knee  Needle size: 22 G  Approach: anterolateral  Medications administered: 2 mL bupivacaine 0.25 %; 2 mL lidocaine 1 %; 60 mg ketorolac 60 mg/2 mL    Patient tolerance: patient tolerated the procedure well with no immediate complications  Dressing:  Sterile dressing applied            Portions of the record may have been created with voice recognition software.  Occasional wrong word or \"sound a like\" substitutions may have " occurred due to the inherent limitations of voice recognition software.  Read the chart carefully and recognize, using context, where substitutions have occurred.

## 2024-05-06 ENCOUNTER — TELEPHONE (OUTPATIENT)
Age: 74
End: 2024-05-06

## 2024-05-06 NOTE — TELEPHONE ENCOUNTER
Pt called needing assistance with Labs close to home. She also wanted to check to see if her lab orders were still current. I confirmed that they are are current.     NFA.    Thank you

## 2024-05-07 LAB
ALBUMIN SERPL-MCNC: 4.6 G/DL (ref 3.8–4.8)
ALBUMIN/GLOB SERPL: 1.5 {RATIO} (ref 1.2–2.2)
ALP SERPL-CCNC: 100 IU/L (ref 44–121)
ALT SERPL-CCNC: 18 IU/L (ref 0–32)
AST SERPL-CCNC: 32 IU/L (ref 0–40)
BASOPHILS # BLD AUTO: 0.1 X10E3/UL (ref 0–0.2)
BASOPHILS NFR BLD AUTO: 1 %
BILIRUB SERPL-MCNC: 0.6 MG/DL (ref 0–1.2)
BUN SERPL-MCNC: 10 MG/DL (ref 8–27)
BUN/CREAT SERPL: 15 (ref 12–28)
CALCIUM SERPL-MCNC: 9.8 MG/DL (ref 8.7–10.3)
CHLORIDE SERPL-SCNC: 101 MMOL/L (ref 96–106)
CHOLEST SERPL-MCNC: 217 MG/DL (ref 100–199)
CO2 SERPL-SCNC: 24 MMOL/L (ref 20–29)
CREAT SERPL-MCNC: 0.65 MG/DL (ref 0.57–1)
EGFR: 92 ML/MIN/1.73
EOSINOPHIL # BLD AUTO: 0.7 X10E3/UL (ref 0–0.4)
EOSINOPHIL NFR BLD AUTO: 9 %
ERYTHROCYTE [DISTWIDTH] IN BLOOD BY AUTOMATED COUNT: 12.3 % (ref 11.7–15.4)
GLOBULIN SER-MCNC: 3.1 G/DL (ref 1.5–4.5)
GLUCOSE SERPL-MCNC: 103 MG/DL (ref 70–99)
HCT VFR BLD AUTO: 41.4 % (ref 34–46.6)
HDLC SERPL-MCNC: 58 MG/DL
HGB BLD-MCNC: 14.1 G/DL (ref 11.1–15.9)
IMM GRANULOCYTES # BLD: 0 X10E3/UL (ref 0–0.1)
IMM GRANULOCYTES NFR BLD: 0 %
LDLC SERPL CALC-MCNC: 131 MG/DL (ref 0–99)
LYMPHOCYTES # BLD AUTO: 2.1 X10E3/UL (ref 0.7–3.1)
LYMPHOCYTES NFR BLD AUTO: 27 %
MCH RBC QN AUTO: 32.1 PG (ref 26.6–33)
MCHC RBC AUTO-ENTMCNC: 34.1 G/DL (ref 31.5–35.7)
MCV RBC AUTO: 94 FL (ref 79–97)
MONOCYTES # BLD AUTO: 0.6 X10E3/UL (ref 0.1–0.9)
MONOCYTES NFR BLD AUTO: 8 %
NEUTROPHILS # BLD AUTO: 4.3 X10E3/UL (ref 1.4–7)
NEUTROPHILS NFR BLD AUTO: 55 %
PLATELET # BLD AUTO: 295 X10E3/UL (ref 150–450)
POTASSIUM SERPL-SCNC: 4.5 MMOL/L (ref 3.5–5.2)
PROT SERPL-MCNC: 7.7 G/DL (ref 6–8.5)
RBC # BLD AUTO: 4.39 X10E6/UL (ref 3.77–5.28)
SL AMB VLDL CHOLESTEROL CALC: 28 MG/DL (ref 5–40)
SODIUM SERPL-SCNC: 140 MMOL/L (ref 134–144)
TRIGL SERPL-MCNC: 157 MG/DL (ref 0–149)
TSH SERPL DL<=0.005 MIU/L-ACNC: 3.49 UIU/ML (ref 0.45–4.5)
WBC # BLD AUTO: 7.8 X10E3/UL (ref 3.4–10.8)

## 2024-05-08 ENCOUNTER — OFFICE VISIT (OUTPATIENT)
Dept: FAMILY MEDICINE CLINIC | Facility: CLINIC | Age: 74
End: 2024-05-08
Payer: COMMERCIAL

## 2024-05-08 VITALS
RESPIRATION RATE: 16 BRPM | BODY MASS INDEX: 43.59 KG/M2 | HEART RATE: 81 BPM | OXYGEN SATURATION: 95 % | HEIGHT: 59 IN | SYSTOLIC BLOOD PRESSURE: 132 MMHG | DIASTOLIC BLOOD PRESSURE: 80 MMHG | WEIGHT: 216.2 LBS | TEMPERATURE: 97.4 F

## 2024-05-08 DIAGNOSIS — E11.21 CONTROLLED TYPE 2 DIABETES MELLITUS WITH DIABETIC NEPHROPATHY, WITHOUT LONG-TERM CURRENT USE OF INSULIN (HCC): Primary | ICD-10-CM

## 2024-05-08 DIAGNOSIS — S80.811A ABRASION, RIGHT LOWER LEG, INITIAL ENCOUNTER: ICD-10-CM

## 2024-05-08 DIAGNOSIS — Z12.31 BREAST CANCER SCREENING BY MAMMOGRAM: ICD-10-CM

## 2024-05-08 DIAGNOSIS — I47.10 SUPRAVENTRICULAR TACHYCARDIA: ICD-10-CM

## 2024-05-08 DIAGNOSIS — E66.01 MORBID OBESITY (HCC): ICD-10-CM

## 2024-05-08 DIAGNOSIS — F33.9 DEPRESSION, RECURRENT (HCC): ICD-10-CM

## 2024-05-08 DIAGNOSIS — Z23 ENCOUNTER FOR IMMUNIZATION: ICD-10-CM

## 2024-05-08 DIAGNOSIS — R26.81 UNSTEADY GAIT: ICD-10-CM

## 2024-05-08 DIAGNOSIS — E78.00 PURE HYPERCHOLESTEROLEMIA: ICD-10-CM

## 2024-05-08 DIAGNOSIS — I10 BENIGN ESSENTIAL HYPERTENSION: ICD-10-CM

## 2024-05-08 DIAGNOSIS — M46.1 SACROILIITIS (HCC): ICD-10-CM

## 2024-05-08 LAB
CREAT UR-MCNC: 107.7 MG/DL
MICROALBUMIN UR-MCNC: 19.6 MG/L
MICROALBUMIN/CREAT 24H UR: 18 MG/G CREATININE (ref 0–30)
SL AMB POCT HEMOGLOBIN AIC: 6.3 (ref ?–6.5)

## 2024-05-08 PROCEDURE — 90715 TDAP VACCINE 7 YRS/> IM: CPT | Performed by: FAMILY MEDICINE

## 2024-05-08 PROCEDURE — 90471 IMMUNIZATION ADMIN: CPT | Performed by: FAMILY MEDICINE

## 2024-05-08 PROCEDURE — 99214 OFFICE O/P EST MOD 30 MIN: CPT | Performed by: FAMILY MEDICINE

## 2024-05-08 PROCEDURE — 82043 UR ALBUMIN QUANTITATIVE: CPT | Performed by: FAMILY MEDICINE

## 2024-05-08 PROCEDURE — 83036 HEMOGLOBIN GLYCOSYLATED A1C: CPT | Performed by: FAMILY MEDICINE

## 2024-05-08 PROCEDURE — 82570 ASSAY OF URINE CREATININE: CPT | Performed by: FAMILY MEDICINE

## 2024-05-08 RX ORDER — SIMVASTATIN 40 MG
40 TABLET ORAL DAILY
Qty: 90 TABLET | Refills: 1 | Status: SHIPPED | OUTPATIENT
Start: 2024-05-08

## 2024-05-08 NOTE — PROGRESS NOTES
Name: Lew Martins      : 1950      MRN: [id number]  Encounter Provider: Rafita Mccloud MD  Encounter Date: 2024   Encounter department: [facility]    Assessment & Plan     1. Controlled type 2 diabetes mellitus with diabetic nephropathy, without long-term current use of insulin (HCC)  Assessment & Plan:    Lab Results   Component Value Date    HGBA1C 6.3 2024     Controlled. Low carb diet. Continue metformin 500mg QD.     Orders:  -     Albumin / creatinine urine ratio  -     POCT hemoglobin A1c  -     Comprehensive metabolic panel; Future; Expected date: 10/25/2024  -     Hemoglobin A1C; Future; Expected date: 10/25/2024  -     Lipid panel; Future; Expected date: 10/25/2024    2. Unsteady gait  -     Ambulatory Referral to Physical Therapy; Future    3. Benign essential hypertension  Assessment & Plan:  Controlled. DASH diet. Continue losartan 100mg QD.     Orders:  -     Comprehensive metabolic panel; Future; Expected date: 10/25/2024  -     Hemoglobin A1C; Future; Expected date: 10/25/2024  -     Lipid panel; Future; Expected date: 10/25/2024    4. Supraventricular tachycardia  Assessment & Plan:  Continue metoprolol per cardiology.       5. Pure hypercholesterolemia  Assessment & Plan:  2024 lipid elevated. Low fat diet. Increase simvastatin to 40mg qhs. SE educated pt.     Orders:  -     simvastatin (ZOCOR) 40 mg tablet; Take 1 tablet (40 mg total) by mouth daily  -     Comprehensive metabolic panel; Future; Expected date: 10/25/2024  -     Hemoglobin A1C; Future; Expected date: 10/25/2024  -     Lipid panel; Future; Expected date: 10/25/2024    6. Depression, recurrent (HCC)  Assessment & Plan:  PHQ-9 score 5 today. Advised pt to continue cymbalta 20mg QD.       7. Sacroiliitis (HCC)  Assessment & Plan:  [facility] pain specialist.       8. Morbid obesity (HCC)    9. Abrasion, right lower leg, initial encounter  -     TDAP VACCINE GREATER THAN OR EQUAL TO 8YO IM    10.  Encounter for immunization  -     TDAP VACCINE GREATER THAN OR EQUAL TO 6YO IM    11. Breast cancer screening by mammogram  -     Mammo screening bilateral w 3d & cad; Future; Expected date: 05/08/2024        Depression Screening and Follow-up Plan: Patient's depression screening was positive with a PHQ-9 score of 5. Patient with underlying depression and was advised to continue current medications as prescribed.     Urinary Incontinence Plan of Care: counseling topics discussed: practice Kegel (pelvic floor strengthening) exercises, use restroom every 2 hours, limit alcohol, caffeine, spicy foods, and acidic foods, keeping a bladder diary, limiting fluid intake 3-4 hours before bed, weight loss and preventing constipation.     Reviewed lab in 5/2024  TSH normal  CBC normal  CMP ok  Lipid 217/157/58/131 elevated    Flu shot yearly.   Got Covid19 vaccines and booster.    Got pneumovax at age of 63. Got prevnar 13 2/2016.  Will get shingrix at pharmacy.   Give Tdap today.   Mammogram 2/2023 normal. Give script today.   Had colonoscopy 5/2018 Dr Barahona. Repeat in 5 years. Will schedule per pt.   Dexa 5/2022 normal.    RTO in 6 months.       Subjective      HPI    Pt is here by herself.   Scratch by her cat on right leg recently. Had bleeding/discharge. Healed now.   Abrasion of left lower leg after she hit her furniture at home also.      DM---Today HgA1C 6.3 in office.   She is on metformin 500mg bid. Had loose stool for a while.   Some neuropathy on toes. She is on gabapentin 300mg qhs.   Denies hypoglycemia.  [facility] opthalmology yearly.   [facility] podiatry.      HTN--- She is on losartan 100mg qhs and metoprolol 25mg bid.    SVT---[facility] cardiology. Denies palpitation, SOB, CP etc.      Hyperlipidemia---She is on simvastatin 20mg qhs. Denies SE.     Knees pain---[facility] orthopedics. She is on celebrex 100mg QD which helped.     Insomnia/anxiety/depression---She did not take cymbalta 20mg QD for a while. Will restart per pt.       Live by  herself. Does all ADL's. Still drive.   Denies recent falls. Feels unsteady when walking. Interested in PT.          Review of Systems   Constitutional:  Negative for appetite change, chills and fever.   HENT:  Negative for congestion, ear pain, sinus pain and sore throat.    Eyes:  Negative for discharge and itching.   Respiratory:  Negative for apnea, cough, chest tightness, shortness of breath and wheezing.    Cardiovascular:  Negative for chest pain, palpitations and leg swelling.   Gastrointestinal:  Negative for abdominal pain, anal bleeding, constipation, diarrhea, nausea and vomiting.   Endocrine: Negative for cold intolerance, heat intolerance and polyuria.   Genitourinary:  Negative for difficulty urinating and dysuria.   Musculoskeletal:  Positive for arthralgias. Negative for back pain and myalgias.   Skin:  Negative for rash.   Neurological:  Negative for dizziness and headaches.   Psychiatric/Behavioral:  Negative for agitation.        Current Outpatient Medications on File Prior to Visit   Medication Sig    acetaminophen (TYLENOL) 500 mg tablet Take 1,000 mg by mouth every 6 (six) hours as needed for mild pain    Biotin 1 MG CAPS Take by mouth daily     Blood Pressure Monitoring (SPHYGMOMANOMETER) MISC by Does not apply route 2 (two) times a day    Calcium Carb-Cholecalciferol (Calcium 1000 + D) 1000-800 MG-UNIT TABS Take by mouth As needed    celecoxib (CeleBREX) 100 mg capsule Take 1 capsule (100 mg total) by mouth daily    cholecalciferol (VITAMIN D3) 1,000 units tablet Take 1,000 Units by mouth daily    DULoxetine (CYMBALTA) 20 mg capsule Take 1 capsule (20 mg total) by mouth daily    gabapentin (NEURONTIN) 300 mg capsule TAKE 1 CAPSULE BY MOUTH THREE TIMES DAILY (Patient taking differently: daily at bedtime)    losartan (COZAAR) 100 MG tablet Take 1 tablet (100 mg total) by mouth daily    metFORMIN (GLUCOPHAGE-XR) 500 mg 24 hr tablet Take 1 tablet (500 mg total) by mouth daily with breakfast     "metoprolol tartrate (LOPRESSOR) 50 mg tablet TAKE 1 TABLET BY MOUTH EVERY 12 HOURS    [DISCONTINUED] simvastatin (ZOCOR) 20 mg tablet TAKE 1 TABLET BY MOUTH EVERY DAY    cephalexin (KEFLEX) 500 mg capsule Take 500 mg by mouth if needed Prior to procedure  (Patient not taking: Reported on 5/24/2023)    folic acid (FOLVITE) 1 mg tablet Take by mouth daily (Patient not taking: Reported on 5/8/2024)       Objective     /80   Pulse 81   Temp (!) 97.4 °F (36.3 °C) (Temporal)   Resp 16   Ht 4' 11\" (1.499 m)   Wt 98.1 kg (216 lb 3.2 oz)   SpO2 95%   BMI 43.67 kg/m²     Physical Exam  Constitutional:       General: She is not in acute distress.     Appearance: She is well-developed.   HENT:      Head: Normocephalic.   Eyes:      General:         Right eye: No discharge.         Left eye: No discharge.      Conjunctiva/sclera: Conjunctivae normal.   Neck:      Thyroid: No thyromegaly.   Cardiovascular:      Rate and Rhythm: Normal rate and regular rhythm.      Pulses: Pulses are weak.           Dorsalis pedis pulses are 1+ on the right side and 1+ on the left side.      Heart sounds: Normal heart sounds. No murmur heard.     No friction rub. No gallop.   Pulmonary:      Effort: Pulmonary effort is normal. No respiratory distress.      Breath sounds: Normal breath sounds. No wheezing or rales.   Chest:      Chest wall: No tenderness.   Abdominal:      General: Bowel sounds are normal. There is no distension.      Palpations: Abdomen is soft. There is no mass.      Tenderness: There is no abdominal tenderness. There is no guarding or rebound.   Musculoskeletal:         General: No tenderness or deformity. Normal range of motion.      Cervical back: Normal range of motion.   Feet:      Right foot:      Skin integrity: No ulcer, skin breakdown, erythema, warmth, callus or dry skin.      Left foot:      Skin integrity: No ulcer, skin breakdown, erythema, warmth, callus or dry skin.   Lymphadenopathy:      Cervical: No " cervical adenopathy.   Skin:     Findings: Lesion present.      Comments: Abrasion of right lower leg, size 1cm, no bleeding.    Neurological:      Mental Status: She is alert.     Patient's shoes and socks removed.    Right Foot/Ankle   Right Foot Inspection  Skin Exam: skin normal and skin intact. No dry skin, no warmth, no callus, no erythema, no maceration, no abnormal color, no pre-ulcer, no ulcer and no callus.     Sensory   Monofilament testing: intact    Vascular  The right DP pulse is 1+.     Left Foot/Ankle  Left Foot Inspection  Skin Exam: skin normal and skin intact. No dry skin, no warmth, no erythema, no maceration, normal color, no pre-ulcer, no ulcer and no callus.     Sensory   Monofilament testing: intact    Vascular  The left DP pulse is 1+.     Assign Risk Category  No deformity present  No loss of protective sensation  Weak pulses  Risk: 2      Rafita Mccloud MD

## 2024-05-08 NOTE — PROGRESS NOTES
Name: Lew Martins      : 1950      MRN: [id number]  Encounter Provider: Rafita Mccloud MD  Encounter Date: 2024   Encounter department: [facility]  Chief Complaint   Patient presents with    Follow-up     6 month f/u      Health Maintenance   Topic Date Due    Depression Follow-up Plan  Never done    Zoster Vaccine (1 of 2) Never done    PT PLAN OF CARE  2020    Colorectal Cancer Screening  2023    COVID-19 Vaccine (5 - 2023-24 season) 2023    Breast Cancer Screening: Mammogram  2024    HEMOGLOBIN A1C  2024    Kidney Health Evaluation: Albumin/Creatinine Ratio  2024    Medicare Annual Wellness Visit (AWV)  2024    Diabetic Foot Exam  2024    Urinary Incontinence Screening  2024    DXA SCAN  2025    Kidney Health Evaluation: GFR  2025    Fall Risk  2025    Depression Screening  2025    DM Eye Exam  2025    Hepatitis C Screening  Completed    Osteoporosis Screening  Completed    Pneumococcal Vaccine: 65+ Years  Completed    Influenza Vaccine  Completed    HIB Vaccine  Aged Out    IPV Vaccine  Aged Out    Hepatitis A Vaccine  Aged Out    Meningococcal ACWY Vaccine  Aged Out    HPV Vaccine  Aged Out     Assessment & Plan     1. Breast cancer screening by mammogram           Subjective      HPI  Review of Systems    Current Outpatient Medications on File Prior to Visit   Medication Sig    acetaminophen (TYLENOL) 500 mg tablet Take 1,000 mg by mouth every 6 (six) hours as needed for mild pain    Biotin 1 MG CAPS Take by mouth daily     Blood Pressure Monitoring (SPHYGMOMANOMETER) MISC by Does not apply route 2 (two) times a day    Calcium Carb-Cholecalciferol (Calcium 1000 + D) 1000-800 MG-UNIT TABS Take by mouth As needed    celecoxib (CeleBREX) 100 mg capsule Take 1 capsule (100 mg total) by mouth daily    cholecalciferol (VITAMIN D3) 1,000 units tablet Take 1,000 Units by mouth daily    gabapentin  (NEURONTIN) 300 mg capsule TAKE 1 CAPSULE BY MOUTH THREE TIMES DAILY (Patient taking differently: daily at bedtime)    losartan (COZAAR) 100 MG tablet Take 1 tablet (100 mg total) by mouth daily    metFORMIN (GLUCOPHAGE-XR) 500 mg 24 hr tablet Take 1 tablet (500 mg total) by mouth daily with breakfast    metoprolol tartrate (LOPRESSOR) 50 mg tablet TAKE 1 TABLET BY MOUTH EVERY 12 HOURS    simvastatin (ZOCOR) 20 mg tablet TAKE 1 TABLET BY MOUTH EVERY DAY    cephalexin (KEFLEX) 500 mg capsule Take 500 mg by mouth if needed Prior to procedure  (Patient not taking: Reported on 5/24/2023)    DULoxetine (CYMBALTA) 20 mg capsule Take 1 capsule (20 mg total) by mouth daily (Patient not taking: Reported on 5/8/2024)    folic acid (FOLVITE) 1 mg tablet Take by mouth daily (Patient not taking: Reported on 5/8/2024)       Objective     There were no vitals taken for this visit.    Physical Exam  Rafita Mccloud MD

## 2024-05-08 NOTE — ASSESSMENT & PLAN NOTE
Lab Results   Component Value Date    HGBA1C 6.3 05/08/2024     Controlled. Low carb diet. Continue metformin 500mg QD.

## 2024-05-10 DIAGNOSIS — I10 BENIGN ESSENTIAL HYPERTENSION: ICD-10-CM

## 2024-05-10 RX ORDER — LOSARTAN POTASSIUM 100 MG/1
100 TABLET ORAL DAILY
Qty: 90 TABLET | Refills: 1 | Status: SHIPPED | OUTPATIENT
Start: 2024-05-10

## 2024-05-12 DIAGNOSIS — G89.29 CHRONIC PAIN OF BOTH KNEES: ICD-10-CM

## 2024-05-12 DIAGNOSIS — M17.0 PRIMARY OSTEOARTHRITIS OF BOTH KNEES: ICD-10-CM

## 2024-05-12 DIAGNOSIS — M25.562 CHRONIC PAIN OF BOTH KNEES: ICD-10-CM

## 2024-05-12 DIAGNOSIS — M25.561 CHRONIC PAIN OF BOTH KNEES: ICD-10-CM

## 2024-05-13 RX ORDER — CELECOXIB 100 MG/1
100 CAPSULE ORAL DAILY
Qty: 30 CAPSULE | Refills: 5 | Status: SHIPPED | OUTPATIENT
Start: 2024-05-13

## 2024-05-21 ENCOUNTER — TELEPHONE (OUTPATIENT)
Age: 74
End: 2024-05-21

## 2024-05-21 DIAGNOSIS — M17.0 PRIMARY OSTEOARTHRITIS OF BOTH KNEES: Primary | ICD-10-CM

## 2024-05-21 RX ORDER — CELECOXIB 200 MG/1
200 CAPSULE ORAL 2 TIMES DAILY
Qty: 60 CAPSULE | Refills: 2 | Status: SHIPPED | OUTPATIENT
Start: 2024-05-21

## 2024-05-21 NOTE — TELEPHONE ENCOUNTER
Caller: Patient     Doctor: Ingrid    Reason for call: Patient is tolerating the celebrex well, but is requesting a higher dose. She is still having stiffness and aches in the knees.    Call back#: [phone]

## 2024-05-29 DIAGNOSIS — I47.10 SVT (SUPRAVENTRICULAR TACHYCARDIA): ICD-10-CM

## 2024-05-30 RX ORDER — METOPROLOL TARTRATE 50 MG/1
50 TABLET, FILM COATED ORAL EVERY 12 HOURS
Qty: 180 TABLET | Refills: 0 | Status: SHIPPED | OUTPATIENT
Start: 2024-05-30

## 2024-06-16 DIAGNOSIS — M54.16 LUMBAR RADICULOPATHY: ICD-10-CM

## 2024-06-16 RX ORDER — GABAPENTIN 300 MG/1
CAPSULE ORAL
Qty: 90 CAPSULE | Refills: 5 | Status: SHIPPED | OUTPATIENT
Start: 2024-06-16

## 2024-07-16 ENCOUNTER — PROBLEM (OUTPATIENT)
Dept: URBAN - METROPOLITAN AREA CLINIC 6 | Facility: CLINIC | Age: 74
End: 2024-07-16

## 2024-07-16 DIAGNOSIS — D31.32: ICD-10-CM

## 2024-07-16 DIAGNOSIS — H35.341: ICD-10-CM

## 2024-07-16 PROCEDURE — 99213 OFFICE O/P EST LOW 20 MIN: CPT

## 2024-07-16 PROCEDURE — 92134 CPTRZ OPH DX IMG PST SGM RTA: CPT

## 2024-07-16 ASSESSMENT — TONOMETRY
OS_IOP_MMHG: 20
OD_IOP_MMHG: 21

## 2024-07-16 ASSESSMENT — VISUAL ACUITY
OD_CC: 20/60
OS_CC: 20/40

## 2024-08-13 DIAGNOSIS — M17.0 PRIMARY OSTEOARTHRITIS OF BOTH KNEES: ICD-10-CM

## 2024-08-13 DIAGNOSIS — I47.10 SVT (SUPRAVENTRICULAR TACHYCARDIA): ICD-10-CM

## 2024-08-13 RX ORDER — CELECOXIB 200 MG/1
200 CAPSULE ORAL 2 TIMES DAILY
Qty: 60 CAPSULE | Refills: 2 | Status: SHIPPED | OUTPATIENT
Start: 2024-08-13

## 2024-08-14 ENCOUNTER — PROCEDURE VISIT (OUTPATIENT)
Dept: OBGYN CLINIC | Facility: HOSPITAL | Age: 74
End: 2024-08-14
Payer: COMMERCIAL

## 2024-08-14 VITALS
BODY MASS INDEX: 44.15 KG/M2 | SYSTOLIC BLOOD PRESSURE: 143 MMHG | WEIGHT: 219 LBS | HEIGHT: 59 IN | DIASTOLIC BLOOD PRESSURE: 84 MMHG | HEART RATE: 72 BPM

## 2024-08-14 DIAGNOSIS — M25.561 CHRONIC PAIN OF BOTH KNEES: ICD-10-CM

## 2024-08-14 DIAGNOSIS — M25.562 CHRONIC PAIN OF BOTH KNEES: ICD-10-CM

## 2024-08-14 DIAGNOSIS — M17.0 PRIMARY OSTEOARTHRITIS OF BOTH KNEES: Primary | ICD-10-CM

## 2024-08-14 DIAGNOSIS — G89.29 CHRONIC PAIN OF BOTH KNEES: ICD-10-CM

## 2024-08-14 PROCEDURE — 20610 DRAIN/INJ JOINT/BURSA W/O US: CPT

## 2024-08-14 RX ORDER — ROPIVACAINE HYDROCHLORIDE 2 MG/ML
4 INJECTION, SOLUTION EPIDURAL; INFILTRATION; PERINEURAL
Status: COMPLETED | OUTPATIENT
Start: 2024-08-14 | End: 2024-08-14

## 2024-08-14 RX ORDER — HYALURONATE SODIUM 10 MG/ML
20 SYRINGE (ML) INTRAARTICULAR
Status: COMPLETED | OUTPATIENT
Start: 2024-08-14 | End: 2024-08-14

## 2024-08-14 RX ORDER — METOPROLOL TARTRATE 50 MG
50 TABLET ORAL EVERY 12 HOURS
Qty: 180 TABLET | Refills: 0 | Status: SHIPPED | OUTPATIENT
Start: 2024-08-14

## 2024-08-14 RX ADMIN — ROPIVACAINE HYDROCHLORIDE 4 ML: 2 INJECTION, SOLUTION EPIDURAL; INFILTRATION; PERINEURAL at 14:30

## 2024-08-14 RX ADMIN — Medication 20 MG: at 14:30

## 2024-08-14 NOTE — PROGRESS NOTES
Assessment:   Diagnosis ICD-10-CM Associated Orders   1. Primary osteoarthritis of both knees  M17.0 Large joint arthrocentesis: R knee     Large joint arthrocentesis: L knee      2. Chronic pain of both knees  M25.561 Large joint arthrocentesis: R knee    M25.562 Large joint arthrocentesis: L knee    G89.29           Plan:  73 y.o. female with known osteoarthritis of bilateral knees    Continued and prolonged relief from visco supplementation injections   Continues taking Celebrex daily with benefit   Euflexxa #1 provided to bilateral knees today (RP)   Patient tolerated procedures well  Follow up next week for second of three injections of series     The above stated was discussed in layman's terms and the patient expressed understanding.  All questions were answered to the patient's satisfaction.     To do next visit:  Return in about 1 week (around 8/21/2024) for Euflexxa #2 to bilateral knees.      Subjective:   Lew Martins is a 73 y.o. female who presents for follow-up of bilateral knees.  Patient has known osteoarthritis of bilateral knees which has been treated in the past with intermittent injections of viscosupplementation and Toradol injections.  She admits to continued relief from intermittent viscosupplementation.  Over the past few months she admits to return of weightbearing pain in both of her knees that is better with rest.  Euflexxa #1 provided to both knees today.   Pain score 8/10    Review of systems negative unless otherwise specified in HPI    Past Medical History:   Diagnosis Date    Alopecia     Calcaneal spur     Diabetes insipidus (HCC)     Diabetes mellitus (HCC)     Hyperlipidemia     Hypertension     Osteoarthrosis 2/13/2013    Pes planus     unspecified laterality    Plantar fasciitis        Past Surgical History:   Procedure Laterality Date    BREAST BIOPSY Right     patient not sure when , negative results    BREAST CYST ASPIRATION Left     patient not sure when negative results     CARPAL TUNNEL RELEASE      COLONOSCOPY      May 2006    INCISIONAL BREAST BIOPSY      NE ARTHRP ACETBLR/PROX FEM PROSTC AGRFT/ALGRFT Right 9/23/2019    Procedure: ARTHROPLASTY HIP TOTAL;  Surgeon: Choco Quintero MD;  Location: [facility];  Service: Orthopedics    NE COLONOSCOPY FLX DX W/COLLJ SPEC WHEN PFRMD N/A 5/4/2018    Procedure: COLONOSCOPY;  Surgeon: Randee Beck MD;  Location: [facility];  Service: Colorectal    WISDOM TOOTH EXTRACTION         Family History   Problem Relation Age of Onset    Diabetes Mother     Hypertension Mother     Emphysema Father     No Known Problems Maternal Grandmother     No Known Problems Maternal Grandfather     No Known Problems Paternal Grandmother     No Known Problems Paternal Grandfather     Colon cancer Brother 37    No Known Problems Brother     No Known Problems Maternal Aunt     No Known Problems Maternal Aunt     No Known Problems Paternal Aunt     No Known Problems Paternal Aunt     No Known Problems Paternal Aunt        Social History     Occupational History    Not on file   Tobacco Use    Smoking status: Never    Smokeless tobacco: Never   Vaping Use    Vaping status: Never Used   Substance and Sexual Activity    Alcohol use: Yes     Alcohol/week: 0.0 standard drinks of alcohol     Comment: 0    Drug use: No    Sexual activity: Not Currently         Current Outpatient Medications:     acetaminophen (TYLENOL) 500 mg tablet, Take 1,000 mg by mouth every 6 (six) hours as needed for mild pain, Disp: , Rfl:     Biotin 1 MG CAPS, Take by mouth daily , Disp: , Rfl:     Blood Pressure Monitoring (SPHYGMOMANOMETER) MISC, by Does not apply route 2 (two) times a day, Disp: 1 each, Rfl: 0    Calcium Carb-Cholecalciferol (Calcium 1000 + D) 1000-800 MG-UNIT TABS, Take by mouth As needed, Disp: , Rfl:     celecoxib (CeleBREX) 100 mg capsule, TAKE 1 CAPSULE BY MOUTH EVERY DAY, Disp: 30 capsule, Rfl: 5    celecoxib (CeleBREX) 200 mg capsule, TAKE 1 CAPSULE BY MOUTH TWO  TIMES DAILY, Disp: 60 capsule, Rfl: 2    cephalexin (KEFLEX) 500 mg capsule, Take 500 mg by mouth if needed Prior to procedure  (Patient not taking: Reported on 5/24/2023), Disp: , Rfl:     cholecalciferol (VITAMIN D3) 1,000 units tablet, Take 1,000 Units by mouth daily, Disp: , Rfl:     DULoxetine (CYMBALTA) 20 mg capsule, Take 1 capsule (20 mg total) by mouth daily, Disp: 90 capsule, Rfl: 1    folic acid (FOLVITE) 1 mg tablet, Take by mouth daily (Patient not taking: Reported on 5/8/2024), Disp: , Rfl:     gabapentin (NEURONTIN) 300 mg capsule, TAKE 1 CAPSULE BY MOUTH THREE TIMES DAILY, Disp: 90 capsule, Rfl: 5    losartan (COZAAR) 100 MG tablet, Take 1 tablet (100 mg total) by mouth daily, Disp: 90 tablet, Rfl: 1    metFORMIN (GLUCOPHAGE-XR) 500 mg 24 hr tablet, Take 1 tablet (500 mg total) by mouth daily with breakfast, Disp: 90 tablet, Rfl: 1    metoprolol tartrate (LOPRESSOR) 50 mg tablet, TAKE 1 TABLET BY MOUTH EVERY 12 HOURS, Disp: 180 tablet, Rfl: 0    simvastatin (ZOCOR) 40 mg tablet, Take 1 tablet (40 mg total) by mouth daily, Disp: 90 tablet, Rfl: 1    Allergies   Allergen Reactions    Meloxicam Hives    Nifedipine Edema    Sulfamethoxazole-Trimethoprim Edema            Vitals:    08/14/24 1438   BP: 143/84   Pulse: 72       Objective:  Physical exam  General: Awake, Alert, Oriented  Eyes: Pupils equal, round and reactive to light  Heart: regular rate and rhythm  Lungs: No audible wheezing  Abdomen: soft                    Right Knee Exam     Tenderness   The patient is experiencing tenderness in the lateral joint line and medial joint line.    Range of Motion   Extension:  normal   Flexion:  normal     Other   Erythema: absent  Scars: absent  Sensation: normal  Swelling: mild  Effusion: no effusion present      Left Knee Exam     Tenderness   The patient is experiencing tenderness in the lateral joint line and medial joint line.    Range of Motion   Extension:  normal   Flexion:  normal     Other  "  Erythema: absent  Scars: absent  Sensation: normal  Swelling: mild  Effusion: no effusion present            Diagnostics, reviewed and taken today if performed as documented:    None performed        Procedures, if performed today:    Large joint arthrocentesis: R knee  Universal Protocol:  Consent: Verbal consent obtained.  Risks and benefits: risks, benefits and alternatives were discussed  Consent given by: patient  Site marked: the operative site was marked  Supporting Documentation  Indications: pain   Procedure Details  Location: knee - R knee  Needle size: 22 G  Approach: anterolateral  Medications administered: 20 mg Sodium Hyaluronate (Viscosup) 20 MG/2ML; 4 mL ropivacaine 0.2 %    Patient tolerance: patient tolerated the procedure well with no immediate complications  Dressing:  Sterile dressing applied      Large joint arthrocentesis: L knee  Universal Protocol:  Consent: Verbal consent obtained.  Risks and benefits: risks, benefits and alternatives were discussed  Consent given by: patient  Site marked: the operative site was marked  Supporting Documentation  Indications: pain   Procedure Details  Location: knee - L knee  Needle size: 22 G  Approach: anterolateral  Medications administered: 4 mL ropivacaine 0.2 %; 20 mg Sodium Hyaluronate (Viscosup) 20 MG/2ML    Patient tolerance: patient tolerated the procedure well with no immediate complications  Dressing:  Sterile dressing applied          Portions of the record may have been created with voice recognition software.  Occasional wrong word or \"sound a like\" substitutions may have occurred due to the inherent limitations of voice recognition software.  Read the chart carefully and recognize, using context, where substitutions have occurred.      "

## 2024-08-21 ENCOUNTER — PROCEDURE VISIT (OUTPATIENT)
Dept: OBGYN CLINIC | Facility: HOSPITAL | Age: 74
End: 2024-08-21
Payer: COMMERCIAL

## 2024-08-21 VITALS
SYSTOLIC BLOOD PRESSURE: 157 MMHG | BODY MASS INDEX: 44.23 KG/M2 | DIASTOLIC BLOOD PRESSURE: 96 MMHG | HEART RATE: 77 BPM | HEIGHT: 59 IN

## 2024-08-21 DIAGNOSIS — G89.29 CHRONIC PAIN OF BOTH KNEES: Primary | ICD-10-CM

## 2024-08-21 DIAGNOSIS — M25.562 CHRONIC PAIN OF BOTH KNEES: Primary | ICD-10-CM

## 2024-08-21 DIAGNOSIS — M17.0 PRIMARY OSTEOARTHRITIS OF BOTH KNEES: ICD-10-CM

## 2024-08-21 DIAGNOSIS — M25.561 CHRONIC PAIN OF BOTH KNEES: Primary | ICD-10-CM

## 2024-08-21 PROCEDURE — 20610 DRAIN/INJ JOINT/BURSA W/O US: CPT | Performed by: ORTHOPAEDIC SURGERY

## 2024-08-21 RX ORDER — HYALURONATE SODIUM 10 MG/ML
20 SYRINGE (ML) INTRAARTICULAR
Status: COMPLETED | OUTPATIENT
Start: 2024-08-21 | End: 2024-08-21

## 2024-08-21 RX ADMIN — Medication 20 MG: at 14:30

## 2024-08-21 NOTE — PROGRESS NOTES
Assessment:  1. Chronic pain of both knees        2. Primary osteoarthritis of both knees            Plan:  2nd bilateral Euflexxa   The patient was provided with 2nd bilateral Euflexxa injections.  The patient tolerated the procedure well.    The patient should follow up in one week.      To do next visit:  Return in about 1 week (around 8/28/2024) for 3d bilateral Euflexxa .    The above stated was discussed in layman's terms and the patient expressed understanding.  All questions were answered to the patient's satisfaction.       Scribe Attestation      I,:  Zeke Crooks MA am acting as a scribe while in the presence of the attending physician.:       I,:  Choco Quintero MD personally performed the services described in this documentation    as scribed in my presence.:               Subjective:   Lew Martins is a 73 y.o. female who presents for 2nd bilateral Euflexxa.  Today she complains of bilateral generalized knee pain.  Prolonged weight bearing and repetitive bending aggravates while rest alleviates.  The patient rates their pain at 7/10 and above at times.        Review of systems negative unless otherwise specified in HPI    Past Medical History:   Diagnosis Date    Alopecia     Calcaneal spur     Diabetes insipidus (HCC)     Diabetes mellitus (HCC)     Hyperlipidemia     Hypertension     Osteoarthrosis 2/13/2013    Pes planus     unspecified laterality    Plantar fasciitis        Past Surgical History:   Procedure Laterality Date    BREAST BIOPSY Right     patient not sure when , negative results    BREAST CYST ASPIRATION Left     patient not sure when negative results    CARPAL TUNNEL RELEASE      COLONOSCOPY      May 2006    INCISIONAL BREAST BIOPSY      NC ARTHRP ACETBLR/PROX FEM PROSTC AGRFT/ALGRFT Right 9/23/2019    Procedure: ARTHROPLASTY HIP TOTAL;  Surgeon: Choco Quintero MD;  Location: [facility];  Service: Orthopedics    NC COLONOSCOPY FLX DX W/COLLJ SPEC WHEN PFRMD N/A 5/4/2018     Procedure: COLONOSCOPY;  Surgeon: Randee Beck MD;  Location: [facility];  Service: Colorectal    WISDOM TOOTH EXTRACTION         Family History   Problem Relation Age of Onset    Diabetes Mother     Hypertension Mother     Emphysema Father     No Known Problems Maternal Grandmother     No Known Problems Maternal Grandfather     No Known Problems Paternal Grandmother     No Known Problems Paternal Grandfather     Colon cancer Brother 37    No Known Problems Brother     No Known Problems Maternal Aunt     No Known Problems Maternal Aunt     No Known Problems Paternal Aunt     No Known Problems Paternal Aunt     No Known Problems Paternal Aunt        Social History     Occupational History    Not on file   Tobacco Use    Smoking status: Never    Smokeless tobacco: Never   Vaping Use    Vaping status: Never Used   Substance and Sexual Activity    Alcohol use: Yes     Alcohol/week: 0.0 standard drinks of alcohol     Comment: 0    Drug use: No    Sexual activity: Not Currently         Current Outpatient Medications:     acetaminophen (TYLENOL) 500 mg tablet, Take 1,000 mg by mouth every 6 (six) hours as needed for mild pain, Disp: , Rfl:     Biotin 1 MG CAPS, Take by mouth daily , Disp: , Rfl:     Blood Pressure Monitoring (SPHYGMOMANOMETER) MISC, by Does not apply route 2 (two) times a day, Disp: 1 each, Rfl: 0    Calcium Carb-Cholecalciferol (Calcium 1000 + D) 1000-800 MG-UNIT TABS, Take by mouth As needed, Disp: , Rfl:     celecoxib (CeleBREX) 100 mg capsule, TAKE 1 CAPSULE BY MOUTH EVERY DAY, Disp: 30 capsule, Rfl: 5    celecoxib (CeleBREX) 200 mg capsule, TAKE 1 CAPSULE BY MOUTH TWO TIMES DAILY, Disp: 60 capsule, Rfl: 2    cephalexin (KEFLEX) 500 mg capsule, Take 500 mg by mouth if needed Prior to procedure  (Patient not taking: Reported on 5/24/2023), Disp: , Rfl:     cholecalciferol (VITAMIN D3) 1,000 units tablet, Take 1,000 Units by mouth daily, Disp: , Rfl:     DULoxetine (CYMBALTA) 20 mg capsule, Take 1  "capsule (20 mg total) by mouth daily, Disp: 90 capsule, Rfl: 1    folic acid (FOLVITE) 1 mg tablet, Take by mouth daily (Patient not taking: Reported on 5/8/2024), Disp: , Rfl:     gabapentin (NEURONTIN) 300 mg capsule, TAKE 1 CAPSULE BY MOUTH THREE TIMES DAILY, Disp: 90 capsule, Rfl: 5    losartan (COZAAR) 100 MG tablet, Take 1 tablet (100 mg total) by mouth daily, Disp: 90 tablet, Rfl: 1    metFORMIN (GLUCOPHAGE-XR) 500 mg 24 hr tablet, Take 1 tablet (500 mg total) by mouth daily with breakfast, Disp: 90 tablet, Rfl: 1    metoprolol tartrate (LOPRESSOR) 50 mg tablet, TAKE 1 TABLET BY MOUTH EVERY 12 HOURS, Disp: 180 tablet, Rfl: 0    simvastatin (ZOCOR) 40 mg tablet, Take 1 tablet (40 mg total) by mouth daily, Disp: 90 tablet, Rfl: 1    Allergies   Allergen Reactions    Meloxicam Hives    Nifedipine Edema    Sulfamethoxazole-Trimethoprim Edema            Vitals:    08/21/24 1450   BP: 157/96   Pulse: 77       Objective:  Physical exam  General: Awake, Alert, Oriented  Eyes: Pupils equal, round and reactive to light  Heart: regular rate and rhythm  Lungs: No audible wheezing  Abdomen: soft                    Ortho Exam  Bilateral knees:  TTP over medial joint line  No erythema or ecchymosis  No effusion or swelling  Normal strength  Good ROM with crepitus   Calf compartments soft and supple  Sensation intact  Toes are warm sensate and mobile      Diagnostics, reviewed and taken today if performed as documented:    None performed     Procedures, if performed today:    Large joint arthrocentesis: R knee  Universal Protocol:  Consent: Verbal consent obtained.  Risks and benefits: risks, benefits and alternatives were discussed  Consent given by: patient  Time out: Immediately prior to procedure a \"time out\" was called to verify the correct patient, procedure, equipment, support staff and site/side marked as required.  Timeout called at: 8/21/2024 3:04 PM.  Patient understanding: patient states understanding of the " "procedure being performed  Site marked: the operative site was marked  Patient identity confirmed: verbally with patient  Supporting Documentation  Indications: pain   Procedure Details  Location: knee - R knee  Preparation: Patient was prepped and draped in the usual sterile fashion  Needle size: 22 G  Ultrasound guidance: no  Approach: anterolateral  Medications administered: 20 mg Sodium Hyaluronate (Viscosup) 20 MG/2ML    Patient tolerance: patient tolerated the procedure well with no immediate complications  Dressing:  Sterile dressing applied      Large joint arthrocentesis: L knee  Universal Protocol:  Consent: Verbal consent obtained.  Risks and benefits: risks, benefits and alternatives were discussed  Consent given by: patient  Time out: Immediately prior to procedure a \"time out\" was called to verify the correct patient, procedure, equipment, support staff and site/side marked as required.  Timeout called at: 8/21/2024 3:04 PM.  Patient understanding: patient states understanding of the procedure being performed  Site marked: the operative site was marked  Patient identity confirmed: verbally with patient  Supporting Documentation  Indications: pain   Procedure Details  Location: knee - L knee  Preparation: Patient was prepped and draped in the usual sterile fashion  Needle size: 22 G  Ultrasound guidance: no  Approach: anterolateral  Medications administered: 20 mg Sodium Hyaluronate (Viscosup) 20 MG/2ML    Patient tolerance: patient tolerated the procedure well with no immediate complications  Dressing:  Sterile dressing applied            Portions of the record may have been created with voice recognition software.  Occasional wrong word or \"sound a like\" substitutions may have occurred due to the inherent limitations of voice recognition software.  Read the chart carefully and recognize, using context, where substitutions have occurred.    "

## 2024-08-27 ENCOUNTER — CONSULT (OUTPATIENT)
Dept: FAMILY MEDICINE CLINIC | Facility: CLINIC | Age: 74
End: 2024-08-27
Payer: COMMERCIAL

## 2024-08-27 VITALS
OXYGEN SATURATION: 97 % | SYSTOLIC BLOOD PRESSURE: 124 MMHG | HEIGHT: 59 IN | RESPIRATION RATE: 18 BRPM | BODY MASS INDEX: 44.11 KG/M2 | HEART RATE: 73 BPM | DIASTOLIC BLOOD PRESSURE: 82 MMHG | WEIGHT: 218.8 LBS | TEMPERATURE: 97 F

## 2024-08-27 DIAGNOSIS — E78.00 PURE HYPERCHOLESTEROLEMIA: ICD-10-CM

## 2024-08-27 DIAGNOSIS — I47.10 SUPRAVENTRICULAR TACHYCARDIA: ICD-10-CM

## 2024-08-27 DIAGNOSIS — I10 BENIGN ESSENTIAL HYPERTENSION: Primary | ICD-10-CM

## 2024-08-27 DIAGNOSIS — E11.42 DIABETIC POLYNEUROPATHY ASSOCIATED WITH TYPE 2 DIABETES MELLITUS (HCC): ICD-10-CM

## 2024-08-27 DIAGNOSIS — Z01.818 PRE-OP EVALUATION: ICD-10-CM

## 2024-08-27 DIAGNOSIS — F33.9 DEPRESSION, RECURRENT (HCC): ICD-10-CM

## 2024-08-27 DIAGNOSIS — H35.341 MACULAR HOLE OF RIGHT EYE: ICD-10-CM

## 2024-08-27 DIAGNOSIS — E66.01 MORBID OBESITY (HCC): ICD-10-CM

## 2024-08-27 PROCEDURE — 99213 OFFICE O/P EST LOW 20 MIN: CPT | Performed by: NURSE PRACTITIONER

## 2024-08-27 PROCEDURE — 93000 ELECTROCARDIOGRAM COMPLETE: CPT | Performed by: NURSE PRACTITIONER

## 2024-08-27 NOTE — ASSESSMENT & PLAN NOTE
Lab Results   Component Value Date    HGBA1C 6.3 05/08/2024   Continue to follow low-carb diet.  Continue metformin.  Gabapentin at bedtime.

## 2024-08-27 NOTE — ASSESSMENT & PLAN NOTE
Patient had been on Cymbalta in the past however discontinued this medication.  She is not interested in going back on the medication at this time.

## 2024-08-27 NOTE — ASSESSMENT & PLAN NOTE
Lab Results   Component Value Date    HGBA1C 6.3 05/08/2024   Patient continues with her metformin 500 mg daily.  She does follow a low-carb diet.  She is up-to-date with diabetic foot exams.  She is up-to-date with diabetic eye exams.

## 2024-08-27 NOTE — ASSESSMENT & PLAN NOTE
Lifestyle modifications discussed.  The patient does find it difficult to exercise due to her arthritis in her knees and hips.

## 2024-08-27 NOTE — ASSESSMENT & PLAN NOTE
Patient continues on her losartan 100 mg daily.  She is following a low-salt diet.  Continues to follow with cardiology.  Blood pressure in the office today is 124/82.

## 2024-08-27 NOTE — PROGRESS NOTES
INTERNAL MEDICINE PRE-OPERATIVE EVALUATION  [facility]    NAME: Lew Martins  AGE: 73 y.o. SEX: female  : 1950     DATE: 2024     Internal Medicine Pre-Operative Evaluation:     Chief Complaint: Pre-operative Evaluation     Surgery: Vitrectomy, membrane peel, gas, right eye  Anticipated Date of Surgery: 2024  Referring Provider: Joseline Coronel*        History of Present Illness:     Lew Martins is a 73 y.o. female who presents to the office today for a preoperative consultation at the request of surgeon, Joesline Coronel, who plans on performing vitrectomy, right eye on 2024. Planned anesthesia is  MAC . Patient has a bleeding risk of: no recent abnormal bleeding.  Current anti-platelet/anti-coagulation medications that the patient is prescribed includes:  none .      Assessment of Chronic Conditions:      1. Benign essential hypertension  Assessment & Plan:  Patient continues on her losartan 100 mg daily.  She is following a low-salt diet.  Continues to follow with cardiology.  Blood pressure in the office today is 124/82.  2. Supraventricular tachycardia  Assessment & Plan:  Continues metoprolol.  Continues to follow with cardiology.  3. Diabetic polyneuropathy associated with type 2 diabetes mellitus (HCC)  Assessment & Plan:    Lab Results   Component Value Date    HGBA1C 6.3 2024   Continue to follow low-carb diet.  Continue metformin.  Gabapentin at bedtime.  4. Depression, recurrent (HCC)  Assessment & Plan:  Patient had been on Cymbalta in the past however discontinued this medication.  She is not interested in going back on the medication at this time.  5. Pure hypercholesterolemia  Assessment & Plan:  Patient's last lipid panel was in May 2024.  At that time her simvastatin was increased to 40 mg daily.  Low-fat low-cholesterol diet.  Weight loss recommended.    Component      Latest Ref Rng 2024    Cholesterol      100 - 199 mg/dL 217 (H)    Triglycerides      0 - 149 mg/dL 157 (H)    HDL      >39 mg/dL 58    LDL Calculated      0 - 99 mg/dL 131 (H)    VLDL Cholesterol Bryan      5 - 40 mg/dL 28       Legend:  (H) High    6. Morbid obesity (HCC)  Assessment & Plan:  Lifestyle modifications discussed.  The patient does find it difficult to exercise due to her arthritis in her knees and hips.  7. Macular hole of right eye  Assessment & Plan:  The patient is scheduled for a right eye vitrectomy on September 5.  8. Pre-op evaluation  -     POCT ECG      Assessment of Cardiac Risk:  Denies unstable or severe angina or MI in the last 6 weeks or history of stent placement in the last year   Denies decompensated heart failure (e.g. New onset heart failure, NYHA functional class IV heart failure, or worsening existing heart failure)  Denies significant arrhythmias such as high grade AV block, symptomatic ventricular arrhythmia, newly recognized ventricular tachycardia, supraventricular tachycardia with resting heart rate >100, or symptomatic bradycardia  Denies severe heart valve disease including aortic stenosis or symptomatic mitral stenosis     Exercise Capacity:  Able to walk 4 blocks without symptoms?: No  Able to walk 2 flights without symptoms?: No    Prior Anesthesia Reactions: No     Personal history of venous thromboembolic disease? No    History of steroid use for >2 weeks within last year? No        Review of Systems:     Review of Systems   Constitutional:  Negative for activity change, fatigue and fever.   HENT:  Negative for congestion, hearing loss, rhinorrhea, trouble swallowing and voice change.    Eyes:  Positive for visual disturbance. Negative for photophobia, pain and discharge.   Respiratory:  Negative for cough, chest tightness and shortness of breath.    Cardiovascular:  Negative for chest pain, palpitations and leg swelling.   Gastrointestinal:  Negative for abdominal pain, blood in stool,  constipation, nausea and vomiting.   Endocrine: Negative for cold intolerance and heat intolerance.   Genitourinary:  Negative for difficulty urinating, frequency, hematuria, urgency, vaginal bleeding and vaginal discharge.   Musculoskeletal:  Positive for arthralgias. Negative for myalgias.   Skin: Negative.    Neurological:  Negative for dizziness, weakness, numbness and headaches.   Psychiatric/Behavioral:  Negative for decreased concentration. The patient is not nervous/anxious.         Problem List:     Patient Active Problem List   Diagnosis   • Benign essential hypertension   • Cervical radiculopathy   • Chronic lower back pain   • Diabetes mellitus type 2, controlled ([facility])   • Hyperlipidemia   • Left knee pain   • Lumbar stenosis with neurogenic claudication   • Morbid obesity ([facility])   • Osteoarthrosis   • Post-menopausal bleeding   • Primary osteoarthritis of both knees   • Psoriasis   • Right knee pain   • Right shoulder pain   • Spondylolisthesis, lumbar region   • Vaginal lump   • Vitamin D deficiency   • Effusion of left knee   • Right hip pain   • Arthritis of right hip   • Lumbar radiculopathy   • DDD (degenerative disc disease), lumbar   • Lumbar spondylosis   • Sacroiliitis ([facility])   • Status post total hip replacement, right   • Supraventricular tachycardia   • Aftercare following right hip joint replacement surgery   • Stress incontinence of urine   • Chronic pain syndrome   • Diabetic polyneuropathy associated with type 2 diabetes mellitus ([facility])   • Onychomycosis   • Insomnia   • Anxiety and depression   • Neuralgia and neuritis, unspecified   • Diabetes mellitus with neuropathy ([facility])   • Depression, recurrent ([facility])   • Macular hole of right eye        Allergies:     Allergies   Allergen Reactions   • Meloxicam Hives   • Nifedipine Edema   • Sulfamethoxazole-Trimethoprim Edema        Current Medications:       Current Outpatient Medications:   •  acetaminophen (TYLENOL) 500 mg tablet, Take 1,000 mg  by mouth every 6 (six) hours as needed for mild pain, Disp: , Rfl:   •  Biotin 1 MG CAPS, Take by mouth daily , Disp: , Rfl:   •  Blood Pressure Monitoring (SPHYGMOMANOMETER) MISC, by Does not apply route 2 (two) times a day, Disp: 1 each, Rfl: 0  •  Calcium Carb-Cholecalciferol (Calcium 1000 + D) 1000-800 MG-UNIT TABS, Take by mouth As needed, Disp: , Rfl:   •  celecoxib (CeleBREX) 100 mg capsule, TAKE 1 CAPSULE BY MOUTH EVERY DAY, Disp: 30 capsule, Rfl: 5  •  celecoxib (CeleBREX) 200 mg capsule, TAKE 1 CAPSULE BY MOUTH TWO TIMES DAILY, Disp: 60 capsule, Rfl: 2  •  cholecalciferol (VITAMIN D3) 1,000 units tablet, Take 1,000 Units by mouth daily, Disp: , Rfl:   •  gabapentin (NEURONTIN) 300 mg capsule, TAKE 1 CAPSULE BY MOUTH THREE TIMES DAILY (Patient taking differently: Take 300 mg by mouth daily at bedtime), Disp: 90 capsule, Rfl: 5  •  losartan (COZAAR) 100 MG tablet, Take 1 tablet (100 mg total) by mouth daily, Disp: 90 tablet, Rfl: 1  •  metFORMIN (GLUCOPHAGE-XR) 500 mg 24 hr tablet, Take 1 tablet (500 mg total) by mouth daily with breakfast, Disp: 90 tablet, Rfl: 1  •  metoprolol tartrate (LOPRESSOR) 50 mg tablet, TAKE 1 TABLET BY MOUTH EVERY 12 HOURS, Disp: 180 tablet, Rfl: 0  •  simvastatin (ZOCOR) 40 mg tablet, Take 1 tablet (40 mg total) by mouth daily, Disp: 90 tablet, Rfl: 1  •  cephalexin (KEFLEX) 500 mg capsule, Take 500 mg by mouth if needed Prior to procedure  (Patient not taking: Reported on 5/24/2023), Disp: , Rfl:      Past History:     Past Medical History:   Diagnosis Date   • Alopecia    • Calcaneal spur    • Diabetes insipidus (HCC)    • Diabetes mellitus (HCC)    • Hyperlipidemia    • Hypertension    • Osteoarthrosis 2/13/2013   • Pes planus     unspecified laterality   • Plantar fasciitis         Past Surgical History:   Procedure Laterality Date   • BREAST BIOPSY Right     patient not sure when , negative results   • BREAST CYST ASPIRATION Left     patient not sure when negative results    • CARPAL TUNNEL RELEASE     • COLONOSCOPY      May 2006   • INCISIONAL BREAST BIOPSY     • NC ARTHRP ACETBLR/PROX FEM PROSTC AGRFT/ALGRFT Right 9/23/2019    Procedure: ARTHROPLASTY HIP TOTAL;  Surgeon: Choco Quintero MD;  Location: [facility];  Service: Orthopedics   • NC COLONOSCOPY FLX DX W/COLLJ SPEC WHEN PFRMD N/A 5/4/2018    Procedure: COLONOSCOPY;  Surgeon: Randee Beck MD;  Location: [facility];  Service: Colorectal   • WISDOM TOOTH EXTRACTION          Family History   Problem Relation Age of Onset   • Diabetes Mother    • Hypertension Mother    • Emphysema Father    • No Known Problems Maternal Grandmother    • No Known Problems Maternal Grandfather    • No Known Problems Paternal Grandmother    • No Known Problems Paternal Grandfather    • Colon cancer Brother 37   • No Known Problems Brother    • No Known Problems Maternal Aunt    • No Known Problems Maternal Aunt    • No Known Problems Paternal Aunt    • No Known Problems Paternal Aunt    • No Known Problems Paternal Aunt         Social History     Socioeconomic History   • Marital status: Single     Spouse name: Not on file   • Number of children: Not on file   • Years of education: Not on file   • Highest education level: Not on file   Occupational History   • Not on file   Tobacco Use   • Smoking status: Never     Passive exposure: Never   • Smokeless tobacco: Never   Vaping Use   • Vaping status: Never Used   Substance and Sexual Activity   • Alcohol use: Yes     Comment: rarely   • Drug use: No   • Sexual activity: Not Currently   Other Topics Concern   • Not on file   Social History Narrative   • Not on file     Social Determinants of Health     Financial Resource Strain: Medium Risk (5/17/2023)    Overall Financial Resource Strain (CARDIA)    • Difficulty of Paying Living Expenses: Somewhat hard   Food Insecurity: Not on file   Transportation Needs: No Transportation Needs (5/17/2023)    PRAPARE - Transportation    • Lack of Transportation  "(Medical): No    • Lack of Transportation (Non-Medical): No   Physical Activity: Not on file   Stress: Not on file   Social Connections: Not on file   Intimate Partner Violence: Not on file   Housing Stability: Not on file        Physical Exam:      /82   Pulse 73   Temp (!) 97 °F (36.1 °C) (Temporal)   Resp 18   Ht 4' 11\" (1.499 m)   Wt 99.2 kg (218 lb 12.8 oz)   SpO2 97%   BMI 44.19 kg/m²     Physical Exam  Constitutional:       General: She is not in acute distress.     Appearance: Normal appearance. She is well-developed. She is obese.   HENT:      Head: Normocephalic and atraumatic.      Right Ear: Tympanic membrane, ear canal and external ear normal. There is no impacted cerumen.      Left Ear: Tympanic membrane, ear canal and external ear normal. There is no impacted cerumen.      Nose: Nose normal. No congestion or rhinorrhea.      Mouth/Throat:      Mouth: Mucous membranes are moist.      Pharynx: Oropharynx is clear. No oropharyngeal exudate or posterior oropharyngeal erythema.   Eyes:      General:         Right eye: No discharge.         Left eye: No discharge.      Conjunctiva/sclera: Conjunctivae normal.      Pupils: Pupils are equal, round, and reactive to light.   Neck:      Thyroid: No thyromegaly.   Cardiovascular:      Rate and Rhythm: Normal rate and regular rhythm.      Pulses: Normal pulses.      Heart sounds: Normal heart sounds. No murmur heard.  Pulmonary:      Effort: Pulmonary effort is normal. No respiratory distress.      Breath sounds: Normal breath sounds.   Abdominal:      General: Bowel sounds are normal. There is no distension.      Palpations: Abdomen is soft. There is no mass.      Tenderness: There is no abdominal tenderness. There is no guarding or rebound.      Hernia: No hernia is present.   Musculoskeletal:         General: Normal range of motion.      Cervical back: Normal range of motion.   Lymphadenopathy:      Cervical: No cervical adenopathy.   Skin:     " General: Skin is warm and dry.      Capillary Refill: Capillary refill takes less than 2 seconds.   Neurological:      General: No focal deficit present.      Mental Status: She is alert and oriented to person, place, and time. Mental status is at baseline.   Psychiatric:         Mood and Affect: Mood normal.         Behavior: Behavior normal.         Thought Content: Thought content normal.         Judgment: Judgment normal.           Data:     Pre-operative work-up    EKG: I have personally reviewed pertinent reports.  Attached with office note     Plan:     73 y.o. female with planned surgery: Vitrectomy, membrane peel, gas, right eye.      1. Further preoperative workup as follows:   - None; no further preoperative work-up is required    2. Medication Management/Recommendations:   - None, continue medication regimen including morning of surgery, with sip of water    3. Prophylaxis for cardiac events with perioperative beta-blockers: not indicated.    4. Patient requires further consultation with: None    Clearance  Patient is CLEARED for surgery without any additional cardiac testing.     WILLIAN Aguilar  [address] [address]  Phone#  [phone]  Fax#  [phone]

## 2024-08-27 NOTE — ASSESSMENT & PLAN NOTE
Patient's last lipid panel was in May 2024.  At that time her simvastatin was increased to 40 mg daily.  Low-fat low-cholesterol diet.  Weight loss recommended.    Component      Latest Ref Rng 5/6/2024   Cholesterol      100 - 199 mg/dL 217 (H)    Triglycerides      0 - 149 mg/dL 157 (H)    HDL      >39 mg/dL 58    LDL Calculated      0 - 99 mg/dL 131 (H)    VLDL Cholesterol Bryan      5 - 40 mg/dL 28       Legend:  (H) High

## 2024-08-28 ENCOUNTER — PROCEDURE VISIT (OUTPATIENT)
Dept: OBGYN CLINIC | Facility: HOSPITAL | Age: 74
End: 2024-08-28
Payer: COMMERCIAL

## 2024-08-28 VITALS
SYSTOLIC BLOOD PRESSURE: 128 MMHG | DIASTOLIC BLOOD PRESSURE: 85 MMHG | HEIGHT: 59 IN | HEART RATE: 87 BPM | BODY MASS INDEX: 44.19 KG/M2

## 2024-08-28 DIAGNOSIS — G89.29 CHRONIC PAIN OF BOTH KNEES: Primary | ICD-10-CM

## 2024-08-28 DIAGNOSIS — M17.0 PRIMARY OSTEOARTHRITIS OF BOTH KNEES: ICD-10-CM

## 2024-08-28 DIAGNOSIS — M25.561 CHRONIC PAIN OF BOTH KNEES: Primary | ICD-10-CM

## 2024-08-28 DIAGNOSIS — M25.562 CHRONIC PAIN OF BOTH KNEES: Primary | ICD-10-CM

## 2024-08-28 PROCEDURE — 20610 DRAIN/INJ JOINT/BURSA W/O US: CPT | Performed by: ORTHOPAEDIC SURGERY

## 2024-08-28 RX ORDER — HYALURONATE SODIUM 10 MG/ML
20 SYRINGE (ML) INTRAARTICULAR
Status: COMPLETED | OUTPATIENT
Start: 2024-08-28 | End: 2024-08-28

## 2024-08-28 RX ORDER — BUPIVACAINE HYDROCHLORIDE 2.5 MG/ML
4 INJECTION, SOLUTION INFILTRATION; PERINEURAL
Status: COMPLETED | OUTPATIENT
Start: 2024-08-28 | End: 2024-08-28

## 2024-08-28 RX ADMIN — Medication 20 MG: at 14:30

## 2024-08-28 RX ADMIN — BUPIVACAINE HYDROCHLORIDE 4 ML: 2.5 INJECTION, SOLUTION INFILTRATION; PERINEURAL at 14:30

## 2024-08-28 NOTE — PROGRESS NOTES
Assessment:  1. Chronic pain of both knees        2. Primary osteoarthritis of both knees              Plan:  3rd bilateral Euflexxa   The patient was provided with 3rd bilateral Euflexxa injections.  The patient tolerated the procedure well.    The patient should follow up in three months      To do next visit:  Return in about 3 months (around 11/28/2024).    The above stated was discussed in layman's terms and the patient expressed understanding.  All questions were answered to the patient's satisfaction.       Scribe Attestation      I,:  Zeke Crooks MA am acting as a scribe while in the presence of the attending physician.:       I,:  Choco Quintero MD personally performed the services described in this documentation    as scribed in my presence.:               Subjective:   Lew Martins is a 73 y.o. female who presents for 3rd bilateral Euflexxa.  Today she complains of bilateral generalized knee pain.  Prolonged weight bearing and repetitive bending aggravates while rest alleviates.  The patient rates their pain at 7/10 and above at times.        Review of systems negative unless otherwise specified in HPI    Past Medical History:   Diagnosis Date    Alopecia     Calcaneal spur     Diabetes insipidus (HCC)     Diabetes mellitus (HCC)     Hyperlipidemia     Hypertension     Osteoarthrosis 2/13/2013    Pes planus     unspecified laterality    Plantar fasciitis        Past Surgical History:   Procedure Laterality Date    BREAST BIOPSY Right     patient not sure when , negative results    BREAST CYST ASPIRATION Left     patient not sure when negative results    CARPAL TUNNEL RELEASE      COLONOSCOPY      May 2006    INCISIONAL BREAST BIOPSY      TX ARTHRP ACETBLR/PROX FEM PROSTC AGRFT/ALGRFT Right 9/23/2019    Procedure: ARTHROPLASTY HIP TOTAL;  Surgeon: Choco Quintero MD;  [facility];  Service: Orthopedics    TX COLONOSCOPY FLX DX W/COLLJ SPEC WHEN PFRMD N/A 5/4/2018    Procedure:  COLONOSCOPY;  Surgeon: Randee Beck MD;  [facility]: [facility];  Service: Colorectal    WISDOM TOOTH EXTRACTION         Family History   Problem Relation Age of Onset    Diabetes Mother     Hypertension Mother     Emphysema Father     No Known Problems Maternal Grandmother     No Known Problems Maternal Grandfather     No Known Problems Paternal Grandmother     No Known Problems Paternal Grandfather     Colon cancer Brother 37    No Known Problems Brother     No Known Problems Maternal Aunt     No Known Problems Maternal Aunt     No Known Problems Paternal Aunt     No Known Problems Paternal Aunt     No Known Problems Paternal Aunt        Social History     Occupational History    Not on file   Tobacco Use    Smoking status: Never     Passive exposure: Never    Smokeless tobacco: Never   Vaping Use    Vaping status: Never Used   Substance and Sexual Activity    Alcohol use: Yes     Comment: rarely    Drug use: No    Sexual activity: Not Currently         Current Outpatient Medications:     acetaminophen (TYLENOL) 500 mg tablet, Take 1,000 mg by mouth every 6 (six) hours as needed for mild pain, Disp: , Rfl:     Biotin 1 MG CAPS, Take by mouth daily , Disp: , Rfl:     Blood Pressure Monitoring (SPHYGMOMANOMETER) MISC, by Does not apply route 2 (two) times a day, Disp: 1 each, Rfl: 0    Calcium Carb-Cholecalciferol (Calcium 1000 + D) 1000-800 MG-UNIT TABS, Take by mouth As needed, Disp: , Rfl:     celecoxib (CeleBREX) 100 mg capsule, TAKE 1 CAPSULE BY MOUTH EVERY DAY, Disp: 30 capsule, Rfl: 5    celecoxib (CeleBREX) 200 mg capsule, TAKE 1 CAPSULE BY MOUTH TWO TIMES DAILY, Disp: 60 capsule, Rfl: 2    cephalexin (KEFLEX) 500 mg capsule, Take 500 mg by mouth if needed Prior to procedure  (Patient not taking: Reported on 5/24/2023), Disp: , Rfl:     cholecalciferol (VITAMIN D3) 1,000 units tablet, Take 1,000 Units by mouth daily, Disp: , Rfl:     gabapentin (NEURONTIN) 300 mg capsule, TAKE 1 CAPSULE BY MOUTH THREE  "TIMES DAILY (Patient taking differently: Take 300 mg by mouth daily at bedtime), Disp: 90 capsule, Rfl: 5    losartan (COZAAR) 100 MG tablet, Take 1 tablet (100 mg total) by mouth daily, Disp: 90 tablet, Rfl: 1    metFORMIN (GLUCOPHAGE-XR) 500 mg 24 hr tablet, Take 1 tablet (500 mg total) by mouth daily with breakfast, Disp: 90 tablet, Rfl: 1    metoprolol tartrate (LOPRESSOR) 50 mg tablet, TAKE 1 TABLET BY MOUTH EVERY 12 HOURS, Disp: 180 tablet, Rfl: 0    simvastatin (ZOCOR) 40 mg tablet, Take 1 tablet (40 mg total) by mouth daily, Disp: 90 tablet, Rfl: 1    Allergies   Allergen Reactions    Meloxicam Hives    Nifedipine Edema    Sulfamethoxazole-Trimethoprim Edema            Vitals:    08/28/24 1427   BP: 128/85   Pulse: 87         Objective:  Physical exam  General: Awake, Alert, Oriented  Eyes: Pupils equal, round and reactive to light  Heart: regular rate and rhythm  Lungs: No audible wheezing  Abdomen: soft                    Ortho Exam  Bilateral knees:  TTP over medial joint line  No erythema or ecchymosis  No effusion or swelling  Normal strength  Good ROM with crepitus   Calf compartments soft and supple  Sensation intact  Toes are warm sensate and mobile      Diagnostics, reviewed and taken today if performed as documented:    None performed     Procedures, if performed today:    Large joint arthrocentesis: R knee  Universal Protocol:  Consent: Verbal consent obtained.  Risks and benefits: risks, benefits and alternatives were discussed  Consent given by: patient  Time out: Immediately prior to procedure a \"time out\" was called to verify the correct patient, procedure, equipment, support staff and site/side marked as required.  Timeout called at: 8/28/2024 2:36 PM.  Patient understanding: patient states understanding of the procedure being performed  Site marked: the operative site was marked  Patient identity confirmed: verbally with patient  Supporting Documentation  Indications: pain   Procedure " "Details  Location: knee - R knee  Preparation: Patient was prepped and draped in the usual sterile fashion  Needle size: 22 G  Ultrasound guidance: no  Approach: anterolateral  Medications administered: 4 mL bupivacaine 0.25 %; 20 mg Sodium Hyaluronate (Viscosup) 20 MG/2ML    Patient tolerance: patient tolerated the procedure well with no immediate complications  Dressing:  Sterile dressing applied      Large joint arthrocentesis: L knee  Universal Protocol:  Consent: Verbal consent obtained.  Risks and benefits: risks, benefits and alternatives were discussed  Consent given by: patient  Time out: Immediately prior to procedure a \"time out\" was called to verify the correct patient, procedure, equipment, support staff and site/side marked as required.  Timeout called at: 8/28/2024 2:37 PM.  Patient understanding: patient states understanding of the procedure being performed  Site marked: the operative site was marked  Patient identity confirmed: verbally with patient  Supporting Documentation  Indications: pain   Procedure Details  Location: knee - L knee  Preparation: Patient was prepped and draped in the usual sterile fashion  Needle size: 22 G  Ultrasound guidance: no  Approach: anterolateral  Medications administered: 4 mL bupivacaine 0.25 %; 20 mg Sodium Hyaluronate (Viscosup) 20 MG/2ML    Patient tolerance: patient tolerated the procedure well with no immediate complications  Dressing:  Sterile dressing applied            Portions of the record may have been created with voice recognition software.  Occasional wrong word or \"sound a like\" substitutions may have occurred due to the inherent limitations of voice recognition software.  Read the chart carefully and recognize, using context, where substitutions have occurred.    "

## 2024-08-29 ENCOUNTER — TELEPHONE (OUTPATIENT)
Age: 74
End: 2024-08-29

## 2024-08-29 NOTE — TELEPHONE ENCOUNTER
Please fax Office visit notes for pre op clearance for macular sx, EKG slip and form for pre-op clearance appt for pt dated 8/27/24  Please Fax [phone]  Call [phone] () if there are any further questions, thank you.

## 2024-10-24 ENCOUNTER — OFFICE VISIT (OUTPATIENT)
Dept: CARDIOLOGY CLINIC | Facility: CLINIC | Age: 74
End: 2024-10-24
Payer: COMMERCIAL

## 2024-10-24 VITALS
DIASTOLIC BLOOD PRESSURE: 80 MMHG | HEART RATE: 75 BPM | WEIGHT: 221 LBS | SYSTOLIC BLOOD PRESSURE: 120 MMHG | OXYGEN SATURATION: 97 % | BODY MASS INDEX: 44.55 KG/M2 | HEIGHT: 59 IN

## 2024-10-24 DIAGNOSIS — I47.10 SUPRAVENTRICULAR TACHYCARDIA (HCC): ICD-10-CM

## 2024-10-24 DIAGNOSIS — R60.0 BILATERAL LOWER EXTREMITY EDEMA: ICD-10-CM

## 2024-10-24 DIAGNOSIS — E78.00 PURE HYPERCHOLESTEROLEMIA: ICD-10-CM

## 2024-10-24 DIAGNOSIS — I10 BENIGN ESSENTIAL HYPERTENSION: Primary | ICD-10-CM

## 2024-10-24 PROCEDURE — 99214 OFFICE O/P EST MOD 30 MIN: CPT | Performed by: INTERNAL MEDICINE

## 2024-10-24 RX ORDER — FUROSEMIDE 20 MG/1
20 TABLET ORAL AS NEEDED
Qty: 30 TABLET | Refills: 0 | Status: SHIPPED | OUTPATIENT
Start: 2024-10-24 | End: 2024-10-25

## 2024-10-24 NOTE — PROGRESS NOTES
Cardiology Follow Up    Lew Martins  1950  [id number]  [facility]  [address]  [address]  [place] [address] [phone]    No diagnosis found.      There are no diagnoses linked to this encounter.    I had the pleasure of seeing Lew Martins for a follow up visit.     INTERVAL HISTORY: none    History of the presenting illness, Discussion/Summary and My Plan are as follows:::    She is a pleasant 74-year-old lady with history of hypertension, hyperlipidemia, type 2 diabetes, who in Oct 2019, post elective right hip total arthroplasty, had asymptomatic supraventricular tachycardia for about 30-40 minutes, converted spontaneously to sinus rhythm.  Her TSH was normal.  Electrolytes were unremarkable.    An echocardiogram showed preserved LV systolic function but also showed LVOT obstruction-up to 40 mm with Valsalva.    She presents for a follow-up visit and denies any complaints at this time.  She denies any palpitations. Moderately active (limited by OA) without any symptoms except new edema since the last visit with seepage from the left left, no travel recently    Plan:    Supraventricular tachycardia: Now on Metoprolol and losartan readded for better BP control. Ordered (but not performed) - 24 hour Holter, no palpitations now    BL lower ext edema: use Lasix as needed, limit NSAID/Clebrex, since on losartan, no adding Kdur but will check a BMP in 2 weeks    LVOT/Mid cavity obstruction:  Discovered on echo in the hospital, on not symptomatic, adequate hydration is important and emphasized.     Hypertension: well controlled  Also has fatigue since she went on Gabapentin    Hyperlipidemia:  On simvastatin with adequate control previously but now has risen, recheck lipids in Nov - pending    Poor sleep, anxiety might be contributing to her chronic fatigue - has an upcoming visit with  when she will discuss  further    Follow-up in 6 months     Latest Reference Range & Units 06/04/21 14:45 02/24/22 15:24 05/06/24 15:01   Cholesterol 100 - 199 mg/dL 194 187 217 (H)   Triglycerides 0 - 149 mg/dL 151 (H) 149 157 (H)   HDL >39 mg/dL 58 56 58   Non-HDL Cholesterol mg/dl  131    LDL Calculated 0 - 99 mg/dL 106 (H) 101 (H) 131 (H)   (H): Data is abnormally high   Latest Reference Range & Units 09/13/23 13:36 05/08/24 13:13   Hemoglobin A1C 6.5  6.0 6.3      Latest Reference Range & Units 02/24/22 15:24 05/16/23 00:00 05/06/24 15:01   BUN 8 - 27 mg/dL 14  10   Creatinine 0.57 - 1.00 mg/dL 0.67 0.61 (E) 0.65   (E): External lab result    Patient Active Problem List   Diagnosis    Benign essential hypertension    Cervical radiculopathy    Chronic lower back pain    Diabetes mellitus type 2, controlled ([facility])    Hyperlipidemia    Left knee pain    Lumbar stenosis with neurogenic claudication    Morbid obesity (HCC)    Osteoarthrosis    Post-menopausal bleeding    Primary osteoarthritis of both knees    Psoriasis    Right knee pain    Right shoulder pain    Spondylolisthesis, lumbar region    Vaginal lump    Vitamin D deficiency    Effusion of left knee    Right hip pain    Arthritis of right hip    Lumbar radiculopathy    DDD (degenerative disc disease), lumbar    Lumbar spondylosis    Sacroiliitis (HCC)    Status post total hip replacement, right    Supraventricular tachycardia (HCC)    Aftercare following right hip joint replacement surgery    Stress incontinence of urine    Chronic pain syndrome    Diabetic polyneuropathy associated with type 2 diabetes mellitus (HCC)    Onychomycosis    Insomnia    Anxiety and depression    Neuralgia and neuritis, unspecified    Diabetes mellitus with neuropathy (HCC)    Depression, recurrent (HCC)    Macular hole of right eye     Past Medical History:   Diagnosis Date    Alopecia     Calcaneal spur     Diabetes insipidus (HCC)     Diabetes mellitus (HCC)     Hyperlipidemia     Hypertension      Osteoarthrosis 2/13/2013    Pes planus     unspecified laterality    Plantar fasciitis      Social History     Socioeconomic History    Marital status: Single     Spouse name: Not on file    Number of children: Not on file    Years of education: Not on file    Highest education level: Not on file   Occupational History    Not on file   Tobacco Use    Smoking status: Never     Passive exposure: Never    Smokeless tobacco: Never   Vaping Use    Vaping status: Never Used   Substance and Sexual Activity    Alcohol use: Yes     Comment: rarely    Drug use: No    Sexual activity: Not Currently   Other Topics Concern    Not on file   Social History Narrative    Not on file     Social Determinants of Health     Financial Resource Strain: Medium Risk (5/17/2023)    Overall Financial Resource Strain (CARDIA)     Difficulty of Paying Living Expenses: Somewhat hard   Food Insecurity: Not on file   Transportation Needs: No Transportation Needs (5/17/2023)    PRAPARE - Transportation     Lack of Transportation (Medical): No     Lack of Transportation (Non-Medical): No   Physical Activity: Not on file   Stress: Not on file   Social Connections: Not on file   Intimate Partner Violence: Not on file   Housing Stability: Not on file      Family History   Problem Relation Age of Onset    Diabetes Mother     Hypertension Mother     Emphysema Father     No Known Problems Maternal Grandmother     No Known Problems Maternal Grandfather     No Known Problems Paternal Grandmother     No Known Problems Paternal Grandfather     Colon cancer Brother 37    No Known Problems Brother     No Known Problems Maternal Aunt     No Known Problems Maternal Aunt     No Known Problems Paternal Aunt     No Known Problems Paternal Aunt     No Known Problems Paternal Aunt      Past Surgical History:   Procedure Laterality Date    BREAST BIOPSY Right     patient not sure when , negative results    BREAST CYST ASPIRATION Left     patient not sure when negative  results    CARPAL TUNNEL RELEASE      COLONOSCOPY      May 2006    INCISIONAL BREAST BIOPSY      OK ARTHRP ACETBLR/PROX FEM PROSTC AGRFT/ALGRFT Right 9/23/2019    Procedure: ARTHROPLASTY HIP TOTAL;  Surgeon: Choco Quintero MD;  Location: [facility];  Service: Orthopedics    OK COLONOSCOPY FLX DX W/COLLJ SPEC WHEN PFRMD N/A 5/4/2018    Procedure: COLONOSCOPY;  Surgeon: Randee Beck MD;  Location: [facility];  Service: Colorectal    WISDOM TOOTH EXTRACTION         Current Outpatient Medications:     acetaminophen (TYLENOL) 500 mg tablet, Take 1,000 mg by mouth every 6 (six) hours as needed for mild pain, Disp: , Rfl:     Biotin 1 MG CAPS, Take by mouth daily , Disp: , Rfl:     celecoxib (CeleBREX) 200 mg capsule, TAKE 1 CAPSULE BY MOUTH TWO TIMES DAILY, Disp: 60 capsule, Rfl: 2    gabapentin (NEURONTIN) 300 mg capsule, TAKE 1 CAPSULE BY MOUTH THREE TIMES DAILY (Patient taking differently: Take 300 mg by mouth daily at bedtime), Disp: 90 capsule, Rfl: 5    losartan (COZAAR) 100 MG tablet, Take 1 tablet (100 mg total) by mouth daily, Disp: 90 tablet, Rfl: 1    metFORMIN (GLUCOPHAGE-XR) 500 mg 24 hr tablet, Take 1 tablet (500 mg total) by mouth daily with breakfast, Disp: 90 tablet, Rfl: 1    metoprolol tartrate (LOPRESSOR) 50 mg tablet, TAKE 1 TABLET BY MOUTH EVERY 12 HOURS, Disp: 180 tablet, Rfl: 0    simvastatin (ZOCOR) 40 mg tablet, Take 1 tablet (40 mg total) by mouth daily, Disp: 90 tablet, Rfl: 1    Blood Pressure Monitoring (SPHYGMOMANOMETER) MISC, by Does not apply route 2 (two) times a day, Disp: 1 each, Rfl: 0    Calcium Carb-Cholecalciferol (Calcium 1000 + D) 1000-800 MG-UNIT TABS, Take by mouth As needed (Patient not taking: Reported on 10/24/2024), Disp: , Rfl:     celecoxib (CeleBREX) 100 mg capsule, TAKE 1 CAPSULE BY MOUTH EVERY DAY (Patient not taking: Reported on 10/24/2024), Disp: 30 capsule, Rfl: 5    cephalexin (KEFLEX) 500 mg capsule, Take 500 mg by mouth if needed Prior to procedure   "(Patient not taking: Reported on 10/24/2024), Disp: , Rfl:     cholecalciferol (VITAMIN D3) 1,000 units tablet, Take 1,000 Units by mouth daily (Patient not taking: Reported on 10/24/2024), Disp: , Rfl:   Allergies   Allergen Reactions    Meloxicam Hives    Nifedipine Edema    Sulfamethoxazole-Trimethoprim Edema       Imaging: Xr Hip/pelv 2-3 Vws Right If Performed    Result Date: 10/4/2019  Narrative: RIGHT HIP INDICATION:   Z47.1: Aftercare following joint replacement surgery Z96.641: Presence of right artificial hip joint. COMPARISON:  Right hip plain films from 3/20/2019. VIEWS:  XR HIP/PELV 2-3 VWS RIGHT W PELVIS IF PERFORMED FINDINGS: There is no acute fracture or dislocation. Right total hip arthroplasty is identified in satisfactory position without evidence of hardware complication. Moderate left hip osteoarthritis with joint space narrowing and marginal osteophytes. No lytic or blastic osseous lesions. Skin staples are seen lateral to the right hip. Degenerative changes visualized lower lumbar spine.     Impression: Unremarkable appearance of right total hip arthroplasty. Workstation performed: [id number]       Review of Systems:  Review of Systems   Constitutional: Negative.    HENT: Negative.     Eyes: Negative.    Respiratory: Negative.     Cardiovascular:  Positive for leg swelling. Negative for chest pain and palpitations.   Endocrine: Negative.    Musculoskeletal: Negative.        Physical Exam:  /80 (BP Location: Right arm, Patient Position: Sitting, Cuff Size: Large)   Pulse 75   Ht 4' 11\" (1.499 m)   Wt 100 kg (221 lb)   SpO2 97%   BMI 44.64 kg/m²   Physical Exam  Constitutional:       General: She is not in acute distress.     Appearance: She is well-developed. She is not diaphoretic.   HENT:      Head: Normocephalic and atraumatic.   Eyes:      General: No scleral icterus.        Right eye: No discharge.         Left eye: No discharge.      Conjunctiva/sclera: Conjunctivae normal.    "   Pupils: Pupils are equal, round, and reactive to light.   Neck:      Thyroid: No thyromegaly.      Trachea: No tracheal deviation.   Cardiovascular:      Rate and Rhythm: Normal rate and regular rhythm.      Heart sounds: Murmur (ESM) heard.      No friction rub.   Pulmonary:      Effort: Pulmonary effort is normal. No respiratory distress.      Breath sounds: Normal breath sounds. No stridor. No wheezing.   Abdominal:      General: Bowel sounds are normal. There is no distension.      Palpations: Abdomen is soft.      Tenderness: There is no abdominal tenderness.   Musculoskeletal:         General: No deformity. Normal range of motion.      Cervical back: Normal range of motion.      Right lower leg: Edema (2 plus) present.      Left lower leg: Edema (2 plus) present.   Skin:     General: Skin is warm.      Coloration: Skin is not pale.      Findings: No erythema or rash.

## 2024-10-25 RX ORDER — FUROSEMIDE 20 MG/1
20 TABLET ORAL AS NEEDED
Qty: 30 TABLET | Refills: 3 | Status: SHIPPED | OUTPATIENT
Start: 2024-10-25 | End: 2024-10-25 | Stop reason: SDUPTHER

## 2024-10-25 RX ORDER — FUROSEMIDE 20 MG/1
20 TABLET ORAL AS NEEDED
Qty: 30 TABLET | Refills: 3 | Status: SHIPPED | OUTPATIENT
Start: 2024-10-25

## 2024-10-26 DIAGNOSIS — E78.00 PURE HYPERCHOLESTEROLEMIA: ICD-10-CM

## 2024-10-26 RX ORDER — SIMVASTATIN 40 MG
40 TABLET ORAL DAILY
Qty: 90 TABLET | Refills: 1 | Status: SHIPPED | OUTPATIENT
Start: 2024-10-26

## 2024-10-28 DIAGNOSIS — R60.0 BILATERAL LOWER EXTREMITY EDEMA: ICD-10-CM

## 2024-10-28 RX ORDER — FUROSEMIDE 20 MG/1
20 TABLET ORAL AS NEEDED
Qty: 30 TABLET | Refills: 3 | Status: SHIPPED | OUTPATIENT
Start: 2024-10-28

## 2024-10-28 NOTE — TELEPHONE ENCOUNTER
Called pt, left a vm advising pt that a new script was send to her pharmacy for lasix. Left detailed instructions on medication as per Dr. Alfonso's message.

## 2024-10-28 NOTE — TELEPHONE ENCOUNTER
Patient called in because she still has not received her medication due to the fact that there is no medication instructions on how much to take and when from . Script needs to be addend and resent to [facility].     Patient also states she is not sure how she should take the medication, in the morning? With or without food? Can you please call her and advise med instructions. Thanks.    Patient says if you catch her voice mail to please leave a very detailed message with this information if possible. As to not delay the process more. Please advise.

## 2024-11-05 ENCOUNTER — TELEPHONE (OUTPATIENT)
Age: 74
End: 2024-11-05

## 2024-11-05 NOTE — TELEPHONE ENCOUNTER
Called and spoke with pt and relayed EVA Lubin's message. She stated understanding. She stated she was planning on trying something called instaflex she has seen commercials for, advised pt to check with pharmacy to make sure that there were no interactions with her other meds if trying a supplement. She stated understanding

## 2024-11-05 NOTE — TELEPHONE ENCOUNTER
Caller: Jannet Martins    Doctor: Dr Alfonso    Reason for call: Patient called stating she wanted me to make Dr Alfonso aware that she is currently having a skin reaction on her legs (blisters) and extreme itchiness. She believes it might be a reaction to her medication Celebrex. It is prescribed by ortho and also called them as well.     Call back#: [phone]

## 2024-11-05 NOTE — TELEPHONE ENCOUNTER
Caller: Patient    Doctor: Ingrid    Reason for call:Called and is currently taking Celebrex, is very itchy and has a rash. Would like to know if she can stop taking it. Lost connection during call. Please call patient to advise.    Call back#: [phone]

## 2024-11-05 NOTE — TELEPHONE ENCOUNTER
Caller: ana rosa     Doctor/Office: Ingrid     Call regarding :  returning nurse's call      Call was transferred to: nurse

## 2024-11-05 NOTE — TELEPHONE ENCOUNTER
Received a transfer call from pt, she states she has been taking the Celebrex 200 mg and it is helping however she has developed a rash recently. She has a fine, red, itchy rash that looks like pimples on her lower extremities and midsection. She saw her cardiologist recently and along with this rash she is having some issues with blisters on her lower extremities also. He thought the two issues were unrelated and advised she call the prescriber of the Celebrex to see if she could stop taking that because rash is a side effect of Celebrex. She also states she had a similar reaction to meloxicam.  Does she need to wean off Celebrex and if so how should she do that? And is there something else she can take that would help with her pain to take in place of the Celebrex? Please advice, thanks!

## 2024-11-08 ENCOUNTER — OFFICE VISIT (OUTPATIENT)
Dept: FAMILY MEDICINE CLINIC | Facility: CLINIC | Age: 74
End: 2024-11-08
Payer: COMMERCIAL

## 2024-11-08 VITALS
HEIGHT: 59 IN | WEIGHT: 218.4 LBS | BODY MASS INDEX: 44.03 KG/M2 | OXYGEN SATURATION: 97 % | DIASTOLIC BLOOD PRESSURE: 82 MMHG | HEART RATE: 84 BPM | TEMPERATURE: 99.8 F | SYSTOLIC BLOOD PRESSURE: 132 MMHG | RESPIRATION RATE: 18 BRPM

## 2024-11-08 DIAGNOSIS — Z00.00 MEDICARE ANNUAL WELLNESS VISIT, SUBSEQUENT: ICD-10-CM

## 2024-11-08 DIAGNOSIS — L03.119 CELLULITIS OF LOWER EXTREMITY, UNSPECIFIED LATERALITY: Primary | ICD-10-CM

## 2024-11-08 DIAGNOSIS — F41.9 ANXIETY AND DEPRESSION: ICD-10-CM

## 2024-11-08 DIAGNOSIS — I10 BENIGN ESSENTIAL HYPERTENSION: ICD-10-CM

## 2024-11-08 DIAGNOSIS — E11.21 CONTROLLED TYPE 2 DIABETES MELLITUS WITH DIABETIC NEPHROPATHY, WITHOUT LONG-TERM CURRENT USE OF INSULIN (HCC): ICD-10-CM

## 2024-11-08 DIAGNOSIS — M54.16 LUMBAR RADICULOPATHY: ICD-10-CM

## 2024-11-08 DIAGNOSIS — E66.01 MORBID OBESITY (HCC): ICD-10-CM

## 2024-11-08 DIAGNOSIS — F32.A ANXIETY AND DEPRESSION: ICD-10-CM

## 2024-11-08 DIAGNOSIS — E78.00 PURE HYPERCHOLESTEROLEMIA: ICD-10-CM

## 2024-11-08 LAB — SL AMB POCT HEMOGLOBIN AIC: 6.6 (ref ?–6.5)

## 2024-11-08 PROCEDURE — 99214 OFFICE O/P EST MOD 30 MIN: CPT | Performed by: FAMILY MEDICINE

## 2024-11-08 PROCEDURE — G0439 PPPS, SUBSEQ VISIT: HCPCS | Performed by: FAMILY MEDICINE

## 2024-11-08 PROCEDURE — 83036 HEMOGLOBIN GLYCOSYLATED A1C: CPT | Performed by: FAMILY MEDICINE

## 2024-11-08 RX ORDER — CEPHALEXIN 500 MG/1
500 CAPSULE ORAL EVERY 6 HOURS SCHEDULED
Qty: 28 CAPSULE | Refills: 0 | Status: SHIPPED | OUTPATIENT
Start: 2024-11-08 | End: 2024-11-15

## 2024-11-08 RX ORDER — DULOXETIN HYDROCHLORIDE 20 MG/1
20 CAPSULE, DELAYED RELEASE ORAL DAILY
Start: 2024-11-08

## 2024-11-08 NOTE — PROGRESS NOTES
Ambulatory Visit  Name: Lew Martins      : 1950      MRN: [id number]  Encounter Provider: Rafita Mccloud MD  Encounter Date: 2024   Encounter department: [facility]  Chief Complaint   Patient presents with    Medicare Wellness Visit     Subsequent Visit     Rash     Rash/pimples on legs, had weeping blisters which stopped, possible reaction to Celebrex. Swollen left leg.     Health Maintenance   Topic Date Due    Zoster Vaccine (1 of 2) Never done    RSV Vaccine Age 60+ Years (1 - 1-dose 60+ series) Never done    PT PLAN OF CARE  2020    Colorectal Cancer Screening  2023    Breast Cancer Screening: Mammogram  2024    Influenza Vaccine (1) 2024    COVID-19 Vaccine (2023- season) 2024    Depression Screening  2025    DXA SCAN  2025    Kidney Health Evaluation: GFR  2025    Depression Follow-up Plan  2025    Kidney Health Evaluation: Albumin/Creatinine Ratio  2025    Diabetic Foot Exam  2025    HEMOGLOBIN A1C  2025    DM Eye Exam  2025    Fall Risk  2025    Urinary Incontinence Screening  2025    Medicare Annual Wellness Visit (AWV)  2025    Hepatitis C Screening  Completed    Osteoporosis Screening  Completed    Pneumococcal Vaccine: 65+ Years  Completed    RSV Vaccine age 0-20 Months  Aged Out    HIB Vaccine  Aged Out    IPV Vaccine  Aged Out    Hepatitis A Vaccine  Aged Out    Meningococcal ACWY Vaccine  Aged Out    HPV Vaccine  Aged Out    Cervical Cancer Screening  Discontinued     Assessment & Plan  Cellulitis of lower extremity, unspecified laterality    Do not pop blister. Give keflex. SE educated pt.   Check doppler as scheduled.   Advised pt to use lasix 20mg QD and check labs in 1 week as ordered by cardiology.     Orders:    cephalexin (KEFLEX) 500 mg capsule; Take 1 capsule (500 mg total) by mouth every 6 (six) hours for 7 days    Controlled type 2 diabetes mellitus with  diabetic nephropathy, without long-term current use of insulin ([facility])    Lab Results   Component Value Date    HGBA1C 6.6 (A) 11/08/2024     Controlled. Low carb diet. Continue metformin 500mg QD.   Orders:    POCT hemoglobin A1c    Benign essential hypertension  Controlled. DASH diet. Continue losartan 100mg QD and metoprolol 50mg bid per cardiology.        Pure hypercholesterolemia  Low fat diet. Continue simvastatin 40mg qhs.        Anxiety and depression  Advised pt to restart cymbalta 20mg QD.     Orders:    DULoxetine (CYMBALTA) 20 mg capsule; Take 1 capsule (20 mg total) by mouth daily    Lumbar radiculopathy  FU pain specialist.        Morbid obesity ([facility])           Medicare annual wellness visit, subsequent           Depression Screening and Follow-up Plan: Patient was screened for depression during today's encounter. They screened negative with a PHQ-9 score of 0.    Urinary Incontinence Plan of Care: counseling topics discussed: practice Kegel (pelvic floor strengthening) exercises, use restroom every 2 hours, limit alcohol, caffeine, spicy foods, and acidic foods, keeping a bladder diary, limiting fluid intake 3-4 hours before bed, weight loss and preventing constipation.         Flu shot yearly. Will get it later per pt.   Got Covid19 vaccines and booster. Recommend booster.   Got pneumovax at age of 63. Got prevnar 13 2/2016.  Will get shingrix at pharmacy.   Got Tdap in 2024.    Dexa 5/2022 normal.    RTO in 6 months.          Preventive health issues were discussed with patient, and age appropriate screening tests were ordered as noted in patient's After Visit Summary. Personalized health advice and appropriate referrals for health education or preventive services given if needed, as noted in patient's After Visit Summary.    History of Present Illness     Rash  Pertinent negatives include no congestion, cough, diarrhea, fever, shortness of breath, sore throat or vomiting.    Pt is here by herself.    B/L leg swollen/itchy for 2 weeks.   Had blister on left ankle and it is painful.   She tried zinc oxide helped little.   Got lasix from cardiology but she did not take it.   Will do doppler on 12/12/2024 per cardiology.      DM---Today HgA1C 6.6 in office.   She is on metformin 500mg bid. Had loose stool for a while.   Some neuropathy on toes. She is on gabapentin 300mg qhs.   Denies hypoglycemia.  FU opthalmology yearly.   FU podiatry.      HTN--- She is on losartan 100mg qhs and metoprolol 25mg bid.    SVT---FU cardiology. Denies palpitation, SOB, CP etc.      Hyperlipidemia---She is on simvastatin 20mg qhs. Denies SE.      Knees pain---Fu orthopedics. She is off celebrex now because she thought her rash was caused by celebrex.     Lumbar radiculopathy---FU pain specialist.      Insomnia/anxiety/depression---She did not take cymbalta 20mg QD for a while. Will restart per pt.       Live by herself. Does all ADL's. Still drive.   Denies recent falls. Feels unsteady when walking. Interested in PT.      Patient Care Team:  Rafita Mccloud MD as PCP - General  DO Randee Carlson MD as Endoscopist  Oleg Pierre DO (Anesthesiology)    Review of Systems   Constitutional:  Negative for appetite change, chills and fever.   HENT:  Negative for congestion, ear pain, sinus pain and sore throat.    Eyes:  Negative for discharge and itching.   Respiratory:  Negative for apnea, cough, chest tightness, shortness of breath and wheezing.    Cardiovascular:  Negative for chest pain, palpitations and leg swelling.   Gastrointestinal:  Negative for abdominal pain, anal bleeding, constipation, diarrhea, nausea and vomiting.   Endocrine: Negative for cold intolerance, heat intolerance and polyuria.   Genitourinary:  Negative for difficulty urinating and dysuria.   Musculoskeletal:  Negative for arthralgias, back pain and myalgias.   Skin:  Positive for rash and wound.   Neurological:  Negative for dizziness and  headaches.   Psychiatric/Behavioral:  Negative for agitation.      Medical History Reviewed by provider this encounter:  Tobacco  Allergies  Meds  Problems  Med Hx  Surg Hx  Fam Hx  Soc   Hx      Annual Wellness Visit Questionnaire   Lew is here for her Subsequent Wellness visit.     Health Risk Assessment:   Patient rates overall health as fair. Patient feels that their physical health rating is same. Patient is satisfied with their life. Eyesight was rated as slightly worse. Hearing was rated as same. Patient feels that their emotional and mental health rating is same. Patients states they are never, rarely angry. Patient states they are often unusually tired/fatigued. Pain experienced in the last 7 days has been some. Patient's pain rating has been 6/10. Patient states that she has experienced no weight loss or gain in last 6 months. Answered questions as accurately as possible.    Depression Screening:   PHQ-9 Score: 0      Fall Risk Screening:   In the past year, patient has experienced: no history of falling in past year      Urinary Incontinence Screening:   Patient has leaked urine accidently in the last six months.     Home Safety:  Patient has trouble with stairs inside or outside of their home. Patient has working smoke alarms and has no working carbon monoxide detector. Home safety hazards include: household clutter and medications that cause fatigue.     Nutrition:   Current diet is Other (please comment). A mix of healthy & unhealthy --some junk food, sweets; better meal planning; should eat at regular hours.    Medications:   Patient is currently taking over-the-counter supplements. OTC medications include: Biotin, Folic Acid, D3.. Patient is able to manage medications.     Activities of Daily Living (ADLs)/Instrumental Activities of Daily Living (IADLs):   Walk and transfer into and out of bed and chair?: Yes  Dress and groom yourself?: Yes    Bathe or shower yourself?: Yes    Feed  yourself? Yes  Do your laundry/housekeeping?: Yes  Manage your money, pay your bills and track your expenses?: Yes  Make your own meals?: Yes    Do your own shopping?: Yes    ADL comments: Certain activities take more effort, like housekeeping & laundry, but I manage..    Previous Hospitalizations:   Any hospitalizations or ED visits within the last 12 months?: No      Advance Care Planning:   Living will: No    Durable POA for healthcare: No    Advanced directive: No    Advanced directive counseling given: Yes    ACP document given: Yes      Cognitive Screening:   Provider or family/friend/caregiver concerned regarding cognition?: No    PREVENTIVE SCREENINGS      Cardiovascular Screening:    General: History Lipid Disorder and Screening Current    Due for: Lipid Panel      Diabetes Screening:     General: History Diabetes and Screening Current    Due for: Blood Glucose      Colorectal Cancer Screening:     General: Screening Current    Due for: Colonoscopy - Low Risk      Breast Cancer Screening:     General: Screening Current      Cervical Cancer Screening:    General: Screening Not Indicated      Osteoporosis Screening:    General: Screening Current      Lung Cancer Screening:     General: Screening Not Indicated      Hepatitis C Screening:    General: Screening Current    Screening, Brief Intervention, and Referral to Treatment (SBIRT)    Screening  Typical number of drinks in a day: 0  Typical number of drinks in a week: 0  Interpretation: Low risk drinking behavior.    Single Item Drug Screening:  How often have you used an illegal drug (including marijuana) or a prescription medication for non-medical reasons in the past year? never    Single Item Drug Screen Score: 0  Interpretation: Negative screen for possible drug use disorder    Social Determinants of Health     Financial Resource Strain: Medium Risk (5/17/2023)    Overall Financial Resource Strain (CARDIA)     Difficulty of Paying Living Expenses:  "Somewhat hard   Food Insecurity: No Food Insecurity (11/8/2024)    Hunger Vital Sign     Worried About Running Out of Food in the Last Year: Never true     Ran Out of Food in the Last Year: Never true   Transportation Needs: No Transportation Needs (11/8/2024)    PRAPARE - Transportation     Lack of Transportation (Medical): No     Lack of Transportation (Non-Medical): No   Housing Stability: Low Risk  (11/8/2024)    Housing Stability Vital Sign     Unable to Pay for Housing in the Last Year: No     Number of Times Moved in the Last Year: 0     Homeless in the Last Year: No   Utilities: Not At Risk (11/8/2024)    [facility] Utilities     Threatened with loss of utilities: No     No results found.    Objective     /82   Pulse 84   Temp 99.8 °F (37.7 °C) (Tympanic)   Resp 18   Ht 4' 11\" (1.499 m)   Wt 99.1 kg (218 lb 6.4 oz)   SpO2 97%   BMI 44.11 kg/m²     Physical Exam  Constitutional:       General: She is not in acute distress.     Appearance: She is well-developed.   HENT:      Head: Normocephalic.   Eyes:      General:         Right eye: No discharge.         Left eye: No discharge.      Conjunctiva/sclera: Conjunctivae normal.   Neck:      Thyroid: No thyromegaly.   Cardiovascular:      Rate and Rhythm: Normal rate and regular rhythm.      Heart sounds: Normal heart sounds. No murmur heard.     No friction rub. No gallop.   Pulmonary:      Effort: Pulmonary effort is normal. No respiratory distress.      Breath sounds: Normal breath sounds. No wheezing or rales.   Chest:      Chest wall: No tenderness.   Abdominal:      General: Bowel sounds are normal. There is no distension.      Palpations: Abdomen is soft. There is no mass.      Tenderness: There is no abdominal tenderness. There is no guarding or rebound.   Musculoskeletal:         General: No tenderness or deformity. Normal range of motion.      Cervical back: Normal range of motion.      Right lower leg: Edema present.      Left lower leg: Edema " present.   Lymphadenopathy:      Cervical: No cervical adenopathy.   Skin:     Findings: Lesion present.      Comments: B/l legs mild erythema with lesions.  Blister 1cm in left ankle, no exudate   Neurological:      Mental Status: She is alert.

## 2024-11-08 NOTE — ASSESSMENT & PLAN NOTE
Lab Results   Component Value Date    HGBA1C 6.6 (A) 11/08/2024     Controlled. Low carb diet. Continue metformin 500mg QD.   Orders:    POCT hemoglobin A1c

## 2024-11-08 NOTE — ASSESSMENT & PLAN NOTE
Advised pt to restart cymbalta 20mg QD.     Orders:    DULoxetine (CYMBALTA) 20 mg capsule; Take 1 capsule (20 mg total) by mouth daily

## 2024-11-08 NOTE — PATIENT INSTRUCTIONS
Medicare Preventive Visit Patient Instructions  Thank you for completing your Welcome to Medicare Visit or Medicare Annual Wellness Visit today. Your next wellness visit will be due in one year (11/9/2025).  The screening/preventive services that you may require over the next 5-10 years are detailed below. Some tests may not apply to you based off risk factors and/or age. Screening tests ordered at today's visit but not completed yet may show as past due. Also, please note that scanned in results may not display below.  Preventive Screenings:  Service Recommendations Previous Testing/Comments   Colorectal Cancer Screening  * Colonoscopy    * Fecal Occult Blood Test (FOBT)/Fecal Immunochemical Test (FIT)  * Fecal DNA/Cologuard Test  * Flexible Sigmoidoscopy Age: 45-75 years old   Colonoscopy: every 10 years (may be performed more frequently if at higher risk)  OR  FOBT/FIT: every 1 year  OR  Cologuard: every 3 years  OR  Sigmoidoscopy: every 5 years  Screening may be recommended earlier than age 45 if at higher risk for colorectal cancer. Also, an individualized decision between you and your healthcare provider will decide whether screening between the ages of 76-85 would be appropriate. Colonoscopy: 05/04/2018  FOBT/FIT: Not on file  Cologuard: Not on file  Sigmoidoscopy: Not on file    Screening Current  Due for Colonoscopy - Low Risk     Breast Cancer Screening Age: 40+ years old  Frequency: every 1-2 years  Not required if history of left and right mastectomy Mammogram: 02/13/2023    Screening Current   Cervical Cancer Screening Between the ages of 21-29, pap smear recommended once every 3 years.   Between the ages of 30-65, can perform pap smear with HPV co-testing every 5 years.   Recommendations may differ for women with a history of total hysterectomy, cervical cancer, or abnormal pap smears in past. Pap Smear: 10/14/2020    Screening Not Indicated   Hepatitis C Screening Once for adults born between 1945 and  1965  More frequently in patients at high risk for Hepatitis C Hep C Antibody: 07/27/2020    Screening Current   Diabetes Screening 1-2 times per year if you're at risk for diabetes or have pre-diabetes Fasting glucose: 83 mg/dL (2/24/2022)  A1C: 6.3 (5/8/2024)  Screening Not Indicated  History Diabetes  Due for Blood Glucose   Cholesterol Screening Once every 5 years if you don't have a lipid disorder. May order more often based on risk factors. Lipid panel: 05/06/2024    Screening Not Indicated  History Lipid Disorder  Due for Lipid Panel     Other Preventive Screenings Covered by Medicare:  Abdominal Aortic Aneurysm (AAA) Screening: covered once if your at risk. You're considered to be at risk if you have a family history of AAA.  Lung Cancer Screening: covers low dose CT scan once per year if you meet all of the following conditions: (1) Age 55-77; (2) No signs or symptoms of lung cancer; (3) Current smoker or have quit smoking within the last 15 years; (4) You have a tobacco smoking history of at least 20 pack years (packs per day multiplied by number of years you smoked); (5) You get a written order from a healthcare provider.  Glaucoma Screening: covered annually if you're considered high risk: (1) You have diabetes OR (2) Family history of glaucoma OR (3)  aged 50 and older OR (4)  American aged 65 and older  Osteoporosis Screening: covered every 2 years if you meet one of the following conditions: (1) You're estrogen deficient and at risk for osteoporosis based off medical history and other findings; (2) Have a vertebral abnormality; (3) On glucocorticoid therapy for more than 3 months; (4) Have primary hyperparathyroidism; (5) On osteoporosis medications and need to assess response to drug therapy.   Last bone density test (DXA Scan): 05/02/2022.  HIV Screening: covered annually if you're between the age of 15-65. Also covered annually if you are younger than 15 and older than 65  with risk factors for HIV infection. For pregnant patients, it is covered up to 3 times per pregnancy.    Immunizations:  Immunization Recommendations   Influenza Vaccine Annual influenza vaccination during flu season is recommended for all persons aged >= 6 months who do not have contraindications   Pneumococcal Vaccine   * Pneumococcal conjugate vaccine = PCV13 (Prevnar 13), PCV15 (Vaxneuvance), PCV20 (Prevnar 20)  * Pneumococcal polysaccharide vaccine = PPSV23 (Pneumovax) Adults 19-65 yo with certain risk factors or if 65+ yo  If never received any pneumonia vaccine: recommend Prevnar 20 (PCV20)  Give PCV20 if previously received 1 dose of PCV13 or PPSV23   Hepatitis B Vaccine 3 dose series if at intermediate or high risk (ex: diabetes, end stage renal disease, liver disease)   Respiratory syncytial virus (RSV) Vaccine - COVERED BY MEDICARE PART D  * RSVPreF3 (Arexvy) CDC recommends that adults 60 years of age and older may receive a single dose of RSV vaccine using shared clinical decision-making (SCDM)   Tetanus (Td) Vaccine - COST NOT COVERED BY MEDICARE PART B Following completion of primary series, a booster dose should be given every 10 years to maintain immunity against tetanus. Td may also be given as tetanus wound prophylaxis.   Tdap Vaccine - COST NOT COVERED BY MEDICARE PART B Recommended at least once for all adults. For pregnant patients, recommended with each pregnancy.   Shingles Vaccine (Shingrix) - COST NOT COVERED BY MEDICARE PART B  2 shot series recommended in those 19 years and older who have or will have weakened immune systems or those 50 years and older     Health Maintenance Due:      Topic Date Due   • Colorectal Cancer Screening  05/04/2023   • Breast Cancer Screening: Mammogram  02/13/2024   • DXA SCAN  05/02/2025   • Hepatitis C Screening  Completed   • Cervical Cancer Screening  Discontinued     Immunizations Due:      Topic Date Due   • Influenza Vaccine (1) 09/01/2024   • COVID-19  Vaccine (5 - 2023-24 season) 09/01/2024     Advance Directives   What are advance directives?  Advance directives are legal documents that state your wishes and plans for medical care. These plans are made ahead of time in case you lose your ability to make decisions for yourself. Advance directives can apply to any medical decision, such as the treatments you want, and if you want to donate organs.   What are the types of advance directives?  There are many types of advance directives, and each state has rules about how to use them. You may choose a combination of any of the following:  Living will:  This is a written record of the treatment you want. You can also choose which treatments you do not want, which to limit, and which to stop at a certain time. This includes surgery, medicine, IV fluid, and tube feedings.   Durable power of  for healthcare (DPAHC):  This is a written record that states who you want to make healthcare choices for you when you are unable to make them for yourself. This person, called a proxy, is usually a family member or a friend. You may choose more than 1 proxy.  Do not resuscitate (DNR) order:  A DNR order is used in case your heart stops beating or you stop breathing. It is a request not to have certain forms of treatment, such as CPR. A DNR order may be included in other types of advance directives.  Medical directive:  This covers the care that you want if you are in a coma, near death, or unable to make decisions for yourself. You can list the treatments you want for each condition. Treatment may include pain medicine, surgery, blood transfusions, dialysis, IV or tube feedings, and a ventilator (breathing machine).  Values history:  This document has questions about your views, beliefs, and how you feel and think about life. This information can help others choose the care that you would choose.  Why are advance directives important?  An advance directive helps you control  your care. Although spoken wishes may be used, it is better to have your wishes written down. Spoken wishes can be misunderstood, or not followed. Treatments may be given even if you do not want them. An advance directive may make it easier for your family to make difficult choices about your care.   Urinary Incontinence   Urinary incontinence (UI)  is when you lose control of your bladder. UI develops because your bladder cannot store or empty urine properly. The 3 most common types of UI are stress incontinence, urge incontinence, or both.  Medicines:   May be given to help strengthen your bladder control. Report any side effects of medication to your healthcare provider.  Do pelvic muscle exercises often:  Your pelvic muscles help you stop urinating. Squeeze these muscles tight for 5 seconds, then relax for 5 seconds. Gradually work up to squeezing for 10 seconds. Do 3 sets of 15 repetitions a day, or as directed. This will help strengthen your pelvic muscles and improve bladder control.  Train your bladder:  Go to the bathroom at set times, such as every 2 hours, even if you do not feel the urge to go. You can also try to hold your urine when you feel the urge to go. For example, hold your urine for 5 minutes when you feel the urge to go. As that becomes easier, hold your urine for 10 minutes.   Self-care:   Keep a UI record.  Write down how often you leak urine and how much you leak. Make a note of what you were doing when you leaked urine.  Drink liquids as directed. You may need to limit the amount of liquid you drink to help control your urine leakage. Do not drink any liquid right before you go to bed. Limit or do not have drinks that contain caffeine or alcohol.   Prevent constipation.  Eat a variety of high-fiber foods. Good examples are high-fiber cereals, beans, vegetables, and whole-grain breads. Walking is the best way to trigger your intestines to have a bowel movement.  Exercise regularly and  maintain a healthy weight.  Weight loss and exercise will decrease pressure on your bladder and help you control your leakage.   Use a catheter as directed  to help empty your bladder. A catheter is a tiny, plastic tube that is put into your bladder to drain your urine.   Go to behavior therapy as directed.  Behavior therapy may be used to help you learn to control your urge to urinate.    Weight Management   Why it is important to manage your weight:  Being overweight increases your risk of health conditions such as heart disease, high blood pressure, type 2 diabetes, and certain types of cancer. It can also increase your risk for osteoarthritis, sleep apnea, and other respiratory problems. Aim for a slow, steady weight loss. Even a small amount of weight loss can lower your risk of health problems.  How to lose weight safely:  A safe and healthy way to lose weight is to eat fewer calories and get regular exercise. You can lose up about 1 pound a week by decreasing the number of calories you eat by 500 calories each day.   Healthy meal plan for weight management:  A healthy meal plan includes a variety of foods, contains fewer calories, and helps you stay healthy. A healthy meal plan includes the following:  Eat whole-grain foods more often.  A healthy meal plan should contain fiber. Fiber is the part of grains, fruits, and vegetables that is not broken down by your body. Whole-grain foods are healthy and provide extra fiber in your diet. Some examples of whole-grain foods are whole-wheat breads and pastas, oatmeal, brown rice, and bulgur.  Eat a variety of vegetables every day.  Include dark, leafy greens such as spinach, kale, mechelle greens, and mustard greens. Eat yellow and orange vegetables such as carrots, sweet potatoes, and winter squash.   Eat a variety of fruits every day.  Choose fresh or canned fruit (canned in its own juice or light syrup) instead of juice. Fruit juice has very little or no  fiber.  Eat low-fat dairy foods.  Drink fat-free (skim) milk or 1% milk. Eat fat-free yogurt and low-fat cottage cheese. Try low-fat cheeses such as mozzarella and other reduced-fat cheeses.  Choose meat and other protein foods that are low in fat.  Choose beans or other legumes such as split peas or lentils. Choose fish, skinless poultry (chicken or turkey), or lean cuts of red meat (beef or pork). Before you cook meat or poultry, cut off any visible fat.   Use less fat and oil.  Try baking foods instead of frying them. Add less fat, such as margarine, sour cream, regular salad dressing and mayonnaise to foods. Eat fewer high-fat foods. Some examples of high-fat foods include french fries, doughnuts, ice cream, and cakes.  Eat fewer sweets.  Limit foods and drinks that are high in sugar. This includes candy, cookies, regular soda, and sweetened drinks.  Exercise:  Exercise at least 30 minutes per day on most days of the week. Some examples of exercise include walking, biking, dancing, and swimming. You can also fit in more physical activity by taking the stairs instead of the elevator or parking farther away from stores. Ask your healthcare provider about the best exercise plan for you.      © Copyright [employer] 2018 Information is for End User's use only and may not be sold, redistributed or otherwise used for commercial purposes. All illustrations and images included in CareNotes® are the copyrighted property of [employer] or [employer]

## 2024-11-09 DIAGNOSIS — I10 BENIGN ESSENTIAL HYPERTENSION: ICD-10-CM

## 2024-11-10 DIAGNOSIS — I47.10 SVT (SUPRAVENTRICULAR TACHYCARDIA) (HCC): ICD-10-CM

## 2024-11-11 RX ORDER — METOPROLOL TARTRATE 50 MG
50 TABLET ORAL EVERY 12 HOURS
Qty: 180 TABLET | Refills: 1 | Status: SHIPPED | OUTPATIENT
Start: 2024-11-11

## 2024-11-11 RX ORDER — LOSARTAN POTASSIUM 100 MG/1
100 TABLET ORAL DAILY
Qty: 90 TABLET | Refills: 1 | Status: SHIPPED | OUTPATIENT
Start: 2024-11-11

## 2024-11-19 ENCOUNTER — APPOINTMENT (OUTPATIENT)
Dept: LAB | Facility: CLINIC | Age: 74
End: 2024-11-19
Payer: COMMERCIAL

## 2024-11-19 DIAGNOSIS — E78.00 PURE HYPERCHOLESTEROLEMIA: ICD-10-CM

## 2024-11-19 DIAGNOSIS — E11.21 CONTROLLED TYPE 2 DIABETES MELLITUS WITH DIABETIC NEPHROPATHY, WITHOUT LONG-TERM CURRENT USE OF INSULIN (HCC): ICD-10-CM

## 2024-11-19 DIAGNOSIS — I10 BENIGN ESSENTIAL HYPERTENSION: ICD-10-CM

## 2024-11-19 LAB
ALBUMIN SERPL BCG-MCNC: 4.3 G/DL (ref 3.5–5)
ALP SERPL-CCNC: 77 U/L (ref 34–104)
ALT SERPL W P-5'-P-CCNC: 13 U/L (ref 7–52)
ANION GAP SERPL CALCULATED.3IONS-SCNC: 11 MMOL/L (ref 4–13)
AST SERPL W P-5'-P-CCNC: 19 U/L (ref 13–39)
BILIRUB SERPL-MCNC: 0.68 MG/DL (ref 0.2–1)
BUN SERPL-MCNC: 16 MG/DL (ref 5–25)
CALCIUM SERPL-MCNC: 9.6 MG/DL (ref 8.4–10.2)
CHLORIDE SERPL-SCNC: 101 MMOL/L (ref 96–108)
CHOLEST SERPL-MCNC: 156 MG/DL (ref ?–200)
CO2 SERPL-SCNC: 28 MMOL/L (ref 21–32)
CREAT SERPL-MCNC: 0.76 MG/DL (ref 0.6–1.3)
EST. AVERAGE GLUCOSE BLD GHB EST-MCNC: 157 MG/DL
GFR SERPL CREATININE-BSD FRML MDRD: 76 ML/MIN/1.73SQ M
GLUCOSE P FAST SERPL-MCNC: 102 MG/DL (ref 65–99)
HBA1C MFR BLD: 7.1 %
HDLC SERPL-MCNC: 42 MG/DL
LDLC SERPL CALC-MCNC: 86 MG/DL (ref 0–100)
NONHDLC SERPL-MCNC: 114 MG/DL
POTASSIUM SERPL-SCNC: 4.1 MMOL/L (ref 3.5–5.3)
PROT SERPL-MCNC: 8.3 G/DL (ref 6.4–8.4)
SODIUM SERPL-SCNC: 140 MMOL/L (ref 135–147)
TRIGL SERPL-MCNC: 140 MG/DL (ref ?–150)

## 2024-11-19 PROCEDURE — 80061 LIPID PANEL: CPT

## 2024-11-19 PROCEDURE — 36415 COLL VENOUS BLD VENIPUNCTURE: CPT

## 2024-11-19 PROCEDURE — 80053 COMPREHEN METABOLIC PANEL: CPT

## 2024-11-19 PROCEDURE — 83036 HEMOGLOBIN GLYCOSYLATED A1C: CPT

## 2024-11-20 ENCOUNTER — RESULTS FOLLOW-UP (OUTPATIENT)
Dept: FAMILY MEDICINE CLINIC | Facility: CLINIC | Age: 74
End: 2024-11-20

## 2024-11-20 NOTE — TELEPHONE ENCOUNTER
Patient aware of results, patient would like to know if antibiotics helped stable lab results and did the celebrex cause the reaction on her legs in your opinion. Also she was prescribed lasix by cardiologist is it okay to continue taking it. ----- Message from Rafita Mccloud MD sent at 11/20/2024  5:43 PM EST -----  HgA1C 7.1 stable  Electrolyte normal  Liver enzymes normal  Kidney function ok  Lipid panel improved

## 2024-11-20 NOTE — TELEPHONE ENCOUNTER
----- Message from Rafita Mccloud MD sent at 11/20/2024  5:43 PM EST -----  HgA1C 7.1 stable  Electrolyte normal  Liver enzymes normal  Kidney function ok  Lipid panel improved

## 2024-11-22 ENCOUNTER — TELEPHONE (OUTPATIENT)
Age: 74
End: 2024-11-22

## 2024-11-22 NOTE — TELEPHONE ENCOUNTER
Patient aware ----- Message from Rafita Mccloud MD sent at 11/21/2024  9:06 AM EST -----    I do not think celebrex cause the cellulitis in legs. It is because of swollen, so she should use lasix from cardiologist.  ----- Message -----  From: Marianna Richardson MA  Sent: 11/20/2024   6:02 PM EST  To: Rafita Mccloud MD

## 2024-11-22 NOTE — TELEPHONE ENCOUNTER
"Caller: Jannet Martins    Doctor: Dr Alfonso    Reason for call: Patient called stating she had lab work completed on 11/19/24 and would like Dr Alfonso to take a look and review. She also mentioned the Lasix and how her body reacted to the Lasix and how she should be taking that. She wants clarification on how to take it \"as needed\". Please advise patient.     Call back#: [phone]   "

## 2024-11-25 ENCOUNTER — NURSE TRIAGE (OUTPATIENT)
Age: 74
End: 2024-11-25

## 2024-12-02 DIAGNOSIS — I47.10 SVT (SUPRAVENTRICULAR TACHYCARDIA) (HCC): ICD-10-CM

## 2024-12-02 RX ORDER — METOPROLOL TARTRATE 50 MG
50 TABLET ORAL EVERY 12 HOURS
Qty: 180 TABLET | Refills: 1 | OUTPATIENT
Start: 2024-12-02

## 2024-12-02 NOTE — TELEPHONE ENCOUNTER
Reason for call:   [x] Refill   [] Prior Auth  [] Other:     Office:   [] PCP/Provider -   [x] Specialty/Provider -  [facility] [place]  Authorized By: Emeka Alfonso MD    Medication:   metoprolol tartrate (LOPRESSOR) 50 mg tablet 50 mg, Every 12 hours         Pharmacy:   [id number] - [place], [place] - [address] [phone]       Does the patient have enough for 3 days?   [] Yes   [x] No - Send as HP to POD

## 2024-12-04 ENCOUNTER — OFFICE VISIT (OUTPATIENT)
Dept: OBGYN CLINIC | Facility: HOSPITAL | Age: 74
End: 2024-12-04
Payer: COMMERCIAL

## 2024-12-04 VITALS
WEIGHT: 213 LBS | BODY MASS INDEX: 42.94 KG/M2 | HEIGHT: 59 IN | SYSTOLIC BLOOD PRESSURE: 126 MMHG | HEART RATE: 81 BPM | DIASTOLIC BLOOD PRESSURE: 87 MMHG

## 2024-12-04 DIAGNOSIS — M25.561 CHRONIC PAIN OF BOTH KNEES: Primary | ICD-10-CM

## 2024-12-04 DIAGNOSIS — M17.0 PRIMARY OSTEOARTHRITIS OF BOTH KNEES: ICD-10-CM

## 2024-12-04 DIAGNOSIS — M25.562 CHRONIC PAIN OF BOTH KNEES: Primary | ICD-10-CM

## 2024-12-04 DIAGNOSIS — G89.29 CHRONIC PAIN OF BOTH KNEES: Primary | ICD-10-CM

## 2024-12-04 PROCEDURE — 20610 DRAIN/INJ JOINT/BURSA W/O US: CPT | Performed by: ORTHOPAEDIC SURGERY

## 2024-12-04 PROCEDURE — 99213 OFFICE O/P EST LOW 20 MIN: CPT | Performed by: ORTHOPAEDIC SURGERY

## 2024-12-04 RX ORDER — LIDOCAINE HYDROCHLORIDE 10 MG/ML
2 INJECTION, SOLUTION INFILTRATION; PERINEURAL
Status: COMPLETED | OUTPATIENT
Start: 2024-12-04 | End: 2024-12-04

## 2024-12-04 RX ORDER — PREDNISOLONE ACETATE 10 MG/ML
SUSPENSION/ DROPS OPHTHALMIC
COMMUNITY
Start: 2024-11-25

## 2024-12-04 RX ORDER — KETOROLAC TROMETHAMINE 30 MG/ML
60 INJECTION, SOLUTION INTRAMUSCULAR; INTRAVENOUS
Status: COMPLETED | OUTPATIENT
Start: 2024-12-04 | End: 2024-12-04

## 2024-12-04 RX ORDER — BUPIVACAINE HYDROCHLORIDE 2.5 MG/ML
2 INJECTION, SOLUTION INFILTRATION; PERINEURAL
Status: COMPLETED | OUTPATIENT
Start: 2024-12-04 | End: 2024-12-04

## 2024-12-04 RX ORDER — OFLOXACIN 3 MG/ML
SOLUTION/ DROPS OPHTHALMIC
COMMUNITY
Start: 2024-11-25

## 2024-12-04 RX ADMIN — BUPIVACAINE HYDROCHLORIDE 2 ML: 2.5 INJECTION, SOLUTION INFILTRATION; PERINEURAL at 14:30

## 2024-12-04 RX ADMIN — LIDOCAINE HYDROCHLORIDE 2 ML: 10 INJECTION, SOLUTION INFILTRATION; PERINEURAL at 14:30

## 2024-12-04 RX ADMIN — KETOROLAC TROMETHAMINE 60 MG: 30 INJECTION, SOLUTION INTRAMUSCULAR; INTRAVENOUS at 14:30

## 2024-12-04 NOTE — PROGRESS NOTES
Assessment:  1. Chronic pain of both knees  Large joint arthrocentesis: R knee    Large joint arthrocentesis: L knee      2. Primary osteoarthritis of both knees  Large joint arthrocentesis: R knee    Large joint arthrocentesis: L knee          Plan:  Bilateral knee osteoarthritis  Patient has had recent rash in which is likely dermatitis from lower extremity swelling that has now resolved with use of Lasix and cessation of Celebrex   Current symptoms of bilateral generalized knee pain  Patient encouraged to discuss potential options for an NSAID with her primary care physician considering her history of potential rash iwht Celebrex and Meloxicam  The patient was provided with bilateral knee Toradol injections.  The patient tolerated the procedure well.    Bilateral knee knee visco-supplement was ordered as this had provided significant decrease of pain, inflammation and crepitus with last application.  The patient has on-going knee pain and stiffness in which interferes with their daily activity and sleep.  Examination of knee includes crepitus with range of motion.  The patient rates their pain a 10/10 at times.  The patient has tried home exercise program learned from past physical therapy.  Bracing has not provided relief.  The patient has tried oral medications and steroid injections with limited benefit.  The patient has been counseled on weight loss.    Follow up in 3 months      To do next visit:  Return in about 3 months (around 3/4/2025).    The above stated was discussed in layman's terms and the patient expressed understanding.  All questions were answered to the patient's satisfaction.       Scribe Attestation      I,:  Zeke Crooks MA am acting as a scribe while in the presence of the attending physician.:       I,:  Choco Quintero MD personally performed the services described in this documentation    as scribed in my presence.:               Subjective:   Lew Martins is a 74 y.o. female  who presents for follow up of bilateral knees.  She is s/p bilateral Euflexxa with benefit, 8/28/2024.  She mentions recent skin rash she suspects was from Celebrex.  Today she complains of ongoing bilateral generalized knee pain.  Prolonged weight bearing and repetitive bending aggravates while rest alleviates.  The patient rates their pain at 7/10 and above at times.  She has stopped using Celebrex.    She mentions EKG and Duplex US scheduled for lateral this week.          Review of systems negative unless otherwise specified in HPI    Past Medical History:   Diagnosis Date    Alopecia     Calcaneal spur     Diabetes insipidus (HCC)     Diabetes mellitus (HCC)     Hyperlipidemia     Hypertension     Osteoarthrosis 2/13/2013    Pes planus     unspecified laterality    Plantar fasciitis        Past Surgical History:   Procedure Laterality Date    BREAST BIOPSY Right     patient not sure when , negative results    BREAST CYST ASPIRATION Left     patient not sure when negative results    CARPAL TUNNEL RELEASE      COLONOSCOPY      May 2006    INCISIONAL BREAST BIOPSY      [place] ARTHRP ACETBLR/PROX FEM PROSTC AGRFT/ALGRFT Right 9/23/2019    Procedure: ARTHROPLASTY HIP TOTAL;  Surgeon: Choco Quintero MD;  Location: [facility];  Service: Orthopedics    [place] COLONOSCOPY FLX DX W/COLLJ SPEC WHEN PFRMD N/A 5/4/2018    Procedure: COLONOSCOPY;  Surgeon: Randee Beck MD;  Location: [facility];  Service: Colorectal    WISDOM TOOTH EXTRACTION         Family History   Problem Relation Age of Onset    Diabetes Mother     Hypertension Mother     Emphysema Father     No Known Problems Maternal Grandmother     No Known Problems Maternal Grandfather     No Known Problems Paternal Grandmother     No Known Problems Paternal Grandfather     Colon cancer Brother 37    No Known Problems Brother     No Known Problems Maternal Aunt     No Known Problems Maternal Aunt     No Known Problems Paternal Aunt     No Known Problems Paternal Aunt      No Known Problems Paternal Aunt        Social History     Occupational History    Not on file   Tobacco Use    Smoking status: Never     Passive exposure: Never    Smokeless tobacco: Never   Vaping Use    Vaping status: Never Used   Substance and Sexual Activity    Alcohol use: Yes     Comment: rarely    Drug use: No    Sexual activity: Not Currently         Current Outpatient Medications:     acetaminophen (TYLENOL) 500 mg tablet, Take 1,000 mg by mouth every 6 (six) hours as needed for mild pain, Disp: , Rfl:     Biotin 1 MG CAPS, Take by mouth daily , Disp: , Rfl:     Blood Pressure Monitoring (SPHYGMOMANOMETER) MISC, by Does not apply route 2 (two) times a day, Disp: 1 each, Rfl: 0    cholecalciferol (VITAMIN D3) 1,000 units tablet, Take 1,000 Units by mouth daily, Disp: , Rfl:     DULoxetine (CYMBALTA) 20 mg capsule, Take 1 capsule (20 mg total) by mouth daily, Disp: , Rfl:     gabapentin (NEURONTIN) 300 mg capsule, TAKE 1 CAPSULE BY MOUTH THREE TIMES DAILY, Disp: 90 capsule, Rfl: 5    losartan (COZAAR) 100 MG tablet, Take 1 tablet (100 mg total) by mouth daily, Disp: 90 tablet, Rfl: 1    metFORMIN (GLUCOPHAGE-XR) 500 mg 24 hr tablet, Take 1 tablet (500 mg total) by mouth daily with breakfast, Disp: 90 tablet, Rfl: 1    metoprolol tartrate (LOPRESSOR) 50 mg tablet, TAKE 1 TABLET BY MOUTH EVERY 12 HOURS, Disp: 180 tablet, Rfl: 1    ofloxacin (OCUFLOX) 0.3 % ophthalmic solution, 0 Refill(s), Type: Maintenance, Disp: , Rfl:     prednisoLONE acetate (PRED FORTE) 1 % ophthalmic suspension, 0 Refill(s), Type: Maintenance, Disp: , Rfl:     simvastatin (ZOCOR) 40 mg tablet, TAKE 1 TABLET BY MOUTH EVERY DAY, Disp: 90 tablet, Rfl: 1    Calcium Carb-Cholecalciferol (Calcium 1000 + D) 1000-800 MG-UNIT TABS, Take by mouth As needed (Patient not taking: Reported on 10/24/2024), Disp: , Rfl:     celecoxib (CeleBREX) 100 mg capsule, TAKE 1 CAPSULE BY MOUTH EVERY DAY (Patient not taking: Reported on 10/24/2024), Disp: 30  "capsule, Rfl: 5    cephalexin (KEFLEX) 500 mg capsule, Take 500 mg by mouth if needed Prior to procedure  (Patient not taking: Reported on 10/24/2024), Disp: , Rfl:     furosemide (LASIX) 20 mg tablet, Take 1 tablet (20 mg total) by mouth if needed (for edema) Take 1 pill as needed for increased swelling in the legs (Patient not taking: Reported on 12/4/2024), Disp: 30 tablet, Rfl: 3    Allergies   Allergen Reactions    Meloxicam Hives    Nifedipine Edema    Sulfamethoxazole-Trimethoprim Edema            Vitals:    12/04/24 1438   BP: 126/87   Pulse: 81       Objective:  Physical exam  General: Awake, Alert, Oriented  Eyes: Pupils equal, round and reactive to light  Heart: regular rate and rhythm  Lungs: No audible wheezing  Abdomen: soft                    Ortho Exam  Bilateral knees:  TTP over medial joint line  No erythema or ecchymosis  No effusion or swelling  Normal strength  Good ROM with crepitus   Calf compartments soft and supple  Sensation intact  Toes are warm sensate and mobile    Diagnostics, reviewed and taken today if performed as documented:    None performed     Procedures, if performed today:    Large joint arthrocentesis: R knee  Universal Protocol:  Consent: Verbal consent obtained.  Risks and benefits: risks, benefits and alternatives were discussed  Consent given by: patient  Time out: Immediately prior to procedure a \"time out\" was called to verify the correct patient, procedure, equipment, support staff and site/side marked as required.  Timeout called at: 12/4/2024 3:10 PM.  Patient understanding: patient states understanding of the procedure being performed  Site marked: the operative site was marked  Patient identity confirmed: verbally with patient  Supporting Documentation  Indications: pain   Procedure Details  Location: knee - R knee  Preparation: Patient was prepped and draped in the usual sterile fashion  Needle size: 22 G  Ultrasound guidance: no  Approach: " "anterolateral  Medications administered: 2 mL bupivacaine 0.25 %; 2 mL lidocaine 1 %; 60 mg ketorolac 60 mg/2 mL    Patient tolerance: patient tolerated the procedure well with no immediate complications  Dressing:  Sterile dressing applied      Large joint arthrocentesis: L knee  Universal Protocol:  Consent: Verbal consent obtained.  Risks and benefits: risks, benefits and alternatives were discussed  Consent given by: patient  Time out: Immediately prior to procedure a \"time out\" was called to verify the correct patient, procedure, equipment, support staff and site/side marked as required.  Timeout called at: 12/4/2024 3:10 PM.  Patient understanding: patient states understanding of the procedure being performed  Site marked: the operative site was marked  Patient identity confirmed: verbally with patient  Supporting Documentation  Indications: pain   Procedure Details  Location: knee - L knee  Preparation: Patient was prepped and draped in the usual sterile fashion  Needle size: 22 G  Ultrasound guidance: no  Approach: anterolateral  Medications administered: 2 mL bupivacaine 0.25 %; 2 mL lidocaine 1 %; 60 mg ketorolac 60 mg/2 mL    Patient tolerance: patient tolerated the procedure well with no immediate complications  Dressing:  Sterile dressing applied            Portions of the record may have been created with voice recognition software.  Occasional wrong word or \"sound a like\" substitutions may have occurred due to the inherent limitations of voice recognition software.  Read the chart carefully and recognize, using context, where substitutions have occurred.    "

## 2024-12-12 ENCOUNTER — HOSPITAL ENCOUNTER (OUTPATIENT)
Dept: NON INVASIVE DIAGNOSTICS | Facility: CLINIC | Age: 74
Discharge: HOME | End: 2024-12-12
Payer: COMMERCIAL

## 2024-12-12 VITALS
WEIGHT: 212.96 LBS | HEIGHT: 59 IN | SYSTOLIC BLOOD PRESSURE: 126 MMHG | DIASTOLIC BLOOD PRESSURE: 87 MMHG | HEART RATE: 81 BPM | BODY MASS INDEX: 42.93 KG/M2

## 2024-12-12 DIAGNOSIS — R60.0 BILATERAL LOWER EXTREMITY EDEMA: ICD-10-CM

## 2024-12-12 LAB
AORTIC ROOT: 3.4 CM
AORTIC VALVE MEAN VELOCITY: 14 M/S
APICAL FOUR CHAMBER EJECTION FRACTION: 75 %
ASCENDING AORTA: 4.6 CM
AV AREA BY CONTINUOUS VTI: 1.2 CM2
AV AREA PEAK VELOCITY: 1.2 CM2
AV LVOT MEAN GRADIENT: 2 MMHG
AV LVOT PEAK GRADIENT: 3 MMHG
AV MEAN GRADIENT: 9 MMHG
AV PEAK GRADIENT: 16 MMHG
AV VALVE AREA: 1.2 CM2
AV VELOCITY RATIO: 0.43
BSA FOR ECHO PROCEDURE: 1.89 M2
DOP CALC AO PEAK VEL: 1.99 M/S
DOP CALC AO VTI: 46.56 CM
DOP CALC LVOT AREA: 2.83 CM2
DOP CALC LVOT CARDIAC INDEX: 2.12 L/MIN/M2
DOP CALC LVOT CARDIAC OUTPUT: 4.01 L/MIN
DOP CALC LVOT DIAMETER: 1.9 CM
DOP CALC LVOT PEAK VEL VTI: 19.75 CM
DOP CALC LVOT PEAK VEL: 0.85 M/S
DOP CALC LVOT STROKE INDEX: 30.7 ML/M2
DOP CALC LVOT STROKE VOLUME: 55.97
E WAVE DECELERATION TIME: 173 MS
E/A RATIO: 0.69
FRACTIONAL SHORTENING: 31 (ref 28–44)
INTERVENTRICULAR SEPTUM IN DIASTOLE (PARASTERNAL SHORT AXIS VIEW): 1.6 CM
INTERVENTRICULAR SEPTUM: 1.6 CM (ref 0.6–1.1)
LAAS-AP2: 13.7 CM2
LAAS-AP4: 16.5 CM2
LEFT ATRIUM SIZE: 3.7 CM
LEFT ATRIUM VOLUME (MOD BIPLANE): 40 ML
LEFT ATRIUM VOLUME INDEX (MOD BIPLANE): 21.2 ML/M2
LEFT INTERNAL DIMENSION IN SYSTOLE: 2.7 CM (ref 2.1–4)
LEFT VENTRICULAR INTERNAL DIMENSION IN DIASTOLE: 3.9 CM (ref 3.5–6)
LEFT VENTRICULAR POSTERIOR WALL IN END DIASTOLE: 1.6 CM
LEFT VENTRICULAR STROKE VOLUME: 37 ML
LVSV (TEICH): 37 ML
MV E'TISSUE VEL-LAT: 7 CM/S
MV E'TISSUE VEL-SEP: 8 CM/S
MV PEAK A VEL: 1.24 M/S
MV PEAK E VEL: 85 CM/S
MV STENOSIS PRESSURE HALF TIME: 50 MS
MV VALVE AREA P 1/2 METHOD: 4.4
RIGHT ATRIUM AREA SYSTOLE A4C: 10.5 CM2
RIGHT VENTRICLE ID DIMENSION: 2.4 CM
SINOTUBULAR JUNCTION: 3 CM
SL CV LEFT ATRIUM LENGTH A2C: 4.5 CM
SL CV LV EF: 70
SL CV PED ECHO LEFT VENTRICLE DIASTOLIC VOLUME (MOD BIPLANE) 2D: 64 ML
SL CV PED ECHO LEFT VENTRICLE SYSTOLIC VOLUME (MOD BIPLANE) 2D: 27 ML
SL CV SINUS OF VALSALVA 2D: 3.6 CM
STJ: 3 CM
TR MAX PG: 33 MMHG
TR PEAK VELOCITY: 2.9 M/S
TRICUSPID ANNULAR PLANE SYSTOLIC EXCURSION: 2.5 CM
TRICUSPID VALVE PEAK REGURGITATION VELOCITY: 2.87 M/S

## 2024-12-12 PROCEDURE — 93306 TTE W/DOPPLER COMPLETE: CPT

## 2024-12-12 PROCEDURE — 93970 EXTREMITY STUDY: CPT

## 2024-12-12 PROCEDURE — 93306 TTE W/DOPPLER COMPLETE: CPT | Performed by: INTERNAL MEDICINE

## 2024-12-12 PROCEDURE — 93970 EXTREMITY STUDY: CPT | Performed by: SURGERY

## 2024-12-13 ENCOUNTER — RESULTS FOLLOW-UP (OUTPATIENT)
Dept: CARDIOLOGY CLINIC | Facility: HOSPITAL | Age: 74
End: 2024-12-13

## 2024-12-13 NOTE — RESULT ENCOUNTER NOTE
Spoke with pt, made pt aware of vascular and echo results. Pt aware of follow up appt on 06/10/25.   Tony Oropeza is here today for Physical (cpe)    Concerns/symptoms: none  Medications: medications verified, no change  Refills needed today? Yes, medications pended     Tobacco history: verified  Advanced Directives: No not on file, not interested.  BP greater than 140/90? Yes    Patient would like communication of their results via:    Home Phone: [phone] (home)  Okay to leave a message containing results? Yes  Preferred language:  English.    Health Maintenance Due   Topic Date Due   • Depression Screening  03/16/1977   • Influenza Vaccine (1) 09/01/2017      Patient is due for topics as listed above, he wishes to discuss with provider.    Medicare HRA:

## 2025-03-13 ENCOUNTER — PRE-OP CATARACT MEASUREMENTS (OUTPATIENT)
Dept: URBAN - METROPOLITAN AREA CLINIC 6 | Facility: CLINIC | Age: 75
End: 2025-03-13

## 2025-03-13 DIAGNOSIS — Z98.890: ICD-10-CM

## 2025-03-13 DIAGNOSIS — D31.32: ICD-10-CM

## 2025-03-13 DIAGNOSIS — E11.9: ICD-10-CM

## 2025-03-13 DIAGNOSIS — H25.813: ICD-10-CM

## 2025-03-13 DIAGNOSIS — H04.123: ICD-10-CM

## 2025-03-13 PROCEDURE — 92136 OPHTHALMIC BIOMETRY: CPT

## 2025-03-13 PROCEDURE — 92014 COMPRE OPH EXAM EST PT 1/>: CPT

## 2025-03-13 ASSESSMENT — VISUAL ACUITY
OD_CC: 20/400
OS_CC: 20/40
OD_PH: 20/100

## 2025-03-13 ASSESSMENT — TONOMETRY
OS_IOP_MMHG: 16
OD_IOP_MMHG: 15

## 2025-03-13 ASSESSMENT — KERATOMETRY
OD_AXISANGLE_DEGREES: 007
OS_AXISANGLE_DEGREES: 168
OD_AXISANGLE2_DEGREES: 97
OS_K2POWER_DIOPTERS: 43.00
OD_K1POWER_DIOPTERS: 41.75
OS_AXISANGLE2_DEGREES: 78
OD_K2POWER_DIOPTERS: 43.50
OS_K1POWER_DIOPTERS: 41.75

## 2025-03-24 ENCOUNTER — TELEPHONE (OUTPATIENT)
Age: 75
End: 2025-03-24

## 2025-03-24 NOTE — TELEPHONE ENCOUNTER
Patient needs the last two office visit encounters provided by [facility]. Please print the office visits dated 11/8/2024 and 08/27/2024. Patient will stop by within the next 1-2 days to pick them up. She needs the office visit notes as she is working with medicare to try and qualify for some reduced out of pocket medical costs. Please call patient once visits are ready for her to . Thank you

## 2025-03-27 ENCOUNTER — PROCEDURE VISIT (OUTPATIENT)
Dept: OBGYN CLINIC | Facility: HOSPITAL | Age: 75
End: 2025-03-27
Payer: COMMERCIAL

## 2025-03-27 VITALS — BODY MASS INDEX: 43.14 KG/M2 | HEIGHT: 59 IN | WEIGHT: 214 LBS

## 2025-03-27 DIAGNOSIS — G89.29 CHRONIC PAIN OF BOTH KNEES: ICD-10-CM

## 2025-03-27 DIAGNOSIS — M25.562 CHRONIC PAIN OF BOTH KNEES: ICD-10-CM

## 2025-03-27 DIAGNOSIS — M17.0 PRIMARY OSTEOARTHRITIS OF BOTH KNEES: Primary | ICD-10-CM

## 2025-03-27 DIAGNOSIS — M25.561 CHRONIC PAIN OF BOTH KNEES: ICD-10-CM

## 2025-03-27 PROCEDURE — 20610 DRAIN/INJ JOINT/BURSA W/O US: CPT | Performed by: ORTHOPAEDIC SURGERY

## 2025-03-27 NOTE — PROGRESS NOTES
Name: Lew Martins      : 1950       MRN: [id number]   Encounter Provider: Choco Quintero MD   Encounter Date: 25  Encounter department: [facility] [place]     ASSESSMENT & PLAN:  Assessment & Plan  Primary osteoarthritis of both knees  1st of 3 synvisc injections performed today, 3/27/25  Orders:    Large joint arthrocentesis: bilateral knee    Chronic pain of both knees  1st of 3 synvisc injections performed today, 3/27/25  Orders:    Large joint arthrocentesis: bilateral knee      ___________________________________________________  Bilateral knees known OA   The 1st of 3 synvisc injections were performed today. She tolerated both injections well  Ice and post-injection protocol advised  Weight bearing and activities as tolerated  Follow-up each of the next 2 weeks to complete the 3-shot visco series    To do next visit:  Return in about 1 week (around 4/3/2025) for re-check both knees and synvisc #2.  ____________________________________________________  CHIEF COMPLAINT:  Chief Complaint   Patient presents with    Left Knee - Follow-up     Synvisc #1    Right Knee - Follow-up     Synvisc #1         SUBJECTIVE:  Lew Martins is a 74 y.o. female who presents today for repeat evaluation of her bilateral knees, known OA. She returns today for the 1st of 3-synvisc injections today. She last had the euflexxa series 2024 and previously orthovisc with relief. Pain at times can be 9/10 at times  She does find improvement of her symptoms with the visco injections.  Her knees will bother her with prolonged weight bearing activities. She'll also have stiffness getting up after prolonged sedentary positions.   At her last visit 3 months ago she found relief with the cortisone injections.        PAST MEDICAL HISTORY:  Past Medical History:   Diagnosis Date    Alopecia     Calcaneal spur     Diabetes insipidus (HCC)     Diabetes mellitus (HCC)     Hyperlipidemia      Hypertension     Osteoarthrosis 2/13/2013    Pes planus     unspecified laterality    Plantar fasciitis        PAST SURGICAL HISTORY:  Past Surgical History:   Procedure Laterality Date    BREAST BIOPSY Right     patient not sure when , negative results    BREAST CYST ASPIRATION Left     patient not sure when negative results    CARPAL TUNNEL RELEASE      COLONOSCOPY      May 2006    INCISIONAL BREAST BIOPSY      [place] ARTHRP ACETBLR/PROX FEM PROSTC AGRFT/ALGRFT Right 9/23/2019    Procedure: ARTHROPLASTY HIP TOTAL;  Surgeon: Choco Quintero MD;  [facility];  Service: Orthopedics    [place] COLONOSCOPY FLX DX W/COLLJ SPEC WHEN PFRMD N/A 5/4/2018    Procedure: COLONOSCOPY;  Surgeon: Randee Beck MD;  Location: [facility];  Service: Colorectal    WISDOM TOOTH EXTRACTION         FAMILY HISTORY:  Family History   Problem Relation Age of Onset    Diabetes Mother     Hypertension Mother     Emphysema Father     No Known Problems Maternal Grandmother     No Known Problems Maternal Grandfather     No Known Problems Paternal Grandmother     No Known Problems Paternal Grandfather     Colon cancer Brother 37    No Known Problems Brother     No Known Problems Maternal Aunt     No Known Problems Maternal Aunt     No Known Problems Paternal Aunt     No Known Problems Paternal Aunt     No Known Problems Paternal Aunt        SOCIAL HISTORY:  Social History     Tobacco Use    Smoking status: Never     Passive exposure: Never    Smokeless tobacco: Never   Vaping Use    Vaping status: Never Used   Substance Use Topics    Alcohol use: Yes     Comment: rarely    Drug use: No       MEDICATIONS:    Current Outpatient Medications:     acetaminophen (TYLENOL) 500 mg tablet, Take 1,000 mg by mouth every 6 (six) hours as needed for mild pain, Disp: , Rfl:     Biotin 1 MG CAPS, Take by mouth daily , Disp: , Rfl:     Blood Pressure Monitoring (SPHYGMOMANOMETER) MISC, by Does not apply route 2 (two) times a day, Disp: 1 each, Rfl: 0     Calcium Carb-Cholecalciferol (Calcium 1000 + D) 1000-800 MG-UNIT TABS, Take by mouth As needed (Patient not taking: Reported on 10/24/2024), Disp: , Rfl:     celecoxib (CeleBREX) 100 mg capsule, TAKE 1 CAPSULE BY MOUTH EVERY DAY (Patient not taking: Reported on 10/24/2024), Disp: 30 capsule, Rfl: 5    cephalexin (KEFLEX) 500 mg capsule, Take 500 mg by mouth if needed Prior to procedure  (Patient not taking: Reported on 10/24/2024), Disp: , Rfl:     cholecalciferol (VITAMIN D3) 1,000 units tablet, Take 1,000 Units by mouth daily, Disp: , Rfl:     DULoxetine (CYMBALTA) 20 mg capsule, Take 1 capsule (20 mg total) by mouth daily, Disp: , Rfl:     furosemide (LASIX) 20 mg tablet, Take 1 tablet (20 mg total) by mouth if needed (for edema) Take 1 pill as needed for increased swelling in the legs (Patient not taking: Reported on 12/4/2024), Disp: 30 tablet, Rfl: 3    gabapentin (NEURONTIN) 300 mg capsule, TAKE 1 CAPSULE BY MOUTH THREE TIMES DAILY, Disp: 90 capsule, Rfl: 5    losartan (COZAAR) 100 MG tablet, Take 1 tablet (100 mg total) by mouth daily, Disp: 90 tablet, Rfl: 1    metFORMIN (GLUCOPHAGE-XR) 500 mg 24 hr tablet, Take 1 tablet (500 mg total) by mouth daily with breakfast, Disp: 90 tablet, Rfl: 1    metoprolol tartrate (LOPRESSOR) 50 mg tablet, TAKE 1 TABLET BY MOUTH EVERY 12 HOURS, Disp: 180 tablet, Rfl: 1    ofloxacin (OCUFLOX) 0.3 % ophthalmic solution, 0 Refill(s), Type: Maintenance, Disp: , Rfl:     prednisoLONE acetate (PRED FORTE) 1 % ophthalmic suspension, 0 Refill(s), Type: Maintenance, Disp: , Rfl:     simvastatin (ZOCOR) 40 mg tablet, TAKE 1 TABLET BY MOUTH EVERY DAY, Disp: 90 tablet, Rfl: 1    ALLERGIES:  Allergies   Allergen Reactions    Meloxicam Hives    Nifedipine Edema    Sulfamethoxazole-Trimethoprim Edema       LABS:  HgA1c:   Lab Results   Component Value Date    HGBA1C 7.1 (H) 11/19/2024     BMP:   Lab Results   Component Value Date    GLUCOSE 95 04/21/2015    CALCIUM 9.6 11/19/2024      "04/21/2015    K 4.1 11/19/2024    CO2 28 11/19/2024     11/19/2024    BUN 16 11/19/2024    CREATININE 0.76 11/19/2024     CBC: No components found for: \"CBC\"    _____________________________________________________  PHYSICAL EXAMINATION:  Vital signs: Ht 4' 11\" (1.499 m)   Wt 97.1 kg (214 lb)   BMI 43.22 kg/m²   General: No acute distress, awake and alert  Psychiatric: Mood and affect appear appropriate  HEENT: Trachea Midline, No torticollis, no apparent facial trauma  Cardiovascular: No audible murmurs; Extremities appear perfused  Pulmonary: No audible wheezing or stridor  Skin: Intact, no open lesions; see further details (if any) below    MUSCULOSKELETAL EXAMINATION:    Right Knee Exam     Muscle Strength   The patient has normal right knee strength.    Tenderness   The patient is experiencing tenderness in the medial joint line (less tenderness medial joint line).    Range of Motion   Extension:  0   Flexion:  120 (with crepitation and stiffness)     Other   Erythema: absent  Sensation: normal  Swelling: mild  Effusion: no effusion present      Left Knee Exam     Muscle Strength   The patient has normal left knee strength.    Tenderness   The patient is experiencing tenderness in the medial joint line (less tenderness medial joint line).    Range of Motion   Extension:  0   Flexion:  120 (with crepitation and stiffness)     Other   Erythema: absent  Sensation: normal  Swelling: mild  Effusion: no effusion present    Comments:    Varus alignment at both knees              _____________________________________________________  STUDIES REVIEWED:    None performed      PROCEDURES PERFORMED:    Large joint arthrocentesis: bilateral knee  Universal Protocol:  Consent: Verbal consent obtained.  Risks and benefits: risks, benefits and alternatives were discussed  Consent given by: patient  Time out: Immediately prior to procedure a \"time out\" was called to verify the correct patient, procedure, equipment, " support staff and site/side marked as required.  Timeout called at: 3/27/2025 2:25 PM.  Patient understanding: patient states understanding of the procedure being performed  Site marked: the operative site was marked  Patient identity confirmed: verbally with patient  Supporting Documentation  Indications: pain and diagnostic evaluation   Procedure Details  Location: knee - bilateral knee  Preparation: Patient was prepped and draped in the usual sterile fashion  Needle size: 22 G  Ultrasound guidance: no  Approach: anterolateral    Medications (Right): 16 mg hylan 16 MG/2MLMedications (Left): 16 mg hylan 16 MG/2ML   Patient tolerance: patient tolerated the procedure well with no immediate complications  Dressing:  Sterile dressing applied               Scribe Attestation      I,:  Dontrell Romero am acting as a scribe while in the presence of the attending physician.:       I,:  Choco Quintero MD personally performed the services described in this documentation    as scribed in my presence.:

## 2025-03-27 NOTE — ASSESSMENT & PLAN NOTE
1st of 3 synvisc injections performed today, 3/27/25  Orders:    Large joint arthrocentesis: bilateral knee

## 2025-04-03 ENCOUNTER — PROCEDURE VISIT (OUTPATIENT)
Dept: OBGYN CLINIC | Facility: HOSPITAL | Age: 75
End: 2025-04-03
Payer: COMMERCIAL

## 2025-04-03 VITALS — HEIGHT: 59 IN | BODY MASS INDEX: 43.22 KG/M2

## 2025-04-03 DIAGNOSIS — M25.561 CHRONIC PAIN OF BOTH KNEES: ICD-10-CM

## 2025-04-03 DIAGNOSIS — M17.0 PRIMARY OSTEOARTHRITIS OF BOTH KNEES: Primary | ICD-10-CM

## 2025-04-03 DIAGNOSIS — G89.29 CHRONIC PAIN OF BOTH KNEES: ICD-10-CM

## 2025-04-03 DIAGNOSIS — M25.562 CHRONIC PAIN OF BOTH KNEES: ICD-10-CM

## 2025-04-03 PROCEDURE — 20610 DRAIN/INJ JOINT/BURSA W/O US: CPT | Performed by: ORTHOPAEDIC SURGERY

## 2025-04-03 RX ORDER — LIDOCAINE HYDROCHLORIDE 10 MG/ML
4 INJECTION, SOLUTION INFILTRATION; PERINEURAL
Status: COMPLETED | OUTPATIENT
Start: 2025-04-03 | End: 2025-04-03

## 2025-04-03 RX ADMIN — LIDOCAINE HYDROCHLORIDE 4 ML: 10 INJECTION, SOLUTION INFILTRATION; PERINEURAL at 14:00

## 2025-04-03 NOTE — PROGRESS NOTES
Name: Lew Martins      : 1950       MRN: [id number]   Encounter Provider: Choco Quintero MD   Encounter Date: 25  Encounter department: [facility]     ASSESSMENT & PLAN:  Assessment & Plan  Primary osteoarthritis of both knees  Synvisc #2 performed to both knees today, 4/3/2025  Orders:    Large joint arthrocentesis: bilateral knee    Chronic pain of both knees  Synvisc #2 performed to both knees today, 4/3/2025  Orders:    Large joint arthrocentesis: bilateral knee      ___________________________________________________  Bilateral knees known OA   The 2nd of 3 synvisc injections were performed today. She tolerated both injections well  Ice and post-injection protocol advised  Weight bearing and activities as tolerated  Follow-up next  week to complete the 3-shot visco series    To do next visit:  Return in about 1 week (around 4/10/2025) for re-check both knees and synvisc #2.  ____________________________________________________  CHIEF COMPLAINT:  Chief Complaint   Patient presents with    Left Knee - Follow-up     Synvisc #2    Right Knee - Follow-up     Synvisc #2         SUBJECTIVE:  Lew Martins is a 74 y.o. female who presents today for he 2nd of 3 synvisc injections. She tolerated last week's injections well  No indications not to proceed with today's 2nd visco injections.          PAST MEDICAL HISTORY:  Past Medical History:   Diagnosis Date    Alopecia     Calcaneal spur     Diabetes insipidus (HCC)     Diabetes mellitus (HCC)     Hyperlipidemia     Hypertension     Osteoarthrosis 2013    Pes planus     unspecified laterality    Plantar fasciitis        PAST SURGICAL HISTORY:  Past Surgical History:   Procedure Laterality Date    BREAST BIOPSY Right     patient not sure when , negative results    BREAST CYST ASPIRATION Left     patient not sure when negative results    CARPAL TUNNEL RELEASE      COLONOSCOPY      May 2006    INCISIONAL  BREAST BIOPSY      [place] ARTHRP ACETBLR/PROX FEM PROSTC AGRFT/ALGRFT Right 9/23/2019    Procedure: ARTHROPLASTY HIP TOTAL;  Surgeon: Choco Quintero MD;  [facility]: [facility];  Service: Orthopedics    [place] COLONOSCOPY FLX DX W/COLLJ SPEC WHEN PFRMD N/A 5/4/2018    Procedure: COLONOSCOPY;  Surgeon: Randee Beck MD;  Location: [facility];  Service: Colorectal    WISDOM TOOTH EXTRACTION         FAMILY HISTORY:  Family History   Problem Relation Age of Onset    Diabetes Mother     Hypertension Mother     Emphysema Father     No Known Problems Maternal Grandmother     No Known Problems Maternal Grandfather     No Known Problems Paternal Grandmother     No Known Problems Paternal Grandfather     Colon cancer Brother 37    No Known Problems Brother     No Known Problems Maternal Aunt     No Known Problems Maternal Aunt     No Known Problems Paternal Aunt     No Known Problems Paternal Aunt     No Known Problems Paternal Aunt        SOCIAL HISTORY:  Social History     Tobacco Use    Smoking status: Never     Passive exposure: Never    Smokeless tobacco: Never   Vaping Use    Vaping status: Never Used   Substance Use Topics    Alcohol use: Yes     Comment: rarely    Drug use: No       MEDICATIONS:    Current Outpatient Medications:     acetaminophen (TYLENOL) 500 mg tablet, Take 1,000 mg by mouth every 6 (six) hours as needed for mild pain, Disp: , Rfl:     Biotin 1 MG CAPS, Take by mouth daily , Disp: , Rfl:     Blood Pressure Monitoring (SPHYGMOMANOMETER) MISC, by Does not apply route 2 (two) times a day, Disp: 1 each, Rfl: 0    Calcium Carb-Cholecalciferol (Calcium 1000 + D) 1000-800 MG-UNIT TABS, Take by mouth As needed (Patient not taking: Reported on 10/24/2024), Disp: , Rfl:     celecoxib (CeleBREX) 100 mg capsule, TAKE 1 CAPSULE BY MOUTH EVERY DAY (Patient not taking: Reported on 10/24/2024), Disp: 30 capsule, Rfl: 5    cephalexin (KEFLEX) 500 mg capsule, Take 500 mg by mouth if needed Prior to procedure  (Patient  "not taking: Reported on 10/24/2024), Disp: , Rfl:     cholecalciferol (VITAMIN D3) 1,000 units tablet, Take 1,000 Units by mouth daily, Disp: , Rfl:     DULoxetine (CYMBALTA) 20 mg capsule, Take 1 capsule (20 mg total) by mouth daily, Disp: , Rfl:     furosemide (LASIX) 20 mg tablet, Take 1 tablet (20 mg total) by mouth if needed (for edema) Take 1 pill as needed for increased swelling in the legs (Patient not taking: Reported on 12/4/2024), Disp: 30 tablet, Rfl: 3    gabapentin (NEURONTIN) 300 mg capsule, TAKE 1 CAPSULE BY MOUTH THREE TIMES DAILY, Disp: 90 capsule, Rfl: 5    losartan (COZAAR) 100 MG tablet, Take 1 tablet (100 mg total) by mouth daily, Disp: 90 tablet, Rfl: 1    metFORMIN (GLUCOPHAGE-XR) 500 mg 24 hr tablet, Take 1 tablet (500 mg total) by mouth daily with breakfast, Disp: 90 tablet, Rfl: 1    metoprolol tartrate (LOPRESSOR) 50 mg tablet, TAKE 1 TABLET BY MOUTH EVERY 12 HOURS, Disp: 180 tablet, Rfl: 1    ofloxacin (OCUFLOX) 0.3 % ophthalmic solution, 0 Refill(s), Type: Maintenance, Disp: , Rfl:     prednisoLONE acetate (PRED FORTE) 1 % ophthalmic suspension, 0 Refill(s), Type: Maintenance, Disp: , Rfl:     simvastatin (ZOCOR) 40 mg tablet, TAKE 1 TABLET BY MOUTH EVERY DAY, Disp: 90 tablet, Rfl: 1    ALLERGIES:  Allergies   Allergen Reactions    Meloxicam Hives    Nifedipine Edema    Sulfamethoxazole-Trimethoprim Edema       LABS:  HgA1c:   Lab Results   Component Value Date    HGBA1C 7.1 (H) 11/19/2024     BMP:   Lab Results   Component Value Date    GLUCOSE 95 04/21/2015    CALCIUM 9.6 11/19/2024     04/21/2015    K 4.1 11/19/2024    CO2 28 11/19/2024     11/19/2024    BUN 16 11/19/2024    CREATININE 0.76 11/19/2024     CBC: No components found for: \"CBC\"    _____________________________________________________  PHYSICAL EXAMINATION:  Vital signs: Ht 4' 11\" (1.499 m)   BMI 43.22 kg/m²   General: No acute distress, awake and alert  Psychiatric: Mood and affect appear appropriate  HEENT: " "Trachea Midline, No torticollis, no apparent facial trauma  Cardiovascular: No audible murmurs; Extremities appear perfused  Pulmonary: No audible wheezing or stridor  Skin: Intact, no open lesions; see further details (if any) below    MUSCULOSKELETAL EXAMINATION:    Right Knee Exam     Muscle Strength   The patient has normal right knee strength.    Tenderness   The patient is experiencing tenderness in the medial joint line (less tenderness medial joint line).    Range of Motion   Extension:  0   Flexion:  120 (with crepitation and stiffness)     Other   Erythema: absent  Sensation: normal  Swelling: mild  Effusion: no effusion present      Left Knee Exam     Muscle Strength   The patient has normal left knee strength.    Tenderness   The patient is experiencing tenderness in the medial joint line (less tenderness medial joint line).    Range of Motion   Extension:  0   Flexion:  120 (with crepitation and stiffness)     Other   Erythema: absent  Sensation: normal  Swelling: mild  Effusion: no effusion present    Comments:    Varus alignment at both knees          _____________________________________________________  STUDIES REVIEWED:    None performed      PROCEDURES PERFORMED:    Large joint arthrocentesis: bilateral knee  Universal Protocol:  Consent: Verbal consent obtained.  Risks and benefits: risks, benefits and alternatives were discussed  Consent given by: patient  Time out: Immediately prior to procedure a \"time out\" was called to verify the correct patient, procedure, equipment, support staff and site/side marked as required.  Timeout called at: 4/3/2025 2:20 PM.  Patient understanding: patient states understanding of the procedure being performed  Site marked: the operative site was marked  Patient identity confirmed: verbally with patient  Supporting Documentation  Indications: pain and diagnostic evaluation   Procedure Details  Location: knee - bilateral knee  Preparation: Patient was prepped and " draped in the usual sterile fashion  Needle size: 22 G  Ultrasound guidance: no  Approach: anteromedial    Medications (Right): 4 mL lidocaine 1 %; 16 mg hylan 16 MG/2MLMedications (Left): 4 mL lidocaine 1 %; 16 mg hylan 16 MG/2ML; 48 mg hylan 48 MG/6ML   Patient tolerance: patient tolerated the procedure well with no immediate complications  Dressing:  Sterile dressing applied             Scribe Attestation      I,:  Dontrell Romero am acting as a scribe while in the presence of the attending physician.:       I,:  Choco Quintero MD personally performed the services described in this documentation    as scribed in my presence.:

## 2025-04-03 NOTE — ASSESSMENT & PLAN NOTE
Synvisc #2 performed to both knees today, 4/3/2025  Orders:    Large joint arthrocentesis: bilateral knee

## 2025-04-10 ENCOUNTER — PROCEDURE VISIT (OUTPATIENT)
Dept: OBGYN CLINIC | Facility: HOSPITAL | Age: 75
End: 2025-04-10
Payer: COMMERCIAL

## 2025-04-10 VITALS — HEIGHT: 59 IN | BODY MASS INDEX: 43.22 KG/M2

## 2025-04-10 DIAGNOSIS — M25.561 CHRONIC PAIN OF BOTH KNEES: Primary | ICD-10-CM

## 2025-04-10 DIAGNOSIS — M25.562 CHRONIC PAIN OF BOTH KNEES: Primary | ICD-10-CM

## 2025-04-10 DIAGNOSIS — G89.29 CHRONIC PAIN OF BOTH KNEES: Primary | ICD-10-CM

## 2025-04-10 DIAGNOSIS — M17.0 PRIMARY OSTEOARTHRITIS OF BOTH KNEES: ICD-10-CM

## 2025-04-10 PROCEDURE — 20610 DRAIN/INJ JOINT/BURSA W/O US: CPT | Performed by: ORTHOPAEDIC SURGERY

## 2025-04-10 RX ORDER — LIDOCAINE HYDROCHLORIDE 10 MG/ML
4 INJECTION, SOLUTION INFILTRATION; PERINEURAL
Status: COMPLETED | OUTPATIENT
Start: 2025-04-10 | End: 2025-04-10

## 2025-04-10 RX ADMIN — LIDOCAINE HYDROCHLORIDE 4 ML: 10 INJECTION, SOLUTION INFILTRATION; PERINEURAL at 14:30

## 2025-04-10 NOTE — PROGRESS NOTES
Encounter Provider: Choco Quintero MD   Encounter Date: 04/10/25  Encounter department: [facility] [place]         ASSESSMENT & PLAN:  Assessment & Plan  Chronic pain of both knees  See other diagnoses     Primary osteoarthritis of both knees  The patient was provided with 3rd bilateral Synvisc injections (RP)  The patient tolerated the procedure well.    The patient should follow up in 3 months.       Other orders    Large joint arthrocentesis    Large joint arthrocentesis      To do next visit:  Return in about 3 months (around 7/10/2025).    Scribe Attestation      I,:  Zeke Crooks MA am acting as a scribe while in the presence of the attending physician.:       I,:  Choco Quintero MD personally performed the services described in this documentation    as scribed in my presence.:             ____________________________________________________  CHIEF COMPLAINT:  Chief Complaint   Patient presents with    Left Knee - Follow-up     Synvisc #3    Right Knee - Follow-up     Synvisc #3         SUBJECTIVE:  Lew Martins is a 74 y.o. female who presents 3rd bilateral Synvisc.  Today she complains of bilateral generalized knee pain.  Prolonged weight bearing and repetitive bending aggravates while rest alleviates.  The patient rates their pain at 7/10 and above at times.             PAST MEDICAL HISTORY:  Past Medical History:   Diagnosis Date    Alopecia     Calcaneal spur     Diabetes insipidus (HCC)     Diabetes mellitus (HCC)     Hyperlipidemia     Hypertension     Osteoarthrosis 2/13/2013    Pes planus     unspecified laterality    Plantar fasciitis        PAST SURGICAL HISTORY:  Past Surgical History:   Procedure Laterality Date    BREAST BIOPSY Right     patient not sure when , negative results    BREAST CYST ASPIRATION Left     patient not sure when negative results    CARPAL TUNNEL RELEASE      COLONOSCOPY      May 2006    INCISIONAL BREAST BIOPSY      [place] ARTHRP  ACETBLR/PROX FEM PROSTC AGRFT/ALGRFT Right 9/23/2019    Procedure: ARTHROPLASTY HIP TOTAL;  Surgeon: Choco Quintero MD;  [facility];  Service: Orthopedics    [place] COLONOSCOPY FLX DX W/COLLJ SPEC WHEN PFRMD N/A 5/4/2018    Procedure: COLONOSCOPY;  Surgeon: Randee Beck MD;  Location: [facility];  Service: Colorectal    WISDOM TOOTH EXTRACTION         FAMILY HISTORY:  Family History   Problem Relation Age of Onset    Diabetes Mother     Hypertension Mother     Emphysema Father     No Known Problems Maternal Grandmother     No Known Problems Maternal Grandfather     No Known Problems Paternal Grandmother     No Known Problems Paternal Grandfather     Colon cancer Brother 37    No Known Problems Brother     No Known Problems Maternal Aunt     No Known Problems Maternal Aunt     No Known Problems Paternal Aunt     No Known Problems Paternal Aunt     No Known Problems Paternal Aunt        SOCIAL HISTORY:  Social History     Tobacco Use    Smoking status: Never     Passive exposure: Never    Smokeless tobacco: Never   Vaping Use    Vaping status: Never Used   Substance Use Topics    Alcohol use: Yes     Comment: rarely    Drug use: No       MEDICATIONS:    Current Outpatient Medications:     acetaminophen (TYLENOL) 500 mg tablet, Take 1,000 mg by mouth every 6 (six) hours as needed for mild pain, Disp: , Rfl:     Biotin 1 MG CAPS, Take by mouth daily , Disp: , Rfl:     Blood Pressure Monitoring (SPHYGMOMANOMETER) MISC, by Does not apply route 2 (two) times a day, Disp: 1 each, Rfl: 0    Calcium Carb-Cholecalciferol (Calcium 1000 + D) 1000-800 MG-UNIT TABS, Take by mouth As needed (Patient not taking: Reported on 10/24/2024), Disp: , Rfl:     celecoxib (CeleBREX) 100 mg capsule, TAKE 1 CAPSULE BY MOUTH EVERY DAY (Patient not taking: Reported on 10/24/2024), Disp: 30 capsule, Rfl: 5    cephalexin (KEFLEX) 500 mg capsule, Take 500 mg by mouth if needed Prior to procedure  (Patient not taking: Reported on  "10/24/2024), Disp: , Rfl:     cholecalciferol (VITAMIN D3) 1,000 units tablet, Take 1,000 Units by mouth daily, Disp: , Rfl:     DULoxetine (CYMBALTA) 20 mg capsule, Take 1 capsule (20 mg total) by mouth daily, Disp: , Rfl:     furosemide (LASIX) 20 mg tablet, Take 1 tablet (20 mg total) by mouth if needed (for edema) Take 1 pill as needed for increased swelling in the legs (Patient not taking: Reported on 12/4/2024), Disp: 30 tablet, Rfl: 3    gabapentin (NEURONTIN) 300 mg capsule, TAKE 1 CAPSULE BY MOUTH THREE TIMES DAILY, Disp: 90 capsule, Rfl: 5    losartan (COZAAR) 100 MG tablet, Take 1 tablet (100 mg total) by mouth daily, Disp: 90 tablet, Rfl: 1    metFORMIN (GLUCOPHAGE-XR) 500 mg 24 hr tablet, Take 1 tablet (500 mg total) by mouth daily with breakfast, Disp: 90 tablet, Rfl: 1    metoprolol tartrate (LOPRESSOR) 50 mg tablet, TAKE 1 TABLET BY MOUTH EVERY 12 HOURS, Disp: 180 tablet, Rfl: 1    ofloxacin (OCUFLOX) 0.3 % ophthalmic solution, 0 Refill(s), Type: Maintenance, Disp: , Rfl:     prednisoLONE acetate (PRED FORTE) 1 % ophthalmic suspension, 0 Refill(s), Type: Maintenance, Disp: , Rfl:     simvastatin (ZOCOR) 40 mg tablet, TAKE 1 TABLET BY MOUTH EVERY DAY, Disp: 90 tablet, Rfl: 1    ALLERGIES:  Allergies   Allergen Reactions    Meloxicam Hives    Nifedipine Edema    Sulfamethoxazole-Trimethoprim Edema       LABS:  HgA1c:   Lab Results   Component Value Date    HGBA1C 7.1 (H) 11/19/2024     BMP:   Lab Results   Component Value Date    GLUCOSE 95 04/21/2015    CALCIUM 9.6 11/19/2024     04/21/2015    K 4.1 11/19/2024    CO2 28 11/19/2024     11/19/2024    BUN 16 11/19/2024    CREATININE 0.76 11/19/2024     CBC: No components found for: \"CBC\"    _____________________________________________________  PHYSICAL EXAMINATION:  Vital signs: Ht 4' 11\" (1.499 m)   BMI 43.22 kg/m²   General: No acute distress, awake and alert  Psychiatric: Mood and affect appear appropriate  HEENT: Trachea Midline, No " "torticollis, no apparent facial trauma  Cardiovascular: No audible murmurs; Extremities appear perfused  Pulmonary: No audible wheezing or stridor  Skin: No open lesions; see further details (if any) below    Ortho Exam:  Bilateral knees:  No erythema or ecchymosis  No effusion or swelling  Normal strength  Good ROM with crepitus   Calf compartments soft and supple  Sensation intact  Toes are warm sensate and mobile      _____________________________________________________  STUDIES REVIEWED:    None performed     PROCEDURES PERFORMED:  Large joint arthrocentesis: R knee  Universal Protocol:  Consent: Verbal consent obtained.  Risks and benefits: risks, benefits and alternatives were discussed  Consent given by: patient  Time out: Immediately prior to procedure a \"time out\" was called to verify the correct patient, procedure, equipment, support staff and site/side marked as required.  Timeout called at: 4/10/2025 3:09 PM.  Patient understanding: patient states understanding of the procedure being performed  Site marked: the operative site was marked  Patient identity confirmed: verbally with patient  Supporting Documentation  Indications: pain   Procedure Details  Location: knee - R knee  Preparation: Patient was prepped and draped in the usual sterile fashion  Needle size: 22 G  Ultrasound guidance: no  Approach: anterolateral  Medications administered: 4 mL lidocaine 1 %; 16 mg hylan 16 MG/2ML    Patient tolerance: patient tolerated the procedure well with no immediate complications  Dressing:  Sterile dressing applied      Large joint arthrocentesis: L knee  Universal Protocol:  Consent: Verbal consent obtained.  Risks and benefits: risks, benefits and alternatives were discussed  Consent given by: patient  Time out: Immediately prior to procedure a \"time out\" was called to verify the correct patient, procedure, equipment, support staff and site/side marked as required.  Timeout called at: 4/10/2025 3:11 " "PM.  Patient understanding: patient states understanding of the procedure being performed  Site marked: the operative site was marked  Patient identity confirmed: verbally with patient  Supporting Documentation  Indications: pain   Procedure Details  Location: knee - L knee  Preparation: Patient was prepped and draped in the usual sterile fashion  Needle size: 22 G  Ultrasound guidance: no  Approach: anterolateral  Medications administered: 4 mL lidocaine 1 %; 16 mg hylan 16 MG/2ML    Patient tolerance: patient tolerated the procedure well with no immediate complications  Dressing:  Sterile dressing applied                Portions of the record may have been created with voice recognition software.  Occasional wrong word or \"sound a like\" substitutions may have occurred due to the inherent limitations of voice recognition software.  Read the chart carefully and recognize, using context, where substitutions have occurred.        "

## 2025-04-10 NOTE — ASSESSMENT & PLAN NOTE
The patient was provided with 3rd bilateral Synvisc injections (RP)  The patient tolerated the procedure well.    The patient should follow up in 3 months.

## 2025-04-11 ENCOUNTER — TELEPHONE (OUTPATIENT)
Dept: OBGYN CLINIC | Facility: HOSPITAL | Age: 75
End: 2025-04-11

## 2025-04-11 NOTE — TELEPHONE ENCOUNTER
Caller: Patient    Doctor: Dr. Quitnero    Reason for call: Jannet was in to see Dr. Quintero tomorrow and forget to get her After Visit Summary. She would like if someone could mail her a copy. She is unable to get it through her [employer]. Thank you.    [address] [place] [id number]     Call back#: [phone]

## 2025-04-11 NOTE — TELEPHONE ENCOUNTER
AVS printed and put in the mail bin. Mail will be going out on Monday.     No further action required at this time.

## 2025-04-22 NOTE — PROGRESS NOTES
Pre-operative Clearance  Name: Lew Martins      : 1950      MRN: [id number]  Encounter Provider: WILLIAN Aguilar  Encounter Date: 2025   Encounter department: [facility]    :  Assessment & Plan  Controlled type 2 diabetes mellitus with diabetic nephropathy, without long-term current use of insulin ([facility])  Lab Results   Component Value Date    HGBA1C 6.2 2025     Hemoglobin A1c in the office today is 6.2. she continues she continues on her metformin 500 mg daily.  Up-to-date with both diabetic eye exams and diabetic foot exams and we will obtain those results.  Orders:    POCT hemoglobin A1c    Albumin / creatinine urine ratio    Lipid panel; Future    Comprehensive metabolic panel; Future    CBC and differential    Benign essential hypertension  Blood pressure in the office today is 140/88.  This was initially elevated.  The patient continues on her losartan 100 mg daily.  She does have hydrochlorothiazide to use with lower extremity swelling.  Orders:    Lipid panel; Future    Comprehensive metabolic panel; Future    CBC and differential    Nonrheumatic aortic valve stenosis  Follows with cardiology.  Recent echo was in .  2024  echocardiogram     Left Ventricle: Left ventricular cavity size is normal. Wall thickness is moderately increased. There is moderate concentric hypertrophy. The left ventricular ejection fraction is 70%. Systolic function is hyperdynamic. Although no diagnostic regional wall motion abnormality was identified, this possibility cannot be completely excluded on the basis of this study. Diastolic function is mildly abnormal, consistent with grade I (abnormal) relaxation.    IVS: There is abnormal septal motion consistent with left bundle branch block. There is sigmoid appearance of the septum.    Right Ventricle: Right ventricular cavity size is normal. Systolic function is normal.    Left Atrium: The atrium is normal in size.    Right  Atrium: The atrium is normal in size.    Aortic Valve: The aortic valve is trileaflet. The leaflets are mildly thickened. The leaflets are moderately calcified. There is moderately reduced mobility. There is moderate stenosis. The aortic valve mean gradient is 9 mmHg. The dimensionless velocity index is 0.43. The aortic valve area is 1.20 cm2. AV valve area is 1.20 cm2.    Mitral Valve: There is moderate annular calcification. There is mild regurgitation.    Tricuspid Valve: There is mild regurgitation.    Aorta: The aortic root is normal in size. The ascending aorta is moderately dilated. The ascending aorta is 4.6 cm.    Prior TTE study available for comparison. Prior study date: 10/23/2019. Prior THEO images not available for review. Compared to prior study report, moderate aortic stenosis is a new finding.         Pure hypercholesterolemia  Due for lipid panel.  Patient remains on Zocor.  Low-fat diet recommended.  Orders:    Lipid panel; Future    Comprehensive metabolic panel; Future    Morbid obesity (HCC)  Patient with a 8 pound weight loss since October.  Her BMI is 43.  She does have bilateral knee arthritis making it difficult for her to exercise.  Low-carb diet recommended.       Supraventricular tachycardia (HCC)  Patient remains on metoprolol.  Continue to follow with cardiology.         Diabetic polyneuropathy associated with type 2 diabetes mellitus (HCC)    Lab Results   Component Value Date    HGBA1C 6.2 04/23/2025   Continue metformin.  Gabapentin at bedtime.  Continue to follow with podiatry.         Primary osteoarthritis of both knees  Patient continues to follow with orthopedics.  She recently had her third injection in her knees..         Anxiety and depression  Patient's mood is stable.  She had been on Cymbalta.  She did stop this medication.             Pre-operative Clearance:     Revised Cardiac Risk Index:  RCI RISK CLASS I (0 risk factors, risk of major cardiac complications  approximately 0.5%)    Clearance:  Patient is medically optimized (CLEARED) for proposed surgery without any additional cardiac testing.      Medication Instructions:   - Avoid herbs or non-directed vitamins one week prior to surgery    - Avoid aspirin containing medications or non-steroidal anti-inflammatory drugs one week preceding surgery    - May take tylenol for pain up until the night before surgery    - ACE Inhibitors or ARBs: Continue this medication up to the evening before surgery/procedure, but do not take the morning of the day of surgery.  - Antiepileptic meds: Continue to take this medication on your normal schedule.  - Beta blockers:  Continue to take this medication on your normal schedule.  - Diuretics: Continue taking this medication up to the evening before surgery/procedure, but do not take in the morning of the day of surgery/procedure.  - Hyperlipidemia meds: Continue to take this medication on your normal schedule.      Medicine Instructions for Adults with Diabetes (NO Bowel Prep)    Follow these instructions when a BOWEL PREP is NOT required for your procedure or surgery!    NOTE:  GLP Agonists taken weekly: do not take in the 7 days before your procedure. **Bariatric surgery: do not take 4 weeks prior to your procedure.    SGLT-2 Inhibitors: do not take in the 4 days before your procedure    On the Day Before Surgery/Procedure  If you are having a procedure (e.g., Colonoscopy) or surgery which DOES NOT require a bowel prep, follow the directions below based on the type of medicine you take for your diabetes.  Type of Medicine You Take Examples What to Do   Pre-Mixed Insulin Intermediate  Vchokds94/25, Wsyumvp41/30, Novolog 70/30, Regular Insulin Take 1/2 your regular dose the evening before our procedure.   Rapid/Fast Acting  Insulin and/or Long-Acting Insulin Humalog U200, NovoLog, Apidra,  Lantus, Levemir, Tresiba, Toujeo,  Fias, Basaglar Take your FULL regular dose the day before  procedure.   Oral Diabetic Medicines (sulfonylurea) Glipizide/Glimepiride/  Glucotrol Take your regular dose with dinner the evening before your procedure.   Other Oral Diabetic Medicines Metformin, Glucophage, Glucophage  XR, Riomet, Glumetza, Actose,  Avandia, Gl set, Prandin Take your regular dose with dinner the evening before your procedure   GLP Agonists Adlyxin, Byetta, Bydureon,  Ozempic, Soliqua, Tanzeum,  Trulicity, Victoza, Saxenda,  Rybelsus, Wegovy, Mounjaro, Zepbound If taken daily, take as normal  If taken weekly, do not take this medicine for 7 days before your procedure including the day of the procedure (resume taking after the procedure). **Bariatric surgery: do not take 4 weeks prior to procedure   SGLT-2 Inhibitors Jardiance, Invokana, Farxiga, Steglatro, Brenzavvy, Qtern, Segluromet Glyxambi, Synjardy, Synjardy XR, Invokamet, InvokametXR, Trijary XR, Xigduo X Do not take for 4 days before your procedure including the day of the procedure (resume taking after the procedure)   This educational material has been approved by the Patient Education Advisory Committee.    On the Day of Surgery/Procedure  Follow the directions below based on the type of medicine you take for your diabetes.  Type of Medicine You Take  Examples What to Do   Long-Acting Insulin Lantus, Levemir, Tresiba,  Toujeo, Basaglar, Semglee If you normally take your Long Acting Insulin in the morning, take the full dose as scheduled.   GLP-I Agonists Adlyxin, Byetta, Bydureon,  Ozempic, Soliqua, Tanzeum,  Trulicity, Victoza, Saxenda,  Rybelsus, Mounjaro Do NOT take this medicine on the day of your procedure (resume taking after the procedure)   Except for the morning Long-Acting Insulin, DO NOT take ANY diabetic medicine on the day of your procedure unless you were instructed by the doctor who manages your diabetes medicines.  Continue to check your blood sugars.  If you have an insulin pump, ask your endocrinologist for  instructions at least 3 days before your procedure. NOTE: If you are not able to ask your endocrinologist in advance, on the day of the procedure set your insulin pump to your basal rate only. Bring your insulin pump supplies to the hospital.         History of Present Illness     Pre-Op Examination     Surgery: right cataract surgery   Anticipated Date of Surgery: 4/29/2025   Surgeon: Rob Dunn MD     Previous history of bleeding disorders or clots?: No    Previous Anesthesia reaction?: No    Prolonged steroid use in the last 6 months?: No      Assessment of Cardiac Risk:   - Unstable or severe angina or MI in the last 6 weeks or history of stent placement in the last year?: No    - Decompensated heart failure (e.g. New onset heart failure, NYHA  Class IV heart failure, or worsening existing heart failure)?: No    - Significant arrhythmias such as high grade AV block, symptomatic ventricular arrhythmia, newly recognized ventricular tachycardia, supraventricular tachycardia with resting heart rate >100, or symptomatic bradycardia?: No    - Severe heart valve disease including aortic stenosis or symptomatic mitral stenosis?: No      Pre-operative Risk Factors:  - Elevated-risk surgery: No    - History of cerebrovascular disease: No    - History of ischemic heart disease: No    - History of congestive heart failure: No    - Pre-operative treatment with insulin: No    - Pre-operative creatinine >2 mg/dL: No      Review of Systems   Constitutional:  Negative for activity change, fatigue and fever.   HENT:  Negative for congestion, hearing loss, rhinorrhea, trouble swallowing and voice change.    Eyes:  Positive for visual disturbance. Negative for photophobia, pain and discharge.   Respiratory:  Negative for cough, chest tightness and shortness of breath.    Cardiovascular:  Negative for chest pain, palpitations and leg swelling.   Gastrointestinal:  Negative for abdominal pain, blood in stool, constipation,  nausea and vomiting.   Endocrine: Negative for cold intolerance and heat intolerance.   Genitourinary:  Negative for difficulty urinating, frequency, hematuria, urgency, vaginal bleeding and vaginal discharge.   Musculoskeletal:  Positive for arthralgias and myalgias.   Skin: Negative.    Neurological:  Negative for dizziness, weakness, numbness and headaches.   Psychiatric/Behavioral:  Negative for decreased concentration. The patient is not nervous/anxious.      Past Medical History   Past Medical History:   Diagnosis Date    Alopecia     Calcaneal spur     Depression, recurrent (HCC) 05/08/2024    Diabetes insipidus (HCC)     Diabetes mellitus (HCC)     Hyperlipidemia     Hypertension     Osteoarthrosis 02/13/2013    Pes planus     unspecified laterality    Plantar fasciitis      Past Surgical History:   Procedure Laterality Date    BREAST BIOPSY Right     patient not sure when , negative results    BREAST CYST ASPIRATION Left     patient not sure when negative results    CARPAL TUNNEL RELEASE      COLONOSCOPY      May 2006    INCISIONAL BREAST BIOPSY      ID ARTHRP ACETBLR/PROX FEM PROSTC AGRFT/ALGRFT Right 9/23/2019    Procedure: ARTHROPLASTY HIP TOTAL;  Surgeon: Choco Quintero MD;  Location: [facility];  Service: Orthopedics    ID COLONOSCOPY FLX DX W/COLLJ SPEC WHEN PFRMD N/A 5/4/2018    Procedure: COLONOSCOPY;  Surgeon: Randee Beck MD;  Location: [facility];  Service: Colorectal    WISDOM TOOTH EXTRACTION       Family History   Problem Relation Age of Onset    Diabetes Mother     Hypertension Mother     Emphysema Father     No Known Problems Maternal Grandmother     No Known Problems Maternal Grandfather     No Known Problems Paternal Grandmother     No Known Problems Paternal Grandfather     Colon cancer Brother 37    No Known Problems Brother     No Known Problems Maternal Aunt     No Known Problems Maternal Aunt     No Known Problems Paternal Aunt     No Known Problems Paternal Aunt     No Known  Problems Paternal Aunt      Social History     Tobacco Use    Smoking status: Never     Passive exposure: Never    Smokeless tobacco: Never   Vaping Use    Vaping status: Never Used   Substance and Sexual Activity    Alcohol use: Yes     Comment: rarely    Drug use: No    Sexual activity: Not Currently     Current Outpatient Medications on File Prior to Visit   Medication Sig    acetaminophen (TYLENOL) 500 mg tablet Take 1,000 mg by mouth every 6 (six) hours as needed for mild pain    Biotin 1 MG CAPS Take by mouth daily     cholecalciferol (VITAMIN D3) 1,000 units tablet Take 1,000 Units by mouth daily    furosemide (LASIX) 20 mg tablet Take 1 tablet (20 mg total) by mouth if needed (for edema) Take 1 pill as needed for increased swelling in the legs (Patient taking differently: Take 20 mg by mouth if needed (for edema) Take 1 pill as needed for increased swelling in the legs)    gabapentin (NEURONTIN) 300 mg capsule TAKE 1 CAPSULE BY MOUTH THREE TIMES DAILY    losartan (COZAAR) 100 MG tablet Take 1 tablet (100 mg total) by mouth daily    metFORMIN (GLUCOPHAGE-XR) 500 mg 24 hr tablet Take 1 tablet (500 mg total) by mouth daily with breakfast    metoprolol tartrate (LOPRESSOR) 50 mg tablet TAKE 1 TABLET BY MOUTH EVERY 12 HOURS    ofloxacin (OCUFLOX) 0.3 % ophthalmic solution 0 Refill(s), Type: Maintenance    prednisoLONE acetate (PRED FORTE) 1 % ophthalmic suspension 0 Refill(s), Type: Maintenance    simvastatin (ZOCOR) 40 mg tablet TAKE 1 TABLET BY MOUTH EVERY DAY    [DISCONTINUED] Blood Pressure Monitoring (SPHYGMOMANOMETER) MISC by Does not apply route 2 (two) times a day    Calcium Carb-Cholecalciferol (Calcium 1000 + D) 1000-800 MG-UNIT TABS Take by mouth As needed (Patient not taking: Reported on 10/24/2024)    cephalexin (KEFLEX) 500 mg capsule Take 500 mg by mouth if needed Prior to procedure  (Patient not taking: Reported on 10/24/2024)    [DISCONTINUED] celecoxib (CeleBREX) 100 mg capsule TAKE 1 CAPSULE BY  "MOUTH EVERY DAY (Patient not taking: Reported on 10/24/2024)    [DISCONTINUED] DULoxetine (CYMBALTA) 20 mg capsule Take 1 capsule (20 mg total) by mouth daily (Patient not taking: Reported on 4/23/2025)     Allergies   Allergen Reactions    Meloxicam Hives    Nifedipine Edema    Sulfamethoxazole-Trimethoprim Edema     Objective   /88   Pulse 75   Temp 97.8 °F (36.6 °C) (Tympanic)   Resp 18   Ht 4' 11\" (1.499 m)   Wt 96.9 kg (213 lb 9.6 oz)   SpO2 97%   BMI 43.14 kg/m²     Physical Exam  Vitals reviewed.   Constitutional:       Appearance: Normal appearance. She is obese.   HENT:      Head: Normocephalic.      Nose: Nose normal.      Mouth/Throat:      Mouth: Mucous membranes are moist.      Pharynx: Oropharynx is clear.   Eyes:      Extraocular Movements: Extraocular movements intact.      Pupils: Pupils are equal, round, and reactive to light.   Cardiovascular:      Rate and Rhythm: Normal rate and regular rhythm.      Pulses: Normal pulses.      Heart sounds: Murmur heard.   Pulmonary:      Effort: Pulmonary effort is normal.      Breath sounds: Normal breath sounds.   Musculoskeletal:         General: Normal range of motion.   Skin:     General: Skin is warm and dry.      Capillary Refill: Capillary refill takes less than 2 seconds.   Neurological:      General: No focal deficit present.      Mental Status: She is alert and oriented to person, place, and time. Mental status is at baseline.   Psychiatric:         Mood and Affect: Mood normal.         Behavior: Behavior normal.         Thought Content: Thought content normal.         Judgment: Judgment normal.           WILLIAN Aguilar  "

## 2025-04-23 ENCOUNTER — CONSULT (OUTPATIENT)
Dept: FAMILY MEDICINE CLINIC | Facility: CLINIC | Age: 75
End: 2025-04-23
Payer: COMMERCIAL

## 2025-04-23 VITALS
OXYGEN SATURATION: 97 % | RESPIRATION RATE: 18 BRPM | SYSTOLIC BLOOD PRESSURE: 140 MMHG | HEIGHT: 59 IN | HEART RATE: 75 BPM | BODY MASS INDEX: 43.06 KG/M2 | WEIGHT: 213.6 LBS | TEMPERATURE: 97.8 F | DIASTOLIC BLOOD PRESSURE: 88 MMHG

## 2025-04-23 DIAGNOSIS — M17.0 PRIMARY OSTEOARTHRITIS OF BOTH KNEES: ICD-10-CM

## 2025-04-23 DIAGNOSIS — E11.21 CONTROLLED TYPE 2 DIABETES MELLITUS WITH DIABETIC NEPHROPATHY, WITHOUT LONG-TERM CURRENT USE OF INSULIN (HCC): Primary | ICD-10-CM

## 2025-04-23 DIAGNOSIS — F32.A ANXIETY AND DEPRESSION: ICD-10-CM

## 2025-04-23 DIAGNOSIS — I35.0 NONRHEUMATIC AORTIC VALVE STENOSIS: ICD-10-CM

## 2025-04-23 DIAGNOSIS — E66.01 MORBID OBESITY (HCC): ICD-10-CM

## 2025-04-23 DIAGNOSIS — F41.9 ANXIETY AND DEPRESSION: ICD-10-CM

## 2025-04-23 DIAGNOSIS — E11.42 DIABETIC POLYNEUROPATHY ASSOCIATED WITH TYPE 2 DIABETES MELLITUS (HCC): ICD-10-CM

## 2025-04-23 DIAGNOSIS — I47.10 SUPRAVENTRICULAR TACHYCARDIA (HCC): ICD-10-CM

## 2025-04-23 DIAGNOSIS — E78.00 PURE HYPERCHOLESTEROLEMIA: ICD-10-CM

## 2025-04-23 DIAGNOSIS — I10 BENIGN ESSENTIAL HYPERTENSION: ICD-10-CM

## 2025-04-23 DIAGNOSIS — H25.011 CORTICAL AGE-RELATED CATARACT OF RIGHT EYE: ICD-10-CM

## 2025-04-23 PROBLEM — M25.462 EFFUSION OF LEFT KNEE: Status: RESOLVED | Noted: 2018-03-15 | Resolved: 2025-04-23

## 2025-04-23 PROBLEM — F33.9 DEPRESSION, RECURRENT (HCC): Status: RESOLVED | Noted: 2024-05-08 | Resolved: 2025-04-23

## 2025-04-23 PROBLEM — E11.40 DIABETES MELLITUS WITH NEUROPATHY (HCC): Status: RESOLVED | Noted: 2023-06-05 | Resolved: 2025-04-23

## 2025-04-23 LAB — SL AMB POCT HEMOGLOBIN AIC: 6.2 (ref ?–6.5)

## 2025-04-23 PROCEDURE — 83036 HEMOGLOBIN GLYCOSYLATED A1C: CPT | Performed by: NURSE PRACTITIONER

## 2025-04-23 PROCEDURE — 99214 OFFICE O/P EST MOD 30 MIN: CPT | Performed by: NURSE PRACTITIONER

## 2025-04-23 PROCEDURE — G2211 COMPLEX E/M VISIT ADD ON: HCPCS | Performed by: NURSE PRACTITIONER

## 2025-04-23 NOTE — ASSESSMENT & PLAN NOTE
Lab Results   Component Value Date    HGBA1C 6.2 04/23/2025   Continue metformin.  Gabapentin at bedtime.  Continue to follow with podiatry.

## 2025-04-23 NOTE — ASSESSMENT & PLAN NOTE
Blood pressure in the office today is 140/88.  This was initially elevated.  The patient continues on her losartan 100 mg daily.  She does have hydrochlorothiazide to use with lower extremity swelling.  Orders:    Lipid panel; Future    Comprehensive metabolic panel; Future    CBC and differential

## 2025-04-23 NOTE — ASSESSMENT & PLAN NOTE
Patient continues to follow with orthopedics.  She recently had her third injection in her knees..

## 2025-04-23 NOTE — ASSESSMENT & PLAN NOTE
Patient with a 8 pound weight loss since October.  Her BMI is 43.  She does have bilateral knee arthritis making it difficult for her to exercise.  Low-carb diet recommended.

## 2025-04-23 NOTE — ASSESSMENT & PLAN NOTE
Follows with cardiology.  Recent echo was in 2024.  12/2024  echocardiogram     Left Ventricle: Left ventricular cavity size is normal. Wall thickness is moderately increased. There is moderate concentric hypertrophy. The left ventricular ejection fraction is 70%. Systolic function is hyperdynamic. Although no diagnostic regional wall motion abnormality was identified, this possibility cannot be completely excluded on the basis of this study. Diastolic function is mildly abnormal, consistent with grade I (abnormal) relaxation.    IVS: There is abnormal septal motion consistent with left bundle branch block. There is sigmoid appearance of the septum.    Right Ventricle: Right ventricular cavity size is normal. Systolic function is normal.    Left Atrium: The atrium is normal in size.    Right Atrium: The atrium is normal in size.    Aortic Valve: The aortic valve is trileaflet. The leaflets are mildly thickened. The leaflets are moderately calcified. There is moderately reduced mobility. There is moderate stenosis. The aortic valve mean gradient is 9 mmHg. The dimensionless velocity index is 0.43. The aortic valve area is 1.20 cm2. AV valve area is 1.20 cm2.    Mitral Valve: There is moderate annular calcification. There is mild regurgitation.    Tricuspid Valve: There is mild regurgitation.    Aorta: The aortic root is normal in size. The ascending aorta is moderately dilated. The ascending aorta is 4.6 cm.    Prior TTE study available for comparison. Prior study date: 10/23/2019. Prior THEO images not available for review. Compared to prior study report, moderate aortic stenosis is a new finding.

## 2025-04-23 NOTE — ASSESSMENT & PLAN NOTE
Lab Results   Component Value Date    HGBA1C 6.2 04/23/2025     Hemoglobin A1c in the office today is 6.2. she continues she continues on her metformin 500 mg daily.  Up-to-date with both diabetic eye exams and diabetic foot exams and we will obtain those results.  Orders:    POCT hemoglobin A1c    Albumin / creatinine urine ratio    Lipid panel; Future    Comprehensive metabolic panel; Future    CBC and differential

## 2025-04-23 NOTE — ASSESSMENT & PLAN NOTE
Due for lipid panel.  Patient remains on Zocor.  Low-fat diet recommended.  Orders:    Lipid panel; Future    Comprehensive metabolic panel; Future

## 2025-04-23 NOTE — PATIENT INSTRUCTIONS
"Patient Education     DASH diet   The Basics   Written by the doctors and editors at [facility]   What is the DASH diet? -- DASH stands for \"dietary approaches to stop hypertension.\" It is an eating plan that can help lower blood pressure. It can also help prevent high blood pressure, which doctors call \"hypertension.\" You don't need special foods or recipes to follow the DASH diet. It is more about eating certain types of foods in certain amounts.  The DASH diet has lots of fruits and vegetables, whole grains, lean meats, healthy fats, and low-fat or fat-free dairy products (figure 1). It is low in saturated fats, trans fats, cholesterol, added sugars, and sodium (salt).  The standard DASH diet limits sodium to no more than 2300 mg a day. Your doctor or nurse can talk to you about what your specific goals should be.  Why do I need the DASH diet? -- The DASH diet can help you:   Lower your blood pressure and cholesterol   Lower your risk for cancer, heart disease, heart attack, and stroke. It might also lower your risk for heart failure, kidney stones, and diabetes.   Lose weight or keep a healthy weight  What can I eat and drink on the DASH diet? -- Below are some guidelines and examples for your daily and weekly nutrition goals. These are based on a 2000-calorie-per-day eating plan.  Daily goals:   Grains - Try to eat 6 to 8 servings of whole-grain, high-fiber foods each day. Examples of a serving include 1 slice of bread, 1 ounce (30 grams) of dry cereal, or 1/2 cup (120 grams) of cooked cereal, pasta, or brown rice.   Fruits - Try to eat 4 to 5 servings of fruit each day. Examples of a serving include 1 medium fruit or 1/2 cup (75 grams) of fresh, frozen, or canned fruit. Try to eat different kinds and colors. Frozen or canned fruit should not have added sugar. Look for frozen or canned fruits with 100 percent fruit juice or water.   Vegetables - Try to eat 4 to 5 servings of vegetables each day. Examples of a " "serving include 1 cup (40 grams) of leafy greens or 1/2 cup (75 grams) of fresh or cooked vegetables. Try to pick many kinds and colors. If you buy canned vegetables, look for \"low sodium\" or \"salt free.\" Buy plain, frozen vegetables to avoid added fat and sodium.   Dairy - Try to eat 2 to 3 servings of fat-free or low-fat milk products each day. Examples of a serving include 1 cup (240 mL) of milk or yogurt or 1.5 ounces (45 grams) of cheese.   Lean meats, poultry, and seafood - Try to eat 6 or fewer servings of lean meat, poultry, and seafood each day. Examples of a serving include 1 egg or 1 ounce (30 grams) of cooked meat, poultry, or fish. Try to choose more low-fat or lean meats like chicken, fish, or turkey. Eat less red meat.   Fats and oils - Try to eat 2 to 3 servings of fats and oils each day. Examples of a serving include 1 teaspoon (5 mL) of soft margarine or vegetable oil, or 1 tablespoon (18 grams) of mayonnaise. Eat healthy fats like those found in fish, nuts, and avocados. Try using olive oil or vegetable oils such as canola oil. You can also try corn, safflower, sunflower, or soybean oils. Use low-sodium and low-fat salad dressing and mayonnaise.  Weekly goals:   Nuts, seeds, and legumes (dry beans and peas) - Try to eat 4 to 5 servings each week. Examples of a serving include 1/3 cup (45 grams) of nuts, 2 tablespoons (50 grams) of nut butter or seeds, or 1/2 cup (75 grams) of cooked legumes. Try almonds and walnuts, sunflower seeds, peanut or other nut butters, soybeans, lentils, kidney beans, and split peas.   Sweets - Try to eat fewer than 5 servings each week. Examples of a serving include 1 tablespoon (14 grams) of sugar or jelly, or 1/2 cup (120 grams) of gelatin. Choose low-fat and trans fat-free desserts. These include fruit-flavored gelatin, sorbet, jellybeans, tod crackers, animal crackers, low-fat fig bars, and chase snaps. Eat fruit to satisfy the desire for sweets.  To add flavor, " use pepper, herbs, spices, vinegar, or lemon or lime juices. Choose low-sodium or salt-free products whenever you can. This is especially important for foods like broths, soups, or soy sauce.  What foods and drinks should I avoid on the DASH diet?    Grains to avoid - Salted breads, rolls, crackers, quick breads, self-rising flours, biscuit mixes, regular breadcrumbs, instant hot cereals, commercially prepared rice, pasta, stuffing mixes.   Fruits and vegetables to avoid - Store-bought prepared potatoes and vegetable mixes, regular canned vegetables and juices, vegetables frozen with sauce, pickled vegetables, processed fruits with salt or sodium.   Dairy products to avoid - Whole milk, malted milk, chocolate milk, buttermilk, full-fat cheese, ice cream.   Meats to avoid - Smoked, cured, salted, or canned fish such as sardines or anchovies. High-fat cuts of meat like beef, lamb, pork, jasso and sausage, and chicken with the skin on it.   Fats and oils to avoid - Eat fewer solid fats like butter, lard, and hard stick margarine. Eat less saturated fat, trans fat, and total fat.   Condiments and snacks to avoid - Salted and canned peas, beans, and olives. Salted snack foods, fried foods, soda, other sweetened drinks.   Sweets to avoid - High-fat baked goods such as muffins, donuts, pastries, and commercial baked goods. Candy bars.   Alcohol - If you choose to drink alcohol, limit the amount. Most doctors recommend limiting alcohol to no more than 1 drink a day (for females) or 2 drinks a day (for males).  What else do I need to know?    Get regular physical activity to make this diet help you even more. Even gentle forms of activity, like walking, are good for your health.   Try baking or broiling instead of frying foods.   Write down the foods that you eat. This will help you track what you have eaten each week.   When you go to the grocery store, have a list or a meal plan. Don't shop when you are hungry, since this  might lead you to buy more unhealthy foods.   Read food labels with care (figure 2). They show you how much is in a serving. The amount is given as a percentage of the total amount that you need each day. Reading labels helps you make healthy food choices.  All topics are updated as new evidence becomes available and our peer review process is complete.  This topic retrieved from [facility] on: Feb 26, 2024.  Topic 403264 Version 1.0  Release: 32.2.4 - C32.56  © 2024 UpToDate, Inc. and/or its affiliates. All rights reserved.  figure 1: DASH diet     Graphic 105400 Version 1.0  figure 2: Food label     Graphic 042090 Version 1.0  Consumer Information Use and Disclaimer   Disclaimer: This generalized information is a limited summary of diagnosis, treatment, and/or medication information. It is not meant to be comprehensive and should be used as a tool to help the user understand and/or assess potential diagnostic and treatment options. It does NOT include all information about conditions, treatments, medications, side effects, or risks that may apply to a specific patient. It is not intended to be medical advice or a substitute for the medical advice, diagnosis, or treatment of a health care provider based on the health care provider's examination and assessment of a patient's specific and unique circumstances. Patients must speak with a health care provider for complete information about their health, medical questions, and treatment options, including any risks or benefits regarding use of medications. This information does not endorse any treatments or medications as safe, effective, or approved for treating a specific patient. UpToDate, Inc. and its affiliates disclaim any warranty or liability relating to this information or the use thereof.The use of this information is governed by the Terms of Use, available at https://www.woltersFinalCADuwer.com/en/know/clinical-effectiveness-terms. 2024© UpToDate, Inc. and its  affiliates and/or licensors. All rights reserved.  Copyright   © 2024 [employer] and/or its affiliates. All rights reserved.

## 2025-04-24 ENCOUNTER — TELEPHONE (OUTPATIENT)
Dept: FAMILY MEDICINE CLINIC | Facility: CLINIC | Age: 75
End: 2025-04-24

## 2025-04-24 NOTE — TELEPHONE ENCOUNTER
Patient dropped off pre-op clearance form to be completed from her 4/23/25 clearance physical.  Gave to Craig

## 2025-04-26 DIAGNOSIS — I10 BENIGN ESSENTIAL HYPERTENSION: ICD-10-CM

## 2025-04-26 DIAGNOSIS — E78.00 PURE HYPERCHOLESTEROLEMIA: ICD-10-CM

## 2025-04-26 RX ORDER — SIMVASTATIN 40 MG
40 TABLET ORAL DAILY
Qty: 90 TABLET | Refills: 1 | Status: SHIPPED | OUTPATIENT
Start: 2025-04-26

## 2025-04-30 ENCOUNTER — 1 DAY POST-OP (OUTPATIENT)
Dept: URBAN - METROPOLITAN AREA CLINIC 6 | Facility: CLINIC | Age: 75
End: 2025-04-30

## 2025-04-30 DIAGNOSIS — H25.812: ICD-10-CM

## 2025-04-30 DIAGNOSIS — Z96.1: ICD-10-CM

## 2025-04-30 PROCEDURE — 99024 POSTOP FOLLOW-UP VISIT: CPT

## 2025-04-30 PROCEDURE — 92136 OPHTHALMIC BIOMETRY: CPT | Mod: 26,LT

## 2025-04-30 RX ORDER — LOSARTAN POTASSIUM 100 MG/1
100 TABLET ORAL DAILY
Qty: 90 TABLET | Refills: 1 | Status: SHIPPED | OUTPATIENT
Start: 2025-04-30

## 2025-04-30 ASSESSMENT — TONOMETRY
OS_IOP_MMHG: 13
OD_IOP_MMHG: 15

## 2025-04-30 ASSESSMENT — KERATOMETRY
OS_AXISANGLE_DEGREES: 168
OS_K2POWER_DIOPTERS: 43.00
OD_AXISANGLE_DEGREES: 007
OD_K2POWER_DIOPTERS: 43.50
OD_K1POWER_DIOPTERS: 41.75
OS_K1POWER_DIOPTERS: 41.75
OD_AXISANGLE2_DEGREES: 97
OS_AXISANGLE2_DEGREES: 78

## 2025-04-30 ASSESSMENT — VISUAL ACUITY
OS_SC: 20/40-1
OD_PH: 20/50+2
OD_SC: 20/70+2

## 2025-05-05 ENCOUNTER — RA CDI HCC (OUTPATIENT)
Dept: OTHER | Facility: HOSPITAL | Age: 75
End: 2025-05-05

## 2025-05-08 ENCOUNTER — 1 WEEK POST-OP (OUTPATIENT)
Dept: URBAN - METROPOLITAN AREA CLINIC 6 | Facility: CLINIC | Age: 75
End: 2025-05-08

## 2025-05-08 DIAGNOSIS — Z96.1: ICD-10-CM

## 2025-05-08 DIAGNOSIS — H25.812: ICD-10-CM

## 2025-05-08 PROCEDURE — 99024 POSTOP FOLLOW-UP VISIT: CPT

## 2025-05-08 ASSESSMENT — KERATOMETRY
OS_K1POWER_DIOPTERS: 41.75
OS_AXISANGLE2_DEGREES: 78
OS_AXISANGLE_DEGREES: 168
OD_AXISANGLE_DEGREES: 007
OD_K1POWER_DIOPTERS: 41.75
OD_AXISANGLE2_DEGREES: 97
OS_K2POWER_DIOPTERS: 43.00
OD_K2POWER_DIOPTERS: 43.50

## 2025-05-08 ASSESSMENT — TONOMETRY
OD_IOP_MMHG: 15
OS_IOP_MMHG: 16

## 2025-05-08 ASSESSMENT — VISUAL ACUITY
OS_SC: 20/40
OD_SC: 20/40+2

## 2025-05-17 DIAGNOSIS — I47.10 SVT (SUPRAVENTRICULAR TACHYCARDIA) (HCC): ICD-10-CM

## 2025-05-18 RX ORDER — METOPROLOL TARTRATE 50 MG
50 TABLET ORAL 2 TIMES DAILY
Qty: 180 TABLET | Refills: 1 | Status: SHIPPED | OUTPATIENT
Start: 2025-05-18

## 2025-05-21 ENCOUNTER — 1 DAY POST-OP (OUTPATIENT)
Dept: URBAN - METROPOLITAN AREA CLINIC 6 | Facility: CLINIC | Age: 75
End: 2025-05-21

## 2025-05-21 DIAGNOSIS — Z96.1: ICD-10-CM

## 2025-05-21 PROCEDURE — 99024 POSTOP FOLLOW-UP VISIT: CPT

## 2025-05-21 ASSESSMENT — KERATOMETRY
OD_AXISANGLE2_DEGREES: 97
OS_K2POWER_DIOPTERS: 43.00
OD_K2POWER_DIOPTERS: 43.50
OS_K1POWER_DIOPTERS: 41.75
OD_AXISANGLE_DEGREES: 007
OD_K1POWER_DIOPTERS: 41.75
OS_AXISANGLE2_DEGREES: 78
OS_AXISANGLE_DEGREES: 168

## 2025-05-21 ASSESSMENT — VISUAL ACUITY
OS_SC: 20/25-2
OD_SC: 20/30+2

## 2025-05-21 ASSESSMENT — TONOMETRY
OD_IOP_MMHG: 16
OS_IOP_MMHG: 15

## 2025-06-04 ENCOUNTER — 1 WEEK POST-OP (OUTPATIENT)
Dept: URBAN - METROPOLITAN AREA CLINIC 6 | Facility: CLINIC | Age: 75
End: 2025-06-04

## 2025-06-04 DIAGNOSIS — Z96.1: ICD-10-CM

## 2025-06-04 PROCEDURE — 99024 POSTOP FOLLOW-UP VISIT: CPT

## 2025-06-04 ASSESSMENT — KERATOMETRY
OS_AXISANGLE2_DEGREES: 78
OS_K2POWER_DIOPTERS: 43.00
OD_AXISANGLE2_DEGREES: 97
OS_K1POWER_DIOPTERS: 41.75
OS_AXISANGLE_DEGREES: 168
OD_AXISANGLE_DEGREES: 007
OD_K2POWER_DIOPTERS: 43.50
OD_K1POWER_DIOPTERS: 41.75

## 2025-06-04 ASSESSMENT — TONOMETRY
OS_IOP_MMHG: 17
OD_IOP_MMHG: 16

## 2025-06-04 ASSESSMENT — VISUAL ACUITY
OD_SC: 20/25
OS_SC: 20/20-1

## 2025-06-10 ENCOUNTER — OFFICE VISIT (OUTPATIENT)
Dept: CARDIOLOGY CLINIC | Facility: CLINIC | Age: 75
End: 2025-06-10
Payer: COMMERCIAL

## 2025-06-10 VITALS
OXYGEN SATURATION: 98 % | BODY MASS INDEX: 43.34 KG/M2 | HEART RATE: 78 BPM | HEIGHT: 59 IN | DIASTOLIC BLOOD PRESSURE: 84 MMHG | WEIGHT: 215 LBS | SYSTOLIC BLOOD PRESSURE: 148 MMHG

## 2025-06-10 DIAGNOSIS — I10 BENIGN ESSENTIAL HYPERTENSION: Primary | ICD-10-CM

## 2025-06-10 DIAGNOSIS — I35.0 NONRHEUMATIC AORTIC VALVE STENOSIS: ICD-10-CM

## 2025-06-10 DIAGNOSIS — E78.00 PURE HYPERCHOLESTEROLEMIA: ICD-10-CM

## 2025-06-10 DIAGNOSIS — I47.10 SUPRAVENTRICULAR TACHYCARDIA (HCC): ICD-10-CM

## 2025-06-10 PROCEDURE — 99214 OFFICE O/P EST MOD 30 MIN: CPT | Performed by: INTERNAL MEDICINE

## 2025-06-10 RX ORDER — CARVEDILOL 12.5 MG/1
12.5 TABLET ORAL 2 TIMES DAILY WITH MEALS
COMMUNITY
End: 2025-06-17 | Stop reason: SDUPTHER

## 2025-06-10 NOTE — PATIENT INSTRUCTIONS
Patient was educated on symptoms to watch out for-in the setting of severe aortic stenosis:  INCREASING SHORTNESS OF BREATH WITH EXERTION, CHEST PAINS OR LIGHTHEADEDNESS/PASSING OUT OR INCREASING FATIGUE.

## 2025-06-10 NOTE — PROGRESS NOTES
Cardiology Follow Up    Lew Martins  1950  [id number]  [facility] [place]  [address]  [address]  [place] [address] [phone]    No diagnosis found.      There are no diagnoses linked to this encounter.    I had the pleasure of seeing Lew Martins for a follow up visit.     INTERVAL HISTORY: none    History of the presenting illness, Discussion/Summary and My Plan are as follows:::    She is a pleasant 74-year-old lady with history of hypertension, hyperlipidemia, type 2 diabetes, who in Oct 2019, post elective right hip total arthroplasty, had asymptomatic supraventricular tachycardia for about 30-40 minutes, converted spontaneously to sinus rhythm.  Her TSH was normal.  Electrolytes were unremarkable.    An echocardiogram showed preserved LV systolic function but also showed LVOT obstruction-up to 40 mm with Valsalva.    She presents for a follow-up visit and denies any complaints at this time.  She denies any palpitations. Moderately active (limited by OA) without any symptoms, edema has resolved and she is dejected about her weight gain, reviewed last echo from Dec 2024    Plan:    Supraventricular tachycardia: Now on Metoprolol and losartan readded for better BP control. Ordered (but not performed) - 24 hour Holter, no palpitations now, for BP, will switch Metoprolol to Coreg    BL lower ext edema: uses Lasix as needed (last in the fall), limit NSAID/Clebrex, since on losartan, not adding Kdur     LVOT/Mid cavity obstruction:  Discovered on echo in the hospital in 2019, on not symptomatic, adequate hydration is important and emphasized. Not noted on last echo in Dec 2024, continue metoprolol    Hypertension: Will switch her metoprolol to Coreg 12.5 twice daily while continuing losartan 100 mg without changes  Also has fatigue since she went on Gabapentin    Moderate AS: repeat echo in 6 mo, discussed symptoms to  watch out for, will check a non contrast CT chest to assess dimensions at next vsiit    Hyperlipidemia:  controlled, see below    Poor sleep, anxiety might be contributing to her chronic fatigue     Follow-up in 6 months     Latest Reference Range & Units 05/06/24 15:01 11/19/24 14:15   Cholesterol See Comment mg/dL 217 (H) 156   Triglycerides See Comment mg/dL 157 (H) 140   HDL >=50 mg/dL 58 42 (L)   Non-HDL Cholesterol mg/dl  114   LDL Calculated 0 - 100 mg/dL 131 (H) 86   (H): Data is abnormally high  (L): Data is abnormally low       Latest Reference Range & Units 06/04/21 14:45 02/24/22 15:24 05/06/24 15:01   Cholesterol 100 - 199 mg/dL 194 187 217 (H)   Triglycerides 0 - 149 mg/dL 151 (H) 149 157 (H)   HDL >39 mg/dL 58 56 58   Non-HDL Cholesterol mg/dl  131    LDL Calculated 0 - 99 mg/dL 106 (H) 101 (H) 131 (H)   (H): Data is abnormally high     Latest Reference Range & Units 11/19/24 14:15 04/23/25 15:25   Hemoglobin A1C <=6.5  7.1 (H) 6.2   (H): Data is abnormally high     Latest Reference Range & Units 05/06/24 15:01 11/19/24 14:15   BUN 5 - 25 mg/dL 10 16   Creatinine 0.60 - 1.30 mg/dL 0.65 0.76       Patient Active Problem List   Diagnosis    Benign essential hypertension    Cervical radiculopathy    Chronic lower back pain    Diabetes mellitus type 2, controlled (HCC)    Hyperlipidemia    Left knee pain    Lumbar stenosis with neurogenic claudication    Morbid obesity (HCC)    Osteoarthrosis    Post-menopausal bleeding    Primary osteoarthritis of both knees    Psoriasis    Chronic pain of both knees    Right shoulder pain    Spondylolisthesis, lumbar region    Vaginal lump    Vitamin D deficiency    Right hip pain    Arthritis of right hip    Lumbar radiculopathy    DDD (degenerative disc disease), lumbar    Lumbar spondylosis    Sacroiliitis (HCC)    Status post total hip replacement, right    Supraventricular tachycardia (HCC)    Aftercare following right hip joint replacement surgery    Stress  incontinence of urine    Chronic pain syndrome    Diabetic polyneuropathy associated with type 2 diabetes mellitus (HCC)    Onychomycosis    Insomnia    Anxiety and depression    Neuralgia and neuritis, unspecified    Macular hole of right eye    Nonrheumatic aortic valve stenosis    Cortical age-related cataract of right eye     Past Medical History:   Diagnosis Date    Alopecia     Calcaneal spur     Depression, recurrent (HCC) 05/08/2024    Diabetes insipidus (HCC)     Diabetes mellitus (HCC)     Hyperlipidemia     Hypertension     Osteoarthrosis 02/13/2013    Pes planus     unspecified laterality    Plantar fasciitis      Social History     Socioeconomic History    Marital status: Single     Spouse name: Not on file    Number of children: Not on file    Years of education: Not on file    Highest education level: Not on file   Occupational History    Not on file   Tobacco Use    Smoking status: Never     Passive exposure: Never    Smokeless tobacco: Never   Vaping Use    Vaping status: Never Used   Substance and Sexual Activity    Alcohol use: Yes     Comment: rarely    Drug use: No    Sexual activity: Not Currently   Other Topics Concern    Not on file   Social History Narrative    Not on file     Social Drivers of Health     Financial Resource Strain: Medium Risk (5/17/2023)    Overall Financial Resource Strain (CARDIA)     Difficulty of Paying Living Expenses: Somewhat hard   Food Insecurity: No Food Insecurity (11/8/2024)    Nursing - Inadequate Food Risk Classification     Worried About Running Out of Food in the Last Year: Never true     Ran Out of Food in the Last Year: Never true     Ran Out of Food in the Last Year: Not on file   Transportation Needs: No Transportation Needs (11/8/2024)    PRAPARE - Transportation     Lack of Transportation (Medical): No     Lack of Transportation (Non-Medical): No   Physical Activity: Not on file   Stress: Not on file   Social Connections: Not on file   Intimate Partner  Violence: Not on file   Housing Stability: Low Risk  (11/8/2024)    Housing Stability Vital Sign     Unable to Pay for Housing in the Last Year: No     Number of Times Moved in the Last Year: 0     Homeless in the Last Year: No      Family History   Problem Relation Name Age of Onset    Diabetes Mother      Hypertension Mother      Emphysema Father      No Known Problems Maternal Grandmother      No Known Problems Maternal Grandfather      No Known Problems Paternal Grandmother      No Known Problems Paternal Grandfather      Colon cancer Brother  37    No Known Problems Brother      No Known Problems Maternal Aunt      No Known Problems Maternal Aunt      No Known Problems Paternal Aunt      No Known Problems Paternal Aunt      No Known Problems Paternal Aunt       Past Surgical History:   Procedure Laterality Date    BREAST BIOPSY Right     patient not sure when , negative results    BREAST CYST ASPIRATION Left     patient not sure when negative results    CARPAL TUNNEL RELEASE      COLONOSCOPY      May 2006    INCISIONAL BREAST BIOPSY      SC ARTHRP ACETBLR/PROX FEM PROSTC AGRFT/ALGRFT Right 9/23/2019    Procedure: ARTHROPLASTY HIP TOTAL;  Surgeon: Choco Quintero MD;  Location: [facility];  Service: Orthopedics    SC COLONOSCOPY FLX DX W/COLLJ SPEC WHEN PFRMD N/A 5/4/2018    Procedure: COLONOSCOPY;  Surgeon: Randee Beck MD;  Location: [facility];  Service: Colorectal    WISDOM TOOTH EXTRACTION         Current Outpatient Medications:     acetaminophen (TYLENOL) 500 mg tablet, Take 1,000 mg by mouth every 6 (six) hours as needed for mild pain, Disp: , Rfl:     Biotin 1 MG CAPS, Take by mouth in the morning., Disp: , Rfl:     furosemide (LASIX) 20 mg tablet, Take 1 tablet (20 mg total) by mouth if needed (for edema) Take 1 pill as needed for increased swelling in the legs, Disp: 30 tablet, Rfl: 3    gabapentin (NEURONTIN) 300 mg capsule, TAKE 1 CAPSULE BY MOUTH THREE TIMES DAILY, Disp: 90 capsule, Rfl: 5     losartan (COZAAR) 100 MG tablet, TAKE 1 TABLET BY MOUTH EVERY DAY, Disp: 90 tablet, Rfl: 1    metFORMIN (GLUCOPHAGE-XR) 500 mg 24 hr tablet, Take 1 tablet (500 mg total) by mouth daily with breakfast, Disp: 90 tablet, Rfl: 1    metoprolol tartrate (LOPRESSOR) 50 mg tablet, TAKE 1 TABLET BY MOUTH EVERY 12 HOURS, Disp: 180 tablet, Rfl: 1    simvastatin (ZOCOR) 40 mg tablet, TAKE 1 TABLET BY MOUTH EVERY DAY, Disp: 90 tablet, Rfl: 1    Calcium Carb-Cholecalciferol (Calcium 1000 + D) 1000-800 MG-UNIT TABS, Take by mouth As needed (Patient not taking: Reported on 10/24/2024), Disp: , Rfl:     cephalexin (KEFLEX) 500 mg capsule, Take 500 mg by mouth if needed Prior to procedure  (Patient not taking: Reported on 10/24/2024), Disp: , Rfl:     cholecalciferol (VITAMIN D3) 1,000 units tablet, Take 1,000 Units by mouth daily (Patient not taking: Reported on 6/10/2025), Disp: , Rfl:     ofloxacin (OCUFLOX) 0.3 % ophthalmic solution, , Disp: , Rfl:     prednisoLONE acetate (PRED FORTE) 1 % ophthalmic suspension, , Disp: , Rfl:   Allergies   Allergen Reactions    Celebrex [Celecoxib] Rash    Meloxicam Hives    Nifedipine Edema    Sulfamethoxazole-Trimethoprim Edema       Imaging: Xr Hip/pelv 2-3 Vws Right If Performed    Result Date: 10/4/2019  Narrative: RIGHT HIP INDICATION:   Z47.1: Aftercare following joint replacement surgery Z96.641: Presence of right artificial hip joint. COMPARISON:  Right hip plain films from 3/20/2019. VIEWS:  XR HIP/PELV 2-3 VWS RIGHT W PELVIS IF PERFORMED FINDINGS: There is no acute fracture or dislocation. Right total hip arthroplasty is identified in satisfactory position without evidence of hardware complication. Moderate left hip osteoarthritis with joint space narrowing and marginal osteophytes. No lytic or blastic osseous lesions. Skin staples are seen lateral to the right hip. Degenerative changes visualized lower lumbar spine.     Impression: Unremarkable appearance of right total hip  "arthroplasty. Workstation performed: [id number]       Review of Systems:  Review of Systems   Constitutional: Negative.    HENT: Negative.     Eyes: Negative.    Respiratory: Negative.     Cardiovascular:  Positive for leg swelling. Negative for chest pain and palpitations.   Endocrine: Negative.    Musculoskeletal: Negative.        Physical Exam:  /88 (BP Location: Right arm, Patient Position: Sitting, Cuff Size: Large)   Pulse 78   Ht 4' 11\" (1.499 m)   Wt 97.5 kg (215 lb)   SpO2 98%   BMI 43.42 kg/m²   Physical Exam  Constitutional:       General: She is not in acute distress.     Appearance: She is well-developed. She is not diaphoretic.   HENT:      Head: Normocephalic and atraumatic.     Eyes:      General: No scleral icterus.        Right eye: No discharge.         Left eye: No discharge.      Conjunctiva/sclera: Conjunctivae normal.      Pupils: Pupils are equal, round, and reactive to light.     Neck:      Thyroid: No thyromegaly.      Trachea: No tracheal deviation.     Cardiovascular:      Rate and Rhythm: Normal rate and regular rhythm.      Heart sounds: Murmur (ESM) heard.      No friction rub.   Pulmonary:      Effort: Pulmonary effort is normal. No respiratory distress.      Breath sounds: Normal breath sounds. No stridor. No wheezing.   Abdominal:      General: Bowel sounds are normal. There is no distension.      Palpations: Abdomen is soft.      Tenderness: There is no abdominal tenderness.     Musculoskeletal:         General: No deformity. Normal range of motion.      Cervical back: Normal range of motion.      Right lower leg: Edema (1 plus) present.      Left lower leg: Edema (1 plus) present.     Skin:     General: Skin is warm.      Coloration: Skin is not pale.      Findings: No erythema or rash.         "

## 2025-06-17 DIAGNOSIS — I10 BENIGN ESSENTIAL HYPERTENSION: Primary | ICD-10-CM

## 2025-06-17 RX ORDER — CARVEDILOL 12.5 MG/1
12.5 TABLET ORAL 2 TIMES DAILY WITH MEALS
Qty: 180 TABLET | Refills: 3 | Status: SHIPPED | OUTPATIENT
Start: 2025-06-17

## 2025-06-26 DIAGNOSIS — M54.16 LUMBAR RADICULOPATHY: ICD-10-CM

## 2025-06-27 RX ORDER — GABAPENTIN 300 MG/1
300 CAPSULE ORAL 3 TIMES DAILY
Qty: 90 CAPSULE | Refills: 5 | Status: SHIPPED | OUTPATIENT
Start: 2025-06-27

## 2025-07-17 DIAGNOSIS — E11.65 CONTROLLED TYPE 2 DIABETES MELLITUS WITH HYPERGLYCEMIA, WITHOUT LONG-TERM CURRENT USE OF INSULIN (HCC): ICD-10-CM

## 2025-07-18 RX ORDER — METFORMIN HYDROCHLORIDE 500 MG/1
500 TABLET, EXTENDED RELEASE ORAL DAILY
Qty: 100 TABLET | Refills: 1 | Status: SHIPPED | OUTPATIENT
Start: 2025-07-18

## 2025-07-25 ENCOUNTER — TELEPHONE (OUTPATIENT)
Age: 75
End: 2025-07-25

## 2025-07-25 NOTE — TELEPHONE ENCOUNTER
Patient calling with questions about switching from Metoprolol to Carvedilol and continuing Losartan. Questions answered, Patient verbalized understanding.

## (undated) DEVICE — CHLORAPREP HI-LITE 26ML ORANGE

## (undated) DEVICE — 2108 SERIES SAGITTAL BLADE (18.6 X 0.64 X 61.1MM)

## (undated) DEVICE — HOOD: Brand: FLYTE, SURGICOOL

## (undated) DEVICE — PENCIL ELECTROSURG E-Z CLEAN -0035H

## (undated) DEVICE — SPONGE PVP SCRUB WING STERILE

## (undated) DEVICE — DRAPE EQUIPMENT RF WAND

## (undated) DEVICE — INTENDED FOR TISSUE SEPARATION, AND OTHER PROCEDURES THAT REQUIRE A SHARP SURGICAL BLADE TO PUNCTURE OR CUT.: Brand: BARD-PARKER SAFETY BLADES SIZE 10, STERILE

## (undated) DEVICE — SUT VICRYL PLUS 2-0 CTB-1 27 IN VCPB259H

## (undated) DEVICE — THE SIMPULSE SOLO SYSTEM WITH ULTREX RETRACTABLE SPLASH SHIELD TIP: Brand: SIMPULSE SOLO

## (undated) DEVICE — SCD SEQUENTIAL COMPRESSION COMFORT SLEEVE MEDIUM KNEE LENGTH: Brand: KENDALL SCD

## (undated) DEVICE — CAPIT HIP MOP -METAL ON POLY

## (undated) DEVICE — GLOVE SRG BIOGEL 8

## (undated) DEVICE — DRESSING MEPILEX AG BORDER 4 X 12 IN

## (undated) DEVICE — GLOVE INDICATOR PI UNDERGLOVE SZ 8.5 BLUE

## (undated) DEVICE — PAD GROUNDING ADULT

## (undated) DEVICE — BETHLEHEM TOTAL HIP, KIT: Brand: CARDINAL HEALTH

## (undated) DEVICE — STOCKINETTE REGULAR

## (undated) DEVICE — SUT VICRYL PLUS 1 CTB-1 36 IN VCPB947H

## (undated) DEVICE — TRAY FOLEY 16FR URIMETER SURESTEP